# Patient Record
Sex: FEMALE | Race: BLACK OR AFRICAN AMERICAN | Employment: OTHER | URBAN - METROPOLITAN AREA
[De-identification: names, ages, dates, MRNs, and addresses within clinical notes are randomized per-mention and may not be internally consistent; named-entity substitution may affect disease eponyms.]

---

## 2018-05-01 ENCOUNTER — OP HISTORICAL/CONVERTED ENCOUNTER (OUTPATIENT)
Dept: OTHER | Age: 48
End: 2018-05-01

## 2019-07-02 ENCOUNTER — ED HISTORICAL/CONVERTED ENCOUNTER (OUTPATIENT)
Dept: OTHER | Age: 49
End: 2019-07-02

## 2019-07-26 ENCOUNTER — OFFICE VISIT (OUTPATIENT)
Dept: HEMATOLOGY | Age: 49
End: 2019-07-26

## 2019-07-26 VITALS
HEIGHT: 66 IN | DIASTOLIC BLOOD PRESSURE: 65 MMHG | OXYGEN SATURATION: 96 % | HEART RATE: 66 BPM | WEIGHT: 170.6 LBS | TEMPERATURE: 98.9 F | BODY MASS INDEX: 27.42 KG/M2 | SYSTOLIC BLOOD PRESSURE: 97 MMHG | RESPIRATION RATE: 14 BRPM

## 2019-07-26 DIAGNOSIS — R74.8 ELEVATED LIVER ENZYMES: Primary | ICD-10-CM

## 2019-07-26 PROBLEM — G62.9 NEUROPATHY: Status: ACTIVE | Noted: 2019-07-26

## 2019-07-26 PROBLEM — G43.909 MIGRAINE HEADACHE: Status: ACTIVE | Noted: 2019-07-26

## 2019-07-26 PROBLEM — Z90.49 HISTORY OF APPENDECTOMY: Status: ACTIVE | Noted: 2019-07-26

## 2019-07-26 PROBLEM — I42.9 CARDIOMYOPATHY (HCC): Status: ACTIVE | Noted: 2019-07-26

## 2019-07-26 PROBLEM — E03.9 HYPOTHYROIDISM: Status: ACTIVE | Noted: 2019-07-26

## 2019-07-26 PROBLEM — R56.9 SEIZURES (HCC): Status: ACTIVE | Noted: 2019-07-26

## 2019-07-26 PROBLEM — Z90.49 HISTORY OF CHOLECYSTECTOMY: Status: ACTIVE | Noted: 2019-07-26

## 2019-07-26 PROBLEM — Z98.84 H/O GASTRIC BYPASS: Status: ACTIVE | Noted: 2019-07-26

## 2019-07-26 RX ORDER — MONTELUKAST SODIUM 10 MG/1
10 TABLET ORAL DAILY
Refills: 3 | COMMUNITY
Start: 2019-07-15

## 2019-07-26 RX ORDER — ALBUTEROL SULFATE 90 UG/1
2 AEROSOL, METERED RESPIRATORY (INHALATION)
Refills: 4 | COMMUNITY
Start: 2019-07-15

## 2019-07-26 RX ORDER — SUCRALFATE 1 G/10ML
1 SUSPENSION ORAL 4 TIMES DAILY
COMMUNITY
End: 2022-09-28

## 2019-07-26 RX ORDER — DICLOFENAC SODIUM 10 MG/G
2 GEL TOPICAL EVERY 6 HOURS
Refills: 1 | Status: ON HOLD | COMMUNITY
Start: 2019-05-27 | End: 2021-09-17

## 2019-07-26 RX ORDER — PREGABALIN 100 MG/1
CAPSULE ORAL
Refills: 2 | COMMUNITY
Start: 2019-06-25 | End: 2020-04-21

## 2019-07-26 RX ORDER — SYRINGE WITH NEEDLE, 1 ML 25GX5/8"
SYRINGE, EMPTY DISPOSABLE MISCELLANEOUS
Refills: 0 | Status: ON HOLD | COMMUNITY
Start: 2019-07-23 | End: 2021-01-04

## 2019-07-26 RX ORDER — DEXLANSOPRAZOLE 60 MG/1
60 CAPSULE, DELAYED RELEASE ORAL DAILY
COMMUNITY

## 2019-07-26 RX ORDER — TOPIRAMATE 100 MG/1
100 TABLET, FILM COATED ORAL 2 TIMES DAILY
Refills: 5 | COMMUNITY
Start: 2019-07-19

## 2019-07-26 RX ORDER — PAROXETINE 10 MG/1
10 TABLET, FILM COATED ORAL DAILY
Refills: 4 | Status: ON HOLD | COMMUNITY
Start: 2019-04-30 | End: 2021-09-17

## 2019-07-26 RX ORDER — BUMETANIDE 1 MG/1
1 TABLET ORAL DAILY
COMMUNITY
End: 2021-04-30 | Stop reason: SDUPTHER

## 2019-07-26 RX ORDER — ESTRADIOL 0.1 MG/G
CREAM VAGINAL
Refills: 2 | COMMUNITY
Start: 2019-05-04 | End: 2022-09-28

## 2019-07-26 RX ORDER — LOSARTAN POTASSIUM 25 MG/1
25 TABLET ORAL
COMMUNITY
End: 2019-07-26 | Stop reason: SDUPTHER

## 2019-07-26 RX ORDER — NORTRIPTYLINE HYDROCHLORIDE 50 MG/1
100 CAPSULE ORAL
Refills: 5 | COMMUNITY
Start: 2019-07-19

## 2019-07-26 RX ORDER — CARVEDILOL 6.25 MG/1
6.25 TABLET ORAL 2 TIMES DAILY
COMMUNITY
End: 2021-11-08 | Stop reason: SDUPTHER

## 2019-07-26 RX ORDER — POTASSIUM CHLORIDE 20 MEQ/1
20 TABLET, EXTENDED RELEASE ORAL DAILY
Refills: 4 | COMMUNITY
Start: 2019-07-11 | End: 2022-03-08

## 2019-07-26 RX ORDER — DICYCLOMINE HYDROCHLORIDE 20 MG/1
20 TABLET ORAL EVERY 6 HOURS
COMMUNITY

## 2019-07-26 RX ORDER — BUDESONIDE AND FORMOTEROL FUMARATE DIHYDRATE 80; 4.5 UG/1; UG/1
AEROSOL RESPIRATORY (INHALATION)
Refills: 4 | COMMUNITY
Start: 2019-05-24

## 2019-07-26 RX ORDER — FAMOTIDINE 40 MG/1
40 TABLET, FILM COATED ORAL
Refills: 2 | COMMUNITY
Start: 2019-07-20 | End: 2022-03-08

## 2019-07-26 RX ORDER — LEVETIRACETAM 750 MG/1
1500 TABLET ORAL 2 TIMES DAILY
Refills: 1 | COMMUNITY
Start: 2019-06-16 | End: 2022-05-08

## 2019-07-26 RX ORDER — ALBUTEROL SULFATE 0.83 MG/ML
2.5 SOLUTION RESPIRATORY (INHALATION)
Refills: 5 | Status: ON HOLD | COMMUNITY
Start: 2019-05-24 | End: 2021-01-04

## 2019-07-26 RX ORDER — SPIRONOLACTONE 25 MG/1
25 TABLET ORAL
COMMUNITY
End: 2019-07-26 | Stop reason: SDUPTHER

## 2019-07-26 RX ORDER — LEVOTHYROXINE SODIUM 50 UG/1
50 CAPSULE ORAL
COMMUNITY
End: 2019-07-26 | Stop reason: SDUPTHER

## 2019-07-26 NOTE — Clinical Note
7/28/19 Patient: Shilo Campbell YOB: 1970 Date of Visit: 7/26/2019 Matteo Ramirez, [address] VIA Facsimile: [phone] Dear Matteo Ramirez MD, Thank you for referring Ms. Anya Munroe Milagros to [facility] for evaluation. My notes for this consultation are attached. If you have questions, please do not hesitate to call me. I look forward to following your patient along with you. Sincerely, Ivelisse Chawla MD

## 2019-07-26 NOTE — PROGRESS NOTES
Chief Complaint   Patient presents with   [employer] Establish Care     new patient         1. Have you been to the ER, urgent care clinic since your last visit? Hospitalized since your last visit? no    2. Have you seen or consulted any other health care providers outside of the [address] [place] [address] since your last visit? Include any pap smears or colon screening.  no

## 2019-07-26 NOTE — PROGRESS NOTES
[facility], MD, [address], Cite Preeti Bonner MD [place], PARONNY Benitez, ACN-BC     Perla Russo, [facility]   BLESSING HawkinsP-C    Luca Garcia, [facility]       Johanne Coyle Ghassan De [address] 136    at [address], [address] [place] [address], [facility] 22.    [phone]    FAX: [address] [place] [address], [address]    [phone]    FAX: [phone]           Patient Care Team:  Yancy Lucas MD as PCP - General (Family Practice)  Magan Tam MD (Breast Surgery)  Robby Nathan MD as Referring Provider (Obstetrics & Gynecology)  Magan Tam MD (Breast Surgery)      Problem List  Date Reviewed: 7/26/2019          Codes Class Noted    Elevated liver enzymes ICD-10-CM: R74.8  ICD-9-CM: 790.5  7/26/2019        Card[facility] ([facility] ICD-10-CM: I42.9  ICD-9-CM: 425.4  7/26/2019        Hypothyroidism ICD-10-CM: E03.9  ICD-9-CM: 244.9  7/26/2019        H/O gastric bypass ICD-10-CM: Z98.84  ICD-9-CM: V45.86  7/26/2019        History of cholecystectomy ICD-10-CM: Z90.49  ICD-9-CM: V45.79  7/26/2019        History of appendectomy ICD-10-CM: Z90.49  ICD-9-CM: V45.89  7/26/2019        Neuropathy ICD-10-CM: G62.9  ICD-9-CM: 355.9  7/26/2019        Migraine headache ICD-10-CM: F67.472  ICD-9-CM: 346.90  7/26/2019        Seizures ([facility] ICD-10-CM: R56.9  ICD-9-CM: 780.39  7/26/2019        Fibrocystic breast changes of both breasts ICD-10-CM: N60.11, N60.12  ICD-9-CM: 610.1  7/8/2015              The clinicians listed above haveasked me to see Paul Palacios in consultation regarding elevated liver enzymes and its management.   All medical records sent by the referring physicians were reviewed     The patient is a 44 y.o. Black female who was found to have abnormalities in liver enzymes in 2018. Serologic evaluation for markers of chronic liver disease has either not been performed or the results are not available to me. Imaging of the liver was not performed. An assessment of liver fibrosis with biopsy or elastography has not been performed. The patient had not started any new medications within 3 months preceding the elevation in liver     The patient has the following symptoms which are thought to be due to the liver disease:  fatigue,   nausea, diffuse abdominal pain,     The patient has not experienced the following symptoms:  pain in the right side over the liver,     The patient has Mild limitations in functional activities which can be attributed to other medical problems that are not related to the liver disease. ASSESSMENT AND PLAN:  Elevated liver enzymes  Persistent elevation in liver transaminases of unclear etiology at this time. Have performed laboratory testing to monitor liver function and degree of liver injury. This included BMP, hepatic panel, CBC with platelet count, INR. Liver transaminases are elevated. ALP is normal.  Liver function is normal.  The platelet count is normal.      Serologic testing for causes of chronic liver disease were ordered. Will perform imaging of the liver with ultrasound. The need to perform an assessment of liver fibrosis was discussed with the patient. The Fibroscan can assess liver fibrosis and determine if a patient has advanced fibrosis or cirrhosis without the need for liver biopsy. This will be performed at the next office visit. If the Fibroscan suggests advanced fibrosis then a liver biopsy should be considered. Screening for Hepatocellular Carcinoma  HCC screening is not necessary if the patient has no evidence of cirrhosis.     Treatment of other medical problems in patients with chronic liver disease  There are no contraindications for the patient to take most medications that are necessary for treatment of other medical issues. Counseling for alcohol in patients with chronic liver disease  The patient was counseled regarding alcohol consumption and the effect of alcohol on chronic liver disease. Patients who have undergone obesity surgery are at much greater risk to develop alcoholic liver injury. Vaccinations   The need for vaccination against viral hepatitis A and B will be assessed with serologic and instituted as appropriate. Routine vaccinations against other bacterial and viral agents can be performed as indicated. Annual flu vaccination should be administered if indicated. ALLERGIES  Allergies   Allergen Reactions    Morphine Other (comments)     SOB/Chest Pressure - in hosp for a week. States DILAUDID Ok    Codeine Other (comments)     SOB chest pain    Sulfa (Sulfonamide Antibiotics) Rash     itching       MEDICATIONS  Current Outpatient Medications   Medication Sig    levETIRAcetam (KEPPRA) 750 mg tablet TAKE 1 TABLET BY MOUTH TWICE DAILY    lipase-protease-amylase (ZENPEP 40,000) 40,000-126,000- 168,000 unit cpDR capsule Take  by mouth.  montelukast (SINGULAIR) 10 mg tablet TAKE 1 TABLET BY MOUTH ONCE DAILY    nortriptyline (PAMELOR) 50 mg capsule TAKE 2 CAPSULES BY MOUTH ONCE DAILY AT BEDTIME FOR 30 DAYS    PARoxetine (PAXIL) 10 mg tablet     potassium chloride (K-DUR, KLOR-CON) 20 mEq tablet TAKE 1 TABLET BY MOUTH TWICE DAILY FOR 30 DAYS    LYRICA 100 mg capsule TAKE 1 CAPSULE BY MOUTH TWICE DAILY FOR 30 DAYS    sucralfate (CARAFATE) 100 mg/mL suspension Take 1 g by mouth.  BD LUER-TEO SYRINGE 3 mL 23 x 1\" syrg USE TO INJECT B 12 INJECTIONS ONCE A MONTH    topiramate (TOPAMAX) 100 mg tablet TAKE 1 TABLET BY MOUTH TWICE DAILY    carvedilol (COREG) 6.25 mg tablet Take 6.25 mg by mouth.     albuterol (PROVENTIL VENTOLIN) 2.5 mg /3 mL (0.083 %) nebu USE 1 VIAL IN NEBULIZER EVERY 4 TO 6 HOURS AS NEEDED    VENTOLIN HFA 90 mcg/actuation inhaler INHALE 2 PUFFS BY MOUTH EVERY 4 TO 6 HOURS AS NEEDED    SYMBICORT 80-4.5 mcg/actuation HFAA INHALE 2 PUFFS BY MOUTH TWICE DAILY    bumetanide (BUMEX) 1 mg tablet Take 1 mg by mouth.  dexlansoprazole (DEXILANT) 60 mg CpDB capsule (delayed release) Take 60 mg by mouth.  diclofenac (VOLTAREN) 1 % gel APPLY 4 GRAMS TOPICALLY TO AFFECTED AREA FOUR TIMES A DAY    dicyclomine (BENTYL) 20 mg tablet Take 20 mg by mouth.  estradiol (ESTRACE) 0.01 % (0.1 mg/gram) vaginal cream     cetirizine (ZYRTEC) 10 mg tablet Take  by mouth daily.  losartan (COZAAR) 25 mg tablet Take 25 mg by mouth daily (after breakfast).  spironolactone (ALDACTONE) 25 mg tablet Take 25 mg by mouth daily (after breakfast).  levothyroxine (SYNTHROID) 50 mcg tablet Take 50 mcg by mouth Daily (before breakfast).  senna-docusate (PERICOLACE) 8.6-50 mg per tablet Take 2 Tabs by mouth daily.  cyanocobalamin (VITAMIN B-12) 1,000 mcg/mL injection 1,000 mcg by IntraMUSCular route every thirty (30) days.  Calcium-Cholecalciferol, D3, (CALCIUM 600 WITH VITAMIN D3) 600 mg(1,500mg) -400 unit cap Take  by mouth two (2) times a day.  multivitamin (ONE A DAY) tablet Take 1 Tab by mouth daily.  Cholecalciferol, Vitamin D3, (VITAMIN D3) 2,000 unit cap capsule Take 5,000 Units by mouth daily.  famotidine (PEPCID) 40 mg tablet TAKE 1 TABLET BY MOUTH TWICE DAILY    evening primrose oil 500 mg cap Take  by mouth.  vitamin E (AQUA GEMS) 400 unit capsule Take  by mouth daily.  divalproex DR (DEPAKOTE) 500 mg tablet Take 500 mg by mouth daily.  carvedilol (COREG) 3.125 mg tablet Take 3.125 mg by mouth two (2) times a day.  esomeprazole (NEXIUM) 40 mg capsule Take 40 mg by mouth daily (after breakfast).  celecoxib (CELEBREX) 200 mg capsule Take 200 mg by mouth daily. No current facility-administered medications for this visit. SYSTEM REVIEW NOT RELATED TO LIVER DISEASE OR REVIEWED ABOVE:  Constitution systems: Negative for fever, chills, weight gain, weight loss. Eyes: Negative for visual changes. ENT: Negative for sore throat, painful swallowing. Respiratory: Negative for cough, hemoptysis, SOB. Cardiology: Negative for chest pain, palpitations. GI:  Negative for constipation or diarrhea. : Negative for urinary frequency, dysuria, hematuria, nocturia. Skin: Negative for rash. Hematology: Negative for easy bruising, blood clots. Musculo-skelatal: Negative for back pain, muscle pain, weakness. Neurologic: Negative for headaches, dizziness, vertigo, memory problems not related to HE. Psychology: Negative for anxiety, depression. FAMILY HISTORY:  The father  Has/had the following following chronic disease(s): cancer. The mother Has/had the following chronic disease(s): MI.    There is no family history of liver disease. SOCIAL HISTORY:  The patient is . The patient has 4 children,   The patient has never used tobacco products. The patient has never consumed significant amounts of alcohol. The patient currently receiving disability. PHYSICAL EXAMINATION:  Visit Vitals  BP 97/65 (BP 1 Location: Left arm, BP Patient Position: Sitting)   Pulse 66   Temp 98.9 °F (37.2 °C) (Oral)   Resp 14   Ht 5' 6\" (1.676 m)   Wt 170 lb 9.6 oz (77.4 kg)   SpO2 96%   BMI 27.54 kg/m²     General: No acute distress. Eyes: Sclera anicteric. ENT: No oral lesions. Thyroid normal.  Nodes: No adenopathy. Skin: No spider angiomata. No jaundice. No palmar erythema. Respiratory: Lungs clear to auscultation. Cardiovascular: Regular heart rate. No murmurs. No JVD. Abdomen: Soft non-tender. Liver size normal to percussion/palpation. Spleen not palpable. No obvious ascites. Extremities: No edema. No muscle wasting. No gross arthritic changes. Neurologic: Alert and oriented.   Cranial nerves grossly intact. No asterixis. LABORATORY STUDIES:  Liver [place] of [address] Units 7/26/2019   WBC 3.4 - 10.8 x10E3/uL 8.5   ANC 1.4 - 7.0 x10E3/uL 6.3   HGB 11.1 - 15.9 g/dL 11.8    - 450 x10E3/uL 209   INR 0.8 - 1.2 1.0   AST 0 - 40 IU/L 34   ALT 0 - 32 IU/L 57 (H)   Alk Phos 39 - 117 IU/L 126 (H)   Bili, Total 0.0 - 1.2 mg/dL 0.3   Bili, Direct 0.00 - 0.40 mg/dL 0.09   Albumin 3.5 - 5.5 g/dL 4.1   BUN 6 - 24 mg/dL 14   Creat 0.57 - 1.00 mg/dL 0.90   Na 134 - 144 mmol/L 138   K 3.5 - 5.2 mmol/L 3.1 (L)   Cl 96 - 106 mmol/L 100   CO2 20 - 29 mmol/L 24   Glucose 65 - 99 mg/dL 85     SEROLOGIES:  Not available or performed. Testing was performed today. LIVER HISTOLOGY:  Not available or performed    ENDOSCOPIC PROCEDURES:  Not available or performed    RADIOLOGY:  Not available or performed    OTHER TESTING:  Not available or performed    FOLLOW-UP:  All of the issues listed above in the Assessment and Plan were discussed with the patient. All questions were answered. The patient expressed a clear understanding of the above. [address] [place] [address] in 4 weeks for Fibroscan to review all data and determine the treatment plan.       Farzana Matthew MD  [facility] 13  [address], [address] [facility] 22.  [phone]  [facility] [address]

## 2019-07-27 LAB
A1AT SERPL-MCNC: 99 MG/DL (ref 90–200)
ALBUMIN SERPL-MCNC: 4.1 G/DL (ref 3.5–5.5)
ALP SERPL-CCNC: 126 IU/L (ref 39–117)
ALT SERPL-CCNC: 57 IU/L (ref 0–32)
AST SERPL-CCNC: 34 IU/L (ref 0–40)
BASOPHILS # BLD AUTO: 0 X10E3/UL (ref 0–0.2)
BASOPHILS NFR BLD AUTO: 0 %
BILIRUB DIRECT SERPL-MCNC: 0.09 MG/DL (ref 0–0.4)
BILIRUB SERPL-MCNC: 0.3 MG/DL (ref 0–1.2)
BUN SERPL-MCNC: 14 MG/DL (ref 6–24)
BUN/CREAT SERPL: 16 (ref 9–23)
CALCIUM SERPL-MCNC: 8.8 MG/DL (ref 8.7–10.2)
CERULOPLASMIN SERPL-MCNC: 23.2 MG/DL (ref 19–39)
CHLORIDE SERPL-SCNC: 100 MMOL/L (ref 96–106)
CO2 SERPL-SCNC: 24 MMOL/L (ref 20–29)
COMMENT, 144067: NORMAL
CREAT SERPL-MCNC: 0.9 MG/DL (ref 0.57–1)
EOSINOPHIL # BLD AUTO: 0 X10E3/UL (ref 0–0.4)
EOSINOPHIL NFR BLD AUTO: 1 %
ERYTHROCYTE [DISTWIDTH] IN BLOOD BY AUTOMATED COUNT: 15.6 % (ref 12.3–15.4)
FERRITIN SERPL-MCNC: 14 NG/ML (ref 15–150)
GLUCOSE SERPL-MCNC: 85 MG/DL (ref 65–99)
HAV AB SER QL IA: NEGATIVE
HBV CORE AB SERPL QL IA: NEGATIVE
HBV SURFACE AB SER QL: NON REACTIVE
HBV SURFACE AG SERPL QL IA: NEGATIVE
HCT VFR BLD AUTO: 36.2 % (ref 34–46.6)
HCV AB S/CO SERPL IA: <0.1 S/CO RATIO (ref 0–0.9)
HGB BLD-MCNC: 11.8 G/DL (ref 11.1–15.9)
IMM GRANULOCYTES # BLD AUTO: 0 X10E3/UL (ref 0–0.1)
IMM GRANULOCYTES NFR BLD AUTO: 0 %
INR PPP: 1 (ref 0.8–1.2)
IRON SATN MFR SERPL: 12 % (ref 15–55)
IRON SERPL-MCNC: 48 UG/DL (ref 27–159)
LYMPHOCYTES # BLD AUTO: 1.6 X10E3/UL (ref 0.7–3.1)
LYMPHOCYTES NFR BLD AUTO: 19 %
MCH RBC QN AUTO: 27.6 PG (ref 26.6–33)
MCHC RBC AUTO-ENTMCNC: 32.6 G/DL (ref 31.5–35.7)
MCV RBC AUTO: 85 FL (ref 79–97)
MONOCYTES # BLD AUTO: 0.6 X10E3/UL (ref 0.1–0.9)
MONOCYTES NFR BLD AUTO: 7 %
NEUTROPHILS # BLD AUTO: 6.3 X10E3/UL (ref 1.4–7)
NEUTROPHILS NFR BLD AUTO: 73 %
PLATELET # BLD AUTO: 209 X10E3/UL (ref 150–450)
POTASSIUM SERPL-SCNC: 3.1 MMOL/L (ref 3.5–5.2)
PROT SERPL-MCNC: 6.4 G/DL (ref 6–8.5)
PROTHROMBIN TIME: 10.5 SEC (ref 9.1–12)
RBC # BLD AUTO: 4.28 X10E6/UL (ref 3.77–5.28)
SODIUM SERPL-SCNC: 138 MMOL/L (ref 134–144)
TIBC SERPL-MCNC: 393 UG/DL (ref 250–450)
UIBC SERPL-MCNC: 345 UG/DL (ref 131–425)
WBC # BLD AUTO: 8.5 X10E3/UL (ref 3.4–10.8)

## 2019-07-29 LAB
ACTIN IGG SERPL-ACNC: 6 UNITS (ref 0–19)
MITOCHONDRIA M2 IGG SER-ACNC: <20 UNITS (ref 0–20)

## 2019-07-30 LAB — ANA TITR SER IF: NEGATIVE {TITER}

## 2019-08-07 ENCOUNTER — HOSPITAL ENCOUNTER (OUTPATIENT)
Dept: ULTRASOUND IMAGING | Age: 49
Discharge: HOME | End: 2019-08-07
Attending: INTERNAL MEDICINE
Payer: MEDICARE

## 2019-08-07 DIAGNOSIS — R74.8 ELEVATED LIVER ENZYMES: ICD-10-CM

## 2019-08-07 PROCEDURE — 76705 ECHO EXAM OF ABDOMEN: CPT

## 2019-08-30 ENCOUNTER — OFFICE VISIT (OUTPATIENT)
Dept: HEMATOLOGY | Age: 49
End: 2019-08-30

## 2019-08-30 VITALS
WEIGHT: 167 LBS | BODY MASS INDEX: 26.84 KG/M2 | HEIGHT: 66 IN | TEMPERATURE: 97.2 F | HEART RATE: 60 BPM | SYSTOLIC BLOOD PRESSURE: 103 MMHG | OXYGEN SATURATION: 97 % | DIASTOLIC BLOOD PRESSURE: 69 MMHG

## 2019-08-30 DIAGNOSIS — R74.8 ELEVATED LIVER ENZYMES: Primary | ICD-10-CM

## 2019-08-30 DIAGNOSIS — K76.0 FATTY LIVER: ICD-10-CM

## 2019-08-30 NOTE — Clinical Note
9/17/19 Patient: Luan Chu YOB: 1970 Date of Visit: 8/30/2019 Kieran Snyder, 14 [address] VIA Facsimile: [phone] Dear Kieran Snyder MD, Thank you for referring Ms. Viky Reyna Jigna to [facility] for evaluation. My notes for this consultation are attached. If you have questions, please do not hesitate to call me. I look forward to following your patient along with you. Sincerely, Trinity Cuevas MD

## 2019-08-30 NOTE — PROGRESS NOTES
[address] [facility], [place], [address], [place]ite Patricia Marie, MD Alejandra Obregon, MANISH Santiago, [facility]     Perla Russo, [facility]   Harish Garcia, NURIA-SERA Aquino, [facility]       Johanne Coyle Ghassan [address] 136    at [address], [address] [place] [address], [facility] 22.    [phone]    FAX: [address] [place] [address]    at [address], [address]    [phone]    FAX: [phone]       Patient Care Team:  Kelsie Hutson MD as PCP - General (Family Practice)  Omid Martinez MD (Breast Surgery)  Ofelia Garcia MD as Referring Provider (Obstetrics & Gynecology)  Omid Martinez MD (Breast Surgery)      Problem List  Date Reviewed: 7/28/2019          Codes Class Noted    Elevated liver enzymes ICD-10-CM: R74.8  ICD-9-CM: 790.5  7/26/2019        Cardiomyopathy ([facility] 75.) ICD-10-CM: I42.9  ICD-9-CM: 425.4  7/26/2019        Hypothyroidism ICD-10-CM: E03.9  ICD-9-CM: 244.9  7/26/2019        H/O gastric bypass ICD-10-CM: Z98.84  ICD-9-CM: V45.86  7/26/2019        History of cholecystectomy ICD-10-CM: Z90.49  ICD-9-CM: V45.79  7/26/2019        History of appendectomy ICD-10-CM: Z90.49  ICD-9-CM: V45.89  7/26/2019        Neuropathy ICD-10-CM: G62.9  ICD-9-CM: 355.9  7/26/2019        Migraine headache ICD-10-CM: H25.655  ICD-9-CM: 346.90  7/26/2019        Seizures ([facility] 75.) ICD-10-CM: R56.9  ICD-9-CM: 780.39  7/26/2019        Fibrocystic breast changes of both breasts ICD-10-CM: N60.11, N60.12  ICD-9-CM: 610.1  7/8/2015              Rella Pack returns to the [address] for management of elevated liver enzymes.  The active problem list, all pertinent past medical history, medications, radiologic findings and laboratory findings related to the liver disorder were reviewed with the patient. The patient is a 44 y.o. Black female who was found to have abnormalities in liver enzymes in 2018. Serologic evaluation for markers of chronic liver disease were negative. Imaging of the liver was performed 8/2019 which demonstrates a normal liver, no ductal dilatation or mass. Assessment of liver fibrosis was performed in the office today with Fibroscan. The result was 3.3 kPa which correlates with no fibrosis. The CAP score of 267 suggests mild fatty liver. The patient has the following symptoms which are thought to be due to the liver disease:  fatigue, nausea, and diffuse abdominal pain. The patient has not experienced the following symptoms:  pain in the right side over the liver. The patient has Mild limitations in functional activities which can be attributed to other medical problems that are not related to the liver disease. ASSESSMENT AND PLAN:  Benign steatosis/NAFL  The diagnosis is based upon imaging, Fiboscan CAP score, features of metabolic syndrome, and serologic studies that are negative for other causes of chronic liver disease. Elastography performed in 8/2019 suggests no fibrosis. NAFL is a benign form of fatty liver disease and not thought to progress to fibrosis or cirrhosis. Liver transaminases are elevated. AST is normal.  ALT is elevated. ALP is elevated. Liver function is normal.  The platelet count is   normal.      Based upon laboratory studies Fibroscan, and imaging  the patient does not appear to have significant liver injury. Have performed laboratory testing to monitor liver function and degree of liver injury. This included BMP, hepatic panel, CBC with platelet count, and INR. Counseling for diet and weight loss in patients with confirmed or suspected NAFLD  There is currently no FDA approved medical treatment for fatty liver, NALFD or NOEL.     The patient was counseled regarding diet and exercise to achieve weight loss. The best diet for patients with fatty liver is one very low in carbohydrates and enriched with protein such as an Javed's program.      The patient does not have a significant amount of weight to lose but changing dietary choices may help eliminate fatty liver and the liver enzymes may improve. The patient was told not to consume any food products and drinks containing fructose as this enhances hepatic fat synthesis. Screening for Hepatocellular Carcinoma  HCC screening is not necessary if the patient has no evidence of cirrhosis. Treatment of other medical problems in patients with chronic liver disease  There are no contraindications for the patient to take most medications that are necessary for treatment of other medical issues. Counseling for alcohol in patients with chronic liver disease  The patient was counseled regarding alcohol consumption and the effect of alcohol on chronic liver disease. Patients who have undergone obesity surgery are at much greater risk to develop alcoholic liver injury. Vaccinations   Vaccination for viral hepatitis A and B is recommended since the patient has no serologic evidence of previous exposure or vaccination with immunity. Routine vaccinations against other bacterial and viral agents can be performed as indicated. Annual flu vaccination should be administered if indicated. Vaccinations   The need for vaccination against viral hepatitis A and B will be assessed with serologic and instituted as appropriate. Routine vaccinations against other bacterial and viral agents can be performed as indicated. Annual flu vaccination should be administered if indicated. ALLERGIES  Allergies   Allergen Reactions    Morphine Other (comments)     SOB/Chest Pressure - in hosp for a week.   States DILAUDID Ok    Codeine Other (comments)     SOB chest pain    Sulfa (Sulfonamide Antibiotics) Rash     itching       MEDICATIONS  Current Outpatient Medications   Medication Sig    levETIRAcetam (KEPPRA) 750 mg tablet TAKE 1 TABLET BY MOUTH TWICE DAILY    lipase-protease-amylase (ZENPEP 40,000) 40,000-126,000- 168,000 unit cpDR capsule Take  by mouth.  montelukast (SINGULAIR) 10 mg tablet TAKE 1 TABLET BY MOUTH ONCE DAILY    nortriptyline (PAMELOR) 50 mg capsule TAKE 2 CAPSULES BY MOUTH ONCE DAILY AT BEDTIME FOR 30 DAYS    potassium chloride (K-DUR, KLOR-CON) 20 mEq tablet TAKE 1 TABLET BY MOUTH TWICE DAILY FOR 30 DAYS    LYRICA 100 mg capsule TAKE 1 CAPSULE BY MOUTH TWICE DAILY FOR 30 DAYS    sucralfate (CARAFATE) 100 mg/mL suspension Take 1 g by mouth.  BD LUER-TEO SYRINGE 3 mL 23 x 1\" syrg USE TO INJECT B 12 INJECTIONS ONCE A MONTH    topiramate (TOPAMAX) 100 mg tablet TAKE 1 TABLET BY MOUTH TWICE DAILY    carvedilol (COREG) 6.25 mg tablet Take 6.25 mg by mouth.  VENTOLIN HFA 90 mcg/actuation inhaler INHALE 2 PUFFS BY MOUTH EVERY 4 TO 6 HOURS AS NEEDED    SYMBICORT 80-4.5 mcg/actuation HFAA INHALE 2 PUFFS BY MOUTH TWICE DAILY    bumetanide (BUMEX) 1 mg tablet Take 1 mg by mouth.  dexlansoprazole (DEXILANT) 60 mg CpDB capsule (delayed release) Take 60 mg by mouth.  diclofenac (VOLTAREN) 1 % gel APPLY 4 GRAMS TOPICALLY TO AFFECTED AREA FOUR TIMES A DAY    dicyclomine (BENTYL) 20 mg tablet Take 20 mg by mouth.  estradiol (ESTRACE) 0.01 % (0.1 mg/gram) vaginal cream     cetirizine (ZYRTEC) 10 mg tablet Take  by mouth daily.  losartan (COZAAR) 25 mg tablet Take 25 mg by mouth daily (after breakfast).  spironolactone (ALDACTONE) 25 mg tablet Take 25 mg by mouth daily (after breakfast).  celecoxib (CELEBREX) 200 mg capsule Take 200 mg by mouth daily.  levothyroxine (SYNTHROID) 50 mcg tablet Take 50 mcg by mouth Daily (before breakfast).  cyanocobalamin (VITAMIN B-12) 1,000 mcg/mL injection 1,000 mcg by IntraMUSCular route every thirty (30) days.     Calcium-Cholecalciferol, D3, (CALCIUM 600 WITH VITAMIN D3) 600 mg(1,500mg) -400 unit cap Take  by mouth two (2) times a day.  multivitamin (ONE A DAY) tablet Take 1 Tab by mouth daily.  Cholecalciferol, Vitamin D3, (VITAMIN D3) 2,000 unit cap capsule Take 5,000 Units by mouth daily.  PARoxetine (PAXIL) 10 mg tablet     albuterol (PROVENTIL VENTOLIN) 2.5 mg /3 mL (0.083 %) nebu USE 1 VIAL IN NEBULIZER EVERY 4 TO 6 HOURS AS NEEDED    famotidine (PEPCID) 40 mg tablet TAKE 1 TABLET BY MOUTH TWICE DAILY    evening primrose oil 500 mg cap Take  by mouth.  vitamin E (AQUA GEMS) 400 unit capsule Take  by mouth daily.  divalproex DR (DEPAKOTE) 500 mg tablet Take 500 mg by mouth daily.  carvedilol (COREG) 3.125 mg tablet Take 3.125 mg by mouth two (2) times a day.  esomeprazole (NEXIUM) 40 mg capsule Take 40 mg by mouth daily (after breakfast).  senna-docusate (PERICOLACE) 8.6-50 mg per tablet Take 2 Tabs by mouth daily. No current facility-administered medications for this visit. SYSTEM REVIEW NOT RELATED TO LIVER DISEASE OR REVIEWED ABOVE:  Constitution systems: Negative for fever, chills, weight gain, weight loss. Eyes: Negative for visual changes. ENT: Negative for sore throat, painful swallowing. Respiratory: Negative for cough, hemoptysis, SOB. Cardiology: Negative for chest pain, palpitations. GI:  Negative for constipation or diarrhea. : Negative for urinary frequency, dysuria, hematuria, nocturia. Skin: Negative for rash. Hematology: Negative for easy bruising, blood clots. Musculo-skelatal: Negative for back pain, muscle pain, weakness. Neurologic: Negative for headaches, dizziness, vertigo, memory problems not related to HE. Psychology: Negative for anxiety, depression. FAMILY HISTORY:  The father  Has/had the following following chronic disease(s): cancer.     The mother Has/had the following chronic disease(s): MI.    There is no family history of liver disease. SOCIAL HISTORY:  The patient is . The patient has 4 children,   The patient has never used tobacco products. The patient has never consumed significant amounts of alcohol. The patient currently receiving disability. PHYSICAL EXAMINATION:  Visit Vitals  /69 (BP 1 Location: Right arm, BP Patient Position: Sitting)   Pulse 60   Temp 97.2 °F (36.2 °C) (Tympanic)   Ht 5' 6\" (1.676 m)   Wt 167 lb (75.8 kg)   SpO2 97%   BMI 26.95 kg/m²     General: No acute distress. Eyes: Sclera anicteric. ENT: No oral lesions. Thyroid normal.  Nodes: No adenopathy. Skin: No spider angiomata. No jaundice. No palmar erythema. Respiratory: Lungs clear to auscultation. Cardiovascular: Regular heart rate. No murmurs. No JVD. Abdomen: Soft non-tender. Liver size normal to percussion/palpation. Spleen not palpable. No obvious ascites. Extremities: No edema. No muscle wasting. No gross arthritic changes. Neurologic: Alert and oriented. Cranial nerves grossly intact. No asterixis.     LABORATORY STUDIES:  Liver [place] [address] Units 7/26/2019   WBC 3.4 - 10.8 x10E3/uL 8.5   ANC 1.4 - 7.0 x10E3/uL 6.3   HGB 11.1 - 15.9 g/dL 11.8    - 450 x10E3/uL 209   INR 0.8 - 1.2 1.0   AST 0 - 40 IU/L 34   ALT 0 - 32 IU/L 57 (H)   Alk Phos 39 - 117 IU/L 126 (H)   Bili, Total 0.0 - 1.2 mg/dL 0.3   Bili, Direct 0.00 - 0.40 mg/dL 0.09   Albumin 3.5 - 5.5 g/dL 4.1   BUN 6 - 24 mg/dL 14   Creat 0.57 - 1.00 mg/dL 0.90   Na 134 - 144 mmol/L 138   K 3.5 - 5.2 mmol/L 3.1 (L)   Cl 96 - 106 mmol/L 100   CO2 20 - 29 mmol/L 24   Glucose 65 - 99 mg/dL 85     SEROLOGIES:  Serologies Latest Ref Rng & Units 7/26/2019   Hep A Ab, Total Negative Negative   Hep B Surface Ag Negative Negative   Hep B Core Ab, Total Negative Negative   Hep B Surface AB QL  Non Reactive   Hep C Ab 0.0 - 0.9 s/co ratio <0.1   Ferritin 15 - 150 ng/mL 14 (L)   Iron % Saturation 15 - 55 % 12 (L)   GULSHAN, IFA  Negative   ASMCA 0 - 19 Units 6   M2 Ab 0.0 - 20.0 Units <20.0   Ceruloplasmin 19.0 - 39.0 mg/dL 23.2   Alpha-1 antitrypsin level 90 - 200 mg/dL 99     LIVER HISTOLOGY:  8/2019. FibroScan performed at [address]. EkPa was 3.3. IQR/med 9%. . The results suggested a fibrosis level of F0. The CAP score suggests no evidence of fatty liver. ENDOSCOPIC PROCEDURES:  Not available or performed    RADIOLOGY:  8/2019. Ultrasound of liver. Normal appearing liver. No liver mass lesions. OTHER TESTING:  Not available or performed    FOLLOW-UP:  All of the issues listed above in the Assessment and Plan were discussed with the patient. All questions were answered. The patient expressed a clear understanding of the above. [facility] 87 in 6 months. April SBRAD Peterson [facility] [id number] [address], [address] [place] [address] 22.  [address] [place], [place]  [address] [id number] Gordon A, [address] [place] [address] 22.  [phone]  [address] [facility]

## 2019-08-30 NOTE — PROGRESS NOTES
Chief Complaint   Patient presents with    Follow-up     FIBROSCAN     Visit Vitals  /69 (BP 1 Location: Right arm, BP Patient Position: Sitting)   Pulse 60   Temp 97.2 °F (36.2 °C) (Tympanic)   Ht 5' 6\" (1.676 m)   Wt 167 lb (75.8 kg)   SpO2 97%   BMI 26.95 kg/m²         Abuse Screening Questionnaire 8/30/2019   Do you ever feel afraid of your partner? N   Are you in a relationship with someone who physically or mentally threatens you? N   Is it safe for you to go home?  Y     Learning Assessment 8/30/2019   PRIMARY LEARNER Patient   HIGHEST LEVEL OF EDUCATION - PRIMARY LEARNER  GRADUATED HIGH SCHOOL OR GED   BARRIERS PRIMARY LEARNER NONE   PRIMARY LANGUAGE ENGLISH   LEARNER PREFERENCE PRIMARY LISTENING   ANSWERED BY PATIENT   RELATIONSHIP SELF

## 2019-09-12 ENCOUNTER — OP HISTORICAL/CONVERTED ENCOUNTER (OUTPATIENT)
Dept: OTHER | Age: 49
End: 2019-09-12

## 2019-09-19 ENCOUNTER — OP HISTORICAL/CONVERTED ENCOUNTER (OUTPATIENT)
Dept: OTHER | Age: 49
End: 2019-09-19

## 2019-10-16 ENCOUNTER — OP HISTORICAL/CONVERTED ENCOUNTER (OUTPATIENT)
Dept: OTHER | Age: 49
End: 2019-10-16

## 2019-11-01 ENCOUNTER — OP HISTORICAL/CONVERTED ENCOUNTER (OUTPATIENT)
Dept: OTHER | Age: 49
End: 2019-11-01

## 2020-04-21 ENCOUNTER — VIRTUAL VISIT (OUTPATIENT)
Dept: HEMATOLOGY | Age: 50
End: 2020-04-21

## 2020-04-21 DIAGNOSIS — R74.8 ELEVATED SERUM ALKALINE PHOSPHATASE LEVEL: ICD-10-CM

## 2020-04-21 DIAGNOSIS — R74.8 ELEVATED LIVER ENZYMES: Primary | ICD-10-CM

## 2020-04-21 PROBLEM — Z90.49 HISTORY OF BOWEL RESECTION: Status: ACTIVE | Noted: 2020-04-21

## 2020-04-21 RX ORDER — GABAPENTIN 100 MG/1
300 CAPSULE ORAL 3 TIMES DAILY
COMMUNITY
End: 2022-03-08

## 2020-04-21 NOTE — PROGRESS NOTES
[facility], [place], [address], [place] Patricia Marie, Leon Shone, MD Omar Hockey, MANISH Cristina, ACNP-BC     Perla Russo, AGPCNP-BC   Verna Bailon FNP-C    Dudley Drake, [facility]       Johanne Coyle Ghass[address]    at [address], [address] [place] Emmanuel Florian  22. [phone]    FAX: [address] [place] [address]    at [address], [address]    [phone]    FAX: [phone]       Patient Care Team:  Monika Cervantes MD as PCP - General (Family Practice)  Jacquetta Bamberger., MD (Breast Surgery)  Wing Harden MD as Referring Provider (Obstetrics & Gynecology)  Jacquetta Bamberger., MD (Breast Surgery)      Problem List  Date Reviewed: 9/17/2019          Codes Class Noted    History of bowel resection ICD-10-CM: Z90.49  ICD-9-CM: V15.89  4/21/2020        Elevated liver enzymes ICD-10-CM: R74.8  ICD-9-CM: 790.5  7/26/2019        Cardiomyopathy ([facility] 75.) ICD-10-CM: I42.9  ICD-9-CM: 425.4  7/26/2019        Hypothyroidism ICD-10-CM: E03.9  ICD-9-CM: 244.9  7/26/2019        H/O gastric bypass ICD-10-CM: Z98.84  ICD-9-CM: V45.86  7/26/2019        History of cholecystectomy ICD-10-CM: Z90.49  ICD-9-CM: V45.79  7/26/2019        History of appendectomy ICD-10-CM: Z90.49  ICD-9-CM: V45.89  7/26/2019        Neuropathy ICD-10-CM: G62.9  ICD-9-CM: 355.9  7/26/2019        Migraine headache ICD-10-CM: D39.001  ICD-9-CM: 346.90  7/26/2019        Seizures ([facility] ICD-10-CM: R56.9  ICD-9-CM: 780.39  7/26/2019        Fibrocystic breast changes of both breasts ICD-10-CM: N60.11, N60.12  ICD-9-CM: 610.1  7/8/2015              VIRTUAL TELEHEALTH VISIT PERFORMED DUE TO COVID-19 EPIDEMIC    CONSENT:  Jessee Bender, who was seen by synchronous, real-time, audio-video technology, and/or her healthcare decision maker, is aware that this patient-initiated, Telehealth encounter on 4/21/2020 is a billable service, with coverage as determined by her insurance carrier. She is aware that she may receive a bill and has provided verbal consent to proceed. This patient was evaluated during a Virtual Telehealth visit. A caregiver was present if appropriate. Due to this being a TeleHealth encounter performed during the AHHOZ-48 public health emergency, the physical examination was limited to that listed in the [address] returns to the [place] [address] for management of elevated liver enzymes. The active problem list, all pertinent past medical history, medications, radiologic findings and laboratory findings related to the liver disorder were reviewed with the patient. The patient is a 44 y.o. Black female who was found to have abnormalities in liver enzymes in 2018. Serologic evaluation for markers of chronic liver disease were negative. Imaging of the liver was performed 8/2019 which demonstrates a normal liver, no ductal dilatation or mass. Assessment of liver fibrosis was performed in the office with Fibroscan 8/2019. The result was 3.3 kPa which correlates with no fibrosis. The CAP score of 267 suggests mild fatty liver. Since the last office visit the patient has had 2 episodes of C-Diff 8/2019, 2/2020 treated with PO Vancomycin. It is unclear why she would have c-diff, the patient reports no antibiotic use or other contributing factors. She has had persistent loose stools up to 10 per day. The patient has the following symptoms which are thought to be due to the liver disease:  fatigue, nausea, and diffuse abdominal pain. The patient has not experienced the following symptoms:  pain in the right side over the liver.       The patient has Mild limitations in functional activities which can be attributed to other medical problems that are not related to the liver disease. ASSESSMENT AND PLAN:  Benign steatosis/NAFL  The diagnosis is based upon imaging, Fiboscan CAP score, features of metabolic syndrome, and serologic studies that are negative for other causes of chronic liver disease. Elastography performed in 8/2019 suggests no fibrosis. NAFL is a benign form of fatty liver disease and not thought to progress to fibrosis or cirrhosis. Liver transaminases are elevated. AST is normal.  ALT is elevated. ALP is elevated. Liver function is normal.  The platelet count is normal.      Based upon laboratory studies Fibroscan, and imaging  the patient does not appear to have significant liver injury. Have performed laboratory testing to monitor liver function and degree of liver injury. This included BMP, hepatic panel, CBC with platelet count, and INR. Elevated ALP  Although this is a mild elevation, in lieu of abdominal pain will order MRI/MRC to further evaluate. Counseling for diet and weight loss in patients with confirmed or suspected NAFLD  There is currently no FDA approved medical treatment for fatty liver, NALFD or NOEL. The patient was counseled regarding diet and exercise to achieve weight loss. The best diet for patients with fatty liver is one very low in carbohydrates and enriched with protein such as an Javed's program.      The patient does not have a significant amount of weight to lose but changing dietary choices may help eliminate fatty liver and the liver enzymes may improve. The patient was told not to consume any food products and drinks containing fructose as this enhances hepatic fat synthesis. Screening for Hepatocellular Carcinoma  HCC screening is not necessary if the patient has no evidence of cirrhosis.     Treatment of other medical problems in patients with chronic liver disease  There are no contraindications for the patient to take most medications that are necessary for treatment of other medical issues. Counseling for alcohol in patients with chronic liver disease  The patient was counseled regarding alcohol consumption and the effect of alcohol on chronic liver disease. Patients who have undergone obesity surgery are at much greater risk to develop alcoholic liver injury. Vaccinations   Vaccination for viral hepatitis A and B is recommended since the patient has no serologic evidence of previous exposure or vaccination with immunity. Routine vaccinations against other bacterial and viral agents can be performed as indicated. Annual flu vaccination should be administered if indicated. ALLERGIES  Allergies   Allergen Reactions    Morphine Other (comments)     SOB/Chest Pressure - in hosp for a week. States DILAUDID Ok    Codeine Other (comments)     SOB chest pain    Sulfa (Sulfonamide Antibiotics) Rash     itching       MEDICATIONS  Current Outpatient Medications   Medication Sig    gabapentin (NEURONTIN) 100 mg capsule Take 100 mg by mouth three (3) times daily.  levETIRAcetam (KEPPRA) 750 mg tablet TAKE 1 TABLET BY MOUTH TWICE DAILY    montelukast (SINGULAIR) 10 mg tablet TAKE 1 TABLET BY MOUTH ONCE DAILY    nortriptyline (PAMELOR) 50 mg capsule TAKE 2 CAPSULES BY MOUTH ONCE DAILY AT BEDTIME FOR 30 DAYS    PARoxetine (PAXIL) 10 mg tablet     potassium chloride (K-DUR, KLOR-CON) 20 mEq tablet TAKE 1 TABLET BY MOUTH TWICE DAILY FOR 30 DAYS    sucralfate (CARAFATE) 100 mg/mL suspension Take 1 g by mouth.  BD LUER-TEO SYRINGE 3 mL 23 x 1\" syrg USE TO INJECT B 12 INJECTIONS ONCE A MONTH    topiramate (TOPAMAX) 100 mg tablet TAKE 1 TABLET BY MOUTH TWICE DAILY    carvedilol (COREG) 6.25 mg tablet Take 6.25 mg by mouth.     albuterol (PROVENTIL VENTOLIN) 2.5 mg /3 mL (0.083 %) nebu USE 1 VIAL IN NEBULIZER EVERY 4 TO 6 HOURS AS NEEDED    VENTOLIN HFA 90 mcg/actuation inhaler INHALE 2 PUFFS BY MOUTH EVERY 4 TO 6 HOURS AS NEEDED    SYMBICORT 80-4.5 mcg/actuation HFAA INHALE 2 PUFFS BY MOUTH TWICE DAILY    bumetanide (BUMEX) 1 mg tablet Take 1 mg by mouth.  dexlansoprazole (DEXILANT) 60 mg CpDB capsule (delayed release) Take 60 mg by mouth.  diclofenac (VOLTAREN) 1 % gel APPLY 4 GRAMS TOPICALLY TO AFFECTED AREA FOUR TIMES A DAY    dicyclomine (BENTYL) 20 mg tablet Take 20 mg by mouth.  estradiol (ESTRACE) 0.01 % (0.1 mg/gram) vaginal cream     famotidine (PEPCID) 40 mg tablet TAKE 1 TABLET BY MOUTH TWICE DAILY    evening primrose oil 500 mg cap Take  by mouth.  vitamin E (AQUA GEMS) 400 unit capsule Take  by mouth daily.  cetirizine (ZYRTEC) 10 mg tablet Take  by mouth daily.  losartan (COZAAR) 25 mg tablet Take 25 mg by mouth daily (after breakfast).  spironolactone (ALDACTONE) 25 mg tablet Take 25 mg by mouth daily (after breakfast).  divalproex DR (DEPAKOTE) 500 mg tablet Take 500 mg by mouth daily.  carvedilol (COREG) 3.125 mg tablet Take 3.125 mg by mouth two (2) times a day.  esomeprazole (NEXIUM) 40 mg capsule Take 40 mg by mouth daily (after breakfast).  levothyroxine (SYNTHROID) 50 mcg tablet Take 50 mcg by mouth Daily (before breakfast).  cyanocobalamin (VITAMIN B-12) 1,000 mcg/mL injection 1,000 mcg by IntraMUSCular route every thirty (30) days.  Calcium-Cholecalciferol, D3, (CALCIUM 600 WITH VITAMIN D3) 600 mg(1,500mg) -400 unit cap Take  by mouth two (2) times a day.  multivitamin (ONE A DAY) tablet Take 1 Tab by mouth daily.  Cholecalciferol, Vitamin D3, (VITAMIN D3) 2,000 unit cap capsule Take 5,000 Units by mouth daily. No current facility-administered medications for this visit. SYSTEM REVIEW NOT RELATED TO LIVER DISEASE OR REVIEWED ABOVE:  Constitution systems: Negative for fever, chills, weight gain, weight loss. Eyes: Negative for visual changes. ENT: Negative for sore throat, painful swallowing. Respiratory: Negative for cough, hemoptysis, SOB.    Cardiology: Negative for chest pain, palpitations. GI:  Negative for constipation or diarrhea. : Negative for urinary frequency, dysuria, hematuria, nocturia. Skin: Negative for rash. Hematology: Negative for easy bruising, blood clots. Musculo-skeletal: Negative for back pain, muscle pain, weakness. Neurologic: Negative for headaches, dizziness, vertigo, memory problems not related to HE. Psychology: Negative for anxiety, depression. FAMILY HISTORY:  The father  Has/had the following following chronic disease(s): cancer. The mother Has/had the following chronic disease(s): MI.    There is no family history of liver disease. SOCIAL HISTORY:  The patient is . The patient has 4 children,   The patient has never used tobacco products. The patient has never consumed significant amounts of alcohol. The patient currently receiving disability. PHYSICAL EXAMINATION PERFORMED BY VIRTUAL TELEHEALTH:  VS: Not performed   General: No acute distress. Eyes: Sclera anicteric. ENT: No oral lesions. Skin: No rashes. spider angiomata. No jaundice. Abdomen: No obvious distention suggesting ascites. Extremities: No edema. No muscle wasting. Neurologic: Alert and oriented. Cranial nerves grossly intact. LABORATORY STUDIES:  Liver [place] of [address] Units 7/26/2019   WBC 3.4 - 10.8 x10E3/uL 8.5   ANC 1.4 - 7.0 x10E3/uL 6.3   HGB 11.1 - 15.9 g/dL 11.8    - 450 x10E3/uL 209   INR 0.8 - 1.2 1.0   AST 0 - 40 IU/L 34   ALT 0 - 32 IU/L 57 (H)   Alk Phos 39 - 117 IU/L 126 (H)   Bili, Total 0.0 - 1.2 mg/dL 0.3   Bili, Direct 0.00 - 0.40 mg/dL 0.09   Albumin 3.5 - 5.5 g/dL 4.1   BUN 6 - 24 mg/dL 14   Creat 0.57 - 1.00 mg/dL 0.90   Na 134 - 144 mmol/L 138   K 3.5 - 5.2 mmol/L 3.1 (L)   Cl 96 - 106 mmol/L 100   CO2 20 - 29 mmol/L 24   Glucose 65 - 99 mg/dL 85     Repeat testing ordered today. Will follow up when available.     SEROLOGIES:  Serologies Latest Ref Rng & Units 7/26/2019   Hep A Ab, Total Negative Negative   Hep B Surface Ag Negative Negative   Hep B Core Ab, Total Negative Negative   Hep B Surface AB QL  Non Reactive   Hep C Ab 0.0 - 0.9 s/co ratio <0.1   Ferritin 15 - 150 ng/mL 14 (L)   Iron % Saturation 15 - 55 % 12 (L)   GULSHAN, IFA  Negative   ASMCA 0 - 19 Units 6   M2 Ab 0.0 - 20.0 Units <20.0   Ceruloplasmin 19.0 - 39.0 mg/dL 23.2   Alpha-1 antitrypsin level 90 - 200 mg/dL 99     LIVER HISTOLOGY:  8/2019. FibroScan performed at [facility]. EkPa was 3.3. IQR/med 9%. . The results suggested a fibrosis level of F0. The CAP score suggests no evidence of fatty liver. ENDOSCOPIC PROCEDURES:  Not available or performed    RADIOLOGY:  8/2019. Ultrasound of liver. Normal appearing liver. No liver mass lesions. OTHER TESTING:  Not available or performed    FOLLOW-UP:  Pursuant to the emergency declaration under the [address] [place] [address], [facility] waiver authority and the [employer] and [employer] Act, this Virtual  Visit was conducted, with the patient's (and/or their legal guardian's) consent, to reduce the patient's risk of exposure to COVID-19 and provide necessary medical care. Services were provided through a video synchronous discussion virtually to substitute for an in-person clinic visit. The patient was located in their home. The provider was located in the [facility] office. Because of the COVID-19 epidemic all non-emergent diagnostic testing and the in-office visit will be delayed by several months to reduce the risk of patient exposure to and potential infection from the novel corona virus. This follow-up appointment may be delayed further if warranted by the status of the epidemic at that time. Orders to obtain laboratory testing will be mailed to the patient. An in-person follow-up visit will be scheduled in 3 months.  Will order an MRI/MRC to be done prior to the next office visit. All of the issues listed above in the Assessment and Plan were discussed with the patient. All questions were answered. The patient expressed a clear understanding of the above. FAISAL Guadalupe 13  [address] [address] [facility] [address] [id number] [address] [place], [address] [facility] [address]

## 2020-04-30 LAB
ACE SERPL-CCNC: 45 U/L (ref 14–82)
ALBUMIN SERPL-MCNC: 3.8 G/DL (ref 3.8–4.9)
ALP SERPL-CCNC: 94 IU/L (ref 39–117)
ALT SERPL-CCNC: 59 IU/L (ref 0–32)
AST SERPL-CCNC: 49 IU/L (ref 0–40)
BASOPHILS # BLD AUTO: 0 X10E3/UL (ref 0–0.2)
BASOPHILS NFR BLD AUTO: 1 %
BILIRUB DIRECT SERPL-MCNC: 0.08 MG/DL (ref 0–0.4)
BILIRUB SERPL-MCNC: <0.2 MG/DL (ref 0–1.2)
BUN SERPL-MCNC: 8 MG/DL (ref 6–24)
BUN/CREAT SERPL: 12 (ref 9–23)
C-ANCA TITR SER IF: NORMAL TITER
CALCIUM SERPL-MCNC: 8.7 MG/DL (ref 8.7–10.2)
CHLORIDE SERPL-SCNC: 110 MMOL/L (ref 96–106)
CO2 SERPL-SCNC: 22 MMOL/L (ref 20–29)
CREAT SERPL-MCNC: 0.69 MG/DL (ref 0.57–1)
EOSINOPHIL # BLD AUTO: 0.3 X10E3/UL (ref 0–0.4)
EOSINOPHIL NFR BLD AUTO: 5 %
ERYTHROCYTE [DISTWIDTH] IN BLOOD BY AUTOMATED COUNT: 13.5 % (ref 11.7–15.4)
GLUCOSE SERPL-MCNC: 70 MG/DL (ref 65–99)
HCT VFR BLD AUTO: 36.1 % (ref 34–46.6)
HGB BLD-MCNC: 11.3 G/DL (ref 11.1–15.9)
IMM GRANULOCYTES # BLD AUTO: 0 X10E3/UL (ref 0–0.1)
IMM GRANULOCYTES NFR BLD AUTO: 0 %
LYMPHOCYTES # BLD AUTO: 1.5 X10E3/UL (ref 0.7–3.1)
LYMPHOCYTES NFR BLD AUTO: 29 %
MCH RBC QN AUTO: 27.6 PG (ref 26.6–33)
MCHC RBC AUTO-ENTMCNC: 31.3 G/DL (ref 31.5–35.7)
MCV RBC AUTO: 88 FL (ref 79–97)
MONOCYTES # BLD AUTO: 0.5 X10E3/UL (ref 0.1–0.9)
MONOCYTES NFR BLD AUTO: 9 %
NEUTROPHILS # BLD AUTO: 3 X10E3/UL (ref 1.4–7)
NEUTROPHILS NFR BLD AUTO: 56 %
P-ANCA ATYPICAL TITR SER IF: NORMAL TITER
P-ANCA TITR SER IF: NORMAL TITER
PLATELET # BLD AUTO: 198 X10E3/UL (ref 150–450)
POTASSIUM SERPL-SCNC: 4.4 MMOL/L (ref 3.5–5.2)
PROT SERPL-MCNC: 5.6 G/DL (ref 6–8.5)
RBC # BLD AUTO: 4.09 X10E6/UL (ref 3.77–5.28)
SODIUM SERPL-SCNC: 143 MMOL/L (ref 134–144)
WBC # BLD AUTO: 5.3 X10E3/UL (ref 3.4–10.8)

## 2020-05-05 NOTE — PROGRESS NOTES
My chart message sent to patient with results. The liver numbers remain elevated. Autoimmune testing was negative. We will see what the [address] [place] [address] looks like in June. Advised to call if there were any questions or concerns.

## 2020-06-06 ENCOUNTER — HOSPITAL ENCOUNTER (OUTPATIENT)
Dept: MRI IMAGING | Age: 50
Discharge: HOME | End: 2020-06-06
Attending: NURSE PRACTITIONER
Payer: MEDICARE

## 2020-06-06 DIAGNOSIS — R74.8 ELEVATED SERUM ALKALINE PHOSPHATASE LEVEL: ICD-10-CM

## 2020-06-06 DIAGNOSIS — R74.8 ELEVATED LIVER ENZYMES: ICD-10-CM

## 2020-06-06 PROCEDURE — 74183 MRI ABD W/O CNTR FLWD CNTR: CPT

## 2020-06-06 PROCEDURE — 74011250636 HC RX REV CODE- 250/636: Performed by: NURSE PRACTITIONER

## 2020-06-06 PROCEDURE — A9585 GADOBUTROL INJECTION: HCPCS | Performed by: NURSE PRACTITIONER

## 2020-06-06 PROCEDURE — 74011000258 HC RX REV CODE- 258: Performed by: NURSE PRACTITIONER

## 2020-06-06 RX ORDER — SODIUM CHLORIDE 0.9 % (FLUSH) 0.9 %
10 SYRINGE (ML) INJECTION
Status: COMPLETED | OUTPATIENT
Start: 2020-06-06 | End: 2020-06-06

## 2020-06-06 RX ADMIN — Medication 10 ML: at 08:45

## 2020-06-06 RX ADMIN — SODIUM CHLORIDE 100 ML: 900 INJECTION, SOLUTION INTRAVENOUS at 08:45

## 2020-06-06 RX ADMIN — GADOBUTROL 9 ML: 604.72 INJECTION INTRAVENOUS at 08:45

## 2020-06-09 NOTE — PROGRESS NOTES
My chart message sent to the patient regarding the MRI. The liver and other organs were normal. There was a moderate amount of stool and gas in the colon. I am wondering if this may explain some of the abdominal pain. Advised she follow up with questions. I am happy to discuss further by phone.

## 2020-07-21 ENCOUNTER — VIRTUAL VISIT (OUTPATIENT)
Dept: HEMATOLOGY | Age: 50
End: 2020-07-21

## 2020-07-21 DIAGNOSIS — K90.9 STEATORRHEA: Primary | ICD-10-CM

## 2020-07-21 DIAGNOSIS — R10.84 GENERALIZED ABDOMINAL PAIN: ICD-10-CM

## 2020-07-21 DIAGNOSIS — R19.7 DIARRHEA, UNSPECIFIED TYPE: ICD-10-CM

## 2020-07-21 DIAGNOSIS — R74.8 ELEVATED LIVER ENZYMES: ICD-10-CM

## 2020-07-21 PROBLEM — Z86.19 H/O CLOSTRIDIUM DIFFICILE INFECTION: Status: ACTIVE | Noted: 2020-07-21

## 2020-07-21 RX ORDER — NITROGLYCERIN 0.3 MG/1
0.3 TABLET SUBLINGUAL
COMMUNITY
End: 2021-05-25 | Stop reason: SDUPTHER

## 2020-07-21 RX ORDER — ACETAMINOPHEN 500 MG
2000 TABLET ORAL DAILY
Status: ON HOLD | COMMUNITY
End: 2021-01-04

## 2020-07-21 RX ORDER — MINERAL OIL
180 ENEMA (ML) RECTAL DAILY
Status: ON HOLD | COMMUNITY
End: 2021-09-17

## 2020-07-21 RX ORDER — ONDANSETRON HYDROCHLORIDE 8 MG/1
8 TABLET, FILM COATED ORAL
COMMUNITY
End: 2022-09-28

## 2020-07-21 NOTE — PROGRESS NOTES
[address] [place] [address], MD, Rahul Miller, Marsh Crigler, MD Lucile Aures, PA-C Tye Andrew, [facility]     Perla Russo, [facility]   Rc Dotson P-SERA Miguel, [facility]       Johanne Ferrell [address] 136    at [address], [address] [place] [address], [facility] 22.    [phone]    FAX: [address] [place] [address]    at [address], [address]    [phone]    FAX: [phone]       Patient Care Team:  Duc Gallagher MD as PCP - General (Family Medicine)  Tammy Ho MD (Breast Surgery)  Marino Cardoza MD as Referring Provider (Obstetrics & Gynecology)  Tammy Ho MD (Breast Surgery)      Problem List  Date Reviewed: 7/21/2020          Codes Class Noted    Generalized abdominal pain ICD-10-CM: R10.84  ICD-9-CM: 789.07  7/21/2020        Diarrhea ICD-10-CM: R19.7  ICD-9-CM: 787.91  7/21/2020        H/O Clostridium difficile infection ICD-10-CM: Z86.19  ICD-9-CM: V12.09  7/21/2020        History of bowel resection ICD-10-CM: Z90.49  ICD-9-CM: V15.89  4/21/2020        Elevated liver enzymes ICD-10-CM: R74.8  ICD-9-CM: 790.5  7/26/2019        Cardiomyopathy ([facility] [address] ICD-10-CM: I42.9  ICD-9-CM: 425.4  7/26/2019        Hypothyroidism ICD-10-CM: E03.9  ICD-9-CM: 244.9  7/26/2019        H/O gastric bypass ICD-10-CM: Z98.84  ICD-9-CM: V45.86  7/26/2019        History of cholecystectomy ICD-10-CM: Z90.49  ICD-9-CM: V45.79  7/26/2019        History of appendectomy ICD-10-CM: Z90.49  ICD-9-CM: V45.89  7/26/2019        Neuropathy ICD-10-CM: G62.9  ICD-9-CM: 355.9  7/26/2019        Migraine headache ICD-10-CM: I36.074  ICD-9-CM: 346.90  7/26/2019        [facility] ([facility] ICD-10-CM: R56.9  ICD-9-CM: 780.39  7/26/2019        Fibrocystic breast changes of both breasts ICD-10-CM: N60.11, N60.12  ICD-9-CM: 610.1  7/8/2015              VIRTUAL TELEHEALTH VISIT PERFORMED DUE TO COVID-19 EPIDEMIC    CONSENT:  Phillip Sotelo, who was seen by synchronous, real-time, audio-video technology, and/or her healthcare decision maker, is aware that this patient-initiated, Telehealth encounter on 7/21/2020 is a billable service, with coverage as determined by her insurance carrier. She is aware that she may receive a bill and has provided verbal consent to proceed. This patient was evaluated during a Virtual Telehealth visit. A caregiver was present if appropriate. Due to this being a TeleHealth encounter performed during the LJCAH-30 public health emergency, the physical examination was limited to that listed in the [facility] [address] returns to the [place] [address] for management of elevated liver enzymes. The active problem list, all pertinent past medical history, medications, radiologic findings and laboratory findings related to the liver disorder were reviewed with the patient. The patient is a 44 y.o. Black female who was found to have abnormalities in liver enzymes in 2018. Serologic evaluation for markers of chronic liver disease were negative. Imaging of the liver was performed 8/2019 which demonstrates a normal liver, no ductal dilatation or mass. Assessment of liver fibrosis was performed in the office with Fibroscan 8/2019. The result was 3.3 kPa which correlates with no fibrosis. The CAP score of 267 suggests mild fatty liver. The patient has had 2 episodes of C-Diff 8/2019, 2/2020 treated with PO Vancomycin. It is unclear why she would have c-diff, the patient reports no antibiotic use or other contributing factors. Since the last office visit the patient started a probiotic with no change in abdominal pain or frequency of stools.  She reports the stools are \"oily\" accompanied with severe abdominal pain and bloating. Bentyl provides no relief of symptoms. The patient has the following symptoms which are thought to be due to the liver disease:  fatigue, nausea, and diffuse abdominal pain. The patient has not experienced the following symptoms:  pain in the right side over the liver. The patient has Mild limitations in functional activities which can be attributed to other medical problems that are not related to the liver disease. ASSESSMENT AND PLAN:  Benign steatosis/NAFL  The diagnosis is based upon imaging, Fiboscan CAP score, features of metabolic syndrome, and serologic studies that are negative for other causes of chronic liver disease. Elastography performed in 8/2019 suggests no fibrosis. NAFL is a benign form of fatty liver disease and not thought to progress to fibrosis or cirrhosis. Liver transaminases are elevated. AST is normal.  ALT is elevated. ALP is elevated. Liver function is normal.  The platelet count is normal.      Based upon laboratory studies Fibroscan, and imaging  the patient does not appear to have significant liver injury. Have performed laboratory testing to monitor liver function and degree of liver injury. This included BMP, hepatic panel, CBC with platelet count, and INR. Elevated ALP  The ALP was normal with the most recent blood work. [address] performed 6/2020 was negative for [facility] [place], operated by [facility] or a biliary pathology for the elevation. Abdominal Pain/Diarrhea/Steatorhea? Will order amylase/lipase, stool for fecal fat, CDiff, and GI profile to evaluate for potential causes. She is scheduled for follow up with GI in the near future. Counseling for diet and weight loss in patients with confirmed or suspected NAFLD  There is currently no FDA approved medical treatment for fatty liver, NALFD or NOEL. The patient was counseled regarding diet and exercise to achieve weight loss.   The best diet for patients with fatty liver is one very low in carbohydrates and enriched with protein such as an Javed's program.      The patient does not have a significant amount of weight to lose but changing dietary choices may help eliminate fatty liver and the liver enzymes may improve. The patient was told not to consume any food products and drinks containing fructose as this enhances hepatic fat synthesis. Screening for Hepatocellular Carcinoma  HCC screening is not necessary if the patient has no evidence of cirrhosis. Treatment of other medical problems in patients with chronic liver disease  There are no contraindications for the patient to take most medications that are necessary for treatment of other medical issues. Counseling for alcohol in patients with chronic liver disease  The patient was counseled regarding alcohol consumption and the effect of alcohol on chronic liver disease. Patients who have undergone obesity surgery are at much greater risk to develop alcoholic liver injury. Vaccinations   Vaccination for viral hepatitis A and B is recommended since the patient has no serologic evidence of previous exposure or vaccination with immunity. Routine vaccinations against other bacterial and viral agents can be performed as indicated. Annual flu vaccination should be administered if indicated. ALLERGIES  Allergies   Allergen Reactions    Morphine Other (comments)     SOB/Chest Pressure - in hosp for a week. States DILAUDID Ok    Codeine Other (comments)     SOB chest pain    Sulfa (Sulfonamide Antibiotics) Rash     itching       MEDICATIONS  Current Outpatient Medications   Medication Sig    tiotropium (SPIRIVA) 18 mcg inhalation capsule Take 1 Cap by inhalation daily.  fexofenadine (ALLEGRA) 180 mg tablet Take  by mouth.  ondansetron hcl (ZOFRAN) 8 mg tablet Take 8 mg by mouth every eight (8) hours as needed.  nitroglycerin (NITROSTAT) 0.3 mg SL tablet by SubLINGual route every five (5) minutes as needed.  cholecalciferol (VITAMIN D3) (2,000 UNITS /50 MCG) cap capsule Take  by mouth daily.  gabapentin (NEURONTIN) 100 mg capsule Take 200 mg by mouth three (3) times daily.  levETIRAcetam (KEPPRA) 750 mg tablet 1,500 mg two (2) times a day.  montelukast (SINGULAIR) 10 mg tablet TAKE 1 TABLET BY MOUTH ONCE DAILY    nortriptyline (PAMELOR) 50 mg capsule TAKE 2 CAPSULES BY MOUTH ONCE DAILY AT BEDTIME FOR 30 DAYS    PARoxetine (PAXIL) 10 mg tablet     potassium chloride (K-DUR, KLOR-CON) 20 mEq tablet TAKE 1 TABLET BY MOUTH TWICE DAILY FOR 30 DAYS    sucralfate (CARAFATE) 100 mg/mL suspension Take 1 g by mouth.  BD LUER-TEO SYRINGE 3 mL 23 x 1\" syrg USE TO INJECT B 12 INJECTIONS ONCE A MONTH    topiramate (TOPAMAX) 100 mg tablet TAKE 1 TABLET BY MOUTH TWICE DAILY    carvedilol (COREG) 6.25 mg tablet Take 6.25 mg by mouth two (2) times a day.  albuterol (PROVENTIL VENTOLIN) 2.5 mg /3 mL (0.083 %) nebu USE 1 VIAL IN NEBULIZER EVERY 4 TO 6 HOURS AS NEEDED    VENTOLIN HFA 90 mcg/actuation inhaler INHALE 2 PUFFS BY MOUTH EVERY 4 TO 6 HOURS AS NEEDED    SYMBICORT 80-4.5 mcg/actuation HFAA INHALE 2 PUFFS BY MOUTH TWICE DAILY    bumetanide (BUMEX) 1 mg tablet Take 1 mg by mouth.  dexlansoprazole (DEXILANT) 60 mg CpDB capsule (delayed release) Take 60 mg by mouth.  diclofenac (VOLTAREN) 1 % gel APPLY 4 GRAMS TOPICALLY TO AFFECTED AREA FOUR TIMES A DAY    dicyclomine (BENTYL) 20 mg tablet Take 20 mg by mouth.  estradiol (ESTRACE) 0.01 % (0.1 mg/gram) vaginal cream     famotidine (PEPCID) 40 mg tablet TAKE 1 TABLET BY MOUTH TWICE DAILY    evening primrose oil 500 mg cap Take  by mouth.  vitamin E (AQUA GEMS) 400 unit capsule Take  by mouth daily.  losartan (COZAAR) 25 mg tablet Take 25 mg by mouth daily (after breakfast).  spironolactone (ALDACTONE) 25 mg tablet Take 25 mg by mouth daily (after breakfast).  divalproex DR (DEPAKOTE) 500 mg tablet Take 500 mg by mouth daily.     levothyroxine (SYNTHROID) 50 mcg tablet Take 50 mcg by mouth Daily (before breakfast).  cyanocobalamin (VITAMIN B-12) 1,000 mcg/mL injection 1,000 mcg by IntraMUSCular route every thirty (30) days.  Calcium-Cholecalciferol, D3, (CALCIUM 600 WITH VITAMIN D3) 600 mg(1,500mg) -400 unit cap Take  by mouth two (2) times a day.  multivitamin (ONE A DAY) tablet Take 1 Tab by mouth daily.  Cholecalciferol, Vitamin D3, (VITAMIN D3) 2,000 unit cap capsule Take 5,000 Units by mouth daily. No current facility-administered medications for this visit. SYSTEM REVIEW NOT RELATED TO LIVER DISEASE OR REVIEWED ABOVE:  Constitution systems: Negative for fever, chills, weight gain, weight loss. Eyes: Negative for visual changes. ENT: Negative for sore throat, painful swallowing. Respiratory: Negative for cough, hemoptysis, SOB. Cardiology: Negative for chest pain, palpitations. GI:  Negative for constipation or diarrhea. : Negative for urinary frequency, dysuria, hematuria, nocturia. Skin: Negative for rash. Hematology: Negative for easy bruising, blood clots. Musculo-skeletal: Negative for back pain, muscle pain, weakness. Neurologic: Negative for headaches, dizziness, vertigo, memory problems not related to HE. Psychology: Negative for anxiety, depression. FAMILY HISTORY:  The father  Has/had the following following chronic disease(s): cancer. The mother Has/had the following chronic disease(s): MI.    There is no family history of liver disease. SOCIAL HISTORY:  The patient is . The patient has 4 children,   The patient has never used tobacco products. The patient has never consumed significant amounts of alcohol. The patient currently receiving disability. PHYSICAL EXAMINATION PERFORMED BY [employer]:  VS: Not performed   General: No acute distress. Eyes: Sclera anicteric. ENT: No oral lesions. Skin: No rashes. spider angiomata. No jaundice. Abdomen: No obvious distention suggesting ascites. Extremities: No edema. No muscle wasting. Neurologic: Alert and oriented. Cranial nerves grossly intact. LABORATORY STUDIES:  Liver [place] of [address] Units 4/29/2020 7/26/2019   WBC 3.4 - 10.8 x10E3/uL 5.3 8.5   ANC 1.4 - 7.0 x10E3/uL 3.0 6.3   HGB 11.1 - 15.9 g/dL 11.3 11.8    - 450 x10E3/uL 198 209   INR 0.8 - 1.2  1.0   AST 0 - 40 IU/L 49 (H) 34   ALT 0 - 32 IU/L 59 (H) 57 (H)   Alk Phos 39 - 117 IU/L 94 126 (H)   Bili, Total 0.0 - 1.2 mg/dL <0.2 0.3   Bili, Direct 0.00 - 0.40 mg/dL 0.08 0.09   Albumin 3.8 - 4.9 g/dL 3.8 4.1   BUN 6 - 24 mg/dL 8 14   Creat 0.57 - 1.00 mg/dL 0.69 0.90   Na 134 - 144 mmol/L 143 138   K 3.5 - 5.2 mmol/L 4.4 3.1 (L)   Cl 96 - 106 mmol/L 110 (H) 100   CO2 20 - 29 mmol/L 22 24   Glucose 65 - 99 mg/dL 70 85     Repeat testing ordered today. Will follow up when available. SEROLOGIES:  Serologies Latest Ref Rng & Units 7/26/2019   Hep A Ab, Total Negative Negative   Hep B Surface Ag Negative Negative   Hep B Core Ab, Total Negative Negative   Hep B Surface AB QL  Non Reactive   Hep C Ab 0.0 - 0.9 s/co ratio <0.1   Ferritin 15 - 150 ng/mL 14 (L)   Iron % Saturation 15 - 55 % 12 (L)   GULSHAN, IFA  Negative   ASMCA 0 - 19 Units 6   M2 Ab 0.0 - 20.0 Units <20.0   Ceruloplasmin 19.0 - 39.0 mg/dL 23.2   Alpha-1 antitrypsin level 90 - 200 mg/dL 99     Serologies Latest Ref Rng & Units 4/29/2020   C-ANCA Neg:<1:20 titer <1:20   P-ANCA Neg:<1:20 titer <1:20   ANCA Neg:<1:20 titer <1:20     LIVER HISTOLOGY:  8/2019. [facility]. EkPa was 3.3. IQR/med 9%. . The results suggested a fibrosis level of F0. The CAP score suggests no evidence of fatty liver. ENDOSCOPIC PROCEDURES:  Not available or performed    RADIOLOGY:  8/2019. Ultrasound of liver. Normal appearing liver. No liver mass lesions. 6/2020. MRI/MRCP of abdomen.  Normal appearance of the liver and biliary tree status post cholecystectomy. No evidence of primary sclerosing cholangitis or biliary pathology. OTHER TESTING:  Not available or performed    FOLLOW-UP:  Pursuant to the emergency declaration under the [facility] waiver authority and the [employer] Act, this Virtual  Visit was conducted, with the patient's (and/or their legal guardian's) consent, to reduce the patient's risk of exposure to COVID-19 and provide necessary medical care. Services were provided through a video synchronous discussion virtually to substitute for an in-person clinic visit. The patient was located in their home. The provider was located in the [facility] office. Because of the COVID-19 epidemic all non-emergent diagnostic testing and the in-office visit will be delayed by several months to reduce the risk of patient exposure to and potential infection from the novel corona virus. This follow-up appointment may be delayed further if warranted by the status of the epidemic at that time. Orders to obtain laboratory testing will be mailed to the patient. A follow up visit will be scheduled via telehealth in 3 months. Orders for laboratory testing will be sent to the patient. All of the issues listed above in the Assessment and Plan were discussed with the patient. All questions were answered. The patient expressed a clear understanding of the above. JACKIE GuadalupePCNP-BC  [facility] of [address] [address] [place], [address] [facility] 22  201 [facility]

## 2020-07-22 DIAGNOSIS — K90.9 STEATORRHEA: ICD-10-CM

## 2020-07-22 DIAGNOSIS — R74.8 ELEVATED LIVER ENZYMES: ICD-10-CM

## 2020-07-22 DIAGNOSIS — R10.84 GENERALIZED ABDOMINAL PAIN: ICD-10-CM

## 2020-07-22 DIAGNOSIS — R19.7 DIARRHEA, UNSPECIFIED TYPE: ICD-10-CM

## 2020-07-29 LAB
ALBUMIN SERPL-MCNC: 4.1 G/DL (ref 3.8–4.9)
ALP SERPL-CCNC: 112 IU/L (ref 39–117)
ALT SERPL-CCNC: 49 IU/L (ref 0–32)
AMYLASE SERPL-CCNC: 84 U/L (ref 31–110)
AST SERPL-CCNC: 35 IU/L (ref 0–40)
BASOPHILS # BLD AUTO: 0 X10E3/UL (ref 0–0.2)
BASOPHILS NFR BLD AUTO: 1 %
BILIRUB DIRECT SERPL-MCNC: 0.07 MG/DL (ref 0–0.4)
BILIRUB SERPL-MCNC: <0.2 MG/DL (ref 0–1.2)
BUN SERPL-MCNC: 17 MG/DL (ref 6–24)
BUN/CREAT SERPL: 22 (ref 9–23)
CALCIUM SERPL-MCNC: 8.8 MG/DL (ref 8.7–10.2)
CHLORIDE SERPL-SCNC: 103 MMOL/L (ref 96–106)
CO2 SERPL-SCNC: 21 MMOL/L (ref 20–29)
CREAT SERPL-MCNC: 0.79 MG/DL (ref 0.57–1)
EOSINOPHIL # BLD AUTO: 0.2 X10E3/UL (ref 0–0.4)
EOSINOPHIL NFR BLD AUTO: 4 %
ERYTHROCYTE [DISTWIDTH] IN BLOOD BY AUTOMATED COUNT: 13.6 % (ref 11.7–15.4)
ERYTHROCYTE [SEDIMENTATION RATE] IN BLOOD BY WESTERGREN METHOD: 12 MM/HR (ref 0–40)
GLUCOSE SERPL-MCNC: 90 MG/DL (ref 65–99)
HCT VFR BLD AUTO: 37.8 % (ref 34–46.6)
HGB BLD-MCNC: 12.2 G/DL (ref 11.1–15.9)
IMM GRANULOCYTES # BLD AUTO: 0 X10E3/UL (ref 0–0.1)
IMM GRANULOCYTES NFR BLD AUTO: 0 %
INR PPP: 1 (ref 0.8–1.2)
LIPASE SERPL-CCNC: 21 U/L (ref 14–72)
LYMPHOCYTES # BLD AUTO: 1.5 X10E3/UL (ref 0.7–3.1)
LYMPHOCYTES NFR BLD AUTO: 26 %
MCH RBC QN AUTO: 27.4 PG (ref 26.6–33)
MCHC RBC AUTO-ENTMCNC: 32.3 G/DL (ref 31.5–35.7)
MCV RBC AUTO: 85 FL (ref 79–97)
MONOCYTES # BLD AUTO: 0.4 X10E3/UL (ref 0.1–0.9)
MONOCYTES NFR BLD AUTO: 8 %
NEUTROPHILS # BLD AUTO: 3.5 X10E3/UL (ref 1.4–7)
NEUTROPHILS NFR BLD AUTO: 61 %
PLATELET # BLD AUTO: 226 X10E3/UL (ref 150–450)
POTASSIUM SERPL-SCNC: 4.3 MMOL/L (ref 3.5–5.2)
PROT SERPL-MCNC: 6.3 G/DL (ref 6–8.5)
PROTHROMBIN TIME: 10.3 SEC (ref 9.1–12)
RBC # BLD AUTO: 4.46 X10E6/UL (ref 3.77–5.28)
SODIUM SERPL-SCNC: 142 MMOL/L (ref 134–144)
WBC # BLD AUTO: 5.7 X10E3/UL (ref 3.4–10.8)

## 2020-08-12 LAB — C DIFF TOX A+B STL QL IA: NEGATIVE

## 2020-08-14 LAB
ADENOVIRUS F 40/41: NORMAL
ASTROVIRUS: NORMAL
C DIFFICILE TOXIN A/B: NORMAL
CAMPYLOBACTER: NORMAL
CRYPTOSPORIDIUM, CRYPTOSPORIDIUM: NORMAL
CYCLOSPORA CAYETANENSIS: NORMAL
E COLI O157: NORMAL
ENTAMOEBA HISTOLYTICA: NORMAL
ENTEROAGGREGATIVE E COLI: NORMAL
ENTEROPATHOGENIC E COLI (EPEC), EPEC: NORMAL
ENTEROTOXIGENIC E COLI (ETEC), ETEC: NORMAL
GIARDIA LAMBLIA: NORMAL
NOROVIRUS GI/GII: NORMAL
PLESIOMONAS SHIGELLOIDES: NORMAL
ROTAVIRUS A: NORMAL
SALMONELLA: NORMAL
SAPOVIRUS: NORMAL
SHIGA-TOXIN-PRODUCING E COLI: NORMAL
SHIGELLA/ENTEROINVASIVE E COLI (EIEC), EIEC: NORMAL
VIBRIO CHOLERAE: NORMAL
VIBRIO: NORMAL
YERSINIA ENTEROCOLITICA: NORMAL

## 2020-08-17 ENCOUNTER — ED HISTORICAL/CONVERTED ENCOUNTER (OUTPATIENT)
Dept: OTHER | Age: 50
End: 2020-08-17

## 2020-08-18 LAB
FAT 24H STL-MRATE: 0.7 G/24 HR (ref 0–7.1)
SPECIMEN WT STL QN: 96 G

## 2020-08-21 NOTE — PROGRESS NOTES
Called patient with blood work results. There has been some improvement in the liver enzymes. Fecal fat, pancreatic numbers and c-diff were all negative. She has a follow up with GI, advised she discuss symptoms further with them.

## 2020-10-20 ENCOUNTER — HOSPITAL ENCOUNTER (EMERGENCY)
Age: 50
Discharge: HOME | End: 2020-10-20
Attending: EMERGENCY MEDICINE
Payer: MEDICARE

## 2020-10-20 VITALS
SYSTOLIC BLOOD PRESSURE: 95 MMHG | HEART RATE: 75 BPM | RESPIRATION RATE: 16 BRPM | HEIGHT: 65 IN | TEMPERATURE: 97.4 F | OXYGEN SATURATION: 96 % | DIASTOLIC BLOOD PRESSURE: 66 MMHG | WEIGHT: 167 LBS | BODY MASS INDEX: 27.82 KG/M2

## 2020-10-20 DIAGNOSIS — E87.6 HYPOKALEMIA: Primary | ICD-10-CM

## 2020-10-20 LAB
ALBUMIN SERPL-MCNC: 3.4 G/DL (ref 3.5–5)
ALBUMIN/GLOB SERPL: 1 {RATIO} (ref 1.1–2.2)
ALP SERPL-CCNC: 137 U/L (ref 45–117)
ALT SERPL-CCNC: 56 U/L (ref 12–78)
ANION GAP SERPL CALC-SCNC: 9 MMOL/L (ref 5–15)
AST SERPL W P-5'-P-CCNC: 45 U/L (ref 15–37)
BASOPHILS # BLD: 0 K/UL (ref 0–0.2)
BASOPHILS NFR BLD: 1 % (ref 0–2.5)
BILIRUB SERPL-MCNC: 0.1 MG/DL (ref 0.2–1)
BUN SERPL-MCNC: 15 MG/DL (ref 6–20)
BUN/CREAT SERPL: 17 (ref 12–20)
CA-I BLD-MCNC: 8.8 MG/DL (ref 8.5–10.1)
CHLORIDE SERPL-SCNC: 106 MMOL/L (ref 97–108)
CO2 SERPL-SCNC: 26 MMOL/L (ref 21–32)
CREAT SERPL-MCNC: 0.87 MG/DL (ref 0.55–1.02)
EOSINOPHIL # BLD: 0.2 K/UL (ref 0–0.7)
EOSINOPHIL NFR BLD: 4 % (ref 0.9–2.9)
ERYTHROCYTE [DISTWIDTH] IN BLOOD BY AUTOMATED COUNT: 14.2 % (ref 11.5–14.5)
GLOBULIN SER CALC-MCNC: 3.4 G/DL (ref 2–4)
GLUCOSE SERPL-MCNC: 92 MG/DL (ref 65–100)
HCT VFR BLD AUTO: 37.4 % (ref 36–46)
HGB BLD-MCNC: 12.4 G/DL (ref 13.5–17.5)
LYMPHOCYTES # BLD: 1.7 K/UL (ref 1–4.8)
LYMPHOCYTES NFR BLD: 32 % (ref 20.5–51.1)
MCH RBC QN AUTO: 29.1 PG (ref 31–34)
MCHC RBC AUTO-ENTMCNC: 33.1 G/DL (ref 31–36)
MCV RBC AUTO: 87.8 FL (ref 80–100)
MONOCYTES # BLD: 0.4 K/UL (ref 0.2–2.4)
MONOCYTES NFR BLD: 8 % (ref 1.7–9.3)
NEUTS SEG # BLD: 2.9 K/UL (ref 1.8–7.7)
NEUTS SEG NFR BLD: 55 % (ref 42–75)
NRBC # BLD: 0.01 K/UL
NRBC BLD-RTO: 0.2 PER 100 WBC
PLATELET # BLD AUTO: 214 K/UL
PMV BLD AUTO: 8.5 FL (ref 6.5–11.5)
POTASSIUM SERPL-SCNC: 3.2 MMOL/L (ref 3.5–5.1)
PROT SERPL-MCNC: 6.8 G/DL (ref 6.4–8.2)
RBC # BLD AUTO: 4.26 M/UL (ref 4.5–5.9)
SODIUM SERPL-SCNC: 141 MMOL/L (ref 136–145)
WBC # BLD AUTO: 5.3 K/UL (ref 4.4–11.3)

## 2020-10-20 PROCEDURE — 85025 COMPLETE CBC W/AUTO DIFF WBC: CPT

## 2020-10-20 PROCEDURE — 36415 COLL VENOUS BLD VENIPUNCTURE: CPT

## 2020-10-20 PROCEDURE — 74011250637 HC RX REV CODE- 250/637: Performed by: EMERGENCY MEDICINE

## 2020-10-20 PROCEDURE — 80053 COMPREHEN METABOLIC PANEL: CPT

## 2020-10-20 PROCEDURE — 99283 EMERGENCY DEPT VISIT LOW MDM: CPT

## 2020-10-20 RX ORDER — POTASSIUM CHLORIDE 20 MEQ/1
40 TABLET, EXTENDED RELEASE ORAL
Status: COMPLETED | OUTPATIENT
Start: 2020-10-20 | End: 2020-10-20

## 2020-10-20 RX ADMIN — POTASSIUM CHLORIDE 40 MEQ: 1500 TABLET, EXTENDED RELEASE ORAL at 19:04

## 2020-10-21 NOTE — ED PROVIDER NOTES
HPI   Patient is a 44 y.o. female [address] who presents to the ER with a chief complaint of  Possible low potassium level, has mild muscle cramping   Chief Complaint   Patient presents with    Abnormal Lab Results     Potassium low      Past Medical History:   Diagnosis Date    Cardiomyopathy [facility]) 2005    Colon polyp     Heart failure ([facility] [address] 2005    Hypertension     patient states she does not have hypertension    Irritable bowel syndrome     Migraines 2011    2-3 week    Seizures ([facility] [address] [facility]     petite mals last seizure April 2015    Thyroid disease     hypothyroid       Past Surgical History:   Procedure Laterality Date    ABDOMEN SURGERY PROC UNLISTED  2008    cholecystectomy    APPENDECTOMY      HX CHOLECYSTECTOMY  5/2006    HX COLONOSCOPY      HX ENDOSCOPY      HX GASTRIC BYPASS  2013    HX HYSTERECTOMY  2007    HX ORTHOPAEDIC Bilateral 2013    CTR    LAP,CHOLECYSTECTOMY      PART REMOVAL COLON W ANASTOMOSIS      RESECT SMALL INTEST W ENTEROSTOMY      STOMACH SURGERY PROCEDURE UNLISTED           Family History:   Problem Relation Age of Onset    Hypertension Mother     Heart Disease Mother     Cancer Father         stomach cancer    No Known Problems Brother     Asthma Daughter     Asthma Son     Asthma Son     Breast Cancer Paternal Aunt         age unknown       Social History     Socioeconomic History    Marital status:      Spouse name: Not on file    Number of children: Not on file    Years of education: Not on file    Highest education level: Not on file   Occupational History    Not on file   Social Needs    Financial resource strain: Not on file    Food insecurity     Worry: Not on file     Inability: Not on file    Transportation needs     Medical: Not on file     Non-medical: Not on file   Tobacco Use    Smoking status: Never Smoker    Smokeless tobacco: Never Used   Substance and Sexual Activity    Alcohol use: No    Drug use: No    Sexual activity: Not on file   Lifestyle    Physical activity     Days per week: Not on file     Minutes per session: Not on file    Stress: Not on file   Relationships    Social connections     Talks on phone: Not on file     Gets together: Not on file     Attends Confucianist service: Not on file     Active member of club or organization: Not on file     Attends meetings of clubs or organizations: Not on file     Relationship status: Not on file    Intimate partner violence     Fear of current or ex partner: Not on file     Emotionally abused: Not on file     Physically abused: Not on file     Forced sexual activity: Not on file   Other Topics Concern    Not on file   Social History Narrative    Not on file         ALLERGIES: Morphine; Codeine; and Sulfa (sulfonamide antibiotics)    Review of Systems   Constitutional: Negative. HENT: Negative. Eyes: Negative. Respiratory: Negative. Cardiovascular: Negative. Gastrointestinal: Negative. Endocrine: Negative. Genitourinary: Negative. Musculoskeletal: Negative. Skin: Negative. Allergic/Immunologic: Negative. Neurological: Negative. Hematological: Negative. Psychiatric/Behavioral: Negative. Vitals:    10/20/20 1733   BP: 95/66   Pulse: 75   Resp: 16   Temp: 97.4 °F (36.3 °C)   SpO2: 96%   Weight: 75.8 kg (167 lb)   Height: 5' 5\" (1.651 m)            Physical Exam  Vitals signs and nursing note reviewed. Constitutional:       Appearance: Normal appearance. She is normal weight. HENT:      Head: Normocephalic and atraumatic. Nose: Nose normal.   Eyes:      Extraocular Movements: Extraocular movements intact. Pupils: Pupils are equal, round, and reactive to light. Neck:      Musculoskeletal: Normal range of motion and neck supple. Cardiovascular:      Rate and Rhythm: Normal rate and regular rhythm. Pulses: Normal pulses.    Pulmonary:      Effort: Pulmonary effort is normal.      Breath sounds: Normal breath sounds. Abdominal:      General: Abdomen is flat. Palpations: Abdomen is soft. Musculoskeletal: Normal range of motion. Skin:     General: Skin is warm and dry. Neurological:      General: No focal deficit present. Mental Status: She is alert and oriented to person, place, and time. Psychiatric:         Mood and Affect: Mood normal.          MDM  Number of Diagnoses or Management Options  Hypokalemia:          Procedures     ICD-10-CM ICD-9-CM    1.  Hypokalemia  E87.6 276.8

## 2020-10-23 ENCOUNTER — VIRTUAL VISIT (OUTPATIENT)
Dept: HEMATOLOGY | Age: 50
End: 2020-10-23
Payer: MEDICARE

## 2020-10-23 DIAGNOSIS — R74.8 ELEVATED LIVER ENZYMES: Primary | ICD-10-CM

## 2020-10-23 DIAGNOSIS — R10.84 GENERALIZED ABDOMINAL PAIN: ICD-10-CM

## 2020-10-23 PROCEDURE — 99214 OFFICE O/P EST MOD 30 MIN: CPT | Performed by: NURSE PRACTITIONER

## 2020-10-23 NOTE — PROGRESS NOTES
[address] [place] [address], Greg BLANCHARD Hayes Ducking, MD Hope Post, PA-C [facility]     Perla Russo [facility]   NURIA Lou-SERA [facility]       Johanne Deputado Ghassan De Colbert 136    at [address], [address] [place] [address], [facility] 22.    [phone]    FAX: [address] [place] [address], [address]    [phone]    FAX: [phone]       Patient Care Team:  Phong Estrella MD as PCP - General (Family Medicine)  Miroslava Mckeon MD (Breast Surgery)  Hunter Mann MD as Referring Provider (Obstetrics & Gynecology)  Miroslava Mckeon MD (Breast Surgery)      Problem List  Date Reviewed: 7/21/2020          Codes Class Noted    Generalized abdominal pain ICD-10-CM: R10.84  ICD-9-CM: 789.07  7/21/2020        Diarrhea ICD-10-CM: R19.7  ICD-9-CM: 787.91  7/21/2020        H/O Clostridium difficile infection ICD-10-CM: Z86.19  ICD-9-CM: V12.09  7/21/2020        History of bowel resection ICD-10-CM: Z90.49  ICD-9-CM: V15.89  4/21/2020        Elevated liver enzymes ICD-10-CM: R74.8  ICD-9-CM: 790.5  7/26/2019        Cardiomyopathy ([facility] ICD-10-CM: I42.9  ICD-9-CM: 425.4  7/26/2019        Hypothyroidism ICD-10-CM: E03.9  ICD-9-CM: 244.9  7/26/2019        H/O gastric bypass ICD-10-CM: Z98.84  ICD-9-CM: V45.86  7/26/2019        History of cholecystectomy ICD-10-CM: Z90.49  ICD-9-CM: V45.79  7/26/2019        History of appendectomy ICD-10-CM: Z90.49  ICD-9-CM: V45.89  7/26/2019        Neuropathy ICD-10-CM: G62.9  ICD-9-CM: 355.9  7/26/2019        Migraine headache ICD-10-CM: X68.153  ICD-9-CM: 346.90  7/26/2019        Seizures ([facility] 75.) ICD-10-CM: R56.9  ICD-9-CM: 780.39  7/26/2019        Fibrocystic breast changes of both breasts ICD-10-CM: N60.11, N60.12  ICD-9-CM: 610.1  7/8/2015              VIRTUAL TELEHEALTH VISIT PERFORMED DUE TO COVID-19 EPIDEMIC    CONSENT:  Mami Norton, who was seen by synchronous, real-time, audio-video technology, and/or her healthcare decision maker, is aware that this patient-initiated, Telehealth encounter on 10/23/2020 is a billable service, with coverage as determined by her insurance carrier. She is aware that she may receive a bill and has provided verbal consent to proceed. This patient was evaluated during a Virtual Telehealth visit. A caregiver was present if appropriate. Due to this being a TeleHealth encounter performed during the [facility] public health emergency, the physical examination was limited to that listed in the [address] returns to the [place] [address] for management of elevated liver enzymes. The active problem list, all pertinent past medical history, medications, radiologic findings and laboratory findings related to the liver disorder were reviewed with the patient. The patient is a 44 y.o. Black female who was found to have abnormalities in liver enzymes in 2018. Serologic evaluation for markers of chronic liver disease were negative. Imaging of the liver was performed 8/2019 which demonstrates a normal liver, no ductal dilatation or mass. Assessment of liver fibrosis was performed in the office with Fibroscan 8/2019. The result was 3.3 kPa which correlates with no fibrosis. The CAP score of 267 suggests mild fatty liver. The patient has had 2 episodes of C-Diff 8/2019, 2/2020 treated with PO Vancomycin. It is unclear why she would have c-diff, the patient reports no antibiotic use or other contributing factors. Stool workup was negative. Since the last office visit the patient continues to have abdominal pain and loose stools alternating with constipation.  She recently went to the ER with hypokalemia. The serum potassium decreased to 2.8 per patient report. The patient reports increasing edema and shortness of breath. There is a history of cardiomyopathy currently being treated with Bumex. The patient has the following symptoms which are thought to be due to the liver disease:  fatigue, nausea, and diffuse abdominal pain. The patient has not experienced the following symptoms:  pain in the right side over the liver. The patient has Mild limitations in functional activities which can be attributed to other medical problems that are not related to the liver disease. ASSESSMENT AND PLAN:  Benign steatosis/NAFL  The diagnosis is based upon imaging, Fiboscan CAP score, features of metabolic syndrome, and serologic studies that are negative for other causes of chronic liver disease. Elastography performed in 8/2019 suggests no fibrosis. NAFL is a benign form of fatty liver disease and not thought to progress to fibrosis or cirrhosis. Liver transaminases are elevated. AST is normal.  ALT is elevated. ALP is elevated. Liver function is normal.  The platelet count is normal.      Based upon laboratory studies Fibroscan, and imaging  the patient does not appear to have significant liver injury. Have performed laboratory testing to monitor liver function and degree of liver injury. This included BMP, hepatic panel, CBC with platelet count, and INR. The patient is concerned about the elevation in liver enzymes over the past year. She would like to proceed with a liver biopsy. I explained the elevation could be passive congestion from cardiomyopathy. The need to perform a liver biopsy to help determine the cause and severity of the liver test abnormalities was discussed. The risks of performing the liver biopsy including pain, puncture of the lung, gallbladder, intestine or kidney and bleeding were discussed. The patient has decided to have a liver biopsy.  This will be scheduled. Elevated ALP  The ALP is intermittently elevated. [address] performed 6/2020 was negative for [facility] or a biliary pathology for the elevation. Abdominal Pain/Diarrhea/Steatorhea? GI work up was negative. This may be IBS or dietary related. Advised she try a low fat diet. Counseling for diet and weight loss in patients with confirmed or suspected NAFLD  There is currently no FDA approved medical treatment for fatty liver, NALFD or NOEL. The patient was counseled regarding diet and exercise to achieve weight loss. The best diet for patients with fatty liver is one very low in carbohydrates and enriched with protein such as an Javed's program.      The patient does not have a significant amount of weight to lose but changing dietary choices may help eliminate fatty liver and the liver enzymes may improve. The patient was told not to consume any food products and drinks containing fructose as this enhances hepatic fat synthesis. Screening for Hepatocellular Carcinoma  HCC screening is not necessary if the patient has no evidence of cirrhosis. Treatment of other medical problems in patients with chronic liver disease  There are no contraindications for the patient to take most medications that are necessary for treatment of other medical issues. Counseling for alcohol in patients with chronic liver disease  The patient was counseled regarding alcohol consumption and the effect of alcohol on chronic liver disease. Patients who have undergone obesity surgery are at much greater risk to develop alcoholic liver injury. She has never consumed significant amounts of alcohol. Vaccinations   Vaccination for viral hepatitis A and B is recommended since the patient has no serologic evidence of previous exposure or vaccination with immunity. Routine vaccinations against other bacterial and viral agents can be performed as indicated.   Annual flu vaccination should be administered if indicated. ALLERGIES  Allergies   Allergen Reactions    Morphine Other (comments)     SOB/Chest Pressure - in hosp for a week. States DILAUDID Ok    Codeine Other (comments)     SOB chest pain    Sulfa (Sulfonamide Antibiotics) Rash     itching       MEDICATIONS  Current Outpatient Medications   Medication Sig    tiotropium (SPIRIVA) 18 mcg inhalation capsule Take 1 Cap by inhalation daily.  fexofenadine (ALLEGRA) 180 mg tablet Take  by mouth.  ondansetron hcl (ZOFRAN) 8 mg tablet Take 8 mg by mouth every eight (8) hours as needed.  nitroglycerin (NITROSTAT) 0.3 mg SL tablet by SubLINGual route every five (5) minutes as needed.  cholecalciferol (VITAMIN D3) (2,000 UNITS /50 MCG) cap capsule Take  by mouth daily.  gabapentin (NEURONTIN) 100 mg capsule Take 200 mg by mouth three (3) times daily.  levETIRAcetam (KEPPRA) 750 mg tablet 1,500 mg two (2) times a day.  montelukast (SINGULAIR) 10 mg tablet TAKE 1 TABLET BY MOUTH ONCE DAILY    nortriptyline (PAMELOR) 50 mg capsule TAKE 2 CAPSULES BY MOUTH ONCE DAILY AT BEDTIME FOR 30 DAYS    PARoxetine (PAXIL) 10 mg tablet     potassium chloride (K-DUR, KLOR-CON) 20 mEq tablet TAKE 1 TABLET BY MOUTH TWICE DAILY FOR 30 DAYS    sucralfate (CARAFATE) 100 mg/mL suspension Take 1 g by mouth.  BD LUER-TEO SYRINGE 3 mL 23 x 1\" syrg USE TO INJECT B 12 INJECTIONS ONCE A MONTH    topiramate (TOPAMAX) 100 mg tablet TAKE 1 TABLET BY MOUTH TWICE DAILY    carvedilol (COREG) 6.25 mg tablet Take 6.25 mg by mouth two (2) times a day.  albuterol (PROVENTIL VENTOLIN) 2.5 mg /3 mL (0.083 %) nebu USE 1 VIAL IN NEBULIZER EVERY 4 TO 6 HOURS AS NEEDED    VENTOLIN HFA 90 mcg/actuation inhaler INHALE 2 PUFFS BY MOUTH EVERY 4 TO 6 HOURS AS NEEDED    SYMBICORT 80-4.5 mcg/actuation HFAA INHALE 2 PUFFS BY MOUTH TWICE DAILY    bumetanide (BUMEX) 1 mg tablet Take 1 mg by mouth.     dexlansoprazole (DEXILANT) 60 mg CpDB capsule (delayed release) Take 60 mg by mouth.    diclofenac (VOLTAREN) 1 % gel APPLY 4 GRAMS TOPICALLY TO AFFECTED AREA FOUR TIMES A DAY    dicyclomine (BENTYL) 20 mg tablet Take 20 mg by mouth.  estradiol (ESTRACE) 0.01 % (0.1 mg/gram) vaginal cream     famotidine (PEPCID) 40 mg tablet TAKE 1 TABLET BY MOUTH TWICE DAILY    evening primrose oil 500 mg cap Take  by mouth.  vitamin E (AQUA GEMS) 400 unit capsule Take  by mouth daily.  losartan (COZAAR) 25 mg tablet Take 25 mg by mouth daily (after breakfast).  spironolactone (ALDACTONE) 25 mg tablet Take 25 mg by mouth daily (after breakfast).  divalproex DR (DEPAKOTE) 500 mg tablet Take 500 mg by mouth daily.  levothyroxine (SYNTHROID) 50 mcg tablet Take 50 mcg by mouth Daily (before breakfast).  cyanocobalamin (VITAMIN B-12) 1,000 mcg/mL injection 1,000 mcg by IntraMUSCular route every thirty (30) days.  Calcium-Cholecalciferol, D3, (CALCIUM 600 WITH VITAMIN D3) 600 mg(1,500mg) -400 unit cap Take  by mouth two (2) times a day.  multivitamin (ONE A DAY) tablet Take 1 Tab by mouth daily.  Cholecalciferol, Vitamin D3, (VITAMIN D3) 2,000 unit cap capsule Take 5,000 Units by mouth daily. No current facility-administered medications for this visit. SYSTEM REVIEW NOT RELATED TO LIVER DISEASE OR REVIEWED ABOVE:  Constitution systems: Negative for fever, chills, weight gain, weight loss. Eyes: Negative for visual changes. ENT: Negative for sore throat, painful swallowing. Respiratory: Negative for cough, hemoptysis, SOB. Cardiology: Negative for chest pain, palpitations. GI:  Negative for constipation or diarrhea. : Negative for urinary frequency, dysuria, hematuria, nocturia. Skin: Negative for rash. Hematology: Negative for easy bruising, blood clots. Musculo-skeletal: Negative for back pain, muscle pain, weakness. Neurologic: Negative for headaches, dizziness, vertigo, memory problems not related to HE.   Psychology: Negative for anxiety, depression. FAMILY HISTORY:  The father  Has/had the following following chronic disease(s): cancer. The mother Has/had the following chronic disease(s): MI.    There is no family history of liver disease. SOCIAL HISTORY:  The patient is . The patient has 4 children,   The patient has never used tobacco products. The patient has never consumed significant amounts of alcohol. The patient currently receiving disability. PHYSICAL EXAMINATION PERFORMED BY VIRTUAL TELEHEALTH:  VS: Not performed   General: No acute distress. Eyes: Sclera anicteric. ENT: No oral lesions. Skin: No rashes. spider angiomata. No jaundice. Abdomen: No obvious distention suggesting ascites. Extremities: No edema. No muscle wasting. Neurologic: Alert and oriented. Cranial nerves grossly intact.       LABORATORY STUDIES:  [address] & Units 10/20/2020 7/28/2020   WBC 4.4 - 11.3 K/uL 5.3 5.7   ANC 1.8 - 7.7 K/UL 2.9 3.5   HGB 13.5 - 17.5 g/dL 12.4 (L) 12.2   PLT K/uL 214 226   INR 0.8 - 1.2  1.0   AST 15 - 37 U/L 45 (H) 35   ALT 12 - 78 U/L 56 49 (H)   Alk Phos 45 - 117 U/L 137 (H) 112   Bili, Total 0.2 - 1.0 mg/dL 0.1 (L) <0.2   Bili, Direct 0.00 - 0.40 mg/dL  0.07   Albumin 3.5 - 5.0 g/dL 3.4 (L) 4.1   BUN 6 - 20 mg/dL 15 17   Creat 0.55 - 1.02 mg/dL 0.87 0.79   Na 136 - 145 mmol/L 141 142   K 3.5 - 5.1 mmol/L 3.2 (L) 4.3   Cl 97 - 108 mmol/L 106 103   CO2 21 - 32 mmol/L 26 21   Glucose 65 - 100 mg/dL 92 90     Liver [facility] Latest Ref Rng & Units 4/29/2020   WBC 4.4 - 11.3 K/uL 5.3   ANC 1.8 - 7.7 K/UL 3.0   HGB 13.5 - 17.5 g/dL 11.3   PLT K/uL 198   INR 0.8 - 1.2    AST 15 - 37 U/L 49 (H)   ALT 12 - 78 U/L 59 (H)   Alk Phos 45 - 117 U/L 94   Bili, Total 0.2 - 1.0 mg/dL <0.2   Bili, Direct 0.00 - 0.40 mg/dL 0.08   Albumin 3.5 - 5.0 g/dL 3.8   BUN 6 - 20 mg/dL 8   Creat 0.55 - 1.02 mg/dL 0.69   Na 136 - 145 mmol/L 143   K 3.5 - 5.1 mmol/L 4.4   Cl 97 - 108 mmol/L 110 (H)   CO2 21 - 32 mmol/L 22   Glucose 65 - 100 mg/dL 70       SEROLOGIES:  Serologies Latest Ref Rng & Units 7/26/2019   Hep A Ab, Total Negative Negative   Hep B Surface Ag Negative Negative   Hep B Core Ab, Total Negative Negative   Hep B Surface AB QL  Non Reactive   Hep C Ab 0.0 - 0.9 s/co ratio <0.1   Ferritin 15 - 150 ng/mL 14 (L)   Iron % Saturation 15 - 55 % 12 (L)   GULSHAN, IFA  Negative   ASMCA 0 - 19 Units 6   M2 Ab 0.0 - 20.0 Units <20.0   Ceruloplasmin 19.0 - 39.0 mg/dL 23.2   Alpha-1 antitrypsin level 90 - 200 mg/dL 99     Serologies Latest Ref Rng & Units 4/29/2020   C-ANCA Neg:<1:20 titer <1:20   P-ANCA Neg:<1:20 titer <1:20   ANCA Neg:<1:20 titer <1:20     LIVER HISTOLOGY:  8/2019. FibroScan performed at [address]. EkPa was 3.3. IQR/med 9%. . The results suggested a fibrosis level of F0. The CAP score suggests no evidence of fatty liver. ENDOSCOPIC PROCEDURES:  Not available or performed    RADIOLOGY:  8/2019. Ultrasound of liver. Normal appearing liver. No liver mass lesions. 6/2020. MRI/MRCP of abdomen. Normal appearance of the liver and biliary tree status post cholecystectomy. No evidence of primary sclerosing cholangitis or biliary pathology. OTHER TESTING:  Not available or performed    FOLLOW-UP:  Pursuant to the emergency declaration under the [facility] waiver authority and the [employer] Act, this Virtual  Visit was conducted, with the patient's (and/or their legal guardian's) consent, to reduce the patient's risk of exposure to COVID-19 and provide necessary medical care. Services were provided through a video synchronous discussion virtually to substitute for an in-person clinic visit. The patient was located in their home. The provider was located in the [place] [address] office.        Because of the COVID-19 epidemic all non-emergent diagnostic testing and the in-office visit will be delayed by several months to reduce the risk of patient exposure to and potential infection from the novel corona virus. This follow-up appointment may be delayed further if warranted by the status of the epidemic at that time. Orders to obtain laboratory testing will be mailed to the patient. An in office follow-up will be scheduled with Dr. Lali Forbes 2 weeks after the liver biopsy. All of the issues listed above in the Assessment and Plan were discussed with the patient. All questions were answered. The patient expressed a clear understanding of the above. JOSE GuadalupeNP-BC  [facility] 13 of [address] [facility] 77 [address] [address], [address] [place] [address] 22.  201 [facility]

## 2020-11-10 ENCOUNTER — DOCUMENTATION ONLY (OUTPATIENT)
Dept: HEMATOLOGY | Age: 50
End: 2020-11-10

## 2020-11-27 ENCOUNTER — TRANSCRIBE ORDER (OUTPATIENT)
Dept: REGISTRATION | Age: 50
End: 2020-11-27

## 2020-11-27 DIAGNOSIS — M23.306 OTHER MENISCUS DERANGEMENTS, UNSPECIFIED MENISCUS, RIGHT KNEE: ICD-10-CM

## 2020-11-27 DIAGNOSIS — M19.90 SENILE ARTHRITIS: Primary | ICD-10-CM

## 2020-12-07 ENCOUNTER — HOSPITAL ENCOUNTER (OUTPATIENT)
Dept: MRI IMAGING | Age: 50
Discharge: HOME | End: 2020-12-07
Attending: PHYSICIAN ASSISTANT
Payer: MEDICARE

## 2020-12-07 DIAGNOSIS — M23.306 OTHER MENISCUS DERANGEMENTS, UNSPECIFIED MENISCUS, RIGHT KNEE: ICD-10-CM

## 2020-12-07 DIAGNOSIS — M19.90 SENILE ARTHRITIS: ICD-10-CM

## 2020-12-07 PROCEDURE — 73721 MRI JNT OF LWR EXTRE W/O DYE: CPT

## 2020-12-11 ENCOUNTER — TRANSCRIBE ORDER (OUTPATIENT)
Dept: SCHEDULING | Age: 50
End: 2020-12-11

## 2020-12-11 DIAGNOSIS — R74.8 ELEVATED LIVER ENZYMES: Primary | ICD-10-CM

## 2020-12-13 ENCOUNTER — TRANSCRIBE ORDER (OUTPATIENT)
Dept: REGISTRATION | Age: 50
End: 2020-12-13

## 2020-12-13 ENCOUNTER — HOSPITAL ENCOUNTER (OUTPATIENT)
Dept: PREADMISSION TESTING | Age: 50
Discharge: HOME | End: 2020-12-13
Payer: MEDICARE

## 2020-12-13 DIAGNOSIS — Z01.812 PRE-PROCEDURE LAB EXAM: ICD-10-CM

## 2020-12-13 DIAGNOSIS — Z01.812 PRE-PROCEDURE LAB EXAM: Primary | ICD-10-CM

## 2020-12-13 PROCEDURE — 87635 SARS-COV-2 COVID-19 AMP PRB: CPT

## 2020-12-14 LAB — SARS-COV-2, COV2NT: NOT DETECTED

## 2020-12-17 ENCOUNTER — HOSPITAL ENCOUNTER (OUTPATIENT)
Age: 50
Setting detail: OUTPATIENT SURGERY
Discharge: HOME | End: 2020-12-17
Attending: INTERNAL MEDICINE | Admitting: INTERNAL MEDICINE
Payer: MEDICARE

## 2020-12-17 ENCOUNTER — APPOINTMENT (OUTPATIENT)
Dept: ULTRASOUND IMAGING | Age: 50
End: 2020-12-17
Attending: INTERNAL MEDICINE
Payer: MEDICARE

## 2020-12-17 VITALS
RESPIRATION RATE: 10 BRPM | OXYGEN SATURATION: 94 % | TEMPERATURE: 99.1 F | HEART RATE: 58 BPM | DIASTOLIC BLOOD PRESSURE: 64 MMHG | SYSTOLIC BLOOD PRESSURE: 102 MMHG | WEIGHT: 190 LBS | HEIGHT: 65 IN | BODY MASS INDEX: 31.65 KG/M2

## 2020-12-17 DIAGNOSIS — R74.8 ELEVATED LIVER ENZYMES: ICD-10-CM

## 2020-12-17 PROCEDURE — 47000 NEEDLE BIOPSY OF LIVER PERQ: CPT | Performed by: INTERNAL MEDICINE

## 2020-12-17 PROCEDURE — 77030013826 HC NDL BIOP MAXCOR BARD -B: Performed by: INTERNAL MEDICINE

## 2020-12-17 PROCEDURE — 77030014115: Performed by: INTERNAL MEDICINE

## 2020-12-17 PROCEDURE — 76942 ECHO GUIDE FOR BIOPSY: CPT

## 2020-12-17 PROCEDURE — 88313 SPECIAL STAINS GROUP 2: CPT

## 2020-12-17 PROCEDURE — 88312 SPECIAL STAINS GROUP 1: CPT

## 2020-12-17 PROCEDURE — 76942 ECHO GUIDE FOR BIOPSY: CPT | Performed by: INTERNAL MEDICINE

## 2020-12-17 PROCEDURE — 88307 TISSUE EXAM BY PATHOLOGIST: CPT

## 2020-12-17 PROCEDURE — 74011250636 HC RX REV CODE- 250/636: Performed by: INTERNAL MEDICINE

## 2020-12-17 PROCEDURE — 76040000019: Performed by: INTERNAL MEDICINE

## 2020-12-17 RX ORDER — HYDROMORPHONE HYDROCHLORIDE 1 MG/ML
1 INJECTION, SOLUTION INTRAMUSCULAR; INTRAVENOUS; SUBCUTANEOUS
Status: DISCONTINUED | OUTPATIENT
Start: 2020-12-17 | End: 2020-12-17 | Stop reason: HOSPADM

## 2020-12-17 RX ORDER — SODIUM CHLORIDE 0.9 % (FLUSH) 0.9 %
5-40 SYRINGE (ML) INJECTION AS NEEDED
Status: DISCONTINUED | OUTPATIENT
Start: 2020-12-17 | End: 2020-12-17 | Stop reason: HOSPADM

## 2020-12-17 RX ORDER — LIDOCAINE HYDROCHLORIDE 10 MG/ML
10 INJECTION INFILTRATION; PERINEURAL ONCE
Status: DISCONTINUED | OUTPATIENT
Start: 2020-12-17 | End: 2020-12-17 | Stop reason: HOSPADM

## 2020-12-17 RX ORDER — SODIUM CHLORIDE 0.9 % (FLUSH) 0.9 %
5-40 SYRINGE (ML) INJECTION EVERY 8 HOURS
Status: DISCONTINUED | OUTPATIENT
Start: 2020-12-17 | End: 2020-12-17 | Stop reason: HOSPADM

## 2020-12-17 RX ORDER — ONDANSETRON 2 MG/ML
4 INJECTION INTRAMUSCULAR; INTRAVENOUS
Status: DISCONTINUED | OUTPATIENT
Start: 2020-12-17 | End: 2020-12-17 | Stop reason: HOSPADM

## 2020-12-17 RX ADMIN — HYDROMORPHONE HYDROCHLORIDE 1 MG: 1 INJECTION, SOLUTION INTRAMUSCULAR; INTRAVENOUS; SUBCUTANEOUS at 11:14

## 2020-12-17 NOTE — PROCEDURES
[address] [facility], MD, [place]Jenn Camille Shields, MD, MPH      Leela Soler, MANISH Montiel, [facility]     Perla Russo, [facility]   DAVID Carmona, [facility]       [facility]    at [address], [address] Emmanuel Dsouza  22.    [phone]    FAX: [address] [place] [address], [address]    [phone]    FAX: [phone]         LIVER BIOPSY PROCEDURE NOTE    Shaun Awad  1970    INDICATIONS/PRE-OPERATIVE  DIAGNOSIS:  Elevated liver enzymes    : Luis Antonio Villanueva MD    SURGICAL ASSISTANT:  None    PROSTHETIC DEVICES, TISSUE GRAFTS, TRANSPLANTED ORGANS:  Not applicable    SEDATION: 1% Lidocaine injection 10 ml    PROCEDURE:  Informed consent to perform the procedure was obtained from the patient. The patient was positioned on the edge of the stretcher lying flat in the supine position. Ultrasound was utilized to image the liver. The diaphragm and any major mass lesion or vascular structures within the liver were identified. An appropriate site for liver biopsy was identified. The distance from the surface of the skin to the liver capsule was 4 cm. This area was prepped with betadine and draped in sterile fashion. The skin was infiltrated with 1% lidocaine. The deeper subcutanous tissues and liver capsule overlying the biopsy site were then infiltrated with 1% lidocaine until appropriate anesthesia was obtained. A small incision was made in the skin so the biopsy devise could be easily inserted. A total of 2 passes with the 16 gauge Bard biopsy devise was then made into the liver.   Core(s) of liver tissue totaling 4 cm in length were obtained and placed into tissue fixative. A band aid was placed over the biopsy site. The patient was then repositioned on the right side and transported to the recovery area on the stretcher for routine monitoring until discharge. The specimen was sent to pathology for processing via the normal transport mechanism. SPECIMEN COLLECTED: Liver    INTERVENTIONS:  None    ESTIMATED BLOOD LOSS: Negligible.      POST-OPERATIVE DIAGNOSIS: Same as Pre-operative Diagnosis      Leonid Zheng MD  [facility] 1, [address] [facility] 22.  [phone]  [address] [place] [address]

## 2020-12-17 NOTE — H&P
[address] [facility] MD Vaibhav, Krupa Mina MD, MPH      Rosalinda Pugh, MANISH Wilde, [facility]     Perla Russo, [facility]   Jordan Noemy, DAVID Beebe, [facility]       Johanne Coyle Ghass[address]    at [address], [facility] Emmanuel Dsouza  22. [phone]    FAX: [address] [place] [address], [address]    [phone]    FAX: [phone]         PRE-PROCEDURE NOTE - LIVER BIOPSY    H and P from last office visit reviewed. Allergies reviewed. Out-patient medication list reviewed. Patient Active Problem List   Diagnosis Code    Fibrocystic breast changes of both breasts N60.11, N60.12    Elevated liver enzymes R74.8    Cardiomyopathy ([address] I42.9    Hypothyroidism E03.9    H/O gastric bypass Z98.84    History of cholecystectomy Z90.49    History of appendectomy Z90.49    Neuropathy G62.9    Migraine headache G43.909    Seizures ([address] R56.9    History of bowel resection Z90.49    Generalized abdominal pain R10.84    Diarrhea R19.7    H/O Clostridium difficile infection Z86.19       Allergies   Allergen Reactions    Morphine Other (comments)     SOB/Chest Pressure - in hosp for a week. States DILAUDID Ok    Codeine Other (comments)     SOB chest pain    Sulfa (Sulfonamide Antibiotics) Rash     itching       No current facility-administered medications on file prior to encounter. Current Outpatient Medications on File Prior to Encounter   Medication Sig Dispense Refill    tiotropium (SPIRIVA) 18 mcg inhalation capsule Take 1 Cap by inhalation daily.  fexofenadine (ALLEGRA) 180 mg tablet Take  by mouth.       cholecalciferol (VITAMIN D3) (2,000 UNITS /50 MCG) cap capsule Take  by mouth daily.  gabapentin (NEURONTIN) 100 mg capsule Take 200 mg by mouth three (3) times daily.  levETIRAcetam (KEPPRA) 750 mg tablet 1,500 mg two (2) times a day. 1    montelukast (SINGULAIR) 10 mg tablet TAKE 1 TABLET BY MOUTH ONCE DAILY  3    nortriptyline (PAMELOR) 50 mg capsule TAKE 2 CAPSULES BY MOUTH ONCE DAILY AT BEDTIME FOR 30 DAYS  5    PARoxetine (PAXIL) 10 mg tablet   4    potassium chloride (K-DUR, KLOR-CON) 20 mEq tablet TAKE 1 TABLET BY MOUTH TWICE DAILY FOR 30 DAYS  4    sucralfate (CARAFATE) 100 mg/mL suspension Take 1 g by mouth.  BD LUER-TEO SYRINGE 3 mL 23 x 1\" syrg USE TO INJECT B 12 INJECTIONS ONCE A MONTH  0    topiramate (TOPAMAX) 100 mg tablet TAKE 1 TABLET BY MOUTH TWICE DAILY  5    carvedilol (COREG) 6.25 mg tablet Take 6.25 mg by mouth two (2) times a day.  albuterol (PROVENTIL VENTOLIN) 2.5 mg /3 mL (0.083 %) nebu USE 1 VIAL IN NEBULIZER EVERY 4 TO 6 HOURS AS NEEDED  5    SYMBICORT 80-4.5 mcg/actuation HFAA INHALE 2 PUFFS BY MOUTH TWICE DAILY  4    bumetanide (BUMEX) 1 mg tablet Take 1 mg by mouth.  dexlansoprazole (DEXILANT) 60 mg CpDB capsule (delayed release) Take 60 mg by mouth.  dicyclomine (BENTYL) 20 mg tablet Take 20 mg by mouth.  losartan (COZAAR) 25 mg tablet Take 25 mg by mouth daily (after breakfast).  spironolactone (ALDACTONE) 25 mg tablet Take 25 mg by mouth daily (after breakfast).  divalproex DR (DEPAKOTE) 500 mg tablet Take 500 mg by mouth daily.  levothyroxine (SYNTHROID) 50 mcg tablet Take 50 mcg by mouth Daily (before breakfast).  cyanocobalamin (VITAMIN B-12) 1,000 mcg/mL injection 1,000 mcg by IntraMUSCular route every thirty (30) days.  Calcium-Cholecalciferol, D3, (CALCIUM 600 WITH VITAMIN D3) 600 mg(1,500mg) -400 unit cap Take  by mouth two (2) times a day.  multivitamin (ONE A DAY) tablet Take 1 Tab by mouth daily.       Cholecalciferol, Vitamin D3, (VITAMIN D3) 2,000 unit cap capsule Take 5,000 Units by mouth daily.  ondansetron hcl (ZOFRAN) 8 mg tablet Take 8 mg by mouth every eight (8) hours as needed.  nitroglycerin (NITROSTAT) 0.3 mg SL tablet by SubLINGual route every five (5) minutes as needed.  VENTOLIN HFA 90 mcg/actuation inhaler INHALE 2 PUFFS BY MOUTH EVERY 4 TO 6 HOURS AS NEEDED  4    diclofenac (VOLTAREN) 1 % gel APPLY 4 GRAMS TOPICALLY TO AFFECTED AREA FOUR TIMES A DAY  1    estradiol (ESTRACE) 0.01 % (0.1 mg/gram) vaginal cream   2    famotidine (PEPCID) 40 mg tablet TAKE 1 TABLET BY MOUTH TWICE DAILY  2    evening primrose oil 500 mg cap Take  by mouth.  vitamin E (AQUA GEMS) 400 unit capsule Take  by mouth daily. For liver biopsy to assess Abnormal liver enzymes. The risks of the procedure were discussed with the patient. This included bleeding, pain, and puncture of other organs. All questions were answered. The patient wishes to proceed with the procedure. The patient was counseled at length about the risks of ayla Covid-19 in the sapphire-operative and post-operative states including the recovery window of their procedure. The patient was made aware that ayla Covid-19 after a surgical procedure may worsen their prognosis for recovering from the virus and lend to a higher morbidity and or mortality risk. The patient was given the options of postponing their procedure. All of the risks, benefits, and alternatives were discussed. The patient does  wish to proceed with the procedure. PHYSICAL EXAMINATION:  VS: per nursing note  General: No acute distress. Eyes: Sclera anicteric. ENT: No oral lesions. Thyroid normal.  Nodes: No adenopathy. Skin: No spider angiomata. No jaundice. No palmar erythema. Respiratory: Lungs clear to auscultation. Cardiovascular: Regular heart rate. No murmurs. No JVD. Abdomen: Soft non-tender, liver size normal to percussion/palpation. Spleen not palpable.  No obvious ascites. Extremities: No edema. No muscle wasting. No gross arthritic changes. Neurologic: Alert and oriented. Cranial nerves grossly intact. No asterixis. LABS:  Lab Results   Component Value Date/Time    WBC 5.3 10/20/2020 06:15 PM    HGB 12.4 (L) 10/20/2020 06:15 PM    HCT 37.4 10/20/2020 06:15 PM    PLATELET 072 47/70/6860 06:15 PM    MCV 87.8 10/20/2020 06:15 PM     Lab Results   Component Value Date/Time    INR 1.0 07/28/2020 11:53 AM    INR 1.0 07/26/2019 12:00 AM    Prothrombin time 10.3 07/28/2020 11:53 AM    Prothrombin time 10.5 07/26/2019 12:00 AM       ASSESSMENT AND PLAN:  Liver biopsy under ultrasound guidance.     MD [facility] 13  [address] [facility] 22.  [phone]  [address] [place] [address]

## 2020-12-17 NOTE — PROGRESS NOTES
Liver Biopsy site assessed with vital signs. No bleeding or swelling noted at biopsy site. Bandaid changed prior to discharge.

## 2020-12-17 NOTE — PROGRESS NOTES
Mason Benito  1970  [id number]    Situation:  Verbal report received from: Regi BARR  Procedure: Procedure(s):  . LIVER BIOPSY  :-    Background:    Preoperative diagnosis: ELEVATED LIVER ENZYMES  Postoperative diagnosis: * No post-op diagnosis entered *    :  Dr. Guille Calles  Assistant(s): Endoscopy Technician-1: Caitlin Gonzales  Endoscopy RN-1: Aidee Del Real RN    Specimens:   ID Type Source Tests Collected by Time Destination   1 : Liver biopsy Preservative Liver specimen  Johnny Macedo MD 12/17/2020 1034 Pathology     H. Pylori  no    Assessment:      Anesthesia gave intra-procedure sedation and medications, see anesthesia flow sheet yes    Intravenous fluids: NS@ KVO     Vital signs stable     Abdominal assessment: round and soft     Recommendation:  Discharge patient per MD order.     Family or Friend   Permission to share finding with family or friend yes

## 2020-12-17 NOTE — DISCHARGE INSTRUCTIONS
[address] [facility] MD Vaibhav, Vikki Grant MD, MPH      Shana Merrill, MANISH Bradley, [facility]     Perla Russo [facility]   Silas Gaxiola DAJUAN-SERA [facility]       Johanne Coyle [facility] 136    at [address], [facility] Emmanuel Dsouza  22.    [phone]    FAX: [address] [place] [address], [address]    [phone]    FAX: [phone]         LIVER BIOPSY DISCHARGE INSTRUCTIONS      Feliciano Anguiano  1970  Date: 12/17/2020    DIET:    Joseph Marroquin may resume your previous diet. ACTIVITIES:  Rest quietly the rest of today. You should not lift any objects more than 20 pounds for the next 2 days. If you work sitting down without strenuous activity you may return to work tomorrow. If you exert yourself or do heavy lifting at work you should take tomorrow off. Do not drive or operate hazardous machinery for 12 hours after you are discharged from this procedure. SPECIAL INSTRUCTIONS:  Do not use any aspirin or non-steroidal (Motin, Advil, Naproxen, etc) pain medications for the next 2 days. You may use extra-strength Tylenol (acetaminophen) if you experience pain or discomfort later today. Restarting blood thinners: If you were taking blood thinners prior to the procedure you can restart these in 2 days. Call the [facility] office if you experience any of the following:  Persistent or severe abdominal pain. Persistent or severe abdominal distention. Fever and chills   Nausea and vomiting. New or unusual symptoms. Follow-up care: You should have a follow up appointment with Dr. Prince Clemente to review the results of the liver biopsy results in 2 weeks.   If you do not have an appointment please call the office at the number listed above to schedule this. Other instructions: If you have any problems or questions call the [facility] office at the phone number listed above. DISCHARGE SUMMARY from Nurse: The following personal items collected during your admission are returned to you:   Dental Appliance: Dental Appliances: None  Vision: Visual Aid: (wearing sunglasses r/t laser eye surgery)  Hearing Aid:    Jewelry:    Clothing:    Other Valuables:    Valuables sent to safe:            Learning About Coronavirus (COVID-19)  Coronavirus [phone]): Overview  What is coronavirus (FTXIV-35)? The coronavirus disease (COVID-19) is caused by a virus. It is an illness that was first found in [place], [place], in December 2019. It has since spread worldwide. The virus can cause fever, cough, and trouble breathing. In severe cases, it can cause pneumonia and make it hard to breathe without help. It can cause death. Coronaviruses are a large group of viruses. They cause the common cold. They also cause more serious illnesses like Middle East respiratory syndrome (MERS) and severe acute respiratory syndrome (SARS). COVID-19 is caused by a novel coronavirus. That means it's a new type that has not been seen in people before. This virus spreads person-to-person through droplets from coughing and sneezing. It can also spread when you are close to someone who is infected. And it can spread when you touch something that has the virus on it, such as a doorknob or a tabletop. What can you do to protect yourself from coronavirus (COVID-19)? The best way to protect yourself from getting sick is to:  · Avoid areas where there is an outbreak. · Avoid contact with people who may be infected. · Wash your hands often with soap or alcohol-based hand sanitizers. · Avoid crowds and try to stay at least 6 feet away from other people.   · Wash your hands often, especially after you cough or sneeze. Use soap and water, and scrub for at least 20 seconds. If soap and water aren't available, use an alcohol-based hand . · Avoid touching your mouth, nose, and eyes. What can you do to avoid spreading the virus to others? To help avoid spreading the virus to others:  · Cover your mouth with a tissue when you cough or sneeze. Then throw the tissue in the trash. · Use a disinfectant to clean things that you touch often. · Stay home if you are sick or have been exposed to the virus. Don't go to school, work, or public areas. And don't use public transportation. · If you are sick:  ? Leave your home only if you need to get medical care. But call the doctor's office first so they know you're coming. And wear a face mask, if you have one.  ? If you have a face mask, wear it whenever you're around other people. It can help stop the spread of the virus when you cough or sneeze. ? Clean and disinfect your home every day. Use household  and disinfectant wipes or sprays. Take special care to clean things that you grab with your hands. These include doorknobs, remote controls, phones, and handles on your refrigerator and microwave. And don't forget countertops, tabletops, bathrooms, and computer keyboards. When to call for help  Call 911 anytime you think you may need emergency care. For example, call if:  · You have severe trouble breathing. (You can't talk at all.)  · You have constant chest pain or pressure. · You are severely dizzy or lightheaded. · You are confused or can't think clearly. · Your face and lips have a blue color. · You pass out (lose consciousness) or are very hard to wake up. Call your doctor now if you develop symptoms such as:  · Shortness of breath. · Fever. · Cough. If you need to get care, call ahead to the doctor's office for instructions before you go. Make sure you wear a face mask, if you have one, to prevent exposing other people to the virus.   Where can you get the latest information? The following health organizations are tracking and studying this virus. Their websites contain the most up-to-date information. Andrae Crawford also learn what to do if you think you may have been exposed to the virus. · U.S. Centers for Disease Control and Prevention (CDC): The CDC provides updated news about the disease and travel advice. The website also tells you how to prevent the spread of infection. www.cdc.gov  · World Health Organization [facility]): WHO offers information about the virus outbreaks. WHO also has travel advice. www.who.int  Current as of: April 1, 2020               Content Version: 12.4  © 7044-5803 [employer]. Care instructions adapted under license by your healthcare professional. If you have questions about a medical condition or this instruction, always ask your healthcare professional. [address] any warranty or liability for your use of this information.

## 2020-12-29 ENCOUNTER — HOSPITAL ENCOUNTER (OUTPATIENT)
Dept: PREADMISSION TESTING | Age: 50
Discharge: HOME | End: 2020-12-29
Payer: MEDICARE

## 2020-12-29 VITALS
RESPIRATION RATE: 16 BRPM | HEART RATE: 74 BPM | SYSTOLIC BLOOD PRESSURE: 92 MMHG | OXYGEN SATURATION: 100 % | WEIGHT: 201.94 LBS | TEMPERATURE: 98 F | BODY MASS INDEX: 33.65 KG/M2 | DIASTOLIC BLOOD PRESSURE: 59 MMHG | HEIGHT: 65 IN

## 2020-12-29 LAB
ANION GAP SERPL CALC-SCNC: 2 MMOL/L (ref 5–15)
APTT PPP: 25.3 SEC (ref 23–35.7)
ATRIAL RATE: 56 BPM
BUN SERPL-MCNC: 13 MG/DL (ref 6–20)
BUN/CREAT SERPL: 15 (ref 12–20)
CA-I BLD-MCNC: 8.6 MG/DL (ref 8.5–10.1)
CALCULATED P AXIS, ECG09: 24 DEGREES
CALCULATED R AXIS, ECG10: 2 DEGREES
CALCULATED T AXIS, ECG11: 12 DEGREES
CHLORIDE SERPL-SCNC: 110 MMOL/L (ref 97–108)
CO2 SERPL-SCNC: 27 MMOL/L (ref 21–32)
CREAT SERPL-MCNC: 0.87 MG/DL (ref 0.55–1.02)
DIAGNOSIS, 93000: NORMAL
ERYTHROCYTE [DISTWIDTH] IN BLOOD BY AUTOMATED COUNT: 14 % (ref 11.5–14.5)
GLUCOSE SERPL-MCNC: 85 MG/DL (ref 65–100)
HCT VFR BLD AUTO: 38.3 % (ref 35–47)
HGB BLD-MCNC: 12 G/DL (ref 11.5–16)
INR PPP: 1 (ref 0.9–1.1)
MCH RBC QN AUTO: 28.2 PG (ref 26–34)
MCHC RBC AUTO-ENTMCNC: 31.3 G/DL (ref 30–36.5)
MCV RBC AUTO: 90.1 FL (ref 80–99)
P-R INTERVAL, ECG05: 182 MS
PLATELET # BLD AUTO: 210 K/UL (ref 150–400)
PMV BLD AUTO: 10.3 FL (ref 8.9–12.9)
POTASSIUM SERPL-SCNC: 4.2 MMOL/L (ref 3.5–5.1)
PROTHROMBIN TIME: 13.1 SEC (ref 11.9–14.7)
Q-T INTERVAL, ECG07: 418 MS
QRS DURATION, ECG06: 112 MS
QTC CALCULATION (BEZET), ECG08: 403 MS
RBC # BLD AUTO: 4.25 M/UL (ref 3.8–5.2)
SODIUM SERPL-SCNC: 139 MMOL/L (ref 136–145)
THERAPEUTIC RANGE,PTTT: NORMAL SEC (ref 68–109)
VENTRICULAR RATE, ECG03: 56 BPM
WBC # BLD AUTO: 5.3 K/UL (ref 3.6–11)

## 2020-12-29 PROCEDURE — 85027 COMPLETE CBC AUTOMATED: CPT

## 2020-12-29 PROCEDURE — 93005 ELECTROCARDIOGRAM TRACING: CPT

## 2020-12-29 PROCEDURE — 80048 BASIC METABOLIC PNL TOTAL CA: CPT

## 2020-12-29 PROCEDURE — 85610 PROTHROMBIN TIME: CPT

## 2020-12-29 PROCEDURE — 85730 THROMBOPLASTIN TIME PARTIAL: CPT

## 2020-12-29 PROCEDURE — 36415 COLL VENOUS BLD VENIPUNCTURE: CPT

## 2020-12-29 RX ORDER — LACOSAMIDE 100 MG/1
100 TABLET ORAL 2 TIMES DAILY
COMMUNITY
End: 2022-05-08

## 2020-12-29 RX ORDER — BUDESONIDE AND FORMOTEROL FUMARATE DIHYDRATE 160; 4.5 UG/1; UG/1
2 AEROSOL RESPIRATORY (INHALATION) 2 TIMES DAILY
Status: ON HOLD | COMMUNITY
End: 2021-01-04

## 2020-12-29 RX ORDER — RIZATRIPTAN BENZOATE 5 MG/1
5 TABLET ORAL
COMMUNITY

## 2020-12-29 RX ORDER — RABEPRAZOLE SODIUM 20 MG/1
20 TABLET, DELAYED RELEASE ORAL 2 TIMES DAILY
COMMUNITY

## 2020-12-29 NOTE — PERIOP NOTES
18 Dr. Yana Rincon office called, with permission given by patient during PAT visit, requested most recent office note, ekg and any cardiac test results to be faxed to PAT dept as soon as possible. 26 Dr. Jamil Mckenzie called, made aware of patient's history, ekg done by Dr. Yana Rincon office July 15, 2020, echocardiogram done 11/5/2020 by Dr. Kayla Del Valle and office note by Dr. Kayla Del Valle done 12/23/2020. Dr. Jamil Mckenzie requested for new ekg to  be done today, and stated patient is ok to proceed with surgery as planned.

## 2020-12-31 ENCOUNTER — OFFICE VISIT (OUTPATIENT)
Dept: HEMATOLOGY | Age: 50
End: 2020-12-31
Payer: MEDICARE

## 2020-12-31 ENCOUNTER — HOSPITAL ENCOUNTER (OUTPATIENT)
Dept: PREADMISSION TESTING | Age: 50
Discharge: HOME | End: 2020-12-31
Payer: MEDICARE

## 2020-12-31 VITALS
WEIGHT: 208 LBS | SYSTOLIC BLOOD PRESSURE: 115 MMHG | OXYGEN SATURATION: 96 % | HEART RATE: 115 BPM | RESPIRATION RATE: 22 BRPM | DIASTOLIC BLOOD PRESSURE: 70 MMHG | TEMPERATURE: 96.5 F | BODY MASS INDEX: 34.66 KG/M2 | HEIGHT: 65 IN

## 2020-12-31 DIAGNOSIS — R60.0 BILATERAL LOWER EXTREMITY EDEMA: Primary | ICD-10-CM

## 2020-12-31 LAB — SARS-COV-2, COV2: NORMAL

## 2020-12-31 PROCEDURE — 87635 SARS-COV-2 COVID-19 AMP PRB: CPT

## 2020-12-31 PROCEDURE — G8510 SCR DEP NEG, NO PLAN REQD: HCPCS | Performed by: INTERNAL MEDICINE

## 2020-12-31 PROCEDURE — G8427 DOCREV CUR MEDS BY ELIG CLIN: HCPCS | Performed by: INTERNAL MEDICINE

## 2020-12-31 PROCEDURE — 99214 OFFICE O/P EST MOD 30 MIN: CPT | Performed by: INTERNAL MEDICINE

## 2020-12-31 PROCEDURE — 3017F COLORECTAL CA SCREEN DOC REV: CPT | Performed by: INTERNAL MEDICINE

## 2020-12-31 PROCEDURE — G8419 CALC BMI OUT NRM PARAM NOF/U: HCPCS | Performed by: INTERNAL MEDICINE

## 2020-12-31 NOTE — PROGRESS NOTES
[address] [place] [address], Diana BLANCHARD Dian Bar, MD Nile Orn, PA-C Bridgett Ruffing, [facility]-BC     April S Karan, [facility]   Gerard Whitney P-SERA Le, [facility]       Johanne Deputado Ghassan De [address] 136    at [address], [address] [place] [address], [facility] 22.    [phone]    FAX: [address] [place] [address], [address], [address]    [phone]    FAX: [phone]       Patient Care Team:  Leora Emanuel MD as PCP - General (Family Medicine)  Bridgette Angel MD (Breast Surgery)  Diego Augustine MD as Referring Provider (Obstetrics & Gynecology)  Bridgette Angel MD (Breast Surgery)      Problem List  Date Reviewed: 10/23/2020          Codes Class Noted    Generalized abdominal pain ICD-10-CM: R10.84  ICD-9-CM: 789.07  7/21/2020        Diarrhea ICD-10-CM: R19.7  ICD-9-CM: 787.91  7/21/2020        H/O Clostridium difficile infection ICD-10-CM: Z86.19  ICD-9-CM: V12.09  7/21/2020        History of bowel resection ICD-10-CM: Z90.49  ICD-9-CM: V15.89  4/21/2020        Elevated liver enzymes ICD-10-CM: R74.8  ICD-9-CM: 790.5  7/26/2019        Cardiomyopathy ([facility] ICD-10-CM: I42.9  ICD-9-CM: 425.4  7/26/2019        Hypothyroidism ICD-10-CM: E03.9  ICD-9-CM: 244.9  7/26/2019        H/O gastric bypass ICD-10-CM: Z98.84  ICD-9-CM: V45.86  7/26/2019        History of cholecystectomy ICD-10-CM: Z90.49  ICD-9-CM: V45.79  7/26/2019        History of appendectomy ICD-10-CM: Z90.49  ICD-9-CM: V45.89  7/26/2019        Neuropathy ICD-10-CM: G62.9  ICD-9-CM: 355.9  7/26/2019        Migraine headache ICD-10-CM: L08.127  ICD-9-CM: 346.90  7/26/2019        Seizures ([facility] ICD-10-CM: R56.9  ICD-9-CM: 780.39  7/26/2019        Fibrocystic breast changes of both breasts ICD-10-CM: N60.11, N60.12  ICD-9-CM: 610.1  7/8/2015              Abby Rodriges returns to the [address] for management of elevated liver enzymes. The active problem list, all pertinent past medical history, medications, radiologic findings and laboratory findings related to the liver disorder were reviewed with the patient. The patient is a 44 y.o. Black female who was found to have abnormalities in liver enzymes in 2018. There is a history of cardiomyopathy with ECHO at [facility]. The results are not known. Serologic evaluation for markers of chronic liver disease were negative. Imaging of the liver from 8/2019 demonstrates a normal liver, no ductal dilatation or mass. The patient underwent a liver biopsy in 12/2020. The procedure was well tolerated. I have personally reviewed the liver biopsy slides. This demonstrates centrolobular sinusoidal dilation with no inflammaiton, steatosis or fibrosis. A single granuloma was present. The patient has the following symptoms which are thought to be due to the liver disease:  fatigue, nausea, and diffuse abdominal pain. The patient has not experienced the following symptoms:  pain in the right side over the liver. The patient has Mild limitations in functional activities which can be attributed to other medical problems that are not related to the liver disease. ASSESSMENT AND PLAN:  Elevated liver enzymes  Intermittent elevation in liver transaminases and ALP. A liver biopsy in 12/2020 demonstrated centrolobular sinusoidal dilation and a granuloma but no steatosis, no inflammaiton and no fibrosis.       Serologic testing for causes of chronic liver disease was negative     Will perform ECHO to determine the severity of cardiomyopathy and if there is TR with passive hepatic congestion which would explain the centrolobular sinusoidal dilation and interrmittent elevation in liver enzymes. Hepatic granuloma  This has little clinical significance. The mild elevation in ALP may be due to the granuloma. Hepatic granulomas are in most cases benign and do not cause liver injury. Abdominal Pain/Diarrhea/Steatorhea? GI work up was negative. This may be IBS or dietary related. Advised she try a low fat diet. Screening for Hepatocellular Carcinoma  HCC screening is not necessary since the patient has no evidence of cirrhosis. Treatment of other medical problems in patients with chronic liver disease  There are no contraindications for the patient to take most medications that are necessary for treatment of other medical issues. Counseling for alcohol in patients with chronic liver disease  The patient was counseled regarding alcohol consumption and the effect of alcohol on chronic liver disease. Patients who have undergone obesity surgery are at much greater risk to develop alcoholic liver injury. She has never consumed significant amounts of alcohol. Vaccinations   Vaccination for viral hepatitis A and B is recommended since the patient has no serologic evidence of previous exposure or vaccination with immunity. Routine vaccinations against other bacterial and viral agents can be performed as indicated. Annual flu vaccination should be administered if indicated. ALLERGIES  Allergies   Allergen Reactions    Acetaminophen Other (comments)     Advised not to take due to Liver Issues    Morphine Other (comments)     SOB/Chest Pressure - in hosp for a week. States DILAUDID Ok    Codeine Other (comments)     SOB chest pain    Sulfa (Sulfonamide Antibiotics) Rash     itching       MEDICATIONS  Current Outpatient Medications   Medication Sig    RABEprazole (ACIPHEX) 20 mg TbEC Take 20 mg by mouth two (2) times a day.  rizatriptan (MAXALT) 5 mg tablet Take 5 mg by mouth once as needed for Migraine.  May repeat in 2 hours if needed    alum-mag hydroxide-simeth-lidocaine-sucralfate Take 30 mL by mouth once.  lacosamide (Vimpat) 100 mg tab tablet Take 100 mg by mouth two (2) times a day.  tiotropium (SPIRIVA) 18 mcg inhalation capsule Take 1 Cap by inhalation daily.  fexofenadine (ALLEGRA) 180 mg tablet Take 180 mg by mouth daily.  ondansetron hcl (ZOFRAN) 8 mg tablet Take 8 mg by mouth every eight (8) hours as needed.  nitroglycerin (NITROSTAT) 0.3 mg SL tablet 0.3 mg by SubLINGual route every five (5) minutes as needed.  gabapentin (NEURONTIN) 100 mg capsule Take 300 mg by mouth three (3) times daily.  levETIRAcetam (KEPPRA) 750 mg tablet 1,500 mg two (2) times a day.  montelukast (SINGULAIR) 10 mg tablet Take 10 mg by mouth daily.  nortriptyline (PAMELOR) 50 mg capsule Take 100 mg by mouth nightly.  PARoxetine (PAXIL) 10 mg tablet Take 10 mg by mouth daily.  potassium chloride (K-DUR, KLOR-CON) 20 mEq tablet Take 20 mEq by mouth daily.  sucralfate (CARAFATE) 100 mg/mL suspension Take 1 g by mouth four (4) times daily.  BD LUER-TEO SYRINGE 3 mL 23 x 1\" syrg USE TO INJECT B 12 INJECTIONS ONCE A MONTH    topiramate (TOPAMAX) 100 mg tablet Take 100 mg by mouth two (2) times a day.  carvedilol (COREG) 6.25 mg tablet Take 6.25 mg by mouth two (2) times a day.  bumetanide (BUMEX) 1 mg tablet Take 1 mg by mouth daily.  dexlansoprazole (DEXILANT) 60 mg CpDB capsule (delayed release) Take 60 mg by mouth daily.  diclofenac (VOLTAREN) 1 % gel Apply 2 g to affected area every six (6) hours.  dicyclomine (BENTYL) 20 mg tablet Take 20 mg by mouth every six (6) hours.  estradiol (ESTRACE) 0.01 % (0.1 mg/gram) vaginal cream     famotidine (PEPCID) 40 mg tablet Take 40 mg by mouth nightly.  losartan (COZAAR) 25 mg tablet Take 25 mg by mouth daily (after breakfast).  spironolactone (ALDACTONE) 25 mg tablet Take 25 mg by mouth daily (after breakfast).     divalproex DR (DEPAKOTE) 500 mg tablet Take 500 mg by mouth daily.  levothyroxine (SYNTHROID) 50 mcg tablet Take 50 mcg by mouth Daily (before breakfast).  cyanocobalamin (VITAMIN B-12) 1,000 mcg/mL injection 1,000 mcg by IntraMUSCular route every thirty (30) days.  Calcium-Cholecalciferol, D3, (CALCIUM 600 WITH VITAMIN D3) 600 mg(1,500mg) -400 unit cap Take 2 Tabs by mouth daily.  multivitamin (ONE A DAY) tablet Take 1 Tab by mouth daily.  budesonide-formoteroL (Symbicort) 160-4.5 mcg/actuation HFAA Take 2 Puffs by inhalation two (2) times a day.  cholecalciferol (VITAMIN D3) (2,000 UNITS /50 MCG) cap capsule Take 2,000 Units by mouth daily.  albuterol (PROVENTIL VENTOLIN) 2.5 mg /3 mL (0.083 %) nebu Take 2.5 mg by inhalation every six (6) hours as needed.  VENTOLIN HFA 90 mcg/actuation inhaler Take 2 Puffs by inhalation every four (4) hours as needed.  SYMBICORT 80-4.5 mcg/actuation HFAA INHALE 2 PUFFS BY MOUTH TWICE DAILY    evening primrose oil 500 mg cap Take  by mouth.  vitamin E (AQUA GEMS) 400 unit capsule Take  by mouth daily.  Cholecalciferol, Vitamin D3, (VITAMIN D3) 2,000 unit cap capsule Take 5,000 Units by mouth daily. No current facility-administered medications for this visit. SYSTEM REVIEW NOT RELATED TO LIVER DISEASE OR REVIEWED ABOVE:  Constitution systems: Negative for fever, chills, weight gain, weight loss. Eyes: Negative for visual changes. ENT: Negative for sore throat, painful swallowing. Respiratory: Negative for cough, hemoptysis, SOB. Cardiology: Negative for chest pain, palpitations. GI:  Negative for constipation or diarrhea. : Negative for urinary frequency, dysuria, hematuria, nocturia. Skin: Negative for rash. Hematology: Negative for easy bruising, blood clots. Musculo-skeletal: Negative for back pain, muscle pain, weakness. Neurologic: Negative for headaches, dizziness, vertigo, memory problems not related to HE. Psychology: Negative for anxiety, depression.      FAMILY HISTORY:  The father  Has/had the following following chronic disease(s): cancer. The mother Has/had the following chronic disease(s): MI.    There is no family history of liver disease. SOCIAL HISTORY:  The patient is . The patient has 4 children,   The patient has never used tobacco products. The patient has never consumed significant amounts of alcohol. The patient currently receiving disability. PHYSICAL EXAMINATION:  Visit Vitals  /70 (BP 1 Location: Right arm, BP Patient Position: Sitting)   Pulse (!) 115   Temp (!) 96.5 °F (35.8 °C) (Temporal)   Resp 22   Ht 5' 4.96\" (1.65 m)   Wt 208 lb (94.3 kg)   SpO2 96%   BMI 34.66 kg/m²     General: No acute distress. Eyes: Sclera anicteric. ENT: No oral lesions. Thyroid normal.  Nodes: No adenopathy. Skin: No spider angiomata. No jaundice. No palmar erythema. Respiratory: Lungs clear to auscultation. Cardiovascular: Regular heart rate. No murmurs. No JVD. Abdomen: Soft non-tender, liver size normal to percussion/palpation. Spleen not palpable. No obvious ascites. Extremities: No edema. No muscle wasting. No gross arthritic changes. Neurologic: Alert and oriented. Cranial nerves grossly intact. No asterixis.       LABORATORY STUDIES:  [facility] [place] [facility] [address] [place] [address] 10/20/2020 7/28/2020   WBC 4.4 - 11.3 K/uL 5.3 5.7   ANC 1.8 - 7.7 K/UL 2.9 3.5   HGB 13.5 - 17.5 g/dL 12.4 (L) 12.2   PLT K/uL 214 226   INR 0.8 - 1.2  1.0   AST 15 - 37 U/L 45 (H) 35   ALT 12 - 78 U/L 56 49 (H)   Alk Phos 45 - 117 U/L 137 (H) 112   Bili, Total 0.2 - 1.0 mg/dL 0.1 (L) <0.2   Bili, Direct 0.00 - 0.40 mg/dL  0.07   Albumin 3.5 - 5.0 g/dL 3.4 (L) 4.1   BUN 6 - 20 mg/dL 15 17   Creat 0.55 - 1.02 mg/dL 0.87 0.79   Na 136 - 145 mmol/L 141 142   K 3.5 - 5.1 mmol/L 3.2 (L) 4.3   Cl 97 - 108 mmol/L 106 103   CO2 21 - 32 mmol/L 26 21   Glucose 65 - 100 mg/dL 92 90     [facility] [place] [facility] [place] Latest Ref Rng & Units 4/29/2020   WBC 4.4 - 11.3 K/uL 5.3   ANC 1.8 - 7.7 K/UL 3.0   HGB 13.5 - 17.5 g/dL 11.3   PLT K/uL 198   INR 0.8 - 1.2    AST 15 - 37 U/L 49 (H)   ALT 12 - 78 U/L 59 (H)   Alk Phos 45 - 117 U/L 94   Bili, Total 0.2 - 1.0 mg/dL <0.2   Bili, Direct 0.00 - 0.40 mg/dL 0.08   Albumin 3.5 - 5.0 g/dL 3.8   BUN 6 - 20 mg/dL 8   Creat 0.55 - 1.02 mg/dL 0.69   Na 136 - 145 mmol/L 143   K 3.5 - 5.1 mmol/L 4.4   Cl 97 - 108 mmol/L 110 (H)   CO2 21 - 32 mmol/L 22   Glucose 65 - 100 mg/dL 70       SEROLOGIES:  Serologies Latest Ref Rng & Units 7/26/2019   Hep A Ab, Total Negative Negative   Hep B Surface Ag Negative Negative   Hep B Core Ab, Total Negative Negative   Hep B Surface AB QL  Non Reactive   Hep C Ab 0.0 - 0.9 s/co ratio <0.1   Ferritin 15 - 150 ng/mL 14 (L)   Iron % Saturation 15 - 55 % 12 (L)   GULSHAN, IFA  Negative   ASMCA 0 - 19 Units 6   M2 Ab 0.0 - 20.0 Units <20.0   Ceruloplasmin 19.0 - 39.0 mg/dL 23.2   Alpha-1 antitrypsin level 90 - 200 mg/dL 99     Serologies Latest Ref Rng & Units 4/29/2020   C-ANCA Neg:<1:20 titer <1:20   P-ANCA Neg:<1:20 titer <1:20   ANCA Neg:<1:20 titer <1:20     LIVER HISTOLOGY:  8/2019. FibroScan performed at [facility]. EkPa was 3.3. IQR/med 9%. . The results suggested a fibrosis level of F0. The CAP score suggests no evidence of fatty liver. 12/2020. Slides reviewed by MLS. Sinusoidal dilation. A single periportal granuloma. No inflammation, No steatosis, Nofibrosis,     ENDOSCOPIC PROCEDURES:  Not available or performed    RADIOLOGY:  8/2019. Ultrasound of liver. Normal appearing liver. No liver mass lesions. 6/2020. MRI/MRCP of abdomen. Normal appearance of the liver and biliary tree status post cholecystectomy. No evidence of primary sclerosing cholangitis or biliary pathology. OTHER TESTING:  Not available or performed    FOLLOW-UP:  All of the issues listed above in the Assessment and Plan were discussed with the patient.   All questions were answered. The patient expressed a clear understanding of the above. [address] [address] in 4 weeks to review results of ECHO and routine follow-up.         Nighat Mejia MD  [facility] of [address], [address] [place] [address] 22.  [phone]  [address]

## 2020-12-31 NOTE — PROGRESS NOTES
Identified pt with two pt identifiers(name and ). Reviewed record in preparation for visit and have obtained necessary documentation. Chief Complaint   Patient presents with    Follow-up      Vitals:    20 0932   BP: 115/70   Pulse: (!) 115   Resp: 22   Temp: (!) 96.5 °F (35.8 °C)   TempSrc: Temporal   SpO2: 96%   Weight: 208 lb (94.3 kg)   Height: 5' 4.96\" (1.65 m)   PainSc:   4   PainLoc: Flank       Health Maintenance Review: Patient reminded of \"due or due soon\" health maintenance. I have asked the patient to contact his/her primary care provider (PCP) for follow-up on his/her health maintenance. Coordination of Care Questionnaire:  :   1) Have you been to an emergency room, urgent care, or hospitalized since your last visit? If yes, where when, and reason for visit? no       2. Have seen or consulted any other health care provider since your last visit? If yes, where when, and reason for visit? NO      Patient is accompanied by self I have received verbal consent from Alicja Mejia to discuss any/all medical information while they are present in the room.

## 2020-12-31 NOTE — Clinical Note
1/11/2021 Patient: Carin Aguiar YOB: 1970 Date of Visit: 12/31/2020 Elois Gaucher, [address] Fax: [phone] Dear Elois Gaucher, MD, Thank you for referring Ms. ALEJANDRO DOMINGUEZ [facility]. My notes for this consultation are attached. If you have questions, please do not hesitate to call me. I look forward to following your patient along with you. Sincerely, Valma Dakins, MD

## 2021-01-01 LAB — SARS-COV-2, COV2NT: NOT DETECTED

## 2021-01-04 ENCOUNTER — HOSPITAL ENCOUNTER (OUTPATIENT)
Age: 51
Discharge: HOME | End: 2021-01-04
Attending: ORTHOPAEDIC SURGERY | Admitting: ORTHOPAEDIC SURGERY
Payer: MEDICARE

## 2021-01-04 ENCOUNTER — APPOINTMENT (OUTPATIENT)
Dept: GENERAL RADIOLOGY | Age: 51
End: 2021-01-04
Attending: ORTHOPAEDIC SURGERY
Payer: MEDICARE

## 2021-01-04 ENCOUNTER — ANESTHESIA (OUTPATIENT)
Dept: SURGERY | Age: 51
End: 2021-01-04
Payer: MEDICARE

## 2021-01-04 ENCOUNTER — ANESTHESIA EVENT (OUTPATIENT)
Dept: SURGERY | Age: 51
End: 2021-01-04
Payer: MEDICARE

## 2021-01-04 VITALS
DIASTOLIC BLOOD PRESSURE: 71 MMHG | SYSTOLIC BLOOD PRESSURE: 110 MMHG | TEMPERATURE: 97.5 F | OXYGEN SATURATION: 95 % | RESPIRATION RATE: 16 BRPM | HEART RATE: 62 BPM

## 2021-01-04 DIAGNOSIS — M25.561 ACUTE PAIN OF RIGHT KNEE: Primary | ICD-10-CM

## 2021-01-04 LAB — POTASSIUM SERPL-SCNC: 3.9 MMOL/L (ref 3.5–5.1)

## 2021-01-04 PROCEDURE — 2709999900 HC NON-CHARGEABLE SUPPLY: Performed by: ORTHOPAEDIC SURGERY

## 2021-01-04 PROCEDURE — 74011250636 HC RX REV CODE- 250/636: Performed by: ANESTHESIOLOGY

## 2021-01-04 PROCEDURE — 74011250637 HC RX REV CODE- 250/637: Performed by: ORTHOPAEDIC SURGERY

## 2021-01-04 PROCEDURE — 36415 COLL VENOUS BLD VENIPUNCTURE: CPT

## 2021-01-04 PROCEDURE — 77030041703 HC SLV COMPR DVT ARJO -B: Performed by: ORTHOPAEDIC SURGERY

## 2021-01-04 PROCEDURE — 76010000131 HC OR TIME 2 TO 2.5 HR: Performed by: ORTHOPAEDIC SURGERY

## 2021-01-04 PROCEDURE — 74011000250 HC RX REV CODE- 250: Performed by: ORTHOPAEDIC SURGERY

## 2021-01-04 PROCEDURE — 76210000017 HC OR PH I REC 1.5 TO 2 HR: Performed by: ORTHOPAEDIC SURGERY

## 2021-01-04 PROCEDURE — 77030008009 HC PRB ARTHO ABLT ARTH -C: Performed by: ORTHOPAEDIC SURGERY

## 2021-01-04 PROCEDURE — 76000 FLUOROSCOPY <1 HR PHYS/QHP: CPT

## 2021-01-04 PROCEDURE — 77030037837: Performed by: ORTHOPAEDIC SURGERY

## 2021-01-04 PROCEDURE — 77030013079 HC BLNKT BAIR HGGR 3M -A: Performed by: ANESTHESIOLOGY

## 2021-01-04 PROCEDURE — 77030038066 HC BLD SHVR ARTH -B: Performed by: ORTHOPAEDIC SURGERY

## 2021-01-04 PROCEDURE — 77030003598 HC NDL MULT/FIRE ARTH -C: Performed by: ORTHOPAEDIC SURGERY

## 2021-01-04 PROCEDURE — 77030034478 HC TU IRR ARTHRO PT ARTH -B: Performed by: ORTHOPAEDIC SURGERY

## 2021-01-04 PROCEDURE — 77030031139 HC SUT VCRL2 J&J -A: Performed by: ORTHOPAEDIC SURGERY

## 2021-01-04 PROCEDURE — 76060000035 HC ANESTHESIA 2 TO 2.5 HR: Performed by: ORTHOPAEDIC SURGERY

## 2021-01-04 PROCEDURE — 84132 ASSAY OF SERUM POTASSIUM: CPT

## 2021-01-04 PROCEDURE — 77030000032 HC CUF TRNQT ZIMM -B: Performed by: ORTHOPAEDIC SURGERY

## 2021-01-04 PROCEDURE — C1713 ANCHOR/SCREW BN/BN,TIS/BN: HCPCS | Performed by: ORTHOPAEDIC SURGERY

## 2021-01-04 PROCEDURE — 77030002916 HC SUT ETHLN J&J -A: Performed by: ORTHOPAEDIC SURGERY

## 2021-01-04 PROCEDURE — 74011000250 HC RX REV CODE- 250: Performed by: NURSE ANESTHETIST, CERTIFIED REGISTERED

## 2021-01-04 PROCEDURE — 76210000022 HC REC RM PH II 1.5 TO 2 HR: Performed by: ORTHOPAEDIC SURGERY

## 2021-01-04 PROCEDURE — 74011250636 HC RX REV CODE- 250/636: Performed by: ORTHOPAEDIC SURGERY

## 2021-01-04 PROCEDURE — 77030022877 HC TU IRR ARTHRO PMP ARTH -B: Performed by: ORTHOPAEDIC SURGERY

## 2021-01-04 PROCEDURE — 74011250636 HC RX REV CODE- 250/636: Performed by: NURSE ANESTHETIST, CERTIFIED REGISTERED

## 2021-01-04 DEVICE — ANCHOR SUT L19.1MM DIA5.5MM PEEK FULL THRD KNOTLESS EYELET: Type: IMPLANTABLE DEVICE | Site: KNEE | Status: FUNCTIONAL

## 2021-01-04 RX ORDER — EPINEPHRINE 1 MG/ML
INJECTION INTRAMUSCULAR; INTRAVENOUS; SUBCUTANEOUS AS NEEDED
Status: DISCONTINUED | OUTPATIENT
Start: 2021-01-04 | End: 2021-01-04 | Stop reason: HOSPADM

## 2021-01-04 RX ORDER — FENTANYL CITRATE 50 UG/ML
INJECTION, SOLUTION INTRAMUSCULAR; INTRAVENOUS AS NEEDED
Status: DISCONTINUED | OUTPATIENT
Start: 2021-01-04 | End: 2021-01-04 | Stop reason: HOSPADM

## 2021-01-04 RX ORDER — PROPOFOL 10 MG/ML
INJECTION, EMULSION INTRAVENOUS AS NEEDED
Status: DISCONTINUED | OUTPATIENT
Start: 2021-01-04 | End: 2021-01-04 | Stop reason: HOSPADM

## 2021-01-04 RX ORDER — HYDROMORPHONE HYDROCHLORIDE 1 MG/ML
0.5 INJECTION, SOLUTION INTRAMUSCULAR; INTRAVENOUS; SUBCUTANEOUS
Status: DISCONTINUED | OUTPATIENT
Start: 2021-01-04 | End: 2021-01-12 | Stop reason: HOSPADM

## 2021-01-04 RX ORDER — CEFAZOLIN SODIUM 1 G/3ML
INJECTION, POWDER, FOR SOLUTION INTRAMUSCULAR; INTRAVENOUS AS NEEDED
Status: DISCONTINUED | OUTPATIENT
Start: 2021-01-04 | End: 2021-01-04 | Stop reason: HOSPADM

## 2021-01-04 RX ORDER — TRAMADOL HYDROCHLORIDE 50 MG/1
50 TABLET ORAL
Qty: 20 TAB | Refills: 0 | Status: SHIPPED | OUTPATIENT
Start: 2021-01-04 | End: 2021-01-11

## 2021-01-04 RX ORDER — SODIUM CHLORIDE 0.9 % (FLUSH) 0.9 %
5-40 SYRINGE (ML) INJECTION AS NEEDED
Status: DISCONTINUED | OUTPATIENT
Start: 2021-01-04 | End: 2021-01-12 | Stop reason: HOSPADM

## 2021-01-04 RX ORDER — SODIUM CHLORIDE, SODIUM LACTATE, POTASSIUM CHLORIDE, CALCIUM CHLORIDE 600; 310; 30; 20 MG/100ML; MG/100ML; MG/100ML; MG/100ML
20 INJECTION, SOLUTION INTRAVENOUS CONTINUOUS
Status: DISCONTINUED | OUTPATIENT
Start: 2021-01-04 | End: 2021-01-04 | Stop reason: HOSPADM

## 2021-01-04 RX ORDER — BUPIVACAINE HYDROCHLORIDE AND EPINEPHRINE 5; 5 MG/ML; UG/ML
INJECTION, SOLUTION EPIDURAL; INTRACAUDAL; PERINEURAL AS NEEDED
Status: DISCONTINUED | OUTPATIENT
Start: 2021-01-04 | End: 2021-01-04 | Stop reason: HOSPADM

## 2021-01-04 RX ORDER — KETOROLAC TROMETHAMINE 30 MG/ML
30 INJECTION, SOLUTION INTRAMUSCULAR; INTRAVENOUS
Status: COMPLETED | OUTPATIENT
Start: 2021-01-04 | End: 2021-01-04

## 2021-01-04 RX ORDER — DEXAMETHASONE SODIUM PHOSPHATE 4 MG/ML
INJECTION, SOLUTION INTRA-ARTICULAR; INTRALESIONAL; INTRAMUSCULAR; INTRAVENOUS; SOFT TISSUE AS NEEDED
Status: DISCONTINUED | OUTPATIENT
Start: 2021-01-04 | End: 2021-01-04 | Stop reason: HOSPADM

## 2021-01-04 RX ORDER — TRAMADOL HYDROCHLORIDE 50 MG/1
50 TABLET ORAL ONCE
Status: COMPLETED | OUTPATIENT
Start: 2021-01-04 | End: 2021-01-04

## 2021-01-04 RX ORDER — FENTANYL CITRATE 50 UG/ML
25 INJECTION, SOLUTION INTRAMUSCULAR; INTRAVENOUS
Status: DISCONTINUED | OUTPATIENT
Start: 2021-01-04 | End: 2021-01-12 | Stop reason: HOSPADM

## 2021-01-04 RX ORDER — ONDANSETRON 2 MG/ML
4 INJECTION INTRAMUSCULAR; INTRAVENOUS AS NEEDED
Status: DISCONTINUED | OUTPATIENT
Start: 2021-01-04 | End: 2021-01-12 | Stop reason: HOSPADM

## 2021-01-04 RX ORDER — SODIUM CHLORIDE 0.9 % (FLUSH) 0.9 %
5-40 SYRINGE (ML) INJECTION EVERY 8 HOURS
Status: DISCONTINUED | OUTPATIENT
Start: 2021-01-04 | End: 2021-01-12 | Stop reason: HOSPADM

## 2021-01-04 RX ORDER — POVIDONE-IODINE 10 MG/G
OINTMENT TOPICAL AS NEEDED
Status: DISCONTINUED | OUTPATIENT
Start: 2021-01-04 | End: 2021-01-04 | Stop reason: HOSPADM

## 2021-01-04 RX ORDER — ONDANSETRON 2 MG/ML
INJECTION INTRAMUSCULAR; INTRAVENOUS AS NEEDED
Status: DISCONTINUED | OUTPATIENT
Start: 2021-01-04 | End: 2021-01-04 | Stop reason: HOSPADM

## 2021-01-04 RX ORDER — LIDOCAINE HYDROCHLORIDE 20 MG/ML
INJECTION, SOLUTION EPIDURAL; INFILTRATION; INTRACAUDAL; PERINEURAL AS NEEDED
Status: DISCONTINUED | OUTPATIENT
Start: 2021-01-04 | End: 2021-01-04 | Stop reason: HOSPADM

## 2021-01-04 RX ORDER — DIPHENHYDRAMINE HYDROCHLORIDE 50 MG/ML
12.5 INJECTION, SOLUTION INTRAMUSCULAR; INTRAVENOUS AS NEEDED
Status: DISCONTINUED | OUTPATIENT
Start: 2021-01-04 | End: 2021-01-04 | Stop reason: HOSPADM

## 2021-01-04 RX ORDER — HYDROMORPHONE HYDROCHLORIDE 2 MG/1
2 TABLET ORAL
Status: DISCONTINUED | OUTPATIENT
Start: 2021-01-04 | End: 2021-01-05 | Stop reason: HOSPADM

## 2021-01-04 RX ADMIN — FENTANYL CITRATE 50 MCG: 50 INJECTION, SOLUTION INTRAMUSCULAR; INTRAVENOUS at 10:51

## 2021-01-04 RX ADMIN — SODIUM CHLORIDE, POTASSIUM CHLORIDE, SODIUM LACTATE AND CALCIUM CHLORIDE 20 ML/HR: 600; 310; 30; 20 INJECTION, SOLUTION INTRAVENOUS at 09:20

## 2021-01-04 RX ADMIN — DEXAMETHASONE SODIUM PHOSPHATE 4 MG: 4 INJECTION, SOLUTION INTRA-ARTICULAR; INTRALESIONAL; INTRAMUSCULAR; INTRAVENOUS; SOFT TISSUE at 11:07

## 2021-01-04 RX ADMIN — PROPOFOL 200 MG: 10 INJECTION, EMULSION INTRAVENOUS at 11:00

## 2021-01-04 RX ADMIN — ONDANSETRON 4 MG: 2 INJECTION INTRAMUSCULAR; INTRAVENOUS at 11:07

## 2021-01-04 RX ADMIN — CEFAZOLIN SODIUM 2 G: 1 INJECTION, POWDER, FOR SOLUTION INTRAMUSCULAR; INTRAVENOUS at 11:19

## 2021-01-04 RX ADMIN — HYDROMORPHONE HYDROCHLORIDE 0.5 MG: 1 INJECTION, SOLUTION INTRAMUSCULAR; INTRAVENOUS; SUBCUTANEOUS at 14:25

## 2021-01-04 RX ADMIN — KETOROLAC TROMETHAMINE 30 MG: 30 INJECTION, SOLUTION INTRAMUSCULAR at 15:59

## 2021-01-04 RX ADMIN — TRAMADOL HYDROCHLORIDE 50 MG: 50 TABLET, COATED ORAL at 16:34

## 2021-01-04 RX ADMIN — FENTANYL CITRATE 50 MCG: 50 INJECTION, SOLUTION INTRAMUSCULAR; INTRAVENOUS at 10:54

## 2021-01-04 RX ADMIN — LIDOCAINE HYDROCHLORIDE 100 MG: 20 INJECTION, SOLUTION EPIDURAL; INFILTRATION; INTRACAUDAL; PERINEURAL at 11:00

## 2021-01-04 NOTE — OP NOTES
Provider Operative Note    No notes of this type exist for this encounter. Operative Report    Patient: Bita Zhang MRN: [id number]  SSN: [id number]    YOB: 1970  Age: 44 y.o. Sex: female       Date of Surgery: 1/4/2021     Preoperative Diagnosis: Acute pain of right knee [M25.561]     Postoperative Diagnosis: Acute pain of right knee [M25.561]   Chondromalacia medial tibial plateau, ACL tear right knee  Subchondral stress fracture medial tibial plateau  Surgeon(s) and Role:     Jc Arroyo MD - Primary    Anesthesia: General     Procedure: Procedure(s):  Right Knee Arthroscopy With Chondroplasty, ACL repair Subchondroplasty     Procedure in Detail: Prepped and draped in the usual manner, timeout was called, antibiotics were given, DVT prophylaxis was initiated on the contralateral side. The patient started on knee arthroscopy, an anterior lateral portal was established and then under direct vision an anterior medial portal was established, inspection of the joint was done, the patellofemoral compartment showed grade I chondromalacia of the patella no loose chondral fragments, the medial compartment showed normal medial meniscus, normal medial femoral condyle, the medial tibial plateau over the anterior medial area of the the head loose chondral fragments with chondral grade II-III chondromalacia noted, using samreen chondroplasty was done and loose chondral fragments were removed the medial tibial plateau, the lateral compartment showed normal lateral femoral condyle normal lateral tibial plateau, there is a small tear at the lateral meniscus in the posterior side, the ACL was then inspected, and there was a tear of the ACL with adherence to the posterior aspect, this was avulsed from its attachment to the femur. It was mainly the anterior medial bundle with of the parts of the procedure lateral bundle.   The the passport cannula was then introduced and fiber tape was then used to put sutures around the ACL stump on the tibial side. A swivel lock was then introduced into the insertion of the ACL and and the sutures to include the fiber tape was then inserted into the swivel lock to be inserted into the attachment point of the ACL. Good stable repair was obtained. Patient had good range of motion. Joint was then lavaged. The fluoroscopy was then brought in. The proximal medial tibia was then localized in cannula was then drilled into the proximal medial tibial plateau in the subchondral area. Biomet subchondroplasty cement was then injected. Wound was then closed in layers, x-rays were taken to confirm removal of the needle a compression bandage was then applied patient tolerated surgery well and left the operating room in a stable condition plan is for weightbearing as tolerated do range of motion exercises and physical therapy,    Estimated Blood Loss:  minimal  Tourniquet Time: * No tourniquets in log *      Implants:   Implant Name Type Inv. Item Serial No.  Lot No. LRB No. Used Action   ANCHOR SUT CLOS EYE 5.5X19.1MM -- PEEK SWIVELOCK - SNA  ANCHOR SUT CLOS EYE 5.5X19.1MM -- PEEK SWIVELOCK NA [employer] [id number] Right 1 Implanted   SCP Knee Kit   [id number] [facility] [id number] Right 1 Implanted               Specimens: * No specimens in log *        Drains: None                Complications: None    Counts: Sponge and needle counts were correct times two.     Signed By:  Harika Elmore MD     January 4, 2021

## 2021-01-04 NOTE — ANESTHESIA POSTPROCEDURE EVALUATION
Procedure(s):  Right Knee Arthroscopy With Chondroplasty, Subchondroplasty. general    Anesthesia Post Evaluation      Multimodal analgesia: multimodal analgesia used between 6 hours prior to anesthesia start to PACU discharge  Patient location during evaluation: PACU  Level of consciousness: awake and alert  Pain score: 1  Pain management: adequate  Airway patency: patent  Anesthetic complications: no  Cardiovascular status: acceptable, blood pressure returned to baseline and hemodynamically stable  Respiratory status: acceptable, spontaneous ventilation, unassisted and room air  Hydration status: acceptable  Post anesthesia nausea and vomiting:  none  Final Post Anesthesia Temperature Assessment:  Normothermia (36.0-37.5 degrees C)      INITIAL Post-op Vital signs: No vitals data found for the desired time range.

## 2021-01-04 NOTE — ANESTHESIA PREPROCEDURE EVALUATION
Relevant Problems   RESPIRATORY SYSTEM   (+) H/O Clostridium difficile infection      NEUROLOGY   (+) Seizures (HCC)      ENDOCRINE   (+) Hypothyroidism       Anesthetic History   No history of anesthetic complications            Review of Systems / Medical History  Patient summary reviewed, nursing notes reviewed and pertinent labs reviewed    Pulmonary  Within defined limits                 Neuro/Psych     seizures    Headaches     Cardiovascular    Hypertension      CHF             GI/Hepatic/Renal     GERD           Endo/Other      Hypothyroidism  Obesity and arthritis     Other Findings   Comments: Allergies  Acetaminophen, Morphine, Codeine, Sulfa (Sulfonamide Antibiotics)  Ht: 5' 4.96\" (165 cm)  Weight: -  XHM: 22.62 kg/m²  Procedure  Right Knee Arthroscopy With Chondroplasty, Subchondroplasty (Right )  In Pre-Op, [facility] MAIN OR 08      Medical History  Cardiomyopathy ([facility]  Thyroid disease  Migraines  Irritable bowel syndrome  Colon polyp  Hypertension  Heart failure (HCC)  Seizures (HCC)  Cardiomyopathy (HCC)  GERD (gastroesophageal reflux disease)  Ill-defined condition  Knee pain, bilateral  Arthritis  Neuropathy  Dizzy spells           Physical Exam    Airway             Cardiovascular               Dental         Pulmonary                 Abdominal         Other Findings   Comments: Results for Julio Spence (MRN [id number]) as of 1/4/2021 10:19    12/29/2020 14:25  WBC: 5.3  RBC: 4.25  HGB: 12.0  HCT: 38.3  MCV: 90.1  MCH: 28.2  MCHC: 31.3  RDW: 14.0  PLATELET: 920  MPV: 69.6    Results for Julio Spence (MRN [id number]) as of 1/4/2021 10:19    12/29/2020 14:25  Sodium: 139  Potassium: 4.2  Chloride: 110 (H)  CO2: 27  Anion gap: 2 (L)  Glucose: 85  BUN: 13  Creatinine: 0.87  BUN/Creatinine ratio: 15  Calcium: 8.6  GFR est non-AA: >60  GFR est AA: >60    Results for Julio Spence (MRN [id number]) as of 1/4/2021 10:19    12/29/2020 14:25  INR: 1.0  Prothrombin time: 13.1  aPTT: 25.3 Anesthetic Plan    ASA: 4  Anesthesia type: general          Induction: Intravenous  Anesthetic plan and risks discussed with: Patient

## 2021-01-04 NOTE — PROGRESS NOTES
Spoke with patient prior to  Obtaining telephone orders from [address]  About allergies pt  States can take  Tramadol and toradol  Pharmacy aware DR Neha Nath aware

## 2021-01-04 NOTE — ROUTINE PROCESS
TRANSFER - OUT REPORT:    Verbal report given to Anil Julian on Ary Maria  being transferred to [facility] for routine post - op       Report consisted of patients Situation, Background, Assessment and   Recommendations(SBAR). Information from the following report(s) SBAR, OR Summary, Procedure Summary, Intake/Output, MAR and Dual Neuro Assessment was reviewed with the receiving nurse. Opportunity for questions and clarification was provided.       Patient transported with:   Registered Nurse

## 2021-01-11 ENCOUNTER — HOSPITAL ENCOUNTER (OUTPATIENT)
Dept: NON INVASIVE DIAGNOSTICS | Age: 51
Discharge: HOME | End: 2021-01-11
Attending: INTERNAL MEDICINE
Payer: MEDICARE

## 2021-01-11 DIAGNOSIS — R60.0 BILATERAL LOWER EXTREMITY EDEMA: ICD-10-CM

## 2021-01-11 LAB
ECHO AV PEAK GRADIENT: 3.33 MMHG
ECHO AV PEAK VELOCITY: 91.24 CM/S
ECHO LA TO AORTIC ROOT RATIO: 0.71
ECHO LV EDV A2C: 59.18 ML
ECHO LV EDV A4C: 88.47 ML
ECHO LV EDV BP: 72.49 ML (ref 56–104)
ECHO LV EJECTION FRACTION A2C: 68 PERCENT
ECHO LV EJECTION FRACTION A4C: 58 PERCENT
ECHO LV EJECTION FRACTION BIPLANE: 63.7 PERCENT (ref 55–100)
ECHO LV ESV A2C: 18.96 ML
ECHO LV ESV A4C: 37.25 ML
ECHO LV ESV BP: 26.29 ML (ref 19–49)
ECHO LV INTERNAL DIMENSION DIASTOLIC: 4.56 CM (ref 3.9–5.3)
ECHO LV INTERNAL DIMENSION SYSTOLIC: 3.57 CM
ECHO LV IVSD: 1.25 CM (ref 0.6–0.9)
ECHO LV MASS 2D: 220.8 G (ref 67–162)
ECHO LV POSTERIOR WALL DIASTOLIC: 1.3 CM (ref 0.6–0.9)
ECHO LVOT PEAK GRADIENT: 2.68 MMHG
ECHO LVOT PEAK VELOCITY: 81.89 CM/S
ECHO MV A VELOCITY: 45.52 CM/S
ECHO MV E VELOCITY: 40.16 CM/S
ECHO MV E/A RATIO: 0.88
ECHO RV TAPSE: 2.28 CM (ref 1.5–2)
ECHO TV REGURGITANT MAX VELOCITY: 217.05 CM/S
ECHO TV REGURGITANT PEAK GRADIENT: 18.84 MMHG

## 2021-01-11 PROCEDURE — 93356 MYOCRD STRAIN IMG SPCKL TRCK: CPT

## 2021-01-28 NOTE — PROGRESS NOTES
Identified pt with two pt identifiers(name and ). Reviewed record in preparation for visit and have obtained necessary documentation. No chief complaint on file. There were no vitals filed for this visit. Health Maintenance Review: Patient reminded of \"due or due soon\" health maintenance. I have asked the patient to contact his/her primary care provider (PCP) for follow-up on his/her health maintenance. Coordination of Care Questionnaire:  :   1) Have you been to an emergency room, urgent care, or hospitalized since your last visit? If yes, where when, and reason for visit? no       2. Have seen or consulted any other health care provider since your last visit? If yes, where when, and reason for visit? NO      Patient is accompanied by self I have received verbal consent from [facility] to discuss any/all medical information while they are present in the room.

## 2021-01-29 ENCOUNTER — VIRTUAL VISIT (OUTPATIENT)
Dept: HEMATOLOGY | Age: 51
End: 2021-01-29
Payer: MEDICARE

## 2021-01-29 DIAGNOSIS — I42.9 CARDIOMYOPATHY, UNSPECIFIED TYPE (HCC): Primary | ICD-10-CM

## 2021-01-29 DIAGNOSIS — R74.8 ELEVATED LIVER ENZYMES: Primary | ICD-10-CM

## 2021-01-29 PROCEDURE — 99214 OFFICE O/P EST MOD 30 MIN: CPT | Performed by: NURSE PRACTITIONER

## 2021-01-29 NOTE — PROGRESS NOTES
[address] [place] [address], MD, Donneta Holter, MD Cayetano Holiday, PA-C Monique Juba, ACNP-BC     Perla PENN Karan, [facility]   NURIA Zarco-SERA Wall, [facility]       Johanne Coyle [facility] 136    at [address], 81 [facility], [facility] 22.    [phone]    FAX: [address] [place] [address], [address]    [phone]    FAX: [phone]       Patient Care Team:  Lv Baird MD as PCP - General (Family Medicine)  Johana Cortes MD (Breast Surgery)  Claudia Pride MD as Referring Provider (Obstetrics & Gynecology)  Johana Cortes MD (Breast Surgery)      Problem List  Date Reviewed: 1/11/2021          Codes Class Noted    Right knee pain ICD-10-CM: M25.561  ICD-9-CM: 719.46  1/4/2021        Generalized abdominal pain ICD-10-CM: R10.84  ICD-9-CM: 789.07  7/21/2020        Diarrhea ICD-10-CM: R19.7  ICD-9-CM: 787.91  7/21/2020        H/O Clostridium difficile infection ICD-10-CM: Z86.19  ICD-9-CM: V12.09  7/21/2020        History of bowel resection ICD-10-CM: Z90.49  ICD-9-CM: V15.89  4/21/2020        Elevated liver enzymes ICD-10-CM: R74.8  ICD-9-CM: 790.5  7/26/2019        Cardiomyopathy ([address] ICD-10-CM: I42.9  ICD-9-CM: 425.4  7/26/2019        Hypothyroidism ICD-10-CM: E03.9  ICD-9-CM: 244.9  7/26/2019        H/O gastric bypass ICD-10-CM: Z98.84  ICD-9-CM: V45.86  7/26/2019        History of cholecystectomy ICD-10-CM: Z90.49  ICD-9-CM: V45.79  7/26/2019        History of appendectomy ICD-10-CM: Z90.49  ICD-9-CM: V45.89  7/26/2019        Neuropathy ICD-10-CM: G62.9  ICD-9-CM: 355.9  7/26/2019        Migraine headache ICD-10-CM: R99.827  ICD-9-CM: 346.90  7/26/2019        Seizures [facility] ICD-10-CM: R56.9  ICD-9-CM: 780.39  7/26/2019        Fibrocystic breast changes of both breasts ICD-10-CM: N60.11, N60.12  ICD-9-CM: 610.1  7/8/2015            VIRTUAL TELEHEALTH VISIT PERFORMED DUE TO COVID-19 EPIDEMIC    CONSENT:  Trae Cifuentes, who was seen by synchronous, real-time, audio-video technology, and/or her healthcare decision maker, is aware that this patient-initiated, Telehealth encounter on 1/29/2021 is a billable service, with coverage as determined by her insurance carrier. She is aware that she may receive a bill and has provided verbal consent to proceed. This patient was evaluated during a Virtual Telehealth visit. A caregiver was present if appropriate. Due to this being a TeleHealth encounter performed during the [facility]- public health emergency, the physical examination was limited to that listed in the [address] returns to the [place] [address] for management of elevated liver enzymes. The active problem list, all pertinent past medical history, medications, radiologic findings and laboratory findings related to the liver disorder were reviewed with the patient. The patient is a 44 y.o. Black female who was found to have abnormalities in liver enzymes in 2018. There is a history of cardiomyopathy with ECHO at [facility]. The results are not known. Serologic evaluation for markers of chronic liver disease were negative. Imaging of the liver from 8/2019 demonstrates a normal liver, no ductal dilatation or mass. The patient underwent a liver biopsy in 12/2020 which demonstrated centrolobular sinusoidal dilation with no inflammaiton, steatosis or fibrosis. A single granuloma was present. The patient has the following symptoms which are thought to be due to the liver disease:  fatigue, nausea, and diffuse abdominal pain. The patient has not experienced the following symptoms:  Itching or swelling of the abdomen.      The patient has Mild limitations in functional activities which can be attributed to other medical problems that are not related to the liver disease. ASSESSMENT AND PLAN:  Elevated liver enzymes  Intermittent elevation in liver transaminases and ALP. A liver biopsy in 12/2020 demonstrated centrolobular sinusoidal dilation and a granuloma but no steatosis, no inflammaiton and no fibrosis. Serologic testing for causes of chronic liver disease were negative     History of Cardiomyopathy  The patient was being followed by cardiology at [facility] for cardiomyopathy. She wishes to have a second opinion. Will refer for evaluation. ECHO performed 1/2021 demonstrated an LVEF of 50-55%. Mild left ventricular diastolic dysfunction. Mildly dilated left Atrium. Hepatic granuloma  This has little clinical significance. The mild elevation in ALP may be due to the granuloma. Hepatic granulomas are in most cases benign and do not cause liver injury. Abdominal Pain/Diarrhea/Steatorhea? There is intermittent constipation followed by pain and diarrhea. The patient has a history of a bowel resection. She may have adhesions which are causing intermittent blockages. Advised she maintain regular bowel movements by adding low dose miralax. Keep a food diary that may help identify triggers of symptoms. Screening for Hepatocellular Carcinoma  HCC screening is not necessary since the patient has no evidence of cirrhosis. Treatment of other medical problems in patients with chronic liver disease  There are no contraindications for the patient to take most medications that are necessary for treatment of other medical issues. Counseling for alcohol in patients with chronic liver disease  The patient was counseled regarding alcohol consumption and the effect of alcohol on chronic liver disease. Patients who have undergone obesity surgery are at much greater risk to develop alcoholic liver injury.  She has never consumed significant amounts of alcohol. Vaccinations   Vaccination for viral hepatitis A and B is recommended since the patient has no serologic evidence of previous exposure or vaccination with immunity. Routine vaccinations against other bacterial and viral agents can be performed as indicated. Annual flu vaccination should be administered if indicated. ALLERGIES  Allergies   Allergen Reactions    Acetaminophen Other (comments)     Advised not to take due to Liver Issues    Morphine Other (comments)     SOB/Chest Pressure - in hosp for a week. States DILAUDID Ok    Codeine Other (comments)     SOB chest pain    Sulfa (Sulfonamide Antibiotics) Rash     itching       MEDICATIONS  Current Outpatient Medications   Medication Sig    RABEprazole (ACIPHEX) 20 mg TbEC Take 20 mg by mouth two (2) times a day.  rizatriptan (MAXALT) 5 mg tablet Take 5 mg by mouth once as needed for Migraine. May repeat in 2 hours if needed    alum-mag hydroxide-simeth-lidocaine-sucralfate Take 30 mL by mouth once.  lacosamide (Vimpat) 100 mg tab tablet Take 100 mg by mouth two (2) times a day.  tiotropium (SPIRIVA) 18 mcg inhalation capsule Take 1 Cap by inhalation daily.  fexofenadine (ALLEGRA) 180 mg tablet Take 180 mg by mouth daily.  ondansetron hcl (ZOFRAN) 8 mg tablet Take 8 mg by mouth every eight (8) hours as needed.  nitroglycerin (NITROSTAT) 0.3 mg SL tablet 0.3 mg by SubLINGual route every five (5) minutes as needed.  gabapentin (NEURONTIN) 100 mg capsule Take 300 mg by mouth three (3) times daily.  levETIRAcetam (KEPPRA) 750 mg tablet 1,500 mg two (2) times a day.  montelukast (SINGULAIR) 10 mg tablet Take 10 mg by mouth daily.  nortriptyline (PAMELOR) 50 mg capsule Take 100 mg by mouth nightly.  PARoxetine (PAXIL) 10 mg tablet Take 10 mg by mouth daily.  potassium chloride (K-DUR, KLOR-CON) 20 mEq tablet Take 20 mEq by mouth daily.     sucralfate (CARAFATE) 100 mg/mL suspension Take 1 g by mouth four (4) times daily.  topiramate (TOPAMAX) 100 mg tablet Take 100 mg by mouth two (2) times a day.  carvedilol (COREG) 6.25 mg tablet Take 6.25 mg by mouth two (2) times a day.  VENTOLIN HFA 90 mcg/actuation inhaler Take 2 Puffs by inhalation every four (4) hours as needed.  SYMBICORT 80-4.5 mcg/actuation HFAA INHALE 2 PUFFS BY MOUTH TWICE DAILY    bumetanide (BUMEX) 1 mg tablet Take 1 mg by mouth daily.  dexlansoprazole (DEXILANT) 60 mg CpDB capsule (delayed release) Take 60 mg by mouth daily.  diclofenac (VOLTAREN) 1 % gel Apply 2 g to affected area every six (6) hours.  dicyclomine (BENTYL) 20 mg tablet Take 20 mg by mouth every six (6) hours.  estradiol (ESTRACE) 0.01 % (0.1 mg/gram) vaginal cream     famotidine (PEPCID) 40 mg tablet Take 40 mg by mouth nightly.  losartan (COZAAR) 25 mg tablet Take 25 mg by mouth daily (after breakfast).  spironolactone (ALDACTONE) 25 mg tablet Take 25 mg by mouth daily (after breakfast).  levothyroxine (SYNTHROID) 50 mcg tablet Take 50 mcg by mouth Daily (before breakfast).  cyanocobalamin (VITAMIN B-12) 1,000 mcg/mL injection 1,000 mcg by IntraMUSCular route every thirty (30) days.  Calcium-Cholecalciferol, D3, (CALCIUM 600 WITH VITAMIN D3) 600 mg(1,500mg) -400 unit cap Take 2 Tabs by mouth daily.  multivitamin (ONE A DAY) tablet Take 1 Tab by mouth daily.  Cholecalciferol, Vitamin D3, (VITAMIN D3) 2,000 unit cap capsule Take 5,000 Units by mouth daily. No current facility-administered medications for this visit. SYSTEM REVIEW NOT RELATED TO LIVER DISEASE OR REVIEWED ABOVE:  Constitution systems: Negative for fever, chills, weight gain, weight loss. Eyes: Negative for visual changes. ENT: Negative for sore throat, painful swallowing. Respiratory: Negative for cough, hemoptysis, SOB. Cardiology: Negative for chest pain, palpitations. GI:  Negative for constipation or diarrhea.   : Negative for urinary frequency, dysuria, hematuria, nocturia. Skin: Negative for rash. Hematology: Negative for easy bruising, blood clots. Musculo-skeletal: Negative for back pain, muscle pain, weakness. Neurologic: Negative for headaches, dizziness, vertigo, memory problems not related to HE. Psychology: Negative for anxiety, depression. FAMILY HISTORY:  The father  Has/had the following following chronic disease(s): cancer. The mother Has/had the following chronic disease(s): MI.    There is no family history of liver disease. SOCIAL HISTORY:  The patient is . The patient has 4 children,   The patient has never used tobacco products. The patient has never consumed significant amounts of alcohol. The patient currently receiving disability. PHYSICAL EXAMINATION PERFORMED BY VIRTUAL TELEHEALTH:  VS: Not performed   General: No acute distress. Eyes: Sclera anicteric. ENT: No oral lesions. Skin: No rashes. spider angiomata. No jaundice. Abdomen: No obvious distention suggesting ascites. Extremities: No edema. No muscle wasting. Neurologic: Alert and oriented. Cranial nerves grossly intact.       LABORATORY STUDIES:  [address] Units 10/20/2020 7/28/2020   WBC 4.4 - 11.3 K/uL 5.3 5.7   ANC 1.8 - 7.7 K/UL 2.9 3.5   HGB 13.5 - 17.5 g/dL 12.4 (L) 12.2   PLT K/uL 214 226   INR 0.8 - 1.2  1.0   AST 15 - 37 U/L 45 (H) 35   ALT 12 - 78 U/L 56 49 (H)   Alk Phos 45 - 117 U/L 137 (H) 112   Bili, Total 0.2 - 1.0 mg/dL 0.1 (L) <0.2   Bili, Direct 0.00 - 0.40 mg/dL  0.07   Albumin 3.5 - 5.0 g/dL 3.4 (L) 4.1   BUN 6 - 20 mg/dL 15 17   Creat 0.55 - 1.02 mg/dL 0.87 0.79   Na 136 - 145 mmol/L 141 142   K 3.5 - 5.1 mmol/L 3.2 (L) 4.3   Cl 97 - 108 mmol/L 106 103   CO2 21 - 32 mmol/L 26 21   Glucose 65 - 100 mg/dL 92 90     Liver [facility] Ref Rng & Units 4/29/2020   WBC 4.4 - 11.3 K/uL 5.3   ANC 1.8 - 7.7 K/UL 3.0   HGB 13.5 - 17.5 g/dL 11.3   PLT K/uL 198   INR 0.8 - 1.2    AST 15 - 37 U/L 49 (H)   ALT 12 - 78 U/L 59 (H)   Alk Phos 45 - 117 U/L 94   Bili, Total 0.2 - 1.0 mg/dL <0.2   Bili, Direct 0.00 - 0.40 mg/dL 0.08   Albumin 3.5 - 5.0 g/dL 3.8   BUN 6 - 20 mg/dL 8   Creat 0.55 - 1.02 mg/dL 0.69   Na 136 - 145 mmol/L 143   K 3.5 - 5.1 mmol/L 4.4   Cl 97 - 108 mmol/L 110 (H)   CO2 21 - 32 mmol/L 22   Glucose 65 - 100 mg/dL 70     Repeat testing ordered today. Will follow up when available. SEROLOGIES:  Serologies Latest Ref Rng & Units 7/26/2019   Hep A Ab, Total Negative Negative   Hep B Surface Ag Negative Negative   Hep B Core Ab, Total Negative Negative   Hep B Surface AB QL  Non Reactive   Hep C Ab 0.0 - 0.9 s/co ratio <0.1   Ferritin 15 - 150 ng/mL 14 (L)   Iron % Saturation 15 - 55 % 12 (L)   GULSHAN, IFA  Negative   ASMCA 0 - 19 Units 6   M2 Ab 0.0 - 20.0 Units <20.0   Ceruloplasmin 19.0 - 39.0 mg/dL 23.2   Alpha-1 antitrypsin level 90 - 200 mg/dL 99     Serologies Latest Ref Rng & Units 4/29/2020   C-ANCA Neg:<1:20 titer <1:20   P-ANCA Neg:<1:20 titer <1:20   ANCA Neg:<1:20 titer <1:20     LIVER HISTOLOGY:  8/2019. FibroScan performed at [address]. EkPa was 3.3. IQR/med 9%. . The results suggested a fibrosis level of F0. The CAP score suggests no evidence of fatty liver. 12/2020. Liver biopsy Slides reviewed by MLS. Sinusoidal dilation. A single periportal granuloma. No inflammation, No steatosis, Nofibrosis,     ENDOSCOPIC PROCEDURES:  Not available or performed    RADIOLOGY:  8/2019. Ultrasound of liver. Normal appearing liver. No liver mass lesions. 6/2020. MRI/MRCP of abdomen. Normal appearance of the liver and biliary tree status post cholecystectomy. No evidence of primary sclerosing cholangitis or biliary pathology.      OTHER TESTING:  Not available or performed    FOLLOW-UP AFTER VIRTUAL VISIT:  Pursuant to the emergency declaration under the [facility] Act, 1135 waiver authority and the Coronavirus Preparedness and Response Supplemental Appropriations Act, this Virtual  Visit was conducted, with the patient's (and/or their legal guardian's) consent, to reduce the patient's risk of exposure to COVID-19 and provide necessary medical care. Services were provided through a video synchronous discussion virtually to substitute for an in-person clinic visit. The patient was located in their home. The provider [facility] office. All of the issues listed above in the Assessment and Plan were discussed with the patient. All questions were answered. The patient expressed a clear understanding of the above. Because of the COVID-19 epidemic a follow-up appointment will be performed via TeleHealth in 3 months. Orders to obtain laboratory testing will be mailed to the patient. GABRIEL Guadalupe-BC  [facility] of [address] [address] [address], [address] [place] [address] [address]

## 2021-03-08 ENCOUNTER — OFFICE VISIT (OUTPATIENT)
Dept: CARDIOLOGY CLINIC | Age: 51
End: 2021-03-08
Payer: COMMERCIAL

## 2021-03-08 VITALS
DIASTOLIC BLOOD PRESSURE: 76 MMHG | BODY MASS INDEX: 36.19 KG/M2 | HEART RATE: 81 BPM | WEIGHT: 212 LBS | SYSTOLIC BLOOD PRESSURE: 110 MMHG | RESPIRATION RATE: 16 BRPM | OXYGEN SATURATION: 97 % | HEIGHT: 64 IN

## 2021-03-08 DIAGNOSIS — I42.8 NICM (NONISCHEMIC CARDIOMYOPATHY) (HCC): ICD-10-CM

## 2021-03-08 DIAGNOSIS — I47.29 NSVT (NONSUSTAINED VENTRICULAR TACHYCARDIA): ICD-10-CM

## 2021-03-08 DIAGNOSIS — R93.1 REGIONAL WALL MOTION ABNORMALITY OF HEART: ICD-10-CM

## 2021-03-08 DIAGNOSIS — R55 VASOVAGAL SYNCOPE: ICD-10-CM

## 2021-03-08 DIAGNOSIS — I50.42 CHRONIC COMBINED SYSTOLIC AND DIASTOLIC CONGESTIVE HEART FAILURE (HCC): Primary | ICD-10-CM

## 2021-03-08 DIAGNOSIS — R07.89 ATYPICAL CHEST PAIN: ICD-10-CM

## 2021-03-08 PROCEDURE — 99204 OFFICE O/P NEW MOD 45 MIN: CPT | Performed by: SPECIALIST

## 2021-03-08 RX ORDER — CLONIDINE 0.1 MG/24H
PATCH, EXTENDED RELEASE TRANSDERMAL
COMMUNITY
Start: 2020-12-02 | End: 2022-09-07

## 2021-03-08 RX ORDER — FLUTICASONE PROPIONATE 50 MCG
SPRAY, SUSPENSION (ML) NASAL
COMMUNITY
Start: 2020-12-15 | End: 2022-09-28

## 2021-03-08 RX ORDER — ERGOCALCIFEROL 1.25 MG/1
CAPSULE ORAL
COMMUNITY
Start: 2021-02-15 | End: 2021-12-17 | Stop reason: ALTCHOICE

## 2021-03-08 RX ORDER — SYRINGE WITH NEEDLE, 1 ML 25GX5/8"
SYRINGE, EMPTY DISPOSABLE MISCELLANEOUS
COMMUNITY
Start: 2020-12-01

## 2021-03-08 RX ORDER — NEOMYCIN SULFATE, POLYMYXIN B SULFATE AND HYDROCORTISONE 10; 3.5; 1 MG/ML; MG/ML; [USP'U]/ML
SUSPENSION/ DROPS AURICULAR (OTIC)
COMMUNITY
Start: 2021-03-06 | End: 2021-12-17 | Stop reason: ALTCHOICE

## 2021-03-08 NOTE — Clinical Note
3/8/2021 Patient: Kavon Sosa YOB: 1970 Date of Visit: 3/8/2021 Aliyah Aragon, [facility] [address] Fax: [phone] April Osmany Ariza NP 
[address] [place] [address] , [address] [place] [address] [address] [place] Dear MD Perla Anglin NP, Thank you for referring Ms. Amado Brown to [facility] for evaluation. My notes for this consultation are attached. If you have questions, please do not hesitate to call me. I look forward to following your patient along with you.  
 
 
Sincerely, 
 
Marino Alfaro MD

## 2021-03-08 NOTE — PROGRESS NOTES
Nathalia Landry     1970       Vishnu Bell MD, [facility]  Date of Visit-3/8/2021   PCP is Tony White, [address] [facility] [place]  [facility]  HPI:  Nathalia Landry is a 44 y.o. female   New consult, second opinion  Patient says she is here to [facility] at leaky heart valve\"    She describes a sharp pain across her back and pulling across her chest.  Pt followed by liver team. Hx of cardiomyopathy. She had elevated liver enzymes since 2018. [facility]. Echo was done to determine if there was passive liver congestion. Echo done 1/11/21 showed normal EF with mild dilation of the left atrium. No mention of TR or PASP. Pt states she was diagnosed with cardiomyopathy over 10 years ago. She reports that prior to the diagnosis she experienced chest and back pain and SOB while driving. She went to a doctor in [facility] and had an EKG and an echo. They told her she had damage to her heart from a heart attack. She then went to [facility] and had a heart cath and stress test. [facility] prescribed her medication, but she cannot recall what those medications were. She had been seeing Dr. Angelo Bumpers at [facility]. She has an ILR as she was having dizziness and syncopal episodes. She notes she sometimes feels short of breath and she still has lightheadedness and dizziness. She also reports having LE edema. Denies chest pain, edema, syncope or shortness of breath at rest, has no tachycardia, palpitations or sense of arrhythmia.       · Reviewed records cardiology outpatient visit 12/23/2020 [facility] noting cardiomyopathy onset in 2016 had seen Dr. Whitney Talamantes at [facility]   · Echocardiogram [facility] November 2020 showed normal LV cavity size LVEF of 60 to 65% trivial mitral and tricuspid insufficiency  · Cardiac MRI 12/27/2019 showed mild reduction LV systolic function EF 33% with mild global hypokinesia some worsening at the apex  · The assessment was that the patient had prior syncope and had an ILR previous fluid overload with peripheral edema resolved after Bumex and recommended with the abnormal MRI that patient have regular echocardiogram yearly. · Remote monitoring reviewed 12/28/2020 noted some ectopy and 1 undersensing because of pauses artifact. · Reviewed actual echo report of 11/5/2020 to be scanned  · Reviewed OP notation 12/23/2020 of outpatient visit noting admission [facility] on 11/1/2019 with chest pain and palpitations having nonsustained ventricular tachycardia frequent PACs and history of prior EF of 35% but an echo 11/2/2019 showed an EF of 69%; reportedly patient has a son who is 25 also with LV noncompaction ; patient had cardiac MRI 12/27/2019 without evidence of LV noncompaction , she did have  mild apical dyskinesia and prominent trabeculations and an unremarkable EP study in February 2020; loop monitor implanted July 2020;  patient called and was seen on this visit 12/23/2020 noting some edema. Assessment/Plan:   The primary encounter diagnosis was Chronic combined systolic and diastolic congestive heart failure ([address] Diagnoses of NICM (nonischemic cardiomyopathy) ([address] Atypical chest pain, Regional wall motion abnormality of heart, Vasovagal syncope, and NSVT (nonsustained ventricular tachycardia) ([address] were also pertinent to this visit. Sounds like she had a prior cardiomyopathy that improved with medical therapy. Her current regimen is excellent and the report of the last echo did not indicate significant TR. I will review that echo to look for reversal of flow in the IVC but at this point I think her EF is normal and her mild edema and DURÁN seem noncardiac.      Son with non compaction, pt with no evidence on MRI  ILR done, EPS negative  EF reduced 35% in 2016, resolved, has mild CHF , diastolic and systolic combined, needed diuretics 12/2020 with good control  Most recent echo with no abnl  Repeat echo in one year with follow up in 6 months  Future Appointments   Date Time Provider Alicja Jimenez   4/21/2021 10:00 AM SRM MRI 1 SRMRMRI SRM   4/30/2021 10:20 AM Perla Russo L, NP LIVR BS AMB      01/11/21   ECHO ADULT COMPLETE 01/11/2021 1/11/2021    Narrative · LV: Estimated LVEF is 50 - 55%. Normal cavity size and wall thickness. Mild (grade 1) left ventricular diastolic dysfunction. · LA: Mildly dilated left atrium. Signed by: Jarocho Marino MD             Impression:   1. Chronic combined systolic and diastolic congestive heart failure ([address]    2. NICM (nonischemic cardiomyopathy) ([address]    3. Atypical chest pain    4. Regional wall motion abnormality of heart    5. Vasovagal syncope    6. NSVT (nonsustained ventricular tachycardia) ([facility])       Cardiac History:   Cardiomyopathy diagnosed 10 years ago   Gastric bypass 2013   Small bowel resection 2016  Hysterectomy partial 2007  Cholecystectomy 2006  Cardiac loop monitor July 2020  Right breast biopsy  Prior seizure 2012    Social history non-smoker, no alcohol drinks 2 cups of caffeine daily has 4 children lives with 2 of them currently  Family history mother 71 father is 76 no early CAD  Review of systems cardiovascular positive for chest pain shortness of breath irregular heartbeat fast heartbeat dizziness passing out edema and leg pains with walking. Remainder of review of systems positive for headaches blurry vision wheezing asthma coughing up phlegm shortness of breath heartburn belching nausea stomach pain constipation diarrhea loose stools change in bowel habits liver disorder unintended weight gain thyroid disorder dry skin aching joint swollen joints back pain arthritis weakness in arm numbness poor circulation and excessive bruising  ROS-except as noted above. . 12 system ROS  see supplement sheet, initialed and to be scanned by staff    Allergies   Allergen Reactions    Acetaminophen Other (comments)     Advised not to take due to Liver Issues    Morphine Other (comments)     SOB/Chest Pressure - in hosp for a week. States DILAUDID Ok    Codeine Other (comments)     SOB chest pain    Sulfa (Sulfonamide Antibiotics) Rash     itching       Past Medical History:   Diagnosis Date    Arthritis     pt states knees and back    Cardiomyopathy ([facility] 2005    Cardiomyopathy ([facility]     Colon polyp     Dizzy spells     GERD (gastroesophageal reflux disease)     Heart failure ([facility] 2005    Hypertension     patient states she does not have hypertension    Ill-defined condition     pt states she has cardiomyopathy    Irritable bowel syndrome     Knee pain, bilateral     Migraines 2011    2-3 week    Neuropathy     pt states of both feet    Seizures ([facility]     petite mals last seizure April 2015    Thyroid disease     hypothyroid      Social Hx= reports that she has never smoked. She has never used smokeless tobacco. She reports that she does not drink alcohol or use drugs. Exam and Labs:  /76 (BP 1 Location: Left upper arm, BP Patient Position: Sitting, BP Cuff Size: Adult)   Pulse 81   Resp 16   Ht 5' 4\" (1.626 m)   Wt 212 lb (96.2 kg)   SpO2 97%   BMI 36.39 kg/m² Constitutional:  NAD, comfortable  Head: NC,AT. Eyes: No scleral icterus. Neck:  Neck supple. No JVD present. Throat: moist mucous membranes. Chest: Effort normal & normal respiratory excursion . Neurological: alert, conversant and oriented . Skin: Skin is not cold. No obvious systemic rash noted. Not diaphoretic. No erythema. Psychiatric:  Grossly normal mood and affect. Behavior appears normal. Extremities:  no clubbing or cyanosis. Abdomen: non distended    Lungs:breath sounds normal. No stridor. distress, wheezes or  Rales. Heart: normal rate, regular rhythm, normal S1, S2, no murmurs, rubs, clicks or gallops , PMI non displaced. Edema: Edema is 1+.   No results found for: CHOL, CHOLX, CHLST, CHOLV, HDL, HDLP, LDL, LDLC, DLDLP, TGLX, TRIGL, TRIGP, CHHD, CHHDX  Lab Results   Component Value Date/Time    Sodium 139 12/29/2020 02:25 PM Potassium 3.9 01/04/2021 09:10 AM    Chloride 110 (H) 12/29/2020 02:25 PM    CO2 27 12/29/2020 02:25 PM    Anion gap 2 (L) 12/29/2020 02:25 PM    Glucose 85 12/29/2020 02:25 PM    BUN 13 12/29/2020 02:25 PM    Creatinine 0.87 12/29/2020 02:25 PM    BUN/Creatinine ratio 15 12/29/2020 02:25 PM    GFR est AA >60 12/29/2020 02:25 PM    GFR est non-AA >60 12/29/2020 02:25 PM    Calcium 8.6 12/29/2020 02:25 PM      Wt Readings from Last 3 Encounters:   03/08/21 212 lb (96.2 kg)   12/31/20 208 lb (94.3 kg)   12/29/20 201 lb 15.1 oz (91.6 kg)      BP Readings from Last 3 Encounters:   03/08/21 110/76   01/04/21 110/71   12/31/20 115/70      Current Outpatient Medications   Medication Sig    cloNIDine (CATAPRES) 0.1 mg/24 hr ptwk     ergocalciferol (ERGOCALCIFEROL) 1,250 mcg (50,000 unit) capsule TAKE 1 CAPSULE BY MOUTH ONCE A WEEK    neomycin-polymyxin-hydrocortisone, buffered, (PEDIOTIC) 3.5-10,000-1 mg/mL-unit/mL-% otic suspension SHAKE WELL INSTILL 4 DROPS INTO AFFECTED EAR(S) 4 TIMES DAILY FOR 4 DAYS    BD Luer-Narda Syringe 3 mL 25 gauge x 1\" syrg USE FOR B12 INJECTIONS    RABEprazole (ACIPHEX) 20 mg TbEC Take 20 mg by mouth two (2) times a day.  rizatriptan (MAXALT) 5 mg tablet Take 5 mg by mouth once as needed for Migraine. May repeat in 2 hours if needed    alum-mag hydroxide-simeth-lidocaine-sucralfate Take 30 mL by mouth once.  lacosamide (Vimpat) 100 mg tab tablet Take 100 mg by mouth two (2) times a day.  tiotropium (SPIRIVA) 18 mcg inhalation capsule Take 1 Cap by inhalation daily.  fexofenadine (ALLEGRA) 180 mg tablet Take 180 mg by mouth daily.  ondansetron hcl (ZOFRAN) 8 mg tablet Take 8 mg by mouth every eight (8) hours as needed.  nitroglycerin (NITROSTAT) 0.3 mg SL tablet 0.3 mg by SubLINGual route every five (5) minutes as needed.  gabapentin (NEURONTIN) 100 mg capsule Take 300 mg by mouth three (3) times daily.     levETIRAcetam (KEPPRA) 750 mg tablet 1,500 mg two (2) times a day.    montelukast (SINGULAIR) 10 mg tablet Take 10 mg by mouth daily.  nortriptyline (PAMELOR) 50 mg capsule Take 100 mg by mouth nightly.  potassium chloride (K-DUR, KLOR-CON) 20 mEq tablet Take 20 mEq by mouth daily.  sucralfate (CARAFATE) 100 mg/mL suspension Take 1 g by mouth four (4) times daily.  topiramate (TOPAMAX) 100 mg tablet Take 100 mg by mouth two (2) times a day.  carvedilol (COREG) 6.25 mg tablet Take 6.25 mg by mouth two (2) times a day.  VENTOLIN HFA 90 mcg/actuation inhaler Take 2 Puffs by inhalation every four (4) hours as needed.  SYMBICORT 80-4.5 mcg/actuation HFAA INHALE 2 PUFFS BY MOUTH TWICE DAILY    bumetanide (BUMEX) 1 mg tablet Take 1 mg by mouth daily.  dexlansoprazole (DEXILANT) 60 mg CpDB capsule (delayed release) Take 60 mg by mouth daily.  diclofenac (VOLTAREN) 1 % gel Apply 2 g to affected area every six (6) hours.  dicyclomine (BENTYL) 20 mg tablet Take 20 mg by mouth every six (6) hours.  famotidine (PEPCID) 40 mg tablet Take 40 mg by mouth nightly.  losartan (COZAAR) 25 mg tablet Take 25 mg by mouth daily (after breakfast).  spironolactone (ALDACTONE) 25 mg tablet Take 25 mg by mouth daily (after breakfast).  levothyroxine (SYNTHROID) 50 mcg tablet Take 50 mcg by mouth Daily (before breakfast).  cyanocobalamin (VITAMIN B-12) 1,000 mcg/mL injection 1,000 mcg by IntraMUSCular route every thirty (30) days.  Calcium-Cholecalciferol, D3, (CALCIUM 600 WITH VITAMIN D3) 600 mg(1,500mg) -400 unit cap Take 2 Tabs by mouth daily.  multivitamin (ONE A DAY) tablet Take 1 Tab by mouth daily.  Cholecalciferol, Vitamin D3, (VITAMIN D3) 2,000 unit cap capsule Take 5,000 Units by mouth daily.  fluticasone propionate (FLONASE) 50 mcg/actuation nasal spray     PARoxetine (PAXIL) 10 mg tablet Take 10 mg by mouth daily.     estradiol (ESTRACE) 0.01 % (0.1 mg/gram) vaginal cream      No current facility-administered medications for this visit. Impression see above.       Written by Gilmer De La Rosa, as dictated by Naila Lorenzo MD.

## 2021-03-08 NOTE — PROGRESS NOTES
.  Visit Vitals  /76 (BP 1 Location: Left upper arm, BP Patient Position: Sitting, BP Cuff Size: Adult)   Pulse 81   Resp 16   Ht 5' 4\" (1.626 m)   Wt 212 lb (96.2 kg)   SpO2 97%   BMI 36.39 kg/m²

## 2021-03-20 NOTE — ED TRIAGE NOTES
Pt states she had blood drawn on Monday and received results back today. Potassium was 2.8 and was advised by her PCP to visit the ER. Pt reports feeling more tired than usual and having tremors. Pt denies pain.
Two to three times per week

## 2021-04-09 ENCOUNTER — TRANSCRIBE ORDER (OUTPATIENT)
Dept: SCHEDULING | Age: 51
End: 2021-04-09

## 2021-04-09 DIAGNOSIS — M54.50 LOW BACK PAIN: Primary | ICD-10-CM

## 2021-04-10 PROBLEM — R07.89 ATYPICAL CHEST PAIN: Status: ACTIVE | Noted: 2021-04-10

## 2021-04-10 PROBLEM — R93.1 REGIONAL WALL MOTION ABNORMALITY OF HEART: Status: ACTIVE | Noted: 2021-04-10

## 2021-04-10 PROBLEM — I47.29 NSVT (NONSUSTAINED VENTRICULAR TACHYCARDIA): Status: ACTIVE | Noted: 2021-04-10

## 2021-04-10 PROBLEM — I42.8 NICM (NONISCHEMIC CARDIOMYOPATHY) (HCC): Status: ACTIVE | Noted: 2019-07-26

## 2021-04-10 PROBLEM — R55 VASOVAGAL SYNCOPE: Status: ACTIVE | Noted: 2021-04-10

## 2021-04-10 PROBLEM — I50.42 CHRONIC COMBINED SYSTOLIC AND DIASTOLIC CONGESTIVE HEART FAILURE (HCC): Status: ACTIVE | Noted: 2021-04-10

## 2021-04-10 NOTE — PROGRESS NOTES
Addendum  Let her know I was able to review the records from her previous stays at [facility] including the previous cardiomyopathy. Looks like her ejection fraction is now normal which is great news she should continue on meds to control her edema and blood pressure which include clonidine carvedilol Bumex losartan and spironolactone. I would like to see her back in 6 months in the office and then we will see her back again in 1 year for an echo and visit same day she needs a yearly echo. Asked her if she knows any further about her son's history of noncompaction.   We know that she does not have that based on the MRI done at [facility]  4/10/2021

## 2021-04-21 ENCOUNTER — HOSPITAL ENCOUNTER (OUTPATIENT)
Dept: MRI IMAGING | Age: 51
Discharge: HOME | End: 2021-04-21
Attending: ORTHOPAEDIC SURGERY
Payer: MEDICARE

## 2021-04-21 DIAGNOSIS — M54.50 LOW BACK PAIN: ICD-10-CM

## 2021-04-21 PROCEDURE — 72148 MRI LUMBAR SPINE W/O DYE: CPT

## 2021-04-30 ENCOUNTER — VIRTUAL VISIT (OUTPATIENT)
Dept: HEMATOLOGY | Age: 51
End: 2021-04-30
Payer: MEDICARE

## 2021-04-30 ENCOUNTER — TELEPHONE (OUTPATIENT)
Dept: HEMATOLOGY | Age: 51
End: 2021-04-30

## 2021-04-30 DIAGNOSIS — R74.8 ELEVATED LIVER ENZYMES: Primary | ICD-10-CM

## 2021-04-30 DIAGNOSIS — I50.42 CHRONIC COMBINED SYSTOLIC AND DIASTOLIC CONGESTIVE HEART FAILURE (HCC): Primary | ICD-10-CM

## 2021-04-30 PROCEDURE — G8427 DOCREV CUR MEDS BY ELIG CLIN: HCPCS | Performed by: NURSE PRACTITIONER

## 2021-04-30 PROCEDURE — G8432 DEP SCR NOT DOC, RNG: HCPCS | Performed by: NURSE PRACTITIONER

## 2021-04-30 PROCEDURE — 99213 OFFICE O/P EST LOW 20 MIN: CPT | Performed by: NURSE PRACTITIONER

## 2021-04-30 PROCEDURE — 3017F COLORECTAL CA SCREEN DOC REV: CPT | Performed by: NURSE PRACTITIONER

## 2021-04-30 PROCEDURE — G8417 CALC BMI ABV UP PARAM F/U: HCPCS | Performed by: NURSE PRACTITIONER

## 2021-04-30 RX ORDER — METFORMIN HYDROCHLORIDE 500 MG/1
TABLET ORAL 2 TIMES DAILY WITH MEALS
COMMUNITY

## 2021-04-30 RX ORDER — BUMETANIDE 1 MG/1
1 TABLET ORAL DAILY
Qty: 90 TAB | Refills: 3 | Status: SHIPPED | OUTPATIENT
Start: 2021-04-30 | End: 2021-11-08 | Stop reason: SDUPTHER

## 2021-04-30 NOTE — PROGRESS NOTES
[address] [facility], MD, MD Nahed Chaudhry PARONNY Bowles, ACNP-BC     Perla PENN Karan, [facility]   Jessie Lopez, FNP-C    Kobe John, [facility]       Johanne Coyle Formerly [facility] 136    at [address], [address] [facility], [facility] 22.    [phone]    FAX: [address] [place] [address] Drive, [address], [address]    [phone]    FAX: [phone]       Patient Care Team:  Jasmine Kim MD as PCP - General (Family Medicine)  Olivia Ayon MD (Breast Surgery)  Isaura Thompson MD as Referring Provider (Obstetrics & Gynecology)  Olivia Ayon MD (Breast Surgery)      Problem List  Date Reviewed: 1/29/2021          Codes Class Noted    Chronic combined systolic and diastolic congestive heart failure [facility]) ICD-10-CM: I50.42  ICD-9-CM: 428.42, 428.0  4/10/2021        Atypical chest pain ICD-10-CM: R07.89  ICD-9-CM: 786.59  4/10/2021        Regional wall motion abnormality of heart ICD-10-CM: R93.1  ICD-9-CM: 793.2  4/10/2021        Vasovagal syncope ICD-10-CM: R55  ICD-9-CM: 780.2  4/10/2021        NSVT (nonsustained ventricular tachycardia) ([facility] ICD-10-CM: I47.2  ICD-9-CM: 427.1  4/10/2021        Right knee pain ICD-10-CM: M25.561  ICD-9-CM: 719.46  1/4/2021        Generalized abdominal pain ICD-10-CM: R10.84  ICD-9-CM: 789.07  7/21/2020        Diarrhea ICD-10-CM: R19.7  ICD-9-CM: 787.91  7/21/2020        H/O Clostridium difficile infection ICD-10-CM: Z86.19  ICD-9-CM: V12.09  7/21/2020        History of bowel resection ICD-10-CM: Z90.49  ICD-9-CM: V15.89  4/21/2020        Elevated liver enzymes ICD-10-CM: R74.8  ICD-9-CM: 790.5  7/26/2019        NICM (nonischemic cardiomyopathy) ([facility] ICD-10-CM: I42.8  ICD-9-CM: 425.4  7/26/2019        Hypothyroidism ICD-10-CM: E03.9  ICD-9-CM: 244.9  7/26/2019        H/O gastric bypass ICD-10-CM: Z98.84  ICD-9-CM: V45.86  7/26/2019        History of cholecystectomy ICD-10-CM: Z90.49  ICD-9-CM: V45.79  7/26/2019        History of appendectomy ICD-10-CM: Z90.49  ICD-9-CM: V45.89  7/26/2019        Neuropathy ICD-10-CM: G62.9  ICD-9-CM: 355.9  7/26/2019        Migraine headache ICD-10-CM: K13.320  ICD-9-CM: 346.90  7/26/2019        Seizures ([facility] ICD-10-CM: R56.9  ICD-9-CM: 780.39  7/26/2019        Fibrocystic breast changes of both breasts ICD-10-CM: N60.11, N60.12  ICD-9-CM: 610.1  7/8/2015        Heart failure ([facility] ICD-10-CM: I50.9  ICD-9-CM: 428.9  2005    Overview Signed 4/10/2021 12:43 PM by Mattie Dean MD     now combined systolic, diastolic, seen at [facility] 1834, [facility] 2021- Dr Soham Gaming:  Marisol Hoyos, who was seen by synchronous, real-time, audio-video technology, and/or her healthcare decision maker, is aware that this patient-initiated, Telehealth encounter on 4/30/2021 is a billable service, with coverage as determined by her insurance carrier. She is aware that she may receive a bill and has provided verbal consent to proceed. This patient was evaluated during a Virtual Telehealth visit. A caregiver was present if appropriate. Due to this being a TeleHealth encounter performed during the EFYWR-39 public health emergency, the physical examination was limited to that listed in the [address] [place] [address] returns to the [address] for management of elevated liver enzymes. The active problem list, all pertinent past medical history, medications, radiologic findings and laboratory findings related to the liver disorder were reviewed with the patient. The patient is a 44 y.o.  Black female who was found to have abnormalities in liver enzymes in 2018. There is a history of cardiomyopathy with ECHO at [facility]. The results are not known. Serologic evaluation for markers of chronic liver disease were negative. Imaging of the liver from 8/2019 demonstrates a normal liver, no ductal dilatation or mass. The patient underwent a liver biopsy in 12/2020 which demonstrated centrolobular sinusoidal dilation with no inflammaiton, steatosis or fibrosis. A single granuloma was present. Since the last office visit the patient had follow up with Dr. Diana Kulkarni, ECHO was performed and normal. She is now on a high protein low carbohydrate diet. Diarrhea has resolved and she continues to have chronic constipation. The patient has the following symptoms which are thought to be due to the liver disease:  fatigue, nausea, and diffuse abdominal pain. The patient has not experienced the following symptoms:  Itching or swelling of the abdomen. The patient has Mild limitations in functional activities which can be attributed to other medical problems that are not related to the liver disease. ASSESSMENT AND PLAN:  Elevated liver enzymes  Intermittent elevation in liver transaminases and ALP. Serologic testing for causes of chronic liver disease were negative. A liver biopsy in 12/2020 demonstrated centrolobular sinusoidal dilation and a granuloma but no steatosis, no inflammaiton and no fibrosis. Have performed laboratory testing to monitor liver function and degree of liver injury. This included BMP, hepatic panel, and CBC with platelet count. Laboratory testing from 10/20/2020 reviewed in detail. The liver enzymes and ALP are elevated. The liver function and platelet count are normal.     History of Cardiomyopathy  The patient was being followed by cardiology at [facility] for cardiomyopathy. ECHO performed 1/2021 demonstrated an LVEF of 50-55%. Mild left ventricular diastolic dysfunction. Mildly dilated left Atrium. She had a follow up with Dr. Kathrin Berry which felt the ECHO was normal and cardiomyopathy has resolved with medical treatment. Hepatic granuloma  This has little clinical significance. The mild elevation in ALP may be due to the granuloma. Hepatic granulomas are in most cases benign and do not cause liver injury. Abdominal Pain/Diarrhea/Steatorhea? There is intermittent constipation followed by pain and diarrhea. The patient has a history of a bowel resection. She may have adhesions which are causing intermittent blockages. Advised she maintain regular bowel movements by adding low dose miralax. Keep a food diary that may help identify triggers of symptoms. Screening for Hepatocellular Carcinoma  HCC screening is not necessary since the patient has no evidence of cirrhosis. Treatment of other medical problems in patients with chronic liver disease  There are no contraindications for the patient to take most medications that are necessary for treatment of other medical issues. Counseling for alcohol in patients with chronic liver disease  The patient was counseled regarding alcohol consumption and the effect of alcohol on chronic liver disease. Patients who have undergone obesity surgery are at much greater risk to develop alcoholic liver injury. She has never consumed significant amounts of alcohol. Vaccinations   Vaccination for viral hepatitis A and B is recommended since the patient has no serologic evidence of previous exposure or vaccination with immunity. Routine vaccinations against other bacterial and viral agents can be performed as indicated. Annual flu vaccination should be administered if indicated. ALLERGIES  Allergies   Allergen Reactions    Acetaminophen Other (comments)     Advised not to take due to Liver Issues    Morphine Other (comments)     SOB/Chest Pressure - in hosp for a week.   States DILAUDID Ok    Codeine Other (comments)     SOB chest pain    Sulfa (Sulfonamide Antibiotics) Rash     itching       MEDICATIONS  Current Outpatient Medications   Medication Sig    metFORMIN (GLUCOPHAGE) 500 mg tablet Take  by mouth two (2) times daily (with meals).  cloNIDine (CATAPRES) 0.1 mg/24 hr ptwk     ergocalciferol (ERGOCALCIFEROL) 1,250 mcg (50,000 unit) capsule TAKE 1 CAPSULE BY MOUTH ONCE A WEEK    fluticasone propionate (FLONASE) 50 mcg/actuation nasal spray     neomycin-polymyxin-hydrocortisone, buffered, (PEDIOTIC) 3.5-10,000-1 mg/mL-unit/mL-% otic suspension SHAKE WELL INSTILL 4 DROPS INTO AFFECTED EAR(S) 4 TIMES DAILY FOR 4 DAYS    BD Luer-Narda Syringe 3 mL 25 gauge x 1\" syrg USE FOR B12 INJECTIONS    RABEprazole (ACIPHEX) 20 mg TbEC Take 20 mg by mouth two (2) times a day.  rizatriptan (MAXALT) 5 mg tablet Take 5 mg by mouth once as needed for Migraine. May repeat in 2 hours if needed    alum-mag hydroxide-simeth-lidocaine-sucralfate Take 30 mL by mouth once.  lacosamide (Vimpat) 100 mg tab tablet Take 100 mg by mouth two (2) times a day.  tiotropium (SPIRIVA) 18 mcg inhalation capsule Take 1 Cap by inhalation daily.  fexofenadine (ALLEGRA) 180 mg tablet Take 180 mg by mouth daily.  ondansetron hcl (ZOFRAN) 8 mg tablet Take 8 mg by mouth every eight (8) hours as needed.  nitroglycerin (NITROSTAT) 0.3 mg SL tablet 0.3 mg by SubLINGual route every five (5) minutes as needed.  gabapentin (NEURONTIN) 100 mg capsule Take 300 mg by mouth three (3) times daily.  levETIRAcetam (KEPPRA) 750 mg tablet 1,500 mg two (2) times a day.  montelukast (SINGULAIR) 10 mg tablet Take 10 mg by mouth daily.  nortriptyline (PAMELOR) 50 mg capsule Take 100 mg by mouth nightly.  PARoxetine (PAXIL) 10 mg tablet Take 10 mg by mouth daily.  potassium chloride (K-DUR, KLOR-CON) 20 mEq tablet Take 20 mEq by mouth daily.  sucralfate (CARAFATE) 100 mg/mL suspension Take 1 g by mouth four (4) times daily.     topiramate (TOPAMAX) 100 mg tablet Take 100 mg by mouth two (2) times a day.  carvedilol (COREG) 6.25 mg tablet Take 6.25 mg by mouth two (2) times a day.  VENTOLIN HFA 90 mcg/actuation inhaler Take 2 Puffs by inhalation every four (4) hours as needed.  SYMBICORT 80-4.5 mcg/actuation HFAA INHALE 2 PUFFS BY MOUTH TWICE DAILY    bumetanide (BUMEX) 1 mg tablet Take 1 mg by mouth daily.  dexlansoprazole (DEXILANT) 60 mg CpDB capsule (delayed release) Take 60 mg by mouth daily.  diclofenac (VOLTAREN) 1 % gel Apply 2 g to affected area every six (6) hours.  dicyclomine (BENTYL) 20 mg tablet Take 20 mg by mouth every six (6) hours.  estradiol (ESTRACE) 0.01 % (0.1 mg/gram) vaginal cream     famotidine (PEPCID) 40 mg tablet Take 40 mg by mouth nightly.  losartan (COZAAR) 25 mg tablet Take 25 mg by mouth daily (after breakfast).  spironolactone (ALDACTONE) 25 mg tablet Take 25 mg by mouth daily (after breakfast).  levothyroxine (SYNTHROID) 50 mcg tablet Take 50 mcg by mouth Daily (before breakfast).  cyanocobalamin (VITAMIN B-12) 1,000 mcg/mL injection 1,000 mcg by IntraMUSCular route every thirty (30) days.  Calcium-Cholecalciferol, D3, (CALCIUM 600 WITH VITAMIN D3) 600 mg(1,500mg) -400 unit cap Take 2 Tabs by mouth daily.  multivitamin (ONE A DAY) tablet Take 1 Tab by mouth daily.  Cholecalciferol, Vitamin D3, (VITAMIN D3) 2,000 unit cap capsule Take 5,000 Units by mouth daily. No current facility-administered medications for this visit. SYSTEM REVIEW NOT RELATED TO LIVER DISEASE OR REVIEWED ABOVE:  Constitution systems: Negative for fever, chills, weight gain, weight loss. Eyes: Negative for visual changes. ENT: Negative for sore throat, painful swallowing. Respiratory: Negative for cough, hemoptysis, SOB. Cardiology: Negative for chest pain, palpitations. GI:  Negative for constipation or diarrhea. : Negative for urinary frequency, dysuria, hematuria, nocturia.    Skin: Negative for rash.  Hematology: Negative for easy bruising, blood clots. Musculo-skeletal: Negative for back pain, muscle pain, weakness. Neurologic: Negative for headaches, dizziness, vertigo, memory problems not related to HE. Psychology: Negative for anxiety, depression. FAMILY HISTORY:  The father  Has/had the following following chronic disease(s): cancer. The mother Has/had the following chronic disease(s): MI.    There is no family history of liver disease. SOCIAL HISTORY:  The patient is . The patient has 4 children,   The patient has never used tobacco products. The patient has never consumed significant amounts of alcohol. The patient currently receiving disability. PHYSICAL EXAMINATION PERFORMED BY VIRTUAL TELEHEALTH:  VS: Not performed   General: No acute distress. Eyes: Sclera anicteric. ENT: No oral lesions. Skin: No rashes. spider angiomata. No jaundice. Abdomen: No obvious distention suggesting ascites. Extremities: No edema. No muscle wasting. Neurologic: Alert and oriented. Cranial nerves grossly intact.       LABORATORY STUDIES:  [place] [address] [address] Units 10/20/2020 7/28/2020   WBC 4.4 - 11.3 K/uL 5.3 5.7   ANC 1.8 - 7.7 K/UL 2.9 3.5   HGB 13.5 - 17.5 g/dL 12.4 (L) 12.2   PLT K/uL 214 226   INR 0.8 - 1.2  1.0   AST 15 - 37 U/L 45 (H) 35   ALT 12 - 78 U/L 56 49 (H)   Alk Phos 45 - 117 U/L 137 (H) 112   Bili, Total 0.2 - 1.0 mg/dL 0.1 (L) <0.2   Bili, Direct 0.00 - 0.40 mg/dL  0.07   Albumin 3.5 - 5.0 g/dL 3.4 (L) 4.1   BUN 6 - 20 mg/dL 15 17   Creat 0.55 - 1.02 mg/dL 0.87 0.79   Na 136 - 145 mmol/L 141 142   K 3.5 - 5.1 mmol/L 3.2 (L) 4.3   Cl 97 - 108 mmol/L 106 103   CO2 21 - 32 mmol/L 26 21   Glucose 65 - 100 mg/dL 92 90     [facility] Latest Ref Rng & Units 4/29/2020   WBC 4.4 - 11.3 K/uL 5.3   ANC 1.8 - 7.7 K/UL 3.0   HGB 13.5 - 17.5 g/dL 11.3   PLT K/uL 198   INR 0.8 - 1.2    AST 15 - 37 U/L 49 (H)   ALT 12 - 78 U/L 59 (H)   Alk Phos 45 - 117 U/L 94   Bili, Total 0.2 - 1.0 mg/dL <0.2   Bili, Direct 0.00 - 0.40 mg/dL 0.08   Albumin 3.5 - 5.0 g/dL 3.8   BUN 6 - 20 mg/dL 8   Creat 0.55 - 1.02 mg/dL 0.69   Na 136 - 145 mmol/L 143   K 3.5 - 5.1 mmol/L 4.4   Cl 97 - 108 mmol/L 110 (H)   CO2 21 - 32 mmol/L 22   Glucose 65 - 100 mg/dL 70     Laboratory testing from 10/20/2020 reviewed in detail. Additional testing included to evaluate progression or regression of disease. Laboratory testing ordered today will be communicated by My Chart or phone. SEROLOGIES:  [facility]ologies Latest Ref Rng & Units 7/26/2019   Hep A Ab, Total Negative Negative   Hep B Surface Ag Negative Negative   Hep B Core Ab, Total Negative Negative   Hep B Surface AB QL  Non Reactive   Hep C Ab 0.0 - 0.9 s/co ratio <0.1   Ferritin 15 - 150 ng/mL 14 (L)   Iron % Saturation 15 - 55 % 12 (L)   GULSHAN, IFA  Negative   ASMCA 0 - 19 Units 6   M2 Ab 0.0 - 20.0 Units <20.0   Ceruloplasmin 19.0 - 39.0 mg/dL 23.2   Alpha-1 antitrypsin level 90 - 200 mg/dL 99     Serologies Latest Ref Rng & Units 4/29/2020   C-ANCA Neg:<1:20 titer <1:20   P-ANCA Neg:<1:20 titer <1:20   ANCA Neg:<1:20 titer <1:20     LIVER HISTOLOGY:  8/2019. FibroScan performed at [address]. EkPa was 3.3. IQR/med 9%. . The results suggested a fibrosis level of F0. The CAP score suggests no evidence of fatty liver. 12/2020. Liver biopsy Slides reviewed by MELINDAS. Sinusoidal dilation. A single periportal granuloma. No inflammation, No steatosis, Nofibrosis,     ENDOSCOPIC PROCEDURES:  Not available or performed    RADIOLOGY:  8/2019. Ultrasound of liver. Normal appearing liver. No liver mass lesions. 6/2020. MRI/MRCP of abdomen. Normal appearance of the liver and biliary tree status post cholecystectomy. No evidence of primary sclerosing cholangitis or biliary pathology.      OTHER TESTING:  Not available or performed    FOLLOW-UP AFTER VIRTUAL VISIT:  Pursuant to the emergency declaration under the [facility], [facility] waiver authority and the [email or web address] and [employer] Act, this Virtual  Visit was conducted, with the patient's (and/or their legal guardian's) consent, to reduce the patient's risk of exposure to COVID-19 and provide necessary medical care. Services were provided through a video synchronous discussion virtually to substitute for an in-person clinic visit. The patient was located in their home. The provider [facility]. All of the issues listed above in the Assessment and Plan were discussed with the patient. All questions were answered. The patient expressed a clear understanding of the above. An in-person follow-up visit will be scheduled at [place] [address] in 6 months. Orders for laboratory testing will be mailed to the patient. Perla Russo, GABRIEL-Pending sale to [facility] of [address] [address] [address] [place], [address] [facility] 22.  [address] [facility]

## 2021-04-30 NOTE — PROGRESS NOTES
Marlene Briceño is a 44 y.o. female    Chief Complaint   Patient presents with    Follow-up     1. Have you been to the ER, urgent care clinic since your last visit? Hospitalized since your last visit? No     2. Have you seen or consulted any other health care providers outside of the [address] [place] [address] since your last visit? Include any pap smears or colon screening.   No

## 2021-05-15 LAB
ALBUMIN SERPL-MCNC: 4.5 G/DL (ref 3.8–4.9)
ALP SERPL-CCNC: 169 IU/L (ref 39–117)
ALT SERPL-CCNC: 53 IU/L (ref 0–32)
AST SERPL-CCNC: 49 IU/L (ref 0–40)
BASOPHILS # BLD AUTO: 0.1 X10E3/UL (ref 0–0.2)
BASOPHILS NFR BLD AUTO: 1 %
BILIRUB DIRECT SERPL-MCNC: 0.08 MG/DL (ref 0–0.4)
BILIRUB SERPL-MCNC: <0.2 MG/DL (ref 0–1.2)
BUN SERPL-MCNC: 21 MG/DL (ref 6–24)
BUN/CREAT SERPL: 21 (ref 9–23)
CALCIUM SERPL-MCNC: 9 MG/DL (ref 8.7–10.2)
CHLORIDE SERPL-SCNC: 97 MMOL/L (ref 96–106)
CO2 SERPL-SCNC: 20 MMOL/L (ref 20–29)
CREAT SERPL-MCNC: 1.02 MG/DL (ref 0.57–1)
EOSINOPHIL # BLD AUTO: 0.5 X10E3/UL (ref 0–0.4)
EOSINOPHIL NFR BLD AUTO: 7 %
ERYTHROCYTE [DISTWIDTH] IN BLOOD BY AUTOMATED COUNT: 13.8 % (ref 11.7–15.4)
GLUCOSE SERPL-MCNC: 88 MG/DL (ref 65–99)
HCT VFR BLD AUTO: 40.7 % (ref 34–46.6)
HGB BLD-MCNC: 13.3 G/DL (ref 11.1–15.9)
IMM GRANULOCYTES # BLD AUTO: 0 X10E3/UL (ref 0–0.1)
IMM GRANULOCYTES NFR BLD AUTO: 1 %
LYMPHOCYTES # BLD AUTO: 1.7 X10E3/UL (ref 0.7–3.1)
LYMPHOCYTES NFR BLD AUTO: 22 %
MCH RBC QN AUTO: 27.3 PG (ref 26.6–33)
MCHC RBC AUTO-ENTMCNC: 32.7 G/DL (ref 31.5–35.7)
MCV RBC AUTO: 84 FL (ref 79–97)
MONOCYTES # BLD AUTO: 0.4 X10E3/UL (ref 0.1–0.9)
MONOCYTES NFR BLD AUTO: 5 %
NEUTROPHILS # BLD AUTO: 5 X10E3/UL (ref 1.4–7)
NEUTROPHILS NFR BLD AUTO: 64 %
PLATELET # BLD AUTO: 242 X10E3/UL (ref 150–450)
POTASSIUM SERPL-SCNC: 3.9 MMOL/L (ref 3.5–5.2)
PROT SERPL-MCNC: 6.5 G/DL (ref 6–8.5)
RBC # BLD AUTO: 4.87 X10E6/UL (ref 3.77–5.28)
SODIUM SERPL-SCNC: 141 MMOL/L (ref 134–144)
WBC # BLD AUTO: 7.7 X10E3/UL (ref 3.4–10.8)

## 2021-05-19 NOTE — TELEPHONE ENCOUNTER
My chart message sent regarding the blood work results. The ALP was slightly higher. Will consider another MRI in the future if the ALP continues to rise. Liver biopsy did not indicate an underlying liver disease. Will continue to monitor closely.

## 2021-05-24 DIAGNOSIS — R74.8 ELEVATED ALKALINE PHOSPHATASE LEVEL: Primary | ICD-10-CM

## 2021-05-25 RX ORDER — NITROGLYCERIN 0.4 MG/1
0.4 TABLET SUBLINGUAL
Qty: 1 BOTTLE | Refills: 3 | Status: SHIPPED | OUTPATIENT
Start: 2021-05-25 | End: 2021-06-01

## 2021-06-01 RX ORDER — NITROGLYCERIN 0.4 MG/1
TABLET SUBLINGUAL
Qty: 1 BOTTLE | Refills: 3 | Status: SHIPPED | OUTPATIENT
Start: 2021-06-01 | End: 2021-09-27

## 2021-06-01 NOTE — TELEPHONE ENCOUNTER
Requested Prescriptions     Signed Prescriptions Disp Refills    nitroglycerin (NITROSTAT) 0.4 mg SL tablet 1 Bottle 3     Sig: DISSOLVE 1 TABLET UNDER THE TONGUE EVERY 5 MINUTES AS  NEEDED FOR CHEST PAIN. MAX  OF 3 TABLETS IN 15 MINUTES. CALL 911 IF PAIN PERSISTS.      Authorizing Provider: Ingris Grimaldo     Ordering User: Adi Loving     Verbal order per Dr. Alan Parks.      Future Appointments   Date Time Provider Alicja Jimenez   6/8/2021 10:15 AM SVR MRI 1 SVRRMRI SVR   9/15/2021  9:20 AM Vishnu Zamorano MD CAV[facility] BS AMB   10/20/2021  9:00 AM Perla Russo NP LIV[facility] BS AMB

## 2021-06-08 ENCOUNTER — HOSPITAL ENCOUNTER (OUTPATIENT)
Dept: LAB | Age: 51
Discharge: HOME | End: 2021-06-08
Payer: MEDICARE

## 2021-06-08 ENCOUNTER — HOSPITAL ENCOUNTER (OUTPATIENT)
Dept: MRI IMAGING | Age: 51
Discharge: HOME | End: 2021-06-08
Payer: MEDICARE

## 2021-06-08 ENCOUNTER — TRANSCRIBE ORDER (OUTPATIENT)
Dept: REGISTRATION | Age: 51
End: 2021-06-08

## 2021-06-08 DIAGNOSIS — R74.8 ACID PHOSPHATASE ELEVATED: ICD-10-CM

## 2021-06-08 DIAGNOSIS — R74.8 ACID PHOSPHATASE ELEVATED: Primary | ICD-10-CM

## 2021-06-08 DIAGNOSIS — R74.8 ELEVATED ALKALINE PHOSPHATASE LEVEL: ICD-10-CM

## 2021-06-08 PROCEDURE — A9576 INJ PROHANCE MULTIPACK: HCPCS | Performed by: FAMILY MEDICINE

## 2021-06-08 PROCEDURE — 74183 MRI ABD W/O CNTR FLWD CNTR: CPT

## 2021-06-08 PROCEDURE — 74011636320 HC RX REV CODE- 636/320: Performed by: FAMILY MEDICINE

## 2021-06-08 RX ADMIN — GADOTERIDOL 18 ML: 279.3 INJECTION, SOLUTION INTRAVENOUS at 11:10

## 2021-06-09 ENCOUNTER — TRANSCRIBE ORDER (OUTPATIENT)
Dept: SCHEDULING | Age: 51
End: 2021-06-09

## 2021-06-09 DIAGNOSIS — R22.41 LOCALIZED SWELLING, MASS, OR LUMP OF LOWER EXTREMITY, RIGHT: Primary | ICD-10-CM

## 2021-06-09 DIAGNOSIS — R22.42 MASS OF SKIN OF LEFT ANKLE: ICD-10-CM

## 2021-06-10 ENCOUNTER — HOSPITAL ENCOUNTER (OUTPATIENT)
Dept: NON INVASIVE DIAGNOSTICS | Age: 51
Discharge: HOME | End: 2021-06-10
Attending: ORTHOPAEDIC SURGERY
Payer: MEDICARE

## 2021-06-10 DIAGNOSIS — R22.41 LOCALIZED SWELLING, MASS, OR LUMP OF LOWER EXTREMITY, RIGHT: ICD-10-CM

## 2021-06-10 PROCEDURE — 93971 EXTREMITY STUDY: CPT

## 2021-08-16 ENCOUNTER — TRANSCRIBE ORDER (OUTPATIENT)
Dept: SCHEDULING | Age: 51
End: 2021-08-16

## 2021-08-16 DIAGNOSIS — R10.9 PAIN, ABDOMINAL: Primary | ICD-10-CM

## 2021-08-25 ENCOUNTER — TRANSCRIBE ORDER (OUTPATIENT)
Dept: SCHEDULING | Age: 51
End: 2021-08-25

## 2021-08-25 DIAGNOSIS — Z12.31 SCREENING MAMMOGRAM FOR HIGH-RISK PATIENT: Primary | ICD-10-CM

## 2021-08-26 ENCOUNTER — TRANSCRIBE ORDER (OUTPATIENT)
Dept: SCHEDULING | Age: 51
End: 2021-08-26

## 2021-08-26 DIAGNOSIS — Z12.31 SCREENING MAMMOGRAM, ENCOUNTER FOR: Primary | ICD-10-CM

## 2021-09-02 ENCOUNTER — HOSPITAL ENCOUNTER (OUTPATIENT)
Dept: ULTRASOUND IMAGING | Age: 51
Discharge: HOME | End: 2021-09-02
Attending: INTERNAL MEDICINE
Payer: MEDICARE

## 2021-09-02 DIAGNOSIS — R10.9 PAIN, ABDOMINAL: ICD-10-CM

## 2021-09-02 PROCEDURE — 76700 US EXAM ABDOM COMPLETE: CPT

## 2021-09-03 ENCOUNTER — HOSPITAL ENCOUNTER (OUTPATIENT)
Dept: MAMMOGRAPHY | Age: 51
Discharge: HOME | End: 2021-09-03
Payer: MEDICARE

## 2021-09-03 DIAGNOSIS — Z12.31 SCREENING MAMMOGRAM, ENCOUNTER FOR: ICD-10-CM

## 2021-09-03 PROCEDURE — 77063 BREAST TOMOSYNTHESIS BI: CPT

## 2021-09-13 ENCOUNTER — VIRTUAL VISIT (OUTPATIENT)
Dept: CARDIOLOGY CLINIC | Age: 51
End: 2021-09-13
Payer: MEDICARE

## 2021-09-13 ENCOUNTER — HOSPITAL ENCOUNTER (OUTPATIENT)
Dept: PREADMISSION TESTING | Age: 51
Discharge: HOME | End: 2021-09-13
Payer: MEDICARE

## 2021-09-13 DIAGNOSIS — I50.42 CHRONIC COMBINED SYSTOLIC AND DIASTOLIC CONGESTIVE HEART FAILURE (HCC): ICD-10-CM

## 2021-09-13 DIAGNOSIS — R55 VASOVAGAL SYNCOPE: ICD-10-CM

## 2021-09-13 DIAGNOSIS — I47.29 NSVT (NONSUSTAINED VENTRICULAR TACHYCARDIA): ICD-10-CM

## 2021-09-13 DIAGNOSIS — R60.0 PEDAL EDEMA: Primary | ICD-10-CM

## 2021-09-13 DIAGNOSIS — R07.89 ATYPICAL CHEST PAIN: ICD-10-CM

## 2021-09-13 DIAGNOSIS — I42.8 NICM (NONISCHEMIC CARDIOMYOPATHY) (HCC): ICD-10-CM

## 2021-09-13 LAB — SARS-COV-2, COV2: NORMAL

## 2021-09-13 PROCEDURE — G8432 DEP SCR NOT DOC, RNG: HCPCS | Performed by: SPECIALIST

## 2021-09-13 PROCEDURE — 99214 OFFICE O/P EST MOD 30 MIN: CPT | Performed by: SPECIALIST

## 2021-09-13 PROCEDURE — G9899 SCRN MAM PERF RSLTS DOC: HCPCS | Performed by: SPECIALIST

## 2021-09-13 PROCEDURE — 3017F COLORECTAL CA SCREEN DOC REV: CPT | Performed by: SPECIALIST

## 2021-09-13 PROCEDURE — G8427 DOCREV CUR MEDS BY ELIG CLIN: HCPCS | Performed by: SPECIALIST

## 2021-09-13 PROCEDURE — U0005 INFEC AGEN DETEC AMPLI PROBE: HCPCS

## 2021-09-13 NOTE — PROGRESS NOTES
Marce Lombardi     1970       Vishnu Mathur MD, [facility]  Date of Visit-9/13/2021   PCP is Silas Akbar, [address] [facility] [place]  [facility]  Virtual Visit  HPI:  Marce Lombardi is a 46 y.o. female   who was seen by synchronous (real-time) audio-video technology on 9/13/2021. Fu of  Hx CMY  Son has non compaction, she had MRI at [facility] that showed no issue   Liver dz-Echo done 1/11/21 showed normal EF with mild dilation of the left atrium. No mention of TR or PASP. Seen 6 months ago, stable, called last week indicating edema and more SOB, has VV via Facetime today  Today  Noticing more shortness of breath and edema  Last 2-3 weeks  No chest pain  Weight no change  No palps  Watch says heart rate is up 110   bp running 99/60  Had been on clonidine, Bumex, Losartan and spironolactone  Previous hx or visit:      · Reviewed records cardiology outpatient visit 12/23/2020 [facility] noting cardiomyopathy onset in 2016 had seen Dr. Shahab Carlos at [facility]   · Echocardiogram [facility] November 2020 showed normal LV cavity size LVEF of 60 to 65% trivial mitral and tricuspid insufficiency  · Cardiac MRI 12/27/2019 showed mild reduction LV systolic function EF 82% with mild global hypokinesia some worsening at the apex  · The assessment was that the patient had prior syncope and had an ILR previous fluid overload with peripheral edema resolved after Bumex and recommended with the abnormal MRI that patient have regular echocardiogram yearly. · Remote monitoring reviewed 12/28/2020 noted some ectopy and 1 undersensing because of pauses artifact.   · Reviewed actual echo report of 11/5/2020 to be scanned  · Reviewed OP notation 12/23/2020 of outpatient visit noting admission [facility] on 11/1/2019 with chest pain and palpitations having nonsustained ventricular tachycardia frequent PACs and history of prior EF of 35% but an echo 11/2/2019 showed an EF of 69%; reportedly patient has a son who is 25 also with LV noncompaction ; patient had cardiac MRI 12/27/2019 without evidence of LV noncompaction , she did have  mild apical dyskinesia and prominent trabeculations and an unremarkable EP study in February 2020; loop monitor implanted July 2020;  patient called and was seen on this visit 12/23/2020 noting some edema.    01/11/21    ECHO ADULT COMPLETE 01/11/2021 1/11/2021    Interpretation Summary  · LV: Estimated LVEF is 50 - 55%. Normal cavity size and wall thickness. Mild (grade 1) left ventricular diastolic dysfunction. · LA: Mildly dilated left atrium. Signed by: Ayah Curran MD on 1/11/2021  8:06 PM      Assessment/Plan:    1. Edema and SOB  HFpEF versus chronic venous insufficiency   Unable to show me her feet, appears comfortable  Previous EF normal in Jan 2021     Plan is: Take 2 bumex 1 mg for 3 days  If not better by end of week then make appt for echo and OV at [facility]    2. Chronic HFpEF and Hx HFrEF  Improved with medical therapy    3. NICM  Normal cath  4. Atypical CP  Resolved  5. VV syncope-stable  6. NSVT -no palps  Future Appointments   Date Time Provider Alicja Jimenez   10/20/2021  9:00 AM Perla Russo NP [facility] AMB   12/22/2021  9:40 AM Vishnu Zamorano MD [facility] AM[facility]      This note was created using voice recognition software. Despite editing, there may be syntax errors. Key CAD CHF Meds             nitroglycerin (NITROSTAT) 0.4 mg SL tablet (Taking) DISSOLVE 1 TABLET UNDER THE TONGUE EVERY 5 MINUTES AS  NEEDED FOR CHEST PAIN. MAX  OF 3 TABLETS IN 15 MINUTES. CALL 911 IF PAIN PERSISTS. bumetanide (BUMEX) 1 mg tablet (Taking) Take 1 Tab by mouth daily. carvedilol (COREG) 6.25 mg tablet (Taking) Take 6.25 mg by mouth two (2) times a day. losartan (COZAAR) 25 mg tablet (Taking) Take 25 mg by mouth daily (after breakfast). spironolactone (ALDACTONE) 25 mg tablet (Taking) Take 25 mg by mouth daily (after breakfast).     cloNIDine (CATAPRES) 0.1 mg/24 hr ptwk            ROS-except as noted above. . A complete cardiac and respiratory are reviewed and negative except as above ; Resp-denies wheezing  or productive cough,. Const- No unusual weight loss or fever; Neuro-no recent seizure or CVA ; GI- No BRBPR, abdom pain, bloating ; - no  hematuria   Past Medical History:   Diagnosis Date    Arthritis     pt states knees and back    Cardiomyopathy ([facility] [address] 2005    EF 35%, echo in 2021 normal EF     Colon polyp     Dizzy spells     GERD (gastroesophageal reflux disease)     Heart failure ([facility] [address] 2005    now combined systolic, diastolic, seen at [facility], [facility] 2021- Dr Humberto Morrow Irritable bowel syndrome     Knee pain, bilateral     Migraines 2011    2-3 week    Neuropathy     pt states of both feet    Seizures ([facility] [address]     petite mals last seizure April 2015    Thyroid disease     hypothyroid      Social Hx= reports that she has never smoked. She has never used smokeless tobacco. She reports that she does not drink alcohol and does not use drugs. Due to this being a TeleHealth evaluation, many elements of the physical examination are unable to be assessed. Vitals if sent, or see HPI  There were no vitals taken for this visit. General: Well developed, in no acute distress, cooperative and alert  HEENT: Pupils equal/round. No marked JVD visible on video. Respiratory: No audible wheezing, no signs of respiratory distress, lips non cyanotic  Extremities:  No edema  Neuro: A&Ox3, speech clear, no facial droop, answering questions appropriately  Skin: Skin color is normal. No rashes or lesions.  Non diaphoretic on visible skin during exam  Psych: mood and affect are appropriate and pleasant    No results found for: CHOL, CHOLX, CHLST, CHOLV, HDL, HDLP, LDL, LDLC, DLDLP, TGLX, TRIGL, TRIGP, CHHD, CHHDX  Lab Results   Component Value Date/Time    Sodium 141 05/14/2021 02:01 PM    Potassium 3.9 05/14/2021 02:01 PM    Chloride 97 05/14/2021 02:01 PM    CO2 20 05/14/2021 02:01 PM    Anion gap 2 (L) 12/29/2020 02:25 PM    Glucose 88 05/14/2021 02:01 PM    BUN 21 05/14/2021 02:01 PM    Creatinine 1.02 (H) 05/14/2021 02:01 PM    BUN/Creatinine ratio 21 05/14/2021 02:01 PM    GFR est AA 73 05/14/2021 02:01 PM    GFR est non-AA 63 05/14/2021 02:01 PM    Calcium 9.0 05/14/2021 02:01 PM      Wt Readings from Last 3 Encounters:   03/08/21 212 lb (96.2 kg)   12/31/20 208 lb (94.3 kg)   12/29/20 201 lb 15.1 oz (91.6 kg)      BP Readings from Last 3 Encounters:   03/08/21 110/76   01/04/21 110/71   12/31/20 115/70        Current Outpatient Medications   Medication Sig    nitroglycerin (NITROSTAT) 0.4 mg SL tablet DISSOLVE 1 TABLET UNDER THE TONGUE EVERY 5 MINUTES AS  NEEDED FOR CHEST PAIN. MAX  OF 3 TABLETS IN 15 MINUTES. CALL 911 IF PAIN PERSISTS.  metFORMIN (GLUCOPHAGE) 500 mg tablet Take  by mouth two (2) times daily (with meals).  bumetanide (BUMEX) 1 mg tablet Take 1 Tab by mouth daily.  ergocalciferol (ERGOCALCIFEROL) 1,250 mcg (50,000 unit) capsule TAKE 1 CAPSULE BY MOUTH ONCE A WEEK    fluticasone propionate (FLONASE) 50 mcg/actuation nasal spray     RABEprazole (ACIPHEX) 20 mg TbEC Take 20 mg by mouth two (2) times a day.  rizatriptan (MAXALT) 5 mg tablet Take 5 mg by mouth once as needed for Migraine. May repeat in 2 hours if needed    tiotropium (SPIRIVA) 18 mcg inhalation capsule Take 1 Cap by inhalation daily.  fexofenadine (ALLEGRA) 180 mg tablet Take 180 mg by mouth daily.  ondansetron hcl (ZOFRAN) 8 mg tablet Take 8 mg by mouth every eight (8) hours as needed.  gabapentin (NEURONTIN) 100 mg capsule Take 300 mg by mouth three (3) times daily.  levETIRAcetam (KEPPRA) 750 mg tablet 1,500 mg two (2) times a day.  montelukast (SINGULAIR) 10 mg tablet Take 10 mg by mouth daily.  nortriptyline (PAMELOR) 50 mg capsule Take 100 mg by mouth nightly.  potassium chloride (K-DUR, KLOR-CON) 20 mEq tablet Take 20 mEq by mouth daily.     sucralfate (CARAFATE) 100 mg/mL suspension Take 1 g by mouth four (4) times daily.  topiramate (TOPAMAX) 100 mg tablet Take 100 mg by mouth two (2) times a day.  carvedilol (COREG) 6.25 mg tablet Take 6.25 mg by mouth two (2) times a day.  VENTOLIN HFA 90 mcg/actuation inhaler Take 2 Puffs by inhalation every four (4) hours as needed.  SYMBICORT 80-4.5 mcg/actuation HFAA INHALE 2 PUFFS BY MOUTH TWICE DAILY    dicyclomine (BENTYL) 20 mg tablet Take 20 mg by mouth every six (6) hours.  estradiol (ESTRACE) 0.01 % (0.1 mg/gram) vaginal cream     famotidine (PEPCID) 40 mg tablet Take 40 mg by mouth nightly.  losartan (COZAAR) 25 mg tablet Take 25 mg by mouth daily (after breakfast).  spironolactone (ALDACTONE) 25 mg tablet Take 25 mg by mouth daily (after breakfast).  levothyroxine (SYNTHROID) 50 mcg tablet Take 50 mcg by mouth Daily (before breakfast).  cyanocobalamin (VITAMIN B-12) 1,000 mcg/mL injection 1,000 mcg by IntraMUSCular route every thirty (30) days.  multivitamin (ONE A DAY) tablet Take 1 Tab by mouth daily.  cloNIDine (CATAPRES) 0.1 mg/24 hr ptwk     neomycin-polymyxin-hydrocortisone, buffered, (PEDIOTIC) 3.5-10,000-1 mg/mL-unit/mL-% otic suspension SHAKE WELL INSTILL 4 DROPS INTO AFFECTED EAR(S) 4 TIMES DAILY FOR 4 DAYS (Patient not taking: Reported on 9/13/2021)    BD Luer-Narda Syringe 3 mL 25 gauge x 1\" syrg USE FOR B12 INJECTIONS    alum-mag hydroxide-simeth-lidocaine-sucralfate Take 30 mL by mouth once.  lacosamide (Vimpat) 100 mg tab tablet Take 100 mg by mouth two (2) times a day.  PARoxetine (PAXIL) 10 mg tablet Take 10 mg by mouth daily. (Patient not taking: Reported on 9/13/2021)    dexlansoprazole (DEXILANT) 60 mg CpDB capsule (delayed release) Take 60 mg by mouth daily.  diclofenac (VOLTAREN) 1 % gel Apply 2 g to affected area every six (6) hours.  (Patient not taking: Reported on 9/13/2021)    Calcium-Cholecalciferol, D3, (CALCIUM 600 WITH VITAMIN D3) 600 mg(1,500mg) -400 unit cap Take 2 Tabs by mouth daily. (Patient not taking: Reported on 9/13/2021)    Cholecalciferol, Vitamin D3, (VITAMIN D3) 2,000 unit cap capsule Take 5,000 Units by mouth daily. No current facility-administered medications for this visit. Impression see above. VIRTUAL VISIT DOCUMENTATION   Pursuant to the emergency declaration under the [address] [place] [address] waiver authority and the [employer] Act, this Virtual  Visit was conducted, with patient's consent, to reduce the patient's risk of exposure to COVID-19 and provide continuity of care for an established patient. Services were provided through a video synchronous discussion virtually to substitute for in-person clinic visit. We discussed the expected course, resolution and complications of the diagnosis(es) in detail. Medication risks, benefits, costs, interactions, and alternatives were discussed as indicated. I advised her to contact the office if her condition worsens, changes or fails to improve as anticipated. She expressed understanding with the diagnosis(es) and plan  I have reviewed the nurses notes, vitals, problem list, allergy list, medical history, family, social history and medications. FOLLOW-UP   Patient was made aware and verbalized understanding that an appointment will be scheduled for them for a virtual visit and/or office visit within the above time frame. Patient understanding his/her responsibility to call and change time/date if he/she so chooses. MD Carley [address]  [place] [address], [facility], [place] [address]  [address]enny [address]    Ramila Naranjo  [phone] / [phone] Fax  [phone] / [phone] Fax  This visit was conducted using [employer] telemedicine services or similar service.

## 2021-09-14 LAB
SARS-COV-2, XPLCVT: NOT DETECTED
SOURCE, COVRS: NORMAL

## 2021-09-17 ENCOUNTER — HOSPITAL ENCOUNTER (OUTPATIENT)
Age: 51
Setting detail: OUTPATIENT SURGERY
Discharge: HOME | End: 2021-09-17
Attending: INTERNAL MEDICINE | Admitting: INTERNAL MEDICINE
Payer: MEDICARE

## 2021-09-17 ENCOUNTER — ANESTHESIA EVENT (OUTPATIENT)
Dept: ENDOSCOPY | Age: 51
End: 2021-09-17
Payer: MEDICARE

## 2021-09-17 ENCOUNTER — ANESTHESIA (OUTPATIENT)
Dept: ENDOSCOPY | Age: 51
End: 2021-09-17
Payer: MEDICARE

## 2021-09-17 ENCOUNTER — APPOINTMENT (OUTPATIENT)
Dept: ENDOSCOPY | Age: 51
End: 2021-09-17
Attending: INTERNAL MEDICINE
Payer: MEDICARE

## 2021-09-17 VITALS
HEART RATE: 63 BPM | OXYGEN SATURATION: 99 % | BODY MASS INDEX: 31.49 KG/M2 | DIASTOLIC BLOOD PRESSURE: 56 MMHG | HEIGHT: 65 IN | SYSTOLIC BLOOD PRESSURE: 98 MMHG | WEIGHT: 189 LBS | RESPIRATION RATE: 16 BRPM | TEMPERATURE: 97.4 F

## 2021-09-17 LAB
GLUCOSE BLD STRIP.AUTO-MCNC: 65 MG/DL (ref 65–117)
PERFORMED BY, TECHID: NORMAL

## 2021-09-17 PROCEDURE — 82962 GLUCOSE BLOOD TEST: CPT

## 2021-09-17 PROCEDURE — 74011250636 HC RX REV CODE- 250/636: Performed by: REGISTERED NURSE

## 2021-09-17 PROCEDURE — 74011000250 HC RX REV CODE- 250: Performed by: REGISTERED NURSE

## 2021-09-17 PROCEDURE — 77030021593 HC FCPS BIOP ENDOSC BSC -A: Performed by: INTERNAL MEDICINE

## 2021-09-17 PROCEDURE — 74011250636 HC RX REV CODE- 250/636: Performed by: INTERNAL MEDICINE

## 2021-09-17 PROCEDURE — 74011000250 HC RX REV CODE- 250

## 2021-09-17 PROCEDURE — 76040000007: Performed by: INTERNAL MEDICINE

## 2021-09-17 PROCEDURE — 88305 TISSUE EXAM BY PATHOLOGIST: CPT

## 2021-09-17 PROCEDURE — 2709999900 HC NON-CHARGEABLE SUPPLY: Performed by: INTERNAL MEDICINE

## 2021-09-17 PROCEDURE — 77030019988 HC FCPS ENDOSC DISP BSC -B: Performed by: INTERNAL MEDICINE

## 2021-09-17 PROCEDURE — 76060000032 HC ANESTHESIA 0.5 TO 1 HR: Performed by: INTERNAL MEDICINE

## 2021-09-17 RX ORDER — PROPOFOL 10 MG/ML
INJECTION, EMULSION INTRAVENOUS AS NEEDED
Status: DISCONTINUED | OUTPATIENT
Start: 2021-09-17 | End: 2021-09-17 | Stop reason: HOSPADM

## 2021-09-17 RX ORDER — DEXTROSE 50 % IN WATER (D50W) INTRAVENOUS SYRINGE
Status: COMPLETED
Start: 2021-09-17 | End: 2021-09-17

## 2021-09-17 RX ORDER — SODIUM CHLORIDE, SODIUM LACTATE, POTASSIUM CHLORIDE, CALCIUM CHLORIDE 600; 310; 30; 20 MG/100ML; MG/100ML; MG/100ML; MG/100ML
50 INJECTION, SOLUTION INTRAVENOUS CONTINUOUS
Status: DISCONTINUED | OUTPATIENT
Start: 2021-09-17 | End: 2021-09-17 | Stop reason: HOSPADM

## 2021-09-17 RX ORDER — LIDOCAINE HYDROCHLORIDE 20 MG/ML
INJECTION, SOLUTION EPIDURAL; INFILTRATION; INTRACAUDAL; PERINEURAL AS NEEDED
Status: DISCONTINUED | OUTPATIENT
Start: 2021-09-17 | End: 2021-09-17 | Stop reason: HOSPADM

## 2021-09-17 RX ORDER — CETIRIZINE HCL 10 MG
10 TABLET ORAL DAILY
COMMUNITY

## 2021-09-17 RX ORDER — DEXTROSE 50 % IN WATER (D50W) INTRAVENOUS SYRINGE
25 ONCE
Status: COMPLETED | OUTPATIENT
Start: 2021-09-17 | End: 2021-09-17

## 2021-09-17 RX ORDER — SODIUM CHLORIDE, SODIUM LACTATE, POTASSIUM CHLORIDE, CALCIUM CHLORIDE 600; 310; 30; 20 MG/100ML; MG/100ML; MG/100ML; MG/100ML
INJECTION, SOLUTION INTRAVENOUS
Status: DISCONTINUED | OUTPATIENT
Start: 2021-09-17 | End: 2021-09-17 | Stop reason: HOSPADM

## 2021-09-17 RX ADMIN — LIDOCAINE HYDROCHLORIDE 70 MG: 20 INJECTION, SOLUTION EPIDURAL; INFILTRATION; INTRACAUDAL; PERINEURAL at 10:41

## 2021-09-17 RX ADMIN — SODIUM CHLORIDE, POTASSIUM CHLORIDE, SODIUM LACTATE AND CALCIUM CHLORIDE 50 ML/HR: 600; 310; 30; 20 INJECTION, SOLUTION INTRAVENOUS at 09:36

## 2021-09-17 RX ADMIN — SODIUM CHLORIDE, POTASSIUM CHLORIDE, SODIUM LACTATE AND CALCIUM CHLORIDE: 600; 310; 30; 20 INJECTION, SOLUTION INTRAVENOUS at 10:35

## 2021-09-17 RX ADMIN — DEXTROSE MONOHYDRATE 12.5 G: 25 INJECTION, SOLUTION INTRAVENOUS at 09:41

## 2021-09-17 RX ADMIN — PROPOFOL 30 MG: 10 INJECTION, EMULSION INTRAVENOUS at 10:45

## 2021-09-17 RX ADMIN — PROPOFOL 100 MG: 10 INJECTION, EMULSION INTRAVENOUS at 10:41

## 2021-09-17 RX ADMIN — DEXTROSE 50 % IN WATER (D50W) INTRAVENOUS SYRINGE 12.5 G: at 09:41

## 2021-09-17 NOTE — ANESTHESIA POSTPROCEDURE EVALUATION
Procedure(s):  ESOPHAGOGASTRODUODENOSCOPY (EGD)  ESOPHAGOGASTRODUODENAL (EGD) BIOPSY. total IV anesthesia, MAC    Anesthesia Post Evaluation      Multimodal analgesia: multimodal analgesia not used between 6 hours prior to anesthesia start to PACU discharge  Patient location during evaluation: bedside  Patient participation: complete - patient participated  Level of consciousness: awake  Pain score: 0  Pain management: adequate  Airway patency: patent  Anesthetic complications: no  Cardiovascular status: acceptable  Respiratory status: acceptable  Hydration status: acceptable  Post anesthesia nausea and vomiting:  none  Final Post Anesthesia Temperature Assessment:  Normothermia (36.0-37.5 degrees C)      INITIAL Post-op Vital signs: No vitals data found for the desired time range.

## 2021-09-17 NOTE — ANESTHESIA PREPROCEDURE EVALUATION
Relevant Problems   RESPIRATORY SYSTEM   (+) H/O Clostridium difficile infection      NEUROLOGY   (+) Migraine headache   (+) Seizures (HCC)      CARDIOVASCULAR   (+) Chronic combined systolic and diastolic congestive heart failure (HCC)      ENDOCRINE   (+) Hypothyroidism       Anesthetic History   No history of anesthetic complications            Review of Systems / Medical History  Patient summary reviewed, nursing notes reviewed and pertinent labs reviewed    Pulmonary  Within defined limits                 Neuro/Psych     seizures    Headaches     Cardiovascular    Hypertension      CHF        Exercise tolerance: >4 METS  Comments: Echo:  · LV: Estimated LVEF is 50 - 55%. Normal cavity size and wall thickness. Mild (grade 1) left ventricular diastolic dysfunction. · LA: Mildly dilated left atrium.    GI/Hepatic/Renal     GERD           Endo/Other      Hypothyroidism  Obesity and arthritis     Other Findings            Past Medical History:   Diagnosis Date    Arthritis     pt states knees and back    Cardiomyopathy ([address] 2005    EF 35%, echo in 2021 normal EF     Colon polyp     Dizzy spells     GERD (gastroesophageal reflux disease)     Heart failure ([address] 2005    now combined systolic, diastolic, seen at [facility], [facility] 2021- Dr Cliff Mora Irritable bowel syndrome     Knee pain, bilateral     Migraines 2011    2-3 week    Neuropathy     pt states of both feet    Seizures ([address]     petite mals last seizure 2-3 months ago    Thyroid disease     hypothyroid       Past Surgical History:   Procedure Laterality Date    HX CHOLECYSTECTOMY  5/2006    HX COLONOSCOPY      HX ENDOSCOPY      HX GASTRIC BYPASS  2013    HX HYSTERECTOMY  2007    HX ORTHOPAEDIC Bilateral 2013    CTR    HX ORTHOPAEDIC      right knee procedure 2021    HX OTHER SURGICAL      left axillary lymph node biopsy    [place] ABDOMEN SURGERY PROC UNLISTED  2008    cholecystectomy    WI APPENDECTOMY      [place] CARDIAC SURG PROCEDURE UNLIST      Loop recorder implanted july 2020    SD LAP,CHOLECYSTECTOMY      SD PART REMOVAL COLON W ANASTOMOSIS      SD RESECT SMALL INTEST W ENTEROSTOMY      SD STOMACH SURGERY PROCEDURE UNLISTED         Current Outpatient Medications   Medication Instructions    alum-mag hydroxide-simeth-lidocaine-sucralfate 30 mL, Oral, DAILY    BD Luer-Narda Syringe 3 mL 25 gauge x 1\" syrg USE FOR B12 INJECTIONS    bumetanide (BUMEX) 1 mg, Oral, DAILY    Calcium-Cholecalciferol, D3, (CALCIUM 600 WITH VITAMIN D3) 600 mg(1,500mg) -400 unit cap 2 Tablets, Oral, DAILY    carvediloL (COREG) 6.25 mg, Oral, 2 TIMES DAILY    cetirizine (ZYRTEC) 10 mg, Oral, DAILY    cloNIDine (CATAPRES) 0.1 mg/24 hr ptwk No dose, route, or frequency recorded.  cyanocobalamin (VITAMIN B-12) 1,000 mcg, IntraMUSCular, EVERY 30 DAYS    dexlansoprazole (DEXILANT) 60 mg, Oral, DAILY    dicyclomine (BENTYL) 20 mg, Oral, EVERY 6 HOURS    ergocalciferol (ERGOCALCIFEROL) 1,250 mcg (50,000 unit) capsule TAKE 1 CAPSULE BY MOUTH ONCE A WEEK    estradiol (ESTRACE) 0.01 % (0.1 mg/gram) vaginal cream No dose, route, or frequency recorded.  famotidine (PEPCID) 40 mg, Oral, EVERY BEDTIME    fluticasone propionate (FLONASE) 50 mcg/actuation nasal spray No dose, route, or frequency recorded.     gabapentin (NEURONTIN) 300 mg, Oral, 3 TIMES DAILY    lacosamide (VIMPAT) 100 mg, Oral, 2 TIMES DAILY    levETIRAcetam (KEPPRA) 1,500 mg, 2 TIMES DAILY    levothyroxine (SYNTHROID) 50 mcg, Oral, DAILY BEFORE BREAKFAST    losartan (COZAAR) 25 mg, Oral, DAILY AFTER BREAKFAST    metFORMIN (GLUCOPHAGE) 500 mg tablet Oral, 2 TIMES DAILY WITH MEALS    montelukast (SINGULAIR) 10 mg, Oral, DAILY    multivitamin (ONE A DAY) tablet 1 Tablet, Oral, DAILY    neomycin-polymyxin-hydrocortisone, buffered, (PEDIOTIC) 3.5-10,000-1 mg/mL-unit/mL-% otic suspension SHAKE WELL INSTILL 4 DROPS INTO AFFECTED EAR(S) 4 TIMES DAILY FOR 4 DAYS    nitroglycerin (NITROSTAT) 0.4 mg SL tablet DISSOLVE 1 TABLET UNDER THE TONGUE EVERY 5 MINUTES AS  NEEDED FOR CHEST PAIN. MAX  OF 3 TABLETS IN 15 MINUTES. CALL 911 IF PAIN PERSISTS.     nortriptyline (PAMELOR) 100 mg, Oral, EVERY BEDTIME    ondansetron hcl (ZOFRAN) 8 mg, Oral, EVERY 8 HOURS AS NEEDED    potassium chloride (K-DUR, KLOR-CON) 20 mEq tablet 20 mEq, Oral, DAILY    RABEprazole (ACIPHEX) 20 mg, Oral, 2 TIMES DAILY    rizatriptan (MAXALT) 5 mg, Oral, ONCE PRN, May repeat in 2 hours if needed     spironolactone (ALDACTONE) 25 mg, Oral, DAILY AFTER BREAKFAST    sucralfate (CARAFATE) 1 g, Oral, 4 TIMES DAILY    SYMBICORT 80-4.5 mcg/actuation HFAA INHALE 2 PUFFS BY MOUTH TWICE DAILY    tiotropium (SPIRIVA) 18 mcg inhalation capsule 1 Capsule, Inhalation, DAILY    topiramate (TOPAMAX) 100 mg, Oral, 2 TIMES DAILY    VENTOLIN HFA 90 mcg/actuation inhaler 2 Puffs, Inhalation, EVERY 4 HOURS AS NEEDED       Current Facility-Administered Medications   Medication Dose Route Frequency    lactated Ringers infusion  50 mL/hr IntraVENous CONTINUOUS       Patient Vitals for the past 24 hrs:   Temp Pulse Resp BP SpO2   09/17/21 0905 36.7 °C (98 °F) 77 16 110/69 99 %       Lab Results   Component Value Date/Time    WBC 7.7 05/14/2021 02:01 PM    HGB 13.3 05/14/2021 02:01 PM    HCT 40.7 05/14/2021 02:01 PM    PLATELET 319 15/42/2703 02:01 PM    MCV 84 05/14/2021 02:01 PM     Lab Results   Component Value Date/Time    Sodium 141 05/14/2021 02:01 PM    Potassium 3.9 05/14/2021 02:01 PM    Chloride 97 05/14/2021 02:01 PM    CO2 20 05/14/2021 02:01 PM    Anion gap 2 (L) 12/29/2020 02:25 PM    Glucose 88 05/14/2021 02:01 PM    BUN 21 05/14/2021 02:01 PM    Creatinine 1.02 (H) 05/14/2021 02:01 PM    BUN/Creatinine ratio 21 05/14/2021 02:01 PM    GFR est AA 73 05/14/2021 02:01 PM    GFR est non-AA 63 05/14/2021 02:01 PM    Calcium 9.0 05/14/2021 02:01 PM     No results found for: APTT, PTP, INR, INREXT  Lab Results   Component Value Date/Time    Glucose 88 05/14/2021 02:01 PM    Glucose (POC) 65 09/17/2021 09:35 AM     Physical Exam    Airway  Mallampati: II  TM Distance: 4 - 6 cm  Neck ROM: normal range of motion   Mouth opening: Normal     Cardiovascular    Rhythm: regular  Rate: normal         Dental  No notable dental hx       Pulmonary  Breath sounds clear to auscultation               Abdominal  GI exam deferred       Other Findings   Comments: Results for Sarwat Sauer (MRN [id number]) as of 1/4/2021 10:19    12/29/2020 14:25  WBC: 5.3  RBC: 4.25  HGB: 12.0  HCT: 38.3  MCV: 90.1  MCH: 28.2  MCHC: 31.3  RDW: 14.0  PLATELET: 302  MPV: 10.5    Results for Sarwat Sauer (MRN [id number]) as of 1/4/2021 10:19    12/29/2020 14:25  Sodium: 139  Potassium: 4.2  Chloride: 110 (H)  CO2: 27  Anion gap: 2 (L)  Glucose: 85  BUN: 13  Creatinine: 0.87  BUN/Creatinine ratio: 15  Calcium: 8.6  GFR est non-AA: >60  GFR est AA: >60    Results for Sarwat Sauer (MRN [id number]) as of 1/4/2021 10:19    12/29/2020 14:25  INR: 1.0  Prothrombin time: 13.1  aPTT: 25.3           Anesthetic Plan    ASA: 4  Anesthesia type: total IV anesthesia and MAC          Induction: Intravenous  Anesthetic plan and risks discussed with: Patient and Family

## 2021-09-22 ENCOUNTER — TELEPHONE (OUTPATIENT)
Dept: CARDIOLOGY CLINIC | Age: 51
End: 2021-09-22

## 2021-09-22 NOTE — TELEPHONE ENCOUNTER
Patient called and stated that Dr. Eb Fang instructed her to double up on her water pill because she was experiencing build up. Patient stated that Dr. Eb Fang told her to call and speak with the nurse about her progress since.         Phone: [phone]

## 2021-09-24 NOTE — TELEPHONE ENCOUNTER
Attempted to reach patient by telephone. A message was left for return call. Verified patient with two types of identifiers. We had a bad connection, asked patient to send [employer] message with how she has been feeling.

## 2021-09-27 RX ORDER — NITROGLYCERIN 0.4 MG/1
TABLET SUBLINGUAL
Qty: 100 TABLET | Refills: 3 | Status: SHIPPED | OUTPATIENT
Start: 2021-09-27 | End: 2022-08-15

## 2021-10-12 ENCOUNTER — TRANSCRIBE ORDER (OUTPATIENT)
Dept: SCHEDULING | Age: 51
End: 2021-10-12

## 2021-10-12 DIAGNOSIS — R10.9 ABDOMINAL PAIN: Primary | ICD-10-CM

## 2021-10-12 DIAGNOSIS — K21.9 GASTRIC REFLUX SYNDROME: ICD-10-CM

## 2021-10-13 ENCOUNTER — APPOINTMENT (OUTPATIENT)
Dept: CT IMAGING | Age: 51
End: 2021-10-13
Attending: STUDENT IN AN ORGANIZED HEALTH CARE EDUCATION/TRAINING PROGRAM
Payer: MEDICARE

## 2021-10-13 ENCOUNTER — HOSPITAL ENCOUNTER (EMERGENCY)
Age: 51
Discharge: HOME | End: 2021-10-14
Attending: STUDENT IN AN ORGANIZED HEALTH CARE EDUCATION/TRAINING PROGRAM
Payer: MEDICARE

## 2021-10-13 DIAGNOSIS — R56.9 SEIZURE (HCC): ICD-10-CM

## 2021-10-13 DIAGNOSIS — T79.7XXA SUBCUTANEOUS EMPHYSEMA, INITIAL ENCOUNTER (HCC): ICD-10-CM

## 2021-10-13 DIAGNOSIS — R47.01 EXPRESSIVE APHASIA: Primary | ICD-10-CM

## 2021-10-13 LAB
ALBUMIN SERPL-MCNC: 3.4 G/DL (ref 3.5–5)
ALBUMIN/GLOB SERPL: 1 {RATIO} (ref 1.1–2.2)
ALP SERPL-CCNC: 154 U/L (ref 45–117)
ALT SERPL-CCNC: 52 U/L (ref 12–78)
ANION GAP SERPL CALC-SCNC: 7 MMOL/L (ref 5–15)
AST SERPL W P-5'-P-CCNC: 48 U/L (ref 15–37)
BASOPHILS # BLD: 0 K/UL (ref 0–0.1)
BASOPHILS NFR BLD: 1 % (ref 0–1)
BILIRUB SERPL-MCNC: 0.2 MG/DL (ref 0.2–1)
BUN SERPL-MCNC: 19 MG/DL (ref 6–20)
BUN/CREAT SERPL: 23 (ref 12–20)
CA-I BLD-MCNC: 8.7 MG/DL (ref 8.5–10.1)
CHLORIDE SERPL-SCNC: 104 MMOL/L (ref 97–108)
CK SERPL-CCNC: 143 NG/ML (ref 26–192)
CO2 SERPL-SCNC: 27 MMOL/L (ref 21–32)
CREAT SERPL-MCNC: 0.82 MG/DL (ref 0.55–1.02)
DIFFERENTIAL METHOD BLD: ABNORMAL
EOSINOPHIL # BLD: 0.2 K/UL (ref 0–0.4)
EOSINOPHIL NFR BLD: 5 % (ref 0–7)
ERYTHROCYTE [DISTWIDTH] IN BLOOD BY AUTOMATED COUNT: 14.7 % (ref 11.5–14.5)
GLOBULIN SER CALC-MCNC: 3.5 G/DL (ref 2–4)
GLUCOSE SERPL-MCNC: 81 MG/DL (ref 65–100)
HCT VFR BLD AUTO: 37.7 % (ref 35–47)
HGB BLD-MCNC: 12.2 G/DL (ref 11.5–16)
IMM GRANULOCYTES # BLD AUTO: 0 K/UL (ref 0–0.04)
IMM GRANULOCYTES NFR BLD AUTO: 0 % (ref 0–0.5)
LYMPHOCYTES # BLD: 1.9 K/UL (ref 0.8–3.5)
LYMPHOCYTES NFR BLD: 38 % (ref 12–49)
MCH RBC QN AUTO: 27.6 PG (ref 26–34)
MCHC RBC AUTO-ENTMCNC: 32.4 G/DL (ref 30–36.5)
MCV RBC AUTO: 85.3 FL (ref 80–99)
MONOCYTES # BLD: 0.4 K/UL (ref 0–1)
MONOCYTES NFR BLD: 7 % (ref 5–13)
NEUTS SEG # BLD: 2.5 K/UL (ref 1.8–8)
NEUTS SEG NFR BLD: 49 % (ref 32–75)
NRBC # BLD: 0 K/UL (ref 0–0.01)
NRBC BLD-RTO: 0 PER 100 WBC
PLATELET # BLD AUTO: 170 K/UL (ref 150–400)
POTASSIUM SERPL-SCNC: 4.2 MMOL/L (ref 3.5–5.1)
PROT SERPL-MCNC: 6.9 G/DL (ref 6.4–8.2)
RBC # BLD AUTO: 4.42 M/UL (ref 3.8–5.2)
SODIUM SERPL-SCNC: 138 MMOL/L (ref 136–145)
WBC # BLD AUTO: 5.1 K/UL (ref 3.6–11)

## 2021-10-13 PROCEDURE — 80177 DRUG SCRN QUAN LEVETIRACETAM: CPT

## 2021-10-13 PROCEDURE — 96365 THER/PROPH/DIAG IV INF INIT: CPT

## 2021-10-13 PROCEDURE — 70450 CT HEAD/BRAIN W/O DYE: CPT

## 2021-10-13 PROCEDURE — 70486 CT MAXILLOFACIAL W/O DYE: CPT

## 2021-10-13 PROCEDURE — 96375 TX/PRO/DX INJ NEW DRUG ADDON: CPT

## 2021-10-13 PROCEDURE — 72125 CT NECK SPINE W/O DYE: CPT

## 2021-10-13 PROCEDURE — 36415 COLL VENOUS BLD VENIPUNCTURE: CPT

## 2021-10-13 PROCEDURE — 82550 ASSAY OF CK (CPK): CPT

## 2021-10-13 PROCEDURE — 74011250636 HC RX REV CODE- 250/636: Performed by: STUDENT IN AN ORGANIZED HEALTH CARE EDUCATION/TRAINING PROGRAM

## 2021-10-13 PROCEDURE — 80053 COMPREHEN METABOLIC PANEL: CPT

## 2021-10-13 PROCEDURE — 85025 COMPLETE CBC W/AUTO DIFF WBC: CPT

## 2021-10-13 PROCEDURE — 93005 ELECTROCARDIOGRAM TRACING: CPT

## 2021-10-13 PROCEDURE — 99284 EMERGENCY DEPT VISIT MOD MDM: CPT

## 2021-10-13 RX ORDER — ONDANSETRON 2 MG/ML
4 INJECTION INTRAMUSCULAR; INTRAVENOUS
Status: COMPLETED | OUTPATIENT
Start: 2021-10-13 | End: 2021-10-13

## 2021-10-13 RX ORDER — LEVETIRACETAM 15 MG/ML
1500 INJECTION INTRAVASCULAR ONCE
Status: COMPLETED | OUTPATIENT
Start: 2021-10-13 | End: 2021-10-13

## 2021-10-13 RX ADMIN — LEVETIRACETAM 1500 MG: 15 INJECTION INTRAVENOUS at 19:24

## 2021-10-13 RX ADMIN — ONDANSETRON 4 MG: 2 INJECTION INTRAMUSCULAR; INTRAVENOUS at 18:39

## 2021-10-13 NOTE — ED TRIAGE NOTES
Pt states that she had a seizure today. Hx of such. .. Take Keppra for them. . ( neurologist Stacy Silver)

## 2021-10-14 ENCOUNTER — APPOINTMENT (OUTPATIENT)
Dept: GENERAL RADIOLOGY | Age: 51
End: 2021-10-14
Attending: FAMILY MEDICINE
Payer: MEDICARE

## 2021-10-14 VITALS
DIASTOLIC BLOOD PRESSURE: 62 MMHG | SYSTOLIC BLOOD PRESSURE: 92 MMHG | BODY MASS INDEX: 29.73 KG/M2 | TEMPERATURE: 98.2 F | HEART RATE: 76 BPM | RESPIRATION RATE: 16 BRPM | HEIGHT: 66 IN | OXYGEN SATURATION: 95 % | WEIGHT: 185 LBS

## 2021-10-14 LAB
ATRIAL RATE: 78 BPM
BNP SERPL-MCNC: 7 PG/ML
CALCULATED P AXIS, ECG09: 33 DEGREES
CALCULATED R AXIS, ECG10: -7 DEGREES
DIAGNOSIS, 93000: NORMAL
P-R INTERVAL, ECG05: 178 MS
Q-T INTERVAL, ECG07: 392 MS
QRS DURATION, ECG06: 106 MS
QTC CALCULATION (BEZET), ECG08: 446 MS
VENTRICULAR RATE, ECG03: 78 BPM

## 2021-10-14 PROCEDURE — 71045 X-RAY EXAM CHEST 1 VIEW: CPT

## 2021-10-14 PROCEDURE — 36415 COLL VENOUS BLD VENIPUNCTURE: CPT

## 2021-10-14 PROCEDURE — 83880 ASSAY OF NATRIURETIC PEPTIDE: CPT

## 2021-10-14 NOTE — ED PROVIDER NOTES
EMERGENCY DEPARTMENT HISTORY AND PHYSICAL EXAM      Date: 10/13/2021  Patient Name: Darlyn Zhong    History of Presenting Illness     Chief Complaint   Patient presents with    Seizure       HPI: Darlyn Zhong, 46 y.o. female with a history of seizures on Keppra and Vimpat presenting today for a seizure. She has been compliant with her medications. Her father passed away on Sunday and she has not been able to sleep. Today she had a generalized tonic-clonic seizure at 10 AM and presented today for persistent word finding difficulties. Denies any weakness numbness in the upper or lower extremities. No facial droop. No dysarthria but she has had new onset expressive aphasia. She denies any trauma. Denies any headache, nausea, vomiting, chest pain, shortness breath, bone pain. PCP: Bright Nunez MD    Current Outpatient Medications   Medication Sig Dispense Refill    carvediloL (COREG) 6.25 mg tablet Take 1 Tablet by mouth two (2) times a day. 180 Tablet 3    losartan (COZAAR) 25 mg tablet Take 1 Tablet by mouth daily (after breakfast). 90 Tablet 3    spironolactone (ALDACTONE) 25 mg tablet Take 1 Tablet by mouth daily (after breakfast). 90 Tablet 3    bumetanide (BUMEX) 1 mg tablet Take 1 Tablet by mouth daily. 180 Tablet 3    TENS units and TENS electrodes (TENS UNIT AND ELECTRODES) Tens Unit, 1 ea, Dispense: 1 EA, Refills: 0, Easyscript, Maintenance, 0      azelastine (ASTELIN) 137 mcg (0.1 %) nasal spray USE 2 SPRAY(S) IN EACH NOSTRIL TWICE DAILY      azelastine (OPTIVAR) 0.05 % ophthalmic solution 1 Drop two (2) times a day.  augmented betamethasone dipropionate (DIPROLENE-AF) 0.05 % ointment       budesonide (PULMICORT) 0.5 mg/2 mL nbsp USE 1 VIAL IN NEBULIZER TWICE DAILY      citalopram (CELEXA) 20 mg tablet Take 20 mg by mouth daily.  docusate sodium (COLACE) 100 mg capsule Take 100 mg by mouth two (2) times a day.       Premarin 0.625 mg/gram vaginal cream       Emgality Pen 120 mg/mL injection       Ferrex 150 150 mg iron capsule       Linzess 145 mcg cap capsule Take 145 mcg by mouth daily.  metoclopramide HCl (REGLAN) 5 mg tablet       Saccharomyces boulardii (FLORASTOR) 250 mg capsule Take 250 mg by mouth two (2) times a day.  nitroglycerin (NITROSTAT) 0.4 mg SL tablet DISSOLVE 1 TABLET UNDER THE TONGUE EVERY 5 MINUTES AS  NEEDED FOR CHEST PAIN. MAX  OF 3 TABLETS IN 15 MINUTES. CALL 911 IF PAIN PERSISTS. 100 Tablet 3    cetirizine (ZyrTEC) 10 mg tablet Take 10 mg by mouth daily.  metFORMIN (GLUCOPHAGE) 500 mg tablet Take  by mouth two (2) times daily (with meals).  cloNIDine (CATAPRES) 0.1 mg/24 hr ptwk       ergocalciferol (ERGOCALCIFEROL) 1,250 mcg (50,000 unit) capsule TAKE 1 CAPSULE BY MOUTH ONCE A WEEK (Patient not taking: Reported on 10/20/2021)      fluticasone propionate (FLONASE) 50 mcg/actuation nasal spray       neomycin-polymyxin-hydrocortisone, buffered, (PEDIOTIC) 3.5-10,000-1 mg/mL-unit/mL-% otic suspension SHAKE WELL INSTILL 4 DROPS INTO AFFECTED EAR(S) 4 TIMES DAILY FOR 4 DAYS (Patient not taking: Reported on 10/20/2021)      BD Luer-Narda Syringe 3 mL 25 gauge x 1\" syrg USE FOR B12 INJECTIONS      RABEprazole (ACIPHEX) 20 mg TbEC Take 20 mg by mouth two (2) times a day.  rizatriptan (MAXALT) 5 mg tablet Take 5 mg by mouth once as needed for Migraine. May repeat in 2 hours if needed      alum-mag hydroxide-simeth-lidocaine-sucralfate Take 30 mL by mouth daily.  lacosamide (Vimpat) 100 mg tab tablet Take 100 mg by mouth two (2) times a day.  tiotropium (SPIRIVA) 18 mcg inhalation capsule Take 1 Cap by inhalation daily.  ondansetron hcl (ZOFRAN) 8 mg tablet Take 8 mg by mouth every eight (8) hours as needed.  gabapentin (NEURONTIN) 100 mg capsule Take 300 mg by mouth three (3) times daily.  levETIRAcetam (KEPPRA) 750 mg tablet 1,500 mg two (2) times a day.   1    montelukast (SINGULAIR) 10 mg tablet Take 10 mg by mouth daily. 3    nortriptyline (PAMELOR) 50 mg capsule Take 100 mg by mouth nightly. 5    potassium chloride (K-DUR, KLOR-CON) 20 mEq tablet Take 20 mEq by mouth daily. 4    sucralfate (CARAFATE) 100 mg/mL suspension Take 1 g by mouth four (4) times daily.  topiramate (TOPAMAX) 100 mg tablet Take 100 mg by mouth two (2) times a day. 5    VENTOLIN HFA 90 mcg/actuation inhaler Take 2 Puffs by inhalation every four (4) hours as needed. 4    SYMBICORT 80-4.5 mcg/actuation HFAA INHALE 2 PUFFS BY MOUTH TWICE DAILY  4    dexlansoprazole (DEXILANT) 60 mg CpDB capsule (delayed release) Take 60 mg by mouth daily.  dicyclomine (BENTYL) 20 mg tablet Take 20 mg by mouth every six (6) hours.  estradiol (ESTRACE) 0.01 % (0.1 mg/gram) vaginal cream   2    famotidine (PEPCID) 40 mg tablet Take 40 mg by mouth nightly. 2    levothyroxine (SYNTHROID) 50 mcg tablet Take 50 mcg by mouth Daily (before breakfast).  cyanocobalamin (VITAMIN B-12) 1,000 mcg/mL injection 1,000 mcg by IntraMUSCular route every thirty (30) days.  Calcium-Cholecalciferol, D3, (CALCIUM 600 WITH VITAMIN D3) 600 mg(1,500mg) -400 unit cap Take 2 Tablets by mouth daily.  multivitamin (ONE A DAY) tablet Take 1 Tab by mouth daily.          Medical History   I reviewed the medical, surgical, family, and social history, as well as allergies:    Past Medical History:  Past Medical History:   Diagnosis Date    Arthritis     pt states knees and back    Cardiomyopathy ([address] 2005    EF 35%, echo in 2021 normal EF     Colon polyp     Dizzy spells     GERD (gastroesophageal reflux disease)     Heart failure ([address] 2005    now combined systolic, diastolic, seen at [facility], [facility] 2021- Dr Daniel Murrieta Irritable bowel syndrome     Knee pain, bilateral     Migraines 2011    2-3 week    Neuropathy     pt states of both feet    Seizures [facility]     petite mals last seizure 2-3 months ago    Thyroid disease     hypothyroid       Past Surgical History:  Past Surgical History:   Procedure Laterality Date    HX CHOLECYSTECTOMY  5/2006    HX COLONOSCOPY      HX ENDOSCOPY      HX GASTRIC BYPASS  2013    HX HYSTERECTOMY  2007    HX KNEE ARTHROSCOPY Right 05/2021    HX ORTHOPAEDIC Bilateral 2013    CTR    HX ORTHOPAEDIC      right knee procedure 2021    HX OTHER SURGICAL      left axillary lymph node biopsy    [facility] ABDOM[facility] SUR[facility] PROC UNLISTED  2008    cholecystectomy    [facility] APPENDECTOM[facility]      [facility] CARDIAC SUR[facility] PROCEDURE UNLIST      Loop recorder implanted july 2020    [facility] LAP,CHOLECYSTECTOMY      [facility] PART REMOVAL COLON W ANASTOMOSIS      [facility] RESECT SMALL INTEST W ENTEROSTOMY      [facility] STOMACH SURG[facility] PROCEDURE UNLISTED         Family History:  Family History   Problem Relation Age of Onset    Hypertension Mother     Heart Disease Mother     Cancer Father         stomach cancer    Hypertension Father     No Known Problems Brother     Asthma Daughter    24 \[facility]"" Asthma Son     Asthma Son     Breast Cancer Paternal Aunt         age unknown       Social History:  Social History     Tobacco Use    Smoking status: Never Smoker    Smokeless tobacco: Never Used   Vaping Use    Vaping Use: Never used   Substance Use Topics    Alcohol use: No    Drug use: No       Allergies: Allergies   Allergen Reactions    Acetaminophen Other (comments)     Advised not to take due to Liver Issues    Morphine Other (comments)     SOB/Chest Pressure - in hosp for a week. States DILAUDID Ok    Shellfish Derived Anaphylaxis    Codeine Other (comments)     SOB chest pain    Sulfa (Sulfonamide Antibiotics) Rash     itching       Review of Systems     Review of Systems   Constitutional: Negative for chills and fever. HENT: Negative for congestion, rhinorrhea and sore throat. Eyes: Negative. Respiratory: Negative for cough and shortness of breath. Cardiovascular: Negative for chest pain and leg swelling. Gastrointestinal: Negative for abdominal pain and vomiting. Endocrine: Negative. Genitourinary: Negative for dysuria and hematuria. Musculoskeletal: Negative for back pain and myalgias. Skin: Negative for rash and wound. Allergic/Immunologic: Negative. Neurological: Positive for seizures. Negative for light-headedness and numbness. Expressive aphasia   Hematological: Negative. Psychiatric/Behavioral: Negative for agitation and confusion. Physical Exam and Vital Signs   Vital Signs - Reviewed the patient's vital signs. No data found. Physical Exam:    GENERAL: awake, alert, cooperative, not in distress  HEENT:  * Pupils equal, EOMI  * Head atraumatic  *No tenderness to facial palpation or C-spine tenderness  CV:  * regular rhythm  * warm and perfused extremities bilaterally  PULMONARY: Good air movement, no wheezes or crackles  ABDOMEN: soft, not distended, no guarding, not tenderness to palpation  : No suprapubic tenderness  EXTREMITIES/BACK: warm and perfused, no tenderness, no edema  SKIN: no rashes or signs of trauma  NEURO:   * GCS = 15 (E=4, V=5, M=6)  * Awake, alert, oriented x 3  * Expressive aphasia noted  * CNs II-XII intact  * Strength 5/5 in all extremities  * Sensory exam grossly normal  * No pronator drift or dysmetria    Medical Decision Making and ED Course   - I am the first and primary provider for this patient and am the primary provider of record. - I reviewed the vital signs, available nursing notes, past medical history, past surgical history, family history and social history. - Initial assessment performed. The patients presenting problems have been discussed, and the staff are in agreement with the care plan formulated and outlined with them. I have encouraged them to ask questions as they arise throughout their visit.   - Available medical records, nursing notes, old EKGs, and EMS run sheets (if patient was EMS transported) were reviewed    MDM: Patient is a 46 y.o. female presenting for seizures. Vitals reveal no abnormalities and physical exam reveals no abnormalities. Based on the history, physical exam, risk factors, and vitals signs, I favor the following differential diagnoses: Stress-induced seizure, medication noncompliance, electrolyte imbalances, ICH, prolonged postictal phase, CVA. Results     Labs:  No results found for this or any previous visit (from the past 12 hour(s)). Radiologic Studies:  CT Results  (Last 48 hours)    None        CXR Results  (Last 48 hours)    None          Medications ordered:  Medications   ondansetron (ZOFRAN) injection 4 mg (4 mg IntraVENous Given 10/13/21 1839)   levETIRAcetam (KEPPRA) 1500 mg in saline (iso-osm) 100 ml IVPB (0 mg IntraVENous IV Completed 10/13/21 1945)        ED Course     ED Course:     ED Course as of 11/11/21 1314   Wed Oct 13, 2021   2150 CBC does not show any evidence of acute process. Leukocytosis not present to suggest infection. Hemoglobin at baseline without evidence of acute anemia. Platelet count is normal.    Electrolytes are within range. Creatinine is not elevated more than baseline range making ROSE unlikely. No significant transaminitis noted. Normal bilirubin. CPK does not reflect any significant rhabdomyolysis. [SS]      ED Course User Index  [SS] Rossy Cabrera MD       Reassessment / Disposition / Discussion:    Labs are largely within normal limits however CT of the head showed fairly extensive infiltration of air throughout tissues under the right skull base and around the spine and spinal canal.  On reevaluation, the patient says that she had head trauma in July but no recent head trauma. She did not have any tenderness or crepitus on repeat examination of the head and neck. Will order CT face and CT C-spine. The patient still experiences expressive aphasia. Will sign out to oncoming provider Dr. Bridgett Miguel pending the CAT scans.   The patient may need transfer for extensive emphysema to neurosurgery at [facility]. It was also discussed that the patient has expressive aphasia and might need to be admitted for further evaluation for prolonged postictal phase if she does not improve. Final Disposition     Disposition: Condition stable    Signed out to Dr. Carloz Burns. Diagnosis     Clinical Impression:   1. Expressive aphasia    2. Subcutaneous emphysema, initial encounter ([facility]    3. Seizure ([facility]        Attestations:    Jc Dennis MD    Please note that this dictation was completed with [employer], the computer voice recognition software. Quite often unanticipated grammatical, syntax, homophones, and other interpretive errors are inadvertently transcribed by the computer software. Please disregard these errors. Please excuse any errors that have escaped final proofreading. Thank you.

## 2021-10-14 NOTE — DISCHARGE INSTRUCTIONS
Thank you! Thank you for allowing me to care for you in the emergency department. I sincerely hope that you are satisfied with your visit today. It is my goal to provide you with excellent care. Below you will find a list of your labs and imaging from your visit today. Should you have any questions regarding these results please do not hesitate to call the emergency department. Labs -     Recent Results (from the past 12 hour(s))   CBC WITH AUTOMATED DIFF    Collection Time: 10/13/21  5:45 PM   Result Value Ref Range    WBC 5.1 3.6 - 11.0 K/uL    RBC 4.42 3.80 - 5.20 M/uL    HGB 12.2 11.5 - 16.0 g/dL    HCT 37.7 35.0 - 47.0 %    MCV 85.3 80.0 - 99.0 FL    MCH 27.6 26.0 - 34.0 PG    MCHC 32.4 30.0 - 36.5 g/dL    RDW 14.7 (H) 11.5 - 14.5 %    PLATELET 364 838 - 515 K/uL    NRBC 0.0 0.0  WBC    ABSOLUTE NRBC 0.00 0.00 - 0.01 K/uL    NEUTROPHILS 49 32 - 75 %    LYMPHOCYTES 38 12 - 49 %    MONOCYTES 7 5 - 13 %    EOSINOPHILS 5 0 - 7 %    BASOPHILS 1 0 - 1 %    IMMATURE GRANULOCYTES 0 0 - 0.5 %    ABS. NEUTROPHILS 2.5 1.8 - 8.0 K/UL    ABS. LYMPHOCYTES 1.9 0.8 - 3.5 K/UL    ABS. MONOCYTES 0.4 0.0 - 1.0 K/UL    ABS. EOSINOPHILS 0.2 0.0 - 0.4 K/UL    ABS. BASOPHILS 0.0 0.0 - 0.1 K/UL    ABS. IMM. GRANS. 0.0 0.00 - 0.04 K/UL    DF AUTOMATED     METABOLIC PANEL, COMPREHENSIVE    Collection Time: 10/13/21  5:45 PM   Result Value Ref Range    Sodium 138 136 - 145 mmol/L    Potassium 4.2 3.5 - 5.1 mmol/L    Chloride 104 97 - 108 mmol/L    CO2 27 21 - 32 mmol/L    Anion gap 7 5 - 15 mmol/L    Glucose 81 65 - 100 mg/dL    BUN 19 6 - 20 mg/dL    Creatinine 0.82 0.55 - 1.02 mg/dL    BUN/Creatinine ratio 23 (H) 12 - 20      GFR est AA >60 >60 ml/min/1.73m2    GFR est non-AA >60 >60 ml/min/1.73m2    Calcium 8.7 8.5 - 10.1 mg/dL    Bilirubin, total 0.2 0.2 - 1.0 mg/dL    AST (SGOT) 48 (H) 15 - 37 U/L    ALT (SGPT) 52 12 - 78 U/L    Alk.  phosphatase 154 (H) 45 - 117 U/L    Protein, total 6.9 6.4 - 8.2 g/dL    Albumin 3.4 (L) 3.5 - 5.0 g/dL    Globulin 3.5 2.0 - 4.0 g/dL    A-G Ratio 1.0 (L) 1.1 - 2.2     CK W/ REFLX CKMB    Collection Time: 10/13/21  5:45 PM   Result Value Ref Range    .0 26 - 192 ng/mL   NT-PRO BNP    Collection Time: 10/14/21 12:30 AM   Result Value Ref Range    NT pro-BNP 7 <125 pg/mL       Radiologic Studies -   XR CHEST PORT   Final Result   Left lower lung atelectasis or infiltrate. No evident pneumothorax   or pneumomediastinum. CT MAXILLOFACIAL WO CONT   Final Result   FINDINGS: IMPRESSION:      No acute fracture or TMJ dislocation. Mucosal thickening of the right maxillary sinus. No sinus air-fluid level to   suggest acute disease. The globes and the lenses are symmetric and grossly intact. There is air in the soft tissues of the right face, C1-C2 spinal soft tissues,   bilateral cavernous sinuses, subclavicular thoracic inlet. No definite source is   identified. Etiology may be vascular, iatrogenic. Correlate clinically. Enlarged left thyroid lobe with 2 cm nodule. Recommend nonemergent thyroid   ultrasound. CT SPINE CERV WO CONT   Final Result   1. No acute bony findings. 2. Air in the soft tissues, possibly from vascular/iatrogenic etiology. Correlate clinically. 3. Bilateral upper lobe pulmonary airspace pneumonia or edema. CT HEAD WO CONT   Final Result   Normal exam of the brain   There is fairly extensive air infiltration throughout the soft tissues below the   right skull base, also around the spine and in the spinal canal. No spinal or   facial bone fracture seen on the limited views. Consider formal study of the   structures, and assess where all this air is coming from        CT Results  (Last 48 hours)                 10/13/21 2150  CT MAXILLOFACIAL WO CONT Final result    Impression:  FINDINGS: IMPRESSION:       No acute fracture or TMJ dislocation. Mucosal thickening of the right maxillary sinus.  No sinus air-fluid level to   suggest acute disease. The globes and the lenses are symmetric and grossly intact. There is air in the soft tissues of the right face, C1-C2 spinal soft tissues,   bilateral cavernous sinuses, subclavicular thoracic inlet. No definite source is   identified. Etiology may be vascular, iatrogenic. Correlate clinically. Enlarged left thyroid lobe with 2 cm nodule. Recommend nonemergent thyroid   ultrasound. Narrative:  Subcutaneous emphysema. TECHNIQUE: Axial imaging of the facial bones, with multiplanar reformatting. Dose reduction: All CT scans at this facility are performed using dose reduction   optimization techniques as appropriate to a performed exam including the   following: Automated exposure control, adjustments of the mA and/or kV according   to patient's size, or use of iterative reconstruction technique. 10/13/21 2150  CT SPINE CERV WO CONT Final result    Impression:  1. No acute bony findings. 2. Air in the soft tissues, possibly from vascular/iatrogenic etiology. Correlate clinically. 3. Bilateral upper lobe pulmonary airspace pneumonia or edema. Narrative:  Subcutaneous emphysema. No comparison. Technique: Axial imaging cervical spine with sagittal and coronal reformatting   and 3-D volume rendering performed by the technologist at the console. Dose reduction: All CT scans at this facility are performed using dose reduction   optimization techniques as appropriate to a performed exam including the   following: Automated exposure control, adjustments of the mA and/or kV according   to patient's size, or use of iterative reconstruction technique       Findings: No listhesis. Vertebral body heights and disc spaces maintained. No   acute fracture, or jumped or perched facet. Lateral masses symmetric   bilaterally. Odontoid intact. No prevertebral soft tissue swelling. Lung apices   demonstrate groundglass airspace disease.  There is air in the deep soft tissues   of the face, subclavicular thoracic inlet, left C1 vertebral foramen, cavernous   carotid region. Enlarged heterogeneous left thyroid lobe. 10/13/21 1945  CT HEAD WO CONT Final result    Impression:  Normal exam of the brain   There is fairly extensive air infiltration throughout the soft tissues below the   right skull base, also around the spine and in the spinal canal. No spinal or   facial bone fracture seen on the limited views. Consider formal study of the   structures, and assess where all this air is coming from       Narrative:  CT dose reduction was achieved through use of a standardized protocol tailored   for this examination and automatic exposure control for dose modulation. CT Head       History:        The sulcal pattern is symmetric. No abnormality is seen in gray or white matter. The ventricles are symmetric and normal in size. There is no midline shift or   mass effect, or evidence of hemorrhage. Bone windows show no fracture. CXR Results  (Last 48 hours)                 10/14/21 0017  XR CHEST PORT Final result    Impression:  Left lower lung atelectasis or infiltrate. No evident pneumothorax   or pneumomediastinum. Narrative:  Abnormal CT. Comparison chest x-ray 4/4/2020 and CT of the cervical spine 10/13/2021. Findings: Single frontal view of the chest. Normal cardiomediastinal silhouette. No vascular congestion or pulmonary edema. The lungs are well-inflated. Left   lower lung atelectasis or infiltrate. No lobar consolidation, pleural effusion,   pneumothorax. No free air under the hemidiaphragms. Bones grossly intact. Cardiac loop recorder overlies the mid inferior thorax. If you feel that you have not received excellent quality care or timely care, please ask to speak to the nurse manager. Please choose us in the future for your continued health care needs. ------------------------------------------------------------------------------------------------------------  The exam and treatment you received in the Emergency Department were for an urgent problem and are not intended as complete care. It is important that you follow-up with a doctor, nurse practitioner, or physician assistant to:  (1) confirm your diagnosis,  (2) re-evaluation of changes in your illness and treatment, and  (3) for ongoing care. If your symptoms become worse or you do not improve as expected and you are unable to reach your usual health care provider, you should return to the Emergency Department. We are available 24 hours a day. Please take your discharge instructions with you when you go to your follow-up appointment. If you have any problem arranging a follow-up appointment, contact the Emergency Department immediately. If a prescription has been provided, please have it filled as soon as possible to prevent a delay in treatment. Read the entire medication instruction sheet provided to you by the pharmacy. If you have any questions or reservations about taking the medication due to side effects or interactions with other medications, please call your primary care physician or contact the ER to speak with the charge nurse. Make an appointment with your family doctor or the physician you were referred to for follow-up of this visit as instructed on your discharge paperwork, as this is a mandatory follow-up. Return to the ER if you are unable to be seen or if you are unable to be seen in a timely manner. If you have any problem arranging the follow-up visit, contact the Emergency Department immediately.

## 2021-10-16 LAB — LEVETIRACETAM SERPL-MCNC: 47.9 UG/ML (ref 10–40)

## 2021-10-19 ENCOUNTER — TELEPHONE (OUTPATIENT)
Dept: HEMATOLOGY | Age: 51
End: 2021-10-19

## 2021-10-19 NOTE — TELEPHONE ENCOUNTER
Called pt to reschedule 10/20/21 appt, no answer lvm to call the office back in the morning at [phone] to do so

## 2021-10-20 ENCOUNTER — OFFICE VISIT (OUTPATIENT)
Dept: HEMATOLOGY | Age: 51
End: 2021-10-20
Payer: MEDICARE

## 2021-10-20 VITALS
WEIGHT: 191.6 LBS | HEIGHT: 66 IN | OXYGEN SATURATION: 96 % | SYSTOLIC BLOOD PRESSURE: 88 MMHG | DIASTOLIC BLOOD PRESSURE: 58 MMHG | BODY MASS INDEX: 30.79 KG/M2 | RESPIRATION RATE: 16 BRPM | TEMPERATURE: 97.3 F | HEART RATE: 74 BPM

## 2021-10-20 DIAGNOSIS — E04.1 THYROID NODULE: ICD-10-CM

## 2021-10-20 DIAGNOSIS — R74.8 ELEVATED ALKALINE PHOSPHATASE LEVEL: ICD-10-CM

## 2021-10-20 DIAGNOSIS — R74.8 ELEVATED LIVER ENZYMES: Primary | ICD-10-CM

## 2021-10-20 LAB — TSH SERPL DL<=0.05 MIU/L-ACNC: 0.82 UIU/ML (ref 0.36–3.74)

## 2021-10-20 PROCEDURE — G9899 SCRN MAM PERF RSLTS DOC: HCPCS | Performed by: NURSE PRACTITIONER

## 2021-10-20 PROCEDURE — G8432 DEP SCR NOT DOC, RNG: HCPCS | Performed by: NURSE PRACTITIONER

## 2021-10-20 PROCEDURE — 99214 OFFICE O/P EST MOD 30 MIN: CPT | Performed by: NURSE PRACTITIONER

## 2021-10-20 PROCEDURE — G8417 CALC BMI ABV UP PARAM F/U: HCPCS | Performed by: NURSE PRACTITIONER

## 2021-10-20 PROCEDURE — 91200 LIVER ELASTOGRAPHY: CPT | Performed by: NURSE PRACTITIONER

## 2021-10-20 PROCEDURE — G8427 DOCREV CUR MEDS BY ELIG CLIN: HCPCS | Performed by: NURSE PRACTITIONER

## 2021-10-20 PROCEDURE — 3017F COLORECTAL CA SCREEN DOC REV: CPT | Performed by: NURSE PRACTITIONER

## 2021-10-20 RX ORDER — METOCLOPRAMIDE 5 MG/1
TABLET ORAL
COMMUNITY
Start: 2021-10-11 | End: 2022-09-28

## 2021-10-20 RX ORDER — SAME BUTANEDISULFONATE/BETAINE 400-600 MG
250 POWDER IN PACKET (EA) ORAL 2 TIMES DAILY
COMMUNITY
End: 2022-09-28

## 2021-10-20 RX ORDER — CITALOPRAM 20 MG/1
20 TABLET, FILM COATED ORAL DAILY
COMMUNITY
Start: 2021-08-29 | End: 2022-09-28

## 2021-10-20 RX ORDER — LINACLOTIDE 145 UG/1
145 CAPSULE, GELATIN COATED ORAL DAILY
COMMUNITY
Start: 2021-08-11

## 2021-10-20 RX ORDER — AZELASTINE 1 MG/ML
SPRAY, METERED NASAL
COMMUNITY
Start: 2021-08-24 | End: 2022-09-28

## 2021-10-20 RX ORDER — BETAMETHASONE DIPROPIONATE 0.5 MG/G
OINTMENT TOPICAL
COMMUNITY
Start: 2021-08-06 | End: 2022-09-28

## 2021-10-20 RX ORDER — CONJUGATED ESTROGENS 0.62 MG/G
CREAM VAGINAL
COMMUNITY
Start: 2021-09-29 | End: 2022-09-28

## 2021-10-20 RX ORDER — BUDESONIDE 0.5 MG/2ML
INHALANT ORAL
COMMUNITY
Start: 2021-08-27

## 2021-10-20 RX ORDER — GALCANEZUMAB 120 MG/ML
INJECTION, SOLUTION SUBCUTANEOUS
COMMUNITY
Start: 2021-09-16 | End: 2022-03-08

## 2021-10-20 RX ORDER — DOCUSATE SODIUM 100 MG/1
100 CAPSULE, LIQUID FILLED ORAL 2 TIMES DAILY
COMMUNITY

## 2021-10-20 RX ORDER — AZELASTINE HYDROCHLORIDE 0.5 MG/ML
1 SOLUTION/ DROPS OPHTHALMIC 2 TIMES DAILY
COMMUNITY
End: 2022-06-07

## 2021-10-20 RX ORDER — IRON POLYSACCHARIDE COMPLEX 150 MG
CAPSULE ORAL
COMMUNITY
Start: 2021-10-13

## 2021-10-20 NOTE — PROGRESS NOTES
Aida Ch MD, MD Karne Woodall PA-C Daphne Eagles, [facility]-BC     April S Karan, [facility]   Mariela Rogerio, FNP-C    Rosana Wolf, [facility]       Johanne Coyle [facility] 136    at [address]    [address] [place] [address], [facility] Emmanuel Dsouza  22.    [phone]    FAX: [address] [place] [address], [address]    [phone]    FAX: [phone]       Patient Care Team:  Lorraine Spence MD as PCP - General (Family Medicine)  Shade Kumar MD (Breast Surgery)  Rickie Ceron MD as Referring Provider (Obstetrics & Gynecology)  Shade Kumar MD (Breast Surgery)  Greg Wset MD (Cardiology)      Problem List  Date Reviewed: 10/21/2021        Codes Class Noted    Hypotension due to drugs ICD-10-CM: I95.2  ICD-9-CM: 458.8, E947.9  10/21/2021        Chronic combined systolic and diastolic congestive heart failure ([facility] ICD-10-CM: I50.42  ICD-9-CM: 428.42, 428.0  4/10/2021        Atypical chest pain ICD-10-CM: R07.89  ICD-9-CM: 786.59  4/10/2021        Regional wall motion abnormality of heart ICD-10-CM: R93.1  ICD-9-CM: 793.2  4/10/2021        Vasovagal syncope ICD-10-CM: R55  ICD-9-CM: 780.2  4/10/2021        NSVT (nonsustained ventricular tachycardia) ([facility] ICD-10-CM: I47.2  ICD-9-CM: 427.1  4/10/2021        Right knee pain ICD-10-CM: M25.561  ICD-9-CM: 719.46  1/4/2021        Generalized abdominal pain ICD-10-CM: R10.84  ICD-9-CM: 789.07  7/21/2020        Diarrhea ICD-10-CM: R19.7  ICD-9-CM: 787.91  7/21/2020        H/O Clostridium difficile infection ICD-10-CM: Z86.19  ICD-9-CM: V12.09  7/21/2020        History of bowel resection ICD-10-CM: Z90.49  ICD-9-CM: V15.89  4/21/2020 Elevated liver enzymes ICD-10-CM: R74.8  ICD-9-CM: 790.5  7/26/2019        NICM (nonischemic cardiomyopathy) ([facility] ICD-10-CM: I42.8  ICD-9-CM: 425.4  7/26/2019        Hypothyroidism ICD-10-CM: E03.9  ICD-9-CM: 244.9  7/26/2019        H/O gastric bypass ICD-10-CM: Z98.84  ICD-9-CM: V45.86  7/26/2019        History of cholecystectomy ICD-10-CM: Z90.49  ICD-9-CM: V45.79  7/26/2019        History of appendectomy ICD-10-CM: Z90.49  ICD-9-CM: V45.89  7/26/2019        Neuropathy ICD-10-CM: G62.9  ICD-9-CM: 355.9  7/26/2019        Migraine headache ICD-10-CM: L62.222  ICD-9-CM: 346.90  7/26/2019        Seizures ([facility] ICD-10-CM: R56.9  ICD-9-CM: 780.39  7/26/2019        Fibrocystic breast changes of both breasts ICD-10-CM: N60.11, N60.12  ICD-9-CM: 610.1  7/8/2015        Heart failure ([facility] ICD-10-CM: I50.9  ICD-9-CM: 428.9  2005    Overview Signed 4/10/2021 12:43 PM by Yana Leavitt MD     now combined systolic, diastolic, seen at [facility], [facility] 2021- Dr Agnes Retana returns to the [address] for management of elevated liver enzymes. The active problem list, all pertinent past medical history, medications, radiologic findings and laboratory findings related to the liver disorder were reviewed with the patient. The patient is a 46 y.o. Black female who was found to have abnormalities in liver enzymes in 2018. There is a history of cardiomyopathy with ECHO at [facility]. The results are not known. Serologic evaluation for markers of chronic liver disease were negative. Imaging of the liver from 8/2019 demonstrates a normal liver, no ductal dilatation or mass. The patient underwent a liver biopsy in 12/2020 which demonstrated centrolobular sinusoidal dilation with no inflammaiton, steatosis or fibrosis. A single granuloma was present.      Since the last office visit the patient had an episode of expressive aphasia 10/2021 which prompted a visit the [facility] ER. A head CT was performed and was remarkable for air infiltration throughout the tissues on the right skull base along with a thyroid nodule. A neurology appointment is scheduled for today. The patient has the following symptoms which are thought to be due to the liver disease:  fatigue, nausea, and diffuse abdominal pain. The patient has not experienced the following symptoms:  Itching or swelling of the abdomen. The patient has Mild limitations in functional activities which can be attributed to other medical problems that are not related to the liver disease. ASSESSMENT AND PLAN:  Elevated liver enzymes  Intermittent elevation in liver transaminases and ALP. Serologic testing for causes of chronic liver disease were negative. A liver biopsy in 12/2020 demonstrated centrolobular sinusoidal dilation and a granuloma but no steatosis, no inflammaiton and no fibrosis. Assessment of liver fibrosis was performed with Fibroscan in the office today. The result was 3.7 kPa which correlates with no fibrosis. Have performed laboratory testing to monitor liver function and degree of liver injury. This included BMP, hepatic panel, and CBC with platelet count. Laboratory testing from 10/13/2021 reviewed in detail. The liver transaminases are elevated. The ALP is elevated. The liver function and platelet count are normal.     History of Cardiomyopathy  The patient was being followed by cardiology at [facility] for cardiomyopathy. ECHO performed 1/2021 demonstrated an LVEF of 50-55%. Mild left ventricular diastolic dysfunction. Mildly dilated left Atrium. She had a follow up with Dr. Khurram Matias which felt the ECHO was normal and cardiomyopathy has resolved with medical treatment. Hepatic granuloma  This has little clinical significance. The mild elevation in ALP may be due to the granuloma.       Hepatic granulomas are in most cases benign and do not cause liver injury. Hypotension  The SBP is in the 80's today and she is lethargic. Advised she remove the clonidine patch and follow-up with cardiology regarding hypotension. Screening for Hepatocellular Carcinoma  HCC screening is not necessary since the patient has no evidence of cirrhosis. Treatment of other medical problems in patients with chronic liver disease  There are no contraindications for the patient to take most medications that are necessary for treatment of other medical issues. Counseling for alcohol in patients with chronic liver disease  The patient was counseled regarding alcohol consumption and the effect of alcohol on chronic liver disease. Patients who have undergone obesity surgery are at much greater risk to develop alcoholic liver injury. She has never consumed significant amounts of alcohol. Vaccinations   Vaccination for viral hepatitis A and B is recommended since the patient has no serologic evidence of previous exposure or vaccination with immunity. Routine vaccinations against other bacterial and viral agents can be performed as indicated. Annual flu vaccination should be administered if indicated. ALLERGIES  Allergies   Allergen Reactions    Acetaminophen Other (comments)     Advised not to take due to Liver Issues    Morphine Other (comments)     SOB/Chest Pressure - in hosp for a week. States DILAUDID Ok    Shellfish Derived Anaphylaxis    Codeine Other (comments)     SOB chest pain    Sulfa (Sulfonamide Antibiotics) Rash     itching       MEDICATIONS  Current Outpatient Medications   Medication Sig    TENS units and TENS electrodes (TENS UNIT AND ELECTRODES) Tens Unit, 1 ea, Dispense: 1 EA, Refills: 0, Easyscript, Maintenance, 0    azelastine (ASTELIN) 137 mcg (0.1 %) nasal spray USE 2 SPRAY(S) IN EACH NOSTRIL TWICE DAILY    azelastine (OPTIVAR) 0.05 % ophthalmic solution 1 Drop two (2) times a day.     budesonide (PULMICORT) 0.5 mg/2 mL nbs USE 1 VIAL IN NEBULIZER TWICE DAILY    docusate sodium (COLACE) 100 mg capsule Take 100 mg by mouth two (2) times a day.  Premarin 0.625 mg/gram vaginal cream     Emgality Pen 120 mg/mL injection     Ferrex 150 150 mg iron capsule     Linzess 145 mcg cap capsule Take 145 mcg by mouth daily.  Saccharomyces boulardii (FLORASTOR) 250 mg capsule Take 250 mg by mouth two (2) times a day.  nitroglycerin (NITROSTAT) 0.4 mg SL tablet DISSOLVE 1 TABLET UNDER THE TONGUE EVERY 5 MINUTES AS  NEEDED FOR CHEST PAIN. MAX  OF 3 TABLETS IN 15 MINUTES. CALL 911 IF PAIN PERSISTS.  cetirizine (ZyrTEC) 10 mg tablet Take 10 mg by mouth daily.  metFORMIN (GLUCOPHAGE) 500 mg tablet Take  by mouth two (2) times daily (with meals).  bumetanide (BUMEX) 1 mg tablet Take 1 Tab by mouth daily.  cloNIDine (CATAPRES) 0.1 mg/24 hr ptwk     fluticasone propionate (FLONASE) 50 mcg/actuation nasal spray     BD Luer-Narda Syringe 3 mL 25 gauge x 1\" syrg USE FOR B12 INJECTIONS    RABEprazole (ACIPHEX) 20 mg TbEC Take 20 mg by mouth two (2) times a day.  rizatriptan (MAXALT) 5 mg tablet Take 5 mg by mouth once as needed for Migraine. May repeat in 2 hours if needed    alum-mag hydroxide-simeth-lidocaine-sucralfate Take 30 mL by mouth daily.  lacosamide (Vimpat) 100 mg tab tablet Take 100 mg by mouth two (2) times a day.  tiotropium (SPIRIVA) 18 mcg inhalation capsule Take 1 Cap by inhalation daily.  ondansetron hcl (ZOFRAN) 8 mg tablet Take 8 mg by mouth every eight (8) hours as needed.  gabapentin (NEURONTIN) 100 mg capsule Take 300 mg by mouth three (3) times daily.  levETIRAcetam (KEPPRA) 750 mg tablet 1,500 mg two (2) times a day.  montelukast (SINGULAIR) 10 mg tablet Take 10 mg by mouth daily.  nortriptyline (PAMELOR) 50 mg capsule Take 100 mg by mouth nightly.  potassium chloride (K-DUR, KLOR-CON) 20 mEq tablet Take 20 mEq by mouth daily.     sucralfate (CARAFATE) 100 mg/mL suspension Take 1 g by mouth four (4) times daily.  topiramate (TOPAMAX) 100 mg tablet Take 100 mg by mouth two (2) times a day.  carvedilol (COREG) 6.25 mg tablet Take 6.25 mg by mouth two (2) times a day.  VENTOLIN HFA 90 mcg/actuation inhaler Take 2 Puffs by inhalation every four (4) hours as needed.  SYMBICORT 80-4.5 mcg/actuation HFAA INHALE 2 PUFFS BY MOUTH TWICE DAILY    dexlansoprazole (DEXILANT) 60 mg CpDB capsule (delayed release) Take 60 mg by mouth daily.  dicyclomine (BENTYL) 20 mg tablet Take 20 mg by mouth every six (6) hours.  estradiol (ESTRACE) 0.01 % (0.1 mg/gram) vaginal cream     famotidine (PEPCID) 40 mg tablet Take 40 mg by mouth nightly.  losartan (COZAAR) 25 mg tablet Take 25 mg by mouth daily (after breakfast).  spironolactone (ALDACTONE) 25 mg tablet Take 25 mg by mouth daily (after breakfast).  levothyroxine (SYNTHROID) 50 mcg tablet Take 50 mcg by mouth Daily (before breakfast).  cyanocobalamin (VITAMIN B-12) 1,000 mcg/mL injection 1,000 mcg by IntraMUSCular route every thirty (30) days.  Calcium-Cholecalciferol, D3, (CALCIUM 600 WITH VITAMIN D3) 600 mg(1,500mg) -400 unit cap Take 2 Tablets by mouth daily.  multivitamin (ONE A DAY) tablet Take 1 Tab by mouth daily.  augmented betamethasone dipropionate (DIPROLENE-AF) 0.05 % ointment     citalopram (CELEXA) 20 mg tablet Take 20 mg by mouth daily.  metoclopramide HCl (REGLAN) 5 mg tablet     ergocalciferol (ERGOCALCIFEROL) 1,250 mcg (50,000 unit) capsule TAKE 1 CAPSULE BY MOUTH ONCE A WEEK (Patient not taking: Reported on 10/20/2021)    neomycin-polymyxin-hydrocortisone, buffered, (PEDIOTIC) 3.5-10,000-1 mg/mL-unit/mL-% otic suspension SHAKE WELL INSTILL 4 DROPS INTO AFFECTED EAR(S) 4 TIMES DAILY FOR 4 DAYS (Patient not taking: Reported on 10/20/2021)     No current facility-administered medications for this visit.        SYSTEM REVIEW NOT RELATED TO LIVER DISEASE OR REVIEWED ABOVE:  Constitution systems: Negative for fever, chills, weight gain, weight loss. Eyes: Negative for visual changes. ENT: Negative for sore throat, painful swallowing. Respiratory: Negative for cough, hemoptysis, SOB. Cardiology: Negative for chest pain, palpitations. GI:  Negative for constipation or diarrhea. : Negative for urinary frequency, dysuria, hematuria, nocturia. Skin: Negative for rash. Hematology: Negative for easy bruising, blood clots. Musculo-skeletal: Negative for back pain, muscle pain, weakness. Neurologic: Negative for headaches, dizziness, vertigo, memory problems not related to HE. Psychology: Negative for anxiety, depression. FAMILY HISTORY:  The father  Has/had the following following chronic disease(s): cancer. The mother Has/had the following chronic disease(s): MI.    There is no family history of liver disease. SOCIAL HISTORY:  The patient is . The patient has 4 children,   The patient has never used tobacco products. The patient has never consumed significant amounts of alcohol. The patient currently receiving disability. PHYSICAL EXAMINATION:  Visit Vitals  BP (!) 88/58 (BP 1 Location: Left upper arm, BP Patient Position: Sitting, BP Cuff Size: Large adult)   Pulse 74   Temp 97.3 °F (36.3 °C) (Temporal)   Resp 16   Ht 5' 6\" (1.676 m)   Wt 191 lb 9.6 oz (86.9 kg)   SpO2 96%   BMI 30.93 kg/m²       General: No acute distress. Eyes: Sclera anicteric. ENT: No oral lesions. Thyroid normal.  Nodes: No adenopathy. Skin: No spider angiomata. No jaundice. No palmar erythema. Respiratory: Lungs clear to auscultation. Cardiovascular: Regular heart rate. No murmurs. No JVD. Abdomen: Soft non-tender, liver size normal to percussion/palpation. Spleen not palpable. No obvious ascites. Extremities: No edema. No muscle wasting. No gross arthritic changes. Neurologic: Alert and oriented. Cranial nerves grossly intact. No asterixis.     LABORATORY STUDIES:  [facility] [place] Latest Ref Rng & Units 10/13/2021 5/14/2021   WBC 3.6 - 11.0 K/uL 5.1 7.7   ANC 1.8 - 8.0 K/UL 2.5 5.0   HGB 11.5 - 16.0 g/dL 12.2 13.3    - 400 K/uL 170 242   INR 0.9 - 1.1       AST 15 - 37 U/L 48 (H) 49 (H)   ALT 12 - 78 U/L 52 53 (H)   Alk Phos 45 - 117 U/L 154 (H) 169 (H)   Bili, Total 0.2 - 1.0 mg/dL 0.2 <0.2   Bili, Direct 0.00 - 0.40 mg/dL  0.08   Albumin 3.5 - 5.0 g/dL 3.4 (L) 4.5   BUN 6 - 20 mg/dL 19 21   Creat 0.55 - 1.02 mg/dL 0.82 1.02 (H)   Na 136 - 145 mmol/L 138 141   K 3.5 - 5.1 mmol/L 4.2 3.9   Cl 97 - 108 mmol/L 104 97   CO2 21 - 32 mmol/L 27 20   Glucose 65 - 100 mg/dL 81 88     Laboratory testing from 10/13/2021 reviewed in detail. Additional testing included to evaluate progression or regression of disease. SEROLOGIES:  Serologies Latest Ref Rng & Units 7/26/2019   Hep A Ab, Total Negative Negative   Hep B Surface Ag Negative Negative   Hep B Core Ab, Total Negative Negative   Hep B Surface AB QL  Non Reactive   Hep C Ab 0.0 - 0.9 s/co ratio <0.1   Ferritin 15 - 150 ng/mL 14 (L)   Iron % Saturation 15 - 55 % 12 (L)   GULSHAN, IFA  Negative   ASMCA 0 - 19 Units 6   M2 Ab 0.0 - 20.0 Units <20.0   Ceruloplasmin 19.0 - 39.0 mg/dL 23.2   Alpha-1 antitrypsin level 90 - 200 mg/dL 99     Serologies Latest Ref Rng & Units 4/29/2020   C-ANCA Neg:<1:20 titer <1:20   P-ANCA Neg:<1:20 titer <1:20   ANCA Neg:<1:20 titer <1:20     LIVER HISTOLOGY:  8/2019. FibroScan performed at [address]. EkPa was 3.3. IQR/med 9%. . The results suggested a fibrosis level of F0. The CAP score suggests no evidence of fatty liver. 12/2020. Liver biopsy Slides reviewed by MLS. Sinusoidal dilation. A single periportal granuloma. No inflammation, No steatosis, No fibrosis. 10/2021. FibroScan performed at [address]. EkPa was 3.7. IQR/med 5%. . The results suggested a fibrosis level of F0.  The CAP score suggests there is no significant hepatic steatosis. ENDOSCOPIC PROCEDURES:  Not available or performed    RADIOLOGY:  8/2019. Ultrasound of liver. Normal appearing liver. No liver mass lesions. 6/2020. MRI/MRCP of abdomen. Normal appearance of the liver and biliary tree status post cholecystectomy. No evidence of primary sclerosing cholangitis or biliary pathology. 9/2021. Ultrasound of liver. Mild intrahepatic dilatation within normal limits post Cholecystectomy. Hepatic mass unchanged. Normal appearing liver. OTHER TESTING:  Not available or performed    FOLLOW-UP:  All of the issues listed above in the Assessment and Plan were discussed with the patient. All questions were answered. The patient expressed a clear understanding of the above. [facility] 87 in 3 months. April S.  Karan, AGPCNP-BC  [address] [address] [address] [facility] [address] [address] [address] [place], [address] [place] [address] 22.  [address] [facility]

## 2021-10-21 PROBLEM — I95.2 HYPOTENSION DUE TO DRUGS: Status: ACTIVE | Noted: 2021-10-21

## 2021-10-21 LAB — ACE SERPL-CCNC: 54 U/L (ref 14–82)

## 2021-10-22 NOTE — PROGRESS NOTES
Letter sent to the patient regarding the blood work results. The testing for sarcoid was negative.  TSH was normal.

## 2021-10-25 ENCOUNTER — HOSPITAL ENCOUNTER (OUTPATIENT)
Dept: NUCLEAR MEDICINE | Age: 51
Discharge: HOME | End: 2021-10-25
Attending: INTERNAL MEDICINE
Payer: MEDICARE

## 2021-10-25 DIAGNOSIS — K21.9 GASTRIC REFLUX SYNDROME: ICD-10-CM

## 2021-10-25 DIAGNOSIS — R10.9 ABDOMINAL PAIN: ICD-10-CM

## 2021-10-25 PROCEDURE — 78264 GASTRIC EMPTYING IMG STUDY: CPT

## 2021-10-25 PROCEDURE — A9503 TC99M MEDRONATE: HCPCS

## 2021-10-25 RX ORDER — TECHNETIUM TC 99M SULFUR COLLOID 2 MG
1.2 KIT MISCELLANEOUS
Status: COMPLETED | OUTPATIENT
Start: 2021-10-25 | End: 2021-10-25

## 2021-10-25 RX ADMIN — TECHNETIUM TC 99M SULFUR COLLOID 1.2 MILLICURIE: KIT at 08:34

## 2021-10-26 ENCOUNTER — TRANSCRIBE ORDER (OUTPATIENT)
Dept: REGISTRATION | Age: 51
End: 2021-10-26

## 2021-10-26 ENCOUNTER — HOSPITAL ENCOUNTER (OUTPATIENT)
Dept: CT IMAGING | Age: 51
Discharge: HOME | End: 2021-10-26
Attending: INTERNAL MEDICINE
Payer: MEDICARE

## 2021-10-26 ENCOUNTER — HOSPITAL ENCOUNTER (OUTPATIENT)
Dept: LAB | Age: 51
Discharge: HOME | End: 2021-10-26
Attending: INTERNAL MEDICINE
Payer: MEDICARE

## 2021-10-26 DIAGNOSIS — K21.9 ESOPHAGEAL REFLUX: ICD-10-CM

## 2021-10-26 DIAGNOSIS — R10.9 ABDOMINAL PAIN: ICD-10-CM

## 2021-10-26 DIAGNOSIS — K21.9 ESOPHAGEAL REFLUX: Primary | ICD-10-CM

## 2021-10-26 DIAGNOSIS — K21.9 GASTRIC REFLUX SYNDROME: ICD-10-CM

## 2021-10-26 LAB — CREAT SERPL-MCNC: 0.94 MG/DL (ref 0.55–1.02)

## 2021-10-26 PROCEDURE — 74178 CT ABD&PLV WO CNTR FLWD CNTR: CPT

## 2021-10-26 PROCEDURE — 36415 COLL VENOUS BLD VENIPUNCTURE: CPT

## 2021-10-26 PROCEDURE — 82565 ASSAY OF CREATININE: CPT

## 2021-10-26 PROCEDURE — 74011000636 HC RX REV CODE- 636: Performed by: INTERNAL MEDICINE

## 2021-10-26 RX ADMIN — DIATRIZOATE MEGLUMINE AND DIATRIZOATE SODIUM 30 ML: 660; 100 LIQUID ORAL; RECTAL at 10:12

## 2021-10-26 RX ADMIN — IOPAMIDOL 100 ML: 755 INJECTION, SOLUTION INTRAVENOUS at 10:12

## 2021-11-08 DIAGNOSIS — I50.42 CHRONIC COMBINED SYSTOLIC AND DIASTOLIC CONGESTIVE HEART FAILURE (HCC): ICD-10-CM

## 2021-11-08 DIAGNOSIS — I42.8 NICM (NONISCHEMIC CARDIOMYOPATHY) (HCC): Primary | ICD-10-CM

## 2021-11-08 NOTE — TELEPHONE ENCOUNTER
Patient has a week supply left of all medications.       Patient has an upcoming appointment on 12/22/21 at 9:40 with Dr. Dee Ulrich patient, [facility] sent over a refill request)       Phone: [phone]

## 2021-11-09 RX ORDER — LOSARTAN POTASSIUM 25 MG/1
25 TABLET ORAL
Qty: 90 TABLET | Refills: 3 | Status: SHIPPED | OUTPATIENT
Start: 2021-11-09 | End: 2022-09-07

## 2021-11-09 RX ORDER — CARVEDILOL 6.25 MG/1
6.25 TABLET ORAL 2 TIMES DAILY
Qty: 180 TABLET | Refills: 3 | Status: SHIPPED | OUTPATIENT
Start: 2021-11-09 | End: 2022-09-07

## 2021-11-09 RX ORDER — SPIRONOLACTONE 25 MG/1
25 TABLET ORAL
Qty: 90 TABLET | Refills: 3 | Status: SHIPPED | OUTPATIENT
Start: 2021-11-09 | End: 2022-02-07

## 2021-11-09 RX ORDER — BUMETANIDE 1 MG/1
1 TABLET ORAL DAILY
Qty: 180 TABLET | Refills: 3 | Status: SHIPPED | OUTPATIENT
Start: 2021-11-09 | End: 2022-09-07

## 2021-11-09 NOTE — TELEPHONE ENCOUNTER
Per VO by MD.     Future Appointments   Date Time Provider [facility]   12/22/2021  9:40 AM Shay Raman MD [facility] BS AMB   2/4/2022  9:00 AM Perla Russo NP [facility] BS AMB

## 2021-12-09 ENCOUNTER — HOSPITAL ENCOUNTER (OUTPATIENT)
Dept: PREADMISSION TESTING | Age: 51
Discharge: HOME | End: 2021-12-09
Payer: MEDICARE

## 2021-12-09 LAB — SARS-COV-2, COV2: NORMAL

## 2021-12-09 PROCEDURE — U0005 INFEC AGEN DETEC AMPLI PROBE: HCPCS

## 2021-12-10 LAB
SARS-COV-2, XPLCVT: NOT DETECTED
SOURCE, COVRS: NORMAL

## 2021-12-13 ENCOUNTER — ANESTHESIA EVENT (OUTPATIENT)
Dept: ENDOSCOPY | Age: 51
End: 2021-12-13
Payer: MEDICARE

## 2021-12-13 ENCOUNTER — APPOINTMENT (OUTPATIENT)
Dept: ENDOSCOPY | Age: 51
End: 2021-12-13
Attending: INTERNAL MEDICINE
Payer: MEDICARE

## 2021-12-13 ENCOUNTER — HOSPITAL ENCOUNTER (OUTPATIENT)
Age: 51
Discharge: HOME | End: 2021-12-13
Attending: INTERNAL MEDICINE | Admitting: INTERNAL MEDICINE
Payer: MEDICARE

## 2021-12-13 ENCOUNTER — ANESTHESIA (OUTPATIENT)
Dept: ENDOSCOPY | Age: 51
End: 2021-12-13
Payer: MEDICARE

## 2021-12-13 VITALS
RESPIRATION RATE: 16 BRPM | OXYGEN SATURATION: 96 % | HEART RATE: 65 BPM | BODY MASS INDEX: 31.65 KG/M2 | WEIGHT: 190 LBS | SYSTOLIC BLOOD PRESSURE: 111 MMHG | HEIGHT: 65 IN | TEMPERATURE: 97.8 F | DIASTOLIC BLOOD PRESSURE: 67 MMHG

## 2021-12-13 PROBLEM — R10.9 ABDOMINAL PAIN: Status: ACTIVE | Noted: 2021-12-13

## 2021-12-13 LAB
GLUCOSE BLD STRIP.AUTO-MCNC: 100 MG/DL (ref 65–117)
PERFORMED BY, TECHID: NORMAL

## 2021-12-13 PROCEDURE — 82962 GLUCOSE BLOOD TEST: CPT

## 2021-12-13 PROCEDURE — 2709999900 HC NON-CHARGEABLE SUPPLY: Performed by: INTERNAL MEDICINE

## 2021-12-13 PROCEDURE — 77030019988 HC FCPS ENDOSC DISP BSC -B: Performed by: INTERNAL MEDICINE

## 2021-12-13 PROCEDURE — 76060000033 HC ANESTHESIA 1 TO 1.5 HR: Performed by: INTERNAL MEDICINE

## 2021-12-13 PROCEDURE — 74011000258 HC RX REV CODE- 258: Performed by: NURSE ANESTHETIST, CERTIFIED REGISTERED

## 2021-12-13 PROCEDURE — 76040000008: Performed by: INTERNAL MEDICINE

## 2021-12-13 PROCEDURE — 74011250636 HC RX REV CODE- 250/636: Performed by: INTERNAL MEDICINE

## 2021-12-13 PROCEDURE — 74011250636 HC RX REV CODE- 250/636: Performed by: NURSE ANESTHETIST, CERTIFIED REGISTERED

## 2021-12-13 PROCEDURE — 77030021593 HC FCPS BIOP ENDOSC BSC -A: Performed by: INTERNAL MEDICINE

## 2021-12-13 PROCEDURE — 74011000250 HC RX REV CODE- 250: Performed by: NURSE ANESTHETIST, CERTIFIED REGISTERED

## 2021-12-13 PROCEDURE — 88305 TISSUE EXAM BY PATHOLOGIST: CPT

## 2021-12-13 RX ORDER — SODIUM CHLORIDE, SODIUM LACTATE, POTASSIUM CHLORIDE, CALCIUM CHLORIDE 600; 310; 30; 20 MG/100ML; MG/100ML; MG/100ML; MG/100ML
INJECTION, SOLUTION INTRAVENOUS
Status: DISCONTINUED | OUTPATIENT
Start: 2021-12-13 | End: 2021-12-13 | Stop reason: HOSPADM

## 2021-12-13 RX ORDER — LIDOCAINE HYDROCHLORIDE 20 MG/ML
INJECTION, SOLUTION EPIDURAL; INFILTRATION; INTRACAUDAL; PERINEURAL AS NEEDED
Status: DISCONTINUED | OUTPATIENT
Start: 2021-12-13 | End: 2021-12-13 | Stop reason: HOSPADM

## 2021-12-13 RX ORDER — SODIUM CHLORIDE, SODIUM LACTATE, POTASSIUM CHLORIDE, CALCIUM CHLORIDE 600; 310; 30; 20 MG/100ML; MG/100ML; MG/100ML; MG/100ML
50 INJECTION, SOLUTION INTRAVENOUS CONTINUOUS
Status: DISCONTINUED | OUTPATIENT
Start: 2021-12-13 | End: 2021-12-13 | Stop reason: HOSPADM

## 2021-12-13 RX ORDER — PROPOFOL 10 MG/ML
INJECTION, EMULSION INTRAVENOUS AS NEEDED
Status: DISCONTINUED | OUTPATIENT
Start: 2021-12-13 | End: 2021-12-13 | Stop reason: HOSPADM

## 2021-12-13 RX ORDER — SODIUM CHLORIDE 9 MG/ML
25 INJECTION, SOLUTION INTRAVENOUS CONTINUOUS
Status: DISCONTINUED | OUTPATIENT
Start: 2021-12-13 | End: 2021-12-13 | Stop reason: HOSPADM

## 2021-12-13 RX ADMIN — SODIUM CHLORIDE, POTASSIUM CHLORIDE, SODIUM LACTATE AND CALCIUM CHLORIDE: 600; 310; 30; 20 INJECTION, SOLUTION INTRAVENOUS at 09:04

## 2021-12-13 RX ADMIN — SODIUM CHLORIDE, POTASSIUM CHLORIDE, SODIUM LACTATE AND CALCIUM CHLORIDE 50 ML/HR: 600; 310; 30; 20 INJECTION, SOLUTION INTRAVENOUS at 08:14

## 2021-12-13 RX ADMIN — PROPOFOL 100 MG: 10 INJECTION, EMULSION INTRAVENOUS at 09:13

## 2021-12-13 RX ADMIN — LIDOCAINE HYDROCHLORIDE 100 MG: 20 INJECTION, SOLUTION EPIDURAL; INFILTRATION; INTRACAUDAL; PERINEURAL at 09:11

## 2021-12-13 RX ADMIN — DEXMEDETOMIDINE HYDROCHLORIDE 10 MCG: 100 INJECTION, SOLUTION, CONCENTRATE INTRAVENOUS at 09:13

## 2021-12-13 NOTE — ANESTHESIA PREPROCEDURE EVALUATION
Relevant Problems   RESPIRATORY SYSTEM   (+) H/O Clostridium difficile infection      NEUROLOGY   (+) Migraine headache   (+) Seizures (HCC)      CARDIOVASCULAR   (+) Chronic combined systolic and diastolic congestive heart failure (HCC)      ENDOCRINE   (+) Hypothyroidism       Anesthetic History   No history of anesthetic complications            Review of Systems / Medical History  Patient summary reviewed, nursing notes reviewed and pertinent labs reviewed    Pulmonary            Asthma        Neuro/Psych     seizures    Headaches     Cardiovascular    Hypertension      CHF        Exercise tolerance: >4 METS  Comments: Echo:  · LV: Estimated LVEF is 50 - 55%. Normal cavity size and wall thickness. Mild (grade 1) left ventricular diastolic dysfunction. · LA: Mildly dilated left atrium.    GI/Hepatic/Renal     GERD           Endo/Other      Hypothyroidism  Obesity and arthritis     Other Findings            Past Medical History:   Diagnosis Date    Arthritis     pt states knees and back    Asthma     Cardiomyopathy ([address] 2005    EF 35%, echo in 2021 normal EF     Colon polyp     Diabetes ([address]     Dizzy spells     GERD (gastroesophageal reflux disease)     Heart failure ([address] 2005    now combined systolic, diastolic, seen at [facility] 2021- Dr Daniel Murrieta Irritable bowel syndrome     Knee pain, bilateral     Migraines 2011    2-3 week    Neuropathy     pt states of both feet    Seizures ([address]     petite mals last seizure 2-3 months ago    Thyroid disease     hypothyroid       Past Surgical History:   Procedure Laterality Date    HX CHOLECYSTECTOMY  5/2006    HX COLONOSCOPY      HX ENDOSCOPY      HX GASTRIC BYPASS  2013    HX HYSTERECTOMY  2007    HX KNEE ARTHROSCOPY Right 05/2021    HX ORTHOPAEDIC Bilateral 2013    CTR    HX ORTHOPAEDIC      right knee procedure 2021    HX OTHER SURGICAL      left axillary lymph node biopsy    HX SMALL BOWEL RESECTION      OR ABDOMEN SURGERY PROC UNLISTED  2008    cholecystectomy    AK APPENDECTOMY      AK CARDIAC SURG PROCEDURE UNLIST      Loop recorder implanted july 2020    AK LAP,CHOLECYSTECTOMY      AK PART REMOVAL COLON W ANASTOMOSIS      AK RESECT SMALL INTEST W ENTEROSTOMY      AK STOMACH SURGERY PROCEDURE UNLISTED         Current Outpatient Medications   Medication Instructions    alum-mag hydroxide-simeth-lidocaine-sucralfate 30 mL, Oral, DAILY    augmented betamethasone dipropionate (DIPROLENE-AF) 0.05 % ointment No dose, route, or frequency recorded.  azelastine (ASTELIN) 137 mcg (0.1 %) nasal spray USE 2 SPRAY(S) IN EACH NOSTRIL TWICE DAILY    azelastine (OPTIVAR) 0.05 % ophthalmic solution 1 Drop, 2 TIMES DAILY    BD Luer-Narda Syringe 3 mL 25 gauge x 1\" syrg USE FOR B12 INJECTIONS    budesonide (PULMICORT) 0.5 mg/2 mL nbsp USE 1 VIAL IN NEBULIZER TWICE DAILY    bumetanide (BUMEX) 1 mg, Oral, DAILY    Calcium-Cholecalciferol, D3, (CALCIUM 600 WITH VITAMIN D3) 600 mg(1,500mg) -400 unit cap 2 Tablets, Oral, DAILY    carvediloL (COREG) 6.25 mg, Oral, 2 TIMES DAILY    cetirizine (ZYRTEC) 10 mg, Oral, DAILY    citalopram (CELEXA) 20 mg, Oral, DAILY    cloNIDine (CATAPRES) 0.1 mg/24 hr ptwk No dose, route, or frequency recorded.  cyanocobalamin (VITAMIN B-12) 1,000 mcg, IntraMUSCular, EVERY 30 DAYS    dexlansoprazole (DEXILANT) 60 mg, Oral, DAILY    dicyclomine (BENTYL) 20 mg, Oral, EVERY 6 HOURS    docusate sodium (COLACE) 100 mg, Oral, 2 TIMES DAILY    Emgality Pen 120 mg/mL injection No dose, route, or frequency recorded.  ergocalciferol (ERGOCALCIFEROL) 1,250 mcg (50,000 unit) capsule TAKE 1 CAPSULE BY MOUTH ONCE A WEEK    estradiol (ESTRACE) 0.01 % (0.1 mg/gram) vaginal cream No dose, route, or frequency recorded.  famotidine (PEPCID) 40 mg, Oral, EVERY BEDTIME    Ferrex 150 150 mg iron capsule No dose, route, or frequency recorded.     fluticasone propionate (FLONASE) 50 mcg/actuation nasal spray No dose, route, or frequency recorded.  gabapentin (NEURONTIN) 300 mg, Oral, 3 TIMES DAILY    lacosamide (VIMPAT) 100 mg, Oral, 2 TIMES DAILY    levETIRAcetam (KEPPRA) 1,500 mg, 2 TIMES DAILY    levothyroxine (SYNTHROID) 50 mcg, Oral, DAILY BEFORE BREAKFAST    Linzess 145 mcg, Oral, DAILY    losartan (COZAAR) 25 mg, Oral, DAILY AFTER BREAKFAST    metFORMIN (GLUCOPHAGE) 500 mg tablet Oral, 2 TIMES DAILY WITH MEALS    metoclopramide HCl (REGLAN) 5 mg tablet No dose, route, or frequency recorded.  montelukast (SINGULAIR) 10 mg, Oral, DAILY    multivitamin (ONE A DAY) tablet 1 Tablet, Oral, DAILY    neomycin-polymyxin-hydrocortisone, buffered, (PEDIOTIC) 3.5-10,000-1 mg/mL-unit/mL-% otic suspension SHAKE WELL INSTILL 4 DROPS INTO AFFECTED EAR(S) 4 TIMES DAILY FOR 4 DAYS    nitroglycerin (NITROSTAT) 0.4 mg SL tablet DISSOLVE 1 TABLET UNDER THE TONGUE EVERY 5 MINUTES AS  NEEDED FOR CHEST PAIN. MAX  OF 3 TABLETS IN 15 MINUTES. CALL 911 IF PAIN PERSISTS.  nortriptyline (PAMELOR) 100 mg, Oral, EVERY BEDTIME    ondansetron hcl (ZOFRAN) 8 mg, Oral, EVERY 8 HOURS AS NEEDED    potassium chloride (K-DUR, KLOR-CON) 20 mEq tablet 20 mEq, Oral, DAILY    Premarin 0.625 mg/gram vaginal cream No dose, route, or frequency recorded.     RABEprazole (ACIPHEX) 20 mg, Oral, 2 TIMES DAILY    rizatriptan (MAXALT) 5 mg, Oral, ONCE PRN, May repeat in 2 hours if needed     Saccharomyces boulardii (FLORASTOR) 250 mg, Oral, 2 TIMES DAILY    spironolactone (ALDACTONE) 25 mg, Oral, DAILY AFTER BREAKFAST    sucralfate (CARAFATE) 1 g, Oral, 4 TIMES DAILY    SYMBICORT 80-4.5 mcg/actuation HFAA INHALE 2 PUFFS BY MOUTH TWICE DAILY    TENS units and TENS electrodes (TENS UNIT AND ELECTRODES) Tens Unit, 1 ea, Dispense: 1 EA, Refills: 0, Easyscript, Maintenance, 0    tiotropium (SPIRIVA) 18 mcg inhalation capsule 1 Capsule, Inhalation, DAILY    topiramate (TOPAMAX) 100 mg, Oral, 2 TIMES DAILY    VENTOLIN HFA 90 mcg/actuation inhaler 2 Puffs, Inhalation, EVERY 4 HOURS AS NEEDED       No current facility-administered medications for this visit. No current outpatient medications on file. Facility-Administered Medications Ordered in Other Visits   Medication Dose Route Frequency    0.9% sodium chloride infusion  25 mL/hr IntraVENous CONTINUOUS    lactated Ringers infusion  50 mL/hr IntraVENous CONTINUOUS       No data found.     Lab Results   Component Value Date/Time    WBC 5.1 10/13/2021 05:45 PM    HGB 12.2 10/13/2021 05:45 PM    HCT 37.7 10/13/2021 05:45 PM    PLATELET 788 41/47/2845 05:45 PM    MCV 85.3 10/13/2021 05:45 PM     Lab Results   Component Value Date/Time    Sodium 138 10/13/2021 05:45 PM    Potassium 4.2 10/13/2021 05:45 PM    Chloride 104 10/13/2021 05:45 PM    CO2 27 10/13/2021 05:45 PM    Anion gap 7 10/13/2021 05:45 PM    Glucose 81 10/13/2021 05:45 PM    BUN 19 10/13/2021 05:45 PM    Creatinine 0.94 10/26/2021 08:22 AM    BUN/Creatinine ratio 23 (H) 10/13/2021 05:45 PM    GFR est AA >60 10/26/2021 08:22 AM    GFR est non-AA >60 10/26/2021 08:22 AM    Calcium 8.7 10/13/2021 05:45 PM     No results found for: APTT, PTP, INR, INREXT, INREXT  Lab Results   Component Value Date/Time    Glucose 81 10/13/2021 05:45 PM    Glucose (POC) 100 12/13/2021 08:00 AM     Physical Exam    Airway  Mallampati: II  TM Distance: 4 - 6 cm  Neck ROM: normal range of motion   Mouth opening: Normal     Cardiovascular    Rhythm: regular  Rate: normal         Dental  No notable dental hx       Pulmonary  Breath sounds clear to auscultation               Abdominal  GI exam deferred       Other Findings   Comments:          Anesthetic Plan    ASA: 4  Anesthesia type: total IV anesthesia and MAC          Induction: Intravenous  Anesthetic plan and risks discussed with: Patient and Family

## 2021-12-13 NOTE — ANESTHESIA POSTPROCEDURE EVALUATION
Procedure(s):  ESOPHAGOGASTRODUODENOSCOPY (EGD)  ESOPHAGOGASTRODUODENAL (EGD) BIOPSY.     total IV anesthesia, MAC    Anesthesia Post Evaluation      Multimodal analgesia: multimodal analgesia not used between 6 hours prior to anesthesia start to PACU discharge  Patient location during evaluation: bedside  Patient participation: complete - patient participated  Level of consciousness: awake and alert  Pain score: 0  Pain management: adequate  Airway patency: patent  Anesthetic complications: no  Cardiovascular status: acceptable, blood pressure returned to baseline and hemodynamically stable  Respiratory status: acceptable, nonlabored ventilation, spontaneous ventilation, unassisted and room air  Hydration status: acceptable  Post anesthesia nausea and vomiting:  none  Final Post Anesthesia Temperature Assessment:  Normothermia (36.0-37.5 degrees C)      INITIAL Post-op Vital signs:   Vitals Value Taken Time   BP 92/57 12/13/21 0930   Temp 36.6 °C (97.8 °F) 12/13/21 0930   Pulse 62 12/13/21 0930   Resp 16 12/13/21 0930   SpO2 95 % 12/13/21 0930

## 2021-12-13 NOTE — PERIOP NOTES
Patient alert and oriented x 4 with respirations even and unlabored on RA. Patient discharged home per physician's order. Patient verbalizes understanding of discharge instructions. Patient transported via wheelchair to front hospital entrance with family present to transport patient via private vehicle.

## 2021-12-13 NOTE — DISCHARGE INSTRUCTIONS
Avoid citrus juices, cigarettes, chocolate, tight fitting clothing, coffee and other caffeinated beverages, carbonated beverages, fatty and fried foods, anticholinergic medications, medications which decrease LES tone (Ca channel blockers, theophyline perparations)     Follow up in 2 weeks

## 2021-12-17 ENCOUNTER — OFFICE VISIT (OUTPATIENT)
Dept: NEUROLOGY | Age: 51
End: 2021-12-17

## 2021-12-17 VITALS
BODY MASS INDEX: 31.65 KG/M2 | DIASTOLIC BLOOD PRESSURE: 65 MMHG | RESPIRATION RATE: 16 BRPM | HEIGHT: 65 IN | SYSTOLIC BLOOD PRESSURE: 101 MMHG | HEART RATE: 95 BPM | OXYGEN SATURATION: 100 % | WEIGHT: 190 LBS

## 2022-02-07 ENCOUNTER — OFFICE VISIT (OUTPATIENT)
Dept: CARDIOLOGY CLINIC | Age: 52
End: 2022-02-07
Payer: MEDICARE

## 2022-02-07 VITALS
HEIGHT: 65 IN | WEIGHT: 187 LBS | DIASTOLIC BLOOD PRESSURE: 60 MMHG | HEART RATE: 80 BPM | SYSTOLIC BLOOD PRESSURE: 100 MMHG | RESPIRATION RATE: 18 BRPM | OXYGEN SATURATION: 97 % | BODY MASS INDEX: 31.16 KG/M2

## 2022-02-07 DIAGNOSIS — R07.89 ATYPICAL CHEST PAIN: ICD-10-CM

## 2022-02-07 DIAGNOSIS — I47.29 NSVT (NONSUSTAINED VENTRICULAR TACHYCARDIA): ICD-10-CM

## 2022-02-07 DIAGNOSIS — R60.0 PEDAL EDEMA: Primary | ICD-10-CM

## 2022-02-07 DIAGNOSIS — I42.8 NICM (NONISCHEMIC CARDIOMYOPATHY) (HCC): ICD-10-CM

## 2022-02-07 DIAGNOSIS — I50.42 CHRONIC COMBINED SYSTOLIC AND DIASTOLIC CONGESTIVE HEART FAILURE (HCC): ICD-10-CM

## 2022-02-07 DIAGNOSIS — R55 VASOVAGAL SYNCOPE: ICD-10-CM

## 2022-02-07 PROCEDURE — 3017F COLORECTAL CA SCREEN DOC REV: CPT | Performed by: SPECIALIST

## 2022-02-07 PROCEDURE — G8510 SCR DEP NEG, NO PLAN REQD: HCPCS | Performed by: SPECIALIST

## 2022-02-07 PROCEDURE — G8427 DOCREV CUR MEDS BY ELIG CLIN: HCPCS | Performed by: SPECIALIST

## 2022-02-07 PROCEDURE — G8417 CALC BMI ABV UP PARAM F/U: HCPCS | Performed by: SPECIALIST

## 2022-02-07 PROCEDURE — 99214 OFFICE O/P EST MOD 30 MIN: CPT | Performed by: SPECIALIST

## 2022-02-07 PROCEDURE — G9899 SCRN MAM PERF RSLTS DOC: HCPCS | Performed by: SPECIALIST

## 2022-02-07 RX ORDER — OFLOXACIN 3 MG/ML
SOLUTION/ DROPS OPHTHALMIC
COMMUNITY
Start: 2021-11-19 | End: 2022-09-28

## 2022-02-07 RX ORDER — LATANOPROST 50 UG/ML
SOLUTION/ DROPS OPHTHALMIC
COMMUNITY
Start: 2022-01-28

## 2022-02-07 RX ORDER — HYDROXYZINE 25 MG/1
TABLET, FILM COATED ORAL
COMMUNITY
Start: 2021-11-29 | End: 2022-09-28

## 2022-02-07 RX ORDER — ESCITALOPRAM OXALATE 10 MG/1
20 TABLET ORAL DAILY
COMMUNITY
Start: 2022-02-04

## 2022-02-07 RX ORDER — SCOLOPAMINE TRANSDERMAL SYSTEM 1 MG/1
PATCH, EXTENDED RELEASE TRANSDERMAL
COMMUNITY
Start: 2022-01-20 | End: 2022-09-28

## 2022-02-07 RX ORDER — SPIRONOLACTONE 50 MG/1
50 TABLET, FILM COATED ORAL DAILY
Qty: 90 TABLET | Refills: 1 | Status: SHIPPED | OUTPATIENT
Start: 2022-02-07 | End: 2022-09-07

## 2022-02-07 NOTE — PROGRESS NOTES
Radha Figueroa     1970       Vishnu López MD, [facility] [place]  Date of Visit-2/7/2022   PCP is Volodymyr Maldonado, [address] [facility]  [facility]  HPI:  Radha Figueroa is a 46 y.o. female   5 month f/u from [place] [id number] 9/13/21. Fu of  Hx CMY  Son has non compaction, she had MRI at [facility] that showed no issue   Liver dz-Echo done 1/11/21 showed normal EF with mild dilation of the left atrium. No mention of TR or PASP. Seen March 2021, stable, called in September indicating edema and more SOB. Reviewed records cardiology outpatient visit 12/23/2020 [facility] noting cardiomyopathy onset in 2016 had seen Dr. Magan Briones at [facility]   ·           Echocardiogram [facility] November 2020 showed normal LV cavity size LVEF of 60 to 65% trivial mitral and tricuspid insufficiency  ·           Cardiac MRI 12/27/2019 showed mild reduction LV systolic function EF 60% with mild global hypokinesia some worsening at the apex  ·           The assessment was that the patient had prior syncope and had an ILR previous fluid overload with peripheral edema resolved after Bumex and recommended with the abnormal MRI that patient have regular echocardiogram yearly. ·           Remote monitoring reviewed 12/28/2020 noted some ectopy and 1 undersensing because of pauses artifact.   ·           Reviewed actual echo report of 11/5/2020 to be scanned  ·           Reviewed OP notation 12/23/2020 of outpatient visit noting admission [facility] on 11/1/2019 with chest pain and palpitations having nonsustained ventricular tachycardia frequent PACs and history of prior EF of 35% but an echo 11/2/2019 showed an EF of 69%; reportedly patient has a son who is 25 also with LV noncompaction ; patient had cardiac MRI 12/27/2019 without evidence of LV noncompaction , she did have  mild apical dyskinesia and prominent trabeculations and an unremarkable EP study in February 2020; loop monitor implanted July 2020;  patient called and was seen on this visit 12/23/2020 noting some edema. Today. .. Pt had a VV in September, unable to show me her feet and felt comfortable. Told her to take Bumex and tell us how she is doing. She was to see the liver team on 2/4/22 but did not make that visit. She is to have knee surgery at [facility]. Pt states that she has been experiencing some swelling in her legs that is constant. Pt notes that she feels fatigued and constantly SOB. She reports that she gets tired when walking around the store. Review of Systems   Constitutional: Negative for fever and weight loss. Respiratory: Positive for shortness of breath. Cardiovascular: Positive for palpitations and leg swelling. Negative for chest pain. tachycardia   Gastrointestinal: Positive for abdominal pain. Negative for blood in stool and nausea. Genitourinary: Positive for hematuria. Musculoskeletal: Negative for joint pain. Neurological: Positive for dizziness and loss of consciousness. Assessment/Plan:    Patient Instructions   Increase your Aldactone to 50 mg daily    Please have your labs drawn in 1 month    You will need to follow up in clinic with Myesha Harrison NP in 6 months with an Echocardiogram     You will need to follow up in clinic with Dr. Kofi López in 1 year. Please wear compression stockings          1. Pedal edema  There is moderate edema, see picture. It is  sock line edema. Despite being on Bumex and Aldactone. Don't know if heart failure since low BNP, but she has long hx of reduced EF. Will increase Aldactone to 50. Encouraged her to use compression stockings as often as possible. We will try the stockings, add the diuretic, and reevaluate labs and echo. 2. Chronic combined systolic and diastolic congestive heart failure ([address]  She has normal EF on echos, 49% EF MRI 2019. Had NSVT with previous EF of 35%, despite this she seems to be well compensated.  CC's are fatigue and lack of energy and edema, but I dont think it is heart failure. Lab Results   Component Value Date/Time    NT pro-BNP 7 10/14/2021 12:30 AM      3. NICM (nonischemic cardiomyopathy) ([facility]  01/11/21  ECHO ADULT COMPLETE 01/11/2021 1/11/2021  Interpretation Summary  · LV: Estimated LVEF is 50 - 55%. Normal cavity size and wall thickness. Mild (grade 1) left ventricular diastolic dysfunction. · LA: Mildly dilated left atrium. 4. Atypical chest pain  Normal Cath. 5. Vasovagal syncope  Resolved. 6. NSVT (nonsustained ventricular tachycardia) (HCC)  No further palps, had MRI and loop. Will call with results, NP 6 months, F/u 1 year with me     Impression:   1. Pedal edema    2. Chronic combined systolic and diastolic congestive heart failure ([address]    3. NICM (nonischemic cardiomyopathy) ([facility]    4. Atypical chest pain    5. Vasovagal syncope    6. NSVT (nonsustained ventricular tachycardia) (HCC)       Cardiac History:   · Reviewed records cardiology outpatient visit [facility]/23/2020 [facility] noting cardiomyopathy onset in 2016 had seen Dr. Kevyn Acharya at [facility]   · Echocardiogram [facility] November 2020 showed normal LV cavity size LVEF of 60 to 65% trivial mitral and tricuspid insufficiency  · Cardiac MRI 12/27/2019 showed mild reduction LV systolic function EF 43% with mild global hypokinesia some worsening at the apex  · The assessment was that the patient had prior syncope and had an ILR previous fluid overload with peripheral edema resolved after Bumex and recommended with the abnormal MRI that patient have regular echocardiogram yearly. · Remote monitoring reviewed 12/28/2020 noted some ectopy and 1 undersensing because of pauses artifact.   · Reviewed actual echo report of 11/5/2020 to be scanned  · Reviewed OP notation 12/23/2020 of outpatient visit noting admission [facility] on 11/1/2019 with chest pain and palpitations having nonsustained ventricular tachycardia frequent PACs and history of prior EF of 35% but an echo 11/2/2019 showed an EF of 69%; reportedly patient has a son who is 25 also with LV noncompaction ; patient had cardiac MRI 12/27/2019 without evidence of LV noncompaction , she did have  mild apical dyskinesia and prominent trabeculations and an unremarkable EP study in February 2020; loop monitor implanted July 2020;  patient called and was seen on this visit 12/23/2020 noting some edema.        Future Appointments   Date Time Provider Alicja Jimenez   3/8/2022  9:00 AM Perla Russo, NP [facility]        Patient Care Team:  Radha Baker MD as PCP - General (Family Medicine)  Maciel Castellanos MD (Breast Surgery)  Anisa Clarke MD as Referring Provider (Obstetrics & Gynecology)  Maciel Castellanos MD (Breast Surgery)  Luba Rodriguez MD (Cardiology)  Raffi Rodriguez MD (Neurology)    ROS-except as noted above. . A complete cardiac and respiratory are reviewed and negative except as above ;   see supplement sheet, initialed and to be scanned by staff  Past Medical History:   Diagnosis Date    Arthritis     pt states knees and back    Asthma     Cardiomyopathy ([address] 2005    EF 35%, echo in 2021 normal EF     Colon polyp     Diabetes ([address]     Dizzy spells     GERD (gastroesophageal reflux disease)     Heart failure ([address] 2005    now combined systolic, diastolic, [facility]- Dr Sellers Erps Irritable bowel syndrome     Knee pain, bilateral     Migraines 2011    2-3 week    Neuropathy     pt states of both feet    Seizures ([address]     petite mals last seizure 2-3 months ago    Thyroid disease     hypothyroid      Social Hx= reports that she has never smoked. She has never used smokeless tobacco. She reports that she does not drink alcohol and does not use drugs.      Exam and Labs:  /60 (BP 1 Location: Left upper arm, BP Patient Position: Sitting, BP Cuff Size: Adult)   Pulse 80   Resp 18   Ht 5' 5\" (1.651 m)   Wt 187 lb (84.8 kg)   SpO2 97%   BMI 31.12 kg/m² Constitutional:  NAD, comfortable  Head: NC,AT. Eyes: No scleral icterus. Neck:  Neck supple. No JVD present. Throat: moist mucous membranes. Chest: Effort normal & normal respiratory excursion . Neurological: alert, conversant and oriented . Skin: Skin is not cold. No obvious systemic rash noted. Not diaphoretic. No erythema. Psychiatric:  Grossly normal mood and affect. Behavior appears normal. Extremities:  no clubbing or cyanosis. Abdomen: non distended    Lungs:breath sounds normal. No stridor. distress, wheezes or  Rales. Heart: normal rate, regular rhythm, normal S1, S2, no murmurs, rubs, clicks or gallops , PMI non displaced. Edema: Edema is 1+. No results found for: CHOL, CHOLX, CHLST, CHOLV, HDL, HDLP, LDL, LDLC, DLDLP, TGLX, TRIGL, TRIGP, CHHD, CHHDX  Lab Results   Component Value Date/Time    Sodium 138 10/13/2021 05:45 PM    Potassium 4.2 10/13/2021 05:45 PM    Chloride 104 10/13/2021 05:45 PM    CO2 27 10/13/2021 05:45 PM    Anion gap 7 10/13/2021 05:45 PM    Glucose 81 10/13/2021 05:45 PM    BUN 19 10/13/2021 05:45 PM    Creatinine 0.94 10/26/2021 08:22 AM    BUN/Creatinine ratio 23 (H) 10/13/2021 05:45 PM    GFR est AA >60 10/26/2021 08:22 AM    GFR est non-AA >60 10/26/2021 08:22 AM    Calcium 8.7 10/13/2021 05:45 PM      Wt Readings from Last 3 Encounters:   02/07/22 187 lb (84.8 kg)   12/17/21 190 lb (86.2 kg)   12/13/21 190 lb (86.2 kg)      BP Readings from Last 3 Encounters:   02/07/22 100/60   12/17/21 101/65   12/13/21 111/67      Current Outpatient Medications   Medication Sig    escitalopram oxalate (LEXAPRO) 10 mg tablet Take 10 mg by mouth daily.  hydrOXYzine HCL (ATARAX) 25 mg tablet TAKE 1 TABLET BY MOUTH THREE TIMES DAILY AS NEEDED FOR 10 DAYS    latanoprost (XALATAN) 0.005 % ophthalmic solution INSTILL 1 DROP INTO EACH EYE AT NIGHT    spironolactone (ALDACTONE) 50 mg tablet Take 1 Tablet by mouth daily.  carvediloL (COREG) 6.25 mg tablet Take 1 Tablet by mouth two (2) times a day.     bumetanide (BUMEX) 1 mg tablet Take 1 Tablet by mouth daily.  TENS units and TENS electrodes (TENS UNIT AND ELECTRODES) Tens Unit, 1 ea, Dispense: 1 EA, Refills: 0, Easyscript, Maintenance, 0    azelastine (ASTELIN) 137 mcg (0.1 %) nasal spray USE 2 SPRAY(S) IN EACH NOSTRIL TWICE DAILY    augmented betamethasone dipropionate (DIPROLENE-AF) 0.05 % ointment     budesonide (PULMICORT) 0.5 mg/2 mL nbsp USE 1 VIAL IN NEBULIZER TWICE DAILY    citalopram (CELEXA) 20 mg tablet Take 20 mg by mouth daily.  docusate sodium (COLACE) 100 mg capsule Take 100 mg by mouth two (2) times a day.  Premarin 0.625 mg/gram vaginal cream     Ferrex 150 150 mg iron capsule     Linzess 145 mcg cap capsule Take 145 mcg by mouth daily.  metoclopramide HCl (REGLAN) 5 mg tablet     Saccharomyces boulardii (FLORASTOR) 250 mg capsule Take 250 mg by mouth two (2) times a day.  nitroglycerin (NITROSTAT) 0.4 mg SL tablet DISSOLVE 1 TABLET UNDER THE TONGUE EVERY 5 MINUTES AS  NEEDED FOR CHEST PAIN. MAX  OF 3 TABLETS IN 15 MINUTES. CALL 911 IF PAIN PERSISTS.  cetirizine (ZyrTEC) 10 mg tablet Take 10 mg by mouth daily.  metFORMIN (GLUCOPHAGE) 500 mg tablet Take  by mouth two (2) times daily (with meals).  cloNIDine (CATAPRES) 0.1 mg/24 hr ptwk     fluticasone propionate (FLONASE) 50 mcg/actuation nasal spray     BD Luer-Narda Syringe 3 mL 25 gauge x 1\" syrg USE FOR B12 INJECTIONS    RABEprazole (ACIPHEX) 20 mg TbEC Take 20 mg by mouth two (2) times a day.  rizatriptan (MAXALT) 5 mg tablet Take 5 mg by mouth once as needed for Migraine. May repeat in 2 hours if needed     alum-mag hydroxide-simeth-lidocaine-sucralfate Take 30 mL by mouth daily.  lacosamide (Vimpat) 100 mg tab tablet Take 100 mg by mouth two (2) times a day.  tiotropium (SPIRIVA) 18 mcg inhalation capsule Take 1 Cap by inhalation daily.  ondansetron hcl (ZOFRAN) 8 mg tablet Take 8 mg by mouth every eight (8) hours as needed.     levETIRAcetam (KEPPRA) 750 mg tablet 1,500 mg two (2) times a day.  montelukast (SINGULAIR) 10 mg tablet Take 10 mg by mouth daily.  nortriptyline (PAMELOR) 50 mg capsule Take 100 mg by mouth nightly.  sucralfate (CARAFATE) 100 mg/mL suspension Take 1 g by mouth four (4) times daily.  topiramate (TOPAMAX) 100 mg tablet Take 100 mg by mouth two (2) times a day.  VENTOLIN HFA 90 mcg/actuation inhaler Take 2 Puffs by inhalation every four (4) hours as needed.  SYMBICORT 80-4.5 mcg/actuation HFAA INHALE 2 PUFFS BY MOUTH TWICE DAILY    dexlansoprazole (DEXILANT) 60 mg CpDB capsule (delayed release) Take 60 mg by mouth daily.  dicyclomine (BENTYL) 20 mg tablet Take 20 mg by mouth every six (6) hours.  estradiol (ESTRACE) 0.01 % (0.1 mg/gram) vaginal cream     famotidine (PEPCID) 40 mg tablet Take 40 mg by mouth nightly.  levothyroxine (SYNTHROID) 50 mcg tablet Take 50 mcg by mouth Daily (before breakfast).  cyanocobalamin (VITAMIN B-12) 1,000 mcg/mL injection 1,000 mcg by IntraMUSCular route every thirty (30) days.  Calcium-Cholecalciferol, D3, (CALCIUM 600 WITH VITAMIN D3) 600 mg(1,500mg) -400 unit cap Take 2 Tablets by mouth daily.  multivitamin (ONE A DAY) tablet Take 1 Tab by mouth daily.  ofloxacin (FLOXIN) 0.3 % ophthalmic solution INSTILL 5 DROPS INTO AFFECTED EAR TWICE DAILY    scopolamine (TRANSDERM-SCOP) 1 mg over 3 days pt3d PLACE 1 PATCH BEHIND THE EAR EVERY 3 DAYS A NEEDED FOR NAUSEA    losartan (COZAAR) 25 mg tablet Take 1 Tablet by mouth daily (after breakfast).  azelastine (OPTIVAR) 0.05 % ophthalmic solution 1 Drop two (2) times a day.  Emgality Pen 120 mg/mL injection  (Patient not taking: Reported on 2/7/2022)    gabapentin (NEURONTIN) 100 mg capsule Take 300 mg by mouth three (3) times daily. (Patient not taking: Reported on 2/7/2022)    potassium chloride (K-DUR, KLOR-CON) 20 mEq tablet Take 20 mEq by mouth daily.  (Patient not taking: Reported on 12/17/2021)     No current facility-administered medications for this visit. Impression see above.       Written by Torsten Mei, as dictated by Derwood Runner, MD.

## 2022-02-07 NOTE — Clinical Note
2/7/2022    Patient: Radha Figueroa   YOB: 1970   Date of Visit: 2/7/2022     Concha Casarez MD  [address]  [address] [facility] [address]  Via Fax: [phone]    Dear Concha Casarez MD,      Thank you for referring Ms. Eunice Villatoro to [facility] for evaluation. My notes for this consultation are attached. If you have questions, please do not hesitate to call me. I look forward to following your patient along with you.       Sincerely,    Luisa Rose MD

## 2022-02-07 NOTE — PATIENT INSTRUCTIONS
Increase your Aldactone to 50 mg daily    Please have your labs drawn in 1 month    You will need to follow up in clinic with Ursula Baird NP in 6 months with an Echocardiogram     You will need to follow up in clinic with Dr. Filippo Euceda in 1 year.     Please wear compression stockings

## 2022-03-08 ENCOUNTER — VIRTUAL VISIT (OUTPATIENT)
Dept: HEMATOLOGY | Age: 52
End: 2022-03-08
Payer: MEDICARE

## 2022-03-08 DIAGNOSIS — K25.7 CHRONIC GASTRIC ULCER WITHOUT HEMORRHAGE AND WITHOUT PERFORATION: Primary | ICD-10-CM

## 2022-03-08 DIAGNOSIS — R74.8 ELEVATED LIVER ENZYMES: ICD-10-CM

## 2022-03-08 PROCEDURE — G9899 SCRN MAM PERF RSLTS DOC: HCPCS | Performed by: NURSE PRACTITIONER

## 2022-03-08 PROCEDURE — 99213 OFFICE O/P EST LOW 20 MIN: CPT | Performed by: NURSE PRACTITIONER

## 2022-03-08 PROCEDURE — G8432 DEP SCR NOT DOC, RNG: HCPCS | Performed by: NURSE PRACTITIONER

## 2022-03-08 PROCEDURE — 3017F COLORECTAL CA SCREEN DOC REV: CPT | Performed by: NURSE PRACTITIONER

## 2022-03-08 PROCEDURE — G8427 DOCREV CUR MEDS BY ELIG CLIN: HCPCS | Performed by: NURSE PRACTITIONER

## 2022-03-08 NOTE — PROGRESS NOTES
[address] [place] [address], Alyce BLANCHARD Robinson Collier, MD Dava Bacca, PARONNY Hernandez, [facility]     Perla Russo, [facility]   Slade Larios P-SERA Harper, [facility]       Johanne Coyle [facility] 136    at [address]    [address] [place] [address], [address] [place] [address].    [phone]    FAX: [address] [place] [address], [address], [address] [place] [address],[address]    [facility] [facility] [address]: [phone]       Patient Care Team:  Ann Sharp MD as PCP - General (Family Medicine)  Brandon Ogden MD (Breast Surgery)  Rayshawn Flowers MD as Referring Provider (Obstetrics & Gynecology)  Brandon Ogden MD (Breast Surgery)  Johnathon Turcios MD (Cardiology)  Bg Wiley MD (Neurology)  Christin Rubin MD (Gastroenterology)  Tia Meadows NP (Nurse Practitioner)      Problem List  Date Reviewed: 12/13/2021          Codes Class Noted    Abdominal pain ICD-10-CM: R10.9  ICD-9-CM: 789.00  12/13/2021        Hypotension due to drugs ICD-10-CM: I95.2  ICD-9-CM: 458.8, E947.9  10/21/2021        Chronic combined systolic and diastolic congestive heart failure ([facility] ICD-10-CM: I50.42  ICD-9-CM: 428.42, 428.0  4/10/2021        Atypical chest pain ICD-10-CM: R07.89  ICD-9-CM: 786.59  4/10/2021        Regional wall motion abnormality of heart ICD-10-CM: R93.1  ICD-9-CM: 793.2  4/10/2021        Vasovagal syncope ICD-10-CM: R55  ICD-9-CM: 780.2  4/10/2021        NSVT (nonsustained ventricular tachycardia) ([facility] ICD-10-CM: I47.2  ICD-9-CM: 427.1  4/10/2021        Right knee pain ICD-10-CM: M25.561  ICD-9-CM: 719.46  1/4/2021        Generalized abdominal pain ICD-10-CM: R10.84  ICD-9-CM: 789.07  7/21/2020        Diarrhea ICD-10-CM: R19.7  ICD-9-CM: 787.91  7/21/2020 H/O Clostridium difficile infection ICD-10-CM: Z86.19  ICD-9-CM: V12.09  7/21/2020        History of bowel resection ICD-10-CM: Z90.49  ICD-9-CM: V15.89  4/21/2020        Elevated liver enzymes ICD-10-CM: R74.8  ICD-9-CM: 790.5  7/26/2019        NICM (nonischemic cardiomyopathy) ([facility] ICD-10-CM: I42.8  ICD-9-CM: 425.4  7/26/2019        Hypothyroidism ICD-10-CM: E03.9  ICD-9-CM: 244.9  7/26/2019        H/O gastric bypass ICD-10-CM: Z98.84  ICD-9-CM: V45.86  7/26/2019        History of cholecystectomy ICD-10-CM: Z90.49  ICD-9-CM: V45.79  7/26/2019        History of appendectomy ICD-10-CM: Z90.49  ICD-9-CM: V45.89  7/26/2019        Neuropathy ICD-10-CM: G62.9  ICD-9-CM: 355.9  7/26/2019        Migraine headache ICD-10-CM: V42.136  ICD-9-CM: 346.90  7/26/2019        Seizures ([facility] ICD-10-CM: R56.9  ICD-9-CM: 780.39  7/26/2019        Fibrocystic breast changes of both breasts ICD-10-CM: N60.11, N60.12  ICD-9-CM: 610.1  7/8/2015        Heart failure ([facility] ICD-10-CM: I50.9  ICD-9-CM: 428.9  2005    Overview Signed 4/10/2021 12:43 PM by Derek Sevilla MD     now combined systolic, diastolic, seen at [facility], [facility] 2021- Dr Kemar Ko:  Kary Betancourt, who was evaluated through a synchronous (real-time) audio-video encounter, and/or his healthcare decision maker, is aware that it is a billable service, which includes applicable co-pays, with coverage as determined by his insurance carrier. He provided verbal consent to proceed and patient identification was verified. This visit was conducted pursuant to the emergency declaration under the [address] [place] [address] and the [employer] Act. A caregiver was present when appropriate. Ability to conduct physical exam was limited.  The patient was located at home in a state where the provider was licensed to provide care. Eddie Best returns to the [place] [address] for management of elevated liver enzymes. The active problem list, all pertinent past medical history, medications, radiologic findings and laboratory findings related to the liver disorder were reviewed with the patient. The patient is a 46 y.o. Black female who was found to have abnormalities in liver enzymes in 2018. ECHO performed 1/2021 demonstrated mildly reduced systolic function. LVEF 50-55%. Serologic evaluation for markers of chronic liver disease were negative. The patient underwent a liver biopsy in 12/2020 which demonstrated centrolobular sinusoidal dilation with no inflammaiton, steatosis or fibrosis. A single granuloma was present. Assessment of liver fibrosis was performed with Fibroscan 10/2021. The result was 3.7 kPa which correlates with no fibrosis. Since the last office visit the patient continues to have difficulty eating. EGD was performed 12/2021 which demonstrated dyskinesia of the esophagus and a chronic gastric ulcer. She is currently on sucralfate, dexilant and rabeprazole. A GI appointment is scheduled for next month. The patient has the following symptoms which are thought to be due to the liver disease:  fatigue, nausea, and diffuse abdominal pain. This is chronic. The patient has not experienced the following symptoms:  Itching or swelling of the abdomen. The patient has Mild limitations in functional activities which can be attributed to other medical problems that are not related to the liver disease. ASSESSMENT AND PLAN:  Elevated liver enzymes  Intermittent elevation in liver transaminases and ALP. Serologic testing for causes of chronic liver disease were negative. A liver biopsy in 12/2020 demonstrated centrolobular sinusoidal dilation and a granuloma but no steatosis, no inflammaiton and no fibrosis.       Assessment of liver fibrosis was performed with Fibroscan 10/2021. The result was 3.7 kPa which correlates with no fibrosis. Have performed laboratory testing to monitor liver function and degree of liver injury. This included BMP, hepatic panel, and CBC with platelet count. Laboratory testing from 10/13/2021 reviewed in detail. Follow-up testing ordered today. The liver transaminases and ALP remain elevated. The liver function is normal. The platelet count is normal.     History of Cardiomyopathy  The patient was being followed by cardiology at [facility] for cardiomyopathy. ECHO performed 1/2021 demonstrated an LVEF of 50-55%. Mild left ventricular diastolic dysfunction. Mildly dilated left Atrium. She had a follow up with Dr. Dee Pride which felt the ECHO was normal and cardiomyopathy has resolved with medical treatment. Hepatic granuloma  This has little clinical significance. The mild elevation in ALP may be due to the granuloma. Hepatic granulomas are in most cases benign and do not cause liver injury. Screening for Hepatocellular Carcinoma  HCC screening is not necessary since the patient has no evidence of cirrhosis. Treatment of other medical problems in patients with chronic liver disease  There are no contraindications for the patient to take most medications that are necessary for treatment of other medical issues. Counseling for alcohol in patients with chronic liver disease  The patient was counseled regarding alcohol consumption and the effect of alcohol on chronic liver disease. Patients who have undergone obesity surgery are at much greater risk to develop alcoholic liver injury. She has never consumed significant amounts of alcohol. Vaccinations   Vaccination for viral hepatitis A and B is recommended since the patient has no serologic evidence of previous exposure or vaccination with immunity.   Routine vaccinations against other bacterial and viral agents can be performed as indicated. Annual flu vaccination should be administered if indicated. ALLERGIES  Allergies   Allergen Reactions    Acetaminophen Other (comments)     Advised not to take due to Liver Issues    Morphine Other (comments)     SOB/Chest Pressure - in hosp for a week. States DILAUDID [place]    Oxycodone Shortness of Breath     Reaction Type: Allergy; Severity: Severe    Shellfish Derived Anaphylaxis    Codeine Other (comments)     SOB chest pain    Peanut Oil Other (comments)     Reaction Type: Intolerance; Severity: Severe    Sulfa (Sulfonamide Antibiotics) Rash     itching       MEDICATIONS  Current Outpatient Medications   Medication Sig    escitalopram oxalate (LEXAPRO) 10 mg tablet Take 10 mg by mouth daily.  hydrOXYzine HCL (ATARAX) 25 mg tablet TAKE 1 TABLET BY MOUTH THREE TIMES DAILY AS NEEDED FOR 10 DAYS    latanoprost (XALATAN) 0.005 % ophthalmic solution INSTILL 1 DROP INTO EACH EYE AT NIGHT    ofloxacin (FLOXIN) 0.3 % ophthalmic solution INSTILL 5 DROPS INTO AFFECTED EAR TWICE DAILY    scopolamine (TRANSDERM-SCOP) 1 mg over 3 days pt3d PLACE 1 PATCH BEHIND THE EAR EVERY 3 DAYS A NEEDED FOR NAUSEA    spironolactone (ALDACTONE) 50 mg tablet Take 1 Tablet by mouth daily.  carvediloL (COREG) 6.25 mg tablet Take 1 Tablet by mouth two (2) times a day.  losartan (COZAAR) 25 mg tablet Take 1 Tablet by mouth daily (after breakfast).  bumetanide (BUMEX) 1 mg tablet Take 1 Tablet by mouth daily.  TENS units and TENS electrodes (TENS UNIT AND ELECTRODES) Tens Unit, 1 ea, Dispense: 1 EA, Refills: 0, Easyscript, Maintenance, 0    azelastine (ASTELIN) 137 mcg (0.1 %) nasal spray USE 2 SPRAY(S) IN EACH NOSTRIL TWICE DAILY    azelastine (OPTIVAR) 0.05 % ophthalmic solution 1 Drop two (2) times a day.     augmented betamethasone dipropionate (DIPROLENE-AF) 0.05 % ointment     budesonide (PULMICORT) 0.5 mg/2 mL nbsp USE 1 VIAL IN NEBULIZER TWICE DAILY    citalopram (CELEXA) 20 mg tablet Take 20 mg by mouth daily.  docusate sodium (COLACE) 100 mg capsule Take 100 mg by mouth two (2) times a day.  Premarin 0.625 mg/gram vaginal cream     Ferrex 150 150 mg iron capsule     Linzess 145 mcg cap capsule Take 145 mcg by mouth daily.  metoclopramide HCl (REGLAN) 5 mg tablet     Saccharomyces boulardii (FLORASTOR) 250 mg capsule Take 250 mg by mouth two (2) times a day.  nitroglycerin (NITROSTAT) 0.4 mg SL tablet DISSOLVE 1 TABLET UNDER THE TONGUE EVERY 5 MINUTES AS  NEEDED FOR CHEST PAIN. MAX  OF 3 TABLETS IN 15 MINUTES. CALL 911 IF PAIN PERSISTS.  cetirizine (ZyrTEC) 10 mg tablet Take 10 mg by mouth daily.  metFORMIN (GLUCOPHAGE) 500 mg tablet Take  by mouth two (2) times daily (with meals).  cloNIDine (CATAPRES) 0.1 mg/24 hr ptwk     fluticasone propionate (FLONASE) 50 mcg/actuation nasal spray     BD Luer-Narda Syringe 3 mL 25 gauge x 1\" syrg USE FOR B12 INJECTIONS    RABEprazole (ACIPHEX) 20 mg TbEC Take 20 mg by mouth two (2) times a day.  rizatriptan (MAXALT) 5 mg tablet Take 5 mg by mouth once as needed for Migraine. May repeat in 2 hours if needed     alum-mag hydroxide-simeth-lidocaine-sucralfate Take 30 mL by mouth daily.  lacosamide (Vimpat) 100 mg tab tablet Take 100 mg by mouth two (2) times a day.  tiotropium (SPIRIVA) 18 mcg inhalation capsule Take 1 Cap by inhalation daily.  ondansetron hcl (ZOFRAN) 8 mg tablet Take 8 mg by mouth every eight (8) hours as needed.  levETIRAcetam (KEPPRA) 750 mg tablet 1,500 mg two (2) times a day.  montelukast (SINGULAIR) 10 mg tablet Take 10 mg by mouth daily.  nortriptyline (PAMELOR) 50 mg capsule Take 100 mg by mouth nightly.  sucralfate (CARAFATE) 100 mg/mL suspension Take 1 g by mouth four (4) times daily.  topiramate (TOPAMAX) 100 mg tablet Take 100 mg by mouth two (2) times a day.  VENTOLIN HFA 90 mcg/actuation inhaler Take 2 Puffs by inhalation every four (4) hours as needed.     SYMBICORT 80-4.5 mcg/actuation HFAA INHALE 2 PUFFS BY MOUTH TWICE DAILY    dexlansoprazole (DEXILANT) 60 mg CpDB capsule (delayed release) Take 60 mg by mouth daily.  dicyclomine (BENTYL) 20 mg tablet Take 20 mg by mouth every six (6) hours.  estradiol (ESTRACE) 0.01 % (0.1 mg/gram) vaginal cream     levothyroxine (SYNTHROID) 50 mcg tablet Take 50 mcg by mouth Daily (before breakfast).  cyanocobalamin (VITAMIN B-12) 1,000 mcg/mL injection 1,000 mcg by IntraMUSCular route every thirty (30) days.  Calcium-Cholecalciferol, D3, (CALCIUM 600 WITH VITAMIN D3) 600 mg(1,500mg) -400 unit cap Take 2 Tablets by mouth daily.  multivitamin (ONE A DAY) tablet Take 1 Tab by mouth daily. No current facility-administered medications for this visit. SYSTEM REVIEW NOT RELATED TO LIVER DISEASE OR REVIEWED ABOVE:  Constitution systems: Negative for fever, chills, weight gain, weight loss. Eyes: Negative for visual changes. ENT: Negative for sore throat, painful swallowing. Respiratory: Negative for cough, hemoptysis, SOB. Cardiology: Negative for chest pain, palpitations. GI:  Negative for constipation or diarrhea. : Negative for urinary frequency, dysuria, hematuria, nocturia. Skin: Negative for rash. Hematology: Negative for easy bruising, blood clots. Musculo-skeletal: Negative for back pain, muscle pain, weakness. Neurologic: Negative for headaches, dizziness, vertigo, memory problems not related to HE. Psychology: Negative for anxiety, depression. FAMILY HISTORY:  The father  Has/had the following following chronic disease(s): cancer. The mother Has/had the following chronic disease(s): MI.    There is no family history of liver disease. SOCIAL HISTORY:  The patient is . The patient has 4 children,   The patient has never used tobacco products. The patient has never consumed significant amounts of alcohol. The patient currently receiving disability.       PHYSICAL EXAMINATION PERFORMED BY VIRTUAL TELEHEALTH:  VS: Not performed   General: No acute distress. Eyes: Sclera anicteric. ENT: No oral lesions. Skin: No rashes. spider angiomata. No jaundice. Abdomen: No obvious distention suggesting ascites. Extremities: No edema. No muscle wasting. Neurologic: Alert and oriented. Cranial nerves grossly intact. LABORATORY STUDIES:  Liver [place] of [address] Units 10/13/2021 5/14/2021   WBC 3.6 - 11.0 K/uL 5.1 7.7   ANC 1.8 - 8.0 K/UL 2.5 5.0   HGB 11.5 - 16.0 g/dL 12.2 13.3    - 400 K/uL 170 242   INR 0.9 - 1.1       AST 15 - 37 U/L 48 (H) 49 (H)   ALT 12 - 78 U/L 52 53 (H)   Alk Phos 45 - 117 U/L 154 (H) 169 (H)   Bili, Total 0.2 - 1.0 mg/dL 0.2 <0.2   Bili, Direct 0.00 - 0.40 mg/dL  0.08   Albumin 3.5 - 5.0 g/dL 3.4 (L) 4.5   BUN 6 - 20 mg/dL 19 21   Creat 0.55 - 1.02 mg/dL 0.82 1.02 (H)   Na 136 - 145 mmol/L 138 141   K 3.5 - 5.1 mmol/L 4.2 3.9   Cl 97 - 108 mmol/L 104 97   CO2 21 - 32 mmol/L 27 20   Glucose 65 - 100 mg/dL 81 88     Laboratory testing from 10/13/2021 reviewed in detail. Additional testing included to evaluate progression or regression of disease. Laboratory testing results from today will be communicated by My Chart. SEROLOGIES:  Serologies Latest Ref Rng & Units 7/26/2019   Hep A Ab, Total Negative Negative   Hep B Surface Ag Negative Negative   Hep B Core Ab, Total Negative Negative   Hep B Surface AB QL  Non Reactive   Hep C Ab 0.0 - 0.9 s/co ratio <0.1   Ferritin 15 - 150 ng/mL 14 (L)   Iron % Saturation 15 - 55 % 12 (L)   GULSHAN, IFA  Negative   ASMCA 0 - 19 Units 6   M2 Ab 0.0 - 20.0 Units <20.0   Ceruloplasmin 19.0 - 39.0 mg/dL 23.2   Alpha-1 antitrypsin level 90 - 200 mg/dL 99     Serologies Latest Ref Rng & Units 4/29/2020   C-ANCA Neg:<1:20 titer <1:20   P-ANCA Neg:<1:20 titer <1:20   ANCA Neg:<1:20 titer <1:20     LIVER HISTOLOGY:  8/2019. FibroScan performed at [address]. EkPa was 3.3. IQR/med 9%. . The results suggested a fibrosis level of F0. The CAP score suggests no evidence of fatty liver. 12/2020. Liver biopsy Slides reviewed by MLS. Sinusoidal dilation. A single periportal granuloma. No inflammation, No steatosis, No fibrosis. 10/2021. FibroScan performed at [address]. EkPa was 3.7. IQR/med 5%. . The results suggested a fibrosis level of F0. The CAP score suggests there is no significant hepatic steatosis. ENDOSCOPIC PROCEDURES:  Not available or performed    RADIOLOGY:  8/2019. Ultrasound of liver. Normal appearing liver. No liver mass lesions. 6/2020. MRI/MRCP of abdomen. Normal appearance of the liver and biliary tree status post cholecystectomy. No evidence of primary sclerosing cholangitis or biliary pathology. 9/2021. Ultrasound of liver. Mild intrahepatic dilatation within normal limits post Cholecystectomy. Hepatic mass unchanged. Normal appearing liver. OTHER TESTING:  Not available or performed    FOLLOW-UP AFTER VIRTUAL VISIT:  Pursuant to the emergency declaration under the [address] [place] [address], [facility] waiver authority and the [employer] Act, this Virtual  Visit was conducted, with the patient's (and/or their legal guardian's) consent, to reduce the patient's risk of exposure to COVID-19 and provide necessary medical care. Services were provided through a video synchronous discussion virtually to substitute for an in-person clinic visit. The patient was located in their home. The provider was located in the [place] [address] office. All of the issues listed above in the Assessment and Plan were discussed with the patient. All questions were answered. The patient expressed a clear understanding of the above. Because of the COVID-19 epidemic a follow-up appointment will be performed via TeleHealth in 3 months.      Orders to obtain laboratory testing will be mailed to the patient. GABRIEL Guadalupe-BC  [facility]  [address] [address] [place], [address] [facility].  [address] [facility]

## 2022-03-08 NOTE — Clinical Note
NOTIFICATION RETURN TO WORK / SCHOOL    3/9/2022 7:58 AM    Ms. [address] [facility] [address]      To Whom It May Concern:    Savannah Peter is currently under the care of [address]. She will return to work/school on: ***    If there are questions or concerns please have the patient contact our office.         Sincerely,      Perla Carbajal NP

## 2022-03-08 NOTE — PROGRESS NOTES
Identified pt with two pt identifiers(name and ). Reviewed record in preparation for visit and have obtained necessary documentation. Chief Complaint   Patient presents with    Elevated Liver Enzymes     3month VV with April      There were no vitals filed for this visit. Health Maintenance Review: Patient reminded of \"due or due soon\" health maintenance. I have asked the patient to contact his/her primary care provider (PCP) for follow-up on his/her health maintenance. Coordination of Care Questionnaire:  :   1) Have you been to an emergency room, urgent care, or hospitalized since your last visit? If yes, where when, and reason for visit? no       2. Have seen or consulted any other health care provider since your last visit? If yes, where when, and reason for visit? NO      Patient is accompanied by self I have received verbal consent from Aidan Rodriges to discuss any/all medical information while they are present in the room.

## 2022-03-13 LAB
ALBUMIN SERPL-MCNC: 4.3 G/DL (ref 3.8–4.9)
ALP SERPL-CCNC: 142 IU/L (ref 44–121)
ALT SERPL-CCNC: 60 IU/L (ref 0–32)
AST SERPL-CCNC: 48 IU/L (ref 0–40)
BASOPHILS # BLD AUTO: 0 X10E3/UL (ref 0–0.2)
BASOPHILS NFR BLD AUTO: 1 %
BILIRUB DIRECT SERPL-MCNC: <0.1 MG/DL (ref 0–0.4)
BILIRUB SERPL-MCNC: <0.2 MG/DL (ref 0–1.2)
BUN SERPL-MCNC: 13 MG/DL (ref 6–24)
BUN/CREAT SERPL: 14 (ref 9–23)
CALCIUM SERPL-MCNC: 9.1 MG/DL (ref 8.7–10.2)
CHLORIDE SERPL-SCNC: 104 MMOL/L (ref 96–106)
CO2 SERPL-SCNC: 20 MMOL/L (ref 20–29)
CREAT SERPL-MCNC: 0.94 MG/DL (ref 0.57–1)
EGFR: 73 ML/MIN/1.73
EOSINOPHIL # BLD AUTO: 0.2 X10E3/UL (ref 0–0.4)
EOSINOPHIL NFR BLD AUTO: 5 %
ERYTHROCYTE [DISTWIDTH] IN BLOOD BY AUTOMATED COUNT: 13.8 % (ref 11.7–15.4)
GLUCOSE SERPL-MCNC: 87 MG/DL (ref 65–99)
HCT VFR BLD AUTO: 37.1 % (ref 34–46.6)
HGB BLD-MCNC: 12.3 G/DL (ref 11.1–15.9)
IMM GRANULOCYTES # BLD AUTO: 0 X10E3/UL (ref 0–0.1)
IMM GRANULOCYTES NFR BLD AUTO: 0 %
LYMPHOCYTES # BLD AUTO: 1.5 X10E3/UL (ref 0.7–3.1)
LYMPHOCYTES NFR BLD AUTO: 28 %
MCH RBC QN AUTO: 28.1 PG (ref 26.6–33)
MCHC RBC AUTO-ENTMCNC: 33.2 G/DL (ref 31.5–35.7)
MCV RBC AUTO: 85 FL (ref 79–97)
MONOCYTES # BLD AUTO: 0.5 X10E3/UL (ref 0.1–0.9)
MONOCYTES NFR BLD AUTO: 9 %
NEUTROPHILS # BLD AUTO: 3.1 X10E3/UL (ref 1.4–7)
NEUTROPHILS NFR BLD AUTO: 57 %
PLATELET # BLD AUTO: 239 X10E3/UL (ref 150–450)
POTASSIUM SERPL-SCNC: 4 MMOL/L (ref 3.5–5.2)
PROT SERPL-MCNC: 6.6 G/DL (ref 6–8.5)
RBC # BLD AUTO: 4.38 X10E6/UL (ref 3.77–5.28)
SODIUM SERPL-SCNC: 139 MMOL/L (ref 134–144)
WBC # BLD AUTO: 5.4 X10E3/UL (ref 3.4–10.8)

## 2022-03-16 NOTE — PROGRESS NOTES
Letter sent to the patient via my chart regarding the blood work results. The liver enzymes are essentially unchanged.

## 2022-03-18 PROBLEM — R93.1 REGIONAL WALL MOTION ABNORMALITY OF HEART: Status: ACTIVE | Noted: 2021-04-10

## 2022-03-18 PROBLEM — E03.9 HYPOTHYROIDISM: Status: ACTIVE | Noted: 2019-07-26

## 2022-03-18 PROBLEM — I47.29 NSVT (NONSUSTAINED VENTRICULAR TACHYCARDIA) (HCC): Status: ACTIVE | Noted: 2021-04-10

## 2022-03-18 PROBLEM — I95.2 HYPOTENSION DUE TO DRUGS: Status: ACTIVE | Noted: 2021-10-21

## 2022-03-18 PROBLEM — Z98.84 H/O GASTRIC BYPASS: Status: ACTIVE | Noted: 2019-07-26

## 2022-03-18 PROBLEM — Z90.49 HISTORY OF CHOLECYSTECTOMY: Status: ACTIVE | Noted: 2019-07-26

## 2022-03-18 PROBLEM — G43.909 MIGRAINE HEADACHE: Status: ACTIVE | Noted: 2019-07-26

## 2022-03-19 PROBLEM — Z90.49 HISTORY OF APPENDECTOMY: Status: ACTIVE | Noted: 2019-07-26

## 2022-03-19 PROBLEM — Z86.19 H/O CLOSTRIDIUM DIFFICILE INFECTION: Status: ACTIVE | Noted: 2020-07-21

## 2022-03-19 PROBLEM — M25.561 RIGHT KNEE PAIN: Status: ACTIVE | Noted: 2021-01-04

## 2022-03-19 PROBLEM — I42.8 NICM (NONISCHEMIC CARDIOMYOPATHY) (HCC): Status: ACTIVE | Noted: 2019-07-26

## 2022-03-19 PROBLEM — R55 VASOVAGAL SYNCOPE: Status: ACTIVE | Noted: 2021-04-10

## 2022-03-19 PROBLEM — R10.84 GENERALIZED ABDOMINAL PAIN: Status: ACTIVE | Noted: 2020-07-21

## 2022-03-19 PROBLEM — Z90.49 HISTORY OF BOWEL RESECTION: Status: ACTIVE | Noted: 2020-04-21

## 2022-03-19 PROBLEM — G62.9 NEUROPATHY: Status: ACTIVE | Noted: 2019-07-26

## 2022-03-20 PROBLEM — I50.42 CHRONIC COMBINED SYSTOLIC AND DIASTOLIC CONGESTIVE HEART FAILURE (HCC): Status: ACTIVE | Noted: 2021-04-10

## 2022-03-20 PROBLEM — R74.8 ELEVATED LIVER ENZYMES: Status: ACTIVE | Noted: 2019-07-26

## 2022-03-20 PROBLEM — R10.9 ABDOMINAL PAIN: Status: ACTIVE | Noted: 2021-12-13

## 2022-03-20 PROBLEM — R07.89 ATYPICAL CHEST PAIN: Status: ACTIVE | Noted: 2021-04-10

## 2022-03-20 PROBLEM — R19.7 DIARRHEA: Status: ACTIVE | Noted: 2020-07-21

## 2022-03-20 PROBLEM — R56.9 SEIZURES (HCC): Status: ACTIVE | Noted: 2019-07-26

## 2022-05-08 ENCOUNTER — APPOINTMENT (OUTPATIENT)
Dept: GENERAL RADIOLOGY | Age: 52
End: 2022-05-08
Attending: EMERGENCY MEDICINE
Payer: MEDICARE

## 2022-05-08 ENCOUNTER — APPOINTMENT (OUTPATIENT)
Dept: CT IMAGING | Age: 52
End: 2022-05-08
Attending: EMERGENCY MEDICINE
Payer: MEDICARE

## 2022-05-08 ENCOUNTER — HOSPITAL ENCOUNTER (OUTPATIENT)
Age: 52
Setting detail: OBSERVATION
Discharge: HOME | End: 2022-05-09
Attending: STUDENT IN AN ORGANIZED HEALTH CARE EDUCATION/TRAINING PROGRAM | Admitting: INTERNAL MEDICINE
Payer: MEDICARE

## 2022-05-08 DIAGNOSIS — G90.01 CAROTID SINUS SYNCOPE: ICD-10-CM

## 2022-05-08 DIAGNOSIS — R10.84 GENERALIZED ABDOMINAL PAIN: ICD-10-CM

## 2022-05-08 DIAGNOSIS — S82.892D CLOSED FRACTURE OF LEFT ANKLE WITH ROUTINE HEALING, SUBSEQUENT ENCOUNTER: ICD-10-CM

## 2022-05-08 DIAGNOSIS — R55 SYNCOPE AND COLLAPSE: Primary | ICD-10-CM

## 2022-05-08 LAB
ALBUMIN SERPL-MCNC: 3.4 G/DL (ref 3.5–5)
ALBUMIN/GLOB SERPL: 1.1 {RATIO} (ref 1.1–2.2)
ALP SERPL-CCNC: 180 U/L (ref 45–117)
ALT SERPL-CCNC: 111 U/L (ref 12–78)
ANION GAP SERPL CALC-SCNC: 4 MMOL/L (ref 5–15)
AST SERPL W P-5'-P-CCNC: 82 U/L (ref 15–37)
ATRIAL RATE: 96 BPM
BASOPHILS # BLD: 0 K/UL (ref 0–0.1)
BASOPHILS NFR BLD: 1 % (ref 0–1)
BILIRUB SERPL-MCNC: 0.2 MG/DL (ref 0.2–1)
BUN SERPL-MCNC: 13 MG/DL (ref 6–20)
BUN/CREAT SERPL: 18 (ref 12–20)
CA-I BLD-MCNC: 8.6 MG/DL (ref 8.5–10.1)
CALCULATED P AXIS, ECG09: 48 DEGREES
CALCULATED R AXIS, ECG10: -16 DEGREES
CALCULATED T AXIS, ECG11: 47 DEGREES
CHLORIDE SERPL-SCNC: 113 MMOL/L (ref 97–108)
CO2 SERPL-SCNC: 25 MMOL/L (ref 21–32)
CREAT SERPL-MCNC: 0.72 MG/DL (ref 0.55–1.02)
DIAGNOSIS, 93000: NORMAL
DIFFERENTIAL METHOD BLD: ABNORMAL
EOSINOPHIL # BLD: 0.1 K/UL (ref 0–0.4)
EOSINOPHIL NFR BLD: 2 % (ref 0–7)
ERYTHROCYTE [DISTWIDTH] IN BLOOD BY AUTOMATED COUNT: 15.8 % (ref 11.5–14.5)
GLOBULIN SER CALC-MCNC: 3 G/DL (ref 2–4)
GLUCOSE BLD STRIP.AUTO-MCNC: 114 MG/DL (ref 65–117)
GLUCOSE BLD STRIP.AUTO-MCNC: 96 MG/DL (ref 65–117)
GLUCOSE SERPL-MCNC: 94 MG/DL (ref 65–100)
HCT VFR BLD AUTO: 37.6 % (ref 35–47)
HGB BLD-MCNC: 12.1 G/DL (ref 11.5–16)
IMM GRANULOCYTES # BLD AUTO: 0 K/UL (ref 0–0.04)
IMM GRANULOCYTES NFR BLD AUTO: 0 % (ref 0–0.5)
LYMPHOCYTES # BLD: 0.4 K/UL (ref 0.8–3.5)
LYMPHOCYTES NFR BLD: 10 % (ref 12–49)
MCH RBC QN AUTO: 28 PG (ref 26–34)
MCHC RBC AUTO-ENTMCNC: 32.2 G/DL (ref 30–36.5)
MCV RBC AUTO: 87 FL (ref 80–99)
MONOCYTES # BLD: 0.3 K/UL (ref 0–1)
MONOCYTES NFR BLD: 8 % (ref 5–13)
NEUTS SEG # BLD: 3.1 K/UL (ref 1.8–8)
NEUTS SEG NFR BLD: 79 % (ref 32–75)
NRBC # BLD: 0 K/UL (ref 0–0.01)
NRBC BLD-RTO: 0 PER 100 WBC
P-R INTERVAL, ECG05: 172 MS
PERFORMED BY, TECHID: NORMAL
PERFORMED BY, TECHID: NORMAL
PLATELET # BLD AUTO: 251 K/UL (ref 150–400)
PMV BLD AUTO: 10.5 FL (ref 8.9–12.9)
POTASSIUM SERPL-SCNC: 4 MMOL/L (ref 3.5–5.1)
PROT SERPL-MCNC: 6.4 G/DL (ref 6.4–8.2)
Q-T INTERVAL, ECG07: 328 MS
QRS DURATION, ECG06: 88 MS
QTC CALCULATION (BEZET), ECG08: 414 MS
RBC # BLD AUTO: 4.32 M/UL (ref 3.8–5.2)
SODIUM SERPL-SCNC: 142 MMOL/L (ref 136–145)
TROPONIN-HIGH SENSITIVITY: 4 NG/L (ref 0–51)
TSH SERPL DL<=0.05 MIU/L-ACNC: 1.04 UIU/ML (ref 0.36–3.74)
VENTRICULAR RATE, ECG03: 96 BPM
WBC # BLD AUTO: 3.9 K/UL (ref 3.6–11)

## 2022-05-08 PROCEDURE — 74011000250 HC RX REV CODE- 250: Performed by: STUDENT IN AN ORGANIZED HEALTH CARE EDUCATION/TRAINING PROGRAM

## 2022-05-08 PROCEDURE — 74011250637 HC RX REV CODE- 250/637: Performed by: STUDENT IN AN ORGANIZED HEALTH CARE EDUCATION/TRAINING PROGRAM

## 2022-05-08 PROCEDURE — 84443 ASSAY THYROID STIM HORMONE: CPT

## 2022-05-08 PROCEDURE — 85025 COMPLETE CBC W/AUTO DIFF WBC: CPT

## 2022-05-08 PROCEDURE — 71045 X-RAY EXAM CHEST 1 VIEW: CPT

## 2022-05-08 PROCEDURE — 82962 GLUCOSE BLOOD TEST: CPT

## 2022-05-08 PROCEDURE — 84484 ASSAY OF TROPONIN QUANT: CPT

## 2022-05-08 PROCEDURE — 80053 COMPREHEN METABOLIC PANEL: CPT

## 2022-05-08 PROCEDURE — 99285 EMERGENCY DEPT VISIT HI MDM: CPT

## 2022-05-08 PROCEDURE — G0378 HOSPITAL OBSERVATION PER HR: HCPCS

## 2022-05-08 PROCEDURE — 74011250636 HC RX REV CODE- 250/636: Performed by: STUDENT IN AN ORGANIZED HEALTH CARE EDUCATION/TRAINING PROGRAM

## 2022-05-08 PROCEDURE — 94640 AIRWAY INHALATION TREATMENT: CPT

## 2022-05-08 PROCEDURE — 73610 X-RAY EXAM OF ANKLE: CPT

## 2022-05-08 PROCEDURE — 70450 CT HEAD/BRAIN W/O DYE: CPT

## 2022-05-08 PROCEDURE — 93005 ELECTROCARDIOGRAM TRACING: CPT

## 2022-05-08 PROCEDURE — 73564 X-RAY EXAM KNEE 4 OR MORE: CPT

## 2022-05-08 PROCEDURE — 36415 COLL VENOUS BLD VENIPUNCTURE: CPT

## 2022-05-08 PROCEDURE — 96374 THER/PROPH/DIAG INJ IV PUSH: CPT

## 2022-05-08 RX ORDER — LATANOPROST 50 UG/ML
1 SOLUTION/ DROPS OPHTHALMIC EVERY EVENING
Status: DISCONTINUED | OUTPATIENT
Start: 2022-05-08 | End: 2022-05-10 | Stop reason: HOSPADM

## 2022-05-08 RX ORDER — MONTELUKAST SODIUM 10 MG/1
10 TABLET ORAL DAILY
Status: DISCONTINUED | OUTPATIENT
Start: 2022-05-09 | End: 2022-05-10 | Stop reason: HOSPADM

## 2022-05-08 RX ORDER — ONDANSETRON 4 MG/1
4 TABLET, ORALLY DISINTEGRATING ORAL
Status: DISCONTINUED | OUTPATIENT
Start: 2022-05-08 | End: 2022-05-10 | Stop reason: HOSPADM

## 2022-05-08 RX ORDER — LANOLIN ALCOHOL/MO/W.PET/CERES
1 CREAM (GRAM) TOPICAL
Status: DISCONTINUED | OUTPATIENT
Start: 2022-05-09 | End: 2022-05-10 | Stop reason: HOSPADM

## 2022-05-08 RX ORDER — HYDROXYZINE HYDROCHLORIDE 10 MG/1
10 TABLET, FILM COATED ORAL
Status: DISCONTINUED | OUTPATIENT
Start: 2022-05-08 | End: 2022-05-10 | Stop reason: HOSPADM

## 2022-05-08 RX ORDER — HYDROMORPHONE HYDROCHLORIDE 1 MG/ML
0.5 INJECTION, SOLUTION INTRAMUSCULAR; INTRAVENOUS; SUBCUTANEOUS
Status: DISCONTINUED | OUTPATIENT
Start: 2022-05-08 | End: 2022-05-10 | Stop reason: HOSPADM

## 2022-05-08 RX ORDER — CITALOPRAM 20 MG/1
20 TABLET, FILM COATED ORAL DAILY
Status: DISCONTINUED | OUTPATIENT
Start: 2022-05-09 | End: 2022-05-10 | Stop reason: HOSPADM

## 2022-05-08 RX ORDER — BUMETANIDE 1 MG/1
1 TABLET ORAL DAILY
Status: DISCONTINUED | OUTPATIENT
Start: 2022-05-09 | End: 2022-05-10 | Stop reason: HOSPADM

## 2022-05-08 RX ORDER — LEVOTHYROXINE SODIUM 25 UG/1
50 TABLET ORAL
Status: DISCONTINUED | OUTPATIENT
Start: 2022-05-09 | End: 2022-05-10 | Stop reason: HOSPADM

## 2022-05-08 RX ORDER — ONDANSETRON 2 MG/ML
4 INJECTION INTRAMUSCULAR; INTRAVENOUS
Status: DISCONTINUED | OUTPATIENT
Start: 2022-05-08 | End: 2022-05-10 | Stop reason: HOSPADM

## 2022-05-08 RX ORDER — SODIUM CHLORIDE 0.9 % (FLUSH) 0.9 %
5-40 SYRINGE (ML) INJECTION EVERY 8 HOURS
Status: DISCONTINUED | OUTPATIENT
Start: 2022-05-08 | End: 2022-05-10 | Stop reason: HOSPADM

## 2022-05-08 RX ORDER — LEVETIRACETAM 250 MG/1
750 TABLET ORAL 2 TIMES DAILY
Status: DISCONTINUED | OUTPATIENT
Start: 2022-05-08 | End: 2022-05-09

## 2022-05-08 RX ORDER — SODIUM CHLORIDE 9 MG/ML
100 INJECTION, SOLUTION INTRAVENOUS CONTINUOUS
Status: DISCONTINUED | OUTPATIENT
Start: 2022-05-08 | End: 2022-05-10 | Stop reason: HOSPADM

## 2022-05-08 RX ORDER — BUDESONIDE AND FORMOTEROL FUMARATE DIHYDRATE 80; 4.5 UG/1; UG/1
2 AEROSOL RESPIRATORY (INHALATION) 2 TIMES DAILY
Status: DISCONTINUED | OUTPATIENT
Start: 2022-05-08 | End: 2022-05-10 | Stop reason: HOSPADM

## 2022-05-08 RX ORDER — ALBUTEROL SULFATE 90 UG/1
2 AEROSOL, METERED RESPIRATORY (INHALATION)
Status: DISCONTINUED | OUTPATIENT
Start: 2022-05-08 | End: 2022-05-10 | Stop reason: HOSPADM

## 2022-05-08 RX ORDER — INSULIN LISPRO 100 [IU]/ML
INJECTION, SOLUTION INTRAVENOUS; SUBCUTANEOUS
Status: DISCONTINUED | OUTPATIENT
Start: 2022-05-08 | End: 2022-05-10 | Stop reason: HOSPADM

## 2022-05-08 RX ORDER — HYDROMORPHONE HYDROCHLORIDE 1 MG/ML
0.5 INJECTION, SOLUTION INTRAMUSCULAR; INTRAVENOUS; SUBCUTANEOUS
Status: DISCONTINUED | OUTPATIENT
Start: 2022-05-08 | End: 2022-05-08

## 2022-05-08 RX ORDER — CALCIUM CARBONATE/VITAMIN D3 600MG-5MCG
1 TABLET ORAL 2 TIMES DAILY WITH MEALS
Status: DISCONTINUED | OUTPATIENT
Start: 2022-05-08 | End: 2022-05-10 | Stop reason: HOSPADM

## 2022-05-08 RX ORDER — CARVEDILOL 3.12 MG/1
6.25 TABLET ORAL 2 TIMES DAILY WITH MEALS
Status: DISCONTINUED | OUTPATIENT
Start: 2022-05-09 | End: 2022-05-10 | Stop reason: HOSPADM

## 2022-05-08 RX ORDER — SODIUM CHLORIDE 0.9 % (FLUSH) 0.9 %
5-40 SYRINGE (ML) INJECTION AS NEEDED
Status: DISCONTINUED | OUTPATIENT
Start: 2022-05-08 | End: 2022-05-10 | Stop reason: HOSPADM

## 2022-05-08 RX ORDER — MIDODRINE HYDROCHLORIDE 5 MG/1
10 TABLET ORAL
Status: DISCONTINUED | OUTPATIENT
Start: 2022-05-08 | End: 2022-05-10 | Stop reason: HOSPADM

## 2022-05-08 RX ORDER — MAGNESIUM SULFATE 100 %
4 CRYSTALS MISCELLANEOUS AS NEEDED
Status: DISCONTINUED | OUTPATIENT
Start: 2022-05-08 | End: 2022-05-10 | Stop reason: HOSPADM

## 2022-05-08 RX ORDER — BUDESONIDE 0.5 MG/2ML
500 INHALANT ORAL
Status: DISCONTINUED | OUTPATIENT
Start: 2022-05-08 | End: 2022-05-09 | Stop reason: SDUPTHER

## 2022-05-08 RX ORDER — DOCUSATE SODIUM 100 MG/1
100 CAPSULE, LIQUID FILLED ORAL 2 TIMES DAILY
Status: DISCONTINUED | OUTPATIENT
Start: 2022-05-08 | End: 2022-05-10 | Stop reason: HOSPADM

## 2022-05-08 RX ORDER — ESCITALOPRAM OXALATE 10 MG/1
10 TABLET ORAL DAILY
Status: DISCONTINUED | OUTPATIENT
Start: 2022-05-09 | End: 2022-05-10 | Stop reason: HOSPADM

## 2022-05-08 RX ORDER — LACOSAMIDE 100 MG/1
200 TABLET ORAL 2 TIMES DAILY
Status: DISCONTINUED | OUTPATIENT
Start: 2022-05-08 | End: 2022-05-10 | Stop reason: HOSPADM

## 2022-05-08 RX ORDER — POLYETHYLENE GLYCOL 3350 17 G/17G
17 POWDER, FOR SOLUTION ORAL DAILY PRN
Status: DISCONTINUED | OUTPATIENT
Start: 2022-05-08 | End: 2022-05-10 | Stop reason: HOSPADM

## 2022-05-08 RX ORDER — IBUPROFEN 600 MG/1
600 TABLET ORAL
Status: COMPLETED | OUTPATIENT
Start: 2022-05-08 | End: 2022-05-08

## 2022-05-08 RX ADMIN — SODIUM CHLORIDE 500 ML: 9 INJECTION, SOLUTION INTRAVENOUS at 14:22

## 2022-05-08 RX ADMIN — SODIUM CHLORIDE, PRESERVATIVE FREE 10 ML: 5 INJECTION INTRAVENOUS at 17:56

## 2022-05-08 RX ADMIN — SODIUM CHLORIDE, PRESERVATIVE FREE 10 ML: 5 INJECTION INTRAVENOUS at 21:10

## 2022-05-08 RX ADMIN — SODIUM CHLORIDE 100 ML/HR: 9 INJECTION, SOLUTION INTRAVENOUS at 14:22

## 2022-05-08 RX ADMIN — BUDESONIDE AND FORMOTEROL FUMARATE DIHYDRATE 2 PUFF: 80; 4.5 AEROSOL RESPIRATORY (INHALATION) at 22:36

## 2022-05-08 RX ADMIN — IBUPROFEN 600 MG: 600 TABLET ORAL at 14:19

## 2022-05-08 RX ADMIN — LACOSAMIDE 200 MG: 100 TABLET, FILM COATED ORAL at 21:09

## 2022-05-08 RX ADMIN — HYDROMORPHONE HYDROCHLORIDE 0.5 MG: 1 INJECTION, SOLUTION INTRAMUSCULAR; INTRAVENOUS; SUBCUTANEOUS at 21:09

## 2022-05-08 RX ADMIN — MIDODRINE HYDROCHLORIDE 10 MG: 5 TABLET ORAL at 18:03

## 2022-05-08 RX ADMIN — SODIUM CHLORIDE 1000 ML: 9 INJECTION, SOLUTION INTRAVENOUS at 14:12

## 2022-05-08 NOTE — ED PROVIDER NOTES
EMERGENCY DEPARTMENT HISTORY AND PHYSICAL EXAM      Date: 5/8/2022  Patient Name: Nunu Serrano    History of Presenting Illness     Chief Complaint   Patient presents with    Syncope    Headache    Ankle Pain     L    Knee Pain     L       History Provided By: Patient and Patient's Daughter    HPI: Nunu Serrano, 46 y.o. female with a past medical history significant Asthma, CHF, diabetes, seizure disorder presents to the ED with cc of syncopal episode. Patient reportedly was walking to bathroom when she suddenly lost consciousness and fell to the floor. Daughter found patient on ground, no note of any seizure-like activity, no bladder or bowel incontinence. Patient states that she does not remember even walking to bathroom or falling. States she remembers waking up on floor, complaining of significant headache, and left ankle pain. No chest pains, palpitations, shortness of breath. Currently patient awake alert and oriented, no distress, no weakness in extremities. Patient states she is compliant with her seizure medication. There are no other complaints, changes, or physical findings at this time. PCP: Carrie Cunningham MD    Current Facility-Administered Medications   Medication Dose Route Frequency Provider Last Rate Last Admin    sodium chloride 0.9 % bolus infusion 1,000 mL  1,000 mL IntraVENous ONCE Ericka Delacruz MD 1,000 mL/hr at 05/08/22 1412 1,000 mL at 05/08/22 1412    ibuprofen (MOTRIN) tablet 600 mg  600 mg Oral NOW Ericka Delacruz MD         Current Outpatient Medications   Medication Sig Dispense Refill    escitalopram oxalate (LEXAPRO) 10 mg tablet Take 10 mg by mouth daily.       hydrOXYzine HCL (ATARAX) 25 mg tablet TAKE 1 TABLET BY MOUTH THREE TIMES DAILY AS NEEDED FOR 10 DAYS      latanoprost (XALATAN) 0.005 % ophthalmic solution INSTILL 1 DROP INTO EACH EYE AT NIGHT      ofloxacin (FLOXIN) 0.3 % ophthalmic solution INSTILL 5 DROPS INTO AFFECTED EAR TWICE DAILY  scopolamine (TRANSDERM-SCOP) 1 mg over 3 days pt3d PLACE 1 PATCH BEHIND THE EAR EVERY 3 DAYS A NEEDED FOR NAUSEA      spironolactone (ALDACTONE) 50 mg tablet Take 1 Tablet by mouth daily. 90 Tablet 1    carvediloL (COREG) 6.25 mg tablet Take 1 Tablet by mouth two (2) times a day. 180 Tablet 3    losartan (COZAAR) 25 mg tablet Take 1 Tablet by mouth daily (after breakfast). 90 Tablet 3    bumetanide (BUMEX) 1 mg tablet Take 1 Tablet by mouth daily. 180 Tablet 3    TENS units and TENS electrodes (TENS UNIT AND ELECTRODES) Tens Unit, 1 ea, Dispense: 1 EA, Refills: 0, Easyscript, Maintenance, 0      azelastine (ASTELIN) 137 mcg (0.1 %) nasal spray USE 2 SPRAY(S) IN EACH NOSTRIL TWICE DAILY      azelastine (OPTIVAR) 0.05 % ophthalmic solution 1 Drop two (2) times a day.  augmented betamethasone dipropionate (DIPROLENE-AF) 0.05 % ointment       budesonide (PULMICORT) 0.5 mg/2 mL nbsp USE 1 VIAL IN NEBULIZER TWICE DAILY      citalopram (CELEXA) 20 mg tablet Take 20 mg by mouth daily.  docusate sodium (COLACE) 100 mg capsule Take 100 mg by mouth two (2) times a day.  Premarin 0.625 mg/gram vaginal cream       Ferrex 150 150 mg iron capsule       Linzess 145 mcg cap capsule Take 145 mcg by mouth daily.  metoclopramide HCl (REGLAN) 5 mg tablet       Saccharomyces boulardii (FLORASTOR) 250 mg capsule Take 250 mg by mouth two (2) times a day.  nitroglycerin (NITROSTAT) 0.4 mg SL tablet DISSOLVE 1 TABLET UNDER THE TONGUE EVERY 5 MINUTES AS  NEEDED FOR CHEST PAIN. MAX  OF 3 TABLETS IN 15 MINUTES. CALL 911 IF PAIN PERSISTS. 100 Tablet 3    cetirizine (ZyrTEC) 10 mg tablet Take 10 mg by mouth daily.  metFORMIN (GLUCOPHAGE) 500 mg tablet Take  by mouth two (2) times daily (with meals).       cloNIDine (CATAPRES) 0.1 mg/24 hr ptwk       fluticasone propionate (FLONASE) 50 mcg/actuation nasal spray       BD Luer-Narda Syringe 3 mL 25 gauge x 1\" syrg USE FOR B12 INJECTIONS      RABEprazole (ACIPHEX) 20 mg TbEC Take 20 mg by mouth two (2) times a day.  rizatriptan (MAXALT) 5 mg tablet Take 5 mg by mouth once as needed for Migraine. May repeat in 2 hours if needed       alum-mag hydroxide-simeth-lidocaine-sucralfate Take 30 mL by mouth daily.  lacosamide (Vimpat) 100 mg tab tablet Take 100 mg by mouth two (2) times a day.  tiotropium (SPIRIVA) 18 mcg inhalation capsule Take 1 Cap by inhalation daily.  ondansetron hcl (ZOFRAN) 8 mg tablet Take 8 mg by mouth every eight (8) hours as needed.  levETIRAcetam (KEPPRA) 750 mg tablet 1,500 mg two (2) times a day. 1    montelukast (SINGULAIR) 10 mg tablet Take 10 mg by mouth daily. 3    nortriptyline (PAMELOR) 50 mg capsule Take 100 mg by mouth nightly. 5    sucralfate (CARAFATE) 100 mg/mL suspension Take 1 g by mouth four (4) times daily.  topiramate (TOPAMAX) 100 mg tablet Take 100 mg by mouth two (2) times a day. 5    VENTOLIN HFA 90 mcg/actuation inhaler Take 2 Puffs by inhalation every four (4) hours as needed. 4    SYMBICORT 80-4.5 mcg/actuation HFAA INHALE 2 PUFFS BY MOUTH TWICE DAILY  4    dexlansoprazole (DEXILANT) 60 mg CpDB capsule (delayed release) Take 60 mg by mouth daily.  dicyclomine (BENTYL) 20 mg tablet Take 20 mg by mouth every six (6) hours.  estradiol (ESTRACE) 0.01 % (0.1 mg/gram) vaginal cream   2    levothyroxine (SYNTHROID) 50 mcg tablet Take 50 mcg by mouth Daily (before breakfast).  cyanocobalamin (VITAMIN B-12) 1,000 mcg/mL injection 1,000 mcg by IntraMUSCular route every thirty (30) days.  Calcium-Cholecalciferol, D3, (CALCIUM 600 WITH VITAMIN D3) 600 mg(1,500mg) -400 unit cap Take 2 Tablets by mouth daily.  multivitamin (ONE A DAY) tablet Take 1 Tab by mouth daily.          Past History     Past Medical History:  Past Medical History:   Diagnosis Date    Arthritis     pt states knees and back    Asthma     Cardiomyopathy ([facility] [address] 2005    EF 35%, echo in 2021 normal EF     Colon polyp     Diabetes ([facility] [address]     Dizzy spells     GERD (gastroesophageal reflux disease)     Heart failure ([facility] [address] 2005    now combined systolic, diastolic, seen at [facility] 2021- Dr Genora Boeck Irritable bowel syndrome     Knee pain, bilateral     Migraines 2011    2-3 week    Neuropathy     pt states of both feet    Seizures (HCC)     petite mals last seizure 2-3 months ago    Thyroid disease     hypothyroid       Past Surgical History:  Past Surgical History:   Procedure Laterality Date    HX CHOLECYSTECTOMY  5/2006    HX COLONOSCOPY      HX ENDOSCOPY      HX GASTRIC BYPASS  2013    HX HYSTERECTOMY  2007    HX KNEE ARTHROSCOPY Right 05/2021    HX ORTHOPAEDIC Bilateral 2013    CTR    HX ORTHOPAEDIC      right knee procedure 2021    HX OTHER SURGICAL      left axillary lymph node biopsy    HX SMALL BOWEL RESECTION      [place] ABDOMEN SURGERY PROC UNLISTED  2008    cholecystectomy    IL APPENDECTOMY      IL CARDIAC SURG PROCEDURE UNLIST      Loop recorder implanted july 2020    IL LAP,CHOLECYSTECTOMY      IL PART REMOVAL COLON W ANASTOMOSIS      IL RESECT SMALL INTEST W ENTEROSTOMY      IL STOMACH SURGERY PROCEDURE UNLISTED         Family History:  Family History   Problem Relation Age of Onset    Hypertension Mother     Heart Disease Mother     Cancer Father         stomach cancer    Hypertension Father     No Known Problems Brother     Asthma Daughter     Asthma Son     Asthma Son     Breast Cancer Paternal Aunt         age unknown       Social History:  Social History     Tobacco Use    Smoking status: Never Smoker    Smokeless tobacco: Never Used   Vaping Use    Vaping Use: Never used   Substance Use Topics    Alcohol use: No    Drug use: No       Allergies:   Allergies   Allergen Reactions    Acetaminophen Other (comments)     Advised not to take due to Liver Issues    Morphine Other (comments)     SOB/Chest Pressure - in hosp for a week.  States DILAUDID Ok    Oxycodone Shortness of Breath     Reaction Type: Allergy; Severity: Severe    Shellfish Derived Anaphylaxis    Codeine Other (comments)     SOB chest pain    Peanut Oil Other (comments)     Reaction Type: Intolerance; Severity: Severe    Sulfa (Sulfonamide Antibiotics) Rash     itching         Review of Systems     Review of Systems   Constitutional: Negative for activity change, chills, fatigue and fever. HENT: Negative for congestion and trouble swallowing. Eyes: Negative for photophobia and visual disturbance. Respiratory: Negative for cough, chest tightness and shortness of breath. Cardiovascular: Negative for chest pain and palpitations. Gastrointestinal: Negative for abdominal distention, abdominal pain, diarrhea, nausea and vomiting. Genitourinary: Negative for dysuria, flank pain and frequency. Musculoskeletal: Positive for arthralgias and myalgias. Negative for back pain. Skin: Negative for color change, pallor and rash. Neurological: Positive for syncope. Negative for dizziness, tremors, weakness and headaches. Psychiatric/Behavioral: Negative for confusion. Physical Exam     Physical Exam  Vitals and nursing note reviewed. Constitutional:       General: She is not in acute distress. Appearance: Normal appearance. She is normal weight. She is not ill-appearing. HENT:      Head: Normocephalic and atraumatic. Nose: Nose normal.      Mouth/Throat:      Mouth: Mucous membranes are moist.   Eyes:      Extraocular Movements: Extraocular movements intact. Pupils: Pupils are equal, round, and reactive to light. Cardiovascular:      Rate and Rhythm: Normal rate and regular rhythm. Pulses: Normal pulses. Pulmonary:      Effort: Pulmonary effort is normal.   Abdominal:      General: Abdomen is flat. Bowel sounds are normal.      Palpations: Abdomen is soft. Tenderness: There is no abdominal tenderness. There is no guarding. Musculoskeletal:         General: Swelling, tenderness and signs of injury present. Normal range of motion. Cervical back: Normal range of motion and neck supple. No muscular tenderness. Comments: L ankle tenderness to palpation over dorsal aspect   Skin:     General: Skin is warm and dry. Neurological:      General: No focal deficit present. Mental Status: She is alert and oriented to person, place, and time. Sensory: No sensory deficit. Motor: No weakness. Diagnostic Study Results     Labs -     Recent Results (from the past 12 hour(s))   EKG, 12 LEAD, INITIAL    Collection Time: 05/08/22 12:11 PM   Result Value Ref Range    Ventricular Rate 96 BPM    Atrial Rate 96 BPM    P-R Interval 172 ms    QRS Duration 88 ms    Q-T Interval 328 ms    QTC Calculation (Bezet) 414 ms    Calculated P Axis 48 degrees    Calculated R Axis -16 degrees    Calculated T Axis 47 degrees    Diagnosis       Normal sinus rhythm  Nonspecific T wave abnormality  Abnormal ECG  When compared with ECG of 08-MAY-2022 12:07, (Unconfirmed)  No significant change was found  Confirmed by [facility] [place][note 2[place]Diana ([id number]) on 5/8/2022 1:28:05 PM     CBC WITH AUTOMATED DIFF    Collection Time: 05/08/22 12:31 PM   Result Value Ref Range    WBC 3.9 3.6 - 11.0 K/uL    RBC 4.32 3.80 - 5.20 M/uL    HGB 12.1 11.5 - 16.0 g/dL    HCT 37.6 35.0 - 47.0 %    MCV 87.0 80.0 - 99.0 FL    MCH 28.0 26.0 - 34.0 PG    MCHC 32.2 30.0 - 36.5 g/dL    RDW 15.8 (H) 11.5 - 14.5 %    PLATELET 439 738 - 495 K/uL    MPV 10.5 8.9 - 12.9 FL    NRBC 0.0 0.0  WBC    ABSOLUTE NRBC 0.00 0.00 - 0.01 K/uL    NEUTROPHILS 79 (H) 32 - 75 %    LYMPHOCYTES 10 (L) 12 - 49 %    MONOCYTES 8 5 - 13 %    EOSINOPHILS 2 0 - 7 %    BASOPHILS 1 0 - 1 %    IMMATURE GRANULOCYTES 0 0 - 0.5 %    ABS. NEUTROPHILS 3.1 1.8 - 8.0 K/UL    ABS. LYMPHOCYTES 0.4 (L) 0.8 - 3.5 K/UL    ABS. MONOCYTES 0.3 0.0 - 1.0 K/UL    ABS. EOSINOPHILS 0.1 0.0 - 0.4 K/UL    ABS.  BASOPHILS 0.0 0.0 - 0.1 K/UL    ABS. IMM. GRANS. 0.0 0.00 - 0.04 K/UL    DF AUTOMATED     METABOLIC PANEL, COMPREHENSIVE    Collection Time: 05/08/22 12:31 PM   Result Value Ref Range    Sodium 142 136 - 145 mmol/L    Potassium 4.0 3.5 - 5.1 mmol/L    Chloride 113 (H) 97 - 108 mmol/L    CO2 25 21 - 32 mmol/L    Anion gap 4 (L) 5 - 15 mmol/L    Glucose 94 65 - 100 mg/dL    BUN 13 6 - 20 mg/dL    Creatinine 0.72 0.55 - 1.02 mg/dL    BUN/Creatinine ratio 18 12 - 20      GFR est AA >60 >60 ml/min/1.73m2    GFR est non-AA >60 >60 ml/min/1.73m2    Calcium 8.6 8.5 - 10.1 mg/dL    Bilirubin, total 0.2 0.2 - 1.0 mg/dL    AST (SGOT) 82 (H) 15 - 37 U/L    ALT (SGPT) 111 (H) 12 - 78 U/L    Alk. phosphatase 180 (H) 45 - 117 U/L    Protein, total 6.4 6.4 - 8.2 g/dL    Albumin 3.4 (L) 3.5 - 5.0 g/dL    Globulin 3.0 2.0 - 4.0 g/dL    A-G Ratio 1.1 1.1 - 2.2     TROPONIN-HIGH SENSITIVITY    Collection Time: 05/08/22 12:31 PM   Result Value Ref Range    Troponin-High Sensitivity 4 0 - 51 ng/L       Radiologic Studies -   [unfilled]  CT Results  (Last 48 hours)               05/08/22 1229  CT HEAD WO CONT Final result    Impression:  No acute intracranial abnormality. Narrative:  CT head without contrast:       Comparison 10/13/2021. Dose reduction: All CT scans at this facility are performed using dose reduction   optimization techniques as appropriate to a performed exam including the   following: Automated exposure control, adjustments of the mA and/or kV according   to patient size, or use of iterative reconstruction technique. Noncontrast axial images were made with coronal and sagittal reconstructions. The ventricular size and position are normal. The posterior fossa images are   normal. There is no acute hemorrhage, midline shift or other mass. There is no acute skull fracture. There is chronic bilateral maxillary sinusitis. The included paranasal sinuses   and mastoid air cells are otherwise clear. CXR Results  (Last 48 hours)               05/08/22 1305  XR CHEST PORT Final result    Impression:  Chronic findings as above. Narrative:  Chest one view. AP upright portable at 1259 dated 5/8/2022. Comparison 10/14/2021. The heart is normal in size. Mediastinal and hilar outlines are normal. Left   lower lobe volume loss/scar is redemonstrated. Similar features were seen   previously. There is no lobar consolidation, pleural fluid or pneumothorax. A cardiac loop recorder overlies the epigastrium. There are clips in the right   upper quadrant. Medical Decision Making and ED Course   I am the first provider for this patient. I reviewed the vital signs, available nursing notes, past medical history, past surgical history, family history and social history. Vital Signs-Reviewed the patient's vital signs. Patient Vitals for the past 12 hrs:   Temp Pulse Resp BP SpO2   05/08/22 1205 98 °F (36.7 °C) 94 16 (!) 86/56 98 %       EKG interpretation: (Preliminary)  Normal sinus rhythm 96 nonspecific T wave changes, no ST elevations, left axis deviation    Records Reviewed: Nursing Notes    The patient presents with syncope, ankle pain with a differential diagnosis of cardiac syncope, seizure disorder, intracranial hemorrhage, vasovagal syncope, left ankle fracture, left ankle dislocation, left ankle sprain      Provider Notes (Medical Decision Making):     MDM   58-year-old female history of CHF, CAD, cardiomyopathy, seizure disorder, presents emergency department for syncopal event. Patient states she does not recall event woke up on floor after walking to bathroom. No note of any chest pains or palpitations. Physical exam shows well-appearing female no distress, nonfocal neurological exam no signs of significant head trauma. Vital signs show mild hypotension 92G to 40L systolic however patient states he usually has low blood pressures.     Patient is tender to palpation over her left ankle otherwise no signs of acute traumatic injury. Will obtain head CT, chest x-ray, basic lab work including cardiac enzymes, left ankle x-ray. ED Course:   Initial assessment performed. The patients presenting problems have been discussed, and they are in agreement with the care plan formulated and outlined with them. I have encouraged them to ask questions as they arise throughout their visit. ED Course as of 05/08/22 1415   Sun May 08, 2022   1356 Patient's lab work resulting, cardiac enzymes negative, Bolick panel grossly unremarkable CBC shows no leukocytosis stable H&H.  CT head shows no signs of acute injury, left ankle shows possible avulsion fracture and of mortise, consistent with physical findings. Discussed results with patient. [PZ]   [phone] Discussed case with Dr. PALACIO [facility], will admit patient for syncope. [PZ]      ED Course User Index  [PZ] Azalea Coffey MD         Procedures       Lucretia Alvarado MD  Procedures                   Disposition     Admit      Diagnosis     Clinical Impression:   1. Syncope and collapse    2. Closed fracture of left ankle with routine healing, subsequent encounter        Attestations:    Lucretia Alvarado MD    Please note that this dictation was completed with [employer], the computer voice recognition software. Quite often unanticipated grammatical, syntax, homophones, and other interpretive errors are inadvertently transcribed by the computer software. Please disregard these errors. Please excuse any errors that have escaped final proofreading. Thank you.

## 2022-05-08 NOTE — ED NOTES
Dr Tam Patches to bedside. Verbal order received to remove the splint to the left ankle and apply a boot.

## 2022-05-08 NOTE — H&P
History and Physical    Patient: Tootie Beach MRN: [id number]  SSN: [id number]    YOB: 1970  Age: 46 y.o. Sex: female      Subjective:      Tootie Beach is a 46 y.o. female with a PMHx of cardiomyopathy, CHF EF 35%, diabetes mellitus, asthma, hypothyroidism, seizure disorder, and neuropathy presenting to the ED after a syncopal episode today. Patient stood up to get to the bathroom and suddenly lost consciousness, falling to the floor. Daughter found patient on the ground. No seizure activity noted. Patient does not remember the episode. She does complain of left knee pain and left ankle pain after fall. Patient was not disoriented when she woke back up. States that her blood pressure normally runs low due to all of the medications she is on for her cardiomyopathy management. She denies any recent travel or sick contacts. Denies fever, chills, chest pain, palpitations, shortness of breath, cough, nausea, vomiting, abdominal pain, dysuria, or lower extremity edema. In the ED, patient hypertensive with blood pressure of 86/56. She was given 1 L fluid bolus in the ED. Routine labs showing elevated LFTs, otherwise unremarkable. EKG normal sinus rhythm without ischemic changes. Chest x-ray showing no acute new findings. X-ray of left knee unremarkable. X-ray of left ankle showing slight irregularity at the dorsal head of the talus potentially nondisplaced avulsion fracture. Cardiology, neurology, and orthopedic surgery consult.     Past Medical History:   Diagnosis Date    Arthritis     pt states knees and back    Asthma     Cardiomyopathy ([address] 2005    EF 35%, echo in 2021 normal EF     Colon polyp     Diabetes ([address]     Dizzy spells     GERD (gastroesophageal reflux disease)     Heart failure ([address] 2005    now combined systolic, diastolic[place] [facility] [place] [facility] [place]- Dr Jaya Leal    Irritable bowel syndrome     Knee pain, bilateral     Migraines 2011    2-3 week    Neuropathy     pt states of both feet    Seizures (HCC)     petite mals last seizure 2-3 months ago    Thyroid disease     hypothyroid     Past Surgical History:   Procedure Laterality Date    HX CHOLECYSTECTOMY  5/2006    HX COLONOSCOPY      HX ENDOSCOPY      HX GASTRIC BYPASS  2013    HX HYSTERECTOMY  2007    HX KNEE ARTHROSCOPY Right 05/2021    HX ORTHOPAEDIC Bilateral 2013    CTR    HX ORTHOPAEDIC      right knee procedure 2021    HX OTHER SURGICAL      left axillary lymph node biopsy    HX SMALL BOWEL RESECTION      MD ABDOMEN SURGERY PROC UNLISTED  2008    cholecystectomy    MD APPENDECTOMY      MD CARDIAC SURG PROCEDURE UNLIST      Loop recorder implanted july 2020    MD LAP,CHOLECYSTECTOMY      MD PART REMOVAL COLON W ANASTOMOSIS      MD RESECT SMALL INTEST W ENTEROSTOMY      MD STOMACH SURGERY PROCEDURE UNLISTED        Family History   Problem Relation Age of Onset    Hypertension Mother     Heart Disease Mother     Cancer Father         stomach cancer    Hypertension Father     No Known Problems Brother     Asthma Daughter    Duana Big Asthma Son     Asthma Son     Breast Cancer Paternal Aunt         age unknown     Social History     Tobacco Use    Smoking status: Never Smoker    Smokeless tobacco: Never Used   Substance Use Topics    Alcohol use: No      Prior to Admission medications    Medication Sig Start Date End Date Taking? Authorizing Provider   escitalopram oxalate (LEXAPRO) 10 mg tablet Take 10 mg by mouth daily.  2/4/22   Provider, Historical   hydrOXYzine HCL (ATARAX) 25 mg tablet TAKE 1 TABLET BY MOUTH THREE TIMES DAILY AS NEEDED FOR 10 DAYS 11/29/21   Provider, Historical   latanoprost (XALATAN) 0.005 % ophthalmic solution INSTILL 1 DROP INTO [facility] EYE AT NIGHT 1/28/22   Provider, Historical   ofloxacin (FLOXIN) 0.3 % ophthalmic solution INSTILL 5 DROPS INTO AFFECTED EAR TWICE DAILY 11/19/21   Provider, Historical   scopolamine (TRANSDERM-SCOP) 1 mg over 3 days pt3d PLACE 1 PATCH BEHIND THE EAR EVERY 3 DAYS A NEEDED FOR NAUSEA 1/20/22   Provider, Historical   spironolactone (ALDACTONE) 50 mg tablet Take 1 Tablet by mouth daily. 2/7/22   Laura Strauss MD   carvediloL (COREG) 6.25 mg tablet Take 1 Tablet by mouth two (2) times a day. 11/9/21   Laura Strauss MD   losartan (COZAAR) 25 mg tablet Take 1 Tablet by mouth daily (after breakfast). 11/9/21   Vishnu Zamorano MD   bumetanide (BUMEX) 1 mg tablet Take 1 Tablet by mouth daily. 11/9/21   Vishnu Zamorano MD   TENS units and TENS electrodes (TENS UNIT AND ELECTRODES) Tens Unit, 1 ea, Dispense: 1 EA, Refills: 0, Easyscript, Maintenance, 0 9/8/21   Provider, Historical   azelastine (ASTELIN) 137 mcg (0.1 %) nasal spray USE 2 SPRAY(S) IN EACH NOSTRIL TWICE DAILY 8/24/21   Provider, Historical   azelastine (OPTIVAR) 0.05 % ophthalmic solution 1 Drop two (2) times a day. Provider, Historical   augmented betamethasone dipropionate (DIPROLENE-AF) 0.05 % ointment  8/6/21   Provider, Historical   budesonide (PULMICORT) 0.5 mg/2 mL nbsp USE 1 VIAL IN NEBULIZER TWICE DAILY 8/27/21   Provider, Historical   citalopram (CELEXA) 20 mg tablet Take 20 mg by mouth daily. 8/29/21   Provider, Historical   docusate sodium (COLACE) 100 mg capsule Take 100 mg by mouth two (2) times a day. Provider, Historical   Premarin 0.625 mg/gram vaginal cream  9/29/21   Provider, Historical   Ferrex 150 150 mg iron capsule  10/13/21   Provider, Historical   Linzess 145 mcg cap capsule Take 145 mcg by mouth daily. 8/11/21   Provider, Historical   metoclopramide HCl (REGLAN) 5 mg tablet  10/11/21   Provider, Historical   Saccharomyces boulardii (FLORASTOR) 250 mg capsule Take 250 mg by mouth two (2) times a day. Provider, Historical   nitroglycerin (NITROSTAT) 0.4 mg SL tablet DISSOLVE 1 TABLET UNDER THE TONGUE EVERY 5 MINUTES AS  NEEDED FOR CHEST PAIN. MAX  OF 3 TABLETS IN 15 MINUTES.  CALL 911 IF PAIN PERSISTS. 9/27/21 Sun Dudley MD   cetirizine (ZyrTEC) 10 mg tablet Take 10 mg by mouth daily. Provider, Historical   metFORMIN (GLUCOPHAGE) 500 mg tablet Take  by mouth two (2) times daily (with meals). Provider, Historical   cloNIDine (CATAPRES) 0.1 mg/24 hr ptwk  12/2/20   Provider, Historical   fluticasone propionate (FLONASE) 50 mcg/actuation nasal spray  12/15/20   Provider, Historical   BD Luer-Narda Syringe 3 mL 25 gauge x 1\" syrg USE FOR B12 INJECTIONS 12/1/20   Provider, Historical   RABEprazole (ACIPHEX) 20 mg TbEC Take 20 mg by mouth two (2) times a day. Provider, Historical   rizatriptan (MAXALT) 5 mg tablet Take 5 mg by mouth once as needed for Migraine. May repeat in 2 hours if needed     Provider, Historical   alum-mag hydroxide-simeth-lidocaine-sucralfate Take 30 mL by mouth daily. Provider, Historical   lacosamide (Vimpat) 100 mg tab tablet Take 100 mg by mouth two (2) times a day. Provider, Historical   tiotropium (SPIRIVA) 18 mcg inhalation capsule Take 1 Cap by inhalation daily. Provider, Historical   ondansetron hcl (ZOFRAN) 8 mg tablet Take 8 mg by mouth every eight (8) hours as needed. Provider, Historical   levETIRAcetam (KEPPRA) 750 mg tablet 1,500 mg two (2) times a day. 6/16/19   Provider, Historical   montelukast (SINGULAIR) 10 mg tablet Take 10 mg by mouth daily. 7/15/19   Provider, Historical   nortriptyline (PAMELOR) 50 mg capsule Take 100 mg by mouth nightly. 7/19/19   Provider, Historical   sucralfate (CARAFATE) 100 mg/mL suspension Take 1 g by mouth four (4) times daily. Provider, Historical   topiramate (TOPAMAX) 100 mg tablet Take 100 mg by mouth two (2) times a day. 7/19/19   Provider, Historical   VENTOLIN HFA 90 mcg/actuation inhaler Take 2 Puffs by inhalation every four (4) hours as needed.  7/15/19   Provider, Historical   SYMBICORT 80-4.5 mcg/actuation HFAA INHALE 2 PUFFS BY MOUTH TWICE DAILY 5/24/19   Provider, Historical   dexlansoprazole (DEXILANT) 60 mg CpDB capsule (delayed release) Take 60 mg by mouth daily. Provider, Historical   dicyclomine (BENTYL) 20 mg tablet Take 20 mg by mouth every six (6) hours. Provider, Historical   estradiol (ESTRACE) 0.01 % (0.1 mg/gram) vaginal cream  5/4/19   Provider, Historical   levothyroxine (SYNTHROID) 50 mcg tablet Take 50 mcg by mouth Daily (before breakfast). Provider, Historical   cyanocobalamin (VITAMIN B-12) 1,000 mcg/mL injection 1,000 mcg by IntraMUSCular route every thirty (30) days. Provider, Historical   Calcium-Cholecalciferol, D3, (CALCIUM 600 WITH VITAMIN D3) 600 mg(1,500mg) -400 unit cap Take 2 Tablets by mouth daily. Provider, Historical   multivitamin (ONE A DAY) tablet Take 1 Tab by mouth daily. Provider, Historical        Allergies   Allergen Reactions    Acetaminophen Other (comments)     Advised not to take due to Liver Issues    Morphine Other (comments)     SOB/Chest Pressure - in hosp for a week. States DILAUDID Ok    Oxycodone Shortness of Breath     Reaction Type: Allergy; Severity: Severe    Shellfish Derived Anaphylaxis    Codeine Other (comments)     SOB chest pain    Peanut Oil Other (comments)     Reaction Type:  Intolerance; Severity: Severe    Sulfa (Sulfonamide Antibiotics) Rash     itching       Review of Systems:  Constitutional: No fevers, No chills, No fatigue, No weakness  Eyes: No visual disturbance  Ears, Nose, Mouth, Throat, and Face: No nasal congestion, No sore throat  Respiratory: No cough, No sputum, No wheezing, No SOB  Cardiovascular: No chest pain, No lower extremity edema, No Palpitations   Gastrointestinal: No nausea, No vomiting, No diarrhea, No constipation, No abdominal pain  Genitourinary: No frequency, No dysuria, No hematuria  Integument/Breast: No rash, No skin lesion(s), No dryness  Musculoskeletal: No arthralgias, No neck pain, No back pain  Neurological: + headaches, + dizziness, + syncope, No confusion,  No seizures  Behavioral/Psychiatric: No anxiety, No depression      Objective:     Vitals:    05/08/22 1205   BP: (!) 86/56   Pulse: 94   Resp: 16   Temp: 98 °F (36.7 °C)   SpO2: 98%   Weight: 75.8 kg (167 lb)   Height: 5' 5\" (1.651 m)        Physical Exam:  General: alert, cooperative, no distress  Eye: conjunctivae/corneas clear. PERRL, EOM's intact. Throat and Neck: normal and no erythema or exudates noted. No mass   Lung: clear to auscultation bilaterally without wheezing, rhonchi, or rales. Heart: regular rate and rhythm, +V8/S2. 3/6 systolic murmur. Abdomen: soft, non-tender. Bowel sounds normal. No masses,  Extremities:  Left ankle in soft cast. Able to move all extremities normal, atraumatic  Skin: Warm, dry, intact. Neurologic: AOx3. Cranial nerves 2-12 and sensation grossly intact.   Psychiatric: non focal    Recent Results (from the past 24 hour(s))   EKG, 12 LEAD, INITIAL    Collection Time: 05/08/22 12:11 PM   Result Value Ref Range    Ventricular Rate 96 BPM    Atrial Rate 96 BPM    P-R Interval 172 ms    QRS Duration 88 ms    Q-T Interval 328 ms    QTC Calculation (Bezet) 414 ms    Calculated P Axis 48 degrees    Calculated R Axis -16 degrees    Calculated T Axis 47 degrees    Diagnosis       Normal sinus rhythm  Nonspecific T wave abnormality  Abnormal ECG  When compared with ECG of 08-MAY-2022 12:07, (Unconfirmed)  No significant change was found  Confirmed by [facility] Fitkevin Conner ([id number]) on 5/8/2022 1:28:05 PM     CBC WITH AUTOMATED DIFF    Collection Time: 05/08/22 12:31 PM   Result Value Ref Range    WBC 3.9 3.6 - 11.0 K/uL    RBC 4.32 3.80 - 5.20 M/uL    HGB 12.1 11.5 - 16.0 g/dL    HCT 37.6 35.0 - 47.0 %    MCV 87.0 80.0 - 99.0 FL    MCH 28.0 26.0 - 34.0 PG    MCHC 32.2 30.0 - 36.5 g/dL    RDW 15.8 (H) 11.5 - 14.5 %    PLATELET 252 912 - 471 K/uL    MPV 10.5 8.9 - 12.9 FL    NRBC 0.0 0.0  WBC    ABSOLUTE NRBC 0.00 0.00 - 0.01 K/uL    NEUTROPHILS 79 (H) 32 - 75 %    LYMPHOCYTES 10 (L) 12 - 49 %    MONOCYTES 8 5 - 13 % EOSINOPHILS 2 0 - 7 %    BASOPHILS 1 0 - 1 %    IMMATURE GRANULOCYTES 0 0 - 0.5 %    ABS. NEUTROPHILS 3.1 1.8 - 8.0 K/UL    ABS. LYMPHOCYTES 0.4 (L) 0.8 - 3.5 K/UL    ABS. MONOCYTES 0.3 0.0 - 1.0 K/UL    ABS. EOSINOPHILS 0.1 0.0 - 0.4 K/UL    ABS. BASOPHILS 0.0 0.0 - 0.1 K/UL    ABS. IMM. GRANS. 0.0 0.00 - 0.04 K/UL    DF AUTOMATED     METABOLIC PANEL, COMPREHENSIVE    Collection Time: 05/08/22 12:31 PM   Result Value Ref Range    Sodium 142 136 - 145 mmol/L    Potassium 4.0 3.5 - 5.1 mmol/L    Chloride 113 (H) 97 - 108 mmol/L    CO2 25 21 - 32 mmol/L    Anion gap 4 (L) 5 - 15 mmol/L    Glucose 94 65 - 100 mg/dL    BUN 13 6 - 20 mg/dL    Creatinine 0.72 0.55 - 1.02 mg/dL    BUN/Creatinine ratio 18 12 - 20      GFR est AA >60 >60 ml/min/1.73m2    GFR est non-AA >60 >60 ml/min/1.73m2    Calcium 8.6 8.5 - 10.1 mg/dL    Bilirubin, total 0.2 0.2 - 1.0 mg/dL    AST (SGOT) 82 (H) 15 - 37 U/L    ALT (SGPT) 111 (H) 12 - 78 U/L    Alk. phosphatase 180 (H) 45 - 117 U/L    Protein, total 6.4 6.4 - 8.2 g/dL    Albumin 3.4 (L) 3.5 - 5.0 g/dL    Globulin 3.0 2.0 - 4.0 g/dL    A-G Ratio 1.1 1.1 - 2.2     TROPONIN-HIGH SENSITIVITY    Collection Time: 05/08/22 12:31 PM   Result Value Ref Range    Troponin-High Sensitivity 4 0 - 51 ng/L       XR Results (maximum last 3): Results from Hospital Encounter encounter on 05/08/22    XR KNEE LT MIN 4 V    Narrative  XR KNEE LT MIN 4 V    Comparison: None available    Findings: No radiographic abnormality of the bones, joints or soft tissues. Impression  Negative. XR ANKLE LT MIN 3 V    Narrative  XR ANKLE LT MIN 3 V    Comparison: None available    Impression  Findings/impression: Achilles attachment and plantar calcaneal enthesophytes are  noted. The ankle mortise appears intact. Slight irregularity at the dorsal head  of the talus potentially nondisplaced avulsion fracture. XR CHEST PORT    Narrative  Chest one view.     AP upright portable at 1259 dated 5/8/2022. Comparison 10/14/2021. The heart is normal in size. Mediastinal and hilar outlines are normal. Left  lower lobe volume loss/scar is redemonstrated. Similar features were seen  previously. There is no lobar consolidation, pleural fluid or pneumothorax. A cardiac loop recorder overlies the epigastrium. There are clips in the right  upper quadrant. Impression  Chronic findings as above. CT Results (maximum last 3): Results from [place] encounter on 05/08/22    CT HEAD WO CONT    Narrative  CT head without contrast:    Comparison 10/13/2021. Dose reduction: All CT scans at this facility are performed using dose reduction  optimization techniques as appropriate to a performed exam including the  following: Automated exposure control, adjustments of the mA and/or kV according  to patient size, or use of iterative reconstruction technique. Noncontrast axial images were made with coronal and sagittal reconstructions. The ventricular size and position are normal. The posterior fossa images are  normal. There is no acute hemorrhage, midline shift or other mass. There is no acute skull fracture. There is chronic bilateral maxillary sinusitis. The included paranasal sinuses  and mastoid air cells are otherwise clear. Impression  No acute intracranial abnormality. Results from [place] encounter on 10/26/21    CT ABD PELV W WO CONT    Narrative  CT dose reduction was achieved through use of a standardized protocol tailored  for this examination and automatic exposure control for dose modulation. Noncontrast study shows no stone in either kidney. Contrast study shows normal liver, spleen and pancreas. Gallbladder is out. Normal adrenals and kidneys. No proximal small bowel or vascular abnormality,  lymphadenopathy or free fluid    Uterus is small or out. No adnexal mass or free fluid. Normal bladder. Distal  colon is normal. Right colonic resection.  Oral contrast does not reach the  distal small bowel, loops coiled in the low pelvis. Some regional scarring,  mainly obscuration of fat planes in the region of the anastomosis and small  amount of free fluid. No mass, or fluid collection. No mesenteric fat edema. Calcifications in the pelvic floor, uncertain origin or significance. Those in  the right may outline a subtle tubular component, arguably hydrosalpinx, if  applicable. No abdominal wall hernia    No significant bone finding. Mild atelectasis in left lower lobe    Impression  No evidence to explain unspecified pain. Minimal free fluid is a  nonspecific finding      Results from Hospital Encounter encounter on 10/13/21    CT SPINE CERV WO CONT    Narrative  Subcutaneous emphysema. No comparison. Technique: Axial imaging cervical spine with sagittal and coronal reformatting  and 3-D volume rendering performed by the technologist at the console. Dose reduction: All CT scans at this facility are performed using dose reduction  optimization techniques as appropriate to a performed exam including the  following: Automated exposure control, adjustments of the mA and/or kV according  to patient's size, or use of iterative reconstruction technique    Findings: No listhesis. Vertebral body heights and disc spaces maintained. No  acute fracture, or jumped or perched facet. Lateral masses symmetric  bilaterally. Odontoid intact. No prevertebral soft tissue swelling. Lung apices  demonstrate groundglass airspace disease. There is air in the deep soft tissues  of the face, subclavicular thoracic inlet, left C1 vertebral foramen, cavernous  carotid region. Enlarged heterogeneous left thyroid lobe. Impression  1. No acute bony findings. 2. Air in the soft tissues, possibly from vascular/iatrogenic etiology. Correlate clinically. 3. Bilateral upper lobe pulmonary airspace pneumonia or edema. MRI Results (maximum last 3):   Results from [facility] encounter on 06/08/21    MRI ABD W MRCP W WO CONT    Narrative  4 phase contrast enhanced study including MRCP and is a follow-up from one year  ago    The intrahepatic and extra hepatic bile ducts are mildly dilated, as before,  post cholecystectomy. No change in caliber and no stone. No sludge. No ampullary  mass or ampullary impression Pancreatic duct is nondilated in the head and  poorly demonstrated in the body. Liver is normal in size and shape, without fatty or heavy metal infiltration. Small cyst cyst in the subphrenic right hepatic lobe with otherwise normal  liver. Spleen is normal. Pancreas is normal. No free fluid or fluid collection    Normal adrenals and kidneys. No small bowel abnormality. Impression  Stable postoperative biliary dilatation. Doubt low-grade ampullary  stricture      Results from Hospital Encounter encounter on 04/21/21    MRI LUMB SPINE WO CONT    Narrative  Technique: Routine noncontrast MRI of lumbar spine    COMPARISON: None available. FINDINGS:    Alignment is within normal limits. Vertebral body heights are maintained, with  normal marrow signal. Disc desiccation at T11-12 and L4-5. There is slight disc  space narrowing at L4-5. No suspicious or aggressive osseous lesion    Conus terminates at L1-2 and maintains normal caliber, contour and signal  characteristics. At T12-L1, no canal or foraminal narrowing    At L1-2, no canal or foraminal narrowing    At L2-3, early facet hypertrophy, right greater than left. No canal or foraminal  narrowing    At L3-4, mild facet hypertrophy and hypertrophy ligamentum flavum without canal  or foraminal narrowing    At L4-5, facet hypertrophy and hypertrophy ligamentum flavum. There is a subtle  left foraminal and lateral disc bulge with associated annular fissure. This  produces mild left foraminal narrowing. Right neuroforamen is patent. No canal  stenosis. At L5-S1 there is subtle disc bulge and mild facet hypertrophy.  No canal  stenosis. Neural foramen appear adequate. Subtle synovial cysts are seen along  the inferior margin of the facets. Visualized sacroiliac joints appear patent. Included paraspinal soft tissues  image normally. Impression  Mild lumbar spondylosis, most prominent at L4-5 where there is mild left  foraminal narrowing. Results from [place] [facility] beds at the [facility] encounter on 12/07/20    MRI KNEE RT WO CONT    Narrative  EXAMINATION: Right knee MRI without contrast    HISTORY: Right knee contours    TECHNIQUE: Multiplanar multisequence MR examination of the right knee is  performed without contrast.    FINDINGS: No prior study is available for comparison. In the medial compartment, the meniscus is intact. There is partial-thickness  chondrosis of the central weightbearing medial femoral condyle. There is partial  and deep partial thickness chondrosis of the medial tibial plateau. In the lateral compartment, the meniscus is intact. There is partial-thickness  chondrosis of the central weightbearing lateral femoral condyle. There is mild  bone marrow edema in the posterior lateral tibial plateau. In the patellofemoral compartment, there is relatively diffuse deep partial  thickness chondrosis of the patella with matching partial-thickness chondrosis  of the trochlea. The tibial tubercle trochlear groove (TT TG) distance measures  10 mm. There is patella jhon. The cruciate and collateral ligaments are normal.  The extensor mechanism is  intact. There is edema in Hoffa's fat pad between the patellar tendon and  lateral femoral condyle. A physiologic amount of fluid is seen in the knee. There are no suspicious marrow replacing lesions. Impression  IMPRESSION:    1. Intact menisci and ligaments of the right knee. 2. Moderate patellofemoral compartment and mild medial and lateral compartment  right knee chondrosis.   3. Edema in Hoffa's fat pad between the patellar tendon lateral femoral condyle,  nonspecific, but has been described in the setting of patellar tendon-lateral  femoral condyle friction syndrome. 4. Mild bone marrow edema in the posterior lateral tibial plateau, nonspecific,  but could represent a contusion in the appropriate clinical setting. Nuclear Medicine Results (maximum last 3): Results from [place] encounter on 10/25/21    NM GASTRIC EMPTY STDY    Narrative  Gastric emptying is grossly abnormal. There is an initial 20% drop-off by 12  minutes and after that, there is virtually no emptying for the remainder of the  2 hour. Images do show some small bowel activity. US Results (maximum last 3): Results from [place] encounter on 09/02/21    US ABD COMP    Narrative  Unspecified abdominal pain. Comparison right upper quadrant ultrasound 8/7/2019 at [address] [place] [address]. [place]  abdomen MRI 6/6/2020. FINDINGS: Transabdominal ultrasound. Pancreas is completely obscured by bowel gas. Liver partially obscured by bowel gas, gross normal size. 1.6 x 1.1 cm echogenic  hepatic mass is unchanged in size compared to MRI 6/6/2020. Mild intrahepatic  biliary ductal dilation. Proximal IVC normal grayscale. Abdominal aorta: Mid and proximal obscured by bowel gas, distal aorta normal  caliber. Common bile duct within normal limits 0.9 cm. Status post cholecystectomy. Negative sonographic Scanlon sign. Right kidney 10.1 cm long axis. 6 mm nonobstructing right renal calcification. Left kidney 10.3 cm long axis. No hydronephrosis. No identified solid renal  mass. Spleen normal size and echotexture. No ascites. Impression  Limited visualization due to bowel gas. 1. Nonobstructing right nephrolithiasis. 2. Unchanged mild intrahepatic biliary ductal dilation, grossly within normal  limits status post cholecystectomy cholecystectomy. 3. Unchanged small hepatic lesion possibly hemangioma.       Results from [place] encounter on 12/17/20    US GUIDE NDL PLC    Narrative  Portable sonography was performed by the technologist to localize a site for  liver biopsy which was performed by Dr. Jessie Miguel. Impression  IMPRESSION:  PORTABLE ULTRASOUND.      vk      Results from Hospital Encounter encounter on 08/07/19    US ABD LTD    Narrative  History: Elevated liver enzymes. Ultrasonography of the right upper quadrant demonstrates that the pancreas is  obscured by bowel gas. The liver is normal in echotexture. There is no  intrahepatic duct dilatation or mass. The right kidney measures 10.3 cm and is  normal in echotexture without hydronephrosis or mass. The gallbladder is  surgically absent. Portal vein flow is hepatopedal. The common bile duct  measures 8.8 mm. Impression  IMPRESSION: Unremarkable right upper quadrant ultrasound status post  cholecystectomy. Active Problems:    Syncope (5/8/2022)        Assessment/Plan:     1. Syncope  - Blood pressure initially 86/56  - S/p 1 L fluid bolus in ED. Continue IV fluids. - Will get orthostatics when more stable  - CT head negative   - Cardiology consult to review home cardiac medications  - Repeat echocardiogram  - Neuro to review home seizure/migraine medications  - Cardiac monitoring   - Neuro-checks    2. Left ankle pain  - X-ray of left ankle showing slight irregularity at the dorsal head of the talus potentially nondisplaced avulsion fracture  - Left ankle in soft short leg cast  - Orthopedic surgery consult     3. Cardiomyopathy  4. Hx of systolic CHF  - EKG normal sinus rhythm without ischemic changes  - Chest x-ray showing no acute new findings    5. Hx seizures  - Continue home Vimpat and Keppra  - Neuro consult to review medications  - Seizure precautions    6. Diabetes mellitus  - ISS and accu-checks    7.  Hypothyroidism   - Continue home medications     DVT Prophylaxis: Lovenox  Code Status: Full  POA    Total Time >55 minutes      Signed By: Ruel West PA-C May 8, 2022

## 2022-05-08 NOTE — ED TRIAGE NOTES
GLF in bathroom, Family member heard Thump found Pt on ground. Reports HA 8/10 and hitting head, Neg Blood thinner. L ankle/knee pain.

## 2022-05-09 ENCOUNTER — APPOINTMENT (OUTPATIENT)
Dept: NON INVASIVE DIAGNOSTICS | Age: 52
End: 2022-05-09
Attending: STUDENT IN AN ORGANIZED HEALTH CARE EDUCATION/TRAINING PROGRAM
Payer: MEDICARE

## 2022-05-09 VITALS
TEMPERATURE: 99.2 F | HEIGHT: 65 IN | WEIGHT: 167 LBS | OXYGEN SATURATION: 95 % | DIASTOLIC BLOOD PRESSURE: 54 MMHG | RESPIRATION RATE: 17 BRPM | SYSTOLIC BLOOD PRESSURE: 83 MMHG | HEART RATE: 90 BPM | BODY MASS INDEX: 27.82 KG/M2

## 2022-05-09 LAB
ANION GAP SERPL CALC-SCNC: 5 MMOL/L (ref 5–15)
BUN SERPL-MCNC: 14 MG/DL (ref 6–20)
BUN/CREAT SERPL: 23 (ref 12–20)
CA-I BLD-MCNC: 7.8 MG/DL (ref 8.5–10.1)
CHLORIDE SERPL-SCNC: 114 MMOL/L (ref 97–108)
CO2 SERPL-SCNC: 22 MMOL/L (ref 21–32)
CREAT SERPL-MCNC: 0.6 MG/DL (ref 0.55–1.02)
ECHO AO ASC DIAM: 2.7 CM
ECHO AO ASCENDING AORTA INDEX: 1.48 CM/M2
ECHO AO ROOT DIAM: 3.2 CM
ECHO AO ROOT INDEX: 1.75 CM/M2
ECHO AV AREA PEAK VELOCITY: 5.1 CM2
ECHO AV AREA VTI: 5 CM2
ECHO AV AREA/BSA PEAK VELOCITY: 2.8 CM2/M2
ECHO AV AREA/BSA VTI: 2.7 CM2/M2
ECHO AV MEAN GRADIENT: 3 MMHG
ECHO AV MEAN VELOCITY: 0.7 M/S
ECHO AV PEAK GRADIENT: 4 MMHG
ECHO AV PEAK VELOCITY: 1.1 M/S
ECHO AV VELOCITY RATIO: 0.91
ECHO AV VTI: 18 CM
ECHO EST RA PRESSURE: 3 MMHG
ECHO LA AREA 2C: 12.5 CM2
ECHO LA AREA 4C: 17 CM2
ECHO LA DIAMETER INDEX: 1.42 CM/M2
ECHO LA DIAMETER: 2.6 CM
ECHO LA MAJOR AXIS: 4.6 CM
ECHO LA MINOR AXIS: 4.1 CM
ECHO LA TO AORTIC ROOT RATIO: 0.81
ECHO LA VOL BP: 31 ML (ref 22–52)
ECHO LA VOL/BSA BIPLANE: 17 ML/M2 (ref 16–34)
ECHO LV E' LATERAL VELOCITY: 9 CM/S
ECHO LV E' SEPTAL VELOCITY: 11 CM/S
ECHO LV EDV A2C: 78 ML
ECHO LV EDV A4C: 30 ML
ECHO LV EDV INDEX A4C: 16 ML/M2
ECHO LV EDV NDEX A2C: 43 ML/M2
ECHO LV EJECTION FRACTION A2C: 74 %
ECHO LV ESV A2C: 20 ML
ECHO LV ESV INDEX A2C: 11 ML/M2
ECHO LV FRACTIONAL SHORTENING: 15 % (ref 28–44)
ECHO LV INTERNAL DIMENSION DIASTOLE INDEX: 2.19 CM/M2
ECHO LV INTERNAL DIMENSION DIASTOLIC: 4 CM (ref 3.9–5.3)
ECHO LV INTERNAL DIMENSION SYSTOLIC INDEX: 1.86 CM/M2
ECHO LV INTERNAL DIMENSION SYSTOLIC: 3.4 CM
ECHO LV IVSD: 1.3 CM (ref 0.6–0.9)
ECHO LV MASS 2D: 186.5 G (ref 67–162)
ECHO LV MASS INDEX 2D: 101.9 G/M2 (ref 43–95)
ECHO LV POSTERIOR WALL DIASTOLIC: 1.3 CM (ref 0.6–0.9)
ECHO LV RELATIVE WALL THICKNESS RATIO: 0.65
ECHO LVOT AREA: 5.3 CM2
ECHO LVOT AV VTI INDEX: 0.95
ECHO LVOT DIAM: 2.6 CM
ECHO LVOT MEAN GRADIENT: 2 MMHG
ECHO LVOT PEAK GRADIENT: 4 MMHG
ECHO LVOT PEAK VELOCITY: 1 M/S
ECHO LVOT STROKE VOLUME INDEX: 49.6 ML/M2
ECHO LVOT SV: 90.7 ML
ECHO LVOT VTI: 17.1 CM
ECHO MV A VELOCITY: 0.68 M/S
ECHO MV AREA VTI: 6.5 CM2
ECHO MV E VELOCITY: 0.57 M/S
ECHO MV E/A RATIO: 0.84
ECHO MV E/E' LATERAL: 6.33
ECHO MV E/E' RATIO (AVERAGED): 5.76
ECHO MV E/E' SEPTAL: 5.18
ECHO MV LVOT VTI INDEX: 0.82
ECHO MV MAX VELOCITY: 0.7 M/S
ECHO MV MEAN GRADIENT: 1 MMHG
ECHO MV MEAN VELOCITY: 0.4 M/S
ECHO MV PEAK GRADIENT: 2 MMHG
ECHO MV REGURGITANT PEAK GRADIENT: 3 MMHG
ECHO MV REGURGITANT PEAK VELOCITY: 0.9 M/S
ECHO MV REGURGITANT VTIA: 21.2 CM
ECHO MV VTI: 14 CM
ECHO PULMONARY ARTERY END DIASTOLIC PRESSURE: 4 MMHG
ECHO PV MAX VELOCITY: 0.8 M/S
ECHO PV MEAN GRADIENT: 1 MMHG
ECHO PV MEAN VELOCITY: 0.5 M/S
ECHO PV PEAK GRADIENT: 3 MMHG
ECHO PV REGURGITANT MAX VELOCITY: 0.9 M/S
ECHO PV VTI: 15.4 CM
ECHO PVEIN PEAK D VELOCITY: 0.5 M/S
ECHO PVEIN PEAK S VELOCITY: 0.8 M/S
ECHO PVEIN S/D RATIO: 1.6 NO UNITS
ECHO RIGHT VENTRICULAR SYSTOLIC PRESSURE (RVSP): 16 MMHG
ECHO RV BASAL DIMENSION: 2.9 CM
ECHO RV FREE WALL PEAK S': 13 CM/S
ECHO RV MID DIMENSION: 2 CM
ECHO TV REGURGITANT MAX VELOCITY: 1.79 M/S
ECHO TV REGURGITANT PEAK GRADIENT: 13 MMHG
ERYTHROCYTE [DISTWIDTH] IN BLOOD BY AUTOMATED COUNT: 16.1 % (ref 11.5–14.5)
GLUCOSE BLD STRIP.AUTO-MCNC: 82 MG/DL (ref 65–117)
GLUCOSE SERPL-MCNC: 80 MG/DL (ref 65–100)
HCT VFR BLD AUTO: 32.8 % (ref 35–47)
HGB BLD-MCNC: 10.5 G/DL (ref 11.5–16)
MCH RBC QN AUTO: 28.3 PG (ref 26–34)
MCHC RBC AUTO-ENTMCNC: 32 G/DL (ref 30–36.5)
MCV RBC AUTO: 88.4 FL (ref 80–99)
NRBC # BLD: 0 K/UL (ref 0–0.01)
NRBC BLD-RTO: 0 PER 100 WBC
PERFORMED BY, TECHID: NORMAL
PLATELET # BLD AUTO: 217 K/UL (ref 150–400)
PMV BLD AUTO: 11 FL (ref 8.9–12.9)
POTASSIUM SERPL-SCNC: 3.9 MMOL/L (ref 3.5–5.1)
RBC # BLD AUTO: 3.71 M/UL (ref 3.8–5.2)
SODIUM SERPL-SCNC: 141 MMOL/L (ref 136–145)
WBC # BLD AUTO: 3.8 K/UL (ref 3.6–11)

## 2022-05-09 PROCEDURE — 96375 TX/PRO/DX INJ NEW DRUG ADDON: CPT

## 2022-05-09 PROCEDURE — 93306 TTE W/DOPPLER COMPLETE: CPT

## 2022-05-09 PROCEDURE — 85027 COMPLETE CBC AUTOMATED: CPT

## 2022-05-09 PROCEDURE — 94640 AIRWAY INHALATION TREATMENT: CPT

## 2022-05-09 PROCEDURE — 74011250637 HC RX REV CODE- 250/637: Performed by: INTERNAL MEDICINE

## 2022-05-09 PROCEDURE — 96376 TX/PRO/DX INJ SAME DRUG ADON: CPT

## 2022-05-09 PROCEDURE — 74011250636 HC RX REV CODE- 250/636: Performed by: STUDENT IN AN ORGANIZED HEALTH CARE EDUCATION/TRAINING PROGRAM

## 2022-05-09 PROCEDURE — 74011250637 HC RX REV CODE- 250/637: Performed by: PSYCHIATRY & NEUROLOGY

## 2022-05-09 PROCEDURE — 36415 COLL VENOUS BLD VENIPUNCTURE: CPT

## 2022-05-09 PROCEDURE — G0378 HOSPITAL OBSERVATION PER HR: HCPCS

## 2022-05-09 PROCEDURE — 80048 BASIC METABOLIC PNL TOTAL CA: CPT

## 2022-05-09 PROCEDURE — 74011000250 HC RX REV CODE- 250: Performed by: STUDENT IN AN ORGANIZED HEALTH CARE EDUCATION/TRAINING PROGRAM

## 2022-05-09 PROCEDURE — 82962 GLUCOSE BLOOD TEST: CPT

## 2022-05-09 PROCEDURE — 74011250637 HC RX REV CODE- 250/637: Performed by: STUDENT IN AN ORGANIZED HEALTH CARE EDUCATION/TRAINING PROGRAM

## 2022-05-09 PROCEDURE — 74011250636 HC RX REV CODE- 250/636: Performed by: PHYSICIAN ASSISTANT

## 2022-05-09 RX ORDER — KETOROLAC TROMETHAMINE 15 MG/ML
15 INJECTION, SOLUTION INTRAMUSCULAR; INTRAVENOUS EVERY 6 HOURS
Status: DISCONTINUED | OUTPATIENT
Start: 2022-05-09 | End: 2022-05-10 | Stop reason: HOSPADM

## 2022-05-09 RX ORDER — DICLOFENAC POTASSIUM 50 MG/1
50 TABLET, FILM COATED ORAL
Qty: 20 TABLET | Refills: 0 | Status: SHIPPED | OUTPATIENT
Start: 2022-05-09 | End: 2022-05-10 | Stop reason: SDUPTHER

## 2022-05-09 RX ORDER — LEVETIRACETAM 500 MG/1
500 TABLET ORAL 2 TIMES DAILY
Status: DISCONTINUED | OUTPATIENT
Start: 2022-05-09 | End: 2022-05-10 | Stop reason: HOSPADM

## 2022-05-09 RX ORDER — HYDROCODONE BITARTRATE AND ACETAMINOPHEN 5; 325 MG/1; MG/1
1 TABLET ORAL
Qty: 16 TABLET | Refills: 0 | Status: SHIPPED | OUTPATIENT
Start: 2022-05-09 | End: 2022-05-09

## 2022-05-09 RX ORDER — LACOSAMIDE 200 MG/1
200 TABLET ORAL 2 TIMES DAILY
Qty: 30 TABLET | Refills: 0 | Status: SHIPPED | OUTPATIENT
Start: 2022-05-09 | End: 2022-05-09 | Stop reason: SDUPTHER

## 2022-05-09 RX ORDER — LACOSAMIDE 200 MG/1
200 TABLET ORAL 2 TIMES DAILY
Qty: 30 TABLET | Refills: 0 | Status: SHIPPED | OUTPATIENT
Start: 2022-05-09 | End: 2022-05-10 | Stop reason: SDUPTHER

## 2022-05-09 RX ORDER — TRAMADOL HYDROCHLORIDE 50 MG/1
50 TABLET ORAL
Status: DISCONTINUED | OUTPATIENT
Start: 2022-05-09 | End: 2022-05-10 | Stop reason: HOSPADM

## 2022-05-09 RX ORDER — TOPIRAMATE 100 MG/1
100 TABLET, FILM COATED ORAL 2 TIMES DAILY
Status: DISCONTINUED | OUTPATIENT
Start: 2022-05-09 | End: 2022-05-10 | Stop reason: HOSPADM

## 2022-05-09 RX ORDER — LEVETIRACETAM 500 MG/1
500 TABLET ORAL 2 TIMES DAILY
Qty: 30 TABLET | Refills: 2 | Status: SHIPPED | OUTPATIENT
Start: 2022-05-09 | End: 2022-05-10 | Stop reason: SDUPTHER

## 2022-05-09 RX ADMIN — LEVETIRACETAM 500 MG: 500 TABLET, FILM COATED ORAL at 09:55

## 2022-05-09 RX ADMIN — Medication 1 TABLET: at 17:50

## 2022-05-09 RX ADMIN — HYDROMORPHONE HYDROCHLORIDE 0.5 MG: 1 INJECTION, SOLUTION INTRAMUSCULAR; INTRAVENOUS; SUBCUTANEOUS at 04:31

## 2022-05-09 RX ADMIN — HYDROMORPHONE HYDROCHLORIDE 0.5 MG: 1 INJECTION, SOLUTION INTRAMUSCULAR; INTRAVENOUS; SUBCUTANEOUS at 17:51

## 2022-05-09 RX ADMIN — LACOSAMIDE 200 MG: 100 TABLET, FILM COATED ORAL at 09:55

## 2022-05-09 RX ADMIN — KETOROLAC TROMETHAMINE 15 MG: 15 INJECTION, SOLUTION INTRAMUSCULAR; INTRAVENOUS at 14:30

## 2022-05-09 RX ADMIN — TOPIRAMATE 100 MG: 100 TABLET, FILM COATED ORAL at 17:51

## 2022-05-09 RX ADMIN — SODIUM CHLORIDE 100 ML/HR: 9 INJECTION, SOLUTION INTRAVENOUS at 06:16

## 2022-05-09 RX ADMIN — SODIUM CHLORIDE, PRESERVATIVE FREE 10 ML: 5 INJECTION INTRAVENOUS at 06:15

## 2022-05-09 RX ADMIN — MONTELUKAST 10 MG: 10 TABLET, FILM COATED ORAL at 09:55

## 2022-05-09 RX ADMIN — HYDROMORPHONE HYDROCHLORIDE 0.5 MG: 1 INJECTION, SOLUTION INTRAMUSCULAR; INTRAVENOUS; SUBCUTANEOUS at 09:55

## 2022-05-09 RX ADMIN — DOCUSATE SODIUM 100 MG: 100 CAPSULE, LIQUID FILLED ORAL at 09:55

## 2022-05-09 RX ADMIN — SODIUM CHLORIDE, PRESERVATIVE FREE 10 ML: 5 INJECTION INTRAVENOUS at 17:52

## 2022-05-09 RX ADMIN — FERROUS SULFATE TAB 325 MG (65 MG ELEMENTAL FE) 325 MG: 325 (65 FE) TAB at 09:55

## 2022-05-09 RX ADMIN — Medication 1 TABLET: at 09:55

## 2022-05-09 RX ADMIN — BUDESONIDE AND FORMOTEROL FUMARATE DIHYDRATE 2 PUFF: 80; 4.5 AEROSOL RESPIRATORY (INHALATION) at 08:27

## 2022-05-09 RX ADMIN — LEVOTHYROXINE SODIUM 50 MCG: 0.03 TABLET ORAL at 10:30

## 2022-05-09 NOTE — CONSULTS
NEUROLOGY CONSULT    Name Nunu Serrano Age 46 y.o. MRN [id number]  1970     Referring Physician: Carrie Cunningham MD    Chief Complaint: Syncope with history of seizure     This is a 46 y.o. right handed pleasant -American female who was admitted through the ED overnight where she presented with an episode of sudden loss of consciousness, fell and injured her left leg. She is also having headache. As per patient she was not sitting in the living room watching TV got up to go to the bathroom and thus the last thing she remembers she denied any prodrome of any dizziness lightheadedness feeling of fainting or any other symptoms. She woke up on the floor of the bathroom but did not know how she got there. Her daughter who was in the house heard her falling and came to found her on the floor. It took a little while for her to wake up. Patient has a history of seizures since that she was around 6 or 7 but was not diagnosed till she was 27years old as nobody could figure out what those spells were. She is currently on 4 seizure medicines including Vimpat 100 g twice a day, Keppra 500 mg twice a day which her neurologist is trying to taper off and she also takes Topamax for headaches but it is a seizure medicine and another new medicine she can tell the name. As such I will increase the dose of lacosamide 250 mg twice a day. Assessment and Plan:  1. Breakthrough seizure: I doubt it was a syncopal episode on the basis of the history and her previous history I believe she had a seizure. She abruptly lost the consciousness when she got up from the couch and does not remember how she got to the bathroom where she fell hard and injured her left leg. I recommended to the patient to contact her neurologist which medicine she would like to increase. My feeling is we should increase the new medicine, but she does not know the name of this medicine. 2.  Cardiomyopathy  3. Migraine headaches  4. History of seizures since the age of 4 years  11. Heart failure  6. Diabetes mellitus  7. Peripheral neuropathy. .  Thank you for the consult    Allergies   Allergen Reactions    Acetaminophen Other (comments)     Advised not to take due to Liver Issues    Morphine Other (comments)     SOB/Chest Pressure - in hosp for a week. States DILAUDID Ok    Oxycodone Shortness of Breath     Reaction Type: Allergy; Severity: Severe    Shellfish Derived Anaphylaxis    Codeine Other (comments)     SOB chest pain    Peanut Oil Other (comments)     Reaction Type:  Intolerance; Severity: Severe    Sulfa (Sulfonamide Antibiotics) Rash     itching     Current Facility-Administered Medications   Medication Dose Route Frequency    levETIRAcetam (KEPPRA) tablet 500 mg  500 mg Oral BID    sodium chloride (NS) flush 5-40 mL  5-40 mL IntraVENous Q8H    sodium chloride (NS) flush 5-40 mL  5-40 mL IntraVENous PRN    polyethylene glycol (MIRALAX) packet 17 g  17 g Oral DAILY PRN    ondansetron (ZOFRAN ODT) tablet 4 mg  4 mg Oral Q8H PRN    Or    ondansetron (ZOFRAN) injection 4 mg  4 mg IntraVENous Q6H PRN    0.9% sodium chloride infusion  100 mL/hr IntraVENous CONTINUOUS    [Held by provider] bumetanide (BUMEX) tablet 1 mg  1 mg Oral DAILY    calcium-vitamin D 600 mg-5 mcg (200 unit) per tablet 1 Tablet  1 Tablet Oral BID WITH MEALS    [Held by provider] carvediloL (COREG) tablet 6.25 mg  6.25 mg Oral BID WITH MEALS    [Held by provider] citalopram (CELEXA) tablet 20 mg  20 mg Oral DAILY    docusate sodium (COLACE) capsule 100 mg  100 mg Oral BID    escitalopram oxalate (LEXAPRO) tablet 10 mg  10 mg Oral DAILY    ferrous sulfate tablet 325 mg  1 Tablet Oral DAILY WITH BREAKFAST    hydrOXYzine HCL (ATARAX) tablet 10 mg  10 mg Oral TID PRN    lacosamide (VIMPAT) tablet 200 mg  200 mg Oral BID    latanoprost (XALATAN) 0.005 % ophthalmic solution 1 Drop  1 Drop Both Eyes QPM    levothyroxine (SYNTHROID) tablet 50 mcg 50 mcg Oral ACB    insulin lispro (HUMALOG) injection   SubCUTAneous AC&HS    glucose chewable tablet 16 g  4 Tablet Oral PRN    glucagon (GLUCAGEN) injection 1 mg  1 mg IntraMUSCular PRN    montelukast (SINGULAIR) tablet 10 mg  10 mg Oral DAILY    budesonide-formoterol (SYMBICORT) 80-4.5 mcg inhaler  2 Puff Inhalation BID    albuterol (PROVENTIL HFA, VENTOLIN HFA, PROAIR HFA) inhaler 2 Puff  2 Puff Inhalation Q4H PRN    midodrine (PROAMATINE) tablet 10 mg  10 mg Oral TID PRN    HYDROmorphone (DILAUDID) syringe 0.5 mg  0.5 mg IntraVENous Q6H PRN     Past Medical History:   Diagnosis Date    Arthritis     pt states knees and back    Asthma     Cardiomyopathy ([facility] [address] 2005    EF 35%, echo in 2021 normal EF     Colon polyp     Diabetes ([facility] [address]     Dizzy spells     GERD (gastroesophageal reflux disease)     Heart failure ([facility] [address] 2005    now combined systolic, diastolic, seen at [facility] [place] [facility] 2021- Dr Meenu Craft    Irritable bowel syndrome     Knee pain, bilateral     Migraines 2011    2-3 week    Neuropathy     pt states of both feet    Seizures (HCC)     petite mals last seizure 2-3 months ago    Thyroid disease     hypothyroid     Social History     Tobacco Use    Smoking status: Never Smoker    Smokeless tobacco: Never Used   Vaping Use    Vaping Use: Never used   Substance Use Topics    Alcohol use: No    Drug use: No     Exam  Visit Vitals  BP 93/64 (BP 1 Location: Left upper arm, BP Patient Position: At rest;Supine)   Pulse 74   Temp 98.2 °F (36.8 °C)   Resp 20   Ht 5' 5\" (1.651 m)   Wt 75.8 kg (167 lb)   SpO2 95%   BMI 27.79 kg/m²     General: Well developed, well nourished. Patient in no distress   Head: Normocephalic, atraumatic, anicteric sclera   Neck Normal ROM, No thyromegally               Ext: No pedal edema   Skin: Supple no rash     NeurologicExam:  Mental Status: Alert and oriented to person place and time   Speech: Fluent no aphasia or dysarthria.    Cranial Nerves: Pupils are equal round and reactive to light and accommodation, extraocular movements are intact and full, visual fields are intact by confrontation, no nystagmus noted, face is symmetric, sensation on face is intact and symmetric, hearing is intact and symmetric, tongue and uvula are in midline with normal movements, palate is elevating equally, shoulder shrug is intact and symmetric. Motor:  Full and symmetric strength of upper and lower ext. Normal bulk and tone. Reflexes:   Deep tendon reflexes 2/4 in the upper extremities but could not be elicited in the knees and the right ankle. The left ankle could not be checked due to the injury. Sensory:   Symmetric and intact    Gait:  Gait is balanced    Tremor:   No tremor noted. Cerebellar:  Coordination intact for finger-nose-finger. Neurovascular: No carotid bruits.  No JVD   Lab Review  Lab Results   Component Value Date/Time    WBC 3.8 05/09/2022 02:00 AM    HCT 32.8 (L) 05/09/2022 02:00 AM    HGB 10.5 (L) 05/09/2022 02:00 AM    PLATELET 536 77/64/1421 02:00 AM     Lab Results   Component Value Date/Time    Sodium 141 05/09/2022 02:00 AM    Potassium 3.9 05/09/2022 02:00 AM    Chloride 114 (H) 05/09/2022 02:00 AM    CO2 22 05/09/2022 02:00 AM    Glucose 80 05/09/2022 02:00 AM    BUN 14 05/09/2022 02:00 AM    Creatinine 0.60 05/09/2022 02:00 AM    Calcium 7.8 (L) 05/09/2022 02:00 AM     No results found for: B12LT, FOL, RBCF  No results found for: LDL, LDLC, DLDLP  Lab Results   Component Value Date/Time    Hemoglobin A1c, External 5.4 09/11/2020 12:00 AM     No components found for: TROPQUANT  No results found for: GULSHAN

## 2022-05-09 NOTE — ROUTINE PROCESS
TRANSFER - OUT REPORT:    Verbal report given to Yana(name) on Nanette Christina  being transferred to [facility](unit) for routine progression of care       Report consisted of patients Situation, Background, Assessment and   Recommendations(SBAR). Information from the following report(s) SBAR was reviewed with the receiving nurse. Lines:   Peripheral IV 05/08/22 Anterior;Proximal;Right Forearm (Active)        Opportunity for questions and clarification was provided.       Patient transported with:   [email or web address]

## 2022-05-09 NOTE — PROGRESS NOTES
Orthopedic progress note    Date:2022       Room:219/  Patient Jadon Askew     YOB: 1970     Age:51 y.o. Subjective    57-year-old female seen in follow-up for left ankle sprain with avulsion fracture lateral talus. Patient still complain of moderate pain of her ankle. He does not feel pain medication helping. Can boot has been applied. No other complaints at this time.   Objective           Vitals Last 24 Hours:  TEMPERATURE:  Temp  Av.2 °F (36.8 °C)  Min: 98 °F (36.7 °C)  Max: 98.4 °F (36.9 °C)  RESPIRATIONS RANGE: Resp  Av.3  Min: 14  Max: 20  PULSE OXIMETRY RANGE: SpO2  Av.9 %  Min: 94 %  Max: 100 %  PULSE RANGE: Pulse  Av.3  Min: 72  Max: 94  BLOOD PRESSURE RANGE: Systolic (41HFS), OXJ:87 , Min:86 , JSI:383   ; Diastolic (87NQE), OMX:29, Min:56, Max:74    Current Facility-Administered Medications   Medication Dose Route Frequency    levETIRAcetam (KEPPRA) tablet 500 mg  500 mg Oral BID    sodium chloride (NS) flush 5-40 mL  5-40 mL IntraVENous Q8H    sodium chloride (NS) flush 5-40 mL  5-40 mL IntraVENous PRN    polyethylene glycol (MIRALAX) packet 17 g  17 g Oral DAILY PRN    ondansetron (ZOFRAN ODT) tablet 4 mg  4 mg Oral Q8H PRN    Or    ondansetron (ZOFRAN) injection 4 mg  4 mg IntraVENous Q6H PRN    0.9% sodium chloride infusion  100 mL/hr IntraVENous CONTINUOUS    [Held by provider] bumetanide (BUMEX) tablet 1 mg  1 mg Oral DAILY    calcium-vitamin D 600 mg-5 mcg (200 unit) per tablet 1 Tablet  1 Tablet Oral BID WITH MEALS    [Held by provider] carvediloL (COREG) tablet 6.25 mg  6.25 mg Oral BID WITH MEALS    [Held by provider] citalopram (CELEXA) tablet 20 mg  20 mg Oral DAILY    docusate sodium (COLACE) capsule 100 mg  100 mg Oral BID    escitalopram oxalate (LEXAPRO) tablet 10 mg  10 mg Oral DAILY    ferrous sulfate tablet 325 mg  1 Tablet Oral DAILY WITH BREAKFAST    hydrOXYzine HCL (ATARAX) tablet 10 mg  10 mg Oral TID PRN    lacosamide (VIMPAT) tablet 200 mg  200 mg Oral BID    latanoprost (XALATAN) 0.005 % ophthalmic solution 1 Drop  1 Drop Both Eyes QPM    levothyroxine (SYNTHROID) tablet 50 mcg  50 mcg Oral ACB    insulin lispro (HUMALOG) injection   SubCUTAneous AC&HS    glucose chewable tablet 16 g  4 Tablet Oral PRN    glucagon (GLUCAGEN) injection 1 mg  1 mg IntraMUSCular PRN    montelukast (SINGULAIR) tablet 10 mg  10 mg Oral DAILY    budesonide-formoterol (SYMBICORT) 80-4.5 mcg inhaler  2 Puff Inhalation BID    albuterol (PROVENTIL HFA, VENTOLIN HFA, PROAIR HFA) inhaler 2 Puff  2 Puff Inhalation Q4H PRN    midodrine (PROAMATINE) tablet 10 mg  10 mg Oral TID PRN    HYDROmorphone (DILAUDID) syringe 0.5 mg  0.5 mg IntraVENous Q6H PRN          I/O (24Hr): Intake/Output Summary (Last 24 hours) at 5/9/2022 1028  Last data filed at 5/8/2022 1825  Gross per 24 hour   Intake 1500 ml   Output --   Net 1500 ml     Objective  Labs/Imaging/Diagnostics    Labs:  CBC:  Recent Labs     05/09/22  0200 05/08/22  1231   WBC 3.8 3.9   RBC 3.71* 4.32   HGB 10.5* 12.1   HCT 32.8* 37.6   MCV 88.4 87.0   RDW 16.1* 15.8*    251     CHEMISTRIES:  Recent Labs     05/09/22  0200 05/08/22  1231    142   K 3.9 4.0   * 113*   CO2 22 25   BUN 14 13   CREA 0.60 0.72   CA 7.8* 8.6   PT/INR:No results for input(s): INR, INREXT in the last 72 hours. No lab exists for component: PROTIME  APTT:No results for input(s): APTT in the last 72 hours. LIVER PROFILE:  Recent Labs     05/08/22  1231   AST 82*   *     Lab Results   Component Value Date/Time    ALT (SGPT) 111 (H) 05/08/2022 12:31 PM    AST (SGOT) 82 (H) 05/08/2022 12:31 PM    Alk. phosphatase 180 (H) 05/08/2022 12:31 PM    Bilirubin, direct <0.10 03/12/2022 11:10 AM    Bilirubin, total 0.2 05/08/2022 12:31 PM       Physical Exam:  Left lower extremity: Cam boot in good position alignment. Mild swelling seen the left ankle. DP pulses palpable.   Cap refill is 2 seconds. No calf pain. Left lower extremity. Neurovascular intact. Assessment//Plan           Patient Active Problem List    Diagnosis Date Noted    Syncope 05/08/2022    Abdominal pain 12/13/2021    Hypotension due to drugs 10/21/2021    Chronic combined systolic and diastolic congestive heart failure ([address] 04/10/2021    Atypical chest pain 04/10/2021    Regional wall motion abnormality of heart 04/10/2021    Vasovagal syncope 04/10/2021    NSVT (nonsustained ventricular tachycardia) ([address] 04/10/2021    Right knee pain 01/04/2021    Generalized abdominal pain 07/21/2020    Diarrhea 07/21/2020    H/O Clostridium difficile infection 07/21/2020    History of bowel resection 04/21/2020    Elevated liver enzymes 07/26/2019    NICM (nonischemic cardiomyopathy) ([address] 07/26/2019    Hypothyroidism 07/26/2019    H/O gastric bypass 07/26/2019    History of cholecystectomy 07/26/2019    History of appendectomy 07/26/2019    Neuropathy 07/26/2019    Migraine headache 07/26/2019    Seizures ([address] 07/26/2019    Fibrocystic breast changes of both breasts 07/08/2015    Heart failure ([address] 2005     Status post left ankle sprain with avulsion fracture lateral talus. Continue with cam boot and weightbearing as tolerated. I will adjust pain medication:. Patient can follow-up with Dr. Cordell Lam as outpatient in approximately 2 weeks.       Electronically signed by Rayshawn Maynard PA-C on 5/9/2022 at 10:28 AM

## 2022-05-09 NOTE — CONSULTS
Consult Date: 5/8/2022    IP CONSULT TO ORTHOPEDIC SURGERY  Consult performed by: Jack Howell MD  Consult ordered by: Elisa Benson PA-C  Reason for consult: Left ankle pain after fall  Assessment/Recommendations: Impression: Left ankle anterior capsular strain with small avulsion fracture from talus-treated nonoperatively in a cam boot walker weightbearing as tolerated  Left knee anterior contusion-stable knee no interventions with weightbearing as tolerated and full range of motion    70-year-old female who had a syncopal episode and fell. Patient was found by daughter on the ground. Patient does not remember the exact mechanism of fall. Patient's left knee had slight anterior tibial tuberosity contusion. Knee is stable for collateral and cruciate ligaments with no joint effusion. Terminal range of motion is slightly decreased for flexion. Left ankle is swollen with tenderness anteriorly medial lateral side of the ankles are stable. Mortise is well-maintained. X-rays of the ankle show a small avulsion fracture of the anterior talus mortise is normal.  Patient can be treated nonoperatively weightbearing as tolerated with a cam boot and mobilization of the knee as tolerated. Patient will benefit from use of anti-inflammatories if allowed medically for 7 to 10 days along with some Tylenol arthritis as needed. Patient can follow-up in office at [phone] with Dr. Piter [facility] in 4 weeks. Subjective   Nanette Christina, 46 y.o. female with a past medical history significant Asthma, CHF, diabetes, seizure disorder presents to the ED with cc of syncopal episode. Patient reportedly was walking to bathroom when she suddenly lost consciousness and fell to the floor. Daughter found patient on ground, no note of any seizure-like activity, no bladder or bowel incontinence. Patient states that she does not remember even walking to bathroom or falling.   States she remembers waking up on floor, complaining of significant headache, and left ankle pain. No chest pains, palpitations, shortness of breath. Currently patient awake alert and oriented, no distress, no weakness in extremities. Patient states she is compliant with her seizure medication.     There are no other complaints, changes, or physical findings at this time.   Past Medical History:   Diagnosis Date    Arthritis     pt states knees and back    Asthma     Cardiomyopathy ([address] 2005    EF 35%, echo in 2021 normal EF     Colon polyp     Diabetes ([address]     Dizzy spells     GERD (gastroesophageal reflux disease)     Heart failure ([address] 2005    now combined systolic, diastolic, seen at [facility] 2021- Dr Halina Mena Irritable bowel syndrome     Knee pain, bilateral     Migraines 2011    2-3 week    Neuropathy     pt states of both feet    Seizures ([address]     petite mals last seizure 2-3 months ago    Thyroid disease     hypothyroid      Past Surgical History:   Procedure Laterality Date    HX CHOLECYSTECTOMY  5/2006    HX COLONOSCOPY      HX ENDOSCOPY      HX GASTRIC BYPASS  2013    HX HYSTERECTOMY  2007    HX KNEE ARTHROSCOPY Right 05/2021    HX ORTHOPAEDIC Bilateral 2013    CTR    HX ORTHOPAEDIC      right knee procedure 2021    HX OTHER SURGICAL      left axillary lymph node biopsy    HX SMALL BOWEL RESECTION      [place] ABDOMEN SURGERY PROC UNLISTED  2008    cholecystectomy    KS APPENDECTOMY      [place] CARDIAC SURG PROCEDURE UNLIST      Loop recorder implanted july 2020    [place] LAP,CHOLECYSTECTOMY      [place] PART REMOVAL COLON W ANASTOMOSIS      [place] RESECT SMALL INTEST W ENTEROSTOMY      [place] STOMACH SURGERY PROCEDURE UNLISTED       Family History   Problem Relation Age of Onset    Hypertension Mother     Heart Disease Mother     Cancer Father         stomach cancer    Hypertension Father     No Known Problems Brother     Asthma Daughter     Asthma Son     Asthma Son    Ticarlos Yaima Breast Cancer Paternal Aunt         age unknown      Social History     Tobacco Use    Smoking status: Never Smoker    Smokeless tobacco: Never Used   Substance Use Topics    Alcohol use: No       Current Facility-Administered Medications   Medication Dose Route Frequency Provider Last Rate Last Admin    sodium chloride (NS) flush 5-40 mL  5-40 mL IntraVENous Q8H Saintair Mcnamara PA-C   10 mL at 05/08/22 1756    sodium chloride (NS) flush 5-40 mL  5-40 mL IntraVENous PRN [facility] MAYE Mcnamara-C        polyethylene glycol (MIRALAX) packet 17 g  17 g Oral DAILY PRN [facility] MANISH Mcnamara        ondansetron (ZOFRAN ODT) tablet 4 mg  4 mg Oral Q8H PRN [facility] MANISH Mcnamara        Or    ondansetron (ZOFRAN) injection 4 mg  4 mg IntraVENous Q6H PRN [facility] VICIK McnamaraC        0.9% sodium chloride infusion  100 mL/hr IntraVENous CONTINUOUS SaintVICKI LauraC 100 mL/hr at 05/08/22 1422 100 mL/hr at 05/08/22 1422    budesonide (PULMICORT) 500 mcg/2 ml nebulizer suspension  500 mcg Nebulization BID RT Saintair Anders PA-C        [Held by provider] bumetanide (BUMEX) tablet 1 mg  1 mg Oral DAILY [facility] MANISH Mcnamara        calcium-vitamin D 600 mg-5 mcg (200 unit) per tablet 1 Tablet  1 Tablet Oral BID WITH MEALS Saintair Anders PA-C        [Held by provider] carvediloL (COREG) tablet 6.25 mg  6.25 mg Oral BID [facility] MANISH Mcnamara        [START ON 5/9/2022] citalopram (CELEXA) tablet 20 mg  20 mg Oral DAILY [facility] OmMANISH woods        docusate sodium (COLACE) capsule 100 mg  100 mg Oral BID [facility] MANISH Mcnamara        [START ON 5/9/2022] escitalopram oxalate (LEXAPRO) tablet 10 mg  10 mg Oral DAILY [facility] OmMANISH woods        [START ON 5/9/2022] ferrous sulfate tablet 325 mg  1 Tablet Oral DAILY WITH BREAKFAST [facility] MANISH Mcnamara        hydrOXYzine HCL (ATARAX) tablet 10 mg  10 mg Oral TID PRN [facility] Oms, PA-C        lacosamide (VIMPAT) tablet 100 mg  100 mg Oral BID Tracy Going, MANISH        latanoprost (XALATAN) 0.005 % ophthalmic solution 1 Drop  1 Drop Both Eyes QPM Tracy Going, MANISH        levETIRAcetam (KEPPRA) tablet 750 mg  750 mg Oral BID Tracy Going, MANISH        [START ON 5/9/2022] levothyroxine (SYNTHROID) tablet 50 mcg  50 mcg Oral ACB Tracy Going, MANISH        insulin lispro (HUMALOG) injection   SubCUTAneous AC&HS Tracy Going, MANISH        glucose chewable tablet 16 g  4 Tablet Oral PRN Tracy Going, MANISH        glucagon (GLUCAGEN) injection 1 mg  1 mg IntraMUSCular PRN Tracy Going, MANISH        [START ON 5/9/2022] montelukast (SINGULAIR) tablet 10 mg  10 mg Oral DAILY Tracy Going, MANISH        budesonide-formoterol (SYMBICORT) 80-4.5 mcg inhaler  2 Puff Inhalation BID Tracy Going, MANISH        albuterol (PROVENTIL HFA, VENTOLIN HFA, PROAIR HFA) inhaler 2 Puff  2 Puff Inhalation Q4H PRN Tracy Going, MANISH        midodrine (PROAMATINE) tablet 10 mg  10 mg Oral TID PRN Tracy Going, MANISH   10 mg at 05/08/22 1803     Current Outpatient Medications   Medication Sig Dispense Refill    escitalopram oxalate (LEXAPRO) 10 mg tablet Take 10 mg by mouth daily.  hydrOXYzine HCL (ATARAX) 25 mg tablet TAKE 1 TABLET BY MOUTH THREE TIMES DAILY AS NEEDED FOR 10 DAYS      latanoprost (XALATAN) 0.005 % ophthalmic solution INSTILL 1 DROP INTO EACH EYE AT NIGHT      ofloxacin (FLOXIN) 0.3 % ophthalmic solution INSTILL 5 DROPS INTO AFFECTED EAR TWICE DAILY      scopolamine (TRANSDERM-SCOP) 1 mg over 3 days pt3d PLACE 1 PATCH BEHIND THE EAR EVERY 3 DAYS A NEEDED FOR NAUSEA      spironolactone (ALDACTONE) 50 mg tablet Take 1 Tablet by mouth daily. 90 Tablet 1    carvediloL (COREG) 6.25 mg tablet Take 1 Tablet by mouth two (2) times a day. 180 Tablet 3    losartan (COZAAR) 25 mg tablet Take 1 Tablet by mouth daily (after breakfast).  90 Tablet 3    bumetanide (BUMEX) 1 mg tablet Take 1 Tablet by mouth daily. 180 Tablet 3    TENS units and TENS electrodes (TENS UNIT AND ELECTRODES) Tens Unit, 1 ea, Dispense: 1 EA, Refills: 0, Easyscript, Maintenance, 0      azelastine (ASTELIN) 137 mcg (0.1 %) nasal spray USE 2 SPRAY(S) IN EACH NOSTRIL TWICE DAILY      azelastine (OPTIVAR) 0.05 % ophthalmic solution 1 Drop two (2) times a day.  augmented betamethasone dipropionate (DIPROLENE-AF) 0.05 % ointment       budesonide (PULMICORT) 0.5 mg/2 mL nbsp USE 1 VIAL IN NEBULIZER TWICE DAILY      citalopram (CELEXA) 20 mg tablet Take 20 mg by mouth daily.  docusate sodium (COLACE) 100 mg capsule Take 100 mg by mouth two (2) times a day.  Premarin 0.625 mg/gram vaginal cream       Ferrex 150 150 mg iron capsule       Linzess 145 mcg cap capsule Take 145 mcg by mouth daily.  metoclopramide HCl (REGLAN) 5 mg tablet       Saccharomyces boulardii (FLORASTOR) 250 mg capsule Take 250 mg by mouth two (2) times a day.  nitroglycerin (NITROSTAT) 0.4 mg SL tablet DISSOLVE 1 TABLET UNDER THE TONGUE EVERY 5 MINUTES AS  NEEDED FOR CHEST PAIN. MAX  OF 3 TABLETS IN 15 MINUTES. CALL 911 IF PAIN PERSISTS. 100 Tablet 3    cetirizine (ZyrTEC) 10 mg tablet Take 10 mg by mouth daily.  metFORMIN (GLUCOPHAGE) 500 mg tablet Take  by mouth two (2) times daily (with meals).  cloNIDine (CATAPRES) 0.1 mg/24 hr ptwk       fluticasone propionate (FLONASE) 50 mcg/actuation nasal spray       BD Luer-Narda Syringe 3 mL 25 gauge x 1\" syrg USE FOR B12 INJECTIONS      RABEprazole (ACIPHEX) 20 mg TbEC Take 20 mg by mouth two (2) times a day.  rizatriptan (MAXALT) 5 mg tablet Take 5 mg by mouth once as needed for Migraine. May repeat in 2 hours if needed       alum-mag hydroxide-simeth-lidocaine-sucralfate Take 30 mL by mouth daily.  lacosamide (Vimpat) 100 mg tab tablet Take 100 mg by mouth two (2) times a day.  tiotropium (SPIRIVA) 18 mcg inhalation capsule Take 1 Cap by inhalation daily.  ondansetron hcl (ZOFRAN) 8 mg tablet Take 8 mg by mouth every eight (8) hours as needed.  levETIRAcetam (KEPPRA) 750 mg tablet 1,500 mg two (2) times a day. 1    montelukast (SINGULAIR) 10 mg tablet Take 10 mg by mouth daily. 3    nortriptyline (PAMELOR) 50 mg capsule Take 100 mg by mouth nightly. 5    sucralfate (CARAFATE) 100 mg/mL suspension Take 1 g by mouth four (4) times daily.  topiramate (TOPAMAX) 100 mg tablet Take 100 mg by mouth two (2) times a day. 5    VENTOLIN HFA 90 mcg/actuation inhaler Take 2 Puffs by inhalation every four (4) hours as needed. 4    SYMBICORT 80-4.5 mcg/actuation HFAA INHALE 2 PUFFS BY MOUTH TWICE DAILY  4    dexlansoprazole (DEXILANT) 60 mg CpDB capsule (delayed release) Take 60 mg by mouth daily.  dicyclomine (BENTYL) 20 mg tablet Take 20 mg by mouth every six (6) hours.  estradiol (ESTRACE) 0.01 % (0.1 mg/gram) vaginal cream   2    levothyroxine (SYNTHROID) 50 mcg tablet Take 50 mcg by mouth Daily (before breakfast).  cyanocobalamin (VITAMIN B-12) 1,000 mcg/mL injection 1,000 mcg by IntraMUSCular route every thirty (30) days.  Calcium-Cholecalciferol, D3, (CALCIUM 600 WITH VITAMIN D3) 600 mg(1,500mg) -400 unit cap Take 2 Tablets by mouth daily.  multivitamin (ONE A DAY) tablet Take 1 Tab by mouth daily. Review of Systems  Constitutional: Negative for activity change, chills, fatigue and fever. HENT: Negative for congestion and trouble swallowing. Eyes: Negative for photophobia and visual disturbance. Respiratory: Negative for cough, chest tightness and shortness of breath. Cardiovascular: Negative for chest pain and palpitations. Gastrointestinal: Negative for abdominal distention, abdominal pain, diarrhea, nausea and vomiting. Genitourinary: Negative for dysuria, flank pain and frequency. Musculoskeletal: Positive for arthralgias and myalgias. Negative for back pain.    Skin: Negative for color change, pallor and rash. Neurological: Positive for syncope. Negative for dizziness, tremors, weakness and headaches. Psychiatric/Behavioral: Negative for confusion. Objective     Vital signs for last 24 hours:  Visit Vitals  BP (!) 87/59   Pulse 86   Temp 98 °F (36.7 °C)   Resp 16   Ht 5' 5\" (1.651 m)   Wt 75.8 kg (167 lb)   SpO2 96%   BMI 27.79 kg/m²       Intake/Output this shift:  Current Shift: No intake/output data recorded. Last 3 Shifts: 05/07 0701 - 05/08 1900  In: 1500 [I.V.:1500]  Out: -     Data Review:   Recent Results (from the past 24 hour(s))   EKG, 12 LEAD, INITIAL    Collection Time: 05/08/22 12:11 PM   Result Value Ref Range    Ventricular Rate 96 BPM    Atrial Rate 96 BPM    P-R Interval 172 ms    QRS Duration 88 ms    Q-T Interval 328 ms    QTC Calculation (Bezet) 414 ms    Calculated P Axis 48 degrees    Calculated R Axis -16 degrees    Calculated T Axis 47 degrees    Diagnosis       Normal sinus rhythm  Nonspecific T wave abnormality  Abnormal ECG  When compared with ECG of 08-MAY-2022 12:07, (Unconfirmed)  No significant change was found  Confirmed by [facility], Sarah 85 ([id number]) on 5/8/2022 1:28:05 PM     CBC WITH AUTOMATED DIFF    Collection Time: 05/08/22 12:31 PM   Result Value Ref Range    WBC 3.9 3.6 - 11.0 K/uL    RBC 4.32 3.80 - 5.20 M/uL    HGB 12.1 11.5 - 16.0 g/dL    HCT 37.6 35.0 - 47.0 %    MCV 87.0 80.0 - 99.0 FL    MCH 28.0 26.0 - 34.0 PG    MCHC 32.2 30.0 - 36.5 g/dL    RDW 15.8 (H) 11.5 - 14.5 %    PLATELET 525 551 - 347 K/uL    MPV 10.5 8.9 - 12.9 FL    NRBC 0.0 0.0  WBC    ABSOLUTE NRBC 0.00 0.00 - 0.01 K/uL    NEUTROPHILS 79 (H) 32 - 75 %    LYMPHOCYTES 10 (L) 12 - 49 %    MONOCYTES 8 5 - 13 %    EOSINOPHILS 2 0 - 7 %    BASOPHILS 1 0 - 1 %    IMMATURE GRANULOCYTES 0 0 - 0.5 %    ABS. NEUTROPHILS 3.1 1.8 - 8.0 K/UL    ABS. LYMPHOCYTES 0.4 (L) 0.8 - 3.5 K/UL    ABS. MONOCYTES 0.3 0.0 - 1.0 K/UL    ABS. EOSINOPHILS 0.1 0.0 - 0.4 K/UL    ABS.  BASOPHILS 0.0 0.0 - 0.1 K/UL ABS. IMM. GRANS. 0.0 0.00 - 0.04 K/UL    DF AUTOMATED     METABOLIC PANEL, COMPREHENSIVE    Collection Time: 05/08/22 12:31 PM   Result Value Ref Range    Sodium 142 136 - 145 mmol/L    Potassium 4.0 3.5 - 5.1 mmol/L    Chloride 113 (H) 97 - 108 mmol/L    CO2 25 21 - 32 mmol/L    Anion gap 4 (L) 5 - 15 mmol/L    Glucose 94 65 - 100 mg/dL    BUN 13 6 - 20 mg/dL    Creatinine 0.72 0.55 - 1.02 mg/dL    BUN/Creatinine ratio 18 12 - 20      GFR est AA >60 >60 ml/min/1.73m2    GFR est non-AA >60 >60 ml/min/1.73m2    Calcium 8.6 8.5 - 10.1 mg/dL    Bilirubin, total 0.2 0.2 - 1.0 mg/dL    AST (SGOT) 82 (H) 15 - 37 U/L    ALT (SGPT) 111 (H) 12 - 78 U/L    Alk. phosphatase 180 (H) 45 - 117 U/L    Protein, total 6.4 6.4 - 8.2 g/dL    Albumin 3.4 (L) 3.5 - 5.0 g/dL    Globulin 3.0 2.0 - 4.0 g/dL    A-G Ratio 1.1 1.1 - 2.2     TROPONIN-HIGH SENSITIVITY    Collection Time: 05/08/22 12:31 PM   Result Value Ref Range    Troponin-High Sensitivity 4 0 - 51 ng/L   TSH 3RD GENERATION    Collection Time: 05/08/22  2:30 PM   Result Value Ref Range    TSH 1.04 0.36 - 3.74 uIU/mL   GLUCOSE, POC    Collection Time: 05/08/22  5:56 PM   Result Value Ref Range    Glucose (POC) 96 65 - 117 mg/dL    Performed by ERNESTINA BILLINGSLEY    X-rays of ankle reviewed reveal no obvious structural bony injury small avulsion of anterior talar neck. X-rays of knee 3 views are normal.    Physical Exam  Vitals and nursing note reviewed. Constitutional:       Appearance: Normal appearance. HENT:      Head: Normocephalic and atraumatic. Mouth/Throat:      Mouth: Mucous membranes are dry. Pharynx: Oropharynx is clear. Eyes:      Extraocular Movements: Extraocular movements intact. Conjunctiva/sclera: Conjunctivae normal.      Pupils: Pupils are equal, round, and reactive to light. Cardiovascular:      Rate and Rhythm: Normal rate and regular rhythm. Pulmonary:      Effort: Pulmonary effort is normal. No respiratory distress.    Musculoskeletal: General: Swelling, tenderness and signs of injury present. Left lower leg: Edema present. Comments: Left hip nontender with good logroll and no tenderness on logroll. Left knee has slight anterior bruise over the tibial tuberosity. There is no joint effusion. Collateral ligaments are stable at 0 and 30. Lachman's test is negative anterior posterior drawer test are negative. Knee range of motion terminal flexion is limited secondary to patellofemoral and anterior tibial pain. Left ankle has anterior swelling with tenderness in the talar neck area medial lateral sides are nontender. Posterior ankle is nontender foot is neurovascular intact. Skin:     General: Skin is warm and dry. Neurological:      General: No focal deficit present. Mental Status: She is alert and oriented to person, place, and time. Mental status is at baseline. Psychiatric:         Mood and Affect: Mood normal.         Behavior: Behavior normal.         Thought Content:  Thought content normal.         Judgment: Judgment normal.

## 2022-05-09 NOTE — PROGRESS NOTES
.Medicare Outpatient Observation Notice (MOON)/ [place] Outpatient Observation Notice (Jose Neigh) provided to patient/representative with verbal explanation of the notice. Time allotted for questions regarding the notice. Patient /representative provided a completed copy of the MOON/VOON notice. Copy placed on bedside chart.     [address], [address]

## 2022-05-10 RX ORDER — LACOSAMIDE 200 MG/1
200 TABLET ORAL 2 TIMES DAILY
Qty: 30 TABLET | Refills: 0 | Status: SHIPPED | OUTPATIENT
Start: 2022-05-10

## 2022-05-10 RX ORDER — LEVETIRACETAM 500 MG/1
500 TABLET ORAL 2 TIMES DAILY
Qty: 30 TABLET | Refills: 2 | Status: SHIPPED | OUTPATIENT
Start: 2022-05-10

## 2022-05-10 RX ORDER — DICLOFENAC POTASSIUM 50 MG/1
50 TABLET, FILM COATED ORAL
Qty: 20 TABLET | Refills: 0 | Status: SHIPPED | OUTPATIENT
Start: 2022-05-10 | End: 2022-05-24

## 2022-05-10 NOTE — PROGRESS NOTES
Problem: Falls - Risk of  Goal: *Absence of Falls  Description: Document Brian Molina Fall Risk and appropriate interventions in the flowsheet.   Outcome: Resolved/Met  Note: Fall Risk Interventions:                                Problem: Patient Education: Go to Patient Education Activity  Goal: Patient/Family Education  Outcome: Resolved/Met

## 2022-05-10 NOTE — DISCHARGE INSTRUCTIONS
Patient Education        Broken Ankle: Care Instructions  Your Care Instructions     An ankle may break (fracture) during sports, a fall, or other accidents. Fractures can range from a small, hairline crack, to a bone or bones broken into two or more pieces. Your treatment depends on how bad the break is. Your doctor may have put your ankle in a splint or cast to allow it to heal or to keep it stable until you see another doctor. It may take weeks or months for your ankle to heal. You can help your ankle heal with some care at home. You heal best when you take good care of yourself. Eat a variety of healthy foods, and don't smoke. You may have had a sedative to help you relax. You may be unsteady after having sedation. It can take a few hours for the medicine's effects to wear off. Common side effects of sedation include nausea, vomiting, and feeling sleepy or tired. The doctor has checked you carefully, but problems can develop later. If you notice any problems or new symptoms,  get medical treatment right away. Follow-up care is a key part of your treatment and safety. Be sure to make and go to all appointments, and call your doctor if you are having problems. It's also a good idea to know your test results and keep a list of the medicines you take. How can you care for yourself at home? · If the doctor gave you a sedative:  ? For 24 hours, don't do anything that requires attention to detail, such as going to work, making important decisions, or signing any legal documents. It takes time for the medicine's effects to completely wear off.  ? For your safety, do not drive or operate any machinery that could be dangerous. Wait until the medicine wears off and you can think clearly and react easily. · Put ice or a cold pack on your ankle for 10 to 20 minutes at a time. Try to do this every 1 to 2 hours for the next 3 days (when you are awake). Put a thin cloth between the ice and your cast or splint.  Keep your cast or splint dry. · Follow the cast care instructions your doctor gives you. If you have a splint, do not take it off unless your doctor tells you to. · Be safe with medicines. Take pain medicines exactly as directed. ? If the doctor gave you a prescription medicine for pain, take it as prescribed. ? If you are not taking a prescription pain medicine, ask your doctor if you can take an over-the-counter medicine. · Prop up your leg on pillows in the first few days after the injury. Keep the ankle higher than the level of your heart. This will help reduce swelling. · Do not put weight on your ankle unless your doctor tells you to. Use crutches to walk. · Follow instructions for exercises to keep your leg strong. · Wiggle your toes often to reduce swelling and stiffness. When should you call for help? Call 911 anytime you think you may need emergency care. For example, call if:    · You have chest pain, are short of breath, or you cough up blood.     · You are very sleepy and you have trouble waking up. Call your doctor now or seek immediate medical care if:    · You have new or worse nausea or vomiting.     · You have new or worse pain.     · Your foot is cool or pale or changes color.     · You have tingling, weakness, or numbness in your toes.     · Your cast or splint feels too tight.     · You have signs of a blood clot in your leg (called a deep vein thrombosis), such as:  ? Pain in your calf, back of the knee, thigh, or groin. ? Redness or swelling in your leg. Watch closely for changes in your health, and be sure to contact your doctor if:    · You have a problem with your splint or cast.     · You do not get better as expected. Where can you learn more? Go to http://www.gray.com/  Enter P763 in the search box to learn more about \"Broken Ankle: Care Instructions. \"  Current as of: July 1, 2021               Content Version: 13.2  © 3148-2511 [employer]. Care instructions adapted under license by [employer] (which disclaims liability or warranty for this information). If you have questions about a medical condition or this instruction, always ask your healthcare professional. [address] any warranty or liability for your use of this information. Patient Education        Abdominal Pain: Care Instructions  Your Care Instructions     Abdominal pain has many possible causes. Some aren't serious and get better on their own in a few days. Others need more testing and treatment. If your pain continues or gets worse, you need to be rechecked and may need more tests to find out what is wrong. You may need surgery to correct the problem. Don't ignore new symptoms, such as fever, nausea and vomiting, urination problems, pain that gets worse, and dizziness. These may be signs of a more serious problem. Your doctor may have recommended a follow-up visit in the next 8 to 12 hours. If you are not getting better, you may need more tests or treatment. The doctor has checked you carefully, but problems can develop later. If you notice any problems or new symptoms, get medical treatment right away. Follow-up care is a key part of your treatment and safety. Be sure to make and go to all appointments, and call your doctor if you are having problems. It's also a good idea to know your test results and keep a list of the medicines you take. How can you care for yourself at home? · Rest until you feel better. · To prevent dehydration, drink plenty of fluids. Choose water and other clear liquids until you feel better. If you have kidney, heart, or liver disease and have to limit fluids, talk with your doctor before you increase the amount of fluids you drink. · If your stomach is upset, eat mild foods, such as rice, dry toast or crackers, bananas, and applesauce.  Try eating several small meals instead of two or three large ones.  · Wait until 48 hours after all symptoms have gone away before you have spicy foods, alcohol, and drinks that contain caffeine. · Do not eat foods that are high in fat. · Avoid anti-inflammatory medicines such as aspirin, ibuprofen (Advil, Motrin), and naproxen (Aleve). These can cause stomach upset. Talk to your doctor if you take daily aspirin for another health problem. When should you call for help? Call 911 anytime you think you may need emergency care. For example, call if:    · You passed out (lost consciousness).     · You pass maroon or very bloody stools.     · You vomit blood or what looks like coffee grounds.     · You have new, severe belly pain. Call your doctor now or seek immediate medical care if:    · Your pain gets worse, especially if it becomes focused in one area of your belly.     · You have a new or higher fever.     · Your stools are black and look like tar, or they have streaks of blood.     · You have unexpected vaginal bleeding.     · You have symptoms of a urinary tract infection. These may include:  ? Pain when you urinate. ? Urinating more often than usual.  ? Blood in your urine.     · You are dizzy or lightheaded, or you feel like you may faint. Watch closely for changes in your health, and be sure to contact your doctor if:    · You are not getting better after 1 day (24 hours). Where can you learn more? Go to http://www.gray.com/  Enter I968 in the search box to learn more about \"Abdominal Pain: Care Instructions. \"  Current as of: July 1, 2021               Content Version: 13.2  © 6227-6518 [employer]. Care instructions adapted under license by [employer] (which disclaims liability or warranty for this information).  If you have questions about a medical condition or this instruction, always ask your healthcare professional. [address] any warranty or liability for your use of this information.

## 2022-06-07 ENCOUNTER — VIRTUAL VISIT (OUTPATIENT)
Dept: HEMATOLOGY | Age: 52
End: 2022-06-07
Payer: MEDICARE

## 2022-06-07 DIAGNOSIS — R74.8 ELEVATED LIVER ENZYMES: Primary | ICD-10-CM

## 2022-06-07 PROCEDURE — G9899 SCRN MAM PERF RSLTS DOC: HCPCS | Performed by: NURSE PRACTITIONER

## 2022-06-07 PROCEDURE — 3017F COLORECTAL CA SCREEN DOC REV: CPT | Performed by: NURSE PRACTITIONER

## 2022-06-07 PROCEDURE — G8432 DEP SCR NOT DOC, RNG: HCPCS | Performed by: NURSE PRACTITIONER

## 2022-06-07 PROCEDURE — G8427 DOCREV CUR MEDS BY ELIG CLIN: HCPCS | Performed by: NURSE PRACTITIONER

## 2022-06-07 PROCEDURE — 99213 OFFICE O/P EST LOW 20 MIN: CPT | Performed by: NURSE PRACTITIONER

## 2022-06-07 NOTE — PROGRESS NOTES
[address] [facility], MD, [address], [place], [place]      MANISH Mckeon, [facility]-BC     Perla Russo, [facility]   BLESSING AlanisP-SERA Ziegler, [facility]       Johanne Coyle Ghassan [address]    at [address], [address] [place] [address], [facility] 22. [phone]    FAX: [address] [place] [address]    at [address], [address], [address] [place] [address],[address]    [address] [facility] [address]: [phone]       Patient Care Team:  Sofia Merlos MD as PCP - General (Family Medicine)  Selwyn Kidd MD (Surgery General)  Maxx Sorenson MD as Referring Provider (Obstetrics & Gynecology)  Selwyn Kidd MD (Surgery General)  Caroline Lewis MD (Cardiovascular Disease Physician)  Rasta Sloan MD (Gastroenterology)  [facility], NP (Nurse Practitioner)      Problem List  Date Reviewed: 3/9/2022          Codes Class Noted    Syncope ICD-10-CM: R55  ICD-9-CM: 780.2  5/8/2022        Abdominal pain ICD-10-CM: R10.9  ICD-9-CM: 789.00  12/13/2021        Hypotension due to drugs ICD-10-CM: I95.2  ICD-9-CM: 458.8, E947.9  10/21/2021        Chronic combined systolic and diastolic congestive heart failure ([facility] ICD-10-CM: I50.42  ICD-9-CM: 428.42, 428.0  4/10/2021        Atypical chest pain ICD-10-CM: R07.89  ICD-9-CM: 786.59  4/10/2021        Regional wall motion abnormality of heart ICD-10-CM: R93.1  ICD-9-CM: 793.2  4/10/2021        Vasovagal syncope ICD-10-CM: R55  ICD-9-CM: 780.2  4/10/2021        NSVT (nonsustained ventricular tachycardia) ([facility] ICD-10-CM: I47.2  ICD-9-CM: 427.1  4/10/2021        Right knee pain ICD-10-CM: M25.561  ICD-9-CM: 719.46  1/4/2021        Generalized abdominal pain ICD-10-CM: R10.84  ICD-9-CM: 789.07  7/21/2020        Diarrhea ICD-10-CM: R19.7  ICD-9-CM: 787.91  7/21/2020        H/O Clostridium difficile infection ICD-10-CM: Z86.19  ICD-9-CM: V12.09  7/21/2020        History of bowel resection ICD-10-CM: Z90.49  ICD-9-CM: V15.89  4/21/2020        Elevated liver enzymes ICD-10-CM: R74.8  ICD-9-CM: 790.5  7/26/2019        NICM (nonischemic cardiomyopathy) ([facility] ICD-10-CM: I42.8  ICD-9-CM: 425.4  7/26/2019        Hypothyroidism ICD-10-CM: E03.9  ICD-9-CM: 244.9  7/26/2019        H/O gastric bypass ICD-10-CM: Z98.84  ICD-9-CM: V45.86  7/26/2019        History of cholecystectomy ICD-10-CM: Z90.49  ICD-9-CM: V45.79  7/26/2019        History of appendectomy ICD-10-CM: Z90.49  ICD-9-CM: V45.89  7/26/2019        Neuropathy ICD-10-CM: G62.9  ICD-9-CM: 355.9  7/26/2019        Migraine headache ICD-10-CM: Z72.665  ICD-9-CM: 346.90  7/26/2019        Seizures ([facility] ICD-10-CM: R56.9  ICD-9-CM: 780.39  7/26/2019        Fibrocystic breast changes of both breasts ICD-10-CM: N60.11, N60.12  ICD-9-CM: 610.1  7/8/2015        Heart failure ([facility] ICD-10-CM: I50.9  ICD-9-CM: 428.9  2005    Overview Signed 4/10/2021 12:43 PM by Nalini Quinn MD     now combined systolic, diastolic, seen at [facility], [facility] 2021- Dr Mahi Escamilla:  [place] Toy, who was evaluated through a synchronous (real-time) audio-video encounter, and/or his healthcare decision maker, is aware that it is a billable service, which includes applicable co-pays, with coverage as determined by his insurance carrier. He provided verbal consent to proceed and patient identification was verified. This visit was conducted pursuant to the emergency declaration under the [address] [place] [address] and the [employer] General Act. A caregiver was present when appropriate. Ability to conduct physical exam was limited.  The patient was located at home in a state where the provider was licensed to provide care. Ariel Seymour returns to the [address] [place] [address] for management of elevated liver enzymes. The active problem list, all pertinent past medical history, medications, radiologic findings and laboratory findings related to the liver disorder were reviewed with the patient. The patient is a 46 y.o. Black female who was found to have abnormalities in liver enzymes in 2018. ECHO performed 1/2021 demonstrated mildly reduced systolic function. LVEF 50-55%. This was recently repeated 5/2022 which demonstrated an LVEF of 45-50%. Grade 1 diastolic dysfunction. Mildly thickened tricuspid valve, Moderate regurgitation of pulmonic valve. Serologic evaluation for markers of chronic liver disease were negative. The patient underwent a liver biopsy in 12/2020 which demonstrated centrolobular sinusoidal dilation with no inflammaiton, steatosis or fibrosis. A single granuloma was present. Assessment of liver fibrosis was performed with Fibroscan 10/2021. The result was 3.7 kPa which correlates with no fibrosis. EGD was performed 12/2021 which demonstrated dyskinesia of the esophagus and a chronic gastric ulcer. She is currently on sucralfate, dexilant and rabeprazole. Since the last office visit the patient has continued to have intermittent seizures with the last being 5/2022 which resulted in a fall with an ankle fracture. She has chronic hypotension and reports there was an adjustment in anti-hypertensive medication which in turn caused her to be symptomatic from heart failure. The patient has the following symptoms which are thought to be due to the liver disease:  fatigue, nausea, and diffuse abdominal pain. This is chronic. The patient has not experienced the following symptoms:  Itching or swelling of the abdomen.      The patient has Mild limitations in functional activities which can be attributed to other medical problems that are not related to the liver disease. ASSESSMENT AND PLAN:  Elevated liver enzymes  Intermittent elevation in liver transaminases and ALP. Serologic testing for causes of chronic liver disease were negative. A liver biopsy in 12/2020 demonstrated centrolobular sinusoidal dilation and a granuloma but no steatosis, no inflammaiton and no fibrosis. Assessment of liver fibrosis was performed with Fibroscan 10/2021. The result was 3.7 kPa which correlates with no fibrosis. Have performed laboratory testing to monitor liver function and degree of liver injury. This included BMP, hepatic panel, CBC with platelet count and INR. Laboratory testing from 5/08/2022 reviewed in detail. Follow-up testing ordered today. The liver transaminases and ALP are elevated. The liver function and platelet count are normal.     Will order IGG4 to rule out an underlying autoimmune liver disease. MRI/MRCP 4/2020 demonstrated a normal appearing liver with no ductal dilatation or stones. History of Cardiomyopathy  The patient was being followed by cardiology at [facility] for cardiomyopathy. ECHO performed 1/2021 demonstrated an LVEF of 50-55%. Mild left ventricular diastolic dysfunction. Mildly dilated left [facility]. She had a follow up with Dr. India Bowles which felt the ECHO was normal and cardiomyopathy has resolved with medical treatment. ECHO again performed 5/2022 which demonstrated an LVEF of 45-50%. Mild diastolic dysfunction. Hepatic granuloma  This has little clinical significance. The mild elevation in ALP may be due to the granuloma. Hepatic granulomas are in most cases benign and do not cause liver injury. Screening for Hepatocellular Carcinoma  HCC screening is not necessary since the patient has no evidence of cirrhosis.     Treatment of other medical problems in patients with chronic liver disease  There are no contraindications for the patient to take most medications that are necessary for treatment of other medical issues. Counseling for alcohol in patients with chronic liver disease  The patient was counseled regarding alcohol consumption and the effect of alcohol on chronic liver disease. Patients who have undergone obesity surgery are at much greater risk to develop alcoholic liver injury. She has never consumed significant amounts of alcohol. Vaccinations   Vaccination for viral hepatitis A and B is recommended since the patient has no serologic evidence of previous exposure or vaccination with immunity. Routine vaccinations against other bacterial and viral agents can be performed as indicated. Annual flu vaccination should be administered if indicated. ALLERGIES  Allergies   Allergen Reactions    Acetaminophen Other (comments)     Advised not to take due to Liver Issues    Morphine Other (comments)     SOB/Chest Pressure - in hosp for a week. States DILAUDID Ok    Oxycodone Shortness of Breath     Reaction Type: Allergy; Severity: Severe    Shellfish Derived Anaphylaxis    Codeine Other (comments)     SOB chest pain    Peanut Oil Other (comments)     Reaction Type: Intolerance; Severity: Severe    Sulfa (Sulfonamide Antibiotics) Rash     itching       MEDICATIONS  Current Outpatient Medications   Medication Sig    lacosamide (Vimpat) 200 mg tab tablet Take 1 Tablet by mouth two (2) times a day. Max Daily Amount: 400 mg.  levETIRAcetam (KEPPRA) 500 mg tablet Take 1 Tablet by mouth two (2) times a day.  escitalopram oxalate (LEXAPRO) 10 mg tablet Take 10 mg by mouth daily.     hydrOXYzine HCL (ATARAX) 25 mg tablet TAKE 1 TABLET BY MOUTH THREE TIMES DAILY AS NEEDED FOR 10 DAYS    latanoprost (XALATAN) 0.005 % ophthalmic solution INSTILL 1 DROP INTO EACH EYE AT NIGHT    ofloxacin (FLOXIN) 0.3 % ophthalmic solution INSTILL 5 DROPS INTO AFFECTED EAR TWICE DAILY    scopolamine (TRANSDERM-SCOP) 1 mg over 3 days pt3d PLACE 1 PATCH BEHIND THE EAR EVERY 3 DAYS A NEEDED FOR NAUSEA    spironolactone (ALDACTONE) 50 mg tablet Take 1 Tablet by mouth daily.  carvediloL (COREG) 6.25 mg tablet Take 1 Tablet by mouth two (2) times a day.  losartan (COZAAR) 25 mg tablet Take 1 Tablet by mouth daily (after breakfast).  bumetanide (BUMEX) 1 mg tablet Take 1 Tablet by mouth daily.  TENS units and TENS electrodes (TENS UNIT AND ELECTRODES) Tens Unit, 1 ea, Dispense: 1 EA, Refills: 0, Easyscript, Maintenance, 0    azelastine (ASTELIN) 137 mcg (0.1 %) nasal spray USE 2 SPRAY(S) IN EACH NOSTRIL TWICE DAILY    augmented betamethasone dipropionate (DIPROLENE-AF) 0.05 % ointment     budesonide (PULMICORT) 0.5 mg/2 mL nbsp USE 1 VIAL IN NEBULIZER TWICE DAILY    citalopram (CELEXA) 20 mg tablet Take 20 mg by mouth daily.  docusate sodium (COLACE) 100 mg capsule Take 100 mg by mouth two (2) times a day.  Premarin 0.625 mg/gram vaginal cream     Ferrex 150 150 mg iron capsule     Linzess 145 mcg cap capsule Take 145 mcg by mouth daily.  metoclopramide HCl (REGLAN) 5 mg tablet     Saccharomyces boulardii (FLORASTOR) 250 mg capsule Take 250 mg by mouth two (2) times a day.  nitroglycerin (NITROSTAT) 0.4 mg SL tablet DISSOLVE 1 TABLET UNDER THE TONGUE EVERY 5 MINUTES AS  NEEDED FOR CHEST PAIN. MAX  OF 3 TABLETS IN 15 MINUTES. CALL 911 IF PAIN PERSISTS.  cetirizine (ZyrTEC) 10 mg tablet Take 10 mg by mouth daily.  metFORMIN (GLUCOPHAGE) 500 mg tablet Take  by mouth two (2) times daily (with meals).  cloNIDine (CATAPRES) 0.1 mg/24 hr ptwk     fluticasone propionate (FLONASE) 50 mcg/actuation nasal spray     BD Luer-Narda Syringe 3 mL 25 gauge x 1\" syrg USE FOR B12 INJECTIONS    RABEprazole (ACIPHEX) 20 mg TbEC Take 20 mg by mouth two (2) times a day.  rizatriptan (MAXALT) 5 mg tablet Take 5 mg by mouth once as needed for Migraine.  May repeat in 2 hours if needed     alum-mag hydroxide-simeth-lidocaine-sucralfate Take 30 mL by mouth daily.    tiotropium (SPIRIVA) 18 mcg inhalation capsule Take 1 Cap by inhalation daily.  ondansetron hcl (ZOFRAN) 8 mg tablet Take 8 mg by mouth every eight (8) hours as needed.  montelukast (SINGULAIR) 10 mg tablet Take 10 mg by mouth daily.  nortriptyline (PAMELOR) 50 mg capsule Take 100 mg by mouth nightly.  sucralfate (CARAFATE) 100 mg/mL suspension Take 1 g by mouth four (4) times daily.  topiramate (TOPAMAX) 100 mg tablet Take 100 mg by mouth two (2) times a day.  VENTOLIN HFA 90 mcg/actuation inhaler Take 2 Puffs by inhalation every four (4) hours as needed.  SYMBICORT 80-4.5 mcg/actuation HFAA INHALE 2 PUFFS BY MOUTH TWICE DAILY    dexlansoprazole (DEXILANT) 60 mg CpDB capsule (delayed release) Take 60 mg by mouth daily.  dicyclomine (BENTYL) 20 mg tablet Take 20 mg by mouth every six (6) hours.  estradiol (ESTRACE) 0.01 % (0.1 mg/gram) vaginal cream     levothyroxine (SYNTHROID) 50 mcg tablet Take 50 mcg by mouth Daily (before breakfast).  cyanocobalamin (VITAMIN B-12) 1,000 mcg/mL injection 1,000 mcg by IntraMUSCular route every thirty (30) days.  Calcium-Cholecalciferol, D3, (CALCIUM 600 WITH VITAMIN D3) 600 mg(1,500mg) -400 unit cap Take 2 Tablets by mouth daily.  multivitamin (ONE A DAY) tablet Take 1 Tab by mouth daily. No current facility-administered medications for this visit. SYSTEM REVIEW NOT RELATED TO LIVER DISEASE OR REVIEWED ABOVE:  Constitution systems: Negative for fever, chills, weight gain, weight loss. Eyes: Negative for visual changes. ENT: Negative for sore throat, painful swallowing. Respiratory: Negative for cough, hemoptysis, SOB. Cardiology: Negative for chest pain, palpitations. GI:  Negative for constipation or diarrhea. : Negative for urinary frequency, dysuria, hematuria, nocturia. Skin: Negative for rash. Hematology: Negative for easy bruising, blood clots.     Musculo-skeletal: Negative for back pain, muscle pain, weakness. Neurologic: Negative for headaches, dizziness, vertigo, memory problems not related to HE. Psychology: Negative for anxiety, depression. FAMILY HISTORY:  The father  Has/had the following following chronic disease(s): cancer. The mother Has/had the following chronic disease(s): MI.    There is no family history of liver disease. SOCIAL HISTORY:  The patient is . The patient has 4 children,   The patient has never used tobacco products. The patient has never consumed significant amounts of alcohol. The patient currently receiving disability. PHYSICAL EXAMINATION PERFORMED BY VIRTUAL TELEHEALTH:  VS: Not performed   General: No acute distress. Eyes: Sclera anicteric. ENT: No oral lesions. Skin: No rashes. spider angiomata. No jaundice. Abdomen: No obvious distention suggesting ascites. Extremities: No edema. No muscle wasting. Neurologic: Alert and oriented. Cranial nerves grossly intact. LABORATORY STUDIES:  Liver [place] of [address] Units 5/8/2022 3/12/2022   WBC 3.6 - 11.0 K/uL 3.9 5.4   ANC 1.8 - 8.0 K/UL 3.1 3.1   HGB 11.5 - 16.0 g/dL 12.1 12.3    - 400 K/uL 251 239   INR 0.9 - 1.1       AST 15 - 37 U/L 82 (H) 48 (H)   ALT 12 - 78 U/L 111 (H) 60 (H)   Alk Phos 45 - 117 U/L 180 (H) 142 (H)   Bili, Total 0.2 - 1.0 mg/dL 0.2 <0.2   Bili, Direct 0.00 - 0.40 mg/dL  <0.10   Albumin 3.5 - 5.0 g/dL 3.4 (L) 4.3   BUN 6 - 20 mg/dL 13 13   Creat 0.55 - 1.02 mg/dL 0.72 0.94   Na 136 - 145 mmol/L 142 139   K 3.5 - 5.1 mmol/L 4.0 4.0   Cl 97 - 108 mmol/L 113 (H) 104   CO2 21 - 32 mmol/L 25 20   Glucose 65 - 100 mg/dL 94 87     Laboratory testing from 5/08/2022 reviewed in detail. Additional testing included to evaluate progression or regression of disease. Laboratory testing results from today will be communicated by My Chart.      SEROLOGIES:  Serologies Latest Ref Rng & Units 7/26/2019   Hep A Ab, Total Negative Negative   Hep B Surface Ag Negative Negative   Hep B Core Ab, Total Negative Negative   Hep B Surface AB QL  Non Reactive   Hep C Ab 0.0 - 0.9 s/co ratio <0.1   Ferritin 15 - 150 ng/mL 14 (L)   Iron % Saturation 15 - 55 % 12 (L)   GULSHAN, IFA  Negative   ASMCA 0 - 19 Units 6   M2 Ab 0.0 - 20.0 Units <20.0   Ceruloplasmin 19.0 - 39.0 mg/dL 23.2   Alpha-1 antitrypsin level 90 - 200 mg/dL 99     Serologies Latest Ref Rng & Units 4/29/2020   C-ANCA Neg:<1:20 titer <1:20   P-ANCA Neg:<1:20 titer <1:20   ANCA Neg:<1:20 titer <1:20     LIVER HISTOLOGY:  8/2019. FibroScan performed at [address]. EkPa was 3.3. IQR/med 9%. . The results suggested a fibrosis level of F0. The CAP score suggests no evidence of fatty liver. 12/2020. Liver biopsy Slides reviewed by MLS. Sinusoidal dilation. A single periportal granuloma. No inflammation, No steatosis, No fibrosis. 10/2021. FibroScan performed at [address]. EkPa was 3.7. IQR/med 5%. . The results suggested a fibrosis level of F0. The CAP score suggests there is no significant hepatic steatosis. ENDOSCOPIC PROCEDURES:  Not available or performed    RADIOLOGY:  8/2019. Ultrasound of liver. Normal appearing liver. No liver mass lesions. 6/2020. MRI/MRCP of abdomen. Normal appearance of the liver and biliary tree status post cholecystectomy. No evidence of primary sclerosing cholangitis or biliary pathology. 9/2021. Ultrasound of liver. Mild intrahepatic dilatation within normal limits post Cholecystectomy. Hepatic mass unchanged. Normal appearing liver.       OTHER TESTING:  Not available or performed    FOLLOW-UP AFTER VIRTUAL VISIT:  Pursuant to the emergency declaration under the [address] [place] [address], [facility] waiver authority and the [employer] Act, this Virtual  Visit was conducted, with the patient's (and/or their legal guardian's) consent, to reduce the patient's risk of exposure to COVID-19 and provide necessary medical care. Services were provided through a video synchronous discussion virtually to substitute for an in-person clinic visit. The patient was located in their home. The provider was located in the [facility] office. All of the issues listed above in the Assessment and Plan were discussed with the patient. All questions were answered. The patient expressed a clear understanding of the above. Because of the COVID-19 epidemic a follow-up appointment will be performed via TeleHealth in 3 months. Orders to obtain laboratory testing will be mailed to the patient. [facility] of [address] [facility] [address] [id number] [address], [address] [place] [address] 22.  [facility]

## 2022-06-07 NOTE — PROGRESS NOTES
Identified pt with two pt identifiers(name and ). Reviewed record in preparation for visit and have obtained necessary documentation. Chief Complaint   Patient presents with    Elevated Liver Enzymes     VV f/u with April      There were no vitals filed for this visit. Health Maintenance Review: Patient reminded of \"due or due soon\" health maintenance. I have asked the patient to contact his/her primary care provider (PCP) for follow-up on his/her health maintenance. Coordination of Care Questionnaire:  :   1) Have you been to an emergency room, urgent care, or hospitalized since your last visit? If yes, where when, and reason for visit? no       2. Have seen or consulted any other health care provider since your last visit? If yes, where when, and reason for visit? YES      Patient is accompanied by self I have received verbal consent from Kaylyn Yang to discuss any/all medical information while they are present in the room. Pt reports having 3 BMs today. Nurse visualized bright red bloody stool in bedside commode.

## 2022-06-07 NOTE — Clinical Note
NOTIFICATION RETURN TO WORK / SCHOOL    6/7/2022 11:51 AM    Ms. [id number] [facility] [address]      To Whom It May Concern:    Katt Joãoraphael is currently under the care of [address]. She will return to work/school on: ***    If there are questions or concerns please have the patient contact our office.         Sincerely,      Perla Sierra NP

## 2022-06-15 LAB
ALBUMIN SERPL-MCNC: 3.8 G/DL (ref 3.8–4.9)
ALP SERPL-CCNC: 156 IU/L (ref 44–121)
ALT SERPL-CCNC: 33 IU/L (ref 0–32)
AST SERPL-CCNC: 23 IU/L (ref 0–40)
BASOPHILS # BLD AUTO: 0 X10E3/UL (ref 0–0.2)
BASOPHILS NFR BLD AUTO: 0 %
BILIRUB DIRECT SERPL-MCNC: <0.1 MG/DL (ref 0–0.4)
BILIRUB SERPL-MCNC: <0.2 MG/DL (ref 0–1.2)
BUN SERPL-MCNC: 15 MG/DL (ref 6–24)
BUN/CREAT SERPL: 21 (ref 9–23)
CALCIUM SERPL-MCNC: 8.6 MG/DL (ref 8.7–10.2)
CHLORIDE SERPL-SCNC: 107 MMOL/L (ref 96–106)
CO2 SERPL-SCNC: 23 MMOL/L (ref 20–29)
CREAT SERPL-MCNC: 0.7 MG/DL (ref 0.57–1)
EGFR: 103 ML/MIN/1.73
EOSINOPHIL # BLD AUTO: 0.2 X10E3/UL (ref 0–0.4)
EOSINOPHIL NFR BLD AUTO: 4 %
ERYTHROCYTE [DISTWIDTH] IN BLOOD BY AUTOMATED COUNT: 15.2 % (ref 11.7–15.4)
GGT SERPL-CCNC: 54 IU/L (ref 0–60)
GLUCOSE SERPL-MCNC: 94 MG/DL (ref 65–99)
HCT VFR BLD AUTO: 33.4 % (ref 34–46.6)
HGB BLD-MCNC: 11 G/DL (ref 11.1–15.9)
IGG4 SER-MCNC: 10 MG/DL (ref 2–96)
IMM GRANULOCYTES # BLD AUTO: 0 X10E3/UL (ref 0–0.1)
IMM GRANULOCYTES NFR BLD AUTO: 0 %
LYMPHOCYTES # BLD AUTO: 1.6 X10E3/UL (ref 0.7–3.1)
LYMPHOCYTES NFR BLD AUTO: 32 %
MCH RBC QN AUTO: 28.4 PG (ref 26.6–33)
MCHC RBC AUTO-ENTMCNC: 32.9 G/DL (ref 31.5–35.7)
MCV RBC AUTO: 86 FL (ref 79–97)
MONOCYTES # BLD AUTO: 0.4 X10E3/UL (ref 0.1–0.9)
MONOCYTES NFR BLD AUTO: 8 %
NEUTROPHILS # BLD AUTO: 2.8 X10E3/UL (ref 1.4–7)
NEUTROPHILS NFR BLD AUTO: 56 %
PLATELET # BLD AUTO: 198 X10E3/UL (ref 150–450)
POTASSIUM SERPL-SCNC: 4.3 MMOL/L (ref 3.5–5.2)
PROT SERPL-MCNC: 5.9 G/DL (ref 6–8.5)
RBC # BLD AUTO: 3.88 X10E6/UL (ref 3.77–5.28)
SODIUM SERPL-SCNC: 141 MMOL/L (ref 134–144)
WBC # BLD AUTO: 5 X10E3/UL (ref 3.4–10.8)

## 2022-06-17 NOTE — PROGRESS NOTES
Letter sent via my chart regarding the blood work results. The liver enzymes have improved. The GGT is normal. The elevation may be due to her other underlying health conditions. Will continue to monitor.

## 2022-08-08 ENCOUNTER — APPOINTMENT (OUTPATIENT)
Dept: GENERAL RADIOLOGY | Age: 52
End: 2022-08-08
Attending: STUDENT IN AN ORGANIZED HEALTH CARE EDUCATION/TRAINING PROGRAM
Payer: MEDICARE

## 2022-08-08 ENCOUNTER — HOSPITAL ENCOUNTER (EMERGENCY)
Age: 52
Discharge: HOME | End: 2022-08-08
Attending: STUDENT IN AN ORGANIZED HEALTH CARE EDUCATION/TRAINING PROGRAM
Payer: MEDICARE

## 2022-08-08 ENCOUNTER — APPOINTMENT (OUTPATIENT)
Dept: CT IMAGING | Age: 52
End: 2022-08-08
Attending: STUDENT IN AN ORGANIZED HEALTH CARE EDUCATION/TRAINING PROGRAM
Payer: MEDICARE

## 2022-08-08 VITALS
HEART RATE: 85 BPM | OXYGEN SATURATION: 98 % | TEMPERATURE: 98.4 F | HEIGHT: 65 IN | RESPIRATION RATE: 18 BRPM | DIASTOLIC BLOOD PRESSURE: 58 MMHG | WEIGHT: 162 LBS | BODY MASS INDEX: 26.99 KG/M2 | SYSTOLIC BLOOD PRESSURE: 94 MMHG

## 2022-08-08 DIAGNOSIS — R42 DIZZINESS: Primary | ICD-10-CM

## 2022-08-08 LAB
ALBUMIN SERPL-MCNC: 2.9 G/DL (ref 3.5–5)
ALBUMIN/GLOB SERPL: 1 {RATIO} (ref 1.1–2.2)
ALP SERPL-CCNC: 133 U/L (ref 45–117)
ALT SERPL-CCNC: 50 U/L (ref 12–78)
ANION GAP SERPL CALC-SCNC: 4 MMOL/L (ref 5–15)
APTT PPP: 24.5 SEC (ref 21.2–34.1)
AST SERPL W P-5'-P-CCNC: 27 U/L (ref 15–37)
BASOPHILS # BLD: 0 K/UL (ref 0–0.1)
BASOPHILS NFR BLD: 1 % (ref 0–1)
BILIRUB SERPL-MCNC: 0.2 MG/DL (ref 0.2–1)
BUN SERPL-MCNC: 14 MG/DL (ref 6–20)
BUN/CREAT SERPL: 16 (ref 12–20)
CA-I BLD-MCNC: 8.4 MG/DL (ref 8.5–10.1)
CHLORIDE SERPL-SCNC: 111 MMOL/L (ref 97–108)
CO2 SERPL-SCNC: 27 MMOL/L (ref 21–32)
COVID-19 RAPID TEST, COVR: NOT DETECTED
CREAT SERPL-MCNC: 0.89 MG/DL (ref 0.55–1.02)
DIFFERENTIAL METHOD BLD: ABNORMAL
EOSINOPHIL # BLD: 0.2 K/UL (ref 0–0.4)
EOSINOPHIL NFR BLD: 4 % (ref 0–7)
ERYTHROCYTE [DISTWIDTH] IN BLOOD BY AUTOMATED COUNT: 15.6 % (ref 11.5–14.5)
GLOBULIN SER CALC-MCNC: 2.8 G/DL (ref 2–4)
GLUCOSE SERPL-MCNC: 123 MG/DL (ref 65–100)
HCT VFR BLD AUTO: 34.6 % (ref 35–47)
HGB BLD-MCNC: 11.1 G/DL (ref 11.5–16)
IMM GRANULOCYTES # BLD AUTO: 0 K/UL (ref 0–0.04)
IMM GRANULOCYTES NFR BLD AUTO: 0 % (ref 0–0.5)
INR PPP: 1.1 (ref 0.9–1.1)
LYMPHOCYTES # BLD: 1.4 K/UL (ref 0.8–3.5)
LYMPHOCYTES NFR BLD: 24 % (ref 12–49)
MCH RBC QN AUTO: 28.2 PG (ref 26–34)
MCHC RBC AUTO-ENTMCNC: 32.1 G/DL (ref 30–36.5)
MCV RBC AUTO: 87.8 FL (ref 80–99)
MONOCYTES # BLD: 0.4 K/UL (ref 0–1)
MONOCYTES NFR BLD: 6 % (ref 5–13)
NEUTS SEG # BLD: 3.7 K/UL (ref 1.8–8)
NEUTS SEG NFR BLD: 65 % (ref 32–75)
NRBC # BLD: 0 K/UL (ref 0–0.01)
NRBC BLD-RTO: 0 PER 100 WBC
PLATELET # BLD AUTO: 198 K/UL (ref 150–400)
PMV BLD AUTO: 10.4 FL (ref 8.9–12.9)
POTASSIUM SERPL-SCNC: 3.5 MMOL/L (ref 3.5–5.1)
PROT SERPL-MCNC: 5.7 G/DL (ref 6.4–8.2)
PROTHROMBIN TIME: 14.2 SEC (ref 11.9–14.6)
RBC # BLD AUTO: 3.94 M/UL (ref 3.8–5.2)
SODIUM SERPL-SCNC: 142 MMOL/L (ref 136–145)
THERAPEUTIC RANGE,PTTT: NORMAL SEC (ref 82–109)
TROPONIN-HIGH SENSITIVITY: 3 NG/L (ref 0–51)
WBC # BLD AUTO: 5.8 K/UL (ref 3.6–11)

## 2022-08-08 PROCEDURE — 96360 HYDRATION IV INFUSION INIT: CPT

## 2022-08-08 PROCEDURE — 94761 N-INVAS EAR/PLS OXIMETRY MLT: CPT

## 2022-08-08 PROCEDURE — 71045 X-RAY EXAM CHEST 1 VIEW: CPT

## 2022-08-08 PROCEDURE — 36415 COLL VENOUS BLD VENIPUNCTURE: CPT

## 2022-08-08 PROCEDURE — 74011250636 HC RX REV CODE- 250/636: Performed by: STUDENT IN AN ORGANIZED HEALTH CARE EDUCATION/TRAINING PROGRAM

## 2022-08-08 PROCEDURE — 93005 ELECTROCARDIOGRAM TRACING: CPT

## 2022-08-08 PROCEDURE — 80053 COMPREHEN METABOLIC PANEL: CPT

## 2022-08-08 PROCEDURE — 87635 SARS-COV-2 COVID-19 AMP PRB: CPT

## 2022-08-08 PROCEDURE — 85610 PROTHROMBIN TIME: CPT

## 2022-08-08 PROCEDURE — 85025 COMPLETE CBC W/AUTO DIFF WBC: CPT

## 2022-08-08 PROCEDURE — 84484 ASSAY OF TROPONIN QUANT: CPT

## 2022-08-08 PROCEDURE — 70450 CT HEAD/BRAIN W/O DYE: CPT

## 2022-08-08 PROCEDURE — 85730 THROMBOPLASTIN TIME PARTIAL: CPT

## 2022-08-08 PROCEDURE — 99285 EMERGENCY DEPT VISIT HI MDM: CPT

## 2022-08-08 RX ADMIN — SODIUM CHLORIDE 1000 ML: 9 INJECTION, SOLUTION INTRAVENOUS at 19:15

## 2022-08-08 NOTE — ED TRIAGE NOTES
Family reports pt has been falling a lot recently. Lisa Dural today. Hit head Unk loc. Hypotensive. Pt lethargic. Able to answer questions. Oriented.

## 2022-08-09 LAB
ATRIAL RATE: 70 BPM
CALCULATED P AXIS, ECG09: 50 DEGREES
CALCULATED R AXIS, ECG10: 27 DEGREES
CALCULATED T AXIS, ECG11: 1 DEGREES
DIAGNOSIS, 93000: NORMAL
P-R INTERVAL, ECG05: 210 MS
Q-T INTERVAL, ECG07: 404 MS
QRS DURATION, ECG06: 114 MS
QTC CALCULATION (BEZET), ECG08: 436 MS
VENTRICULAR RATE, ECG03: 70 BPM

## 2022-08-09 NOTE — DISCHARGE INSTRUCTIONS
Please return to the emergency department if your symptoms worsen or you experience any new symptoms that are concerning to you. Please follow-up with your primary care doctor within the next week.

## 2022-08-09 NOTE — ED PROVIDER NOTES
EMERGENCY DEPARTMENT HISTORY AND PHYSICAL EXAM      Date: 8/8/2022  Patient Name: Jamila Sears      History of Presenting Illness     Chief Complaint   Patient presents with    Hypotension       History Provided By: Patient    HPI: Jamila Sears, 70-year-old female with past medical history of CHF, migraines, asthma, GERD, cardiomyopathy presents with complaint of dizziness. She describes her dizziness as feeling like she is going to pass out and says that she has had multiple episodes at home where she has had to lower herself to the ground due to this sensation. She has experienced this in the past and does not know what the cause was. She endorses a chronic cough that has become somewhat worse recently but denies any fever or dyspnea. No sick contacts. She denies any other symptoms at this time    There are no other complaints, changes, or physical findings at this time. PCP: Barbra Singh MD    Current Outpatient Medications   Medication Sig Dispense Refill    lacosamide (Vimpat) 200 mg tab tablet Take 1 Tablet by mouth two (2) times a day. Max Daily Amount: 400 mg. 30 Tablet 0    levETIRAcetam (KEPPRA) 500 mg tablet Take 1 Tablet by mouth two (2) times a day. 30 Tablet 2    escitalopram oxalate (LEXAPRO) 10 mg tablet Take 10 mg by mouth daily. hydrOXYzine HCL (ATARAX) 25 mg tablet TAKE 1 TABLET BY MOUTH THREE TIMES DAILY AS NEEDED FOR 10 DAYS      latanoprost (XALATAN) 0.005 % ophthalmic solution INSTILL 1 DROP INTO EACH EYE AT NIGHT      ofloxacin (FLOXIN) 0.3 % ophthalmic solution INSTILL 5 DROPS INTO AFFECTED EAR TWICE DAILY      scopolamine (TRANSDERM-SCOP) 1 mg over 3 days pt3d PLACE 1 PATCH BEHIND THE EAR EVERY 3 DAYS A NEEDED FOR NAUSEA      spironolactone (ALDACTONE) 50 mg tablet Take 1 Tablet by mouth daily. 90 Tablet 1    carvediloL (COREG) 6.25 mg tablet Take 1 Tablet by mouth two (2) times a day.  180 Tablet 3    losartan (COZAAR) 25 mg tablet Take 1 Tablet by mouth daily (after breakfast). 90 Tablet 3    bumetanide (BUMEX) 1 mg tablet Take 1 Tablet by mouth daily. 180 Tablet 3    TENS units and TENS electrodes (TENS UNIT AND ELECTRODES) Tens Unit, 1 ea, Dispense: 1 EA, Refills: 0, Easyscript, Maintenance, 0      azelastine (ASTELIN) 137 mcg (0.1 %) nasal spray USE 2 SPRAY(S) IN EACH NOSTRIL TWICE DAILY      augmented betamethasone dipropionate (DIPROLENE-AF) 0.05 % ointment       budesonide (PULMICORT) 0.5 mg/2 mL nbsp USE 1 VIAL IN NEBULIZER TWICE DAILY      citalopram (CELEXA) 20 mg tablet Take 20 mg by mouth daily. docusate sodium (COLACE) 100 mg capsule Take 100 mg by mouth two (2) times a day. Premarin 0.625 mg/gram vaginal cream       Ferrex 150 150 mg iron capsule       Linzess 145 mcg cap capsule Take 145 mcg by mouth daily. metoclopramide HCl (REGLAN) 5 mg tablet       Saccharomyces boulardii (FLORASTOR) 250 mg capsule Take 250 mg by mouth two (2) times a day. nitroglycerin (NITROSTAT) 0.4 mg SL tablet DISSOLVE 1 TABLET UNDER THE TONGUE EVERY 5 MINUTES AS  NEEDED FOR CHEST PAIN. MAX  OF 3 TABLETS IN 15 MINUTES. CALL 911 IF PAIN PERSISTS. 100 Tablet 3    cetirizine (ZyrTEC) 10 mg tablet Take 10 mg by mouth daily. metFORMIN (GLUCOPHAGE) 500 mg tablet Take  by mouth two (2) times daily (with meals). cloNIDine (CATAPRES) 0.1 mg/24 hr ptwk       fluticasone propionate (FLONASE) 50 mcg/actuation nasal spray       BD Luer-Narda Syringe 3 mL 25 gauge x 1\" syrg USE FOR B12 INJECTIONS      RABEprazole (ACIPHEX) 20 mg TbEC Take 20 mg by mouth two (2) times a day. rizatriptan (MAXALT) 5 mg tablet Take 5 mg by mouth once as needed for Migraine. May repeat in 2 hours if needed       alum-mag hydroxide-simeth-lidocaine-sucralfate Take 30 mL by mouth daily. tiotropium (SPIRIVA) 18 mcg inhalation capsule Take 1 Cap by inhalation daily. ondansetron hcl (ZOFRAN) 8 mg tablet Take 8 mg by mouth every eight (8) hours as needed.       montelukast (SINGULAIR) 10 mg tablet Take 10 mg by mouth daily. 3    nortriptyline (PAMELOR) 50 mg capsule Take 100 mg by mouth nightly. 5    sucralfate (CARAFATE) 100 mg/mL suspension Take 1 g by mouth four (4) times daily. topiramate (TOPAMAX) 100 mg tablet Take 100 mg by mouth two (2) times a day.  5    VENTOLIN HFA 90 mcg/actuation inhaler Take 2 Puffs by inhalation every four (4) hours as needed.  4    SYMBICORT 80-4.5 mcg/actuation HFAA INHALE 2 PUFFS BY MOUTH TWICE DAILY  4    dexlansoprazole (DEXILANT) 60 mg CpDB capsule (delayed release) Take 60 mg by mouth daily. dicyclomine (BENTYL) 20 mg tablet Take 20 mg by mouth every six (6) hours. estradiol (ESTRACE) 0.01 % (0.1 mg/gram) vaginal cream   2    levothyroxine (SYNTHROID) 50 mcg tablet Take 50 mcg by mouth Daily (before breakfast). cyanocobalamin (VITAMIN B-12) 1,000 mcg/mL injection 1,000 mcg by IntraMUSCular route every thirty (30) days. Calcium-Cholecalciferol, D3, (CALCIUM 600 WITH VITAMIN D3) 600 mg(1,500mg) -400 unit cap Take 2 Tablets by mouth daily. multivitamin (ONE A DAY) tablet Take 1 Tab by mouth daily.          Past History   Past Medical History:  Past Medical History:   Diagnosis Date    Arthritis     pt states knees and back    Asthma     Cardiomyopathy ([address] 2005    EF 35%, echo in 2021 normal EF     Colon polyp     Diabetes ([address]     Dizzy spells     GERD (gastroesophageal reflux disease)     Heart failure ([address] 2005    now combined systolic, diastolic, seen at [facility], [facility] 2021- Dr Whit Piper    Irritable bowel syndrome     Knee pain, bilateral     Migraines 2011    2-3 week    Neuropathy     pt states of both feet    Seizures ([address]     petite mals last seizure 2-3 months ago    Thyroid disease     hypothyroid       Past Surgical History:  Past Surgical History:   Procedure Laterality Date    HX CHOLECYSTECTOMY  5/2006    HX COLONOSCOPY      HX ENDOSCOPY      HX GASTRIC BYPASS  2013    HX HYSTERECTOMY  2007    HX KNEE ARTHROSCOPY Right 05/2021    HX ORTHOPAEDIC Bilateral 2013    CTR    HX ORTHOPAEDIC      right knee procedure 2021    HX OTHER SURGICAL      left axillary lymph node biopsy    HX SMALL BOWEL RESECTION      MS ABDOMEN SURGERY PROC UNLISTED  2008    cholecystectomy    MS APPENDECTOMY      MS CARDIAC SURG PROCEDURE UNLIST      Loop recorder implanted july 2020    MS LAP,CHOLECYSTECTOMY      MS PART REMOVAL COLON W ANASTOMOSIS      MS RESECT SMALL INTEST W ENTEROSTOMY      MS STOMACH SURGERY PROCEDURE UNLISTED         Family History:  Family History   Problem Relation Age of Onset    Hypertension Mother     Heart Disease Mother     Cancer Father         stomach cancer    Hypertension Father     No Known Problems Brother     Asthma Daughter     Asthma Son     Asthma Son     Breast Cancer Paternal Aunt         age unknown       Social History:  Social History     Tobacco Use    Smoking status: Never    Smokeless tobacco: Never   Vaping Use    Vaping Use: Never used   Substance Use Topics    Alcohol use: No    Drug use: No       Allergies: Allergies   Allergen Reactions    Acetaminophen Other (comments)     Advised not to take due to Liver Issues    Morphine Other (comments)     SOB/Chest Pressure - in hosp for a week. States DILAUDID Ok    Oxycodone Shortness of Breath     Reaction Type: Allergy; Severity: Severe    Shellfish Derived Anaphylaxis    Codeine Other (comments)     SOB chest pain    Peanut Oil Other (comments)     Reaction Type: Intolerance; Severity: Severe    Sulfa (Sulfonamide Antibiotics) Rash     itching     Review of Systems   Review of Systems   Constitutional:  Negative for fever. HENT:  Negative for congestion. Respiratory:  Positive for cough. Negative for shortness of breath. Cardiovascular:  Negative for chest pain. Gastrointestinal:  Negative for abdominal pain, constipation, nausea and vomiting. Genitourinary:  Negative for dysuria.    Musculoskeletal:  Negative for arthralgias and myalgias. Skin:  Negative for rash. Allergic/Immunologic: Negative for immunocompromised state. Neurological:  Positive for dizziness and light-headedness. Psychiatric/Behavioral:  Negative for confusion. Physical Exam   Physical Exam  Constitutional:       Appearance: She is not toxic-appearing. HENT:      Head: Normocephalic and atraumatic. Nose: No congestion. Mouth/Throat:      Mouth: Mucous membranes are dry. Eyes:      Extraocular Movements: Extraocular movements intact. Conjunctiva/sclera: Conjunctivae normal.      Pupils: Pupils are equal, round, and reactive to light. Cardiovascular:      Rate and Rhythm: Normal rate and regular rhythm. Pulmonary:      Effort: Pulmonary effort is normal.      Breath sounds: No wheezing, rhonchi or rales. Abdominal:      General: There is no distension. Palpations: Abdomen is soft. Tenderness: There is no abdominal tenderness. Musculoskeletal:         General: No swelling or deformity. Cervical back: Normal range of motion. Skin:     General: Skin is warm and dry. Neurological:      General: No focal deficit present. Mental Status: She is alert.    Psychiatric:         Mood and Affect: Mood normal.         Behavior: Behavior normal.       Lab and Diagnostic Study Results   Labs -     Recent Results (from the past 12 hour(s))   CBC WITH AUTOMATED DIFF    Collection Time: 08/08/22  7:12 PM   Result Value Ref Range    WBC 5.8 3.6 - 11.0 K/uL    RBC 3.94 3.80 - 5.20 M/uL    HGB 11.1 (L) 11.5 - 16.0 g/dL    HCT 34.6 (L) 35.0 - 47.0 %    MCV 87.8 80.0 - 99.0 FL    MCH 28.2 26.0 - 34.0 PG    MCHC 32.1 30.0 - 36.5 g/dL    RDW 15.6 (H) 11.5 - 14.5 %    PLATELET 119 624 - 334 K/uL    MPV 10.4 8.9 - 12.9 FL    NRBC 0.0 0.0  WBC    ABSOLUTE NRBC 0.00 0.00 - 0.01 K/uL    NEUTROPHILS 65 32 - 75 %    LYMPHOCYTES 24 12 - 49 %    MONOCYTES 6 5 - 13 %    EOSINOPHILS 4 0 - 7 %    BASOPHILS 1 0 - 1 %    IMMATURE GRANULOCYTES 0 0 - 0.5 %    ABS. NEUTROPHILS 3.7 1.8 - 8.0 K/UL    ABS. LYMPHOCYTES 1.4 0.8 - 3.5 K/UL    ABS. MONOCYTES 0.4 0.0 - 1.0 K/UL    ABS. EOSINOPHILS 0.2 0.0 - 0.4 K/UL    ABS. BASOPHILS 0.0 0.0 - 0.1 K/UL    ABS. IMM. GRANS. 0.0 0.00 - 0.04 K/UL    DF AUTOMATED     METABOLIC PANEL, COMPREHENSIVE    Collection Time: 08/08/22  7:12 PM   Result Value Ref Range    Sodium 142 136 - 145 mmol/L    Potassium 3.5 3.5 - 5.1 mmol/L    Chloride 111 (H) 97 - 108 mmol/L    CO2 27 21 - 32 mmol/L    Anion gap 4 (L) 5 - 15 mmol/L    Glucose 123 (H) 65 - 100 mg/dL    BUN 14 6 - 20 mg/dL    Creatinine 0.89 0.55 - 1.02 mg/dL    BUN/Creatinine ratio 16 12 - 20      GFR est AA >60 >60 ml/min/1.73m2    GFR est non-AA >60 >60 ml/min/1.73m2    Calcium 8.4 (L) 8.5 - 10.1 mg/dL    Bilirubin, total 0.2 0.2 - 1.0 mg/dL    AST (SGOT) 27 15 - 37 U/L    ALT (SGPT) 50 12 - 78 U/L    Alk. phosphatase 133 (H) 45 - 117 U/L    Protein, total 5.7 (L) 6.4 - 8.2 g/dL    Albumin 2.9 (L) 3.5 - 5.0 g/dL    Globulin 2.8 2.0 - 4.0 g/dL    A-G Ratio 1.0 (L) 1.1 - 2.2     TROPONIN-HIGH SENSITIVITY    Collection Time: 08/08/22  7:12 PM   Result Value Ref Range    Troponin-High Sensitivity 3 0 - 51 ng/L   PROTHROMBIN TIME + INR    Collection Time: 08/08/22  7:12 PM   Result Value Ref Range    Prothrombin time 14.2 11.9 - 14.6 sec    INR 1.1 0.9 - 1.1     PTT    Collection Time: 08/08/22  7:12 PM   Result Value Ref Range    aPTT 24.5 21.2 - 34.1 sec    aPTT, therapeutic range   82 - 109 sec   COVID-19 RAPID TEST    Collection Time: 08/08/22  8:15 PM   Result Value Ref Range    COVID-19 rapid test Not Detected Not Detected         Radiologic Studies -   [unfilled]  CT Results  (Last 48 hours)                 08/08/22 1926  CT HEAD WO CONT Final result    Impression:  No acute intracranial hemorrhage, mass or infarct. Narrative:  INDICATION: eval for bleed        Exam: Noncontrast CT of the brain is performed with 5 mm collimation.        CT dose reduction was achieved with the use of the standardized protocol   tailored for this examination and automatic exposure control for dose   modulation. Direct comparison is made to prior CT dated May 2022. FINDINGS: There is no acute intracranial hemorrhage, mass, mass effect or   herniation. Ventricular system is normal. The gray-white matter differentiation   is well-preserved. The mastoid air cells are well pneumatized. The visualized   paranasal sinuses are normal.                 CXR Results  (Last 48 hours)                 08/08/22 2003  XR CHEST PORT Final result    Impression:  No acute process identified. Narrative:  Clinical history: eval for pneumonia   INDICATION:   Cough   COMPARISON: 5/8/2022       FINDINGS:   AP portable upright view of the chest demonstrates a stable  cardiopericardial   silhouette. There is no pleural effusion. .There is no focal consolidation. .There   is no pneumothorax. . Patient is on a cardiac monitor. Medical Decision Making and ED Course   - I am the first and primary provider for this patient AND AM THE PRIMARY PROVIDER OF RECORD. I reviewed the vital signs, available nursing notes, past medical history, past surgical history, family history and social history. - Initial assessment performed. The patients presenting problems have been discussed, and the staff are in agreement with the care plan formulated and outlined with them. I have encouraged them to ask questions as they arise throughout their visit. Differential Diagnosis & Medical Decision Making Provider Note:   77-year-old presenting with complaint of dizziness leading to near syncopal events. She is hypertensive on arrival with a leading diagnosis of dehydration given reduced p.o. intake recently. She has a cough so infection is on the differential though she does not appear septic at this time.   Will check chest x-ray as well as basic labs  MDM       Vital Signs-Reviewed the patient's vital signs. Patient Vitals for the past 12 hrs:   Temp Pulse Resp BP SpO2   08/08/22 2151 -- 71 15 95/67 98 %   08/08/22 2023 -- 64 16 103/69 98 %   08/08/22 1912 -- 70 16 98/64 98 %   08/08/22 1848 98.4 °F (36.9 °C) 69 18 94/60 99 %         ED Course:   ED Course as of 08/08/22 2205   Mon Aug 08, 2022   1946 CT HEAD IMPRESSION  No acute intracranial hemorrhage, mass or infarct. [BQ]   2204 Patient reports that she is feeling much better, has tolerated eating and would like to be discharged [BQ]      ED Course User Index  [BQ] Jeffry Iyer MD           Procedures and Critical Care     Performed by: Lorena Melendez MD  Procedures    Disposition   Disposition: DC- Adult Discharges: All of the diagnostic tests were reviewed and questions answered. Diagnosis, care plan and treatment options were discussed. The patient understands the instructions and will follow up as directed. The patients results have been reviewed with them. They have been counseled regarding their diagnosis. The patient verbally convey understanding and agreement of the signs, symptoms, diagnosis, treatment and prognosis and additionally agrees to follow up as recommended with their PCP in 24 - 48 hours. They also agree with the care-plan and convey that all of their questions have been answered. I have also put together some discharge instructions for them that include: 1) educational information regarding their diagnosis, 2) how to care for their diagnosis at home, as well a 3) list of reasons why they would want to return to the ED prior to their follow-up appointment, should their condition change. Discharged    DISCHARGE PLAN:  1. Current Discharge Medication List        CONTINUE these medications which have NOT CHANGED    Details   lacosamide (Vimpat) 200 mg tab tablet Take 1 Tablet by mouth two (2) times a day. Max Daily Amount: 400 mg.   Qty: 30 Tablet, Refills: 0    Associated Diagnoses: Carotid sinus syncope      levETIRAcetam (KEPPRA) 500 mg tablet Take 1 Tablet by mouth two (2) times a day. Qty: 30 Tablet, Refills: 2      escitalopram oxalate (LEXAPRO) 10 mg tablet Take 10 mg by mouth daily. hydrOXYzine HCL (ATARAX) 25 mg tablet TAKE 1 TABLET BY MOUTH THREE TIMES DAILY AS NEEDED FOR 10 DAYS      latanoprost (XALATAN) 0.005 % ophthalmic solution INSTILL 1 DROP INTO EACH EYE AT NIGHT      ofloxacin (FLOXIN) 0.3 % ophthalmic solution INSTILL 5 DROPS INTO AFFECTED EAR TWICE DAILY      scopolamine (TRANSDERM-SCOP) 1 mg over 3 days pt3d PLACE 1 PATCH BEHIND THE EAR EVERY 3 DAYS A NEEDED FOR NAUSEA      spironolactone (ALDACTONE) 50 mg tablet Take 1 Tablet by mouth daily. Qty: 90 Tablet, Refills: 1    Comments: Dose increase  Associated Diagnoses: Chronic combined systolic and diastolic congestive heart failure (HCC); NICM (nonischemic cardiomyopathy) ([facility])      carvediloL (COREG) 6.25 mg tablet Take 1 Tablet by mouth two (2) times a day. Qty: 180 Tablet, Refills: 3    Associated Diagnoses: Chronic combined systolic and diastolic congestive heart failure (HCC); NICM (nonischemic cardiomyopathy) ([facility])      losartan (COZAAR) 25 mg tablet Take 1 Tablet by mouth daily (after breakfast). Qty: 90 Tablet, Refills: 3    Associated Diagnoses: Chronic combined systolic and diastolic congestive heart failure (HCC); NICM (nonischemic cardiomyopathy) ([facility])      bumetanide (BUMEX) 1 mg tablet Take 1 Tablet by mouth daily.   Qty: 180 Tablet, Refills: 3    Associated Diagnoses: Chronic combined systolic and diastolic congestive heart failure (HCC); NICM (nonischemic cardiomyopathy) ([facility])      TENS units and TENS electrodes (TENS UNIT AND ELECTRODES) Tens Unit, 1 ea, Dispense: 1 EA, Refills: 0, Easyscript, Maintenance, 0      azelastine (ASTELIN) 137 mcg (0.1 %) nasal spray USE 2 SPRAY(S) IN EACH NOSTRIL TWICE DAILY      augmented betamethasone dipropionate (DIPROLENE-AF) 0.05 % ointment       budesonide (PULMICORT) 0.5 mg/2 mL nbs USE 1 VIAL IN NEBULIZER TWICE DAILY      citalopram (CELEXA) 20 mg tablet Take 20 mg by mouth daily. docusate sodium (COLACE) 100 mg capsule Take 100 mg by mouth two (2) times a day. Premarin 0.625 mg/gram vaginal cream       Ferrex 150 150 mg iron capsule       Linzess 145 mcg cap capsule Take 145 mcg by mouth daily. metoclopramide HCl (REGLAN) 5 mg tablet       Saccharomyces boulardii (FLORASTOR) 250 mg capsule Take 250 mg by mouth two (2) times a day. nitroglycerin (NITROSTAT) 0.4 mg SL tablet DISSOLVE 1 TABLET UNDER THE TONGUE EVERY 5 MINUTES AS  NEEDED FOR CHEST PAIN. MAX  OF 3 TABLETS IN 15 MINUTES. CALL 911 IF PAIN PERSISTS. Qty: 100 Tablet, Refills: 3    Comments: Requesting 1 year supply      cetirizine (ZyrTEC) 10 mg tablet Take 10 mg by mouth daily. metFORMIN (GLUCOPHAGE) 500 mg tablet Take  by mouth two (2) times daily (with meals). cloNIDine (CATAPRES) 0.1 mg/24 hr ptwk       fluticasone propionate (FLONASE) 50 mcg/actuation nasal spray       BD Luer-Narda Syringe 3 mL 25 gauge x 1\" syrg USE FOR B12 INJECTIONS      RABEprazole (ACIPHEX) 20 mg TbEC Take 20 mg by mouth two (2) times a day. rizatriptan (MAXALT) 5 mg tablet Take 5 mg by mouth once as needed for Migraine. May repeat in 2 hours if needed       alum-mag hydroxide-simeth-lidocaine-sucralfate Take 30 mL by mouth daily. tiotropium (SPIRIVA) 18 mcg inhalation capsule Take 1 Cap by inhalation daily. ondansetron hcl (ZOFRAN) 8 mg tablet Take 8 mg by mouth every eight (8) hours as needed. montelukast (SINGULAIR) 10 mg tablet Take 10 mg by mouth daily. Refills: 3      nortriptyline (PAMELOR) 50 mg capsule Take 100 mg by mouth nightly. Refills: 5      sucralfate (CARAFATE) 100 mg/mL suspension Take 1 g by mouth four (4) times daily. topiramate (TOPAMAX) 100 mg tablet Take 100 mg by mouth two (2) times a day.   Refills: 5      VENTOLIN HFA 90 mcg/actuation inhaler Take 2 Puffs by inhalation every four (4) hours as needed. Refills: 4      SYMBICORT 80-4.5 mcg/actuation HFAA INHALE 2 PUFFS BY MOUTH TWICE DAILY  Refills: 4      dexlansoprazole (DEXILANT) 60 mg CpDB capsule (delayed release) Take 60 mg by mouth daily. dicyclomine (BENTYL) 20 mg tablet Take 20 mg by mouth every six (6) hours. estradiol (ESTRACE) 0.01 % (0.1 mg/gram) vaginal cream Refills: 2      levothyroxine (SYNTHROID) 50 mcg tablet Take 50 mcg by mouth Daily (before breakfast). cyanocobalamin (VITAMIN B-12) 1,000 mcg/mL injection 1,000 mcg by IntraMUSCular route every thirty (30) days. Calcium-Cholecalciferol, D3, (CALCIUM 600 WITH VITAMIN D3) 600 mg(1,500mg) -400 unit cap Take 2 Tablets by mouth daily. multivitamin (ONE A DAY) tablet Take 1 Tab by mouth daily. 2.   Follow-up Information       Follow up With Specialties Details Why Reji Key MD Family Medicine Schedule an appointment as soon as possible for a visit   [address] [place] [id number] [phone]      [facility] EMERGENCY DEPT Emergency Medicine  As needed, If symptoms worsen [address] [facility] [address]  [phone]          3. Return to ED if worse   4. Current Discharge Medication List            Diagnosis/Clinical Impression     Clinical Impression:   1. Dizziness        Attestations: Omero Lutz MD, am the primary clinician of record. Please note that this dictation was completed with [employer], the [employer] voice recognition software. Quite often unanticipated grammatical, syntax, homophones, and other interpretive errors are inadvertently transcribed by the computer software. Please disregard these errors. Please excuse any errors that have escaped final proofreading. Thank you.

## 2022-08-15 RX ORDER — NITROGLYCERIN 0.4 MG/1
TABLET SUBLINGUAL
Qty: 30 TABLET | Refills: 5 | Status: SHIPPED | OUTPATIENT
Start: 2022-08-15 | End: 2022-10-19

## 2022-08-15 NOTE — TELEPHONE ENCOUNTER
Per VO by MD.    Future Appointments   Date Time Provider Alicja Jimenez   8/17/2022  1:00 PM LUIS E JIANG BS AMB   8/26/2022  1:00 PM Christiane Santiago NP [facility] BS AMB   9/7/2022  3:30 PM Perla Russo NP [facility] BS AMB   2/7/2023 11:00 AM Vishnu Zamorano MD [facility] BS AMB

## 2022-08-26 ENCOUNTER — TELEPHONE (OUTPATIENT)
Dept: CARDIOLOGY CLINIC | Age: 52
End: 2022-08-26

## 2022-08-26 ENCOUNTER — APPOINTMENT (OUTPATIENT)
Dept: GENERAL RADIOLOGY | Age: 52
End: 2022-08-26
Attending: PHYSICIAN ASSISTANT
Payer: MEDICARE

## 2022-08-26 ENCOUNTER — DOCUMENTATION ONLY (OUTPATIENT)
Dept: CARDIOLOGY CLINIC | Age: 52
End: 2022-08-26

## 2022-08-26 ENCOUNTER — HOSPITAL ENCOUNTER (EMERGENCY)
Age: 52
Discharge: HOME | End: 2022-08-26
Attending: STUDENT IN AN ORGANIZED HEALTH CARE EDUCATION/TRAINING PROGRAM
Payer: MEDICARE

## 2022-08-26 VITALS
OXYGEN SATURATION: 94 % | HEIGHT: 68 IN | WEIGHT: 165 LBS | DIASTOLIC BLOOD PRESSURE: 70 MMHG | RESPIRATION RATE: 17 BRPM | TEMPERATURE: 98.5 F | HEART RATE: 85 BPM | SYSTOLIC BLOOD PRESSURE: 99 MMHG | BODY MASS INDEX: 25.01 KG/M2

## 2022-08-26 DIAGNOSIS — R07.9 CHEST PAIN, UNSPECIFIED TYPE: ICD-10-CM

## 2022-08-26 DIAGNOSIS — R53.81 MALAISE AND FATIGUE: Primary | ICD-10-CM

## 2022-08-26 DIAGNOSIS — R53.83 MALAISE AND FATIGUE: Primary | ICD-10-CM

## 2022-08-26 LAB
ALBUMIN SERPL-MCNC: 2.9 G/DL (ref 3.5–5)
ALBUMIN/GLOB SERPL: 0.8 {RATIO} (ref 1.1–2.2)
ALP SERPL-CCNC: 155 U/L (ref 45–117)
ALT SERPL-CCNC: 73 U/L (ref 12–78)
ANION GAP SERPL CALC-SCNC: 5 MMOL/L (ref 5–15)
AST SERPL W P-5'-P-CCNC: 55 U/L (ref 15–37)
BASOPHILS # BLD: 0 K/UL (ref 0–0.1)
BASOPHILS NFR BLD: 1 % (ref 0–1)
BILIRUB SERPL-MCNC: 0.1 MG/DL (ref 0.2–1)
BUN SERPL-MCNC: 11 MG/DL (ref 6–20)
BUN/CREAT SERPL: 15 (ref 12–20)
CA-I BLD-MCNC: 8.4 MG/DL (ref 8.5–10.1)
CHLORIDE SERPL-SCNC: 109 MMOL/L (ref 97–108)
CO2 SERPL-SCNC: 24 MMOL/L (ref 21–32)
CREAT SERPL-MCNC: 0.72 MG/DL (ref 0.55–1.02)
DIFFERENTIAL METHOD BLD: ABNORMAL
EOSINOPHIL # BLD: 0.2 K/UL (ref 0–0.4)
EOSINOPHIL NFR BLD: 4 % (ref 0–7)
ERYTHROCYTE [DISTWIDTH] IN BLOOD BY AUTOMATED COUNT: 14.8 % (ref 11.5–14.5)
GLOBULIN SER CALC-MCNC: 3.5 G/DL (ref 2–4)
GLUCOSE BLD STRIP.AUTO-MCNC: 79 MG/DL (ref 65–117)
GLUCOSE SERPL-MCNC: 77 MG/DL (ref 65–100)
HCT VFR BLD AUTO: 34.3 % (ref 35–47)
HGB BLD-MCNC: 11 G/DL (ref 11.5–16)
IMM GRANULOCYTES # BLD AUTO: 0 K/UL (ref 0–0.04)
IMM GRANULOCYTES NFR BLD AUTO: 0 % (ref 0–0.5)
LACTATE SERPL-SCNC: 1.6 MMOL/L (ref 0.4–2)
LYMPHOCYTES # BLD: 0.4 K/UL (ref 0.8–3.5)
LYMPHOCYTES NFR BLD: 9 % (ref 12–49)
MCH RBC QN AUTO: 27.6 PG (ref 26–34)
MCHC RBC AUTO-ENTMCNC: 32.1 G/DL (ref 30–36.5)
MCV RBC AUTO: 86 FL (ref 80–99)
MONOCYTES # BLD: 0.4 K/UL (ref 0–1)
MONOCYTES NFR BLD: 10 % (ref 5–13)
NEUTS SEG # BLD: 3.3 K/UL (ref 1.8–8)
NEUTS SEG NFR BLD: 76 % (ref 32–75)
NRBC # BLD: 0 K/UL (ref 0–0.01)
NRBC BLD-RTO: 0 PER 100 WBC
PERFORMED BY, TECHID: NORMAL
PLATELET # BLD AUTO: 189 K/UL (ref 150–400)
PMV BLD AUTO: 11.1 FL (ref 8.9–12.9)
POTASSIUM SERPL-SCNC: 4.1 MMOL/L (ref 3.5–5.1)
PROT SERPL-MCNC: 6.4 G/DL (ref 6.4–8.2)
RBC # BLD AUTO: 3.99 M/UL (ref 3.8–5.2)
SODIUM SERPL-SCNC: 138 MMOL/L (ref 136–145)
TROPONIN-HIGH SENSITIVITY: <3 NG/L (ref 0–51)
WBC # BLD AUTO: 4.4 K/UL (ref 3.6–11)

## 2022-08-26 PROCEDURE — 74011250636 HC RX REV CODE- 250/636: Performed by: PHYSICIAN ASSISTANT

## 2022-08-26 PROCEDURE — 85025 COMPLETE CBC W/AUTO DIFF WBC: CPT

## 2022-08-26 PROCEDURE — 84484 ASSAY OF TROPONIN QUANT: CPT

## 2022-08-26 PROCEDURE — 83605 ASSAY OF LACTIC ACID: CPT

## 2022-08-26 PROCEDURE — 82962 GLUCOSE BLOOD TEST: CPT

## 2022-08-26 PROCEDURE — 80053 COMPREHEN METABOLIC PANEL: CPT

## 2022-08-26 PROCEDURE — 93005 ELECTROCARDIOGRAM TRACING: CPT

## 2022-08-26 PROCEDURE — 99285 EMERGENCY DEPT VISIT HI MDM: CPT

## 2022-08-26 PROCEDURE — 36415 COLL VENOUS BLD VENIPUNCTURE: CPT

## 2022-08-26 PROCEDURE — 71045 X-RAY EXAM CHEST 1 VIEW: CPT

## 2022-08-26 PROCEDURE — 96374 THER/PROPH/DIAG INJ IV PUSH: CPT

## 2022-08-26 RX ORDER — KETOROLAC TROMETHAMINE 30 MG/ML
15 INJECTION, SOLUTION INTRAMUSCULAR; INTRAVENOUS
Status: COMPLETED | OUTPATIENT
Start: 2022-08-26 | End: 2022-08-26

## 2022-08-26 RX ADMIN — KETOROLAC TROMETHAMINE 15 MG: 30 INJECTION, SOLUTION INTRAMUSCULAR; INTRAVENOUS at 21:28

## 2022-08-26 RX ADMIN — SODIUM CHLORIDE 500 ML: 9 INJECTION, SOLUTION INTRAVENOUS at 21:07

## 2022-08-26 NOTE — ED TRIAGE NOTES
Pt presents to er c/o chest pain, lethargy/fatigue. Family reports pt had seizure activity earlier in the day. Pt fully a/o but slow to reply and gives weak response.

## 2022-08-26 NOTE — Clinical Note
[facility] EMERGENCY DEPT  [facility] [place] [id number]  [phone]    Work/School Note    Date: 8/26/2022    To Whom It May concern:      Nina Sol was seen and treated today in the emergency room by the following provider(s):  Attending Provider: Gerardo Anthony DO  Physician Assistant: Uday Pina PA-C. Nina Sol is excused from work/school on 08/26/22. She is clear to return to work/school on 08/27/22.         Sincerely,          Jud Frazier RN

## 2022-08-26 NOTE — TELEPHONE ENCOUNTER
Patient contacted and identity verified by two identifiers. Rescheduled patient missed 8/26/22 appointment with Dudley Montano NP to 9/9/22, as requested. Patient verbalized all understanding and had no additional questions.      Future Appointments   Date Time Provider Alicja Jimenez   9/7/2022  3:30 PM Perla Russo NP [facility] BS AMB   9/9/2022  2:00 PM Merlin Santiago NP [facility] BS AMB   2/7/2023 11:00 AM Vishnu Zamorano MD [facility] BS AMB

## 2022-08-26 NOTE — TELEPHONE ENCOUNTER
Jena Drew NP routed conversation to You 1 hour ago (2:23 PM)     Jena Drew NP 1 hour ago (2:23 PM)     KD  Patient did not show for appointment today  Please call to see if she would like to reschedule after 9/6      Unsigned   Note

## 2022-08-26 NOTE — PROGRESS NOTES
Patient did not show for appointment today  Please call to see if she would like to reschedule after 9/6

## 2022-08-26 NOTE — Clinical Note
[facility] EMERGENCY DEPT  [address] [place] [id number]  [phone]    Work/School Note    Date: 8/26/2022    To Whom It May concern:      Rena Whitten was seen and treated today in the emergency room by the following provider(s):  Attending Provider: Monica Aguilera DO  Physician Assistant: Parth March PA-C. Rena Whitten is excused from work/school on 08/26/22. She is clear to return to work/school on 08/27/22.         Sincerely,          Gia Cadena PA-C

## 2022-08-27 NOTE — ED PROVIDER NOTES
EMERGENCY DEPARTMENT HISTORY AND PHYSICAL EXAM      Date: 8/26/2022  Patient Name: Jannie Osman    History of Presenting Illness     Chief Complaint   Patient presents with    Chest Pain       History Provided By: Patient    HPI: Jannie Osman, 45 y.o. female with a past medical history significant for HTN, DM, CHF, asthma, seizures, hypothyroidism, migraines, GERD, and IBS who presents to the ED with cc of chest pain. Patient reports 2-day history of constant midsternal chest pain described as a \"sharp and achy\" sensation. Patient denies any modifying factors or radiating quality of pain. She also complains of generalized malaise and fatigue today. Family expresses concern that she had 2 seizures earlier this afternoon noting that she was more tremulous today than usual.  Patient takes Keppra 750 mg and denies any missed doses or recent changes in dosage. Family denies witnessing any postictal state. Patient denies treating symptoms with anything and notes no additional associated symptoms. She specifically denies any shortness of breath, palpitations, leg swelling, numbness, weakness, headaches, lightheadedness, dizziness, syncope or near syncope, diaphoresis, fevers, chills, cough, congestion, rash. There are no other complaints, changes, or physical findings at this time. PCP: Beltran Whelan MD    No current facility-administered medications on file prior to encounter. Current Outpatient Medications on File Prior to Encounter   Medication Sig Dispense Refill    nitroglycerin (NITROSTAT) 0.4 mg SL tablet DISSOLVE 1 TABLET UNDER THE TONGUE EVERY 5 MINUTES AS  NEEDED FOR CHEST PAIN. MAX  OF 3 TABLETS IN 15 MINUTES. CALL 911 IF PAIN PERSISTS. 30 Tablet 5    lacosamide (Vimpat) 200 mg tab tablet Take 1 Tablet by mouth two (2) times a day. Max Daily Amount: 400 mg. 30 Tablet 0    levETIRAcetam (KEPPRA) 500 mg tablet Take 1 Tablet by mouth two (2) times a day.  30 Tablet 2    escitalopram oxalate (LEXAPRO) 10 mg tablet Take 10 mg by mouth daily. hydrOXYzine HCL (ATARAX) 25 mg tablet TAKE 1 TABLET BY MOUTH THREE TIMES DAILY AS NEEDED FOR 10 DAYS      latanoprost (XALATAN) 0.005 % ophthalmic solution INSTILL 1 DROP INTO EACH EYE AT NIGHT      ofloxacin (FLOXIN) 0.3 % ophthalmic solution INSTILL 5 DROPS INTO AFFECTED EAR TWICE DAILY      scopolamine (TRANSDERM-SCOP) 1 mg over 3 days pt3d PLACE 1 PATCH BEHIND THE EAR EVERY 3 DAYS A NEEDED FOR NAUSEA      spironolactone (ALDACTONE) 50 mg tablet Take 1 Tablet by mouth daily. 90 Tablet 1    carvediloL (COREG) 6.25 mg tablet Take 1 Tablet by mouth two (2) times a day. 180 Tablet 3    losartan (COZAAR) 25 mg tablet Take 1 Tablet by mouth daily (after breakfast). 90 Tablet 3    bumetanide (BUMEX) 1 mg tablet Take 1 Tablet by mouth daily. 180 Tablet 3    TENS units and TENS electrodes (TENS UNIT AND ELECTRODES) Tens Unit, 1 ea, Dispense: 1 EA, Refills: 0, Easyscript, Maintenance, 0      azelastine (ASTELIN) 137 mcg (0.1 %) nasal spray USE 2 SPRAY(S) IN EACH NOSTRIL TWICE DAILY      augmented betamethasone dipropionate (DIPROLENE-AF) 0.05 % ointment       budesonide (PULMICORT) 0.5 mg/2 mL nbsp USE 1 VIAL IN NEBULIZER TWICE DAILY      citalopram (CELEXA) 20 mg tablet Take 20 mg by mouth daily. docusate sodium (COLACE) 100 mg capsule Take 100 mg by mouth two (2) times a day. Premarin 0.625 mg/gram vaginal cream       Ferrex 150 150 mg iron capsule       Linzess 145 mcg cap capsule Take 145 mcg by mouth daily. metoclopramide HCl (REGLAN) 5 mg tablet       Saccharomyces boulardii (FLORASTOR) 250 mg capsule Take 250 mg by mouth two (2) times a day. cetirizine (ZyrTEC) 10 mg tablet Take 10 mg by mouth daily. metFORMIN (GLUCOPHAGE) 500 mg tablet Take  by mouth two (2) times daily (with meals).       cloNIDine (CATAPRES) 0.1 mg/24 hr ptwk       fluticasone propionate (FLONASE) 50 mcg/actuation nasal spray       BD Luer-Narda Syringe 3 mL 25 gauge x 1\" syrg USE FOR B12 INJECTIONS      RABEprazole (ACIPHEX) 20 mg TbEC Take 20 mg by mouth two (2) times a day. rizatriptan (MAXALT) 5 mg tablet Take 5 mg by mouth once as needed for Migraine. May repeat in 2 hours if needed       alum-mag hydroxide-simeth-lidocaine-sucralfate Take 30 mL by mouth daily. tiotropium (SPIRIVA) 18 mcg inhalation capsule Take 1 Cap by inhalation daily. ondansetron hcl (ZOFRAN) 8 mg tablet Take 8 mg by mouth every eight (8) hours as needed. montelukast (SINGULAIR) 10 mg tablet Take 10 mg by mouth daily. 3    nortriptyline (PAMELOR) 50 mg capsule Take 100 mg by mouth nightly. 5    sucralfate (CARAFATE) 100 mg/mL suspension Take 1 g by mouth four (4) times daily. topiramate (TOPAMAX) 100 mg tablet Take 100 mg by mouth two (2) times a day.  5    VENTOLIN HFA 90 mcg/actuation inhaler Take 2 Puffs by inhalation every four (4) hours as needed.  4    SYMBICORT 80-4.5 mcg/actuation HFAA INHALE 2 PUFFS BY MOUTH TWICE DAILY  4    dexlansoprazole (DEXILANT) 60 mg CpDB capsule (delayed release) Take 60 mg by mouth daily. dicyclomine (BENTYL) 20 mg tablet Take 20 mg by mouth every six (6) hours. estradiol (ESTRACE) 0.01 % (0.1 mg/gram) vaginal cream   2    levothyroxine (SYNTHROID) 50 mcg tablet Take 50 mcg by mouth Daily (before breakfast). cyanocobalamin (VITAMIN B-12) 1,000 mcg/mL injection 1,000 mcg by IntraMUSCular route every thirty (30) days. Calcium-Cholecalciferol, D3, (CALCIUM 600 WITH VITAMIN D3) 600 mg(1,500mg) -400 unit cap Take 2 Tablets by mouth daily. multivitamin (ONE A DAY) tablet Take 1 Tab by mouth daily.          Past History     Past Medical History:  Past Medical History:   Diagnosis Date    Arthritis     pt states knees and back    Asthma     Cardiomyopathy ([facility] 2005    EF 35%, echo in 2021 normal EF     Colon polyp     Diabetes ([facility]     Dizzy spells     GERD (gastroesophageal reflux disease)     [facility]    now combined systolic, diastolic, seen at [facility], [facility] 2021- Dr Paredes Wixom    Irritable bowel syndrome     Knee pain, bilateral     Migraines 2011    2-3 week    Neuropathy     pt states of both feet    Seizures (HCC)     petite mals last seizure 2-3 months ago    Thyroid disease     hypothyroid       Past Surgical History:  Past Surgical History:   Procedure Laterality Date    HX CHOLECYSTECTOMY  5/2006    HX COLONOSCOPY      HX ENDOSCOPY      HX GASTRIC BYPASS  2013    HX HYSTERECTOMY  2007    HX KNEE ARTHROSCOPY Right 05/2021    HX ORTHOPAEDIC Bilateral 2013    CTR    HX ORTHOPAEDIC      right knee procedure 2021    HX OTHER SURGICAL      left axillary lymph node biopsy    HX SMALL BOWEL RESECTION      OH ABDOMEN SURGERY PROC UNLISTED  2008    cholecystectomy    OH APPENDECTOMY      OH CARDIAC SURG PROCEDURE UNLIST      Loop recorder implanted july 2020    OH LAP,CHOLECYSTECTOMY      OH PART REMOVAL COLON W ANASTOMOSIS      OH RESECT SMALL INTEST W ENTEROSTOMY      OH STOMACH SURGERY PROCEDURE UNLISTED         Family History:  Family History   Problem Relation Age of Onset    Hypertension Mother     Heart Disease Mother     Cancer Father         stomach cancer    Hypertension Father     No Known Problems Brother     Asthma Daughter     Asthma Son     Asthma Son     Breast Cancer Paternal Aunt         age unknown       Social History:  Social History     Tobacco Use    Smoking status: Never    Smokeless tobacco: Never   Vaping Use    Vaping Use: Never used   Substance Use Topics    Alcohol use: No    Drug use: No       Allergies: Allergies   Allergen Reactions    Acetaminophen Other (comments)     Advised not to take due to Liver Issues    Morphine Other (comments)     SOB/Chest Pressure - in hosp for a week. States DILAUDID Ok    Oxycodone Shortness of Breath     Reaction Type: Allergy;  Severity: Severe    Shellfish Derived Anaphylaxis    Codeine Other (comments)     SOB chest pain    Peanut Oil Other (comments)     Reaction Type: Intolerance; Severity: Severe    Sulfa (Sulfonamide Antibiotics) Rash     itching       Review of Systems   Review of Systems   Constitutional:  Positive for fatigue. Negative for chills, diaphoresis and fever. Malaise   HENT: Negative. Negative for congestion, rhinorrhea and sore throat. Eyes: Negative. Respiratory: Negative. Negative for cough, chest tightness, shortness of breath and wheezing. Cardiovascular:  Positive for chest pain. Negative for palpitations and leg swelling. Gastrointestinal: Negative. Negative for abdominal pain, diarrhea, nausea and vomiting. Genitourinary: Negative. Negative for difficulty urinating, dysuria, flank pain, frequency and hematuria. Musculoskeletal: Negative. Negative for back pain and gait problem. Skin: Negative. Negative for rash. Neurological: Negative. Negative for dizziness, syncope, weakness, light-headedness, numbness and headaches. Psychiatric/Behavioral: Negative. All other systems reviewed and are negative. Physical Exam   Physical Exam  Vitals and nursing note reviewed. Constitutional:       General: She is not in acute distress. Appearance: Normal appearance. She is not ill-appearing or toxic-appearing. HENT:      Head: Normocephalic and atraumatic. Mouth/Throat:      Mouth: Mucous membranes are dry. Pharynx: Oropharynx is clear. Eyes:      Extraocular Movements: Extraocular movements intact. Conjunctiva/sclera: Conjunctivae normal.      Pupils: Pupils are equal, round, and reactive to light. Cardiovascular:      Rate and Rhythm: Normal rate and regular rhythm. Pulses: Normal pulses. Heart sounds: Normal heart sounds. No murmur heard. No friction rub. No gallop. Pulmonary:      Effort: Pulmonary effort is normal.      Breath sounds: Normal breath sounds. No wheezing, rhonchi or rales. Abdominal:      General: There is no distension.       Palpations: Abdomen is soft. Tenderness: There is no abdominal tenderness. There is no guarding or rebound. Musculoskeletal:      Cervical back: Neck supple. No tenderness. Right lower leg: No edema. Left lower leg: No edema. Skin:     General: Skin is warm and dry. Capillary Refill: Capillary refill takes less than 2 seconds. Findings: No rash. Neurological:      General: No focal deficit present. Mental Status: She is alert and oriented to person, place, and time. GCS: GCS eye subscore is 4. GCS verbal subscore is 5. GCS motor subscore is 6. Sensory: Sensation is intact. Motor: Motor function is intact. Gait: Gait is intact. Comments: Somnolent   Psychiatric:         Mood and Affect: Mood normal.         Behavior: Behavior normal.       Lab and Diagnostic Study Results   Labs -     Recent Results (from the past 12 hour(s))   GLUCOSE, POC    Collection Time: 08/26/22  7:46 PM   Result Value Ref Range    Glucose (POC) 79 65 - 117 mg/dL    Performed by Aden Mac RN (Traveler)    CBC WITH AUTOMATED DIFF    Collection Time: 08/26/22  7:47 PM   Result Value Ref Range    WBC 4.4 3.6 - 11.0 K/uL    RBC 3.99 3.80 - 5.20 M/uL    HGB 11.0 (L) 11.5 - 16.0 g/dL    HCT 34.3 (L) 35.0 - 47.0 %    MCV 86.0 80.0 - 99.0 FL    MCH 27.6 26.0 - 34.0 PG    MCHC 32.1 30.0 - 36.5 g/dL    RDW 14.8 (H) 11.5 - 14.5 %    PLATELET 333 140 - 462 K/uL    MPV 11.1 8.9 - 12.9 FL    NRBC 0.0 0.0  WBC    ABSOLUTE NRBC 0.00 0.00 - 0.01 K/uL    NEUTROPHILS 76 (H) 32 - 75 %    LYMPHOCYTES 9 (L) 12 - 49 %    MONOCYTES 10 5 - 13 %    EOSINOPHILS 4 0 - 7 %    BASOPHILS 1 0 - 1 %    IMMATURE GRANULOCYTES 0 0 - 0.5 %    ABS. NEUTROPHILS 3.3 1.8 - 8.0 K/UL    ABS. LYMPHOCYTES 0.4 (L) 0.8 - 3.5 K/UL    ABS. MONOCYTES 0.4 0.0 - 1.0 K/UL    ABS. EOSINOPHILS 0.2 0.0 - 0.4 K/UL    ABS. BASOPHILS 0.0 0.0 - 0.1 K/UL    ABS. IMM.  GRANS. 0.0 0.00 - 0.04 K/UL    DF AUTOMATED     METABOLIC PANEL, COMPREHENSIVE    Collection Time: 08/26/22  7:47 PM   Result Value Ref Range    Sodium 138 136 - 145 mmol/L    Potassium 4.1 3.5 - 5.1 mmol/L    Chloride 109 (H) 97 - 108 mmol/L    CO2 24 21 - 32 mmol/L    Anion gap 5 5 - 15 mmol/L    Glucose 77 65 - 100 mg/dL    BUN 11 6 - 20 mg/dL    Creatinine 0.72 0.55 - 1.02 mg/dL    BUN/Creatinine ratio 15 12 - 20      GFR est AA >60 >60 ml/min/1.73m2    GFR est non-AA >60 >60 ml/min/1.73m2    Calcium 8.4 (L) 8.5 - 10.1 mg/dL    Bilirubin, total 0.1 (L) 0.2 - 1.0 mg/dL    AST (SGOT) 55 (H) 15 - 37 U/L    ALT (SGPT) 73 12 - 78 U/L    Alk. phosphatase 155 (H) 45 - 117 U/L    Protein, total 6.4 6.4 - 8.2 g/dL    Albumin 2.9 (L) 3.5 - 5.0 g/dL    Globulin 3.5 2.0 - 4.0 g/dL    A-G Ratio 0.8 (L) 1.1 - 2.2     TROPONIN-HIGH SENSITIVITY    Collection Time: 08/26/22  7:47 PM   Result Value Ref Range    Troponin-High Sensitivity <3 0 - 51 ng/L   LACTIC ACID    Collection Time: 08/26/22  9:13 PM   Result Value Ref Range    Lactic acid 1.6 0.4 - 2.0 mmol/L       Radiologic Studies -   @lastxrresult@  CT Results  (Last 48 hours)      None          CXR Results  (Last 48 hours)                 08/26/22 2020  XR CHEST PORT Final result    Impression:  Bibasilar atelectasis and interstitial infiltrate new since the prior study. Correlate for developing interstitial edema or infection. .  . Narrative:  INDICATION:  cp        EXAM: Chest single view. COMPARISON: 8/8/2022. FINDINGS: A single frontal view of the chest at 2022 hours shows bibasilar   interstitial infiltrate and atelectasis. .  The heart, mediastinum and pulmonary   vasculature are stable . The bony thorax is unremarkable for age.  .                   Medical Decision Making and ED Course   Differential Diagnosis & Medical Decision Making Provider Note:   DDx: atypical chest pain, ACS, costochondritis, PNA, bronchitis, CHF, pleural effusion, MSK pain, GERD, pancreatitis    Pt presents with acute chest pain; stable vitals and nontoxic appearing. The pt is unlikely to have PE. There is no pleuritic chest pain, no tachycardia, no hypoxia, pt non-smoker, no unilateral leg swelling, no hormone use, no recent travel/immobilization, no recent surgery. Therefore no d-dimer or PE    The pt is unlikely to have aortic dissection. The pulses are equal, there is no sharp tearing chest pain radiating to back, the patient is not extremely hypertensive. Therefore no d-dimer or CTA chest for dissection    Patient not extremely hypertensive, unlikely to be hypertensive emergency. No history of valve abnormality and no harsh murmur, no signs of flash pulmonary edema, therefore less likely ruptured valve. Endocarditis unlikely -- no IV drug abuse, native valve, no fevers, patient well appearing, no murmurs/splinter hemorrhages/janeway lesions or oslar nodes    The pt is unlikely to have esophageal rupture. There is no history of forceful vomiting, patient well appearing, no difficulty swallowing    Will pursue cardiac work-up with EKG, troponin, ckmb, CXR. While the pt is less likely to have pneumonia as there is no cough and no fever, and less likely to have ptx, will order CXR to assess lung fields. Will provide pain control and reassess.      - I am the first provider for this patient. I reviewed the vital signs, available nursing notes, past medical history, past surgical history, family history and social history. The patients presenting problems have been discussed, and they are in agreement with the care plan formulated and outlined with them. I have encouraged them to ask questions as they arise throughout their visit. Vital Signs-Reviewed the patient's vital signs.   Patient Vitals for the past 12 hrs:   Temp Pulse Resp BP SpO2   08/26/22 2238 -- 85 17 99/70 94 %   08/26/22 2057 -- 86 16 102/66 96 %   08/26/22 1945 -- 90 17 95/60 96 %   08/26/22 1932 98.5 °F (36.9 °C) 90 16 95/61 96 %       ED Course:   11:00 PM  Patient reassessed and updated on all available results and findings. She reports feeling markedly improved and states that her headache is completely resolved. Patient encouraged to schedule close follow-up with her primary care physician within the next week, or return to ED sooner if her condition persists or worsens. She has been educated on strict return precautions conveys good understanding agree with care plan as outlined. Patient has no new complaints or concerns and all her questions have been answered. Patient requesting to be discharged home at this time. Procedures   Performed by: Diego Andrews PA-C  Procedures      Disposition   Disposition: DC- Adult Discharges: All of the diagnostic tests were reviewed and questions answered. Diagnosis, care plan and treatment options were discussed. The patient understands the instructions and will follow up as directed. The patients results have been reviewed with them. They have been counseled regarding their diagnosis. The patient verbally convey understanding and agreement of the signs, symptoms, diagnosis, treatment and prognosis and additionally agrees to follow up as recommended with their PCP in 24 - 48 hours. They also agree with the care-plan and convey that all of their questions have been answered. I have also put together some discharge instructions for them that include: 1) educational information regarding their diagnosis, 2) how to care for their diagnosis at home, as well a 3) list of reasons why they would want to return to the ED prior to their follow-up appointment, should their condition change. DISCHARGE PLAN:  1. Current Discharge Medication List        CONTINUE these medications which have NOT CHANGED    Details   nitroglycerin (NITROSTAT) 0.4 mg SL tablet DISSOLVE 1 TABLET UNDER THE TONGUE EVERY 5 MINUTES AS  NEEDED FOR CHEST PAIN. MAX  OF 3 TABLETS IN 15 MINUTES. CALL 911 IF PAIN PERSISTS.   Qty: 30 Tablet, Refills: 5      lacosamide (Vimpat) 200 mg tab tablet Take 1 Tablet by mouth two (2) times a day. Max Daily Amount: 400 mg. Qty: 30 Tablet, Refills: 0    Associated Diagnoses: Carotid sinus syncope      levETIRAcetam (KEPPRA) 500 mg tablet Take 1 Tablet by mouth two (2) times a day. Qty: 30 Tablet, Refills: 2      escitalopram oxalate (LEXAPRO) 10 mg tablet Take 10 mg by mouth daily. hydrOXYzine HCL (ATARAX) 25 mg tablet TAKE 1 TABLET BY MOUTH THREE TIMES DAILY AS NEEDED FOR 10 DAYS      latanoprost (XALATAN) 0.005 % ophthalmic solution INSTILL 1 DROP INTO EACH EYE AT NIGHT      ofloxacin (FLOXIN) 0.3 % ophthalmic solution INSTILL 5 DROPS INTO AFFECTED EAR TWICE DAILY      scopolamine (TRANSDERM-SCOP) 1 mg over 3 days pt3d PLACE 1 PATCH BEHIND THE EAR EVERY 3 DAYS A NEEDED FOR NAUSEA      spironolactone (ALDACTONE) 50 mg tablet Take 1 Tablet by mouth daily. Qty: 90 Tablet, Refills: 1    Comments: Dose increase  Associated Diagnoses: Chronic combined systolic and diastolic congestive heart failure (HCC); NICM (nonischemic cardiomyopathy) ([facility])      carvediloL (COREG) 6.25 mg tablet Take 1 Tablet by mouth two (2) times a day. Qty: 180 Tablet, Refills: 3    Associated Diagnoses: Chronic combined systolic and diastolic congestive heart failure (HCC); NICM (nonischemic cardiomyopathy) ([facility])      losartan (COZAAR) 25 mg tablet Take 1 Tablet by mouth daily (after breakfast). Qty: 90 Tablet, Refills: 3    Associated Diagnoses: Chronic combined systolic and diastolic congestive heart failure (HCC); NICM (nonischemic cardiomyopathy) ([facility])      bumetanide (BUMEX) 1 mg tablet Take 1 Tablet by mouth daily.   Qty: 180 Tablet, Refills: 3    Associated Diagnoses: Chronic combined systolic and diastolic congestive heart failure (HCC); NICM (nonischemic cardiomyopathy) ([facility])      TENS units and TENS electrodes (TENS UNIT AND ELECTRODES) Tens Unit, 1 ea, Dispense: 1 EA, Refills: 0, Easyscript, Maintenance, 0      azelastine (ASTELIN) 137 mcg (0.1 %) nasal spray USE 2 SPRAY(S) IN EACH NOSTRIL TWICE DAILY      augmented betamethasone dipropionate (DIPROLENE-AF) 0.05 % ointment       budesonide (PULMICORT) 0.5 mg/2 mL nbsp USE 1 VIAL IN NEBULIZER TWICE DAILY      citalopram (CELEXA) 20 mg tablet Take 20 mg by mouth daily. docusate sodium (COLACE) 100 mg capsule Take 100 mg by mouth two (2) times a day. Premarin 0.625 mg/gram vaginal cream       Ferrex 150 150 mg iron capsule       Linzess 145 mcg cap capsule Take 145 mcg by mouth daily. metoclopramide HCl (REGLAN) 5 mg tablet       Saccharomyces boulardii (FLORASTOR) 250 mg capsule Take 250 mg by mouth two (2) times a day. cetirizine (ZyrTEC) 10 mg tablet Take 10 mg by mouth daily. metFORMIN (GLUCOPHAGE) 500 mg tablet Take  by mouth two (2) times daily (with meals). cloNIDine (CATAPRES) 0.1 mg/24 hr ptwk       fluticasone propionate (FLONASE) 50 mcg/actuation nasal spray       BD Luer-Narda Syringe 3 mL 25 gauge x 1\" syrg USE FOR B12 INJECTIONS      RABEprazole (ACIPHEX) 20 mg TbEC Take 20 mg by mouth two (2) times a day. rizatriptan (MAXALT) 5 mg tablet Take 5 mg by mouth once as needed for Migraine. May repeat in 2 hours if needed       alum-mag hydroxide-simeth-lidocaine-sucralfate Take 30 mL by mouth daily. tiotropium (SPIRIVA) 18 mcg inhalation capsule Take 1 Cap by inhalation daily. ondansetron hcl (ZOFRAN) 8 mg tablet Take 8 mg by mouth every eight (8) hours as needed. montelukast (SINGULAIR) 10 mg tablet Take 10 mg by mouth daily. Refills: 3      nortriptyline (PAMELOR) 50 mg capsule Take 100 mg by mouth nightly. Refills: 5      sucralfate (CARAFATE) 100 mg/mL suspension Take 1 g by mouth four (4) times daily. topiramate (TOPAMAX) 100 mg tablet Take 100 mg by mouth two (2) times a day. Refills: 5      VENTOLIN HFA 90 mcg/actuation inhaler Take 2 Puffs by inhalation every four (4) hours as needed.   Refills: 4      SYMBICORT 80-4.5 mcg/actuation HFAA INHALE 2 PUFFS BY MOUTH TWICE DAILY  Refills: 4      dexlansoprazole (DEXILANT) 60 mg CpDB capsule (delayed release) Take 60 mg by mouth daily. dicyclomine (BENTYL) 20 mg tablet Take 20 mg by mouth every six (6) hours. estradiol (ESTRACE) 0.01 % (0.1 mg/gram) vaginal cream Refills: 2      levothyroxine (SYNTHROID) 50 mcg tablet Take 50 mcg by mouth Daily (before breakfast). cyanocobalamin (VITAMIN B-12) 1,000 mcg/mL injection 1,000 mcg by IntraMUSCular route every thirty (30) days. Calcium-Cholecalciferol, D3, (CALCIUM 600 WITH VITAMIN D3) 600 mg(1,500mg) -400 unit cap Take 2 Tablets by mouth daily. multivitamin (ONE A DAY) tablet Take 1 Tab by mouth daily. 2.   Follow-up Information       Follow up With Specialties Details Why Joelle Rivera MD Family Medicine Schedule an appointment as soon as possible for a visit   [address] [place] [id number] [phone]            3. Return to ED if worse   4. Current Discharge Medication List        Remove if admitted/transferred    Diagnosis/Clinical Impression     Clinical Impression:   1. Malaise and fatigue    2. Chest pain, unspecified type        Attestations: I, Jessica Cadena PA-C, am the primary clinician of record. Please note that this dictation was completed with [employer], the computer voice recognition software. Quite often unanticipated grammatical, syntax, homophones, and other interpretive errors are inadvertently transcribed by the computer software. Please disregard these errors. Please excuse any errors that have escaped final proofreading. Thank you.

## 2022-08-28 LAB
ATRIAL RATE: 90 BPM
CALCULATED P AXIS, ECG09: 47 DEGREES
CALCULATED R AXIS, ECG10: 38 DEGREES
CALCULATED T AXIS, ECG11: 16 DEGREES
DIAGNOSIS, 93000: NORMAL
P-R INTERVAL, ECG05: 196 MS
Q-T INTERVAL, ECG07: 352 MS
QRS DURATION, ECG06: 104 MS
QTC CALCULATION (BEZET), ECG08: 430 MS
VENTRICULAR RATE, ECG03: 90 BPM

## 2022-09-07 ENCOUNTER — VIRTUAL VISIT (OUTPATIENT)
Dept: HEMATOLOGY | Age: 52
End: 2022-09-07
Payer: MEDICARE

## 2022-09-07 DIAGNOSIS — R10.84 GENERALIZED ABDOMINAL PAIN: ICD-10-CM

## 2022-09-07 DIAGNOSIS — R74.8 ELEVATED LIVER ENZYMES: Primary | ICD-10-CM

## 2022-09-07 DIAGNOSIS — I26.99 ACUTE PULMONARY EMBOLISM, UNSPECIFIED PULMONARY EMBOLISM TYPE, UNSPECIFIED WHETHER ACUTE COR PULMONALE PRESENT (HCC): ICD-10-CM

## 2022-09-07 PROCEDURE — G9899 SCRN MAM PERF RSLTS DOC: HCPCS | Performed by: NURSE PRACTITIONER

## 2022-09-07 PROCEDURE — 3017F COLORECTAL CA SCREEN DOC REV: CPT | Performed by: NURSE PRACTITIONER

## 2022-09-07 PROCEDURE — G8427 DOCREV CUR MEDS BY ELIG CLIN: HCPCS | Performed by: NURSE PRACTITIONER

## 2022-09-07 PROCEDURE — G8432 DEP SCR NOT DOC, RNG: HCPCS | Performed by: NURSE PRACTITIONER

## 2022-09-07 PROCEDURE — 99214 OFFICE O/P EST MOD 30 MIN: CPT | Performed by: NURSE PRACTITIONER

## 2022-09-07 PROCEDURE — G8417 CALC BMI ABV UP PARAM F/U: HCPCS | Performed by: NURSE PRACTITIONER

## 2022-09-07 NOTE — PROGRESS NOTES
[address] [facility], [place], [address], [facility], [place]      MANISH Chawla, [facility]-BC     Perla Russo, [facility]   NURIA Keyes-SERA Nieto, [facility]       Johanne Coyle [facility] [address]    at [address], [address] [place] [address], [place] [address]. [phone]    FAX: [address] [place] [address] Avenue    at [address], [address], [address] [place] [address],[address]    [address] [facility] [address]: [phone]       Patient Care Team:  Indigo Christina MD as PCP - General (Family Medicine)  Nel Rashid MD (Surgery General)  Dario Monzon MD as Referring Provider (Obstetrics & Gynecology)  Nel Rashid MD (Surgery General)  Larisa Best MD (Cardiovascular Disease Physician)  Martha Landry MD (Gastroenterology)  Stephen Peter NP (Nurse Practitioner)      Problem List  Date Reviewed: 6/8/2022            Codes Class Noted    Syncope ICD-10-CM: R55  ICD-9-CM: 780.2  5/8/2022        Abdominal pain ICD-10-CM: R10.9  ICD-9-CM: 789.00  12/13/2021        Hypotension due to drugs ICD-10-CM: I95.2  ICD-9-CM: 458.8, E947.9  10/21/2021        Chronic combined systolic and diastolic congestive heart failure ([facility] ICD-10-CM: I50.42  ICD-9-CM: 428.42, 428.0  4/10/2021        Atypical chest pain ICD-10-CM: R07.89  ICD-9-CM: 786.59  4/10/2021        Regional wall motion abnormality of heart ICD-10-CM: R93.1  ICD-9-CM: 793.2  4/10/2021        Vasovagal syncope ICD-10-CM: R55  ICD-9-CM: 780.2  4/10/2021        NSVT (nonsustained ventricular tachycardia) ([facility] ICD-10-CM: I47.2  ICD-9-CM: 427.1  4/10/2021        Right knee pain ICD-10-CM: M25.561  ICD-9-CM: 719.46  1/4/2021        Generalized abdominal pain ICD-10-CM: R10.84  ICD-9-CM: 789.07  7/21/2020        Diarrhea ICD-10-CM: R19.7  ICD-9-CM: 787.91  7/21/2020        H/O Clostridium difficile infection ICD-10-CM: Z86.19  ICD-9-CM: V12.09  7/21/2020        History of bowel resection ICD-10-CM: Z90.49  ICD-9-CM: V15.89  4/21/2020        Elevated liver enzymes ICD-10-CM: R74.8  ICD-9-CM: 790.5  7/26/2019        NICM (nonischemic cardiomyopathy) ([facility] ICD-10-CM: I42.8  ICD-9-CM: 425.4  7/26/2019        Hypothyroidism ICD-10-CM: E03.9  ICD-9-CM: 244.9  7/26/2019        H/O gastric bypass ICD-10-CM: Z98.84  ICD-9-CM: V45.86  7/26/2019        History of cholecystectomy ICD-10-CM: Z90.49  ICD-9-CM: V45.79  7/26/2019        History of appendectomy ICD-10-CM: Z90.49  ICD-9-CM: V45.89  7/26/2019        Neuropathy ICD-10-CM: G62.9  ICD-9-CM: 355.9  7/26/2019        Migraine headache ICD-10-CM: B19.454  ICD-9-CM: 346.90  7/26/2019        Seizures ([facility] ICD-10-CM: R56.9  ICD-9-CM: 780.39  7/26/2019        Fibrocystic breast changes of both breasts ICD-10-CM: N60.11, N60.12  ICD-9-CM: 610.1  7/8/2015        Heart failure ([facility] ICD-10-CM: I50.9  ICD-9-CM: 428.9  2005    Overview Signed 4/10/2021 12:43 PM by Ludmila West MD     now combined systolic, diastolic, seen at [facility]19, [facility] 2021- Dr Polo Farrar COVID-19 EPIDEMIC    CONSENT:    Darlyn Zhong, was evaluated through a synchronous (real-time) audio-video encounter. The patient (or guardian if applicable) is aware that this is a billable service, which includes applicable co-pays. This Virtual Visit was conducted with patient's (and/or legal guardian's) consent. The visit was conducted pursuant to the emergency declaration under the [address] [place] [address] authority and the [employer] General Act. Patient identification was verified, and a caregiver was present when appropriate.   The patient was located at: Home  The provider was located at: [address] Rubia Holguin returns to the [place] [address] for management of elevated liver enzymes. The active problem list, all pertinent past medical history, medications, radiologic findings and laboratory findings related to the liver disorder were reviewed with the patient. The patient is a 45 y.o. Black female who was found to have abnormalities in liver enzymes in 2018. ECHO performed 1/2021 demonstrated mildly reduced systolic function. LVEF 50-55%. This was recently repeated 5/2022 which demonstrated an LVEF of 45-50%. Grade 1 diastolic dysfunction. Mildly thickened tricuspid valve, Moderate regurgitation of pulmonic valve. Serologic evaluation for markers of chronic liver disease were negative. The patient underwent a liver biopsy in 12/2020 which demonstrated centrolobular sinusoidal dilation with no inflammaiton, steatosis or fibrosis. A single granuloma was present. Assessment of liver fibrosis was performed with Fibroscan 10/2021. The result was 3.7 kPa which correlates with no fibrosis. EGD was performed 12/2021 which demonstrated dyskinesia of the esophagus and a chronic gastric ulcer. She is currently on sucralfate, dexilant and rabeprazole. Since the last office visit the patient developed an acute onset of inability to ambulate and subsequently went to the ER for treatment 8/28/2022. She was diagnosed with COVID. Further evaluation with CTA demonstrated an acute PE. The main pulmonary artery demonstrated PHTN. Lower extremity workup negative for DVT. She was completely asymptomatic but noted a significant cough 1 month prior. The liver enzymes were elevated during these events but not significantly changed from baseline. All anti-hypertensive medication discontinued due to hypotension. She notes this has caused symptoms of SOB due to CHF.      The patient has the following symptoms which are thought to be due to the liver disease:  fatigue, nausea, and diffuse abdominal pain. This is chronic. The patient has not experienced the following symptoms:  Itching or swelling of the abdomen. The patient has Mild limitations in functional activities which can be attributed to other medical problems that are not related to the liver disease. ASSESSMENT AND PLAN:  Elevated liver enzymes  Intermittent elevation in liver transaminases and ALP. Serologic testing for causes of chronic liver disease were negative. A liver biopsy in 12/2020 demonstrated centrolobular sinusoidal dilation and a granuloma but no steatosis, no inflammaiton and no fibrosis. Assessment of liver fibrosis was performed with Fibroscan 10/2021. The result was 3.7 kPa which correlates with no fibrosis. Have performed laboratory testing to monitor liver function and degree of liver injury. This included BMP, hepatic panel, CBC with platelet count and INR. Laboratory testing during hospitalization from 8/30/2022 reviewed in detail. Follow-up testing ordered today. The liver transaminases and ALP are elevated. The liver function is normal. The platelet count is normal.     The IGG4 and GGT are normal.     MRI/MRCP 6/2020 demonstrated a normal appearing liver with no ductal dilatation or stones. It has been 2 years since the last MRI. Will order MRI/MRCP to evaluate for biliary stricture or change in imaging. The elevation in liver enzymes may be due to other underlying medical conditions. Pulmonary Embolism  Acute 8/28/2022, asymptomatic. Apixaban 5 mg started and current dose is 5 mg BID    History of Cardiomyopathy  The patient was being followed by cardiology at [facility] for cardiomyopathy. ECHO performed 1/2021 demonstrated an LVEF of 50-55%. Mild left ventricular diastolic dysfunction. Mildly dilated left Atrium. She had a follow up with Dr. Constance Xiong which felt the ECHO was normal and cardiomyopathy has resolved with medical treatment.    ECHO again performed 5/2022 which demonstrated an LVEF of 45-50%. Mild diastolic dysfunction. A follow up with cardiology is scheduled. Hepatic granuloma  This has little clinical significance. The mild elevation in ALP may be due to the granuloma. Hepatic granulomas are in most cases benign and do not cause liver injury. Screening for Hepatocellular Carcinoma  HCC screening is not necessary since the patient has no evidence of cirrhosis. Treatment of other medical problems in patients with chronic liver disease  There are no contraindications for the patient to take most medications that are necessary for treatment of other medical issues. Counseling for alcohol in patients with chronic liver disease  The patient was counseled regarding alcohol consumption and the effect of alcohol on chronic liver disease. Patients who have undergone obesity surgery are at much greater risk to develop alcoholic liver injury. She has never consumed significant amounts of alcohol. Vaccinations   Vaccination for viral hepatitis A and B is recommended since the patient has no serologic evidence of previous exposure or vaccination with immunity. Routine vaccinations against other bacterial and viral agents can be performed as indicated. Annual flu vaccination should be administered if indicated. ALLERGIES  Allergies   Allergen Reactions    Acetaminophen Other (comments)     Advised not to take due to Liver Issues    Morphine Other (comments)     SOB/Chest Pressure - in hosp for a week. States DILAUDID Ok    Oxycodone Shortness of Breath     Reaction Type: Allergy; Severity: Severe    Shellfish Derived Anaphylaxis    Codeine Other (comments)     SOB chest pain    Peanut Oil Other (comments)     Reaction Type:  Intolerance; Severity: Severe    Sulfa (Sulfonamide Antibiotics) Rash     itching       MEDICATIONS  Current Outpatient Medications   Medication Sig    nitroglycerin (NITROSTAT) 0.4 mg SL tablet DISSOLVE 1 TABLET UNDER THE TONGUE EVERY 5 MINUTES AS  NEEDED FOR CHEST PAIN. MAX  OF 3 TABLETS IN 15 MINUTES. CALL 911 IF PAIN PERSISTS. lacosamide (Vimpat) 200 mg tab tablet Take 1 Tablet by mouth two (2) times a day. Max Daily Amount: 400 mg.    levETIRAcetam (KEPPRA) 500 mg tablet Take 1 Tablet by mouth two (2) times a day. escitalopram oxalate (LEXAPRO) 10 mg tablet Take 10 mg by mouth daily. hydrOXYzine HCL (ATARAX) 25 mg tablet TAKE 1 TABLET BY MOUTH THREE TIMES DAILY AS NEEDED FOR 10 DAYS    latanoprost (XALATAN) 0.005 % ophthalmic solution INSTILL 1 DROP INTO EACH EYE AT NIGHT    ofloxacin (FLOXIN) 0.3 % ophthalmic solution INSTILL 5 DROPS INTO AFFECTED EAR TWICE DAILY    scopolamine (TRANSDERM-SCOP) 1 mg over 3 days pt3d PLACE 1 PATCH BEHIND THE EAR EVERY 3 DAYS A NEEDED FOR NAUSEA    TENS units and TENS electrodes (TENS UNIT AND ELECTRODES) Tens Unit, 1 ea, Dispense: 1 EA, Refills: 0, Easyscript, Maintenance, 0    azelastine (ASTELIN) 137 mcg (0.1 %) nasal spray USE 2 SPRAY(S) IN EACH NOSTRIL TWICE DAILY    augmented betamethasone dipropionate (DIPROLENE-AF) 0.05 % ointment     budesonide (PULMICORT) 0.5 mg/2 mL nbsp USE 1 VIAL IN NEBULIZER TWICE DAILY    citalopram (CELEXA) 20 mg tablet Take 20 mg by mouth daily. docusate sodium (COLACE) 100 mg capsule Take 100 mg by mouth two (2) times a day. Premarin 0.625 mg/gram vaginal cream     Ferrex 150 150 mg iron capsule     Linzess 145 mcg cap capsule Take 145 mcg by mouth daily. metoclopramide HCl (REGLAN) 5 mg tablet     Saccharomyces boulardii (FLORASTOR) 250 mg capsule Take 250 mg by mouth two (2) times a day. cetirizine (ZyrTEC) 10 mg tablet Take 10 mg by mouth daily. metFORMIN (GLUCOPHAGE) 500 mg tablet Take  by mouth two (2) times daily (with meals).     fluticasone propionate (FLONASE) 50 mcg/actuation nasal spray     BD Luer-Narda Syringe 3 mL 25 gauge x 1\" syrg USE FOR B12 INJECTIONS    RABEprazole (ACIPHEX) 20 mg TbEC Take 20 mg by mouth two (2) times a day. rizatriptan (MAXALT) 5 mg tablet Take 5 mg by mouth once as needed for Migraine. May repeat in 2 hours if needed     alum-mag hydroxide-simeth-lidocaine-sucralfate Take 30 mL by mouth daily. tiotropium (SPIRIVA) 18 mcg inhalation capsule Take 1 Cap by inhalation daily. ondansetron hcl (ZOFRAN) 8 mg tablet Take 8 mg by mouth every eight (8) hours as needed. montelukast (SINGULAIR) 10 mg tablet Take 10 mg by mouth daily. nortriptyline (PAMELOR) 50 mg capsule Take 100 mg by mouth nightly. sucralfate (CARAFATE) 100 mg/mL suspension Take 1 g by mouth four (4) times daily. topiramate (TOPAMAX) 100 mg tablet Take 100 mg by mouth two (2) times a day. VENTOLIN HFA 90 mcg/actuation inhaler Take 2 Puffs by inhalation every four (4) hours as needed. SYMBICORT 80-4.5 mcg/actuation HFAA INHALE 2 PUFFS BY MOUTH TWICE DAILY    dexlansoprazole (DEXILANT) 60 mg CpDB capsule (delayed release) Take 60 mg by mouth daily. dicyclomine (BENTYL) 20 mg tablet Take 20 mg by mouth every six (6) hours. estradiol (ESTRACE) 0.01 % (0.1 mg/gram) vaginal cream     levothyroxine (SYNTHROID) 50 mcg tablet Take 50 mcg by mouth Daily (before breakfast). cyanocobalamin (VITAMIN B-12) 1,000 mcg/mL injection 1,000 mcg by IntraMUSCular route every thirty (30) days. Calcium-Cholecalciferol, D3, (CALCIUM 600 WITH VITAMIN D3) 600 mg(1,500mg) -400 unit cap Take 2 Tablets by mouth daily. multivitamin (ONE A DAY) tablet Take 1 Tab by mouth daily. No current facility-administered medications for this visit. SYSTEM REVIEW NOT RELATED TO LIVER DISEASE OR REVIEWED ABOVE:  Constitution systems: Negative for fever, chills, weight gain, weight loss. Eyes: Negative for visual changes. ENT: Negative for sore throat, painful swallowing. Respiratory: Negative for cough, hemoptysis, SOB. Cardiology: Negative for chest pain, palpitations. GI:  Negative for constipation or diarrhea.   : Negative for urinary frequency, dysuria, hematuria, nocturia. Skin: Negative for rash. Hematology: Negative for easy bruising, blood clots. Musculo-skeletal: Negative for back pain, muscle pain, weakness. Neurologic: Negative for headaches, dizziness, vertigo, memory problems not related to HE. Psychology: Negative for anxiety, depression. FAMILY HISTORY:  The father  Has/had the following following chronic disease(s): cancer. The mother Has/had the following chronic disease(s): MI.    There is no family history of liver disease. SOCIAL HISTORY:  The patient is . The patient has 4 children,   The patient has never used tobacco products. The patient has never consumed significant amounts of alcohol. The patient currently receiving disability. PHYSICAL EXAMINATION PERFORMED BY VIRTUAL TELEHEALTH:  VS: Not performed   General: No acute distress. Eyes: Sclera anicteric. ENT: No oral lesions. Skin: No rashes. spider angiomata. No jaundice. Abdomen: No obvious distention suggesting ascites. Extremities: No edema. No muscle wasting. Neurologic: Alert and oriented. Cranial nerves grossly intact.     LABORATORY STUDIES:  From: 8/30/2022   AST/ALT/ALP/T Bili/ALB:70/74/154/0.1/3.3  WBC/HB/PLT:3.4/11.2/179  NA/BUN/CREAT:137/12/0.66    Liver [place] of [address] [place] [address] Ref Rng & Units 5/8/2022 3/12/2022   WBC 3.6 - 11.0 K/uL 3.9 5.4   ANC 1.8 - 8.0 K/UL 3.1 3.1   HGB 11.5 - 16.0 g/dL 12.1 12.3    - 400 K/uL 251 239   INR 0.9 - 1.1       AST 15 - 37 U/L 82 (H) 48 (H)   ALT 12 - 78 U/L 111 (H) 60 (H)   Alk Phos 45 - 117 U/L 180 (H) 142 (H)   Bili, Total 0.2 - 1.0 mg/dL 0.2 <0.2   Bili, Direct 0.00 - 0.40 mg/dL  <0.10   Albumin 3.5 - 5.0 g/dL 3.4 (L) 4.3   BUN 6 - 20 mg/dL 13 13   Creat 0.55 - 1.02 mg/dL 0.72 0.94   Na 136 - 145 mmol/L 142 139   K 3.5 - 5.1 mmol/L 4.0 4.0   Cl 97 - 108 mmol/L 113 (H) 104   CO2 21 - 32 mmol/L 25 20   Glucose 65 - 100 mg/dL 94 87 2022. IGG4 10, GGT 54    Laboratory testing from 2022 reviewed in detail. Additional testing included to evaluate progression or regression of disease. Laboratory testing results from today will be communicated by My Chart. SEROLOGIES:  Serologies Latest Ref Rng & Units 2019   Hep A Ab, Total Negative Negative   Hep B Surface Ag Negative Negative   Hep B Core Ab, Total Negative Negative   Hep B Surface AB QL  Non Reactive   Hep C Ab 0.0 - 0.9 s/co ratio <0.1   Ferritin 15 - 150 ng/mL 14 (L)   Iron % Saturation 15 - 55 % 12 (L)   GULSHAN, IFA  Negative   ASMCA 0 - 19 Units 6   M2 Ab 0.0 - 20.0 Units <20.0   Ceruloplasmin 19.0 - 39.0 mg/dL 23.2   Alpha-1 antitrypsin level 90 - 200 mg/dL 99     Serologies Latest Ref Rng & Units 2020   C-ANCA Neg:<1:20 titer <1:20   P-ANCA Neg:<1:20 titer <1:20   ANCA Neg:<1:20 titer <1:20     LIVER HISTOLOGY:  2019. FibroScan performed at [facility]. EkPa was 3.3. IQR/med 9%. . The results suggested a fibrosis level of F0. The CAP score suggests no evidence of fatty liver. 2020. Liver biopsy Slides reviewed by MLS. Sinusoidal dilation. A single periportal granuloma. No inflammation, No steatosis, No fibrosis. 10/2021. FibroScan performed at [facility]. EkPa was 3.7. IQR/med 5%. . The results suggested a fibrosis level of F0. The CAP score suggests there is no significant hepatic steatosis. ENDOSCOPIC PROCEDURES:  Not available or performed    RADIOLOGY:  2019. Ultrasound of liver. Normal appearing liver. No liver mass lesions. 2020. MRI/MRCP of abdomen. Normal appearance of the liver and biliary tree status post cholecystectomy. No evidence of primary sclerosing cholangitis or biliary pathology. 2021. Ultrasound of liver. Mild intrahepatic dilatation within normal limits post Cholecystectomy. Hepatic mass unchanged. Normal appearing liver. OTHER TESTIN2022.   2022. CTA. Acute PE. PHTN     FOLLOW-UP AFTER VIRTUAL VISIT:  Pursuant to the emergency declaration under the [address] waiver authority and the [employer] Act, this Virtual  Visit was conducted, with the patient's (and/or their legal guardian's) consent, to reduce the patient's risk of exposure to COVID-19 and provide necessary medical care. Services were provided through a video synchronous discussion virtually to substitute for an in-person clinic visit. The patient was located in their home. The provider was located in the [place] [address] office. All of the issues listed above in the Assessment and Plan were discussed with the patient. All questions were answered. The patient expressed a clear understanding of the above. Because of the COVID-19 epidemic a follow-up appointment will be performed via TeleHealth in 3 months. Orders to obtain laboratory testing will be mailed to the patient and obtained 1 week prior to the next TeleHealth or in-person encounter. April BENNY Russo AGPCNP-BC  NataliiaFormerly [facility] 13 of [address] [address] [place], [address] [facility] 22.  [address] [facility]

## 2022-09-07 NOTE — Clinical Note
NOTIFICATION RETURN TO WORK / SCHOOL    9/8/2022 9:07 AM    [phone]      To Whom It May Concern:    Roxanna Lorenzana is currently under the care of [address]. She will return to work/school on: ***    If there are questions or concerns please have the patient contact our office.         Sincerely,      Perla Leong NP

## 2022-09-07 NOTE — PROGRESS NOTES
Identified pt with two pt identifiers(name and ). Reviewed record in preparation for visit and have obtained necessary documentation. Chief Complaint   Patient presents with    Follow-up      There were no vitals filed for this visit. Health Maintenance Review: Patient reminded of \"due or due soon\" health maintenance. I have asked the patient to contact his/her primary care provider (PCP) for follow-up on his/her health maintenance. Coordination of Care Questionnaire:  :   1) Have you been to an emergency room, urgent care, or hospitalized since your last visit? If yes, where when, and reason for visit? yes / shoulder pain      2. Have seen or consulted any other health care provider since your last visit? If yes, where when, and reason for visit? YES/ check-up VV      Patient is accompanied by self I have received verbal consent from Ace Lorenzo to discuss any/all medical information while they are present in the room.

## 2022-09-13 ENCOUNTER — TELEPHONE (OUTPATIENT)
Dept: CARDIOLOGY CLINIC | Age: 52
End: 2022-09-13

## 2022-09-13 DIAGNOSIS — I42.8 NICM (NONISCHEMIC CARDIOMYOPATHY) (HCC): ICD-10-CM

## 2022-09-13 DIAGNOSIS — R07.2 PRECORDIAL CHEST PAIN: Primary | ICD-10-CM

## 2022-09-13 DIAGNOSIS — I50.42 CHRONIC COMBINED SYSTOLIC AND DIASTOLIC CONGESTIVE HEART FAILURE (HCC): ICD-10-CM

## 2022-09-13 NOTE — TELEPHONE ENCOUNTER
Sab patient    Please call patient and let her know that I reviewed her recent admission to the emergency room for chest pain and I would recommend that she undergo a Lexiscan nuclear stress test to look for blocked arteries in her heart. I would like to go ahead and schedule this even though I know she probably will not have it done before her appointment with me on September 28. I would still like her to come to the appointment on September 28 but I want to go ahead and get her a spot for the stress test now if she is agreeable.     If she is agreeable please place order for lexiscan nuclear stress test.    Future Appointments   Date Time Provider Alicja Jimenez   9/28/2022  9:00 AM Terry Santiago NP [facility] AM[facility]   1/23/2023  3:30 PM Perla Russo NP [facility] AMB   2/7/2023 11:00 AM Vishnu Zamorano MD [facility]

## 2022-09-14 DIAGNOSIS — I50.42 CHRONIC COMBINED SYSTOLIC AND DIASTOLIC CONGESTIVE HEART FAILURE (HCC): ICD-10-CM

## 2022-09-14 DIAGNOSIS — I42.8 NICM (NONISCHEMIC CARDIOMYOPATHY) (HCC): ICD-10-CM

## 2022-09-16 NOTE — TELEPHONE ENCOUNTER
Attempted to reach patient by telephone. A message was left for return call.      Future Appointments   Date Time Provider [facility]   9/28/2022  9:00 AM Cely Santiago NP [facility]   10/6/2022 10:00 AM LUIS E HINOJOSA BS AMB   12/16/2022 11:20 AM MD AB Willis BS AMB   1/23/2023  3:30 PM Perla Russo NP [facility] AMB   2/7/2023 11:00 AM Vishnu Zamorano MD [facility] AM[facility]

## 2022-09-19 RX ORDER — LOSARTAN POTASSIUM 25 MG/1
TABLET ORAL
Qty: 90 TABLET | Refills: 3 | OUTPATIENT
Start: 2022-09-19

## 2022-09-19 RX ORDER — CARVEDILOL 6.25 MG/1
TABLET ORAL
Qty: 180 TABLET | Refills: 3 | Status: SHIPPED | OUTPATIENT
Start: 2022-09-19 | End: 2022-09-28

## 2022-09-19 NOTE — TELEPHONE ENCOUNTER
Per VO by MD.    Future Appointments   Date Time Provider [facility]   9/28/2022  9:00 AM Rosangela Santiago NP [facility]   10/6/2022 10:00 AM LUIS E HINOJOSA BS AMB   1/23/2023  3:30 PM Perla Russo NP [facility] AMB   2/7/2023 11:00 AM Vishnu Zamorano MD [facility] AMB

## 2022-09-27 NOTE — PROGRESS NOTES
Keiry Green     1970         Date of Visit-9/29/2022   PCP is Tatyana Dos Santos MD   Cardiology: Ama Escamilla. Lona BLANCHARD, [address] [facility] [place]  [facility]  HPI:  Keiry Green is a 45 y.o. female     Fu for cardiomyopathy    Admission at [facility] on 11/1/2019 with chest pain and palpitations, had nonsustained ventricular tachycardia frequent PACs and history of prior EF of 35% but an echo 11/2/2019 showed an EF of 69%    Son has non compaction, she had MRI at [address] [place] [address] that did not show non-compaction  Cardiac MRI 12/27/2019 showed mild reduction LV systolic function EF 98% with mild global hypokinesia some worsening at the apex, no evidence of LV noncompaction but mild apical dyskinesia and prominent trabeculations   Recommended with the abnormal MRI that patient have annual echocardiograms    Unremarkable EP study in February 2020  Echocardiogram at [address] [place] [address] in November 2020 showed normal LV cavity size LVEF of 60 to 65% trivial mitral and tricuspid insufficiency  Cardiology outpatient visit 12/23/2020 at [address] [place] [address] noted cardiomyopathy onset in 2016 and saw Dr. Lakesha Rivera at [facility]   Remote monitoring 12/28/2020 noted some ectopy and 1 undersensing because of pauses artifact. Prior syncope and had ILR placed July 2020  Liver dz-Echo done 1/11/21 showed normal EF with mild dilation of the left atrium. No mention of TR or PASP. Seen March 2021, stable, called in September indicating edema and more SOB, resolved with bumex      Today. ..   She was admitted at [address] [place] [address] last month and diagnosed with acute NELLY PE, partial clots seen in the right lung as well, now on eliquis  She had had COVID prior to this   She has been having a lot of palpitations but is not currently taking coreg due to low BP  She reports a lot of shortness of breath and coughing, coughing preceded PE  Not currently on a diuretic  She was recently in ER with c/o chest pain and is awaiting a stress test that is scheduled for  10/6/22, PE was diagnosed a month ago and will be having lexiscan nuclear stress test  The cough is really bothering her, she says that pulmonary says it's not pulmonary and GI says it's not GI   Not on CT scan at [facility] she has a left thyroid mass of 2cm, she was unaware of this, I asked her to follow up with her PCP about this, I will fax of a copy of the report to her PCP's office for review     ECHO ADULT COMPLETE 05/09/2022 5/9/2022    Interpretation Summary    Left Ventricle: Mildly reduced left ventricular systolic function with a visually estimated EF of 45 - 50%. Left ventricle size is normal. Mildly increased wall thickness. Normal wall motion. Grade I diastolic dysfunction with normal LAP. Tricuspid Valve: Valve structure is normal. Mildly thickened leaflets. Pulmonic Valve: Moderate regurgitation. Signed by: Joey Guerrero MD on 5/9/2022 12:23 PM      Review of Systems   Constitutional:  Negative for fever and weight loss. Respiratory:  Positive for shortness of breath. Cardiovascular:  Positive for palpitations and leg swelling. Negative for chest pain. Gastrointestinal:  Positive for abdominal pain. Negative for blood in stool and nausea. Genitourinary:  Negative for hematuria . Musculoskeletal:  Negative for joint pain. Neurological:  Positive for dizziness and loss of consciousness. Assessment/Plan:    Patient Instructions   Please go to imaging center today for chest xray     Please start Toprol XL 12.5mg (1/2 of a 25mg tab) nightly  Please begin Lasix/furosemide 20mg daily   Please have labs drawn at [facility] on 10/14/22 to check your kidney function     Please check your blood pressure daily in the morning. Keep a written record of your blood pressure readings and bring it to the next appointment.     Please begin wearing compression socks - put them on first thing in the morning and take them off at bedtime    Please follow up with Dr. Mague Herzog about your thyroid mass    Stress Test:    Please arrive 15 minutes prior to your appointment. Wear comfortable clothing and walking or athletic shoes. Do not eat or drink anything, except water, for at least 4 hours prior to your appointment. Avoid tobacco products for at least 12 hours prior to your test.    Do not eat or drink anything containing caffeine, including but not limited to the following: chocolate, regular and decaffeinated coffee, soft drinks, or tea for at least 24 hours prior to your test.    Do not hold your scheduled medications prior to your test.    Your test will be performed on a 1 day protocol. This is determined by your height, weight, and other risk factors. For a 1 day test, please allow for 4 hours in the office that day. 1. Secondary lymphedema  Not currently on any diuretics, advised her to begin lasix 20mg daily   Asked her to follow low sodium diet and begin wearing compression socks  Labs 8/26/22 show K 4.1, Cr 0.7, low proBNP in 10/21    2. Chronic combined systolic and diastolic congestive heart failure ([facility]  She had normal EF on echos up until 5/22, EF 49% on MRI in 2019. Had NSVT with previous EF of 35%  With low BP will start Toprol XL 12.5mg nightly, will not start entresto or ARB yet  Advised her to begin lasix 20mg daily as well  Asked her to have BMP, Mg level and pro-BNP checked in 2 weeks  She will follow up with Dr. Gina Steven in 3 weeks     Key CAD CHF Meds               metoprolol succinate (TOPROL-XL) 25 mg XL tablet (Taking) Take 0.5 Tablets by mouth every evening. furosemide (LASIX) 20 mg tablet (Taking) Take 1 Tablet by mouth daily. apixaban (ELIQUIS) 5 mg tablet (Taking) Take 5 mg by mouth two (2) times a day. nitroglycerin (NITROSTAT) 0.4 mg SL tablet (Taking) DISSOLVE 1 TABLET UNDER THE TONGUE EVERY 5 MINUTES AS  NEEDED FOR CHEST PAIN. MAX  OF 3 TABLETS IN 15 MINUTES. CALL 911 IF PAIN PERSISTS.            Lab Results   Component Value Date/Time    NT pro-BNP 7 10/14/2021 12:30 AM      3. NICM (nonischemic cardiomyopathy) [facility])  Medical management as above    4. Chest pain  Normal Cath in the past but recent ER visit for chest pain   Will proceed with nuclear stress test to assess for ischemia   Could be related to PE as well     5. Vasovagal syncope  Etiology unclear at this time    6. NSVT (nonsustained ventricular tachycardia) (HCC)  Had MRI and loop, will resume beta blocker as above    7. NELLY pulmonary embolism with COVID infection  On Eliquis for anticoagulation  Will proceed with TTE to check EF, right sided function, looked for PA HTN    8. Cough   Recent COVID infection but has been coughing for at least a year   Will check CXR today     9. Palpitations  Will start Toprol XL 12.5mg nightly and reassess at next visit  May need loop monitor to assess rhythm in the future  She will follow up with Dr. Padmini Aiken in 3 weeks        Impression:   1. Chronic combined systolic and diastolic congestive heart failure ([address]    2. Cough    3. NSVT (nonsustained ventricular tachycardia) (HCC)    4. Palpitations    5. Secondary lymphedema    6. Chest pain, unspecified type    7. Syncope, unspecified syncope type    8. Acute pulmonary embolism without acute cor pulmonale, unspecified pulmonary embolism type [facility])       Cardiac History:   · Reviewed records cardiology outpatient visit 12/23/2020 [facility] noting cardiomyopathy onset in 2016 had seen Dr. Sandra Maria at [facility]   · Echocardiogram [facility] November 2020 showed normal LV cavity size LVEF of 60 to 65% trivial mitral and tricuspid insufficiency  · Cardiac MRI 12/27/2019 showed mild reduction LV systolic function EF 80% with mild global hypokinesia some worsening at the apex  · The assessment was that the patient had prior syncope and had an ILR previous fluid overload with peripheral edema resolved after Bumex and recommended with the abnormal MRI that patient have regular echocardiogram yearly.   · Remote monitoring reviewed 12/28/2020 noted some ectopy and 1 undersensing because of pauses artifact. · Reviewed actual echo report of 11/5/2020 to be scanned  · Reviewed OP notation 12/23/2020 of outpatient visit noting admission [facility] on 11/1/2019 with chest pain and palpitations having nonsustained ventricular tachycardia frequent PACs and history of prior EF of 35% but an echo 11/2/2019 showed an EF of 69%; reportedly patient has a son who is 25 also with LV noncompaction ; patient had cardiac MRI 12/27/2019 without evidence of LV noncompaction , she did have  mild apical dyskinesia and prominent trabeculations and an unremarkable EP study in February 2020; loop monitor implanted July 2020;  patient called and was seen on this visit 12/23/2020 noting some edema. Future Appointments   Date Time Provider Alicja Jimenez   10/6/2022 10:00 AM LUIS E HINOJOSA  AMB   10/6/2022 11:30 AM ECHOTWLUIS E WESTBROOK  AMB   10/18/2022 11:40 [facility]   1/23/2023  3:30 PM Perla Russo NP [facility]  AMB   2/7/2023 11:00 AM Madelaine Sutton MD [facility] AMB        Patient Care Team:  Mio Phelps MD as PCP - General (Family Medicine)  Sandra Vicente MD (Surgery General)  Rosie Martinez MD as Referring Provider (Obstetrics & Gynecology)  Sandra Vicente MD (Surgery General)  Madelaine Sutton MD (Cardiovascular Disease Physician)  Princess Montilla MD (Gastroenterology)  Jonathon Welsh NP (Nurse Practitioner)    ROS-except as noted above. . A complete cardiac and respiratory are reviewed and negative except as above     Past Medical History:   Diagnosis Date    Arthritis     pt states knees and back    Asthma     Cardiomyopathy ([address] 2005    EF 35%, echo in 2021 normal EF     Colon polyp     Diabetes ([address]     Dizzy spells     GERD (gastroesophageal reflux disease)     Heart failure ([address] 2005    now combined systolic, diastolic, seen at [facility] 2021- Dr Main Johnson Irritable bowel syndrome     Knee pain, bilateral     Migraines 2011    2-3 week    Neuropathy     pt states of both feet    Seizures (HCC)     petite mals last seizure 2-3 months ago    Thyroid disease     hypothyroid      Social Hx= reports that she has never smoked. She has never used smokeless tobacco. She reports that she does not drink alcohol and does not use drugs. Exam and Labs:  /70   Pulse 86   Resp 16   Ht 5' 5\" (1.651 m)   Wt 168 lb (76.2 kg)   SpO2 96%   BMI 27.96 kg/m² Constitutional:  NAD, comfortable  Head: NC,AT. Eyes: No scleral icterus. Neck:  Neck supple. No JVD present. Throat: moist mucous membranes. Chest: Effort normal & normal respiratory excursion . Neurological: alert, conversant and oriented . Skin: Skin is not cold. No obvious systemic rash noted. Not diaphoretic. No erythema. Psychiatric:  Grossly normal mood and affect. Behavior appears normal. Extremities:  no clubbing or cyanosis. Abdomen: non distended    Lungs:breath sounds normal. No stridor. distress, wheezes or rales   Heart: normal rate, regular rhythm, normal S1, S2, no murmurs, rubs, clicks or gallops , PMI non displaced.     Edema: Edema is trace-1+ bilaterally     No results found for: CHOL, CHOLX, CHLST, CHOLV, HDL, HDLP, LDL, LDLC, DLDLP, TGLX, TRIGL, TRIGP, CHHD, CHHDX  Lab Results   Component Value Date/Time    Sodium 138 08/26/2022 07:47 PM    Potassium 4.1 08/26/2022 07:47 PM    Chloride 109 (H) 08/26/2022 07:47 PM    CO2 24 08/26/2022 07:47 PM    Anion gap 5 08/26/2022 07:47 PM    Glucose 77 08/26/2022 07:47 PM    BUN 11 08/26/2022 07:47 PM    Creatinine 0.72 08/26/2022 07:47 PM    BUN/Creatinine ratio 15 08/26/2022 07:47 PM    GFR est AA >60 08/26/2022 07:47 PM    GFR est non-AA >60 08/26/2022 07:47 PM    Calcium 8.4 (L) 08/26/2022 07:47 PM      Wt Readings from Last 3 Encounters:   09/28/22 168 lb (76.2 kg)   08/26/22 165 lb (74.8 kg)   08/08/22 162 lb (73.5 kg)      BP Readings from Last 3 Encounters:   09/28/22 110/70   08/26/22 99/70   08/08/22 (!) 94/58      Current Outpatient Medications   Medication Sig    metoprolol succinate (TOPROL-XL) 25 mg XL tablet Take 0.5 Tablets by mouth every evening. furosemide (LASIX) 20 mg tablet Take 1 Tablet by mouth daily. apixaban (ELIQUIS) 5 mg tablet Take 5 mg by mouth two (2) times a day. nitroglycerin (NITROSTAT) 0.4 mg SL tablet DISSOLVE 1 TABLET UNDER THE TONGUE EVERY 5 MINUTES AS  NEEDED FOR CHEST PAIN. MAX  OF 3 TABLETS IN 15 MINUTES. CALL 911 IF PAIN PERSISTS. lacosamide (Vimpat) 200 mg tab tablet Take 1 Tablet by mouth two (2) times a day. Max Daily Amount: 400 mg.    levETIRAcetam (KEPPRA) 500 mg tablet Take 1 Tablet by mouth two (2) times a day. (Patient taking differently: Take 750 mg by mouth two (2) times a day.)    escitalopram oxalate (LEXAPRO) 10 mg tablet Take 20 mg by mouth daily. latanoprost (XALATAN) 0.005 % ophthalmic solution INSTILL 1 DROP INTO EACH EYE AT NIGHT    TENS units and TENS electrodes (TENS UNIT AND ELECTRODES) Tens Unit, 1 ea, Dispense: 1 EA, Refills: 0, Easyscript, Maintenance, 0    budesonide (PULMICORT) 0.5 mg/2 mL nbsp USE 1 VIAL IN NEBULIZER TWICE DAILY    docusate sodium (COLACE) 100 mg capsule Take 100 mg by mouth two (2) times a day. Ferrex 150 150 mg iron capsule     Linzess 145 mcg cap capsule Take 145 mcg by mouth daily. cetirizine (ZYRTEC) 10 mg tablet Take 10 mg by mouth daily. metFORMIN (GLUCOPHAGE) 500 mg tablet Take  by mouth two (2) times daily (with meals). BD Luer-Narda Syringe 3 mL 25 gauge x 1\" syrg USE FOR B12 INJECTIONS    RABEprazole (ACIPHEX) 20 mg TbEC Take 20 mg by mouth two (2) times a day. rizatriptan (MAXALT) 5 mg tablet Take 5 mg by mouth once as needed for Migraine. May repeat in 2 hours if needed     tiotropium (SPIRIVA) 18 mcg inhalation capsule Take 1 Cap by inhalation daily. montelukast (SINGULAIR) 10 mg tablet Take 10 mg by mouth daily.     nortriptyline (PAMELOR) 50 mg capsule Take 100 mg by mouth nightly. topiramate (TOPAMAX) 100 mg tablet Take 100 mg by mouth two (2) times a day. VENTOLIN HFA 90 mcg/actuation inhaler Take 2 Puffs by inhalation every four (4) hours as needed. SYMBICORT 80-4.5 mcg/actuation HFAA INHALE 2 PUFFS BY MOUTH TWICE DAILY    dexlansoprazole (DEXILANT) 60 mg CpDB capsule (delayed release) Take 60 mg by mouth daily. dicyclomine (BENTYL) 20 mg tablet Take 20 mg by mouth every six (6) hours. levothyroxine (SYNTHROID) 50 mcg tablet Take 50 mcg by mouth Daily (before breakfast). cyanocobalamin (VITAMIN B12) 1,000 mcg/mL injection 1,000 mcg by IntraMUSCular route every thirty (30) days. Calcium-Cholecalciferol, D3, 600 mg-10 mcg (400 unit) cap Take 2 Tablets by mouth daily. multivitamin (ONE A DAY) tablet Take 1 Tab by mouth daily. famotidine (PEPCID) 40 mg tablet     gabapentin (NEURONTIN) 300 mg capsule Take 300 mg by mouth three (3) times daily. alum-mag hydroxide-simeth-lidocaine-sucralfate Take 30 mL by mouth daily. No current facility-administered medications for this visit.      JOHN Rose, [address] [facility]  [facility]  [address], [address] [facility] [address]  [address] [facility], [address] [place] [address]  (x) [phone] (kul) [phone]

## 2022-09-28 ENCOUNTER — OFFICE VISIT (OUTPATIENT)
Dept: CARDIOLOGY CLINIC | Age: 52
End: 2022-09-28
Payer: MEDICARE

## 2022-09-28 ENCOUNTER — HOSPITAL ENCOUNTER (OUTPATIENT)
Dept: GENERAL RADIOLOGY | Age: 52
Discharge: HOME | End: 2022-09-28
Attending: NURSE PRACTITIONER
Payer: MEDICARE

## 2022-09-28 VITALS
OXYGEN SATURATION: 96 % | HEIGHT: 65 IN | DIASTOLIC BLOOD PRESSURE: 70 MMHG | BODY MASS INDEX: 27.99 KG/M2 | SYSTOLIC BLOOD PRESSURE: 110 MMHG | HEART RATE: 86 BPM | WEIGHT: 168 LBS | RESPIRATION RATE: 16 BRPM

## 2022-09-28 DIAGNOSIS — R55 SYNCOPE, UNSPECIFIED SYNCOPE TYPE: ICD-10-CM

## 2022-09-28 DIAGNOSIS — I26.99 ACUTE PULMONARY EMBOLISM WITHOUT ACUTE COR PULMONALE, UNSPECIFIED PULMONARY EMBOLISM TYPE (HCC): ICD-10-CM

## 2022-09-28 DIAGNOSIS — I89.0 SECONDARY LYMPHEDEMA: ICD-10-CM

## 2022-09-28 DIAGNOSIS — R05.9 COUGH: ICD-10-CM

## 2022-09-28 DIAGNOSIS — I50.42 CHRONIC COMBINED SYSTOLIC AND DIASTOLIC CONGESTIVE HEART FAILURE (HCC): Primary | ICD-10-CM

## 2022-09-28 DIAGNOSIS — I47.29 NSVT (NONSUSTAINED VENTRICULAR TACHYCARDIA): ICD-10-CM

## 2022-09-28 DIAGNOSIS — R07.9 CHEST PAIN, UNSPECIFIED TYPE: ICD-10-CM

## 2022-09-28 DIAGNOSIS — R00.2 PALPITATIONS: ICD-10-CM

## 2022-09-28 PROCEDURE — 99214 OFFICE O/P EST MOD 30 MIN: CPT | Performed by: NURSE PRACTITIONER

## 2022-09-28 PROCEDURE — 3017F COLORECTAL CA SCREEN DOC REV: CPT | Performed by: NURSE PRACTITIONER

## 2022-09-28 PROCEDURE — G8427 DOCREV CUR MEDS BY ELIG CLIN: HCPCS | Performed by: NURSE PRACTITIONER

## 2022-09-28 PROCEDURE — G9899 SCRN MAM PERF RSLTS DOC: HCPCS | Performed by: NURSE PRACTITIONER

## 2022-09-28 PROCEDURE — G8432 DEP SCR NOT DOC, RNG: HCPCS | Performed by: NURSE PRACTITIONER

## 2022-09-28 PROCEDURE — G8417 CALC BMI ABV UP PARAM F/U: HCPCS | Performed by: NURSE PRACTITIONER

## 2022-09-28 PROCEDURE — 71046 X-RAY EXAM CHEST 2 VIEWS: CPT

## 2022-09-28 RX ORDER — GABAPENTIN 300 MG/1
300 CAPSULE ORAL 3 TIMES DAILY
COMMUNITY
Start: 2022-09-09

## 2022-09-28 RX ORDER — FUROSEMIDE 20 MG/1
20 TABLET ORAL DAILY
Qty: 30 TABLET | Refills: 0 | Status: SHIPPED | OUTPATIENT
Start: 2022-09-28 | End: 2022-10-24

## 2022-09-28 RX ORDER — METOPROLOL SUCCINATE 25 MG/1
12.5 TABLET, EXTENDED RELEASE ORAL EVERY EVENING
Qty: 15 TABLET | Refills: 1 | Status: SHIPPED | OUTPATIENT
Start: 2022-09-28

## 2022-09-28 RX ORDER — FAMOTIDINE 40 MG/1
TABLET, FILM COATED ORAL
COMMUNITY
Start: 2022-08-12

## 2022-09-28 NOTE — PATIENT INSTRUCTIONS
Please go to imaging center today for chest xray     Please start Toprol XL 12.5mg (1/2 of a 25mg tab) nightly  Please begin Lasix/furosemide 20mg daily   Please have labs drawn at [facility] on 10/14/22 to check your kidney function     Please check your blood pressure daily in the morning. Keep a written record of your blood pressure readings and bring it to the next appointment. Please begin wearing compression socks - put them on first thing in the morning and take them off at bedtime    Please follow up with Dr. Tiff Will about your thyroid mass    Stress Test:    Please arrive 15 minutes prior to your appointment. Wear comfortable clothing and walking or athletic shoes. Do not eat or drink anything, except water, for at least 4 hours prior to your appointment. Avoid tobacco products for at least 12 hours prior to your test.    Do not eat or drink anything containing caffeine, including but not limited to the following: chocolate, regular and decaffeinated coffee, soft drinks, or tea for at least 24 hours prior to your test.    Do not hold your scheduled medications prior to your test.    Your test will be performed on a 1 day protocol. This is determined by your height, weight, and other risk factors. For a 1 day test, please allow for 4 hours in the office that day.

## 2022-09-28 NOTE — PROGRESS NOTES
CXR does not show any fluid or infection. Please fax a copy to her PCP for review regarding gaseous distention of sigmoid colon.

## 2022-09-29 ENCOUNTER — TELEPHONE (OUTPATIENT)
Dept: CARDIOLOGY CLINIC | Age: 52
End: 2022-09-29

## 2022-09-29 ENCOUNTER — DOCUMENTATION ONLY (OUTPATIENT)
Dept: CARDIOLOGY CLINIC | Age: 52
End: 2022-09-29

## 2022-09-29 NOTE — TELEPHONE ENCOUNTER
I called and spoke with the patient, two identifiers verified. PCP, Dr. Grant Chowdary confirmed by patient. I have notified her that I will be faxing the result of her CT head, neck, and cervical spine done at [facility] on 08/28/2022. She verbalized understanding. Fax number verified by  via phone call at Dr. Hillary Townsend office: [phone]    Fax confirmation received.

## 2022-09-29 NOTE — PROGRESS NOTES
Please fax her CT of the head and neck done at [facility] (it's in care everywhere) dated 8/28/22 to her PCP  + thyroid mass, I asked her to follow up with PCP about this

## 2022-09-29 NOTE — PROGRESS NOTES
CT Results of head and neck; and cervical spine done at [facility] on 08/28/2022 faxed to PCP, Dr. Yasmin Langston.

## 2022-10-10 ENCOUNTER — TELEPHONE (OUTPATIENT)
Dept: CARDIOLOGY CLINIC | Age: 52
End: 2022-10-10

## 2022-10-10 NOTE — TELEPHONE ENCOUNTER
Patient called in on today stating since her medication switch during last office visit she has been having palpitations and fatigue and would like to be contacted.     Callback#: [phone]

## 2022-10-12 ENCOUNTER — TRANSCRIBE ORDER (OUTPATIENT)
Dept: SCHEDULING | Age: 52
End: 2022-10-12

## 2022-10-12 DIAGNOSIS — R10.9 STOMACH ACHE: Primary | ICD-10-CM

## 2022-10-14 NOTE — TELEPHONE ENCOUNTER
Verified patient with two types of identifiers. Mrs. Natalie Weber reports an increase in her feelings of palpitations over the last two weeks. She reports she had her labs completed today. She no showed to her nuke & echo, they are r/s for 11-18-22. She reports she cannot remember her home BP and pulse. Advised she continue to monitor her home BP/HR and write it down.  Will update NP.

## 2022-10-14 NOTE — TELEPHONE ENCOUNTER
Increase Toprol XL to 25mg daily  Needs to call us back with BP and heart rate readings  Will await lab results   Nuc and echo scheduled for 11/18      Future Appointments   Date Time Provider Alicja Barbara   11/18/2022 10:00 AM NUCLEARLUIS E BS AMB   11/18/2022  3:45 PM LUIS E JIANG AMB   11/21/2022 11:40 AM Vishnu Zamorano MD [facility] BS AMB   1/23/2023  3:30 PM Perla Russo NP [facility] BS AMB   2/7/2023 11:00 AM Vishnu Zamorano MD [facility] BS AMB

## 2022-10-15 LAB
BUN SERPL-MCNC: 12 MG/DL (ref 6–24)
BUN/CREAT SERPL: 16 (ref 9–23)
CALCIUM SERPL-MCNC: 8.3 MG/DL (ref 8.7–10.2)
CHLORIDE SERPL-SCNC: 110 MMOL/L (ref 96–106)
CO2 SERPL-SCNC: 21 MMOL/L (ref 20–29)
CREAT SERPL-MCNC: 0.74 MG/DL (ref 0.57–1)
EGFR: 96 ML/MIN/1.73
GLUCOSE SERPL-MCNC: 80 MG/DL (ref 70–99)
MAGNESIUM SERPL-MCNC: 2.1 MG/DL (ref 1.6–2.3)
NT-PROBNP SERPL-MCNC: 60 PG/ML (ref 0–249)
POTASSIUM SERPL-SCNC: 3.8 MMOL/L (ref 3.5–5.2)
SODIUM SERPL-SCNC: 142 MMOL/L (ref 134–144)

## 2022-10-17 NOTE — TELEPHONE ENCOUNTER
Verified patient with two types of identifiers. Ms. Maria Luisa Fleming will start to monitor her pulse and blood pressure at home as she has not done this yet. Advised she may increase her metoprolol but she will hold off her now and record her blood pressure and pulse as advised. Confirmed testing on 11-18 and reiterated date, time and instructions for stress test. We discussed her lab results as well. Patient verbalized understanding and will call with any other questions.

## 2022-10-18 NOTE — PROGRESS NOTES
Labs look good, kidney function is normal and potassium is 3.8. Magnesium is normal at 2.1 and proBNP is low indicating that she has very little fluid overload.   Please ask her to discuss her chronic low calcium level with Dr. Adolfo Beavers and fax last 2 sets of labs indicating low calcium level to Dr. Rebecca Jason office

## 2022-10-19 RX ORDER — NITROGLYCERIN 0.4 MG/1
TABLET SUBLINGUAL
Qty: 30 TABLET | Refills: 5 | Status: SHIPPED | OUTPATIENT
Start: 2022-10-19

## 2022-10-19 NOTE — TELEPHONE ENCOUNTER
Per VO by MD.    Future Appointments   Date Time Provider Alicja Jimenez   11/18/2022 10:00 AM LUIS E HINOJOSA BS AMB   11/18/2022  3:45 PM LUIS E JIANG AMB   11/21/2022 11:40 AM Vishnu Zamorano MD [facility] AMB   1/23/2023  3:30 PM Perla Russo NP [facility] BS AMB   2/7/2023 11:00 AM Vishnu Zamorano MD [facility] BS AMB

## 2022-10-24 RX ORDER — FUROSEMIDE 20 MG/1
TABLET ORAL
Qty: 30 TABLET | Refills: 0 | Status: SHIPPED | OUTPATIENT
Start: 2022-10-24 | End: 2022-11-29

## 2022-10-27 ENCOUNTER — HOSPITAL ENCOUNTER (OUTPATIENT)
Dept: CT IMAGING | Age: 52
Discharge: HOME | End: 2022-10-27
Attending: INTERNAL MEDICINE
Payer: MEDICARE

## 2022-10-27 DIAGNOSIS — R10.9 STOMACH ACHE: ICD-10-CM

## 2022-10-27 PROCEDURE — 74011000636 HC RX REV CODE- 636: Performed by: INTERNAL MEDICINE

## 2022-10-27 PROCEDURE — 74177 CT ABD & PELVIS W/CONTRAST: CPT

## 2022-10-27 RX ADMIN — IOPAMIDOL 100 ML: 755 INJECTION, SOLUTION INTRAVENOUS at 10:34

## 2022-11-09 ENCOUNTER — TRANSCRIBE ORDER (OUTPATIENT)
Dept: SCHEDULING | Age: 52
End: 2022-11-09

## 2022-11-09 DIAGNOSIS — R10.9 ABDOMINAL PAIN: Primary | ICD-10-CM

## 2022-11-18 ENCOUNTER — ANCILLARY PROCEDURE (OUTPATIENT)
Dept: CARDIOLOGY CLINIC | Age: 52
End: 2022-11-18
Payer: MEDICARE

## 2022-11-18 VITALS
SYSTOLIC BLOOD PRESSURE: 110 MMHG | BODY MASS INDEX: 27.99 KG/M2 | DIASTOLIC BLOOD PRESSURE: 70 MMHG | HEIGHT: 65 IN | WEIGHT: 168 LBS

## 2022-11-18 DIAGNOSIS — I50.42 CHRONIC COMBINED SYSTOLIC AND DIASTOLIC CONGESTIVE HEART FAILURE (HCC): ICD-10-CM

## 2022-11-18 LAB
ECHO AO ASC DIAM: 3.3 CM
ECHO AO ASCENDING AORTA INDEX: 1.79 CM/M2
ECHO AO ROOT DIAM: 3 CM
ECHO AO ROOT INDEX: 1.63 CM/M2
ECHO AV PEAK GRADIENT: 3 MMHG
ECHO AV PEAK VELOCITY: 0.9 M/S
ECHO AV VELOCITY RATIO: 0.89
ECHO EST RA PRESSURE: 3 MMHG
ECHO LA DIAMETER INDEX: 2.17 CM/M2
ECHO LA DIAMETER: 4 CM
ECHO LA TO AORTIC ROOT RATIO: 1.33
ECHO LA VOL 2C: 67 ML (ref 22–52)
ECHO LA VOL 4C: 68 ML (ref 22–52)
ECHO LA VOL BP: 70 ML (ref 22–52)
ECHO LA VOL/BSA BIPLANE: 38 ML/M2 (ref 16–34)
ECHO LA VOLUME AREA LENGTH: 73 ML
ECHO LA VOLUME INDEX A2C: 36 ML/M2 (ref 16–34)
ECHO LA VOLUME INDEX A4C: 37 ML/M2 (ref 16–34)
ECHO LA VOLUME INDEX AREA LENGTH: 40 ML/M2 (ref 16–34)
ECHO LV E' LATERAL VELOCITY: 8 CM/S
ECHO LV E' SEPTAL VELOCITY: 6 CM/S
ECHO LV EDV A2C: 29 ML
ECHO LV EDV A4C: 106 ML
ECHO LV EDV BP: 91 ML (ref 56–104)
ECHO LV EDV INDEX A4C: 58 ML/M2
ECHO LV EDV INDEX BP: 49 ML/M2
ECHO LV EDV NDEX A2C: 16 ML/M2
ECHO LV EJECTION FRACTION A2C: 62 %
ECHO LV EJECTION FRACTION A4C: 49 %
ECHO LV EJECTION FRACTION BIPLANE: 56 % (ref 55–100)
ECHO LV ESV A2C: 29 ML
ECHO LV ESV A4C: 54 ML
ECHO LV ESV BP: 51 ML (ref 19–49)
ECHO LV ESV INDEX A2C: 16 ML/M2
ECHO LV ESV INDEX A4C: 29 ML/M2
ECHO LV ESV INDEX BP: 28 ML/M2
ECHO LV FRACTIONAL SHORTENING: 36 % (ref 28–44)
ECHO LV INTERNAL DIMENSION DIASTOLE INDEX: 2.72 CM/M2
ECHO LV INTERNAL DIMENSION DIASTOLIC: 5 CM (ref 3.9–5.3)
ECHO LV INTERNAL DIMENSION SYSTOLIC INDEX: 1.74 CM/M2
ECHO LV INTERNAL DIMENSION SYSTOLIC: 3.2 CM
ECHO LV IVSD: 1.2 CM (ref 0.6–0.9)
ECHO LV MASS 2D: 233.7 G (ref 67–162)
ECHO LV MASS INDEX 2D: 127 G/M2 (ref 43–95)
ECHO LV POSTERIOR WALL DIASTOLIC: 1.2 CM (ref 0.6–0.9)
ECHO LV RELATIVE WALL THICKNESS RATIO: 0.48
ECHO LVOT CARDIAC OUTPUT: 3.4 LITER/MINUTE
ECHO LVOT CARDIAC OUTPUT: 3.4 LITER/MINUTE
ECHO LVOT CARDIAC OUTPUT: 3.5 LITER/MINUTE
ECHO LVOT CARDIAC OUTPUT: 3.5 LITER/MINUTE
ECHO LVOT CARDIAC OUTPUT: 3.6 LITER/MINUTE
ECHO LVOT CARDIAC OUTPUT: 3.6 LITER/MINUTE
ECHO LVOT PEAK GRADIENT: 3 MMHG
ECHO LVOT PEAK VELOCITY: 0.8 M/S
ECHO MV A VELOCITY: 0.55 M/S
ECHO MV E DECELERATION TIME (DT): 224.1 MS
ECHO MV E VELOCITY: 0.47 M/S
ECHO MV E/A RATIO: 0.85
ECHO MV E/E' LATERAL: 5.88
ECHO MV E/E' RATIO (AVERAGED): 6.85
ECHO MV E/E' SEPTAL: 7.83
ECHO RA AREA 4C: 12.2 CM2
ECHO RA END SYSTOLIC VOLUME APICAL 4 CHAMBER INDEX BSA: 13 ML/M2
ECHO RA VOLUME AREA LENGTH APICAL 4 CHAMBER: 24 ML
ECHO RA VOLUME: 23 ML
ECHO RIGHT VENTRICULAR SYSTOLIC PRESSURE (RVSP): 19 MMHG
ECHO RV BASAL DIMENSION: 3.7 CM
ECHO RV FREE WALL PEAK S': 15 CM/S
ECHO RV TAPSE: 1.8 CM (ref 1.7–?)
ECHO TV REGURGITANT MAX VELOCITY: 2 M/S
ECHO TV REGURGITANT PEAK GRADIENT: 16 MMHG

## 2022-11-21 ENCOUNTER — OFFICE VISIT (OUTPATIENT)
Dept: CARDIOLOGY CLINIC | Age: 52
End: 2022-11-21
Payer: MEDICARE

## 2022-11-21 VITALS
RESPIRATION RATE: 16 BRPM | WEIGHT: 168 LBS | HEART RATE: 97 BPM | DIASTOLIC BLOOD PRESSURE: 50 MMHG | SYSTOLIC BLOOD PRESSURE: 90 MMHG | BODY MASS INDEX: 27.99 KG/M2 | HEIGHT: 65 IN | OXYGEN SATURATION: 99 %

## 2022-11-21 DIAGNOSIS — I42.8 NICM (NONISCHEMIC CARDIOMYOPATHY) (HCC): Primary | ICD-10-CM

## 2022-11-21 DIAGNOSIS — I47.29 NSVT (NONSUSTAINED VENTRICULAR TACHYCARDIA): ICD-10-CM

## 2022-11-21 DIAGNOSIS — R60.0 PEDAL EDEMA: ICD-10-CM

## 2022-11-21 DIAGNOSIS — R55 VASOVAGAL SYNCOPE: ICD-10-CM

## 2022-11-21 DIAGNOSIS — Z86.711 HISTORY OF PULMONARY EMBOLUS (PE): ICD-10-CM

## 2022-11-21 DIAGNOSIS — R00.2 PALPITATIONS: ICD-10-CM

## 2022-11-21 NOTE — PATIENT INSTRUCTIONS
Please stop your metoprolol for one week. After one week off the metoprolol send Dr. Edson Dang a AdverseEventst message with how you are feeling. Stress Test:    Please arrive 15 minutes prior to your appointment. Wear comfortable clothing and walking or athletic shoes. Do not eat or drink anything, except water, for at least 4 hours prior to your appointment. Avoid tobacco products for at least 12 hours prior to your test.    Do not eat or drink anything containing caffeine, including but not limited to the following: chocolate, regular and decaffeinated coffee, soft drinks, or tea for at least 24 hours prior to your test.    Do hold your scheduled medications prior to your test - do not take you metoprolol 24 hours prior. Your test will be performed on a 1 day protocol. This is determined by your height, weight, and other risk factors. For a 1 day test, please allow for 4 hours in the office that day.     We will see you back in 6 months with Costa Means., BRAD.

## 2022-11-21 NOTE — PROGRESS NOTES
Corina Hendricks     1970       Vishnu Gentile MD, [facility] [place]  Date of Visit-12/4/2022   PCP is Britni Francis, [address] [facility] [place]  [facility]  HPI:  Corina Hendricks is a 45 y.o. female   5 month f/u from [facility] 1237 9/13/21. Fu of  Hx prior cardiomyopathy chronic edema  Cardiomyopathy onset in 2016 had seen Dr. Edith Gutierrez at [facility],  prior EF of 35%  [place] [facility] [place] showed normal LV cavity size LVEF of 60 to 65% trivial mitral and tricuspid insufficiency  Cardiac MRI 12/27/2019 showed mild reduction LV systolic function EF 21% with mild global hypokinesia some worsening at the apex  assessment was that the patient had prior syncope and had an ILR previous fluid overload with peripheral edema resolved after Bumex and recommended with the abnormal MRI that patient have regular echocardiogram yearly. Admission [facility] on 11/1/2019 with chest pain and palpitations having nonsustained ventricular tachycardia frequent PACs    echo 11/2/2019 showed an EF of 69%; reportedly patient has a son who is 25 also with LV noncompaction ; patient had cardiac MRI 12/27/2019 without evidence of LV noncompaction , she did have  mild apical dyskinesia and prominent trabeculations and an unremarkable EP study in February 2020; loop monitor implanted July 2020;    Liver dz-Echo done 1/11/21 showed normal EF with mild dilation of the left atrium. No mention of TR or PASP. Today. .. Seen in Feb OV and increased aldactone due to edema  Ad[place] [facility] [place]  with a PE , seen by Dilma Hernandez NP two months ago   chest x ray and labs ordered BNP is 60   Chest x-ray and ECHO done   echo showed normal EF,unremarkable study    Today she reports the edema has gone. She feels fatigue and shortness of breath which has been persistent. She seen her pulmonologist at [facility] had PFTs and placed on as medication which she does not believe is helping.   She does not feel the diuretics are changed anything. She was scheduled for a stress test but missed 1 appointment and had to be rescheduled because of isotope shortage. Review of Systems   Constitutional:  Negative for fever and weight loss. Respiratory:  Positive for shortness of breath. Cardiovascular:  Positive for palpitations and leg swelling. Negative for chest pain. tachycardia   Gastrointestinal:  Positive for abdominal pain. Negative for blood in stool and nausea. Genitourinary:  Positive for hematuria. Musculoskeletal:  Negative for joint pain. Neurological:  Positive for dizziness and loss of consciousness. Assessment/Plan:    Patient Instructions   Please stop your metoprolol for one week. After one week off the metoprolol send Dr. Alex Sher a [employer] message with how you are feeling. Stress Test:    Please arrive 15 minutes prior to your appointment. Wear comfortable clothing and walking or athletic shoes. Do not eat or drink anything, except water, for at least 4 hours prior to your appointment. Avoid tobacco products for at least 12 hours prior to your test.    Do not eat or drink anything containing caffeine, including but not limited to the following: chocolate, regular and decaffeinated coffee, soft drinks, or tea for at least 24 hours prior to your test.    Do hold your scheduled medications prior to your test - do not take you metoprolol 24 hours prior. Your test will be performed on a 1 day protocol. This is determined by your height, weight, and other risk factors. For a 1 day test, please allow for 4 hours in the office that day. We will see you back in 6 months with Marco Antonio GARVEY NP.        1. NICM (nonischemic cardiomyopathy) ([facility] [address]  Prior EF 2016 around 35% but normal or low normal since then  49% EF MRI 2019.  Normal Cath somewhere in past , but she can not recall when or where-to be complete with her DURÁN will do [facility] stress  No edema, has persistent fatigue and DURÁN  Pulmonary has done PFTs, has recent PE but DURÁN and fatigue predate the PE  Echo now shows good RV and LV fx-  Hard to explain her DURÁN with normal EF, BNP, CXR, PFTs , Hgb 11 on 8/22 and resolution of edema  Will stop beta blocker for one week and have her update us, if that does not change things then stop diuretic for one week    2. NSVT (nonsustained ventricular tachycardia)  NSVT with previous EF of 35%    3. Pedal edema  --has no edema now, on low dose lasix, BNP normal, echo normal EF  -prev on Bumex and aldactone  No longer requiring compression stockings    4. Prior PE  On [facility] , no bleeding  5. Palpitations  Has ILR from [facility]  6. Vasovagal syncope  No recent episodes     Impression:   1. NICM (nonischemic cardiomyopathy) ([address]    2. NSVT (nonsustained ventricular tachycardia)    3. Pedal edema    4. Vasovagal syncope    5. History of pulmonary embolus (PE)    6. Palpitations       Cardiac History:   · Reviewed records cardiology outpatient visit 12/23/2020 [facility] noting cardiomyopathy onset in 2016 had seen Dr. Stephanie Santos at [facility]   · Echocardiogram [facility] November 2020 showed normal LV cavity size LVEF of 60 to 65% trivial mitral and tricuspid insufficiency  · Cardiac MRI 12/27/2019 showed mild reduction LV systolic function EF 24% with mild global hypokinesia some worsening at the apex  · The assessment was that the patient had prior syncope and had an ILR previous fluid overload with peripheral edema resolved after Bumex and recommended with the abnormal MRI that patient have regular echocardiogram yearly. · Remote monitoring reviewed 12/28/2020 noted some ectopy and 1 undersensing because of pauses artifact.   · Reviewed actual echo report of 11/5/2020 to be scanned  · Reviewed OP notation 12/23/2020 of outpatient visit noting admission [facility] on 11/1/2019 with chest pain and palpitations having nonsustained ventricular tachycardia frequent PACs and history of prior EF of 35% but an echo 11/2/2019 showed an EF of 69%; reportedly patient has a son who is 25 also with LV noncompaction ; patient had cardiac MRI 12/27/2019 without evidence of LV noncompaction , she did have  mild apical dyskinesia and prominent trabeculations and an unremarkable EP study in February 2020; loop monitor implanted July 2020;  patient called and was seen on this visit 12/23/2020 noting some edema. Future Appointments   Date Time Provider Alicja Jimenez   1/3/2023 10:00 AM LUIS E HINOJOSA BS AMB   1/23/2023  3:30 PM Perla Russo NP LIVR BS AMB   5/22/2023 11:00 AM BRAD Graham BS AMB        Patient Care Team:  Miranda De La Torre MD as PCP - General (Family Medicine)  Melissa Becerra MD (Surgery General)  Sherrell Alvarez MD as Referring Provider (Obstetrics & Gynecology)  Melissa Becerra MD (Surgery General)  Nevaeh Crawford MD (Cardiovascular Disease Physician)  Marielle Maravilla MD (Gastroenterology)  Fatuma Daniels NP (Nurse Practitioner)    ROS-except as noted above. . A complete cardiac and respiratory are reviewed and negative except as above ;   see supplement sheet, initialed and to be scanned by staff  Past Medical History:   Diagnosis Date    Arthritis     pt states knees and back    Asthma     Cardiomyopathy ([address] 2005    EF 35%, echo in 2021 normal EF     Colon polyp     Diabetes ([address]     Pt states not diabetic but has all the symptoms of it    Dizzy spells     GERD (gastroesophageal reflux disease)     Heart failure ([address] 2005    now combined systolic, diastolic, seen at [facility], [facility] 2021- Dr Sid Last    Irritable bowel syndrome     Knee pain, bilateral     Migraines 2011    2-3 week    Neuropathy     pt states of both feet    Pulmonary embolism ([address]     August 2022    Seizures ([address]     petite mals last seizure 2-3 months ago    Thyroid disease     hypothyroid      Social Hx= reports that she has never smoked.  She has never used smokeless tobacco. She reports that she does not drink alcohol and does not use drugs. Exam and Labs:  BP (!) 90/50   Pulse 97   Resp 16   Ht 5' 5\" (1.651 m)   Wt 168 lb (76.2 kg)   SpO2 99%   BMI 27.96 kg/m² Constitutional:  NAD, comfortable  Head: NC,AT. Eyes: No scleral icterus. Neck:  Neck supple. No JVD present. Throat: moist mucous membranes. Chest: Effort normal & normal respiratory excursion . Neurological: alert, conversant and oriented . Skin: Skin is not cold. No obvious systemic rash noted. Not diaphoretic. No erythema. Psychiatric:  Grossly normal mood and affect. Behavior appears normal. Extremities:  no clubbing or cyanosis. Abdomen: non distended    Lungs:breath sounds normal. No stridor. distress, wheezes or  Rales. Heart: normal rate, regular rhythm, normal S1, S2, no murmurs, rubs, clicks or gallops , PMI non displaced. Edema: Edema is 1+. No results found for: CHOL, CHOLX, CHLST, CHOLV, HDL, HDLP, LDL, LDLC, DLDLP, TGLX, TRIGL, TRIGP, CHHD, CHHDX  Lab Results   Component Value Date/Time    Sodium 142 10/14/2022 02:22 PM    Potassium 3.8 10/14/2022 02:22 PM    Chloride 110 (H) 10/14/2022 02:22 PM    CO2 21 10/14/2022 02:22 PM    Anion gap 5 08/26/2022 07:47 PM    Glucose 80 10/14/2022 02:22 PM    BUN 12 10/14/2022 02:22 PM    Creatinine 0.74 10/14/2022 02:22 PM    BUN/Creatinine ratio 16 10/14/2022 02:22 PM    GFR est AA >60 08/26/2022 07:47 PM    GFR est non-AA >60 08/26/2022 07:47 PM    Calcium 8.3 (L) 10/14/2022 02:22 PM      Wt Readings from Last 3 Encounters:   11/21/22 167 lb (75.8 kg)   11/25/22 168 lb (76.2 kg)   11/21/22 168 lb (76.2 kg)      BP Readings from Last 3 Encounters:   11/28/22 131/80   11/25/22 113/73 11/21/22 (!) 90/50      Current Outpatient Medications   Medication Sig    nitroglycerin (NITROSTAT) 0.4 mg SL tablet DISSOLVE 1 TABLET UNDER THE TONGUE EVERY 5 MINUTES AS  NEEDED FOR CHEST PAIN. MAX  OF 3 TABLETS IN 15 MINUTES. CALL 911 IF PAIN PERSISTS. famotidine (PEPCID) 40 mg tablet Take 40 mg by mouth nightly. apixaban (ELIQUIS) 5 mg tablet Take 5 mg by mouth two (2) times a day. lacosamide (Vimpat) 200 mg tab tablet Take 1 Tablet by mouth two (2) times a day. Max Daily Amount: 400 mg.    levETIRAcetam (KEPPRA) 500 mg tablet Take 1 Tablet by mouth two (2) times a day. (Patient taking differently: Take 750 mg by mouth two (2) times a day.)    escitalopram oxalate (LEXAPRO) 10 mg tablet Take 20 mg by mouth daily. latanoprost (XALATAN) 0.005 % ophthalmic solution INSTILL 1 DROP INTO EACH EYE AT NIGHT    TENS units and TENS electrodes (TENS UNIT AND ELECTRODES) Tens Unit, 1 ea, Dispense: 1 EA, Refills: 0, Easyscript, Maintenance, 0    docusate sodium (COLACE) 100 mg capsule Take 100 mg by mouth two (2) times a day. Linzess 145 mcg cap capsule Take 145 mcg by mouth daily. BD Luer-Narda Syringe 3 mL 25 gauge x 1\" syrg USE FOR B12 INJECTIONS    RABEprazole (ACIPHEX) 20 mg TbEC Take 20 mg by mouth two (2) times a day. rizatriptan (MAXALT) 5 mg tablet Take 5 mg by mouth once as needed for Migraine. May repeat in 2 hours if needed     alum-mag hydroxide-simeth-lidocaine-sucralfate Take 30 mL by mouth daily. montelukast (SINGULAIR) 10 mg tablet Take 10 mg by mouth daily. nortriptyline (PAMELOR) 50 mg capsule Take 100 mg by mouth nightly. topiramate (TOPAMAX) 100 mg tablet Take 100 mg by mouth two (2) times a day. VENTOLIN HFA 90 mcg/actuation inhaler Take 2 Puffs by inhalation every four (4) hours as needed. dexlansoprazole (DEXILANT) 60 mg CpDB capsule (delayed release) Take 60 mg by mouth daily. dicyclomine (BENTYL) 20 mg tablet Take 20 mg by mouth every six (6) hours. levothyroxine (SYNTHROID) 50 mcg tablet Take 50 mcg by mouth Daily (before breakfast). Calcium-Cholecalciferol, D3, 600 mg-10 mcg (400 unit) cap Take 2 Tablets by mouth daily. multivitamin (ONE A DAY) tablet Take 1 Tab by mouth daily.     furosemide (LASIX) 20 mg tablet Take 1 tablet by mouth once daily    metoprolol succinate (TOPROL-XL) 25 mg XL tablet TAKE 1/2 (ONE-HALF) TABLET BY MOUTH IN THE EVENING    sucralfate (Carafate) 100 mg/mL suspension Take 1 g by mouth four (4) times daily. albuterol (PROVENTIL VENTOLIN) 2.5 mg /3 mL (0.083 %) nebu by Nebulization route every four (4) hours as needed for Wheezing.    scopolamine (TRANSDERM-SCOP) 1 mg over 3 days pt3d 1 Patch by TransDERmal route every seventy-two (72) hours. Present on pt at this time, right ear     No current facility-administered medications for this visit. Impression see above.       Written by Lyndon Piña, as dictated by MD Taylor Mathis with the note as Mariama Blair MD

## 2022-11-21 NOTE — Clinical Note
11/21/2022    Patient: Radha Figueroa   YOB: 1970   Date of Visit: 11/21/2022     Concha Casarez MD  [address]  [address] [facility] [address]  Via Fax: [phone]    Dear Concha Casarez MD,      Thank you for referring Ms. Eunice Villatoro to [facility] for evaluation. My notes for this consultation are attached. If you have questions, please do not hesitate to call me. I look forward to following your patient along with you.       Sincerely,    Luisa Rose MD

## 2022-11-25 ENCOUNTER — HOSPITAL ENCOUNTER (EMERGENCY)
Age: 52
Discharge: HOME | End: 2022-11-25
Attending: EMERGENCY MEDICINE
Payer: MEDICARE

## 2022-11-25 ENCOUNTER — TELEPHONE (OUTPATIENT)
Dept: CARDIOLOGY CLINIC | Age: 52
End: 2022-11-25

## 2022-11-25 ENCOUNTER — TRANSCRIBE ORDER (OUTPATIENT)
Dept: REGISTRATION | Age: 52
End: 2022-11-25

## 2022-11-25 ENCOUNTER — HOSPITAL ENCOUNTER (OUTPATIENT)
Dept: NON INVASIVE DIAGNOSTICS | Age: 52
Discharge: HOME | End: 2022-11-25
Payer: MEDICARE

## 2022-11-25 VITALS
SYSTOLIC BLOOD PRESSURE: 113 MMHG | OXYGEN SATURATION: 98 % | BODY MASS INDEX: 27.99 KG/M2 | HEART RATE: 95 BPM | RESPIRATION RATE: 18 BRPM | DIASTOLIC BLOOD PRESSURE: 73 MMHG | HEIGHT: 65 IN | WEIGHT: 168 LBS | TEMPERATURE: 98.4 F

## 2022-11-25 DIAGNOSIS — R52 PAIN: ICD-10-CM

## 2022-11-25 DIAGNOSIS — L60.0 INGROWN TOENAIL: ICD-10-CM

## 2022-11-25 DIAGNOSIS — M79.89 LEG SWELLING: Primary | ICD-10-CM

## 2022-11-25 DIAGNOSIS — R52 PAIN: Primary | ICD-10-CM

## 2022-11-25 PROCEDURE — 93970 EXTREMITY STUDY: CPT

## 2022-11-25 PROCEDURE — 99282 EMERGENCY DEPT VISIT SF MDM: CPT

## 2022-11-25 NOTE — ED TRIAGE NOTES
Patient reports right and left leg swelling since yesterday, right calf bruise, missed a few doses of eliquis, right big to has a hang nail she wants checked out as well.

## 2022-11-25 NOTE — TELEPHONE ENCOUNTER
Patient called. Verified patient's identity with two identifiers. Pt stated she stopped metoprolol per Dr. Jenn Lu recomenedations on 11/21 and now she is having palpitations she wants to report to Dr. Serene Ferrell. Patient also mentioned she is planning to go to urgent care. I asked what for and she said she has had calf bruising and swelling in one calf only. It was painful yesterday, but not really today, she was off eliquis for 2 days due to her daughter being in the hospital, and now she is concerned of clot, so had already decided she is going to go to urgent care before calling us about palpitations. I asked about fatigue and sob, which it seems is why Dr. Serene Ferrell stopped BB and sob also concerning for possible PE if clot. She is having both. I said probably okay to go back on the toprol 12.5 mg for now to help with palpitations, will message Dr. Serene Ferrell, but advised more importantly that she proceed with plan for urgent care to r/o clot. Patient verbalized understanding and denied further questions or concerns. Nurse from urgent care called. Chart discussed. Pt already on eliquis for recent PE (not prescribed by Dr. Serene Ferrell). I let them know pt had only really called us because of palpitations after toprol stopped and mentioned the calf swelling, bruising, and pain that she was planning to go to urgent care for and that we agreed she should get it checked out.

## 2022-11-25 NOTE — ED PROVIDER NOTES
EMERGENCY DEPARTMENT HISTORY AND PHYSICAL EXAM      Date: 11/25/2022  Patient Name: Fahad Yun    History of Presenting Illness     Chief Complaint   Patient presents with    Leg Swelling       History Provided By: Patient    HPI: Fahad Yun, 45 y.o. female with history of PE on eliquis, came to ED concerned about LE swelling. Pt has bruise on right leg and both legs are swollen per pt. She is concerned because she missed two days of her Eliquis. She reports calling Cardiology and they advised her to the ED to rule out clot. She is also complaining of ingrow toe nail big toe right. There are no other complaints, changes, or physical findings at this time.     Past History     Past Medical History:  Past Medical History:   Diagnosis Date    Arthritis     pt states knees and back    Asthma     Cardiomyopathy ([address] 2005    EF 35%, echo in 2021 normal EF     Colon polyp     Diabetes ([address]     Dizzy spells     GERD (gastroesophageal reflux disease)     Heart failure ([address] 2005    now combined systolic, diastolic, seen at [facility], [facility] 2021- Dr Natalia Morataya    Irritable bowel syndrome     Knee pain, bilateral     Migraines 2011    2-3 week    Neuropathy     pt states of both feet    Seizures ([address]     petite mals last seizure 2-3 months ago    Thyroid disease     hypothyroid       Past Surgical History:  Past Surgical History:   Procedure Laterality Date    HX CHOLECYSTECTOMY  5/2006    HX COLONOSCOPY      HX ENDOSCOPY      HX GASTRIC BYPASS  2013    HX HYSTERECTOMY  2007    HX KNEE ARTHROSCOPY Right 05/2021    HX ORTHOPAEDIC Bilateral 2013    CTR    HX ORTHOPAEDIC      right knee procedure 2021    HX OTHER SURGICAL      left axillary lymph node biopsy    HX SMALL BOWEL RESECTION      CA ABDOMEN SURGERY PROC UNLISTED  2008    cholecystectomy    CA APPENDECTOMY      CA CARDIAC SURG PROCEDURE UNLIST      Loop recorder implanted july 2020    CA LAP,CHOLECYSTECTOMY      CA PART REMOVAL COLON W ANASTOMOSIS      ID RESECT SMALL INTEST W ENTEROSTOMY      ID STOMACH SURGERY PROCEDURE UNLISTED         Family History:  Family History   Problem Relation Age of Onset    Hypertension Mother     Heart Disease Mother     Cancer Father         stomach cancer    Hypertension Father     No Known Problems Brother     Asthma Daughter     Asthma Son     Asthma Son     Breast Cancer Paternal Aunt         age unknown       Social History:  Social History     Tobacco Use    Smoking status: Never    Smokeless tobacco: Never   Vaping Use    Vaping Use: Never used   Substance Use Topics    Alcohol use: No    Drug use: No       Allergies: Allergies   Allergen Reactions    Acetaminophen Other (comments)     Advised not to take due to Liver Issues    Morphine Other (comments)     SOB/Chest Pressure - in hosp for a week. States DILAUDID Ok    Oxycodone Shortness of Breath     Reaction Type: Allergy; Severity: Severe    Shellfish Derived Anaphylaxis    Codeine Other (comments)     SOB chest pain    Peanut Oil Other (comments)     Reaction Type: Intolerance; Severity: Severe    Sulfa (Sulfonamide Antibiotics) Rash     itching       PCP: Julianna Lucero MD    No current facility-administered medications on file prior to encounter. Current Outpatient Medications on File Prior to Encounter   Medication Sig Dispense Refill    furosemide (LASIX) 20 mg tablet Take 1 tablet by mouth once daily 30 Tablet 0    nitroglycerin (NITROSTAT) 0.4 mg SL tablet DISSOLVE 1 TABLET UNDER THE TONGUE EVERY 5 MINUTES AS  NEEDED FOR CHEST PAIN. MAX  OF 3 TABLETS IN 15 MINUTES. CALL 911 IF PAIN PERSISTS. 30 Tablet 5    famotidine (PEPCID) 40 mg tablet       gabapentin (NEURONTIN) 300 mg capsule Take 300 mg by mouth three (3) times daily. metoprolol succinate (TOPROL-XL) 25 mg XL tablet Take 0.5 Tablets by mouth every evening. 15 Tablet 1    apixaban (ELIQUIS) 5 mg tablet Take 5 mg by mouth two (2) times a day.       lacosamide (Vimpat) 200 mg tab tablet Take 1 Tablet by mouth two (2) times a day. Max Daily Amount: 400 mg. 30 Tablet 0    levETIRAcetam (KEPPRA) 500 mg tablet Take 1 Tablet by mouth two (2) times a day. (Patient taking differently: Take 750 mg by mouth two (2) times a day.) 30 Tablet 2    escitalopram oxalate (LEXAPRO) 10 mg tablet Take 20 mg by mouth daily. latanoprost (XALATAN) 0.005 % ophthalmic solution INSTILL 1 DROP INTO EACH EYE AT NIGHT      TENS units and TENS electrodes (TENS UNIT AND ELECTRODES) Tens Unit, 1 ea, Dispense: 1 EA, Refills: 0, Easyscript, Maintenance, 0      budesonide (PULMICORT) 0.5 mg/2 mL nbsp USE 1 VIAL IN NEBULIZER TWICE DAILY      docusate sodium (COLACE) 100 mg capsule Take 100 mg by mouth two (2) times a day. Ferrex 150 150 mg iron capsule       Linzess 145 mcg cap capsule Take 145 mcg by mouth daily. cetirizine (ZYRTEC) 10 mg tablet Take 10 mg by mouth daily. metFORMIN (GLUCOPHAGE) 500 mg tablet Take  by mouth two (2) times daily (with meals). BD Luer-Narda Syringe 3 mL 25 gauge x 1\" syrg USE FOR B12 INJECTIONS      RABEprazole (ACIPHEX) 20 mg TbEC Take 20 mg by mouth two (2) times a day. rizatriptan (MAXALT) 5 mg tablet Take 5 mg by mouth once as needed for Migraine. May repeat in 2 hours if needed       alum-mag hydroxide-simeth-lidocaine-sucralfate Take 30 mL by mouth daily. tiotropium (SPIRIVA) 18 mcg inhalation capsule Take 1 Cap by inhalation daily. montelukast (SINGULAIR) 10 mg tablet Take 10 mg by mouth daily. 3    nortriptyline (PAMELOR) 50 mg capsule Take 100 mg by mouth nightly. 5    topiramate (TOPAMAX) 100 mg tablet Take 100 mg by mouth two (2) times a day.  5    VENTOLIN HFA 90 mcg/actuation inhaler Take 2 Puffs by inhalation every four (4) hours as needed.  4    SYMBICORT 80-4.5 mcg/actuation HFAA INHALE 2 PUFFS BY MOUTH TWICE DAILY  4    dexlansoprazole (DEXILANT) 60 mg CpDB capsule (delayed release) Take 60 mg by mouth daily.       dicyclomine (BENTYL) 20 mg tablet Take 20 mg by mouth every six (6) hours. levothyroxine (SYNTHROID) 50 mcg tablet Take 50 mcg by mouth Daily (before breakfast). cyanocobalamin (VITAMIN B12) 1,000 mcg/mL injection 1,000 mcg by IntraMUSCular route every thirty (30) days. Calcium-Cholecalciferol, D3, 600 mg-10 mcg (400 unit) cap Take 2 Tablets by mouth daily. multivitamin (ONE A DAY) tablet Take 1 Tab by mouth daily. Review of Systems   Review of Systems   Constitutional:  Negative for activity change, appetite change, fatigue and fever. HENT: Negative. Negative for congestion, rhinorrhea and sore throat. Respiratory: Negative. Negative for cough, shortness of breath and wheezing. Cardiovascular:  Positive for leg swelling. Negative for chest pain. Gastrointestinal: Negative. Negative for abdominal distention, abdominal pain, constipation, diarrhea, nausea and vomiting. Endocrine: Negative. Genitourinary:  Negative for difficulty urinating and dysuria. Musculoskeletal:  Positive for myalgias. Negative for arthralgias and joint swelling. Skin: Negative. Negative for rash. Neurological: Negative. Negative for dizziness, weakness, light-headedness and headaches. Psychiatric/Behavioral: Negative. Physical Exam   Physical Exam  Vitals and nursing note reviewed. Constitutional:       General: She is not in acute distress. Appearance: Normal appearance. She is not ill-appearing or toxic-appearing. HENT:      Head: Normocephalic. Mouth/Throat:      Mouth: Mucous membranes are moist.   Eyes:      Extraocular Movements: Extraocular movements intact. Conjunctiva/sclera: Conjunctivae normal.      Pupils: Pupils are equal, round, and reactive to light. Cardiovascular:      Rate and Rhythm: Normal rate and regular rhythm. Pulses: Normal pulses. Pulmonary:      Effort: Pulmonary effort is normal.      Breath sounds: Normal breath sounds.    Abdominal:      General: Abdomen is flat. Bowel sounds are normal.      Palpations: Abdomen is soft. Tenderness: There is no abdominal tenderness. There is no guarding. Musculoskeletal:         General: No swelling. Normal range of motion. Cervical back: Normal range of motion. Right lower leg: Edema present. Left lower leg: Edema present. Comments:     Pt has ingrown toenail, right great toe without redness or swelling. ttp   Skin:     General: Skin is warm. Findings: Bruising present. No erythema or rash. Neurological:      General: No focal deficit present. Mental Status: She is alert and oriented to person, place, and time. Cranial Nerves: No cranial nerve deficit. Psychiatric:         Mood and Affect: Mood normal.       Lab and Diagnostic Study Results   Labs -   No results found for this or any previous visit (from the past 12 hour(s)). Radiologic Studies -   @lastxrresult@  CT Results  (Last 48 hours)      None          CXR Results  (Last 48 hours)      None            Medical Decision Making and ED Course   Differential Diagnosis & Medical Decision Making Provider Note:   Pt already on Eliquis for PE however missed two doses this week, and resumed today. Will review correspondence she had with her heart doctor as to why they sent her because she is already on therapy for clots and determine need to send for Dopplers. Chart Review: Pt chart review, telephone call to nurse Eden Carmona for Dr Janice Robertson where it states her concerns and that she should come to ED. Next attempted to call Pulmonologist who writes her refills now but did not receive return call despite consult request.    Progress note\" This was discussed with patient. Outpatient doppler ordered since her doctor's office advised her in writing (however when called nurse stated pt stated she was coming anyway so she supported her decision but agreed that treatment would be to resume Eliquis which she already has.     - I am the first provider for this patient. I reviewed the vital signs, available nursing notes, past medical history, past surgical history, family history and social history. The patients presenting problems have been discussed, and they are in agreement with the care plan formulated and outlined with them. I have encouraged them to ask questions as they arise throughout their visit. Vital Signs-Reviewed the patient's vital signs. Patient Vitals for the past 12 hrs:   Temp Pulse Resp BP SpO2   11/25/22 1437 98.4 °F (36.9 °C) 95 18 113/73 98 %       ED Course:          Procedures   Performed by: Sumit Andrews MD  Procedures    Disposition   Disposition: Condition stable    DISCHARGE PLAN:  1. Cannot display discharge medications since this patient is not currently admitted. 2.   Follow-up Information       Follow up With Specialties Details Why Contact Info       CONTINUE ELIQUIS IF POSITIVE AND CONTACT YOUR PULMONOLOGIST HEAD TO [address] [place] [address]    GO TO OUTPATIENT REGISTRATION          3. Return to ED if worse   4. Discharge Medication List as of 11/25/2022  4:05 PM            Diagnosis/Clinical Impression     Clinical Impression:   1. Leg swelling    2. Ingrown toenail        Attestations: I, Sumit Andrews MD, am the primary clinician of record. Please note that this dictation was completed with [employer], the computer voice recognition software. Quite often unanticipated grammatical, syntax, homophones, and other interpretive errors are inadvertently transcribed by the computer software. Please disregard these errors. Please excuse any errors that have escaped final proofreading. Thank you.

## 2022-11-28 ENCOUNTER — APPOINTMENT (OUTPATIENT)
Dept: ENDOSCOPY | Age: 52
End: 2022-11-28
Attending: INTERNAL MEDICINE
Payer: MEDICARE

## 2022-11-28 ENCOUNTER — HOSPITAL ENCOUNTER (OUTPATIENT)
Age: 52
Setting detail: OUTPATIENT SURGERY
Discharge: HOME | End: 2022-11-28
Attending: INTERNAL MEDICINE | Admitting: INTERNAL MEDICINE
Payer: MEDICARE

## 2022-11-28 VITALS
OXYGEN SATURATION: 94 % | TEMPERATURE: 97.2 F | SYSTOLIC BLOOD PRESSURE: 131 MMHG | BODY MASS INDEX: 27.82 KG/M2 | HEIGHT: 65 IN | HEART RATE: 68 BPM | RESPIRATION RATE: 18 BRPM | DIASTOLIC BLOOD PRESSURE: 80 MMHG | WEIGHT: 167 LBS

## 2022-11-28 LAB
GLUCOSE BLD STRIP.AUTO-MCNC: 175 MG/DL (ref 65–100)
GLUCOSE BLD STRIP.AUTO-MCNC: 65 MG/DL (ref 65–100)
GLUCOSE BLD STRIP.AUTO-MCNC: 79 MG/DL (ref 65–100)
PERFORMED BY, TECHID: ABNORMAL
PERFORMED BY, TECHID: NORMAL
PERFORMED BY, TECHID: NORMAL

## 2022-11-28 PROCEDURE — 74011000250 HC RX REV CODE- 250: Performed by: INTERNAL MEDICINE

## 2022-11-28 PROCEDURE — 74011250636 HC RX REV CODE- 250/636: Performed by: INTERNAL MEDICINE

## 2022-11-28 PROCEDURE — 82962 GLUCOSE BLOOD TEST: CPT

## 2022-11-28 PROCEDURE — 2709999900 HC NON-CHARGEABLE SUPPLY: Performed by: INTERNAL MEDICINE

## 2022-11-28 PROCEDURE — 76040000007: Performed by: INTERNAL MEDICINE

## 2022-11-28 RX ORDER — SUCRALFATE 1 G/10ML
1 SUSPENSION ORAL 4 TIMES DAILY
COMMUNITY

## 2022-11-28 RX ORDER — SODIUM CHLORIDE 9 MG/ML
25 INJECTION, SOLUTION INTRAVENOUS CONTINUOUS
Status: DISCONTINUED | OUTPATIENT
Start: 2022-11-28 | End: 2022-11-28 | Stop reason: HOSPADM

## 2022-11-28 RX ORDER — FENTANYL CITRATE 50 UG/ML
INJECTION, SOLUTION INTRAMUSCULAR; INTRAVENOUS AS NEEDED
Status: DISCONTINUED | OUTPATIENT
Start: 2022-11-28 | End: 2022-11-28 | Stop reason: HOSPADM

## 2022-11-28 RX ORDER — SODIUM CHLORIDE, SODIUM LACTATE, POTASSIUM CHLORIDE, CALCIUM CHLORIDE 600; 310; 30; 20 MG/100ML; MG/100ML; MG/100ML; MG/100ML
75 INJECTION, SOLUTION INTRAVENOUS CONTINUOUS
Status: DISCONTINUED | OUTPATIENT
Start: 2022-11-28 | End: 2022-11-28 | Stop reason: HOSPADM

## 2022-11-28 RX ORDER — DEXTROSE MONOHYDRATE 100 MG/ML
250 INJECTION, SOLUTION INTRAVENOUS ONCE
Status: COMPLETED | OUTPATIENT
Start: 2022-11-28 | End: 2022-11-28

## 2022-11-28 RX ORDER — MIDAZOLAM HYDROCHLORIDE 1 MG/ML
INJECTION INTRAMUSCULAR; INTRAVENOUS AS NEEDED
Status: DISCONTINUED | OUTPATIENT
Start: 2022-11-28 | End: 2022-11-28 | Stop reason: HOSPADM

## 2022-11-28 RX ORDER — SCOLOPAMINE TRANSDERMAL SYSTEM 1 MG/1
1 PATCH, EXTENDED RELEASE TRANSDERMAL
COMMUNITY

## 2022-11-28 RX ORDER — ALBUTEROL SULFATE 0.83 MG/ML
SOLUTION RESPIRATORY (INHALATION)
COMMUNITY

## 2022-11-28 RX ADMIN — SODIUM CHLORIDE, POTASSIUM CHLORIDE, SODIUM LACTATE AND CALCIUM CHLORIDE 75 ML/HR: 600; 310; 30; 20 INJECTION, SOLUTION INTRAVENOUS at 09:06

## 2022-11-28 RX ADMIN — DEXTROSE MONOHYDRATE 250 ML: 100 INJECTION, SOLUTION INTRAVENOUS at 09:22

## 2022-11-28 NOTE — PROGRESS NOTES
IV DC'D SITE INTACT NO SWELLING NOTED DISCHARGE INSTRUCTIONS GIVEN TO PT AND PT'S SON HEATHER DOMINGUEZ , BOTH VERBALIZED UNDERSTANDING , NO DISTRESS NOTED

## 2022-11-28 NOTE — PERIOP NOTES
Called to [place], RN in [facility] for Dr. Mita Simpson. Made Dr. Mita Simpson aware blood sugar 65, order received for D50, made aware no D50 available, D10 being hung in place of it.

## 2022-11-29 RX ORDER — METOPROLOL SUCCINATE 25 MG/1
TABLET, EXTENDED RELEASE ORAL
Qty: 15 TABLET | Refills: 0 | Status: SHIPPED | OUTPATIENT
Start: 2022-11-29

## 2022-11-29 RX ORDER — FUROSEMIDE 20 MG/1
TABLET ORAL
Qty: 30 TABLET | Refills: 0 | Status: SHIPPED | OUTPATIENT
Start: 2022-11-29

## 2022-12-01 NOTE — TELEPHONE ENCOUNTER
Thomas Ambriz MD  You 3 days ago     VS  Restart beta blocker and get update on her condition     Future Appointments   1/3/2023   10:00 AM   LUIS E HINOJOSA              BS AMB   1/23/2023  3:30 PM    Perla Russo NP [facility]                BS AMB   5/22/2023  11:00 AM   Pierce Santiago* AB              BS AMB       Verified patient with two types of identifiers. Called to check on Mrs. Grace Sr. She reports she resumed her metoprolol 12.5mg QHS but her SBP has been in the 90's the last couple days. She reports she passed out this morning. She denies hitting her head and has states her BP cuff is broken. Advised she proceed to ED or UC if she lost consciousness for further evaluation.  Will update MD.

## 2022-12-06 NOTE — TELEPHONE ENCOUNTER
Verified patient with two types of identifiers. Ms. Alejandra Urban returned my calls. She reports she passed out again on Sunday. She sounds out of sorts and sleepy. She still reports she has not gotten new batteries for her BP cuff. Again advised she proceed to ED or UC for evaluation.

## 2022-12-06 NOTE — TELEPHONE ENCOUNTER
Attempted to reach patient by telephone yesterday and today. JAQUELIN full unable to leave message. Attempted to reach patient's daughter by telephone. JAQUELIN full.

## 2022-12-13 ENCOUNTER — TELEPHONE (OUTPATIENT)
Dept: CARDIOLOGY CLINIC | Age: 52
End: 2022-12-13

## 2022-12-13 NOTE — TELEPHONE ENCOUNTER
Patient is calling to speak with the nurse in regards to her Lasix ( not on currently ) , she says her legs are swollen and she would like to know if she would be able to get back on the medication. Her BP was 99/54. Please Advise.      [phone]

## 2022-12-14 NOTE — TELEPHONE ENCOUNTER
Rx sent for lasix to her pharmacy last month. She can go ahead and restart it but I need to see her again in 1 month for follow up. Please schedule this with her.

## 2022-12-16 NOTE — TELEPHONE ENCOUNTER
Verified patient with two types of identifiers. Ms. Nicole Armstrong has stopped her metoprolol and lasix. Her swelling has returned and her BP remains low with the last SBP in the 90's. She notes she has not had any further episodes of passing out/seizures. She sounds more alert while speaking with me. Scheduled OV next week. Also let her know I have reached out to [facility] to see if they can interrogate her ILR.  Will update MD.

## 2022-12-28 RX ORDER — NITROGLYCERIN 0.4 MG/1
TABLET SUBLINGUAL
Qty: 100 TABLET | Refills: 3 | OUTPATIENT
Start: 2022-12-28

## 2022-12-28 NOTE — TELEPHONE ENCOUNTER
Refill sent to pharmacy 10/19/2022 for 30 tablets with 5 refills. LOV: 11/21/2022    Future Appointments   Date Time Provider Alicja Barbara   1/3/2023 10:00 AM LUIS E HINOJOSA BS AMB   1/23/2023  3:30 PM Perla Russo NP [facility] BS AMB   5/23/2023 11:20 AM Dayan Santiago NP [facility] BS AMB       Requested Prescriptions     Refused Prescriptions Disp Refills    nitroglycerin (NITROSTAT) 0.4 mg SL tablet [Pharmacy Med Name: Nitroglycerin 0.4 MG Sublingual Tablet Sublingual] 100 Tablet 3     Sig: DISSOLVE 1 TABLET UNDER THE TONGUE EVERY 5 MINUTES AS  NEEDED FOR CHEST PAIN. MAX  OF 3 TABLETS IN 15 MINUTES. CALL 911 IF PAIN PERSISTS.      Refused By: Mazin Carmona     Reason for Refusal: Patient has requested refill too soon

## 2023-01-01 ENCOUNTER — APPOINTMENT (OUTPATIENT)
Dept: CT IMAGING | Age: 53
DRG: 103 | End: 2023-01-01
Attending: FAMILY MEDICINE
Payer: MEDICARE

## 2023-01-01 ENCOUNTER — HOSPITAL ENCOUNTER (EMERGENCY)
Age: 53
Discharge: HOME | DRG: 103 | End: 2023-01-01
Attending: FAMILY MEDICINE
Payer: MEDICARE

## 2023-01-01 VITALS
WEIGHT: 170 LBS | TEMPERATURE: 98.4 F | HEART RATE: 91 BPM | OXYGEN SATURATION: 98 % | RESPIRATION RATE: 18 BRPM | HEIGHT: 65 IN | BODY MASS INDEX: 28.32 KG/M2 | DIASTOLIC BLOOD PRESSURE: 65 MMHG | SYSTOLIC BLOOD PRESSURE: 103 MMHG

## 2023-01-01 DIAGNOSIS — R51.9 NONINTRACTABLE EPISODIC HEADACHE, UNSPECIFIED HEADACHE TYPE: Primary | ICD-10-CM

## 2023-01-01 PROCEDURE — 74011250636 HC RX REV CODE- 250/636: Performed by: FAMILY MEDICINE

## 2023-01-01 PROCEDURE — 70450 CT HEAD/BRAIN W/O DYE: CPT

## 2023-01-01 PROCEDURE — 74011250637 HC RX REV CODE- 250/637: Performed by: FAMILY MEDICINE

## 2023-01-01 RX ORDER — PROCHLORPERAZINE EDISYLATE 5 MG/ML
10 INJECTION INTRAMUSCULAR; INTRAVENOUS
Status: DISCONTINUED | OUTPATIENT
Start: 2023-01-01 | End: 2023-01-01 | Stop reason: HOSPADM

## 2023-01-01 RX ORDER — ERENUMAB-AOOE 140 MG/ML
140 INJECTION, SOLUTION SUBCUTANEOUS ONCE
COMMUNITY

## 2023-01-01 RX ORDER — DIPHENHYDRAMINE HCL 25 MG
25 CAPSULE ORAL
Status: COMPLETED | OUTPATIENT
Start: 2023-01-01 | End: 2023-01-01

## 2023-01-01 RX ADMIN — PROCHLORPERAZINE EDISYLATE 10 MG: 5 INJECTION INTRAMUSCULAR; INTRAVENOUS at 11:48

## 2023-01-01 RX ADMIN — DIPHENHYDRAMINE HYDROCHLORIDE 25 MG: 25 CAPSULE ORAL at 11:45

## 2023-01-01 NOTE — ED NOTES
Pt discharged, son will be picking her up.  Pt verbalized understanding of discharge instructions and to follow up with neurologist

## 2023-01-01 NOTE — ED TRIAGE NOTES
Pt with c/o migraine for a couple of days, takes an injection for migraines once a month and takes topamax, no relief

## 2023-01-01 NOTE — ED PROVIDER NOTES
EMERGENCY DEPARTMENT HISTORY AND PHYSICAL EXAM      Date: 1/1/2023  Patient Name: Alison Smith    History of Presenting Illness     Chief Complaint   Patient presents with    Migraine       History Provided By:     HPI: Alison Smith, is an extremely pleasant 45 y.o. female presenting to the ED with a chief complaint of headache. Patient endorses gradual onset of headache that started 2 days ago patient characterizes pain as achy over front of head. Denies abrupt nor worst headache of life. No identifiable alleviating or aggravating factors. No neck stiffness nor fevers. No visual changes. No temporal pain. No numbness, tingling nor weakness in extremities. Patient declines any recent head nor neck injuries. There are no other complaints, changes, or physical findings at this time. PCP: Gina Galloway MD    No current facility-administered medications on file prior to encounter. Current Outpatient Medications on File Prior to Encounter   Medication Sig Dispense Refill    erenumab-aooe (Aimovig Autoinjector) 140 mg/mL injection 140 mg by SubCUTAneous route once. sucralfate (Carafate) 100 mg/mL suspension Take 1 g by mouth four (4) times daily. albuterol (PROVENTIL VENTOLIN) 2.5 mg /3 mL (0.083 %) nebu by Nebulization route every four (4) hours as needed for Wheezing.      scopolamine (TRANSDERM-SCOP) 1 mg over 3 days pt3d 1 Patch by TransDERmal route every seventy-two (72) hours. Present on pt at this time, right ear      nitroglycerin (NITROSTAT) 0.4 mg SL tablet DISSOLVE 1 TABLET UNDER THE TONGUE EVERY 5 MINUTES AS  NEEDED FOR CHEST PAIN. MAX  OF 3 TABLETS IN 15 MINUTES. CALL 911 IF PAIN PERSISTS. 30 Tablet 5    famotidine (PEPCID) 40 mg tablet Take 40 mg by mouth nightly. apixaban (ELIQUIS) 5 mg tablet Take 5 mg by mouth two (2) times a day. lacosamide (Vimpat) 200 mg tab tablet Take 1 Tablet by mouth two (2) times a day.  Max Daily Amount: 400 mg. 30 Tablet 0 levETIRAcetam (KEPPRA) 500 mg tablet Take 1 Tablet by mouth two (2) times a day. (Patient taking differently: Take 750 mg by mouth two (2) times a day.) 30 Tablet 2    escitalopram oxalate (LEXAPRO) 10 mg tablet Take 20 mg by mouth daily. latanoprost (XALATAN) 0.005 % ophthalmic solution INSTILL 1 DROP INTO EACH EYE AT NIGHT      TENS units and TENS electrodes (TENS UNIT AND ELECTRODES) Tens Unit, 1 ea, Dispense: 1 EA, Refills: 0, Easyscript, Maintenance, 0      docusate sodium (COLACE) 100 mg capsule Take 100 mg by mouth two (2) times a day. Linzess 145 mcg cap capsule Take 145 mcg by mouth daily. BD Luer-Narda Syringe 3 mL 25 gauge x 1\" syrg USE FOR B12 INJECTIONS      RABEprazole (ACIPHEX) 20 mg TbEC Take 20 mg by mouth two (2) times a day. rizatriptan (MAXALT) 5 mg tablet Take 5 mg by mouth once as needed for Migraine. May repeat in 2 hours if needed       alum-mag hydroxide-simeth-lidocaine-sucralfate Take 30 mL by mouth daily. montelukast (SINGULAIR) 10 mg tablet Take 10 mg by mouth daily. 3    nortriptyline (PAMELOR) 50 mg capsule Take 100 mg by mouth nightly. 5    topiramate (TOPAMAX) 100 mg tablet Take 100 mg by mouth two (2) times a day.  5    VENTOLIN HFA 90 mcg/actuation inhaler Take 2 Puffs by inhalation every four (4) hours as needed. 4    dexlansoprazole (DEXILANT) 60 mg CpDB capsule (delayed release) Take 60 mg by mouth daily. dicyclomine (BENTYL) 20 mg tablet Take 20 mg by mouth every six (6) hours. levothyroxine (SYNTHROID) 50 mcg tablet Take 50 mcg by mouth Daily (before breakfast). Calcium-Cholecalciferol, D3, 600 mg-10 mcg (400 unit) cap Take 2 Tablets by mouth daily. multivitamin (ONE A DAY) tablet Take 1 Tab by mouth daily.          Past History     Past Medical History:  Past Medical History:   Diagnosis Date    Arthritis     pt states knees and back    Asthma     Cardiomyopathy ([facility] [address] 2005    EF 35%, echo in 2021 normal EF     Colon polyp Diabetes ([address]     Pt states not diabetic but has all the symptoms of it    Dizzy spells     GERD (gastroesophageal reflux disease)     Heart failure ([address] 2005    now combined systolic, diastolic, seen at [facility] [address], [facility] 2021- Dr Dhruv Diggs    Irritable bowel syndrome     Knee pain, bilateral     Migraines 2011    2-3 week    Neuropathy     pt states of both feet    Pulmonary embolism ([address]     August 2022    Seizures ([facility] [address]     petite mals last seizure 2-3 months ago    Thyroid disease     hypothyroid       Past Surgical History:  Past Surgical History:   Procedure Laterality Date    COLONOSCOPY N/A 11/28/2022    COLONOSCOPY performed by Denice Oakley MD at [facility] ENDOSCOPY    HX CHOLECYSTECTOMY  5/2006    HX COLONOSCOPY      HX ENDOSCOPY      HX GASTRIC BYPASS  2013    HX HYSTERECTOMY  2007    HX KNEE ARTHROSCOPY Right 05/2021    HX ORTHOPAEDIC Bilateral 2013    CTR    HX ORTHOPAEDIC      right knee procedure 2021    HX OTHER SURGICAL      left axillary lymph node biopsy    HX SMALL BOWEL RESECTION      PA APPENDECTOMY      PA COLECTOMY PARTIAL W/ANASTOMOSIS      PA ENTERECTOMY RESCJ SMALL INTESTINE W/ENTEROSTOMY      PA LAPAROSCOPY SURG CHOLECYSTECTOMY      PA UNLISTED PROCEDURE ABDOMEN PERITONEUM & OMENTUM  2008    cholecystectomy    PA UNLISTED PROCEDURE CARDIAC SURGERY      Loop recorder implanted july 2020    PA UNLISTED PROCEDURE STOMACH         Family History:  Family History   Problem Relation Age of Onset    Hypertension Mother     Heart Disease Mother     Cancer Father         stomach cancer    Hypertension Father     No Known Problems Brother     Asthma Daughter     Asthma Son     Asthma Son     Breast Cancer Paternal Aunt         age unknown       Social History:  Social History     Tobacco Use    Smoking status: Never    Smokeless tobacco: Never   Vaping Use    Vaping Use: Never used   Substance Use Topics    Alcohol use: No    Drug use: No       Allergies:   Allergies   Allergen Reactions Acetaminophen Other (comments)     Advised not to take due to Liver Issues    Morphine Other (comments)     SOB/Chest Pressure - in hosp for a week. States DILAUDID Ok    Oxycodone Shortness of Breath     Reaction Type: Allergy; Severity: Severe    Shellfish Derived Anaphylaxis    Codeine Other (comments)     SOB chest pain    Peanut Oil Other (comments)     Reaction Type: Intolerance; Severity: Severe    Sulfa (Sulfonamide Antibiotics) Rash     itching         Review of Systems     Review of Systems   Constitutional: Negative for activity change, appetite change, chills, fatigue and fever. HENT: Negative for congestion and sore throat. Eyes: Negative for photophobia and visual disturbance. Respiratory: Negative for cough, shortness of breath and wheezing. Cardiovascular: Negative for chest pain, palpitations and leg swelling. Gastrointestinal: Negative for abdominal pain, diarrhea, nausea and vomiting. Endocrine: Negative for cold intolerance and heat intolerance. Musculoskeletal: Negative for gait problem and joint swelling. Skin: Negative for color change and rash. Neurological: + Headache. No numbness, tingling nor weakness in extremities. No visual disturbances. No vertigo nor lightheadedness. No syncope. No neck stiffness. Physical Exam     Physical Exam  Constitutional:       Appearance: well-developed. HENT:      Head: Normocephalic and atraumatic. Right Ear: Tympanic membrane, ear canal and external ear normal.      Left Ear: Tympanic membrane, ear canal and external ear normal.      Ears:         Mouth/Throat:      Mouth: Mucous membranes are moist.      Pharynx: Oropharynx is clear. Eyes:      Conjunctiva/sclera: Conjunctivae normal.      Pupils: Pupils are equal, round, and reactive to light. Cardiovascular:      Rate and Rhythm: Normal rate and regular rhythm. Heart sounds: No murmur heard  Pulmonary:      Effort: No respiratory distress.       Breath sounds: No stridor. No wheezing, rhonchi or rales. Abdominal:      General: There is no distension. Tenderness: There is no abdominal tenderness. There is no rebound. Musculoskeletal:      Cervical back: Normal range of motion and neck supple. No neck stiffness/rigidity, negative Brudzinski sign, negative Kernig sign    Skin:     General: Skin is warm and dry. Neurological:      General: No focal neurologic deficits, alert and oriented x4, cranial nerves II through XII grossly intact, no sensory deficits, no weakness in extremities, no pronator drift, no abnormal coordination, no abnormal gait, no abnormal deep tendon reflexes. No motor nor sensory deficits. No nystagmus. Mental Status: alert and oriented to person, place, and time. Psychiatric:         Mood and Affect: Mood normal.         Behavior: Behavior normal.       Lab and Diagnostic Study Results     Labs -   No results found for this or any previous visit (from the past 12 hour(s)). Radiologic Studies -   @lastxrresult@  CT Results  (Last 48 hours)                 01/01/23 1055  CT HEAD WO CONT Final result    Impression:  No acute intracranial process. Narrative:  CLINICAL HISTORY: HA   INDICATION: HA   COMPARISON: 8/8/2022. CT dose reduction was achieved through use of a standardized protocol tailored   for this examination and automatic exposure control for dose modulation. TECHNIQUE: Serial axial images with a collimation of 5 mm were obtained from the   skull base through the vertex     FINDINGS:    The sulci and ventricles are within normal limits for patient age. There is no   evidence of an acute infarction, hemorrhage, or mass-effect. There is no   evidence of midline shift or hydrocephalus. Posterior fossa structures are   unremarkable. No extra-axial collections are seen. Mastoid air cells are well pneumatized and clear. There is no evidence of depressed skull fractures of soft tissue swelling. CXR Results  (Last 48 hours)      None              Medical Decision Making   - I am the first provider for this patient. - I reviewed the vital signs, available nursing notes, past medical history, past surgical history, family history and social history. - Initial assessment performed. The patients presenting problems have been discussed, and they are in agreement with the care plan formulated and outlined with them. I have encouraged them to ask questions as they arise throughout their visit. Vital Signs-Reviewed the patient's vital signs. Patient Vitals for the past 12 hrs:   Temp Pulse Resp BP SpO2   01/01/23 0952 98.4 °F (36.9 °C) 91 18 103/65 98 %         ED Course/ Provider Notes (Medical Decision Making):     Patient presented to the emergency department with a chief complaint of headache. On examination the patient is nontoxic and well-appearing. Vitals were reviewed per above. Pt resents with gradual onset headache; afebrile with stable vitals; exam is without focal deficits. DDx: migraine, tension headache, cluster HA, stress, hypertensive urgency, dehydration. HA was gradual onset, pt neuro intact, no nausea/vomiting/focal weakness/sensory change to suggest CVA or ICH. Also pt is not immunocompromised, no fever, no focal weakness to suggest brain abscess. Therefore, no CT head. No neck stiffness, fever, AMS, photophobia to suggest meningitis. Neg kernig and Brudzinski. Therefore no LP     No jaw claudication, visual changes or temporal pain to suggest temporal arteritis, therefore no ESR/CRP     No eye pain, PERRL, no red eye to suggest glaucoma     No risk factors for CO    Upon reexamining patient they are feeling significantly better. History and exam findings consistent with likely self-limiting headache. Patient was discharged home in stable ambulatory condition feeling much better.         Anant Walls was given a thorough list of signs and symptoms that would warrant an immediate return to the emergency department. Otherwise Sybil Castillo will follow up with PCP. Procedures   Medical Decision Makingedical Decision Making  Performed by: Lisbeth Joe DO  Procedures  None       Disposition   Disposition:     Home     All of the diagnostic tests were reviewed and questions answered. Diagnosis, care plan and treatment options were discussed. The patient understands the instructions and will follow up as directed. The patients results have been reviewed with them. They have been counseled regarding their diagnosis. The patient verbally convey understanding and agreement of the signs, symptoms, diagnosis, treatment and prognosis and additionally agrees to follow up as recommended with their PCP in 24 - 48 hours. They also agree with the care-plan and convey that all of their questions have been answered. I have also put together some discharge instructions for them that include: 1) educational information regarding their diagnosis, 2) how to care for their diagnosis at home, as well a 3) list of reasons why they would want to return to the ED prior to their follow-up appointment, should their condition change. DISCHARGE PLAN:    1. Current Discharge Medication List        CONTINUE these medications which have NOT CHANGED    Details   erenumab-aooe (Aimovig Autoinjector) 140 mg/mL injection 140 mg by SubCUTAneous route once. sucralfate (Carafate) 100 mg/mL suspension Take 1 g by mouth four (4) times daily. albuterol (PROVENTIL VENTOLIN) 2.5 mg /3 mL (0.083 %) nebu by Nebulization route every four (4) hours as needed for Wheezing.      scopolamine (TRANSDERM-SCOP) 1 mg over 3 days pt3d 1 Patch by TransDERmal route every seventy-two (72) hours. Present on pt at this time, right ear      nitroglycerin (NITROSTAT) 0.4 mg SL tablet DISSOLVE 1 TABLET UNDER THE TONGUE EVERY 5 MINUTES AS  NEEDED FOR CHEST PAIN. MAX  OF 3 TABLETS IN 15 MINUTES.  CALL 911 IF PAIN PERSISTS. Qty: 30 Tablet, Refills: 5    Comments: Requesting 1 year supply      famotidine (PEPCID) 40 mg tablet Take 40 mg by mouth nightly. apixaban (ELIQUIS) 5 mg tablet Take 5 mg by mouth two (2) times a day. lacosamide (Vimpat) 200 mg tab tablet Take 1 Tablet by mouth two (2) times a day. Max Daily Amount: 400 mg. Qty: 30 Tablet, Refills: 0    Associated Diagnoses: Carotid sinus syncope      levETIRAcetam (KEPPRA) 500 mg tablet Take 1 Tablet by mouth two (2) times a day. Qty: 30 Tablet, Refills: 2      escitalopram oxalate (LEXAPRO) 10 mg tablet Take 20 mg by mouth daily. latanoprost (XALATAN) 0.005 % ophthalmic solution INSTILL 1 DROP INTO EACH EYE AT NIGHT      TENS units and TENS electrodes (TENS UNIT AND ELECTRODES) Tens Unit, 1 ea, Dispense: 1 EA, Refills: 0, Easyscript, Maintenance, 0      docusate sodium (COLACE) 100 mg capsule Take 100 mg by mouth two (2) times a day. Linzess 145 mcg cap capsule Take 145 mcg by mouth daily. BD Luer-Narda Syringe 3 mL 25 gauge x 1\" syrg USE FOR B12 INJECTIONS      RABEprazole (ACIPHEX) 20 mg TbEC Take 20 mg by mouth two (2) times a day. rizatriptan (MAXALT) 5 mg tablet Take 5 mg by mouth once as needed for Migraine. May repeat in 2 hours if needed       alum-mag hydroxide-simeth-lidocaine-sucralfate Take 30 mL by mouth daily. montelukast (SINGULAIR) 10 mg tablet Take 10 mg by mouth daily. Refills: 3      nortriptyline (PAMELOR) 50 mg capsule Take 100 mg by mouth nightly. Refills: 5      topiramate (TOPAMAX) 100 mg tablet Take 100 mg by mouth two (2) times a day. Refills: 5      VENTOLIN HFA 90 mcg/actuation inhaler Take 2 Puffs by inhalation every four (4) hours as needed. Refills: 4      dexlansoprazole (DEXILANT) 60 mg CpDB capsule (delayed release) Take 60 mg by mouth daily. dicyclomine (BENTYL) 20 mg tablet Take 20 mg by mouth every six (6) hours.       levothyroxine (SYNTHROID) 50 mcg tablet Take 50 mcg by mouth Daily (before breakfast). Calcium-Cholecalciferol, D3, 600 mg-10 mcg (400 unit) cap Take 2 Tablets by mouth daily. multivitamin (ONE A DAY) tablet Take 1 Tab by mouth daily. 2.   Follow-up Information       Follow up With Specialties Details Why Contact Info    Your primary care doctor  Schedule an appointment as soon as possible for a visit in 2 days      [id number] [facility] Neurology Call   [address] [place] [address]  [phone]              3.  Return to ED if worse       4. Current Discharge Medication List            Diagnosis     Clinical Impression:    1. Nonintractable episodic headache, unspecified headache type        Attestations:    Page Carpenter, DO    Please note that this dictation was completed with [employer], the computer voice recognition software. Quite often unanticipated grammatical, syntax, homophones, and other interpretive errors are inadvertently transcribed by the computer software. Please disregard these errors. Please excuse any errors that have escaped final proofreading. Thank you.

## 2023-01-01 NOTE — DISCHARGE INSTRUCTIONS
Thank you! Thank you for allowing me to care for you in the emergency department. It is my goal to provide you with excellent care. If you have not received excellent quality care, please ask to speak to the nurse manager. Please fill out the survey that will come to you by mail or email since we listen to your feedback! Below you will find a list of your tests from today's visit. Should you have any questions, please do not hesitate to call the emergency department. Labs  No results found for this or any previous visit (from the past 12 hour(s)). Radiologic Studies  CT HEAD WO CONT   Final Result   No acute intracranial process. CT Results  (Last 48 hours)                 01/01/23 1055  CT HEAD WO CONT Final result    Impression:  No acute intracranial process. Narrative:  CLINICAL HISTORY: HA   INDICATION: HA   COMPARISON: 8/8/2022. CT dose reduction was achieved through use of a standardized protocol tailored   for this examination and automatic exposure control for dose modulation. TECHNIQUE: Serial axial images with a collimation of 5 mm were obtained from the   skull base through the vertex     FINDINGS:    The sulci and ventricles are within normal limits for patient age. There is no   evidence of an acute infarction, hemorrhage, or mass-effect. There is no   evidence of midline shift or hydrocephalus. Posterior fossa structures are   unremarkable. No extra-axial collections are seen. Mastoid air cells are well pneumatized and clear. There is no evidence of depressed skull fractures of soft tissue swelling. CXR Results  (Last 48 hours)      None          ------------------------------------------------------------------------------------------------------------  The exam and treatment you received in the Emergency Department were for an urgent problem and are not intended as complete care.  It is important that you follow-up with a doctor, nurse practitioner, or physician assistant to:  (1) confirm your diagnosis,  (2) re-evaluation of changes in your illness and treatment, and  (3) for ongoing care. Please take your discharge instructions with you when you go to your follow-up appointment. If you have any problem arranging a follow-up appointment, contact the Emergency Department. If your symptoms become worse or you do not improve as expected and you are unable to reach your health care provider, please return to the Emergency Department. We are available 24 hours a day. If a prescription has been provided, please have it filled as soon as possible to prevent a delay in treatment. If you have any questions or reservations about taking the medication due to side effects or interactions with other medications, please call your primary care provider or contact the ER.

## 2023-01-03 ENCOUNTER — HOSPITAL ENCOUNTER (INPATIENT)
Age: 53
LOS: 2 days | Discharge: HOME HEALTH | DRG: 103 | End: 2023-01-05
Attending: STUDENT IN AN ORGANIZED HEALTH CARE EDUCATION/TRAINING PROGRAM | Admitting: FAMILY MEDICINE
Payer: MEDICARE

## 2023-01-03 ENCOUNTER — APPOINTMENT (OUTPATIENT)
Dept: GENERAL RADIOLOGY | Age: 53
DRG: 103 | End: 2023-01-03
Attending: FAMILY MEDICINE
Payer: MEDICARE

## 2023-01-03 ENCOUNTER — APPOINTMENT (OUTPATIENT)
Dept: CT IMAGING | Age: 53
DRG: 103 | End: 2023-01-03
Attending: STUDENT IN AN ORGANIZED HEALTH CARE EDUCATION/TRAINING PROGRAM
Payer: MEDICARE

## 2023-01-03 DIAGNOSIS — R56.9 SEIZURE (HCC): ICD-10-CM

## 2023-01-03 DIAGNOSIS — R41.82 ALTERED MENTAL STATUS, UNSPECIFIED ALTERED MENTAL STATUS TYPE: Primary | ICD-10-CM

## 2023-01-03 DIAGNOSIS — D70.9 NEUTROPENIA, UNSPECIFIED TYPE (HCC): ICD-10-CM

## 2023-01-03 LAB
ALBUMIN SERPL-MCNC: 3.3 G/DL (ref 3.5–5)
ALBUMIN/GLOB SERPL: 1 (ref 1.1–2.2)
ALP SERPL-CCNC: 171 U/L (ref 45–117)
ALT SERPL-CCNC: 160 U/L (ref 12–78)
AMMONIA PLAS-SCNC: 14 UMOL/L
AMPHET UR QL SCN: NEGATIVE
ANION GAP SERPL CALC-SCNC: 8 MMOL/L (ref 5–15)
APPEARANCE UR: CLEAR
AST SERPL W P-5'-P-CCNC: 148 U/L (ref 15–37)
ATRIAL RATE: 76 BPM
BACTERIA URNS QL MICRO: NEGATIVE /HPF
BARBITURATES UR QL SCN: NEGATIVE
BASOPHILS # BLD: 0 K/UL (ref 0–0.1)
BASOPHILS NFR BLD: 1 % (ref 0–1)
BENZODIAZ UR QL: NEGATIVE
BILIRUB SERPL-MCNC: 0.2 MG/DL (ref 0.2–1)
BILIRUB UR QL: NEGATIVE
BUN SERPL-MCNC: 12 MG/DL (ref 6–20)
BUN/CREAT SERPL: 16 (ref 12–20)
CA-I BLD-MCNC: 8.6 MG/DL (ref 8.5–10.1)
CALCULATED P AXIS, ECG09: 47 DEGREES
CALCULATED R AXIS, ECG10: 35 DEGREES
CALCULATED T AXIS, ECG11: 35 DEGREES
CANNABINOIDS UR QL SCN: NEGATIVE
CHLORIDE SERPL-SCNC: 108 MMOL/L (ref 97–108)
CO2 SERPL-SCNC: 21 MMOL/L (ref 21–32)
COCAINE UR QL SCN: NEGATIVE
COLOR UR: ABNORMAL
CREAT SERPL-MCNC: 0.73 MG/DL (ref 0.55–1.02)
DIAGNOSIS, 93000: NORMAL
DIFFERENTIAL METHOD BLD: ABNORMAL
DRUG SCRN COMMENT,DRGCM: NORMAL
EOSINOPHIL # BLD: 0.1 K/UL (ref 0–0.4)
EOSINOPHIL NFR BLD: 5 % (ref 0–7)
ERYTHROCYTE [DISTWIDTH] IN BLOOD BY AUTOMATED COUNT: 14.9 % (ref 11.5–14.5)
EST. AVERAGE GLUCOSE BLD GHB EST-MCNC: 108 MG/DL
GLOBULIN SER CALC-MCNC: 3.4 G/DL (ref 2–4)
GLUCOSE BLD STRIP.AUTO-MCNC: 103 MG/DL (ref 65–100)
GLUCOSE BLD STRIP.AUTO-MCNC: 111 MG/DL (ref 65–100)
GLUCOSE BLD STRIP.AUTO-MCNC: 66 MG/DL (ref 65–100)
GLUCOSE BLD STRIP.AUTO-MCNC: 77 MG/DL (ref 65–100)
GLUCOSE SERPL-MCNC: 98 MG/DL (ref 65–100)
GLUCOSE UR STRIP.AUTO-MCNC: NEGATIVE MG/DL
HBA1C MFR BLD: 5.4 % (ref 4–5.6)
HCT VFR BLD AUTO: 35.7 % (ref 35–47)
HGB BLD-MCNC: 11.7 G/DL (ref 11.5–16)
HGB UR QL STRIP: NEGATIVE
IMM GRANULOCYTES # BLD AUTO: 0 K/UL (ref 0–0.04)
IMM GRANULOCYTES NFR BLD AUTO: 0 % (ref 0–0.5)
KETONES UR QL STRIP.AUTO: 5 MG/DL
LEUKOCYTE ESTERASE UR QL STRIP.AUTO: NEGATIVE
LYMPHOCYTES # BLD: 0.9 K/UL (ref 0.8–3.5)
LYMPHOCYTES NFR BLD: 42 % (ref 12–49)
MAGNESIUM SERPL-MCNC: 2.3 MG/DL (ref 1.6–2.4)
MCH RBC QN AUTO: 27.8 PG (ref 26–34)
MCHC RBC AUTO-ENTMCNC: 32.8 G/DL (ref 30–36.5)
MCV RBC AUTO: 84.8 FL (ref 80–99)
METHADONE UR QL: NEGATIVE
MONOCYTES # BLD: 0.4 K/UL (ref 0–1)
MONOCYTES NFR BLD: 18 % (ref 5–13)
MUCOUS THREADS URNS QL MICRO: ABNORMAL /LPF
NEUTS SEG # BLD: 0.7 K/UL (ref 1.8–8)
NEUTS SEG NFR BLD: 34 % (ref 32–75)
NITRITE UR QL STRIP.AUTO: NEGATIVE
NRBC # BLD: 0 K/UL (ref 0–0.01)
NRBC BLD-RTO: 0 PER 100 WBC
OPIATES UR QL: NEGATIVE
P-R INTERVAL, ECG05: 192 MS
PCP UR QL: NEGATIVE
PERFORMED BY, TECHID: ABNORMAL
PERFORMED BY, TECHID: ABNORMAL
PERFORMED BY, TECHID: NORMAL
PERFORMED BY, TECHID: NORMAL
PH UR STRIP: 5 (ref 5–8)
PLATELET # BLD AUTO: 198 K/UL (ref 150–400)
PMV BLD AUTO: 11.2 FL (ref 8.9–12.9)
POTASSIUM SERPL-SCNC: 3.8 MMOL/L (ref 3.5–5.1)
PROT SERPL-MCNC: 6.7 G/DL (ref 6.4–8.2)
PROT UR STRIP-MCNC: NEGATIVE MG/DL
Q-T INTERVAL, ECG07: 400 MS
QRS DURATION, ECG06: 100 MS
QTC CALCULATION (BEZET), ECG08: 450 MS
RBC # BLD AUTO: 4.21 M/UL (ref 3.8–5.2)
RBC #/AREA URNS HPF: ABNORMAL /HPF (ref 0–5)
SODIUM SERPL-SCNC: 137 MMOL/L (ref 136–145)
SP GR UR REFRACTOMETRY: 1.02 (ref 1–1.03)
TROPONIN-HIGH SENSITIVITY: 7 NG/L (ref 0–51)
TSH SERPL DL<=0.05 MIU/L-ACNC: 0.31 UIU/ML (ref 0.36–3.74)
UA: UC IF INDICATED,UAUC: ABNORMAL
UROBILINOGEN UR QL STRIP.AUTO: 0.1 EU/DL (ref 0.1–1)
VENTRICULAR RATE, ECG03: 76 BPM
WBC # BLD AUTO: 2.1 K/UL (ref 3.6–11)
WBC URNS QL MICRO: ABNORMAL /HPF (ref 0–4)

## 2023-01-03 PROCEDURE — 80307 DRUG TEST PRSMV CHEM ANLYZR: CPT

## 2023-01-03 PROCEDURE — 84443 ASSAY THYROID STIM HORMONE: CPT

## 2023-01-03 PROCEDURE — 85025 COMPLETE CBC W/AUTO DIFF WBC: CPT

## 2023-01-03 PROCEDURE — G0378 HOSPITAL OBSERVATION PER HR: HCPCS

## 2023-01-03 PROCEDURE — 74011000250 HC RX REV CODE- 250: Performed by: FAMILY MEDICINE

## 2023-01-03 PROCEDURE — 36415 COLL VENOUS BLD VENIPUNCTURE: CPT

## 2023-01-03 PROCEDURE — 82140 ASSAY OF AMMONIA: CPT

## 2023-01-03 PROCEDURE — 82962 GLUCOSE BLOOD TEST: CPT

## 2023-01-03 PROCEDURE — 71045 X-RAY EXAM CHEST 1 VIEW: CPT

## 2023-01-03 PROCEDURE — 87086 URINE CULTURE/COLONY COUNT: CPT

## 2023-01-03 PROCEDURE — 74011250637 HC RX REV CODE- 250/637: Performed by: FAMILY MEDICINE

## 2023-01-03 PROCEDURE — 74011250636 HC RX REV CODE- 250/636: Performed by: FAMILY MEDICINE

## 2023-01-03 PROCEDURE — 80177 DRUG SCRN QUAN LEVETIRACETAM: CPT

## 2023-01-03 PROCEDURE — 81001 URINALYSIS AUTO W/SCOPE: CPT

## 2023-01-03 PROCEDURE — 84484 ASSAY OF TROPONIN QUANT: CPT

## 2023-01-03 PROCEDURE — 99285 EMERGENCY DEPT VISIT HI MDM: CPT

## 2023-01-03 PROCEDURE — 94761 N-INVAS EAR/PLS OXIMETRY MLT: CPT

## 2023-01-03 PROCEDURE — 70450 CT HEAD/BRAIN W/O DYE: CPT

## 2023-01-03 PROCEDURE — 65270000032 HC RM SEMIPRIVATE

## 2023-01-03 PROCEDURE — 80053 COMPREHEN METABOLIC PANEL: CPT

## 2023-01-03 PROCEDURE — 87040 BLOOD CULTURE FOR BACTERIA: CPT

## 2023-01-03 PROCEDURE — 93005 ELECTROCARDIOGRAM TRACING: CPT

## 2023-01-03 PROCEDURE — 83735 ASSAY OF MAGNESIUM: CPT

## 2023-01-03 PROCEDURE — 83036 HEMOGLOBIN GLYCOSYLATED A1C: CPT

## 2023-01-03 PROCEDURE — 74011000250 HC RX REV CODE- 250: Performed by: STUDENT IN AN ORGANIZED HEALTH CARE EDUCATION/TRAINING PROGRAM

## 2023-01-03 RX ORDER — PANTOPRAZOLE SODIUM 40 MG/1
40 TABLET, DELAYED RELEASE ORAL DAILY
Status: DISCONTINUED | OUTPATIENT
Start: 2023-01-04 | End: 2023-01-05 | Stop reason: HOSPADM

## 2023-01-03 RX ORDER — ONDANSETRON 2 MG/ML
4 INJECTION INTRAMUSCULAR; INTRAVENOUS
Status: DISCONTINUED | OUTPATIENT
Start: 2023-01-03 | End: 2023-01-05 | Stop reason: HOSPADM

## 2023-01-03 RX ORDER — INSULIN LISPRO 100 [IU]/ML
INJECTION, SOLUTION INTRAVENOUS; SUBCUTANEOUS
Status: DISCONTINUED | OUTPATIENT
Start: 2023-01-03 | End: 2023-01-05 | Stop reason: HOSPADM

## 2023-01-03 RX ORDER — SUMATRIPTAN 25 MG/1
50 TABLET, FILM COATED ORAL
Status: COMPLETED | OUTPATIENT
Start: 2023-01-03 | End: 2023-01-03

## 2023-01-03 RX ORDER — LATANOPROST 50 UG/ML
1 SOLUTION/ DROPS OPHTHALMIC DAILY
Status: DISCONTINUED | OUTPATIENT
Start: 2023-01-03 | End: 2023-01-05 | Stop reason: HOSPADM

## 2023-01-03 RX ORDER — DICYCLOMINE HYDROCHLORIDE 20 MG/1
20 TABLET ORAL EVERY 6 HOURS
Status: DISCONTINUED | OUTPATIENT
Start: 2023-01-03 | End: 2023-01-05 | Stop reason: HOSPADM

## 2023-01-03 RX ORDER — SODIUM CHLORIDE 0.9 % (FLUSH) 0.9 %
5-40 SYRINGE (ML) INJECTION AS NEEDED
Status: DISCONTINUED | OUTPATIENT
Start: 2023-01-03 | End: 2023-01-05 | Stop reason: HOSPADM

## 2023-01-03 RX ORDER — NORTRIPTYLINE HYDROCHLORIDE 25 MG/1
100 CAPSULE ORAL
Status: DISCONTINUED | OUTPATIENT
Start: 2023-01-03 | End: 2023-01-05 | Stop reason: HOSPADM

## 2023-01-03 RX ORDER — SODIUM CHLORIDE 9 MG/ML
75 INJECTION, SOLUTION INTRAVENOUS CONTINUOUS
Status: DISCONTINUED | OUTPATIENT
Start: 2023-01-03 | End: 2023-01-05 | Stop reason: HOSPADM

## 2023-01-03 RX ORDER — SUMATRIPTAN 25 MG/1
50 TABLET, FILM COATED ORAL
Status: DISCONTINUED | OUTPATIENT
Start: 2023-01-03 | End: 2023-01-03

## 2023-01-03 RX ORDER — CLONAZEPAM 1 MG/1
1 TABLET ORAL
COMMUNITY

## 2023-01-03 RX ORDER — ESCITALOPRAM OXALATE 10 MG/1
20 TABLET ORAL DAILY
Status: DISCONTINUED | OUTPATIENT
Start: 2023-01-03 | End: 2023-01-05 | Stop reason: HOSPADM

## 2023-01-03 RX ORDER — LEVOTHYROXINE SODIUM 25 UG/1
50 TABLET ORAL
Status: DISCONTINUED | OUTPATIENT
Start: 2023-01-04 | End: 2023-01-05 | Stop reason: HOSPADM

## 2023-01-03 RX ORDER — SODIUM CHLORIDE 0.9 % (FLUSH) 0.9 %
5-40 SYRINGE (ML) INJECTION EVERY 8 HOURS
Status: DISCONTINUED | OUTPATIENT
Start: 2023-01-03 | End: 2023-01-04

## 2023-01-03 RX ORDER — ERGOCALCIFEROL 1.25 MG/1
50000 CAPSULE ORAL ONCE
COMMUNITY

## 2023-01-03 RX ORDER — ONDANSETRON 4 MG/1
4 TABLET, ORALLY DISINTEGRATING ORAL
Status: DISCONTINUED | OUTPATIENT
Start: 2023-01-03 | End: 2023-01-05 | Stop reason: HOSPADM

## 2023-01-03 RX ORDER — POLYETHYLENE GLYCOL 3350 17 G/17G
17 POWDER, FOR SOLUTION ORAL DAILY PRN
Status: DISCONTINUED | OUTPATIENT
Start: 2023-01-03 | End: 2023-01-05 | Stop reason: HOSPADM

## 2023-01-03 RX ORDER — SCOLOPAMINE TRANSDERMAL SYSTEM 1 MG/1
1 PATCH, EXTENDED RELEASE TRANSDERMAL
Status: DISCONTINUED | OUTPATIENT
Start: 2023-01-03 | End: 2023-01-05 | Stop reason: HOSPADM

## 2023-01-03 RX ORDER — ESLICARBAZEPINE ACETATE 800 MG/1
800 TABLET ORAL DAILY
COMMUNITY

## 2023-01-03 RX ORDER — CENOBAMATE 200 MG/1
200 TABLET, FILM COATED ORAL DAILY
COMMUNITY

## 2023-01-03 RX ORDER — LACOSAMIDE 200 MG/1
200 TABLET ORAL 2 TIMES DAILY
Status: DISCONTINUED | OUTPATIENT
Start: 2023-01-03 | End: 2023-01-05 | Stop reason: HOSPADM

## 2023-01-03 RX ORDER — NITROGLYCERIN 0.4 MG/1
0.4 TABLET SUBLINGUAL AS NEEDED
Status: DISCONTINUED | OUTPATIENT
Start: 2023-01-03 | End: 2023-01-05 | Stop reason: HOSPADM

## 2023-01-03 RX ORDER — ALBUTEROL SULFATE 2.5 MG/.5ML
2.5 SOLUTION RESPIRATORY (INHALATION)
Status: DISCONTINUED | OUTPATIENT
Start: 2023-01-03 | End: 2023-01-05 | Stop reason: HOSPADM

## 2023-01-03 RX ORDER — TOPIRAMATE 100 MG/1
100 TABLET, FILM COATED ORAL 2 TIMES DAILY
Status: DISCONTINUED | OUTPATIENT
Start: 2023-01-03 | End: 2023-01-05 | Stop reason: HOSPADM

## 2023-01-03 RX ORDER — MONTELUKAST SODIUM 10 MG/1
10 TABLET ORAL DAILY
Status: DISCONTINUED | OUTPATIENT
Start: 2023-01-03 | End: 2023-01-05 | Stop reason: HOSPADM

## 2023-01-03 RX ORDER — IBUPROFEN 200 MG
4 TABLET ORAL AS NEEDED
Status: DISCONTINUED | OUTPATIENT
Start: 2023-01-03 | End: 2023-01-05 | Stop reason: HOSPADM

## 2023-01-03 RX ORDER — CALCIUM CARBONATE/VITAMIN D3 600MG-5MCG
2 TABLET ORAL DAILY
Status: DISCONTINUED | OUTPATIENT
Start: 2023-01-04 | End: 2023-01-05 | Stop reason: HOSPADM

## 2023-01-03 RX ORDER — CEPHALEXIN 500 MG/1
500 CAPSULE ORAL 4 TIMES DAILY
COMMUNITY
End: 2023-01-05

## 2023-01-03 RX ADMIN — MONTELUKAST 10 MG: 10 TABLET, FILM COATED ORAL at 14:11

## 2023-01-03 RX ADMIN — LACOSAMIDE 200 MG: 200 TABLET, FILM COATED ORAL at 21:27

## 2023-01-03 RX ADMIN — APIXABAN 5 MG: 5 TABLET, FILM COATED ORAL at 21:26

## 2023-01-03 RX ADMIN — LACOSAMIDE 200 MG: 200 TABLET, FILM COATED ORAL at 14:09

## 2023-01-03 RX ADMIN — SODIUM CHLORIDE 75 ML/HR: 9 INJECTION, SOLUTION INTRAVENOUS at 20:18

## 2023-01-03 RX ADMIN — TOPIRAMATE 100 MG: 100 TABLET, FILM COATED ORAL at 21:27

## 2023-01-03 RX ADMIN — SODIUM CHLORIDE 75 ML/HR: 9 INJECTION, SOLUTION INTRAVENOUS at 22:23

## 2023-01-03 RX ADMIN — APIXABAN 5 MG: 5 TABLET, FILM COATED ORAL at 14:09

## 2023-01-03 RX ADMIN — SODIUM CHLORIDE, PRESERVATIVE FREE 10 ML: 5 INJECTION INTRAVENOUS at 22:25

## 2023-01-03 RX ADMIN — SODIUM CHLORIDE, PRESERVATIVE FREE 10 ML: 5 INJECTION INTRAVENOUS at 01:59

## 2023-01-03 RX ADMIN — ESCITALOPRAM OXALATE 20 MG: 10 TABLET ORAL at 14:09

## 2023-01-03 RX ADMIN — TOPIRAMATE 100 MG: 100 TABLET, FILM COATED ORAL at 14:09

## 2023-01-03 RX ADMIN — SODIUM CHLORIDE, PRESERVATIVE FREE 10 ML: 5 INJECTION INTRAVENOUS at 14:12

## 2023-01-03 RX ADMIN — NORTRIPTYLINE HYDROCHLORIDE 100 MG: 25 CAPSULE ORAL at 22:21

## 2023-01-03 RX ADMIN — LATANOPROST 1 DROP: 50 SOLUTION OPHTHALMIC at 14:11

## 2023-01-03 RX ADMIN — DICYCLOMINE HYDROCHLORIDE 20 MG: 20 TABLET ORAL at 21:26

## 2023-01-03 RX ADMIN — SUMATRIPTAN SUCCINATE 50 MG: 25 TABLET ORAL at 22:19

## 2023-01-03 RX ADMIN — DICYCLOMINE HYDROCHLORIDE 20 MG: 20 TABLET ORAL at 14:09

## 2023-01-03 RX ADMIN — LEVETIRACETAM 750 MG: 250 TABLET, FILM COATED ORAL at 14:09

## 2023-01-03 RX ADMIN — SODIUM CHLORIDE 75 ML/HR: 9 INJECTION, SOLUTION INTRAVENOUS at 10:37

## 2023-01-03 RX ADMIN — LEVETIRACETAM 750 MG: 250 TABLET, FILM COATED ORAL at 22:53

## 2023-01-03 NOTE — ED NOTES
Pt requested medication for her migraine. Notified Ben RANGEL Will continue to monitor while awaiting orders.

## 2023-01-03 NOTE — Clinical Note
Status[de-identified] INPATIENT [101]   Type of Bed: Remote Telemetry [29]   Cardiac Monitoring Required?: Yes   Inpatient Hospitalization Certified Necessary for the Following Reasons: 9.  Other (further clarification in H&P documentation)   Admitting Diagnosis: AMS (altered mental status) [[id number]]   Admitting Physician: Josey Harris [[id number]]   Attending Physician: Josey Harris [[id number]]   Estimated Length of Stay: 2 Midnights   Discharge Plan[de-identified] Home with Office Follow-up

## 2023-01-03 NOTE — MED STUDENT NOTES
History & Physical    Primary Care Provider: Hernandez Cortes MD  Source of Information: Patient/family     History of Presenting Illness:   Aidan Rodriges is a 45 y.o. female with past medical history of hypotension, diabetes, hypothyroidism, GERD, PE, CHF, and seizure disorder who presented to the ED last night after she was found unresponsive on the floor. Patient was sleeping at the time of interview and therefore history was taken from her mother. Mother reports that she has not been doing much last week. He did not witness any seizure like activity. She has been having some mucus las night as well as wheezing and migraine headache that she has been complaining for about a week. She could not recall any particular triggers leading to this event. Patient had episodes of seizure before. She currently sees Dr. Rajinder Torre as her primary care doctor. She denies any other complaints today. In the ED, CT of the head was done which showed no acute intracranial process. Review of Systems:  Review of systems not obtained due to patient factors.      Past Medical History:   Diagnosis Date    Arthritis     pt states knees and back    Asthma     Cardiomyopathy ([address] 2005    EF 35%, echo in 2021 normal EF     Colon polyp     Diabetes ([address]     Pt states not diabetic but has all the symptoms of it    Dizzy spells     GERD (gastroesophageal reflux disease)     Heart failure ([address] 2005    now combined systolic, diastolic, seen at [facility], [facility] 2021- Dr Alexey Adams    Irritable bowel syndrome     Knee pain, bilateral     Migraines 2011    2-3 week    Neuropathy     pt states of both feet    Pulmonary embolism [facility])     August 2022    Seizures ([address]     petite mals last seizure 2-3 months ago    Thyroid disease     hypothyroid        Past Surgical History:   Procedure Laterality Date    COLONOSCOPY N/A 11/28/2022    COLONOSCOPY performed by Isamar Pichardo MD at [address]  5/2006    HX COLONOSCOPY      HX ENDOSCOPY      HX GASTRIC BYPASS  2013    HX HYSTERECTOMY  2007    HX KNEE ARTHROSCOPY Right 05/2021    HX ORTHOPAEDIC Bilateral 2013    CTR    HX ORTHOPAEDIC      right knee procedure 2021    HX OTHER SURGICAL      left axillary lymph node biopsy    HX SMALL BOWEL RESECTION      NH APPENDECTOMY      NH COLECTOMY PARTIAL W/ANASTOMOSIS      NH ENTERECTOMY RESCJ SMALL INTESTINE W/ENTEROSTOMY      NH LAPAROSCOPY SURG CHOLECYSTECTOMY      NH UNLISTED PROCEDURE ABDOMEN PERITONEUM & OMENTUM  2008    cholecystectomy    NH UNLISTED PROCEDURE CARDIAC SURGERY      Loop recorder implanted july 2020    NH UNLISTED PROCEDURE STOMACH         Prior to Admission medications    Medication Sig Start Date End Date Taking? Authorizing Provider   erenumab-aooe (Aimovig Autoinjector) 140 mg/mL injection 140 mg by SubCUTAneous route once. Other, MD Kenzie   sucralfate (Carafate) 100 mg/mL suspension Take 1 g by mouth four (4) times daily. Provider, Historical   albuterol (PROVENTIL VENTOLIN) 2.5 mg /3 mL (0.083 %) nebu by Nebulization route every four (4) hours as needed for Wheezing. Provider, Historical   scopolamine (TRANSDERM-SCOP) 1 mg over 3 days pt3d 1 Patch by TransDERmal route every seventy-two (72) hours. Present on pt at this time, right ear    Provider, Historical   nitroglycerin (NITROSTAT) 0.4 mg SL tablet DISSOLVE 1 TABLET UNDER THE TONGUE EVERY 5 MINUTES AS  NEEDED FOR CHEST PAIN. MAX  OF 3 TABLETS IN 15 MINUTES. CALL 911 IF PAIN PERSISTS. 10/19/22   Vishnu Zamorano MD   famotidine (PEPCID) 40 mg tablet Take 40 mg by mouth nightly. 8/12/22   Provider, Historical   apixaban (ELIQUIS) 5 mg tablet Take 5 mg by mouth two (2) times a day. Provider, Historical   lacosamide (Vimpat) 200 mg tab tablet Take 1 Tablet by mouth two (2) times a day. Max Daily Amount: 400 mg. 5/10/22   Reasoner, Fredi Leahy PA-C   levETIRAcetam (KEPPRA) 500 mg tablet Take 1 Tablet by mouth two (2) times a day.   Patient taking differently: Take 750 mg by mouth two (2) times a day. 5/10/22   Josesito Rogers PA-C   escitalopram oxalate (LEXAPRO) 10 mg tablet Take 20 mg by mouth daily. 2/4/22   Provider, Historical   latanoprost (XALATAN) 0.005 % ophthalmic solution INSTILL 1 DROP INTO [facility] EYE AT NIGHT 1/28/22   Provider, Historical   TENS units and TENS electrodes (TENS UNIT AND ELECTRODES) Tens Unit, 1 ea, Dispense: 1 EA, Refills: 0, Easyscript, Maintenance, 0 9/8/21   Provider, Historical   docusate sodium (COLACE) 100 mg capsule Take 100 mg by mouth two (2) times a day. Provider, Historical   Linzess 145 mcg cap capsule Take 145 mcg by mouth daily. 8/11/21   Provider, Historical   BD Luer-Narda Syringe 3 mL 25 gauge x 1\" syrg USE FOR B12 INJECTIONS 12/1/20   Provider, Historical   RABEprazole (ACIPHEX) 20 mg TbEC Take 20 mg by mouth two (2) times a day. Provider, Historical   rizatriptan (MAXALT) 5 mg tablet Take 5 mg by mouth once as needed for Migraine. May repeat in 2 hours if needed     Provider, Historical   alum-mag hydroxide-simeth-lidocaine-sucralfate Take 30 mL by mouth daily. Provider, Historical   montelukast (SINGULAIR) 10 mg tablet Take 10 mg by mouth daily. 7/15/19   Provider, Historical   nortriptyline (PAMELOR) 50 mg capsule Take 100 mg by mouth nightly. 7/19/19   Provider, Historical   topiramate (TOPAMAX) 100 mg tablet Take 100 mg by mouth two (2) times a day. 7/19/19   Provider, Historical   VENTOLIN HFA 90 mcg/actuation inhaler Take 2 Puffs by inhalation every four (4) hours as needed. 7/15/19   Provider, Historical   dexlansoprazole (DEXILANT) 60 mg CpDB capsule (delayed release) Take 60 mg by mouth daily. Provider, Historical   dicyclomine (BENTYL) 20 mg tablet Take 20 mg by mouth every six (6) hours. Provider, Historical   levothyroxine (SYNTHROID) 50 mcg tablet Take 50 mcg by mouth Daily (before breakfast).     Provider, Historical   Calcium-Cholecalciferol, D3, 600 mg-10 mcg (400 unit) cap Take 2 Tablets by mouth daily. Provider, Historical   multivitamin (ONE A DAY) tablet Take 1 Tab by mouth daily. Provider, Historical       Allergies   Allergen Reactions    Acetaminophen Other (comments)     Advised not to take due to Liver Issues    Morphine Other (comments)     SOB/Chest Pressure - in hosp for a week. States DILAUDID Ok    Oxycodone Shortness of Breath     Reaction Type: Allergy; Severity: Severe    Shellfish Derived Anaphylaxis    Codeine Other (comments)     SOB chest pain    Peanut Oil Other (comments)     Reaction Type: Intolerance; Severity: Severe    Sulfa (Sulfonamide Antibiotics) Rash     itching        Family History   Problem Relation Age of Onset    Hypertension Mother     Heart Disease Mother     Cancer Father         stomach cancer    Hypertension Father     No Known Problems Brother     Asthma Daughter     Asthma Son     Asthma Son     Breast Cancer Paternal Aunt         age unknown        Social History     Socioeconomic History    Marital status:    Tobacco Use    Smoking status: Never    Smokeless tobacco: Never   Vaping Use    Vaping Use: Never used   Substance and Sexual Activity    Alcohol use: No    Drug use: No            CODE STATUS:  DNR    Full    Other      Objective:     Physical Exam:     Visit Vitals  /77   Pulse 76   Temp 98.7 °F (37.1 °C)   Resp 19   Wt 79.4 kg (175 lb)   SpO2 98%   BMI 29.12 kg/m²      O2 Device: None (Room air)    General:  Alert, cooperative, no distress, appears stated age. Head:  Normocephalic, without obvious abnormality, atraumatic. Eyes:  Conjunctivae/corneas clear. PERRL, EOMs intact. Nose: Nares normal. Septum midline. Mucosa normal. No drainage or sinus tenderness. Throat: Lips, mucosa, and tongue normal. Teeth and gums normal.   Neck: Supple, symmetrical, trachea midline, no adenopathy, thyroid: no enlargement/tenderness/nodules, no carotid bruit and no JVD. Back:   Symmetric, no curvature.  ROM normal. No CVA tenderness. Lungs:   Clear to auscultation bilaterally. Chest wall:  No tenderness or deformity. Heart:  Regular rate and rhythm, S1, S2 normal, no murmur, click, rub or gallop. Abdomen:   Soft, non-tender. Bowel sounds normal. No masses,  No organomegaly. Extremities: Extremities normal, atraumatic, no cyanosis or edema. Pulses: 2+ and symmetric all extremities. Skin: Skin color, texture, turgor normal. No rashes or lesions   Neurologic: CNII-XII intact. No motor or sensory deficits. 24 Hour Results:    Recent Results (from the past 24 hour(s))   METABOLIC PANEL, COMPREHENSIVE    Collection Time: 01/03/23  1:07 AM   Result Value Ref Range    Sodium 137 136 - 145 mmol/L    Potassium 3.8 3.5 - 5.1 mmol/L    Chloride 108 97 - 108 mmol/L    CO2 21 21 - 32 mmol/L    Anion gap 8 5 - 15 mmol/L    Glucose 98 65 - 100 mg/dL    BUN 12 6 - 20 mg/dL    Creatinine 0.73 0.55 - 1.02 mg/dL    BUN/Creatinine ratio 16 12 - 20      eGFR >60 >60 ml/min/1.73m2    Calcium 8.6 8.5 - 10.1 mg/dL    Bilirubin, total 0.2 0.2 - 1.0 mg/dL    AST (SGOT) 148 (H) 15 - 37 U/L    ALT (SGPT) 160 (H) 12 - 78 U/L    Alk. phosphatase 171 (H) 45 - 117 U/L    Protein, total 6.7 6.4 - 8.2 g/dL    Albumin 3.3 (L) 3.5 - 5.0 g/dL    Globulin 3.4 2.0 - 4.0 g/dL    A-G Ratio 1.0 (L) 1.1 - 2.2     CBC WITH AUTOMATED DIFF    Collection Time: 01/03/23  1:07 AM   Result Value Ref Range    WBC 2.1 (L) 3.6 - 11.0 K/uL    RBC 4.21 3.80 - 5.20 M/uL    HGB 11.7 11.5 - 16.0 g/dL    HCT 35.7 35.0 - 47.0 %    MCV 84.8 80.0 - 99.0 FL    MCH 27.8 26.0 - 34.0 PG    MCHC 32.8 30.0 - 36.5 g/dL    RDW 14.9 (H) 11.5 - 14.5 %    PLATELET 733 891 - 512 K/uL    MPV 11.2 8.9 - 12.9 FL    NRBC 0.0 0.0  WBC    ABSOLUTE NRBC 0.00 0.00 - 0.01 K/uL    NEUTROPHILS 34 32 - 75 %    LYMPHOCYTES 42 12 - 49 %    MONOCYTES 18 (H) 5 - 13 %    EOSINOPHILS 5 0 - 7 %    BASOPHILS 1 0 - 1 %    IMMATURE GRANULOCYTES 0 0 - 0.5 %    ABS.  NEUTROPHILS 0.7 (L) 1.8 - 8.0 K/UL    ABS. LYMPHOCYTES 0.9 0.8 - 3.5 K/UL    ABS. MONOCYTES 0.4 0.0 - 1.0 K/UL    ABS. EOSINOPHILS 0.1 0.0 - 0.4 K/UL    ABS. BASOPHILS 0.0 0.0 - 0.1 K/UL    ABS. IMM. GRANS. 0.0 0.00 - 0.04 K/UL    DF AUTOMATED     TROPONIN-HIGH SENSITIVITY    Collection Time: 01/03/23  1:07 AM   Result Value Ref Range    Troponin-High Sensitivity 7 0 - 51 ng/L   MAGNESIUM    Collection Time: 01/03/23  1:07 AM   Result Value Ref Range    Magnesium 2.3 1.6 - 2.4 mg/dL   TSH 3RD GENERATION    Collection Time: 01/03/23  1:07 AM   Result Value Ref Range    TSH 0.31 (L) 0.36 - 3.74 uIU/mL   DRUG SCREEN, URINE    Collection Time: 01/03/23  1:16 AM   Result Value Ref Range    AMPHETAMINES Negative Negative      BARBITURATES Negative Negative      BENZODIAZEPINES Negative Negative      COCAINE Negative Negative      METHADONE Negative Negative      OPIATES Negative Negative      PCP(PHENCYCLIDINE) Negative Negative      THC (TH-CANNABINOL) Negative Negative      Drug screen comment        This test is a screen for drugs of abuse in a medical setting only (i.e., they are unconfirmed results and as such must not be used for non-medical purposes, e.g.,employment testing, legal testing). Due to its inherent nature, false positive (FP) and false negative (FN) results may be obtained. Therefore, if necessary for medical care, recommend confirmation of positive findings by GC/MS.      URINALYSIS W/ REFLEX CULTURE    Collection Time: 01/03/23  1:18 AM    Specimen: Urine   Result Value Ref Range    Color Yellow/Straw      Appearance Clear Clear      Specific gravity 1.016 1.003 - 1.030      pH (UA) 5.0 5.0 - 8.0      Protein Negative Negative mg/dL    Glucose Negative Negative mg/dL    Ketone 5 (A) Negative mg/dL    Bilirubin Negative Negative      Blood Negative Negative      Urobilinogen 0.1 0.1 - 1.0 EU/dL    Nitrites Negative Negative      Leukocyte Esterase Negative Negative      UA:UC IF INDICATED Culture not indicated by UA result Culture not indicated by UA result      WBC 0-4 0 - 4 /hpf    RBC 0-5 0 - 5 /hpf    Bacteria Negative Negative /hpf    Mucus Trace /lpf   AMMONIA    Collection Time: 01/03/23  6:05 AM   Result Value Ref Range    Ammonia, plasma 14 <32 umol/L         Imaging:   CT HEAD WO CONT   Final Result   No acute intracranial process. Assessment:   Altered mental status  Seizure disorder  Migraine headaches  Diabetes mellitus  Heart failure, combined systolic and diastolic  - EF 00%  Hypothyroidism  GERD  Hx of pulmonary embolism      Plan:   Consult to neurologist  Continue home medications  Admit to medical floor for observation    Home medications were reviewed    Signed By: Joseph Jarrett     January 3, 2023           *ATTENTION:  This note has been created by a medical student for educational purposes only. Please do not refer to the content of this note for clinical decision-making, billing, or other purposes. Please see attending physicians note to obtain clinical information on this patient. *

## 2023-01-03 NOTE — PROGRESS NOTES
Reason for Admission:  AMS                     RUR Score:  12%                   Plan for utilizing home health:  None @ this time/uses cane/awaiting therapy evals. PCP: First and Last name:  Terri Contreras MD     Name of Practice:    Are you a current patient: Yes/No: Yes   Approximate date of last visit: Been a while since last visit. Can you participate in a virtual visit with your PCP: Yes/Call/Has cell phone. Current Advanced Directive/Advance Care Plan: Full Code      Healthcare Decision Maker:              Primary Decision Maker: Leann Piecre - Mother - [phone]                  Transition of Care Plan:                    D/C Plan is home with mother & family to transport. Send Rxs to [facility] , upon discharge.

## 2023-01-03 NOTE — H&P
History & Physical    Primary Care Provider: Evelyn Friedman MD  Source of Information: Patient/family     History of Presenting Illness:   Mane Cabezas is a 45 y.o. female with past medical history of hypotension, diabetes, hypothyroidism, GERD, PE, CHF, and seizure disorder who presented to the ED last night after she was found unresponsive on the floor. Patient was sleeping at the time of interview and therefore history was taken from her mother. Mother reports that she has not been doing much last week. He did not witness any seizure like activity. She has been having some mucus las night as well as wheezing and migraine headache that she has been complaining for about a week. She could not recall any particular triggers leading to this event. Patient had episodes of seizure before. She currently sees Dr. Vicente Amin as her primary care doctor. She denies any other complaints today. In the ED, CT of the head was done which showed no acute intracranial process. Was recently seen and in the ER because of intractable headache and was sent home       Review of Systems:  Review of systems not obtained due to patient factors.      Past Medical History:   Diagnosis Date    Arthritis     pt states knees and back    Asthma     Cardiomyopathy ([address] 2005    EF 35%, echo in 2021 normal EF     Colon polyp     Diabetes ([address]     Pt states not diabetic but has all the symptoms of it    Dizzy spells     GERD (gastroesophageal reflux disease)     Heart failure ([address] 2005    now combined systolic, diastolic, seen at [facility], [facility] 2021- Dr Brooklynn Dupont    Irritable bowel syndrome     Knee pain, bilateral     Migraines 2011    2-3 week    Neuropathy     pt states of both feet    Pulmonary embolism [facility])     August 2022    Seizures ([address]     petite mals last seizure 2-3 months ago    Thyroid disease     hypothyroid        Past Surgical History:   Procedure Laterality Date    COLONOSCOPY N/A 11/28/2022    COLONOSCOPY performed by Michele Schlatter, MD at [address]  5/2006    HX COLONOSCOPY      HX ENDOSCOPY      HX GASTRIC BYPASS  2013    HX HYSTERECTOMY  2007    HX KNEE ARTHROSCOPY Right 05/2021    HX ORTHOPAEDIC Bilateral 2013    CTR    HX ORTHOPAEDIC      right knee procedure 2021    HX OTHER SURGICAL      left axillary lymph node biopsy    HX SMALL BOWEL RESECTION      WA APPENDECTOMY      WA COLECTOMY PARTIAL W/ANASTOMOSIS      WA ENTERECTOMY RESCJ SMALL INTESTINE W/ENTEROSTOMY      WA LAPAROSCOPY SURG CHOLECYSTECTOMY      WA UNLISTED PROCEDURE ABDOMEN PERITONEUM & OMENTUM  2008    cholecystectomy    WA UNLISTED PROCEDURE CARDIAC SURGERY      Loop recorder implanted july 2020    WA UNLISTED PROCEDURE STOMACH         Prior to Admission medications    Medication Sig Start Date End Date Taking? Authorizing Provider   erenumab-aooe (Aimovig Autoinjector) 140 mg/mL injection 140 mg by SubCUTAneous route once. Other, MD Kenzie   sucralfate (Carafate) 100 mg/mL suspension Take 1 g by mouth four (4) times daily. Provider, Historical   albuterol (PROVENTIL VENTOLIN) 2.5 mg /3 mL (0.083 %) nebu by Nebulization route every four (4) hours as needed for Wheezing. Provider, Historical   scopolamine (TRANSDERM-SCOP) 1 mg over 3 days pt3d 1 Patch by TransDERmal route every seventy-two (72) hours. Present on pt at this time, right ear    Provider, Historical   nitroglycerin (NITROSTAT) 0.4 mg SL tablet DISSOLVE 1 TABLET UNDER THE TONGUE EVERY 5 MINUTES AS  NEEDED FOR CHEST PAIN. MAX  OF 3 TABLETS IN 15 MINUTES. CALL 911 IF PAIN PERSISTS. 10/19/22   Vishnu Zamorano MD   famotidine (PEPCID) 40 mg tablet Take 40 mg by mouth nightly. 8/12/22   Provider, Historical   apixaban (ELIQUIS) 5 mg tablet Take 5 mg by mouth two (2) times a day. Provider, Historical   lacosamide (Vimpat) 200 mg tab tablet Take 1 Tablet by mouth two (2) times a day.  Max Daily Amount: 400 mg. 5/10/22   Reasoner, Maine Campbell PA-C levETIRAcetam (KEPPRA) 500 mg tablet Take 1 Tablet by mouth two (2) times a day. Patient taking differently: Take 750 mg by mouth two (2) times a day. 5/10/22   Porter Rogers PA-C   escitalopram oxalate (LEXAPRO) 10 mg tablet Take 20 mg by mouth daily. 2/4/22   Provider, Historical   latanoprost (XALATAN) 0.005 % ophthalmic solution INSTILL 1 DROP INTO [facility] EYE AT NIGHT 1/28/22   Provider, Historical   TENS units and TENS electrodes (TENS UNIT AND ELECTRODES) Tens Unit, 1 ea, Dispense: 1 EA, Refills: 0, Easyscript, Maintenance, 0 9/8/21   Provider, Historical   docusate sodium (COLACE) 100 mg capsule Take 100 mg by mouth two (2) times a day. Provider, Historical   Linzess 145 mcg cap capsule Take 145 mcg by mouth daily. 8/11/21   Provider, Historical   BD Luer-Narda Syringe 3 mL 25 gauge x 1\" syrg USE FOR B12 INJECTIONS 12/1/20   Provider, Historical   RABEprazole (ACIPHEX) 20 mg TbEC Take 20 mg by mouth two (2) times a day. Provider, Historical   rizatriptan (MAXALT) 5 mg tablet Take 5 mg by mouth once as needed for Migraine. May repeat in 2 hours if needed     Provider, Historical   alum-mag hydroxide-simeth-lidocaine-sucralfate Take 30 mL by mouth daily. Provider, Historical   montelukast (SINGULAIR) 10 mg tablet Take 10 mg by mouth daily. 7/15/19   Provider, Historical   nortriptyline (PAMELOR) 50 mg capsule Take 100 mg by mouth nightly. 7/19/19   Provider, Historical   topiramate (TOPAMAX) 100 mg tablet Take 100 mg by mouth two (2) times a day. 7/19/19   Provider, Historical   VENTOLIN HFA 90 mcg/actuation inhaler Take 2 Puffs by inhalation every four (4) hours as needed. 7/15/19   Provider, Historical   dexlansoprazole (DEXILANT) 60 mg CpDB capsule (delayed release) Take 60 mg by mouth daily. Provider, Historical   dicyclomine (BENTYL) 20 mg tablet Take 20 mg by mouth every six (6) hours.     Provider, Historical   levothyroxine (SYNTHROID) 50 mcg tablet Take 50 mcg by mouth Daily (before breakfast). Provider, Historical   Calcium-Cholecalciferol, D3, 600 mg-10 mcg (400 unit) cap Take 2 Tablets by mouth daily. Provider, Historical   multivitamin (ONE A DAY) tablet Take 1 Tab by mouth daily. Provider, Historical       Allergies   Allergen Reactions    Acetaminophen Other (comments)     Advised not to take due to Liver Issues    Morphine Other (comments)     SOB/Chest Pressure - in hosp for a week. States DILAUDID Ok    Oxycodone Shortness of Breath     Reaction Type: Allergy; Severity: Severe    Shellfish Derived Anaphylaxis    Codeine Other (comments)     SOB chest pain    Peanut Oil Other (comments)     Reaction Type: Intolerance; Severity: Severe    Sulfa (Sulfonamide Antibiotics) Rash     itching        Family History   Problem Relation Age of Onset    Hypertension Mother     Heart Disease Mother     Cancer Father         stomach cancer    Hypertension Father     No Known Problems Brother     Asthma Daughter     Asthma Son     Asthma Son     Breast Cancer Paternal Aunt         age unknown        Social History     Socioeconomic History    Marital status:    Tobacco Use    Smoking status: Never    Smokeless tobacco: Never   Vaping Use    Vaping Use: Never used   Substance and Sexual Activity    Alcohol use: No    Drug use: No            CODE STATUS:  DNR    Full    Other      Objective:     Physical Exam:     Visit Vitals  /74   Pulse 74   Temp 98.7 °F (37.1 °C)   Resp 17   Wt 79.4 kg (175 lb)   SpO2 94%   BMI 29.12 kg/m²      O2 Device: None (Room air)    General:  Alert, cooperative, no distress, appears stated age. Head:  Normocephalic, without obvious abnormality, atraumatic. Eyes:  Conjunctivae/corneas clear. PERRL, EOMs intact. Nose: Nares normal. Septum midline. Mucosa normal. No drainage or sinus tenderness.    Throat: Lips, mucosa, and tongue normal. Teeth and gums normal.   Neck: Supple, symmetrical, trachea midline, no adenopathy, thyroid: no enlargement/tenderness/nodules, no carotid bruit and no JVD. Back:   Symmetric, no curvature. ROM normal. No CVA tenderness. Lungs:   Clear to auscultation bilaterally. Chest wall:  No tenderness or deformity. Heart:  Regular rate and rhythm, S1, S2 normal, no murmur, click, rub or gallop. Abdomen:   Soft, non-tender. Bowel sounds normal. No masses,  No organomegaly. Extremities: Extremities normal, atraumatic, no cyanosis or edema. Pulses: 2+ and symmetric all extremities. Skin: Skin color, texture, turgor normal. No rashes or lesions   Neurologic: CNII-XII intact. No motor or sensory deficits. 24 Hour Results:    Recent Results (from the past 24 hour(s))   EKG, 12 LEAD, INITIAL    Collection Time: 01/03/23 12:54 AM   Result Value Ref Range    Ventricular Rate 76 BPM    Atrial Rate 76 BPM    P-R Interval 192 ms    QRS Duration 100 ms    Q-T Interval 400 ms    QTC Calculation (Bezet) 450 ms    Calculated P Axis 47 degrees    Calculated R Axis 35 degrees    Calculated T Axis 35 degrees    Diagnosis       Normal sinus rhythm  Normal ECG  When compared with ECG of 26-AUG-2022 19:40,  No significant change was found  Confirmed by Karissa Kapadia MD, KELL ([id number]) on 1/3/2023 01:48:04 AM     METABOLIC PANEL, COMPREHENSIVE    Collection Time: 01/03/23  1:07 AM   Result Value Ref Range    Sodium 137 136 - 145 mmol/L    Potassium 3.8 3.5 - 5.1 mmol/L    Chloride 108 97 - 108 mmol/L    CO2 21 21 - 32 mmol/L    Anion gap 8 5 - 15 mmol/L    Glucose 98 65 - 100 mg/dL    BUN 12 6 - 20 mg/dL    Creatinine 0.73 0.55 - 1.02 mg/dL    BUN/Creatinine ratio 16 12 - 20      eGFR >60 >60 ml/min/1.73m2    Calcium 8.6 8.5 - 10.1 mg/dL    Bilirubin, total 0.2 0.2 - 1.0 mg/dL    AST (SGOT) 148 (H) 15 - 37 U/L    ALT (SGPT) 160 (H) 12 - 78 U/L    Alk.  phosphatase 171 (H) 45 - 117 U/L    Protein, total 6.7 6.4 - 8.2 g/dL    Albumin 3.3 (L) 3.5 - 5.0 g/dL    Globulin 3.4 2.0 - 4.0 g/dL    A-G Ratio 1.0 (L) 1.1 - 2.2     CBC WITH AUTOMATED DIFF    Collection Time: 01/03/23  1:07 AM   Result Value Ref Range    WBC 2.1 (L) 3.6 - 11.0 K/uL    RBC 4.21 3.80 - 5.20 M/uL    HGB 11.7 11.5 - 16.0 g/dL    HCT 35.7 35.0 - 47.0 %    MCV 84.8 80.0 - 99.0 FL    MCH 27.8 26.0 - 34.0 PG    MCHC 32.8 30.0 - 36.5 g/dL    RDW 14.9 (H) 11.5 - 14.5 %    PLATELET 628 566 - 220 K/uL    MPV 11.2 8.9 - 12.9 FL    NRBC 0.0 0.0  WBC    ABSOLUTE NRBC 0.00 0.00 - 0.01 K/uL    NEUTROPHILS 34 32 - 75 %    LYMPHOCYTES 42 12 - 49 %    MONOCYTES 18 (H) 5 - 13 %    EOSINOPHILS 5 0 - 7 %    BASOPHILS 1 0 - 1 %    IMMATURE GRANULOCYTES 0 0 - 0.5 %    ABS. NEUTROPHILS 0.7 (L) 1.8 - 8.0 K/UL    ABS. LYMPHOCYTES 0.9 0.8 - 3.5 K/UL    ABS. MONOCYTES 0.4 0.0 - 1.0 K/UL    ABS. EOSINOPHILS 0.1 0.0 - 0.4 K/UL    ABS. BASOPHILS 0.0 0.0 - 0.1 K/UL    ABS. IMM. GRANS. 0.0 0.00 - 0.04 K/UL    DF AUTOMATED     TROPONIN-HIGH SENSITIVITY    Collection Time: 01/03/23  1:07 AM   Result Value Ref Range    Troponin-High Sensitivity 7 0 - 51 ng/L   MAGNESIUM    Collection Time: 01/03/23  1:07 AM   Result Value Ref Range    Magnesium 2.3 1.6 - 2.4 mg/dL   TSH 3RD GENERATION    Collection Time: 01/03/23  1:07 AM   Result Value Ref Range    TSH 0.31 (L) 0.36 - 3.74 uIU/mL   DRUG SCREEN, URINE    Collection Time: 01/03/23  1:16 AM   Result Value Ref Range    AMPHETAMINES Negative Negative      BARBITURATES Negative Negative      BENZODIAZEPINES Negative Negative      COCAINE Negative Negative      METHADONE Negative Negative      OPIATES Negative Negative      PCP(PHENCYCLIDINE) Negative Negative      THC (TH-CANNABINOL) Negative Negative      Drug screen comment        This test is a screen for drugs of abuse in a medical setting only (i.e., they are unconfirmed results and as such must not be used for non-medical purposes, e.g.,employment testing, legal testing). Due to its inherent nature, false positive (FP) and false negative (FN) results may be obtained.  Therefore, if necessary for medical care, recommend confirmation of positive findings by GC/MS. URINALYSIS W/ REFLEX CULTURE    Collection Time: 01/03/23  1:18 AM    Specimen: Urine   Result Value Ref Range    Color Yellow/Straw      Appearance Clear Clear      Specific gravity 1.016 1.003 - 1.030      pH (UA) 5.0 5.0 - 8.0      Protein Negative Negative mg/dL    Glucose Negative Negative mg/dL    Ketone 5 (A) Negative mg/dL    Bilirubin Negative Negative      Blood Negative Negative      Urobilinogen 0.1 0.1 - 1.0 EU/dL    Nitrites Negative Negative      Leukocyte Esterase Negative Negative      UA:UC IF INDICATED Culture not indicated by UA result Culture not indicated by UA result      WBC 0-4 0 - 4 /hpf    RBC 0-5 0 - 5 /hpf    Bacteria Negative Negative /hpf    Mucus Trace /lpf   AMMONIA    Collection Time: 01/03/23  6:05 AM   Result Value Ref Range    Ammonia, plasma 14 <32 umol/L         Imaging:   CT HEAD WO CONT   Final Result   No acute intracranial process.                     Assessment:   Altered mental status  Seizure disorder  Migraine headaches  Diabetes mellitus  Heart failure, combined systolic and diastolic  Recent echo shows ejection fraction of 50 to 55%  Hypothyroidism  GERD  Hx of pulmonary embolism  Neutropenia      Plan:   Consult to neurologist  Continue home medications  Admit to medical floor for observation  Order blood cultures urine cultures  Chest x-ray  Seizure precautions  Sliding scale insulin      Current Facility-Administered Medications:     sodium chloride (NS) flush 5-40 mL, 5-40 mL, IntraVENous, Q8H, Andrew Contreras MD, 10 mL at 01/03/23 0159    sodium chloride (NS) flush 5-40 mL, 5-40 mL, IntraVENous, PRN, Andrew Contreras MD    albuterol CONCENTRATE 2.5mg/0.5 mL neb soln, 2.5 mg, Nebulization, Q4H PRN, Andrew Contreras MD    apixaban (ELIQUIS) tablet 5 mg, 5 mg, Oral, BID, Andrew Contreras MD    .PHARMACY TO SUBSTITUTE PER PROTOCOL (Reordered from: Calcium-Cholecalciferol, D3, 600 mg-10 mcg (400 unit) cap), , , Per Protocol, Jaya Turcios MD    .PHARMACY TO SUBSTITUTE PER PROTOCOL (Reordered from: dexlansoprazole (DEXILANT) 60 mg CpDB capsule (delayed release)), , , Per Protocol, Jaya Turcios MD    dicyclomine (BENTYL) tablet 20 mg, 20 mg, Oral, Q6H, Andrew Contreras MD    escitalopram oxalate (LEXAPRO) tablet 20 mg, 20 mg, Oral, DAILY, Thomas Contreras MD    lacosamide (VIMPAT) tablet 200 mg, 200 mg, Oral, BID, Thomas Contreras MD    levETIRAcetam (KEPPRA) tablet 750 mg, 750 mg, Oral, BID, Thomas Contreras MD    latanoprost (XALATAN) 0.005 % ophthalmic solution 1 Drop, 1 Drop, Both Eyes, DAILY, Thomas Contreras MD    [START ON 1/4/2023] levothyroxine (SYNTHROID) tablet 50 mcg, 50 mcg, Oral, ACB, Andrew Contreras MD    .PHARMACY TO SUBSTITUTE PER PROTOCOL (Reordered from: Linzess 145 mcg cap capsule), , , Per Protocol, Thomas Contreras MD    montelukast (SINGULAIR) tablet 10 mg, 10 mg, Oral, DAILY, Thomas Contreras MD    nitroglycerin (NITROSTAT) tablet 0.4 mg, 0.4 mg, SubLINGual, PRN, Thomas Contreras MD    nortriptyline (PAMELOR) capsule 100 mg, 100 mg, Oral, QHS, Andrew Contreras MD    .PHARMACY TO SUBSTITUTE PER PROTOCOL (Reordered from: rizatriptan (MAXALT) 5 mg tablet), , , Per Protocol, Jaya Turcios MD    topiramate (TOPAMAX) tablet 100 mg, 100 mg, Oral, BID, Thomas Contreras MD    insulin lispro (HUMALOG) injection, , SubCUTAneous, AC&HS, Thomas Contreras MD    glucose chewable tablet 16 g, 4 Tablet, Oral, PRN, Andrew Contreras MD    glucagon (GLUCAGEN) injection 1 mg, 1 mg, IntraMUSCular, PRN, Andrew Contreras MD    polyethylene glycol (MIRALAX) packet 17 g, 17 g, Oral, DAILY PRN, Andrew Contreras MD    ondansetron (ZOFRAN ODT) tablet 4 mg, 4 mg, Oral, Q8H PRN **OR** ondansetron (ZOFRAN) injection 4 mg, 4 mg, IntraVENous, Q6H PRN, Andrew Contreras MD    0.9% sodium chloride infusion, 75 mL/hr, IntraVENous, CONTINUOUS, Thomas Contreras MD    Current Outpatient Medications:     erenumab-aooe (Aimovig Autoinjector) 140 mg/mL injection, 140 mg by SubCUTAneous route once., Disp: , Rfl:     sucralfate (Carafate) 100 mg/mL suspension, Take 1 g by mouth four (4) times daily. , Disp: , Rfl:     albuterol (PROVENTIL VENTOLIN) 2.5 mg /3 mL (0.083 %) nebu, by Nebulization route every four (4) hours as needed for Wheezing., Disp: , Rfl:     scopolamine (TRANSDERM-SCOP) 1 mg over 3 days pt3d, 1 Patch by TransDERmal route every seventy-two (72) hours. Present on pt at this time, right ear, Disp: , Rfl:     nitroglycerin (NITROSTAT) 0.4 mg SL tablet, DISSOLVE 1 TABLET UNDER THE TONGUE EVERY 5 MINUTES AS  NEEDED FOR CHEST PAIN. MAX  OF 3 TABLETS IN 15 MINUTES. CALL 911 IF PAIN PERSISTS., Disp: 30 Tablet, Rfl: 5    famotidine (PEPCID) 40 mg tablet, Take 40 mg by mouth nightly., Disp: , Rfl:     apixaban (ELIQUIS) 5 mg tablet, Take 5 mg by mouth two (2) times a day., Disp: , Rfl:     lacosamide (Vimpat) 200 mg tab tablet, Take 1 Tablet by mouth two (2) times a day. Max Daily Amount: 400 mg., Disp: 30 Tablet, Rfl: 0    levETIRAcetam (KEPPRA) 500 mg tablet, Take 1 Tablet by mouth two (2) times a day. (Patient taking differently: Take 750 mg by mouth two (2) times a day.), Disp: 30 Tablet, Rfl: 2    escitalopram oxalate (LEXAPRO) 10 mg tablet, Take 20 mg by mouth daily. , Disp: , Rfl:     latanoprost (XALATAN) 0.005 % ophthalmic solution, INSTILL 1 DROP INTO EACH EYE AT NIGHT, Disp: , Rfl:     TENS units and TENS electrodes (TENS UNIT AND ELECTRODES), Tens Unit, 1 ea, Dispense: 1 EA, Refills: 0, Easyscript, Maintenance, 0, Disp: , Rfl:     docusate sodium (COLACE) 100 mg capsule, Take 100 mg by mouth two (2) times a day., Disp: , Rfl:     Linzess 145 mcg cap capsule, Take 145 mcg by mouth daily. , Disp: , Rfl:     BD Luer-Narda Syringe 3 mL 25 gauge x 1\" syrg, USE FOR B12 INJECTIONS, Disp: , Rfl:     RABEprazole (ACIPHEX) 20 mg TbEC, Take 20 mg by mouth two (2) times a day., Disp: , Rfl: rizatriptan (MAXALT) 5 mg tablet, Take 5 mg by mouth once as needed for Migraine. May repeat in 2 hours if needed , Disp: , Rfl:     alum-mag hydroxide-simeth-lidocaine-sucralfate, Take 30 mL by mouth daily. , Disp: , Rfl:     montelukast (SINGULAIR) 10 mg tablet, Take 10 mg by mouth daily. , Disp: , Rfl: 3    nortriptyline (PAMELOR) 50 mg capsule, Take 100 mg by mouth nightly., Disp: , Rfl: 5    topiramate (TOPAMAX) 100 mg tablet, Take 100 mg by mouth two (2) times a day., Disp: , Rfl: 5    VENTOLIN HFA 90 mcg/actuation inhaler, Take 2 Puffs by inhalation every four (4) hours as needed. , Disp: , Rfl: 4    dexlansoprazole (DEXILANT) 60 mg CpDB capsule (delayed release), Take 60 mg by mouth daily. , Disp: , Rfl:     dicyclomine (BENTYL) 20 mg tablet, Take 20 mg by mouth every six (6) hours. , Disp: , Rfl:     levothyroxine (SYNTHROID) 50 mcg tablet, Take 50 mcg by mouth Daily (before breakfast). , Disp: , Rfl:     Calcium-Cholecalciferol, D3, 600 mg-10 mcg (400 unit) cap, Take 2 Tablets by mouth daily. , Disp: , Rfl:     multivitamin (ONE A DAY) tablet, Take 1 Tab by mouth daily. , Disp: , Rfl:      Home medications were reviewed    Signed By: Sophie Morejon MD     January 3, 2023

## 2023-01-03 NOTE — ED NOTES
Per daughter patient had migraine on Sunday and used unknown amount of benadryl and Imitrex shots to relieve headache. She believes this is why her mother became unresponsive.

## 2023-01-03 NOTE — ED NOTES
Writer noticed scopolamine patch behind pt's right ear. Writer informed MD. Verbal order received to remove patch. Patch removed. MD aware.

## 2023-01-03 NOTE — ED TRIAGE NOTES
EMS reports pt was in the floor unresponsive when they arrived. Upon EMS arrival pt is arrousable but lethargic.

## 2023-01-03 NOTE — ED PROVIDER NOTES
[facility]  EMERGENCY DEPARTMENT ENCOUNTER NOTE        Date: 1/3/2023  Patient Name: Aidan Rodriges      History of Presenting Illness     Chief Complaint   Patient presents with    Unresponsive       History Provided By: Patient and EMS    HPI: Aidan Rodriges, 45 y.o. female with PMH as below including seizure disorder presents to the ED with altered mental status. Reportedly, patient was found on the floor after falling. She was slow to respond to questions. Did not receive any medications. No witnessed like seizure activity. As the patient is being transported her mental status is slowly clearing up. Fingerstick within normal.  Last seizure was 3 days ago. Patient reports compliance with medications. She is denying other complaints today. PCP: Hernandez Cortes MD    Current Facility-Administered Medications   Medication Dose Route Frequency Provider Last Rate Last Admin    sodium chloride (NS) flush 5-40 mL  5-40 mL IntraVENous Q8H Tawanna Ryan MD   10 mL at 01/03/23 0159    sodium chloride (NS) flush 5-40 mL  5-40 mL IntraVENous PRN Tawanna Ryan MD         Current Outpatient Medications   Medication Sig Dispense Refill    erenumab-aooe (Aimovig Autoinjector) 140 mg/mL injection 140 mg by SubCUTAneous route once. sucralfate (Carafate) 100 mg/mL suspension Take 1 g by mouth four (4) times daily. albuterol (PROVENTIL VENTOLIN) 2.5 mg /3 mL (0.083 %) nebu by Nebulization route every four (4) hours as needed for Wheezing.      scopolamine (TRANSDERM-SCOP) 1 mg over 3 days pt3d 1 Patch by TransDERmal route every seventy-two (72) hours. Present on pt at this time, right ear      nitroglycerin (NITROSTAT) 0.4 mg SL tablet DISSOLVE 1 TABLET UNDER THE TONGUE EVERY 5 MINUTES AS  NEEDED FOR CHEST PAIN. MAX  OF 3 TABLETS IN 15 MINUTES. CALL 911 IF PAIN PERSISTS. 30 Tablet 5    famotidine (PEPCID) 40 mg tablet Take 40 mg by mouth nightly.       apixaban (ELIQUIS) 5 mg tablet Take 5 mg by mouth two (2) times a day. lacosamide (Vimpat) 200 mg tab tablet Take 1 Tablet by mouth two (2) times a day. Max Daily Amount: 400 mg. 30 Tablet 0    levETIRAcetam (KEPPRA) 500 mg tablet Take 1 Tablet by mouth two (2) times a day. (Patient taking differently: Take 750 mg by mouth two (2) times a day.) 30 Tablet 2    escitalopram oxalate (LEXAPRO) 10 mg tablet Take 20 mg by mouth daily. latanoprost (XALATAN) 0.005 % ophthalmic solution INSTILL 1 DROP INTO EACH EYE AT NIGHT      TENS units and TENS electrodes (TENS UNIT AND ELECTRODES) Tens Unit, 1 ea, Dispense: 1 EA, Refills: 0, Easyscript, Maintenance, 0      docusate sodium (COLACE) 100 mg capsule Take 100 mg by mouth two (2) times a day. Linzess 145 mcg cap capsule Take 145 mcg by mouth daily. BD Luer-Narda Syringe 3 mL 25 gauge x 1\" syrg USE FOR B12 INJECTIONS      RABEprazole (ACIPHEX) 20 mg TbEC Take 20 mg by mouth two (2) times a day. rizatriptan (MAXALT) 5 mg tablet Take 5 mg by mouth once as needed for Migraine. May repeat in 2 hours if needed       alum-mag hydroxide-simeth-lidocaine-sucralfate Take 30 mL by mouth daily. montelukast (SINGULAIR) 10 mg tablet Take 10 mg by mouth daily. 3    nortriptyline (PAMELOR) 50 mg capsule Take 100 mg by mouth nightly. 5    topiramate (TOPAMAX) 100 mg tablet Take 100 mg by mouth two (2) times a day.  5    VENTOLIN HFA 90 mcg/actuation inhaler Take 2 Puffs by inhalation every four (4) hours as needed. 4    dexlansoprazole (DEXILANT) 60 mg CpDB capsule (delayed release) Take 60 mg by mouth daily. dicyclomine (BENTYL) 20 mg tablet Take 20 mg by mouth every six (6) hours. levothyroxine (SYNTHROID) 50 mcg tablet Take 50 mcg by mouth Daily (before breakfast). Calcium-Cholecalciferol, D3, 600 mg-10 mcg (400 unit) cap Take 2 Tablets by mouth daily. multivitamin (ONE A DAY) tablet Take 1 Tab by mouth daily.          Past History     Past Medical History:  Past Medical History:   Diagnosis Date    Arthritis     pt states knees and back    Asthma     Cardiomyopathy ([address] 2005    EF 35%, echo in 2021 normal EF     Colon polyp     Diabetes ([address]     Pt states not diabetic but has all the symptoms of it    Dizzy spells     GERD (gastroesophageal reflux disease)     Heart failure ([address] 2005    now combined systolic, diastolic, seen at [facility] 2021- Dr Chris De La Vega    Irritable bowel syndrome     Knee pain, bilateral     Migraines 2011    2-3 week    Neuropathy     pt states of both feet    Pulmonary embolism ([address]     August 2022    Seizures ([address]     petite mals last seizure 2-3 months ago    Thyroid disease     hypothyroid       Past Surgical History:  Past Surgical History:   Procedure Laterality Date    COLONOSCOPY N/A 11/28/2022    COLONOSCOPY performed by Carolyn Bahena MD at [facility] END[facility]COPY    HX CHOLECYSTECTOMY  5/2006    HX COLONOSCOPY      HX ENDOSCOPY      HX GASTRIC BYPASS  2013    HX HYSTERECTOMY  2007    HX KNEE ARTHROSCOPY Right 05/2021    HX ORTHOPAEDIC Bilateral 2013    CTR    HX ORTHOPAEDIC      right knee procedure 2021    HX OTHER SURGICAL      left axillary lymph node biopsy    HX SMALL BOWEL RESECTION      AL APPENDECTOMY      AL COLECTOMY PARTIAL W/ANASTOMOSIS      AL ENTERECTOMY RESCJ SMALL INTESTINE W/ENTEROSTOMY      AL LAPAROSCOPY SURG CHOLECYSTECTOMY      AL UNLISTED PROCEDURE ABDOMEN PERITONEUM & OMENTUM  2008    cholecystectomy    AL UNLISTED PROCEDURE CARDIAC SURGERY      Loop recorder implanted july 2020    AL UNLISTED PROCEDURE STOMACH         Family History:  Family History   Problem Relation Age of Onset    Hypertension Mother     Heart Disease Mother     Cancer Father         stomach cancer    Hypertension Father     No Known Problems Brother     Asthma Daughter     Asthma Son     Asthma Son     Breast Cancer Paternal Aunt         age unknown       Social History:  Social History     Tobacco Use    Smoking status: Never    Smokeless tobacco: Never   Vaping Use    Vaping Use: Never used   Substance Use Topics    Alcohol use: No    Drug use: No       Allergies: Allergies   Allergen Reactions    Acetaminophen Other (comments)     Advised not to take due to Liver Issues    Morphine Other (comments)     SOB/Chest Pressure - in hosp for a week. States DILAUDID Ok    Oxycodone Shortness of Breath     Reaction Type: Allergy; Severity: Severe    Shellfish Derived Anaphylaxis    Codeine Other (comments)     SOB chest pain    Peanut Oil Other (comments)     Reaction Type: Intolerance; Severity: Severe    Sulfa (Sulfonamide Antibiotics) Rash     itching         Review of Systems     Review of Systems    A 10 point review of system was performed and was negative except as noted above in HPI    Physical Exam     Physical Exam  Vitals and nursing note reviewed. Constitutional:       General: She is not in acute distress. Appearance: She is well-developed. She is not diaphoretic. HENT:      Head: Normocephalic and atraumatic. Eyes:      Extraocular Movements: Extraocular movements intact. Conjunctiva/sclera: Conjunctivae normal.   Cardiovascular:      Rate and Rhythm: Normal rate and regular rhythm. Heart sounds: Normal heart sounds. Pulmonary:      Effort: Pulmonary effort is normal.      Breath sounds: Normal breath sounds. Abdominal:      Palpations: Abdomen is soft. Tenderness: There is no abdominal tenderness. Musculoskeletal:      Cervical back: Neck supple. Right lower leg: No tenderness. No edema. Left lower leg: No tenderness. No edema. Neurological:      General: No focal deficit present. Mental Status: She is alert and oriented to person, place, and time.        Lab and Diagnostic Study Results     Labs -     Recent Results (from the past 12 hour(s))   METABOLIC PANEL, COMPREHENSIVE    Collection Time: 01/03/23  1:07 AM   Result Value Ref Range    Sodium 137 136 - 145 mmol/L Potassium 3.8 3.5 - 5.1 mmol/L    Chloride 108 97 - 108 mmol/L    CO2 21 21 - 32 mmol/L    Anion gap 8 5 - 15 mmol/L    Glucose 98 65 - 100 mg/dL    BUN 12 6 - 20 mg/dL    Creatinine 0.73 0.55 - 1.02 mg/dL    BUN/Creatinine ratio 16 12 - 20      eGFR >60 >60 ml/min/1.73m2    Calcium 8.6 8.5 - 10.1 mg/dL    Bilirubin, total 0.2 0.2 - 1.0 mg/dL    AST (SGOT) 148 (H) 15 - 37 U/L    ALT (SGPT) 160 (H) 12 - 78 U/L    Alk. phosphatase 171 (H) 45 - 117 U/L    Protein, total 6.7 6.4 - 8.2 g/dL    Albumin 3.3 (L) 3.5 - 5.0 g/dL    Globulin 3.4 2.0 - 4.0 g/dL    A-G Ratio 1.0 (L) 1.1 - 2.2     CBC WITH AUTOMATED DIFF    Collection Time: 01/03/23  1:07 AM   Result Value Ref Range    WBC 2.1 (L) 3.6 - 11.0 K/uL    RBC 4.21 3.80 - 5.20 M/uL    HGB 11.7 11.5 - 16.0 g/dL    HCT 35.7 35.0 - 47.0 %    MCV 84.8 80.0 - 99.0 FL    MCH 27.8 26.0 - 34.0 PG    MCHC 32.8 30.0 - 36.5 g/dL    RDW 14.9 (H) 11.5 - 14.5 %    PLATELET 298 736 - 884 K/uL    MPV 11.2 8.9 - 12.9 FL    NRBC 0.0 0.0  WBC    ABSOLUTE NRBC 0.00 0.00 - 0.01 K/uL    NEUTROPHILS 34 32 - 75 %    LYMPHOCYTES 42 12 - 49 %    MONOCYTES 18 (H) 5 - 13 %    EOSINOPHILS 5 0 - 7 %    BASOPHILS 1 0 - 1 %    IMMATURE GRANULOCYTES 0 0 - 0.5 %    ABS. NEUTROPHILS 0.7 (L) 1.8 - 8.0 K/UL    ABS. LYMPHOCYTES 0.9 0.8 - 3.5 K/UL    ABS. MONOCYTES 0.4 0.0 - 1.0 K/UL    ABS. EOSINOPHILS 0.1 0.0 - 0.4 K/UL    ABS. BASOPHILS 0.0 0.0 - 0.1 K/UL    ABS. IMM.  GRANS. 0.0 0.00 - 0.04 K/UL    DF AUTOMATED     TROPONIN-HIGH SENSITIVITY    Collection Time: 01/03/23  1:07 AM   Result Value Ref Range    Troponin-High Sensitivity 7 0 - 51 ng/L   MAGNESIUM    Collection Time: 01/03/23  1:07 AM   Result Value Ref Range    Magnesium 2.3 1.6 - 2.4 mg/dL   TSH 3RD GENERATION    Collection Time: 01/03/23  1:07 AM   Result Value Ref Range    TSH 0.31 (L) 0.36 - 3.74 uIU/mL   DRUG SCREEN, URINE    Collection Time: 01/03/23  1:16 AM   Result Value Ref Range    AMPHETAMINES Negative Negative BARBITURATES Negative Negative      BENZODIAZEPINES Negative Negative      COCAINE Negative Negative      METHADONE Negative Negative      OPIATES Negative Negative      PCP(PHENCYCLIDINE) Negative Negative      THC (TH-CANNABINOL) Negative Negative      Drug screen comment        This test is a screen for drugs of abuse in a medical setting only (i.e., they are unconfirmed results and as such must not be used for non-medical purposes, e.g.,employment testing, legal testing). Due to its inherent nature, false positive (FP) and false negative (FN) results may be obtained. Therefore, if necessary for medical care, recommend confirmation of positive findings by GC/MS. URINALYSIS W/ REFLEX CULTURE    Collection Time: 01/03/23  1:18 AM    Specimen: Urine   Result Value Ref Range    Color Yellow/Straw      Appearance Clear Clear      Specific gravity 1.016 1.003 - 1.030      pH (UA) 5.0 5.0 - 8.0      Protein Negative Negative mg/dL    Glucose Negative Negative mg/dL    Ketone 5 (A) Negative mg/dL    Bilirubin Negative Negative      Blood Negative Negative      Urobilinogen 0.1 0.1 - 1.0 EU/dL    Nitrites Negative Negative      Leukocyte Esterase Negative Negative      UA:UC IF INDICATED Culture not indicated by UA result Culture not indicated by UA result      WBC 0-4 0 - 4 /hpf    RBC 0-5 0 - 5 /hpf    Bacteria Negative Negative /hpf    Mucus Trace /lpf   AMMONIA    Collection Time: 01/03/23  6:05 AM   Result Value Ref Range    Ammonia, plasma 14 <32 umol/L       Radiologic Studies -   [unfilled]  CT Results  (Last 48 hours)                 01/03/23 0127  CT HEAD WO CONT Final result    Impression:  No acute intracranial process. Narrative:  EXAM: CT HEAD WO CONT       INDICATION: AMS       COMPARISON: January 1, 2023. CONTRAST: None. TECHNIQUE: Unenhanced CT of the head was performed using 5 mm images. Brain and   bone windows were generated. Coronal and sagittal reformats.  CT dose reduction   was achieved through use of a standardized protocol tailored for this   examination and automatic exposure control for dose modulation. FINDINGS:   The ventricles and sulci are normal in size, shape and configuration. There is   no significant white matter disease. There is no intracranial hemorrhage,   extra-axial collection, or mass effect. The basilar cisterns are open. No CT   evidence of acute infarct. The bone windows demonstrate no abnormalities. The visualized portions of the   paranasal sinuses and mastoid air cells are clear. 01/01/23 1055  CT HEAD WO CONT Final result    Impression:  No acute intracranial process. Narrative:  CLINICAL HISTORY: HA   INDICATION: HA   COMPARISON: 8/8/2022. CT dose reduction was achieved through use of a standardized protocol tailored   for this examination and automatic exposure control for dose modulation. TECHNIQUE: Serial axial images with a collimation of 5 mm were obtained from the   skull base through the vertex     FINDINGS:    The sulci and ventricles are within normal limits for patient age. There is no   evidence of an acute infarction, hemorrhage, or mass-effect. There is no   evidence of midline shift or hydrocephalus. Posterior fossa structures are   unremarkable. No extra-axial collections are seen. Mastoid air cells are well pneumatized and clear. There is no evidence of depressed skull fractures of soft tissue swelling. CXR Results  (Last 48 hours)      None            Medical Decision Making and ED Course   - I am the first and primary provider for this patient AND AM THE PRIMARY PROVIDER OF RECORD. - I reviewed the vital signs, available nursing notes, past medical history, past surgical history, family history and social history. - Initial assessment performed.  The patients presenting problems have been discussed, and the staff are in agreement with the care plan formulated and outlined with them. I have encouraged them to ask questions as they arise throughout their visit. Vital Signs-Reviewed the patient's vital signs. Patient Vitals for the past 24 hrs:   Temp Pulse Resp BP SpO2   01/03/23 0647 -- 76 19 110/77 --   01/03/23 0632 -- 79 16 112/78 98 %   01/03/23 0547 -- 69 17 105/73 98 %   01/03/23 0532 -- 71 19 112/76 97 %   01/03/23 0357 -- 65 18 105/78 97 %   01/03/23 0257 -- 64 16 116/84 97 %   01/03/23 0142 -- 72 16 112/83 97 %   01/03/23 0057 98.7 °F (37.1 °C) 79 18 112/79 96 %       Records Reviewed: Nursing Notes and Old Medical Records    Provider Notes (Medical Decision Making):     ED Course as of 01/03/23 0717   Tue Jan 03, 2023   [address] [facility],[address] Patient is 40-year-old female who presents to the ED with somnolence/lethargy that is improving of the patient arriving to the ED. She does not recall if she had a seizure. Possibly fell and hit her head. She is reporting medication compliance. In that setting and her having seizure 3 days ago the question is that if she is having infectious etiology such as urine tract infection patient is denying any URI-like symptoms. Her lung exam is unremarkable. No wounds appreciated. Thus, will obtain CBC, chemistry, troponin, urinalysis, Keppra level, CT head without contrast.  Patient will obtain an EKG to look for signs of dysrhythmia. [AA]   0105 EKG was done at 12:54 AM and read by me as NSR at a rate of 76, intervals within normal, normal axis, no ST elevation or depression, no signs of dysrhythmia nor blocks. [AA]   0130 I independently reviewed the CT scan. I did not appreciate acute bleed. Pending the radiologist final read for confirmation. [AA]   0240 CBC showed leukopenia with decreased absolute lymphocytic count. Chemistry showed mild hepatic impairment. Patient does not have any tenderness in the right upper quadrant of the epigastrium. This would need further work-up as an outpatient.   Pending urinalysis and urine drug screen. [AA]   0330 Patient is more arousable. I obtained additional history. She reports that she has had upper and right lower extremity weakness since August 2022 that has not changed. She does not have any new weakness, facial droop, difficulty speaking, vision changes, or hearing changes. Pending return to baseline for disposition. [AA]   0400 Urinalysis and urine drug screen are negative for acute finding. [AA]   [id number] Patient has prolonged postictal status. At this point I wonder if there is an underlying cause of her mental status change. We will add in TSH given history of hypothyroidism as well as ammonia. She otherwise is easily arousable yet somnolent. [AA]   0700 TSH and ammonia is negative. Admit the patient to the hospital for further work-up for altered mental status. [AA]      ED Course User Index  [AA] Radha Hickman MD           Diagnosis     Clinical Impression:   1. Altered mental status, unspecified altered mental status type    2. Seizure ([address]    3. Neutropenia, unspecified type ([address]        Disposition     Disposition: Condition stable  Admitted to [facility] the case was discussed with the admitting physician Dr. Conchita Navarrete    Admitted    Attestations: Renetta Sawyer MD    Please note that this dictation was completed with [employer], the computer voice recognition software. Quite often unanticipated grammatical, syntax, homophones, and other interpretive errors are inadvertently transcribed by the computer software. Please disregard these errors. Please excuse any errors that have escaped final proofreading. Thank you.

## 2023-01-04 LAB
ALBUMIN SERPL-MCNC: 2.8 G/DL (ref 3.5–5)
ALBUMIN/GLOB SERPL: 0.9 (ref 1.1–2.2)
ALP SERPL-CCNC: 156 U/L (ref 45–117)
ALT SERPL-CCNC: 121 U/L (ref 12–78)
ANION GAP SERPL CALC-SCNC: 8 MMOL/L (ref 5–15)
AST SERPL W P-5'-P-CCNC: 83 U/L (ref 15–37)
BASOPHILS # BLD: 0 K/UL (ref 0–0.1)
BASOPHILS NFR BLD: 1 % (ref 0–1)
BILIRUB SERPL-MCNC: 0.2 MG/DL (ref 0.2–1)
BUN SERPL-MCNC: 11 MG/DL (ref 6–20)
BUN/CREAT SERPL: 17 (ref 12–20)
CA-I BLD-MCNC: 8.1 MG/DL (ref 8.5–10.1)
CHLORIDE SERPL-SCNC: 113 MMOL/L (ref 97–108)
CO2 SERPL-SCNC: 21 MMOL/L (ref 21–32)
CREAT SERPL-MCNC: 0.65 MG/DL (ref 0.55–1.02)
DIFFERENTIAL METHOD BLD: ABNORMAL
EOSINOPHIL # BLD: 0.2 K/UL (ref 0–0.4)
EOSINOPHIL NFR BLD: 6 % (ref 0–7)
ERYTHROCYTE [DISTWIDTH] IN BLOOD BY AUTOMATED COUNT: 15.1 % (ref 11.5–14.5)
GLOBULIN SER CALC-MCNC: 3.2 G/DL (ref 2–4)
GLUCOSE BLD STRIP.AUTO-MCNC: 74 MG/DL (ref 65–100)
GLUCOSE BLD STRIP.AUTO-MCNC: 79 MG/DL (ref 65–100)
GLUCOSE BLD STRIP.AUTO-MCNC: 87 MG/DL (ref 65–100)
GLUCOSE BLD STRIP.AUTO-MCNC: 92 MG/DL (ref 65–100)
GLUCOSE SERPL-MCNC: 81 MG/DL (ref 65–100)
HCT VFR BLD AUTO: 34.1 % (ref 35–47)
HGB BLD-MCNC: 11.1 G/DL (ref 11.5–16)
IMM GRANULOCYTES # BLD AUTO: 0 K/UL (ref 0–0.04)
IMM GRANULOCYTES NFR BLD AUTO: 0 % (ref 0–0.5)
LEVETIRACETAM SERPL-MCNC: 11.9 UG/ML (ref 10–40)
LYMPHOCYTES # BLD: 1.1 K/UL (ref 0.8–3.5)
LYMPHOCYTES NFR BLD: 40 % (ref 12–49)
MCH RBC QN AUTO: 27.7 PG (ref 26–34)
MCHC RBC AUTO-ENTMCNC: 32.6 G/DL (ref 30–36.5)
MCV RBC AUTO: 85 FL (ref 80–99)
MONOCYTES # BLD: 0.3 K/UL (ref 0–1)
MONOCYTES NFR BLD: 11 % (ref 5–13)
NEUTS SEG # BLD: 1.2 K/UL (ref 1.8–8)
NEUTS SEG NFR BLD: 42 % (ref 32–75)
NRBC # BLD: 0 K/UL (ref 0–0.01)
NRBC BLD-RTO: 0 PER 100 WBC
PERFORMED BY, TECHID: NORMAL
PLATELET # BLD AUTO: 187 K/UL (ref 150–400)
PMV BLD AUTO: 10.8 FL (ref 8.9–12.9)
POTASSIUM SERPL-SCNC: 3.6 MMOL/L (ref 3.5–5.1)
PROT SERPL-MCNC: 6 G/DL (ref 6.4–8.2)
RBC # BLD AUTO: 4.01 M/UL (ref 3.8–5.2)
SODIUM SERPL-SCNC: 142 MMOL/L (ref 136–145)
WBC # BLD AUTO: 2.7 K/UL (ref 3.6–11)

## 2023-01-04 PROCEDURE — 74011250637 HC RX REV CODE- 250/637: Performed by: FAMILY MEDICINE

## 2023-01-04 PROCEDURE — 97162 PT EVAL MOD COMPLEX 30 MIN: CPT

## 2023-01-04 PROCEDURE — 97165 OT EVAL LOW COMPLEX 30 MIN: CPT

## 2023-01-04 PROCEDURE — 36415 COLL VENOUS BLD VENIPUNCTURE: CPT

## 2023-01-04 PROCEDURE — 65270000032 HC RM SEMIPRIVATE

## 2023-01-04 PROCEDURE — 80053 COMPREHEN METABOLIC PANEL: CPT

## 2023-01-04 PROCEDURE — 74011000250 HC RX REV CODE- 250: Performed by: FAMILY MEDICINE

## 2023-01-04 PROCEDURE — G0378 HOSPITAL OBSERVATION PER HR: HCPCS

## 2023-01-04 PROCEDURE — 82962 GLUCOSE BLOOD TEST: CPT

## 2023-01-04 PROCEDURE — 85025 COMPLETE CBC W/AUTO DIFF WBC: CPT

## 2023-01-04 PROCEDURE — 74011250637 HC RX REV CODE- 250/637: Performed by: PSYCHIATRY & NEUROLOGY

## 2023-01-04 PROCEDURE — 97530 THERAPEUTIC ACTIVITIES: CPT

## 2023-01-04 RX ORDER — BENZONATATE 100 MG/1
100 CAPSULE ORAL
Status: DISCONTINUED | OUTPATIENT
Start: 2023-01-04 | End: 2023-01-05 | Stop reason: HOSPADM

## 2023-01-04 RX ORDER — OXCARBAZEPINE 150 MG/1
300 TABLET, FILM COATED ORAL 2 TIMES DAILY
Status: DISCONTINUED | OUTPATIENT
Start: 2023-01-04 | End: 2023-01-05

## 2023-01-04 RX ADMIN — Medication 2 TABLET: at 08:55

## 2023-01-04 RX ADMIN — LATANOPROST 1 DROP: 50 SOLUTION OPHTHALMIC at 08:56

## 2023-01-04 RX ADMIN — BENZONATATE 100 MG: 100 CAPSULE ORAL at 11:58

## 2023-01-04 RX ADMIN — NORTRIPTYLINE HYDROCHLORIDE 100 MG: 25 CAPSULE ORAL at 21:34

## 2023-01-04 RX ADMIN — SODIUM CHLORIDE, PRESERVATIVE FREE 10 ML: 5 INJECTION INTRAVENOUS at 05:28

## 2023-01-04 RX ADMIN — LACOSAMIDE 200 MG: 200 TABLET, FILM COATED ORAL at 21:34

## 2023-01-04 RX ADMIN — TOPIRAMATE 100 MG: 100 TABLET, FILM COATED ORAL at 21:34

## 2023-01-04 RX ADMIN — LEVOTHYROXINE SODIUM 50 MCG: 0.03 TABLET ORAL at 08:55

## 2023-01-04 RX ADMIN — ESCITALOPRAM OXALATE 20 MG: 10 TABLET ORAL at 08:55

## 2023-01-04 RX ADMIN — APIXABAN 5 MG: 5 TABLET, FILM COATED ORAL at 08:56

## 2023-01-04 RX ADMIN — DICYCLOMINE HYDROCHLORIDE 20 MG: 20 TABLET ORAL at 03:07

## 2023-01-04 RX ADMIN — DICYCLOMINE HYDROCHLORIDE 20 MG: 20 TABLET ORAL at 11:58

## 2023-01-04 RX ADMIN — DICYCLOMINE HYDROCHLORIDE 20 MG: 20 TABLET ORAL at 08:55

## 2023-01-04 RX ADMIN — DICYCLOMINE HYDROCHLORIDE 20 MG: 20 TABLET ORAL at 23:58

## 2023-01-04 RX ADMIN — OXCARBAZEPINE 300 MG: 150 TABLET, FILM COATED ORAL at 11:58

## 2023-01-04 RX ADMIN — APIXABAN 5 MG: 5 TABLET, FILM COATED ORAL at 21:34

## 2023-01-04 RX ADMIN — PANTOPRAZOLE SODIUM 40 MG: 40 TABLET, DELAYED RELEASE ORAL at 08:55

## 2023-01-04 RX ADMIN — MONTELUKAST 10 MG: 10 TABLET, FILM COATED ORAL at 08:55

## 2023-01-04 RX ADMIN — OXCARBAZEPINE 300 MG: 150 TABLET, FILM COATED ORAL at 21:34

## 2023-01-04 RX ADMIN — TOPIRAMATE 100 MG: 100 TABLET, FILM COATED ORAL at 08:55

## 2023-01-04 RX ADMIN — LACOSAMIDE 200 MG: 200 TABLET, FILM COATED ORAL at 08:55

## 2023-01-04 RX ADMIN — DICYCLOMINE HYDROCHLORIDE 20 MG: 20 TABLET ORAL at 17:22

## 2023-01-04 NOTE — PROGRESS NOTES
General Daily Progress Note          Patient Name:   Matt Bender       YOB: 1970       Age:  45 y.o. Admit Date: 1/3/2023      Subjective:     Patient evaluated at bedside and resting comfortably about to have breakfast. She reports no episodes of a migraine since last night. Only complaint this morning is a dry cough and asking for me medication to assist with this symptom. She denies any shortness of breath, chest pain or any other symptoms at this time.     Objective:     Visit Vitals  /67 (BP 1 Location: Right upper arm)   Pulse 70   Temp 97.9 °F (36.6 °C)   Resp 18   Wt 81 kg (178 lb 9.2 oz)   SpO2 97%   BMI 29.72 kg/m²        Recent Results (from the past 24 hour(s))   GLUCOSE, POC    Collection Time: 01/03/23  1:25 PM   Result Value Ref Range    Glucose (POC) 77 65 - 100 mg/dL    Performed by RADHA ROQUE    HEMOGLOBIN A1C WITH EAG    Collection Time: 01/03/23  1:28 PM   Result Value Ref Range    Hemoglobin A1c 5.4 4.0 - 5.6 %    Est. average glucose 108 mg/dL   GLUCOSE, POC    Collection Time: 01/03/23  6:37 PM   Result Value Ref Range    Glucose (POC) 103 (H) 65 - 100 mg/dL    Performed by [id number], POC    Collection Time: 01/03/23 11:01 PM   Result Value Ref Range    Glucose (POC) 66 65 - 100 mg/dL    Performed by [address] [facility], POC    Collection Time: 01/03/23 11:37 PM   Result Value Ref Range    Glucose (POC) 111 (H) 65 - 100 mg/dL    Performed by Geoff Precise    METABOLIC PANEL, COMPREHENSIVE    Collection Time: 01/04/23  6:51 AM   Result Value Ref Range    Sodium 142 136 - 145 mmol/L    Potassium 3.6 3.5 - 5.1 mmol/L    Chloride 113 (H) 97 - 108 mmol/L    CO2 21 21 - 32 mmol/L    Anion gap 8 5 - 15 mmol/L    Glucose 81 65 - 100 mg/dL    BUN 11 6 - 20 mg/dL    Creatinine 0.65 0.55 - 1.02 mg/dL    BUN/Creatinine ratio 17 12 - 20      eGFR >60 >60 ml/min/1.73m2    Calcium 8.1 (L) 8.5 - 10.1 mg/dL    Bilirubin, total 0.2 0.2 - 1.0 mg/dL    AST (SGOT) 83 (H) 15 - 37 U/L    ALT (SGPT) 121 (H) 12 - 78 U/L    Alk. phosphatase 156 (H) 45 - 117 U/L    Protein, total 6.0 (L) 6.4 - 8.2 g/dL    Albumin 2.8 (L) 3.5 - 5.0 g/dL    Globulin 3.2 2.0 - 4.0 g/dL    A-G Ratio 0.9 (L) 1.1 - 2.2     CBC WITH AUTOMATED DIFF    Collection Time: 01/04/23  6:51 AM   Result Value Ref Range    WBC 2.7 (L) 3.6 - 11.0 K/uL    RBC 4.01 3.80 - 5.20 M/uL    HGB 11.1 (L) 11.5 - 16.0 g/dL    HCT 34.1 (L) 35.0 - 47.0 %    MCV 85.0 80.0 - 99.0 FL    MCH 27.7 26.0 - 34.0 PG    MCHC 32.6 30.0 - 36.5 g/dL    RDW 15.1 (H) 11.5 - 14.5 %    PLATELET 028 302 - 979 K/uL    MPV 10.8 8.9 - 12.9 FL    NRBC 0.0 0.0  WBC    ABSOLUTE NRBC 0.00 0.00 - 0.01 K/uL    NEUTROPHILS 42 32 - 75 %    LYMPHOCYTES 40 12 - 49 %    MONOCYTES 11 5 - 13 %    EOSINOPHILS 6 0 - 7 %    BASOPHILS 1 0 - 1 %    IMMATURE GRANULOCYTES 0 0 - 0.5 %    ABS. NEUTROPHILS 1.2 (L) 1.8 - 8.0 K/UL    ABS. LYMPHOCYTES 1.1 0.8 - 3.5 K/UL    ABS. MONOCYTES 0.3 0.0 - 1.0 K/UL    ABS. EOSINOPHILS 0.2 0.0 - 0.4 K/UL    ABS. BASOPHILS 0.0 0.0 - 0.1 K/UL    ABS. IMM. GRANS. 0.0 0.00 - 0.04 K/UL    DF AUTOMATED     GLUCOSE, POC    Collection Time: 01/04/23  8:40 AM   Result Value Ref Range    Glucose (POC) 87 65 - 100 mg/dL    Performed by Yuki Gabriel    GLUCOSE, POC    Collection Time: 01/04/23 11:08 AM   Result Value Ref Range    Glucose (POC) 74 65 - 100 mg/dL    Performed by Anita Brennan      [unfilled]      Review of Systems    Constitutional: Negative for chills and fever. HENT: Negative. Eyes: Negative. Respiratory: Positive cough (dry)   Cardiovascular: Negative. Gastrointestinal: Negative for abdominal pain and nausea. Skin: Negative. Neurological: Negative. Physical Exam:      Constitutional: pt is oriented to person, place, and time. HENT:   Head: Normocephalic and atraumatic. Eyes: Pupils are equal, round, and reactive to light.  EOM are normal.   Cardiovascular: Normal rate, regular rhythm and normal heart sounds. Pulmonary/Chest: Breath sounds normal. No wheezes. No rales. Exhibits no tenderness. Abdominal: Soft. Bowel sounds are normal. There is no abdominal tenderness. There is no rebound and no guarding. Musculoskeletal: Normal range of motion. Neurological: pt is alert and oriented to person, place, and time. XR CHEST PORT   Final Result   Small areas of discoid atelectasis in the left base. CT HEAD WO CONT   Final Result   No acute intracranial process. Recent Results (from the past 24 hour(s))   GLUCOSE, POC    Collection Time: 01/03/23  1:25 PM   Result Value Ref Range    Glucose (POC) 77 65 - 100 mg/dL    Performed by RADHA ROQUE    HEMOGLOBIN A1C WITH EAG    Collection Time: 01/03/23  1:28 PM   Result Value Ref Range    Hemoglobin A1c 5.4 4.0 - 5.6 %    Est. average glucose 108 mg/dL   GLUCOSE, POC    Collection Time: 01/03/23  6:37 PM   Result Value Ref Range    Glucose (POC) 103 (H) 65 - 100 mg/dL    Performed by [id number] [facility], POC    Collection Time: 01/03/23 11:01 PM   Result Value Ref Range    Glucose (POC) 66 65 - 100 mg/dL    Performed by [address] [facility], POC    Collection Time: 01/03/23 11:37 PM   Result Value Ref Range    Glucose (POC) 111 (H) 65 - 100 mg/dL    Performed by Jim Fenton    METABOLIC PANEL, COMPREHENSIVE    Collection Time: 01/04/23  6:51 AM   Result Value Ref Range    Sodium 142 136 - 145 mmol/L    Potassium 3.6 3.5 - 5.1 mmol/L    Chloride 113 (H) 97 - 108 mmol/L    CO2 21 21 - 32 mmol/L    Anion gap 8 5 - 15 mmol/L    Glucose 81 65 - 100 mg/dL    BUN 11 6 - 20 mg/dL    Creatinine 0.65 0.55 - 1.02 mg/dL    BUN/Creatinine ratio 17 12 - 20      eGFR >60 >60 ml/min/1.73m2    Calcium 8.1 (L) 8.5 - 10.1 mg/dL    Bilirubin, total 0.2 0.2 - 1.0 mg/dL    AST (SGOT) 83 (H) 15 - 37 U/L    ALT (SGPT) 121 (H) 12 - 78 U/L    Alk.  phosphatase 156 (H) 45 - 117 U/L    Protein, total 6.0 (L) 6.4 - 8.2 g/dL Albumin 2.8 (L) 3.5 - 5.0 g/dL    Globulin 3.2 2.0 - 4.0 g/dL    A-G Ratio 0.9 (L) 1.1 - 2.2     CBC WITH AUTOMATED DIFF    Collection Time: 01/04/23  6:51 AM   Result Value Ref Range    WBC 2.7 (L) 3.6 - 11.0 K/uL    RBC 4.01 3.80 - 5.20 M/uL    HGB 11.1 (L) 11.5 - 16.0 g/dL    HCT 34.1 (L) 35.0 - 47.0 %    MCV 85.0 80.0 - 99.0 FL    MCH 27.7 26.0 - 34.0 PG    MCHC 32.6 30.0 - 36.5 g/dL    RDW 15.1 (H) 11.5 - 14.5 %    PLATELET 771 863 - 249 K/uL    MPV 10.8 8.9 - 12.9 FL    NRBC 0.0 0.0  WBC    ABSOLUTE NRBC 0.00 0.00 - 0.01 K/uL    NEUTROPHILS 42 32 - 75 %    LYMPHOCYTES 40 12 - 49 %    MONOCYTES 11 5 - 13 %    EOSINOPHILS 6 0 - 7 %    BASOPHILS 1 0 - 1 %    IMMATURE GRANULOCYTES 0 0 - 0.5 %    ABS. NEUTROPHILS 1.2 (L) 1.8 - 8.0 K/UL    ABS. LYMPHOCYTES 1.1 0.8 - 3.5 K/UL    ABS. MONOCYTES 0.3 0.0 - 1.0 K/UL    ABS. EOSINOPHILS 0.2 0.0 - 0.4 K/UL    ABS. BASOPHILS 0.0 0.0 - 0.1 K/UL    ABS. IMM.  GRANS. 0.0 0.00 - 0.04 K/UL    DF AUTOMATED     GLUCOSE, POC    Collection Time: 01/04/23  8:40 AM   Result Value Ref Range    Glucose (POC) 87 65 - 100 mg/dL    Performed by Zia Anderson    GLUCOSE, POC    Collection Time: 01/04/23 11:08 AM   Result Value Ref Range    Glucose (POC) 74 65 - 100 mg/dL    Performed by Jesu Fraga        Results       Procedure Component Value Units Date/Time    CULTURE, BLOOD #1 [[id number]] Collected: 01/03/23 1319    Order Status: Sent Specimen: Blood Updated: 01/03/23 1346    CULTURE, BLOOD #2 [[id number]] Collected: 01/03/23 1319    Order Status: Sent Specimen: Blood Updated: 01/03/23 1346    CULTURE, URINE [[id number]]     Order Status: Sent Specimen: Urine from Clean catch              Labs:     Recent Labs     01/04/23  0651 01/03/23  0107   WBC 2.7* 2.1*   HGB 11.1* 11.7   HCT 34.1* 35.7    198       Recent Labs     01/04/23  0651 01/03/23  0107    137   K 3.6 3.8   * 108   CO2 21 21   BUN 11 12   CREA 0.65 0.73   GLU 81 98   CA 8.1* 8.6   MG  --  2.3 Recent Labs     01/04/23  0651 01/03/23  0107   * 160*   * 171*   TBILI 0.2 0.2   TP 6.0* 6.7   ALB 2.8* 3.3*   GLOB 3.2 3.4       No results for input(s): INR, PTP, APTT, INREXT, INREXT in the last 72 hours. No results for input(s): FE, TIBC, PSAT, FERR in the last 72 hours. No results found for: FOL, RBCF   No results for input(s): PH, PCO2, PO2 in the last 72 hours. No results for input(s): CPK, CKNDX, TROIQ in the last 72 hours.     No lab exists for component: CPKMB  No results found for: CHOL, CHOLX, CHLST, CHOLV, HDL, HDLP, LDL, LDLC, DLDLP, TGLX, TRIGL, TRIGP, CHHD, CHHDX  Lab Results   Component Value Date/Time    Glucose (POC) 74 01/04/2023 11:08 AM    Glucose (POC) 87 01/04/2023 08:40 AM    Glucose (POC) 111 (H) 01/03/2023 11:37 PM    Glucose (POC) 66 01/03/2023 11:01 PM    Glucose (POC) 103 (H) 01/03/2023 06:37 PM     Lab Results   Component Value Date/Time    Color Yellow/Straw 01/03/2023 01:18 AM    Appearance Clear 01/03/2023 01:18 AM    Specific gravity 1.016 01/03/2023 01:18 AM    pH (UA) 5.0 01/03/2023 01:18 AM    Protein Negative 01/03/2023 01:18 AM    Glucose Negative 01/03/2023 01:18 AM    Ketone 5 (A) 01/03/2023 01:18 AM    Bilirubin Negative 01/03/2023 01:18 AM    Urobilinogen 0.1 01/03/2023 01:18 AM    Nitrites Negative 01/03/2023 01:18 AM    Leukocyte Esterase Negative 01/03/2023 01:18 AM    Bacteria Negative 01/03/2023 01:18 AM    WBC 0-4 01/03/2023 01:18 AM    RBC 0-5 01/03/2023 01:18 AM         Assessment:     Altered mental status  Seizure disorder  Migraine headaches  Diabetes mellitus  Heart failure, combined systolic and diastolic  Recent echo shows ejection fraction of 50 to 55%  Hypothyroidism  GERD  Hx of pulmonary embolism  Neutropenia (improving)    Plan:     Continue home medications  Admit to medical floor for observation  Blood and urine cultures pending  Chest x-ray revealed small areas of discoid atelectasis in the left base  Seizure precautions  Sliding scale insulin  Begin Teslon  assist with the dry cough    Neurology evaluated the patient and recommended reinstating all antiepileptic medications, if some of medications are not available to pharmacy she can take her home medications. No further neurological work-up recommended. Repeat the labs in the morning and possible discharge home tomorrow      Current Facility-Administered Medications:     OTHER(NON-FORMULARY) 200 mg, 200 mg, Oral, DAILY, Josef Lares MD    OTHER(NON-FORMULARY) 800 mg, 800 mg, Oral, DAILY, Josef Lares MD    OXcarbazepine (TRILEPTAL) tablet 300 mg, 300 mg, Oral, BID, Josef Lares MD    sodium chloride (NS) flush 5-40 mL, 5-40 mL, IntraVENous, PRN, Andrew Contreras MD    albuterol CONCENTRATE 2.5mg/0.5 mL neb soln, 2.5 mg, Nebulization, Q4H PRN, Andrew Contreras MD    apixaban (ELIQUIS) tablet 5 mg, 5 mg, Oral, BID, Andrew Contreras MD, 5 mg at 01/04/23 0856    calcium-vitamin D 600 mg-5 mcg (200 unit) per tablet 2 Tablet, 2 Tablet, Oral, DAILY, Andrew Contreras MD, 2 Tablet at 01/04/23 0855    pantoprazole (PROTONIX) tablet 40 mg, 40 mg, Oral, DAILY, Ricardo Dudley MD, 40 mg at 01/04/23 0855    dicyclomine (BENTYL) tablet 20 mg, 20 mg, Oral, Q6H, Andrew Contreras MD, 20 mg at 01/04/23 0855    escitalopram oxalate (LEXAPRO) tablet 20 mg, 20 mg, Oral, DAILY, Andrew Contreras MD, 20 mg at 01/04/23 0855    lacosamide (VIMPAT) tablet 200 mg, 200 mg, Oral, BID, Ricardo Dudley MD, 200 mg at 01/04/23 0855    latanoprost (XALATAN) 0.005 % ophthalmic solution 1 Drop, 1 Drop, Both Eyes, DAILY, Andrew Contreras MD, 1 Drop at 01/04/23 0856    levothyroxine (SYNTHROID) tablet 50 mcg, 50 mcg, Oral, ACB, Andrew Contreras MD, 50 mcg at 01/04/23 0855    . PHARMACY TO SUBSTITUTE PER PROTOCOL (Reordered from: Linzess 145 mcg cap capsule), , , Per Protocol, Elisha Contreras MD    montelukast (SINGULAIR) tablet 10 mg, 10 mg, Oral, DAILY, Andrew Contreras MD, 10 mg at 01/04/23 0855    nitroglycerin (NITROSTAT) tablet 0.4 mg, 0.4 mg, SubLINGual, PRN, Rico Contreras MD    nortriptyline (PAMELOR) capsule 100 mg, 100 mg, Oral, QHS, Andrew Contreras MD, 100 mg at 01/03/23 2221    topiramate (TOPAMAX) tablet 100 mg, 100 mg, Oral, BID, Rico Contreras MD, 100 mg at 01/04/23 0855    insulin lispro (HUMALOG) injection, , SubCUTAneous, AC&HS, Rico Contreras MD    glucose chewable tablet 16 g, 4 Tablet, Oral, PRN, Rico Contreras MD    glucagon (GLUCAGEN) injection 1 mg, 1 mg, IntraMUSCular, PRN, Rico Contreras MD    polyethylene glycol (MIRALAX) packet 17 g, 17 g, Oral, DAILY PRN, Andrew Contreras MD    ondansetron (ZOFRAN ODT) tablet 4 mg, 4 mg, Oral, Q8H PRN **OR** ondansetron (ZOFRAN) injection 4 mg, 4 mg, IntraVENous, Q6H PRN, Marjorie Cardona MD    0.9% sodium chloride infusion, 75 mL/hr, IntraVENous, CONTINUOUS, Andrew Contreras MD, Last Rate: 75 mL/hr at 01/03/23 2223, 75 mL/hr at 01/03/23 2223    scopolamine (TRANSDERM-SCOP) 1 mg over 3 days 1 Patch, 1 Patch, TransDERmal, Q72H, Marjorie Cardona MD, 1 Patch at 01/03/23 2221

## 2023-01-04 NOTE — CONSULTS
Consult Date: 1/4/2023    Consults Ms. Meldia Krause is a 58-year-old woman with past medical history of seizures, migraines, heart failure, comes in for evaluation of loss of consciousness that occurred yesterday. She recalls that she was trying to sit up on her bed but could not do so after repeated tries and then lost consciousness. Her mother called 46. She says she woke up sometime after but cannot recall how long she was unconscious for. She says she is unsure but this may have been like her typical seizure. She is back to baseline at this time. She does state that she has been having 4 days worth of migraines and did get some relief briefly from an ER visit but migraines came back. At the current time she denies any migraine headaches but does have a mild headache in the frontal region. She also has a history of complex partial seizures and is on Vimpat 200 mg twice a day, Xcopri 200 mg daily and Aptiom 800 mg daily and follows up with Dr. Richard Purvis. She does state that she has been having trouble filling one of her medications and has not been on it. She does not recall the name of that particular medication.     Subjective     Past Medical History:   Diagnosis Date    Arthritis     pt states knees and back    Asthma     Cardiomyopathy ([address] 2005    EF 35%, echo in 2021 normal EF     Colon polyp     Diabetes ([address]     Pt states not diabetic but has all the symptoms of it    Dizzy spells     GERD (gastroesophageal reflux disease)     Heart failure ([address] 2005    now combined systolic, diastolic, [place] [facility] [place] [id number] 2021- Dr Sourav Ortiz    Irritable bowel syndrome     Knee pain, bilateral     Migraines 2011    2-3 week    Neuropathy     pt states of both feet    Pulmonary embolism [facility])     August 2022    Seizures ([address]     petite mals last seizure 2-3 months ago    Thyroid disease     hypothyroid      Past Surgical History:   Procedure Laterality Date    COLONOSCOPY N/A 11/28/2022 COLONOSCOPY performed by Ester Sewell MD at [address]  5/2006    HX COLONOSCOPY      HX ENDOSCOPY      HX GASTRIC BYPASS  2013    HX HYSTERECTOMY  2007    HX KNEE ARTHROSCOPY Right 05/2021    HX ORTHOPAEDIC Bilateral 2013    CTR    HX ORTHOPAEDIC      right knee procedure 2021    HX OTHER SURGICAL      left axillary lymph node biopsy    HX SMALL BOWEL RESECTION      FL APPENDECTOMY      FL COLECTOMY PARTIAL W/ANASTOMOSIS      FL ENTERECTOMY RESCJ SMALL INTESTINE W/ENTEROSTOMY      FL LAPAROSCOPY SURG CHOLECYSTECTOMY      FL UNLISTED PROCEDURE ABDOMEN PERITONEUM & OMENTUM  2008    cholecystectomy    FL UNLISTED PROCEDURE CARDIAC SURGERY      Loop recorder implanted july 2020    FL UNLISTED PROCEDURE STOMACH       Family History   Problem Relation Age of Onset    Hypertension Mother     Heart Disease Mother     Cancer Father         stomach cancer    Hypertension Father     No Known Problems Brother     Asthma Daughter     Asthma Son     Asthma Son     Breast Cancer Paternal Aunt         age unknown      Social History     Tobacco Use    Smoking status: Never    Smokeless tobacco: Never   Substance Use Topics    Alcohol use: No       Current Facility-Administered Medications   Medication Dose Route Frequency Provider Last Rate Last Admin    sodium chloride (NS) flush 5-40 mL  5-40 mL IntraVENous PRN Andrew Contreras MD        albuterol CONCENTRATE 2.5mg/0.5 mL neb soln  2.5 mg Nebulization Q4H PRN Doris Contreras MD        apixaban (ELIQUIS) tablet 5 mg  5 mg Oral BID Andrew Contreras MD   5 mg at 01/03/23 2126    calcium-vitamin D 600 mg-5 mcg (200 unit) per tablet 2 Tablet  2 Tablet Oral DAILY Andrew Contreras MD        pantoprazole (PROTONIX) tablet 40 mg  40 mg Oral DAILY Andrew Contreras MD        dicyclomine (BENTYL) tablet 20 mg  20 mg Oral Q6H Andrew Contreras MD   20 mg at 01/04/23 0307    escitalopram oxalate (LEXAPRO) tablet 20 mg  20 mg Oral DAILY Kelsea Garcia MD 20 mg at 01/03/23 1409    lacosamide (VIMPAT) tablet 200 mg  200 mg Oral BID Rico Contreras MD   200 mg at 01/03/23 2127    levETIRAcetam (KEPPRA) tablet 750 mg  750 mg Oral BID Rico Contreras MD   750 mg at 01/03/23 2253    latanoprost (XALATAN) 0.005 % ophthalmic solution 1 Drop  1 Drop Both Eyes DAILY Andrew Contreras MD   1 Drop at 01/03/23 1411    levothyroxine (SYNTHROID) tablet 50 mcg  50 mcg Oral ACB Andrew Contreras MD        .PHARMACY TO SUBSTITUTE PER PROTOCOL (Reordered from: Linzess 145 mcg cap capsule)    Per Protocol Marjorie Cardona MD        montelukast (SINGULAIR) tablet 10 mg  10 mg Oral DAILY Andrew Contreras MD   10 mg at 01/03/23 1411    nitroglycerin (NITROSTAT) tablet 0.4 mg  0.4 mg SubLINGual PRN Marjorie Cardona MD        nortriptyline (PAMELOR) capsule 100 mg  100 mg Oral QHS Andrew Contreras MD   100 mg at 01/03/23 2221    topiramate (TOPAMAX) tablet 100 mg  100 mg Oral BID Rico Contreras MD   100 mg at 01/03/23 2127    insulin lispro (HUMALOG) injection   SubCUTAneous AC&HS Marjorie Cardona MD        glucose chewable tablet 16 g  4 Tablet Oral PRN Rico Contreras MD        glucagon (GLUCAGEN) injection 1 mg  1 mg IntraMUSCular PRN Marjorie Cardona MD        polyethylene glycol (MIRALAX) packet 17 g  17 g Oral DAILY PRN Marjorie Cardona MD        ondansetron (ZOFRAN ODT) tablet 4 mg  4 mg Oral Q8H PRN Andrew Contreras MD        Or    ondansetron [facility]) injection 4 mg  4 mg IntraVENous Q6H PRN Marjorie Cardona MD        0.9% sodium chloride infusion  75 mL/hr IntraVENous CONTINUOUS Andrew Contreras MD 75 mL/hr at 01/03/23 2223 75 mL/hr at 01/03/23 2223    scopolamine (TRANSDERM-SCOP) 1 mg over 3 days 1 Patch  1 Patch TransDERmal Q72H Rico Contreras MD   1 Patch at 01/03/23 2221        Review of Systems   Neurological:  Positive for headaches. All other systems reviewed and are negative.     Objective     Vital signs for last 24 hours:  Visit Vitals  /67 (BP 1 Location: Right upper arm)   Pulse 70   Temp 97.9 °F (36.6 °C)   Resp 18   Wt 81 kg (178 lb 9.2 oz)   SpO2 97%   BMI 29.72 kg/m²       Intake/Output this shift:  Current Shift: No intake/output data recorded. Last 3 Shifts: 01/02 1901 - 01/04 0700  In: 726.3 [I.V.:726.3]  Out: 900 [Urine:900]    Data Review:   Recent Results (from the past 24 hour(s))   GLUCOSE, POC    Collection Time: 01/03/23  1:25 PM   Result Value Ref Range    Glucose (POC) 77 65 - 100 mg/dL    Performed by RADHA ROQUE    HEMOGLOBIN A1C WITH EAG    Collection Time: 01/03/23  1:28 PM   Result Value Ref Range    Hemoglobin A1c 5.4 4.0 - 5.6 %    Est. average glucose 108 mg/dL   GLUCOSE, POC    Collection Time: 01/03/23  6:37 PM   Result Value Ref Range    Glucose (POC) 103 (H) 65 - 100 mg/dL    Performed by [id number], POC    Collection Time: 01/03/23 11:01 PM   Result Value Ref Range    Glucose (POC) 66 65 - 100 mg/dL    Performed by [address] [facility], POC    Collection Time: 01/03/23 11:37 PM   Result Value Ref Range    Glucose (POC) 111 (H) 65 - 100 mg/dL    Performed by Cleo Juan        Physical Exam    Neuro Physical Exam      General: Well developed, well nourished. Patient in no apparent distress. Cardiac: Regular rate and rhythm with no murmurs. Neurological Exam:  Mental Status: Awake, alert, oriented x4, normal speech   Cranial Nerves:   Intact visual fields. PERRL, EOM's full, no nystagmus, no ptosis. Facial sensation is normal. Facial movement is symmetric. Palate is midline. Normal sternocleidomastoid strength. Tongue is midline. Hearing is intact bilaterally. Motor:  5/5 strength in upper and lower proximal and distal muscles. Normal bulk and tone. Reflexes:   Deep tendon reflexes 2+/4 and symmetrical.   Sensory:   Normal to light touch throughout   Gait:  defferred   Tremor:   No tremor noted. Cerebellar:   Babinski:  No cerebellar signs present.   Down b/l     Assessment and plan: Ms. Chevy Hunter is a 49-year-old woman with an episode of loss of consciousness yesterday which may have been her typical seizure. She is not able to provide a good history of what her typical seizures like. She has been unable to take 1 medication because of problems with pharmacy. Reinstate all antiepileptics and she can use home medication if not available at our pharmacy. No further neurological work-up.

## 2023-01-04 NOTE — ED NOTES
Patient family member standing in window with her face in the glass dancing and jumping around, patient holding her phone wearing rubber gloves.

## 2023-01-04 NOTE — PROGRESS NOTES
Primary Nurse Braden Ray and Cecille Marrero RN performed a dual skin assessment on this patient No impairment noted  Meir score is 22.

## 2023-01-04 NOTE — PROGRESS NOTES
PHYSICAL THERAPY EVALUATION  Patient: Carisa Hitchcock (58 y.o. female)  Date: 1/4/2023  Primary Diagnosis: AMS (altered mental status) [R41.82]       Precautions: Fall      ASSESSMENT  Pt is a 45 y.o. female admitted on 1/3/2023 for AMS; pt currently being treated for seizure . Pt semi supine upon PT arrival/Ot join for OOB activities, agreeable to evaluation. Pt A&O x 4. Based on the objective data described, the patient presents with generalized weakness, impaired functional mobility, impaired amb, impaired balance, and decreased endurance to activities. (See below for objective details and assist levels). Overall pt tolerated session fair  today with therapy. Pt will benefit from continued skilled PT to address above deficits and return to PLOF. Current PT DC recommendation Inpatient Rehabilitation Facility  once medically appropriate. Patient reside with mother and was indep prior to adm. Current Level of Function Impacting Discharge (mobility/balance): decreased balance in sitting ans standing requiring constant sup to min assist for ambulation. Other factors to consider for discharge: Irf-patient wants to go home. so HHPT      PLAN :  Recommendations and Planned Interventions: bed mobility training, transfer training, gait training, therapeutic exercises, patient and family training/education, and therapeutic activities      Recommend for staff: Out of bed to chair for meals and Amb to bathroom for toileting with gt belt and AD    Frequency/Duration: Patient will be followed by physical therapy:  3-5x/week to address goals.     Recommendation for discharge: (in order for the patient to meet his/her long term goals)  [facility]     This discharge recommendation:  Has been made in collaboration with the attending provider and/or case management    IF patient discharges home will need the following DME: bedside commode, shower chair, rolling walker, and wheelchair SUBJECTIVE:   Patient stated I am better.     OBJECTIVE DATA SUMMARY:   HISTORY:    Past Medical History:   Diagnosis Date    Arthritis     pt states knees and back    Asthma     Cardiomyopathy ([address] 2005    EF 35%, echo in 2021 normal EF     Colon polyp     Diabetes ([address]     Pt states not diabetic but has all the symptoms of it    Dizzy spells     GERD (gastroesophageal reflux disease)     Heart failure ([address] 2005    now combined systolic, diastolic, [facility] at [facility] 2021- Dr Bartlett Neighbor    Irritable bowel syndrome     Knee pain, bilateral     Migraines 2011    2-3 week    Neuropathy     pt states of both feet    Pulmonary embolism ([address]     August 2022    Seizures ([facility]     petite mals last seizure 2-3 months ago    Thyroid disease     hypothyroid     Past Surgical History:   Procedure Laterality Date    COLONOSCOPY N/A 11/28/2022    COLONOSCOPY performed by Denice Oakley MD at [address]  5/2006    HX COLONOSCOPY      HX ENDOSCOPY      HX GASTRIC BYPASS  2013    HX HYSTERECTOMY  2007    HX KNEE ARTHROSCOPY Right 05/2021    HX ORTHOPAEDIC Bilateral 2013    CTR    HX ORTHOPAEDIC      right knee procedure 2021    HX OTHER SURGICAL      left axillary lymph node biopsy    HX SMALL BOWEL RESECTION      NC APPENDECTOMY      NC COLECTOMY PARTIAL W/ANASTOMOSIS      NC ENTERECTOMY RESCJ SMALL INTESTINE W/ENTEROSTOMY      NC LAPAROSCOPY SURG CHOLECYSTECTOMY      NC UNLISTED PROCEDURE ABDOMEN PERITONEUM & OMENTUM  2008    cholecystectomy    NC UNLISTED PROCEDURE CARDIAC SURGERY      Loop recorder implanted july 2020    NC UNLISTED PROCEDURE STOMACH         Home Situation  Home Environment: Private residence  # Steps to Enter: 3  Rails to Enter: Yes  Hand Rails : Bilateral  One/Two Story Residence: One story  Living Alone: No  Support Systems: Parent(s)  Patient Expects to be Discharged to[de-identified] Home with home health  Current DME Used/Available at Home: lionel Watkins, Guido Deras, rolling    EXAMINATION/PRESENTATION/DECISION MAKING:   Critical Behavior:  Neurologic State: Alert  Orientation Level: Appropriate for age  Cognition: Appropriate decision making     Hearing: Auditory  Auditory Impairment: None  Skin:  intact  Edema: none  Range Of Motion:  AROM: Generally decreased, functional                       Strength:    Strength: Generally decreased, functional (RUE decreased compared to LUE)                       Functional Mobility:  Bed Mobility:  Rolling: Contact guard assistance  Supine to Sit: Contact guard assistance     Scooting: Contact guard assistance  Transfers:  Sit to Stand: Minimum assistance  Stand to Sit: Minimum assistance                       Balance:   Sitting: Intact  Standing: Impaired; With support  Standing - Static: Fair;Constant support  Standing - Dynamic : Fair;Constant support  Ambulation/Gait Training:                                                    Therapeutic Exercises:   AP, laq, lyndsey curry reviewed. Functional Measure:  Mariah Whaley -PAC 6 Clicks         Basic Mobility Inpatient Short Form  How much difficulty does the patient currently have. .. Unable A Lot A Little None   1. Turning over in bed (including adjusting bedclothes, sheets and blankets)? [] 1   [] 2   [x] 3   [] 4   2. Sitting down on and standing up from a chair with arms ( e.g., wheelchair, bedside commode, etc.)   [] 1   [] 2   [x] 3   [] 4   3. Moving from lying on back to sitting on the side of the bed? [] 1   [] 2   [x] 3   [] 4          How much help from another person does the patient currently need. .. Total A Lot A Little None   4. Moving to and from a bed to a chair (including a wheelchair)? [] 1   [] 2   [x] 3   [] 4   5. Need to walk in hospital room? [] 1   [] 2   [x] 3   [] 4   6. Climbing 3-5 steps with a railing? [] 1   [] 2   [x] 3   [] 4   © 2007, Trustees of Mariah Whaley, under license to [employer].  All rights reserved     Score: Initial:  Most Recent: X (Date: 2023 )   Interpretation of Tool:  Represents activities that are increasingly more difficult (i.e. Bed mobility, Transfers, Gait). Score 24 23 22-20 19-15 14-10 9-7 6   Modifier CH CI CJ CK CL CM CN         Physical Therapy Evaluation Charge Determination   History Examination Presentation Decision-Making   LOW Complexity : Zero comorbidities / personal factors that will impact the outcome / POC MEDIUM Complexity : 3 Standardized tests and measures addressing body structure, function, activity limitation and / or participation in recreation  MEDIUM Complexity : Evolving with changing characteristics  Other outcome measures [facility] 6        Based on the above components, the patient evaluation is determined to be of the following complexity level: MEDIUM    Pain Ratin/10     Activity Tolerance:   Good and Fair    After treatment patient left in no apparent distress:   Bed locked and in lowest position Sitting in chair and Call bell within reach . GOALS:    Problem: Mobility Impaired (Adult and Pediatric)  Goal: *Acute Goals and Plan of Care (Insert Text)  Description: Patient stated goal: go home  Patient will move from supine to sit and sit to supine , scoot up and down, and roll side to side in bed with supervision/set-up within 7 day(s). Patient will transfer from bed to chair and chair to bed with supervision/set-up using the least restrictive device within 7 day(s). Patient will improve static standing balance to supervision within 1 week(s). Patient will ambulate 70 feet with supervision with least restrictive device within 1 weeks. Outcome: Progressing Towards Goal       COMMUNICATION/EDUCATION:   The patients plan of care was discussed with: Occupational therapist, Registered nurse, and Case management.      Fall prevention education was provided and the patient/caregiver indicated understanding., Patient/family have participated as able in goal setting and plan of care. , and Patient/family agree to work toward stated goals and plan of care.          Thank you for this referral.  Patti Bazan, PT   Time Calculation: 35 mins

## 2023-01-04 NOTE — PROGRESS NOTES
OCCUPATIONAL THERAPY EVALUATION  Patient: Jorge Blum (49 y.o. female)  Date: 1/4/2023  Primary Diagnosis: AMS (altered mental status) [R41.82]       Precautions: fall risk, seizure precautions       ASSESSMENT  Pt is a 45 y.o. female [facility] to [facility] after being found unresponsive, admitted 1/3/23 and currently being treated for AMS, seizure disorder, migraine headaches, DM, heart failure, hypothyroidism, GERD, hx of pulmonary embolism, and neutropenia. See below for home and PLOF information. Pt received semi-supine in bed upon arrival, AXO x person and place, and agreeable to OT evaluation. Based on current observations, pt presents with deficits in generalized strength/AROM, balance (see PT note for gait details), functional activity tolerance, cognition/confusion, and decreased safety awareness currently impacting overall performance of ADLs and functional transfers/mobility (see below for objective details and assist levels). Overall, pt tolerates session fair with pt completing bed mobility, sit<>stand transfers, ambulating to and from bathroom, completing toileting routine, and returning to chairside with CGA-min A overall. Socks adjusted while long sitting on bed with mod I. Pt educated to push from bed during sit>stand transfer with pt demo'ing understanding. VC req'd for pt to reach back for recliner chair during stand>sit transfer with pt demo'ing understanding. Pt able to complete anterior sapphire care while seated on toilet with SBA. Pt would benefit from continued skilled OT services to address current impairments and improve IND and safety with self cares and functional transfers/mobility. Current OT d/c recommendation Inpatient Rehabilitation Facility  once medically appropriate.     Other factors to consider for discharge: family/social support, DME, time since onset, severity of deficits, functional baseline     Patient will benefit from skilled therapy intervention to address the above noted impairments. PLAN :  Recommendations and Planned Interventions: self care training, functional mobility training, therapeutic exercise, balance training, therapeutic activities, cognitive retraining, endurance activities, patient education, home safety training, and family training/education    Recommend with staff: Out of bed to chair for meals, Encourage HEP in prep for ADLs/mobility, and Amb to bathroom for toileting with gt belt and AD    Recommend next session: Standing grooming    Frequency/Duration: Patient will be followed by occupational therapy:  3-5x/week to address goals. Recommendation for discharge: (in order for the patient to meet his/her long term goals)  [facility]     This discharge recommendation:  Has been made in collaboration with the attending provider and/or case management    IF patient discharges home will need the following DME: TBD       SUBJECTIVE:   Patient stated I am okay to walk to the bathroom.     OBJECTIVE DATA SUMMARY:   HISTORY:   Past Medical History:   Diagnosis Date    Arthritis     pt states knees and back    Asthma     Cardiomyopathy ([facility] 2005    EF 35%, echo in 2021 normal EF     Colon polyp     Diabetes ([facility]     Pt states not diabetic but has all the symptoms of it    Dizzy spells     GERD (gastroesophageal reflux disease)     Heart failure ([address] 2005    now combined systolic, diastolic, [facility] at [facility] 2021- Dr Winnie Olvera    Irritable bowel syndrome     Knee pain, bilateral     Migraines 2011    2-3 week    Neuropathy     pt states of both feet    Pulmonary embolism [facility])     August 2022    Seizures ([facility]     petite mals last seizure 2-3 months ago    Thyroid disease     hypothyroid     Past Surgical History:   Procedure Laterality Date    COLONOSCOPY N/A 11/28/2022    COLONOSCOPY performed by Luda Edwards MD at [address]  5/2006    HX COLONOSCOPY      HX ENDOSCOPY      HX GASTRIC BYPASS  2013    HX HYSTERECTOMY  2007    HX KNEE ARTHROSCOPY Right 05/2021    HX ORTHOPAEDIC Bilateral 2013    CTR    HX ORTHOPAEDIC      right knee procedure 2021    HX OTHER SURGICAL      left axillary lymph node biopsy    HX SMALL BOWEL RESECTION      NM APPENDECTOMY      NM COLECTOMY PARTIAL W/ANASTOMOSIS      NM ENTERECTOMY RESCJ SMALL INTESTINE W/ENTEROSTOMY      NM LAPAROSCOPY SURG CHOLECYSTECTOMY      NM UNLISTED PROCEDURE ABDOMEN PERITONEUM & OMENTUM  2008    cholecystectomy    NM UNLISTED PROCEDURE CARDIAC SURGERY      Loop recorder implanted july 2020    NM UNLISTED PROCEDURE STOMACH         Per pt:   Home Situation  Home Environment: Private residence  # Steps to Enter: 3  Rails to Enter: Yes  Hand Rails : Bilateral  One/Two Story Residence: One story  Living Alone: No  Support Systems: Parent(s)  Patient Expects to be Discharged to[de-identified] Home with home health  Current DME Used/Available at Home: Cane, straight, Walker, rolling    Hand dominance: Right    EXAMINATION OF PERFORMANCE DEFICITS:  Cognitive/Behavioral Status:  Neurologic State: Alert  Orientation Level: Appropriate for age  Cognition: Appropriate decision making    Hearing: Auditory  Auditory Impairment: None    Range of Motion:  AROM: Generally decreased, functional    Strength:  Strength: Generally decreased, functional (RUE decreased compared to LUE)    Coordination:  Fine Motor Skills-Upper: Left Intact; Right Intact    Gross Motor Skills-Upper: Left Intact; Right Intact    Balance:  Sitting: Intact  Standing: Impaired; With support  Standing - Static: Fair;Constant support  Standing - Dynamic : Fair;Constant support    Functional Mobility and Transfers for ADLs:  Bed Mobility:  Rolling: Contact guard assistance  Supine to Sit: Contact guard assistance  Scooting: Contact guard assistance    Transfers:  Sit to Stand: Minimum assistance  Stand to Sit: Minimum assistance  Bathroom Mobility: Minimum assistance  Toilet Transfer : Minimum assistance      ADL Intervention and task modifications:  Lower Body Dressing Assistance  Socks: Modified independent  Leg Crossed Method Used: No  Position Performed: Long sitting on bed    Toileting  Bladder Hygiene: Stand-by assistance  Clothing Management: Minimum assistance    Therapeutic Exercise:  Pt would benefit from UE HEP in prep for ADLs and functional mobility/transfers. Functional Measure:    Zeinab Kim AM-PACTM \"6 Clicks\"                                                       Daily Activity Inpatient Short Form  How much help from another person does the patient currently need. .. Total; A Lot A Little None   1. Putting on and taking off regular lower body clothing? []  1 []  2 [x]  3 []  4   2. Bathing (including washing, rinsing, drying)? []  1 []  2 [x]  3 []  4   3. Toileting, which includes using toilet, bedpan or urinal? [] 1 []  2 [x]  3 []  4   4. Putting on and taking off regular upper body clothing? []  1 []  2 []  3 [x]  4   5. Taking care of personal grooming such as brushing teeth? []  1 []  2 [x]  3 []  4   6. Eating meals? []  1 []  2 []  3 [x]  4   © 2007, Trustees of Zeinab Kim, under license to [employer]. All rights reserved     Score: 20/24     Interpretation of Tool:  Represents clinically-significant functional categories (i.e. Activities of daily living). Percentage of Impairment CH    0%   CI    1-19% CJ    20-39% CK    40-59% CL    60-79% CM    80-99% CN     100%   AMPA  Score 6-24 24 23 20-22 15-19 10-14 7-9 6     Occupational Therapy Evaluation Charge Determination   History Examination Decision-Making   LOW Complexity : Brief history review  MEDIUM Complexity : 3-5 performance deficits relating to physical, cognitive , or psychosocial skils that result in activity limitations and / or participation restrictions MEDIUM Complexity : Patient may present with comorbidities that affect occupational performnce.  Miniml to moderate modification of tasks or assistance (eg, physical or verbal ) with assesment(s) is necessary to enable patient to complete evaluation       Based on the above components, the patient evaluation is determined to be of the following complexity level: LOW     Pain Ratin/10    Activity Tolerance:   Fair and requires rest breaks    After treatment patient left in no apparent distress:    Sitting in chair and Call bell within reach, bed locked and in lowest position    COMMUNICATION/EDUCATION:   The patients plan of care was discussed with: Physical therapist and Registered nurse. Patient/family agree to work toward stated goals and plan of care. This patients plan of care is appropriate for delegation to [facility]. PT/OT sessions occurred together for increased safety of pt and clinician. Thank you for this referral.  Leticia Jimenez OT  Time Calculation: 33 mins   Problem: Self Care Deficits Care Plan (Adult)  Goal: *Acute Goals and Plan of Care (Insert Text)  Description:   FUNCTIONAL STATUS PRIOR TO ADMISSION: Patient was modified independent using a single point cane for functional mobility. Patient was modified independent for basic and instrumental ADLs. HOME SUPPORT: The patient lived with her mother but did not require assist.    Occupational Therapy Goals  Initiated 2023   Patient Goal: Be able to take care of self and do daily tasks. 1.  Patient will perform grooming with modified independence within 7 day(s). 2.  Patient will perform bathing with modified independence within 7 day(s). 3.  Patient will perform simple home management with modified independence within 7 day(s). 4.  Patient will perform toilet transfers with modified independence within 7 day(s). 5.  Patient will perform all aspects of toileting with modified independence within 7 day(s). 6.  Patient will participate in upper extremity therapeutic exercise/activities with independence within 7 day(s).     Outcome: Not Met

## 2023-01-05 VITALS
DIASTOLIC BLOOD PRESSURE: 68 MMHG | SYSTOLIC BLOOD PRESSURE: 113 MMHG | TEMPERATURE: 98.2 F | WEIGHT: 178.57 LBS | OXYGEN SATURATION: 96 % | BODY MASS INDEX: 29.72 KG/M2 | RESPIRATION RATE: 18 BRPM | HEART RATE: 61 BPM

## 2023-01-05 LAB
ALBUMIN SERPL-MCNC: 2.7 G/DL (ref 3.5–5)
ALBUMIN/GLOB SERPL: 0.9 (ref 1.1–2.2)
ALP SERPL-CCNC: 149 U/L (ref 45–117)
ALT SERPL-CCNC: 102 U/L (ref 12–78)
ANION GAP SERPL CALC-SCNC: 8 MMOL/L (ref 5–15)
AST SERPL W P-5'-P-CCNC: 66 U/L (ref 15–37)
BACTERIA SPEC CULT: NORMAL
BILIRUB SERPL-MCNC: 0.2 MG/DL (ref 0.2–1)
BUN SERPL-MCNC: 11 MG/DL (ref 6–20)
BUN/CREAT SERPL: 19 (ref 12–20)
CA-I BLD-MCNC: 8 MG/DL (ref 8.5–10.1)
CHLORIDE SERPL-SCNC: 114 MMOL/L (ref 97–108)
CO2 SERPL-SCNC: 20 MMOL/L (ref 21–32)
CREAT SERPL-MCNC: 0.58 MG/DL (ref 0.55–1.02)
ERYTHROCYTE [DISTWIDTH] IN BLOOD BY AUTOMATED COUNT: 15.1 % (ref 11.5–14.5)
GLOBULIN SER CALC-MCNC: 3 G/DL (ref 2–4)
GLUCOSE BLD STRIP.AUTO-MCNC: 78 MG/DL (ref 65–100)
GLUCOSE BLD STRIP.AUTO-MCNC: 79 MG/DL (ref 65–100)
GLUCOSE SERPL-MCNC: 80 MG/DL (ref 65–100)
HCT VFR BLD AUTO: 31.8 % (ref 35–47)
HGB BLD-MCNC: 10.5 G/DL (ref 11.5–16)
MCH RBC QN AUTO: 27.9 PG (ref 26–34)
MCHC RBC AUTO-ENTMCNC: 33 G/DL (ref 30–36.5)
MCV RBC AUTO: 84.4 FL (ref 80–99)
NRBC # BLD: 0 K/UL (ref 0–0.01)
NRBC BLD-RTO: 0 PER 100 WBC
PERFORMED BY, TECHID: NORMAL
PERFORMED BY, TECHID: NORMAL
PLATELET # BLD AUTO: 182 K/UL (ref 150–400)
PMV BLD AUTO: 11.2 FL (ref 8.9–12.9)
POTASSIUM SERPL-SCNC: 3.6 MMOL/L (ref 3.5–5.1)
PROT SERPL-MCNC: 5.7 G/DL (ref 6.4–8.2)
RBC # BLD AUTO: 3.77 M/UL (ref 3.8–5.2)
SODIUM SERPL-SCNC: 142 MMOL/L (ref 136–145)
SPECIAL REQUESTS,SREQ: NORMAL
WBC # BLD AUTO: 2.8 K/UL (ref 3.6–11)

## 2023-01-05 PROCEDURE — G0378 HOSPITAL OBSERVATION PER HR: HCPCS

## 2023-01-05 PROCEDURE — 36415 COLL VENOUS BLD VENIPUNCTURE: CPT

## 2023-01-05 PROCEDURE — 74011250636 HC RX REV CODE- 250/636: Performed by: FAMILY MEDICINE

## 2023-01-05 PROCEDURE — 82962 GLUCOSE BLOOD TEST: CPT

## 2023-01-05 PROCEDURE — 74011250637 HC RX REV CODE- 250/637: Performed by: FAMILY MEDICINE

## 2023-01-05 PROCEDURE — 85027 COMPLETE CBC AUTOMATED: CPT

## 2023-01-05 PROCEDURE — 97535 SELF CARE MNGMENT TRAINING: CPT

## 2023-01-05 PROCEDURE — 74011250637 HC RX REV CODE- 250/637: Performed by: PSYCHIATRY & NEUROLOGY

## 2023-01-05 PROCEDURE — 80053 COMPREHEN METABOLIC PANEL: CPT

## 2023-01-05 RX ORDER — SUMATRIPTAN 25 MG/1
50 TABLET, FILM COATED ORAL ONCE
Status: COMPLETED | OUTPATIENT
Start: 2023-01-05 | End: 2023-01-05

## 2023-01-05 RX ORDER — OXCARBAZEPINE 300 MG/1
300 TABLET, FILM COATED ORAL 2 TIMES DAILY
Qty: 60 TABLET | Refills: 0 | Status: SHIPPED | OUTPATIENT
Start: 2023-01-05

## 2023-01-05 RX ORDER — BENZONATATE 100 MG/1
100 CAPSULE ORAL
Qty: 30 CAPSULE | Refills: 0 | Status: SHIPPED | OUTPATIENT
Start: 2023-01-05 | End: 2023-01-12

## 2023-01-05 RX ORDER — ONDANSETRON 4 MG/1
4 TABLET, ORALLY DISINTEGRATING ORAL
Qty: 20 TABLET | Refills: 0 | Status: SHIPPED | OUTPATIENT
Start: 2023-01-05

## 2023-01-05 RX ADMIN — Medication 2 TABLET: at 10:31

## 2023-01-05 RX ADMIN — LACOSAMIDE 200 MG: 200 TABLET, FILM COATED ORAL at 10:32

## 2023-01-05 RX ADMIN — APIXABAN 5 MG: 5 TABLET, FILM COATED ORAL at 10:31

## 2023-01-05 RX ADMIN — OXCARBAZEPINE 300 MG: 150 TABLET, FILM COATED ORAL at 10:31

## 2023-01-05 RX ADMIN — DICYCLOMINE HYDROCHLORIDE 20 MG: 20 TABLET ORAL at 10:32

## 2023-01-05 RX ADMIN — PANTOPRAZOLE SODIUM 40 MG: 40 TABLET, DELAYED RELEASE ORAL at 10:31

## 2023-01-05 RX ADMIN — ESCITALOPRAM OXALATE 20 MG: 10 TABLET ORAL at 10:32

## 2023-01-05 RX ADMIN — LATANOPROST 1 DROP: 50 SOLUTION OPHTHALMIC at 10:31

## 2023-01-05 RX ADMIN — LEVOTHYROXINE SODIUM 50 MCG: 0.03 TABLET ORAL at 10:32

## 2023-01-05 RX ADMIN — DICYCLOMINE HYDROCHLORIDE 20 MG: 20 TABLET ORAL at 04:55

## 2023-01-05 RX ADMIN — TOPIRAMATE 100 MG: 100 TABLET, FILM COATED ORAL at 10:32

## 2023-01-05 RX ADMIN — SODIUM CHLORIDE 75 ML/HR: 9 INJECTION, SOLUTION INTRAVENOUS at 04:54

## 2023-01-05 RX ADMIN — SUMATRIPTAN SUCCINATE 50 MG: 25 TABLET ORAL at 04:54

## 2023-01-05 RX ADMIN — MONTELUKAST 10 MG: 10 TABLET, FILM COATED ORAL at 10:32

## 2023-01-05 NOTE — DISCHARGE SUMMARY
Discharge Summary       PATIENT ID: Eryn Guzman  MRN: [id number]   YOB: 1970    DATE OF ADMISSION: 1/3/2023 12:51 AM    DATE OF DISCHARGE:   PRIMARY CARE PROVIDER: Rupesh Ferraro MD     ATTENDING PHYSICIAN: Andrew Contreras  DISCHARGING PROVIDER: Ruslan Contreras      CONSULTATIONS: IP CONSULT TO NEUROLOGY    PROCEDURES/SURGERIES: * No surgery found *    ADMITTING DIAGNOSES:    Patient Active Problem List    Diagnosis Date Noted    AMS (altered mental status) 01/03/2023    Syncope 05/08/2022    Abdominal pain 12/13/2021    Hypotension due to drugs 10/21/2021    Chronic combined systolic and diastolic congestive heart failure ([facility] [address] 04/10/2021    Atypical chest pain 04/10/2021    Regional wall motion abnormality of heart 04/10/2021    Vasovagal syncope 04/10/2021    NSVT (nonsustained ventricular tachycardia) 04/10/2021    Right knee pain 01/04/2021    Generalized abdominal pain 07/21/2020    Diarrhea 07/21/2020    H/O Clostridium difficile infection 07/21/2020    History of bowel resection 04/21/2020    Elevated liver enzymes 07/26/2019    NICM (nonischemic cardiomyopathy) ([facility] [address] 07/26/2019    Hypothyroidism 07/26/2019    H/O gastric bypass 07/26/2019    History of cholecystectomy 07/26/2019    History of appendectomy 07/26/2019    Neuropathy 07/26/2019    Migraine headache 07/26/2019    Seizures ([facility] [address] 07/26/2019    Fibrocystic breast changes of both breasts 07/08/2015    Heart failure ([facility] [address] 2005       DISCHARGE DIAGNOSES / PLAN:      Altered mental status, resolved  Seizure disorder  Migraine headaches  Diabetes mellitus  Heart failure, combined systolic and diastolic  Recent echo shows ejection fraction of 50 to 55%  Hypothyroidism  GERD  Hx of pulmonary embolism  Neutropenia (improving)        DISCHARGE MEDICATIONS:  Current Discharge Medication List        START taking these medications    Details   benzonatate (TESSALON) 100 mg capsule Take 1 Capsule by mouth three (3) times daily as needed for Cough for up to 7 days. Qty: 30 Capsule, Refills: 0  Start date: 1/5/2023, End date: 1/12/2023      ondansetron (ZOFRAN ODT) 4 mg disintegrating tablet Take 1 Tablet by mouth every eight (8) hours as needed for Nausea or Vomiting. Qty: 20 Tablet, Refills: 0  Start date: 1/5/2023      OXcarbazepine (TRILEPTAL) 300 mg tablet Take 1 Tablet by mouth two (2) times a day. Qty: 60 Tablet, Refills: 0  Start date: 1/5/2023           CONTINUE these medications which have NOT CHANGED    Details   clonazePAM (KlonoPIN) 1 mg tablet Take 1 mg by mouth nightly.      ergocalciferol (Vitamin D2) 1,250 mcg (50,000 unit) capsule Take 50,000 Units by mouth once. Every Friday      eslicarbazepine (Aptiom) 800 mg tab tablet Take 800 mg by mouth daily. erenumab-aooe (Aimovig Autoinjector) 140 mg/mL injection 140 mg by SubCUTAneous route once.      scopolamine (TRANSDERM-SCOP) 1 mg over 3 days pt3d 1 Patch by TransDERmal route every seventy-two (72) hours. Present on pt at this time, right ear      nitroglycerin (NITROSTAT) 0.4 mg SL tablet DISSOLVE 1 TABLET UNDER THE TONGUE EVERY 5 MINUTES AS  NEEDED FOR CHEST PAIN. MAX  OF 3 TABLETS IN 15 MINUTES. CALL 911 IF PAIN PERSISTS. Qty: 30 Tablet, Refills: 5    Comments: Requesting 1 year supply      apixaban (ELIQUIS) 5 mg tablet Take 5 mg by mouth two (2) times a day. lacosamide (Vimpat) 200 mg tab tablet Take 1 Tablet by mouth two (2) times a day. Max Daily Amount: 400 mg. Qty: 30 Tablet, Refills: 0    Associated Diagnoses: Carotid sinus syncope      escitalopram oxalate (LEXAPRO) 10 mg tablet Take 20 mg by mouth daily. latanoprost (XALATAN) 0.005 % ophthalmic solution INSTILL 1 DROP INTO EACH EYE AT NIGHT      Linzess 145 mcg cap capsule Take 290 mcg by mouth daily. rizatriptan (MAXALT) 5 mg tablet Take 5 mg by mouth once as needed for Migraine. May repeat in 2 hours if needed       montelukast (SINGULAIR) 10 mg tablet Take 10 mg by mouth daily.   Refills: 3 nortriptyline (PAMELOR) 50 mg capsule Take 100 mg by mouth nightly. Refills: 5      topiramate (TOPAMAX) 100 mg tablet Take 100 mg by mouth two (2) times a day. Refills: 5      dexlansoprazole (DEXILANT) 60 mg CpDB capsule (delayed release) Take 60 mg by mouth daily. dicyclomine (BENTYL) 20 mg tablet Take 20 mg by mouth every six (6) hours. levothyroxine (SYNTHROID) 50 mcg tablet Take 50 mcg by mouth Daily (before breakfast). Calcium-Cholecalciferol, D3, 600 mg-10 mcg (400 unit) cap Take 2 Tablets by mouth daily. VENTOLIN HFA 90 mcg/actuation inhaler Take 2 Puffs by inhalation every four (4) hours as needed. Refills: 4           STOP taking these medications       cephALEXin (KEFLEX) 500 mg capsule Comments:   Reason for Stopping:         cenobamate (Xcopri) 200 mg tab Comments:   Reason for Stopping:         albuterol (PROVENTIL VENTOLIN) 2.5 mg /3 mL (0.083 %) nebu Comments:   Reason for Stopping:         levETIRAcetam (KEPPRA) 500 mg tablet Comments:   Reason for Stopping:         docusate sodium (COLACE) 100 mg capsule Comments:   Reason for Stopping:         BD Luer-Narda Syringe 3 mL 25 gauge x 1\" syrg Comments:   Reason for Stopping:         RABEprazole (ACIPHEX) 20 mg TbEC Comments:   Reason for Stopping:         multivitamin (ONE A DAY) tablet Comments:   Reason for Stopping:         sucralfate (CARAFATE) 100 mg/mL suspension Comments:   Reason for Stopping:         famotidine (PEPCID) 40 mg tablet Comments:   Reason for Stopping:         TENS units and TENS electrodes (TENS UNIT AND ELECTRODES) Comments:   Reason for Stopping:         alum-mag hydroxide-simeth-lidocaine-sucralfate Comments:   Reason for Stopping:                 NOTIFY YOUR PHYSICIAN FOR ANY OF THE FOLLOWING:   Fever over 101 degrees for 24 hours. Chest pain, shortness of breath, fever, chills, nausea, vomiting, diarrhea, change in mentation, falling, weakness, bleeding.  Severe pain or pain not relieved by medications. Or, any other signs or symptoms that you may have questions about. DISPOSITION:  x  Home With:   OT  PT  HH  RN       Long term SNF/Inpatient Rehab    Independent/assisted living    Hospice    Other:       PATIENT CONDITION AT DISCHARGE: Stable      PHYSICAL EXAMINATION AT DISCHARGE:  General:          Alert, cooperative, no distress, appears stated age. HEENT:           Atraumatic, anicteric sclerae, pink conjunctivae                          No oral ulcers, mucosa moist, throat clear, dentition fair  Neck:               Supple, symmetrical  Lungs:             Clear to auscultation bilaterally. No Wheezing or Rhonchi. No rales. Chest wall:      No tenderness  No Accessory muscle use. Heart:              Regular  rhythm,  No  murmur   No edema  Abdomen:        Soft, non-tender. Not distended. Bowel sounds normal  Extremities:     No cyanosis. No clubbing,                            Skin turgor normal, Capillary refill normal  Skin:                Not pale. Not Jaundiced  No rashes   Psych:             Not anxious or agitated. Neurologic:      Alert, moves all extremities, answers questions appropriately and responds to commands     XR CHEST PORT   Final Result   Small areas of discoid atelectasis in the left base. CT HEAD WO CONT   Final Result   No acute intracranial process.                  Recent Results (from the past 24 hour(s))   GLUCOSE, POC    Collection Time: 01/04/23  4:35 PM   Result Value Ref Range    Glucose (POC) 92 65 - 100 mg/dL    Performed by Jacqueline Howard, POC    Collection Time: 01/04/23  8:01 PM   Result Value Ref Range    Glucose (POC) 79 65 - 100 mg/dL    Performed by Dajuan Fields    CBC W/O DIFF    Collection Time: 01/05/23  6:53 AM   Result Value Ref Range    WBC 2.8 (L) 3.6 - 11.0 K/uL    RBC 3.77 (L) 3.80 - 5.20 M/uL    HGB 10.5 (L) 11.5 - 16.0 g/dL    HCT 31.8 (L) 35.0 - 47.0 %    MCV 84.4 80.0 - 99.0 FL    MCH 27.9 26.0 - 34.0 PG    MCHC 33.0 30.0 - 36.5 g/dL    RDW 15.1 (H) 11.5 - 14.5 %    PLATELET 318 582 - 063 K/uL    MPV 11.2 8.9 - 12.9 FL    NRBC 0.0 0.0  WBC    ABSOLUTE NRBC 0.00 0.00 - 2.61 K/uL   METABOLIC PANEL, COMPREHENSIVE    Collection Time: 01/05/23  6:53 AM   Result Value Ref Range    Sodium 142 136 - 145 mmol/L    Potassium 3.6 3.5 - 5.1 mmol/L    Chloride 114 (H) 97 - 108 mmol/L    CO2 20 (L) 21 - 32 mmol/L    Anion gap 8 5 - 15 mmol/L    Glucose 80 65 - 100 mg/dL    BUN 11 6 - 20 mg/dL    Creatinine 0.58 0.55 - 1.02 mg/dL    BUN/Creatinine ratio 19 12 - 20      eGFR >60 >60 ml/min/1.73m2    Calcium 8.0 (L) 8.5 - 10.1 mg/dL    Bilirubin, total 0.2 0.2 - 1.0 mg/dL    AST (SGOT) 66 (H) 15 - 37 U/L    ALT (SGPT) 102 (H) 12 - 78 U/L    Alk. phosphatase 149 (H) 45 - 117 U/L    Protein, total 5.7 (L) 6.4 - 8.2 g/dL    Albumin 2.7 (L) 3.5 - 5.0 g/dL    Globulin 3.0 2.0 - 4.0 g/dL    A-G Ratio 0.9 (L) 1.1 - 2.2     GLUCOSE, POC    Collection Time: 01/05/23  8:27 AM   Result Value Ref Range    Glucose (POC) 79 65 - 100 mg/dL    Performed by [employer]    GLUCOSE, POC    Collection Time: 01/05/23 11:52 AM   Result Value Ref Range    Glucose (POC) 78 65 - 100 mg/dL    Performed by Svetlana VIDAL:    Mary Sykes is a 45 y.o. female with past medical history of hypotension, diabetes, hypothyroidism, GERD, PE, CHF, and seizure disorder who presented to the ED last night after she was found unresponsive on the floor. Patient was sleeping at the time of interview and therefore history was taken from her mother. Mother reports that she has not been doing much last week. He did not witness any seizure like activity. She has been having some mucus las night as well as wheezing and migraine headache that she has been complaining for about a week. She could not recall any particular triggers leading to this event. Patient had episodes of seizure before. She currently sees Dr. Vikram Garcia as her primary care doctor.  She denies any other complaints today. In the ED, CT of the head was done which showed no acute intracranial process. Was recently seen and in the ER because of intractable headache and was sent home  Patient was seen in her room and she was resting comfortably in chair. She reports that she had an episode of migraine overnight and received medication for it. She also stated that when she got up this morning for physical therapy, she had migraine again but after she got herself together she felt fine. Currently she doesn't complain of any migraine headaches. She still complains of some cough with congestion.  Otherwise she denies any shortness of breath, chest pain or any other symptoms at this time    Signed:   [id number] Michel Cross MD  1/5/2023  11:54 AM

## 2023-01-05 NOTE — PROGRESS NOTES
Neurology Progress Note    Patient ID:  Kary Betancourt  [id number]  45 y.o.  1970    Subjective: At the time of my evaluation this morning, Ms. Jose Tay was awake, alert, and oriented x4. She verbalized she has been doing well and denies any seizures/activities overnight. She did have a slight frontal headache 4/10 on the pain scale and was improving. She verbalized she has not been able to refill Xcopri and Aptiom outpatient and she has not had any Keppra since admitted to the hospital. She was originally on keppra xr 1500 mg daily. Ms. Joes Tay is a 57-year-old woman with past medical history of seizures, migraines, heart failure, comes in for evaluation of loss of consciousness that occurred yesterday. She recalls that she was trying to sit up on her bed but could not do so after repeated tries and then lost consciousness. Her mother called 46. She says she woke up sometime after but cannot recall how long she was unconscious for. She says she is unsure but this may have been like her typical seizure. She is back to baseline at this time. She does state that she has been having 4 days worth of migraines and did get some relief briefly from an ER visit but migraines came back. At the current time she denies any migraine headaches but does have a mild headache in the frontal region. She also has a history of complex partial seizures and is on Vimpat 200 mg twice a day, Xcopri 200 mg daily and Aptiom 800 mg daily and follows up with Dr. Ayan Amaral. She does state that she has been having trouble filling one of her medications and has not been on it. She does not recall the name of that particular medication.     Current Facility-Administered Medications   Medication Dose Route Frequency    OTHER(NON-FORMULARY) 200 mg  200 mg Oral DAILY    OTHER(NON-FORMULARY) 800 mg  800 mg Oral DAILY    OXcarbazepine (TRILEPTAL) tablet 300 mg  300 mg Oral BID    benzonatate (TESSALON) capsule 100 mg  100 mg Oral TID PRN    sodium chloride (NS) flush 5-40 mL  5-40 mL IntraVENous PRN    albuterol CONCENTRATE 2.5mg/0.5 mL neb soln  2.5 mg Nebulization Q4H PRN    apixaban (ELIQUIS) tablet 5 mg  5 mg Oral BID    calcium-vitamin D 600 mg-5 mcg (200 unit) per tablet 2 Tablet  2 Tablet Oral DAILY    pantoprazole (PROTONIX) tablet 40 mg  40 mg Oral DAILY    dicyclomine (BENTYL) tablet 20 mg  20 mg Oral Q6H    escitalopram oxalate (LEXAPRO) tablet 20 mg  20 mg Oral DAILY    lacosamide (VIMPAT) tablet 200 mg  200 mg Oral BID    latanoprost (XALATAN) 0.005 % ophthalmic solution 1 Drop  1 Drop Both Eyes DAILY    levothyroxine (SYNTHROID) tablet 50 mcg  50 mcg Oral ACB    . PHARMACY TO SUBSTITUTE PER PROTOCOL (Reordered from: Linzess 145 mcg cap capsule)    Per Protocol    montelukast (SINGULAIR) tablet 10 mg  10 mg Oral DAILY    nitroglycerin (NITROSTAT) tablet 0.4 mg  0.4 mg SubLINGual PRN    nortriptyline (PAMELOR) capsule 100 mg  100 mg Oral QHS    topiramate (TOPAMAX) tablet 100 mg  100 mg Oral BID    insulin lispro (HUMALOG) injection   SubCUTAneous AC&HS    glucose chewable tablet 16 g  4 Tablet Oral PRN    glucagon (GLUCAGEN) injection 1 mg  1 mg IntraMUSCular PRN    polyethylene glycol (MIRALAX) packet 17 g  17 g Oral DAILY PRN    ondansetron (ZOFRAN ODT) tablet 4 mg  4 mg Oral Q8H PRN    Or    ondansetron (ZOFRAN) injection 4 mg  4 mg IntraVENous Q6H PRN    0.9% sodium chloride infusion  75 mL/hr IntraVENous CONTINUOUS    scopolamine (TRANSDERM-SCOP) 1 mg over 3 days 1 Patch  1 Patch TransDERmal Q72H          Objective:     Patient Vitals for the past 8 hrs:   BP Temp Pulse Resp SpO2   01/05/23 0829 113/68 98.2 °F (36.8 °C) 61 18 96 %   01/05/23 0241 101/62 98.2 °F (36.8 °C) 77 16 96 %       No intake/output data recorded.   01/03 1901 - 01/05 0700  In: 2492.5 [I.V.:2492.5]  Out: 1800 [Urine:1800]    Lab Review   Recent Results (from the past 24 hour(s))   GLUCOSE, POC    Collection Time: 01/04/23 11:08 AM   Result Value Ref Range    Glucose (POC) 74 65 - 100 mg/dL    Performed by Rowena Taveras, POC    Collection Time: 01/04/23  4:35 PM   Result Value Ref Range    Glucose (POC) 92 65 - 100 mg/dL    Performed by Jacqueline Howard, POC    Collection Time: 01/04/23  8:01 PM   Result Value Ref Range    Glucose (POC) 79 65 - 100 mg/dL    Performed by Citlalli Mabry    CBC W/O DIFF    Collection Time: 01/05/23  6:53 AM   Result Value Ref Range    WBC 2.8 (L) 3.6 - 11.0 K/uL    RBC 3.77 (L) 3.80 - 5.20 M/uL    HGB 10.5 (L) 11.5 - 16.0 g/dL    HCT 31.8 (L) 35.0 - 47.0 %    MCV 84.4 80.0 - 99.0 FL    MCH 27.9 26.0 - 34.0 PG    MCHC 33.0 30.0 - 36.5 g/dL    RDW 15.1 (H) 11.5 - 14.5 %    PLATELET 137 141 - 204 K/uL    MPV 11.2 8.9 - 12.9 FL    NRBC 0.0 0.0  WBC    ABSOLUTE NRBC 0.00 0.00 - 5.58 K/uL   METABOLIC PANEL, COMPREHENSIVE    Collection Time: 01/05/23  6:53 AM   Result Value Ref Range    Sodium 142 136 - 145 mmol/L    Potassium 3.6 3.5 - 5.1 mmol/L    Chloride 114 (H) 97 - 108 mmol/L    CO2 20 (L) 21 - 32 mmol/L    Anion gap 8 5 - 15 mmol/L    Glucose 80 65 - 100 mg/dL    BUN 11 6 - 20 mg/dL    Creatinine 0.58 0.55 - 1.02 mg/dL    BUN/Creatinine ratio 19 12 - 20      eGFR >60 >60 ml/min/1.73m2    Calcium 8.0 (L) 8.5 - 10.1 mg/dL    Bilirubin, total 0.2 0.2 - 1.0 mg/dL    AST (SGOT) 66 (H) 15 - 37 U/L    ALT (SGPT) 102 (H) 12 - 78 U/L    Alk. phosphatase 149 (H) 45 - 117 U/L    Protein, total 5.7 (L) 6.4 - 8.2 g/dL    Albumin 2.7 (L) 3.5 - 5.0 g/dL    Globulin 3.0 2.0 - 4.0 g/dL    A-G Ratio 0.9 (L) 1.1 - 2.2     GLUCOSE, POC    Collection Time: 01/05/23  8:27 AM   Result Value Ref Range    Glucose (POC) 79 65 - 100 mg/dL    Performed by Dalton Herrera          NEUROLOGICAL EXAM:    Appearance: The patient is well developed, well nourished, provides a coherent history and is in no acute distress. Mental Status: Oriented to time, place and person. Mood and affect appropriate.    Cranial Nerves:   Intact visual fields. LYLY, EOM's full, no nystagmus, no ptosis. Facial sensation is normal. Corneal reflexes are intact. Facial movement is symmetric. Hearing is normal bilaterally. Palate is midline with normal sternocleidomastoid and trapezius muscles are normal. Tongue is midline. Motor:  5/5 strength in upper and lower proximal and distal muscles. Normal bulk and tone. Reflexes:   Deep tendon reflexes 2+/4 and symmetrical.   Sensory:   Intact and symmetric. Gait:  Normal gait using a walker   Tremor:   No tremor noted. Cerebellar:  Coordination intact for finger-nose-finger   Neurovascular:  Normal heart sounds and regular rhythm, peripheral pulses intact, and no carotid bruits. Assessment:  1) Seizures: Ms. Alden Mccall is a 57-year-old female who came in with an episode of loss of consciousness which may have been her typical seizure since she was not able to take 2 of her seizure medications due to pharmacy issues. No seizures overnight reported. 2) Migraine headaches  3) Diabetes  4) Cardiomyopathy  5) Hypothyroidism  6) Pulmonary embolism  7) GERD     Active Problems:    AMS (altered mental status) (1/3/2023)        Plan:  -No further neurological work-up needed at this time. -patient had difficulty filling aptiom and xcopri. For now continue vimpat 200 mg bid and keppra 750 mg bid.    -follow up with Dr. Betzaida Cruz scheduled 2/9     Thank you for the courtesy of this consult,    Signed:  Marcial Green  1/5/2023  9:42 AM     Collaborating Physician :  Dr. Bonnie Bustamante

## 2023-01-05 NOTE — PROGRESS NOTES
OCCUPATIONAL THERAPY TREATMENT  Patient: Zahira Schultz (37 y.o. female)  Date: 1/5/2023  Diagnosis: AMS (altered mental status) [R41.82] <principal problem not specified>      Precautions: FALL  Chart, occupational therapy assessment, plan of care, and goals were reviewed. ASSESSMENT  Pt continues with skilled OT services and is progressing towards goals. Pt received semi-supine in bed upon arrival, AXO x4 and agreeable to VALENTINE tx at this time. Pt cooperative and demonstrated good effort during activities. Pt improved with less confusion this visit. Pt improved with bed mobility>EOB with additional time to compose self. Pt continues to require vc's for proper hand placement for STS and additional time to compose self d/t headache with positional change. Pt reported headache 4/10. Physician present to perform exam and present for part of therapy. Pt able to perform toileting tf with vc's for hand placement and RW mgmt. Pt completed toileting hygiene seated with SBA. Pt stood at sink for ~ 4 minutes to perform simple grooming before pt requesting to sit d/t unsteadiness. Pt ambulated to chair with slow gait and vc's for RW mgmt for safety. Pt appears to be motivated and good rehab potential.  Overall, pt continues to present with deficits in generalized strength/AROM, static/dynamic standing balance and functional activity tolerance during performance of ADLs/mobility (see below for objective details and assist levels). Will continue to progress. Recommend d/c to IRF once medically appropriate. Other factors to consider for discharge: Time of onset, medical prognosis/diagnosis, severity of deficits, PLOF, functional baseline, home environment, and family support          PLAN :  Patient continues to benefit from skilled intervention to address the above impairments. Continue treatment per established plan of care. to address goals.     Recommend with staff: Out of bed to chair for meals and Encourage HEP in prep for ADLs/mobility    Recommend next session: Standing grooming    Recommendation for discharge: (in order for the patient to meet his/her long term goals)  [address]     This discharge recommendation:  Has been made in collaboration with the attending provider and/or case management       IF patient discharges home will need the following DME: TBD       SUBJECTIVE:   Patient stated I have a headache.     OBJECTIVE DATA SUMMARY:   Cognitive/Behavioral Status:  Neurologic State: Alert  Orientation Level: Oriented X4  Cognition: Follows commands             Functional Mobility and Transfers for ADLs:  Bed Mobility:  Rolling: Stand-by assistance  Supine to Sit: Stand-by assistance; Additional time  Scooting: Stand-by assistance    Transfers:  Sit to Stand: Contact guard assistance; Additional time  Functional Transfers  Bathroom Mobility: Minimum assistance;Contact guard assistance  Toilet Transfer : Contact guard assistance;Minimum assistance       Balance:  Sitting: Intact  Standing: Impaired; With support  Standing - Static: Constant support; Fair  Standing - Dynamic : Constant support; Fair    ADL Intervention:       Grooming  Grooming Assistance: Contact guard assistance  Position Performed: Standing  Washing Face: Contact guard assistance  Washing Hands: Contact guard assistance     Lower Body Dressing Assistance  Dressing Assistance: Set-up  Socks: Set-up  Leg Crossed Method Used: Yes    Toileting  Toileting Assistance: Stand-by assistance (seated)  Bladder Hygiene: Stand-by assistance  Clothing Management: Stand-by assistance                Pain:  4/10 for headache with initial STS    Activity Tolerance:   Fair and requires rest breaks    After treatment patient left in no apparent distress:   Sitting in chair and Call bell within reach, bed locked and in lowest position    COMMUNICATION/COLLABORATION:   The patients plan of care was discussed with: Registered nurse.  Physician MEME Peña  Time Calculation: 32 mins     Problem: Self Care Deficits Care Plan (Adult)  Goal: *Acute Goals and Plan of Care (Insert Text)  Description:   FUNCTIONAL STATUS PRIOR TO ADMISSION: Patient was modified independent using a single point cane for functional mobility. Patient was modified independent for basic and instrumental ADLs. HOME SUPPORT: The patient lived with her mother but did not require assist.    Occupational Therapy Goals  Initiated 1/4/2023   Patient Goal: Be able to take care of self and do daily tasks. 1.  Patient will perform grooming with modified independence within 7 day(s). 2.  Patient will perform bathing with modified independence within 7 day(s). 3.  Patient will perform simple home management with modified independence within 7 day(s). 4.  Patient will perform toilet transfers with modified independence within 7 day(s). 5.  Patient will perform all aspects of toileting with modified independence within 7 day(s). 6.  Patient will participate in upper extremity therapeutic exercise/activities with independence within 7 day(s).     Outcome: Progressing Towards Goal

## 2023-01-05 NOTE — PROGRESS NOTES
Problem: Seizure Disorder (Adult)  Goal: *STG: Remains safe in hospital  Outcome: Progressing Towards Goal  Note: Patient has experienced no seizures while in the hospital and the patient is resting soundly     Bedside shift change report given to Gretta Romano (oncoming nurse) by Tete Mcfarland RN (offgoing nurse). Report included the following information SBAR, Kardex, ED Summary, Intake/Output, MAR, Accordion, and Cardiac Rhythm NSR .

## 2023-01-05 NOTE — DISCHARGE INSTRUCTIONS
Discharge Instructions       PATIENT ID: Mary Sykes  MRN: [id number]   YOB: 1970    DATE OF ADMISSION: [unfilled]    DATE OF DISCHARGE: 1/5/2023    PRIMARY CARE PROVIDER: @PCP@     ATTENDING PHYSICIAN: [unfilled]  DISCHARGING PROVIDER: Kwabena Esquivel MD    To contact this individual call [phone] and ask the  to page. If unavailable ask to be transferred the Adult Hospitalist Department. DISCHARGE DIAGNOSES migraine/seizures    CONSULTATIONS: [unfilled]    PROCEDURES/SURGERIES: * No surgery found *    PENDING TEST RESULTS:   At the time of discharge the following test results are still pending: None    FOLLOW UP APPOINTMENTS:   [facility]     ADDITIONAL CARE RECOMMENDATIONS: Follow-up with neurologist    DIET: Resume previous diet      ACTIVITY: Activity as tolerated    Wound care: Wound Care Order: submitted to [facility] Please view [email or web address]    EQUIPMENT needed: ***      DISCHARGE MEDICATIONS:   See Medication Reconciliation Form    It is important that you take the medication exactly as they are prescribed. Keep your medication in the bottles provided by the pharmacist and keep a list of the medication names, dosages, and times to be taken in your wallet. Do not take other medications without consulting your doctor. NOTIFY YOUR PHYSICIAN FOR ANY OF THE FOLLOWING:   Fever over 101 degrees for 24 hours. Chest pain, shortness of breath, fever, chills, nausea, vomiting, diarrhea, change in mentation, falling, weakness, bleeding. Severe pain or pain not relieved by medications. Or, any other signs or symptoms that you may have questions about.       DISPOSITION:    Home With:   OT  PT  HH  RN       SNF/Inpatient Rehab/LTAC    Independent/assisted living    Hospice    Other:         PROBLEM LIST Updated:  Yes ***       Signed:   Kwaebna Esquivel MD  1/5/2023  11:54 AM

## 2023-01-05 NOTE — PROGRESS NOTES
CM discussed discharge planning with patient at bedside. Therapy recommend IRF. Patient does not want to go to IRF. She will go to her mother home at discharge. Patient is willing to accept [facility]. Choice letter reviewed and signed. Blank referral sent due to insurance type. Patient accepted by [facility] services. Anticipated SOC date 1-10-23. Patient is aware and agrees. Medicare pt has received, reviewed, and signed 2nd IM letter informing them of their right to appeal the discharge. Signed copied has been placed on pt bedside chart. Discharge plan of care/case management plan validated with provider discharge order.

## 2023-01-05 NOTE — MED STUDENT NOTES
General Daily Progress Note          Patient Name:   Russ Collier       YOB: 1970       Age:  45 y.o. Admit Date: 1/3/2023      Subjective:     Patient was seen in her room and she was resting comfortably in chair. She reports that she had an episode of migraine overnight and received medication for it. She also stated that when she got up this morning for physical therapy, she had migraine again but after she got herself together she felt fine. Currently she doesn't complain of any migraine headaches. She still complains of some cough with congestion. Otherwise she denies any shortness of breath, chest pain or any other symptoms at this time.     Objective:     Visit Vitals  /68 (BP 1 Location: Right upper arm, BP Patient Position: At rest)   Pulse 61   Temp 98.2 °F (36.8 °C)   Resp 18   Wt 81 kg (178 lb 9.2 oz)   SpO2 96%   BMI 29.72 kg/m²        Recent Results (from the past 24 hour(s))   GLUCOSE, POC    Collection Time: 01/04/23 11:08 AM   Result Value Ref Range    Glucose (POC) 74 65 - 100 mg/dL    Performed by [address] [place] [address], POC    Collection Time: 01/04/23  4:35 PM   Result Value Ref Range    Glucose (POC) 92 65 - 100 mg/dL    Performed by [address] [place] [address], POC    Collection Time: 01/04/23  8:01 PM   Result Value Ref Range    Glucose (POC) 79 65 - 100 mg/dL    Performed by Reyna Caleb    CBC W/O DIFF    Collection Time: 01/05/23  6:53 AM   Result Value Ref Range    WBC 2.8 (L) 3.6 - 11.0 K/uL    RBC 3.77 (L) 3.80 - 5.20 M/uL    HGB 10.5 (L) 11.5 - 16.0 g/dL    HCT 31.8 (L) 35.0 - 47.0 %    MCV 84.4 80.0 - 99.0 FL    MCH 27.9 26.0 - 34.0 PG    MCHC 33.0 30.0 - 36.5 g/dL    RDW 15.1 (H) 11.5 - 14.5 %    PLATELET 152 536 - 767 K/uL    MPV 11.2 8.9 - 12.9 FL    NRBC 0.0 0.0  WBC    ABSOLUTE NRBC 0.00 0.00 - 8.80 K/uL   METABOLIC PANEL, COMPREHENSIVE    Collection Time: 01/05/23  6:53 AM   Result Value Ref Range    Sodium 142 136 - 145 mmol/L    Potassium 3.6 3.5 - 5.1 mmol/L    Chloride 114 (H) 97 - 108 mmol/L    CO2 20 (L) 21 - 32 mmol/L    Anion gap 8 5 - 15 mmol/L    Glucose 80 65 - 100 mg/dL    BUN 11 6 - 20 mg/dL    Creatinine 0.58 0.55 - 1.02 mg/dL    BUN/Creatinine ratio 19 12 - 20      eGFR >60 >60 ml/min/1.73m2    Calcium 8.0 (L) 8.5 - 10.1 mg/dL    Bilirubin, total 0.2 0.2 - 1.0 mg/dL    AST (SGOT) 66 (H) 15 - 37 U/L    ALT (SGPT) 102 (H) 12 - 78 U/L    Alk. phosphatase 149 (H) 45 - 117 U/L    Protein, total 5.7 (L) 6.4 - 8.2 g/dL    Albumin 2.7 (L) 3.5 - 5.0 g/dL    Globulin 3.0 2.0 - 4.0 g/dL    A-G Ratio 0.9 (L) 1.1 - 2.2     GLUCOSE, POC    Collection Time: 01/05/23  8:27 AM   Result Value Ref Range    Glucose (POC) 79 65 - 100 mg/dL    Performed by Kymberly Santana      [unfilled]      Review of Systems    Constitutional: Negative for chills and fever. HENT: Negative. Eyes: Negative. Respiratory: Positive cough   Cardiovascular: Negative. Gastrointestinal: Negative for abdominal pain and nausea. Skin: Negative. Neurological: Negative. Physical Exam:      Constitutional: pt is oriented to person, place, and time. HENT:   Head: Normocephalic and atraumatic. Eyes: Pupils are equal, round, and reactive to light. EOM are normal.   Cardiovascular: Normal rate, regular rhythm and normal heart sounds. Pulmonary/Chest: Breath sounds normal. No wheezes. No rales. Exhibits no tenderness. Abdominal: Soft. Bowel sounds are normal. There is no abdominal tenderness. There is no rebound and no guarding. Musculoskeletal: Normal range of motion. Neurological: pt is alert and oriented to person, place, and time. XR CHEST PORT   Final Result   Small areas of discoid atelectasis in the left base. CT HEAD WO CONT   Final Result   No acute intracranial process.                  Recent Results (from the past 24 hour(s))   GLUCOSE, POC    Collection Time: 01/04/23 11:08 AM   Result Value Ref Range    Glucose (POC) 74 65 - 100 mg/dL    Performed by Aftab Marquez    GLUCOSE, POC    Collection Time: 01/04/23  4:35 PM   Result Value Ref Range    Glucose (POC) 92 65 - 100 mg/dL    Performed by [address], POC    Collection Time: 01/04/23  8:01 PM   Result Value Ref Range    Glucose (POC) 79 65 - 100 mg/dL    Performed by [address] Strong    CBC W/O DIFF    Collection Time: 01/05/23  6:53 AM   Result Value Ref Range    WBC 2.8 (L) 3.6 - 11.0 K/uL    RBC 3.77 (L) 3.80 - 5.20 M/uL    HGB 10.5 (L) 11.5 - 16.0 g/dL    HCT 31.8 (L) 35.0 - 47.0 %    MCV 84.4 80.0 - 99.0 FL    MCH 27.9 26.0 - 34.0 PG    MCHC 33.0 30.0 - 36.5 g/dL    RDW 15.1 (H) 11.5 - 14.5 %    PLATELET 899 153 - 754 K/uL    MPV 11.2 8.9 - 12.9 FL    NRBC 0.0 0.0  WBC    ABSOLUTE NRBC 0.00 0.00 - 0.02 K/uL   METABOLIC PANEL, COMPREHENSIVE    Collection Time: 01/05/23  6:53 AM   Result Value Ref Range    Sodium 142 136 - 145 mmol/L    Potassium 3.6 3.5 - 5.1 mmol/L    Chloride 114 (H) 97 - 108 mmol/L    CO2 20 (L) 21 - 32 mmol/L    Anion gap 8 5 - 15 mmol/L    Glucose 80 65 - 100 mg/dL    BUN 11 6 - 20 mg/dL    Creatinine 0.58 0.55 - 1.02 mg/dL    BUN/Creatinine ratio 19 12 - 20      eGFR >60 >60 ml/min/1.73m2    Calcium 8.0 (L) 8.5 - 10.1 mg/dL    Bilirubin, total 0.2 0.2 - 1.0 mg/dL    AST (SGOT) 66 (H) 15 - 37 U/L    ALT (SGPT) 102 (H) 12 - 78 U/L    Alk.  phosphatase 149 (H) 45 - 117 U/L    Protein, total 5.7 (L) 6.4 - 8.2 g/dL    Albumin 2.7 (L) 3.5 - 5.0 g/dL    Globulin 3.0 2.0 - 4.0 g/dL    A-G Ratio 0.9 (L) 1.1 - 2.2     GLUCOSE, POC    Collection Time: 01/05/23  8:27 AM   Result Value Ref Range    Glucose (POC) 79 65 - 100 mg/dL    Performed by Lelo Haile        Results       Procedure Component Value Units Date/Time    CULTURE, BLOOD #1 [[id number]] Collected: 01/03/23 1319    Order Status: Sent Specimen: Blood Updated: 01/03/23 1346    CULTURE, BLOOD #2 [[id number]] Collected: 01/03/23 1319    Order Status: Sent Specimen: Blood Updated: 01/03/23 1346 CULTURE, URINE [[id number]] Collected: 01/03/23 1045    Order Status: Sent Specimen: Urine Updated: 01/04/23 1212             Labs:     Recent Labs     01/05/23  0653 01/04/23  0651   WBC 2.8* 2.7*   HGB 10.5* 11.1*   HCT 31.8* 34.1*    187       Recent Labs     01/05/23  0653 01/04/23  0651 01/03/23  0107    142 137   K 3.6 3.6 3.8   * 113* 108   CO2 20* 21 21   BUN 11 11 12   CREA 0.58 0.65 0.73   GLU 80 81 98   CA 8.0* 8.1* 8.6   MG  --   --  2.3       Recent Labs     01/05/23  0653 01/04/23  0651 01/03/23  0107   * 121* 160*   * 156* 171*   TBILI 0.2 0.2 0.2   TP 5.7* 6.0* 6.7   ALB 2.7* 2.8* 3.3*   GLOB 3.0 3.2 3.4       No results for input(s): INR, PTP, APTT, INREXT, INREXT in the last 72 hours. No results for input(s): FE, TIBC, PSAT, FERR in the last 72 hours. No results found for: FOL, RBCF   No results for input(s): PH, PCO2, PO2 in the last 72 hours. No results for input(s): CPK, CKNDX, TROIQ in the last 72 hours.     No lab exists for component: CPKMB  No results found for: CHOL, CHOLX, CHLST, CHOLV, HDL, HDLP, LDL, LDLC, DLDLP, TGLX, TRIGL, TRIGP, CHHD, CHHDX  Lab Results   Component Value Date/Time    Glucose (POC) 79 01/05/2023 08:27 AM    Glucose (POC) 79 01/04/2023 08:01 PM    Glucose (POC) 92 01/04/2023 04:35 PM    Glucose (POC) 74 01/04/2023 11:08 AM    Glucose (POC) 87 01/04/2023 08:40 AM     Lab Results   Component Value Date/Time    Color Yellow/Straw 01/03/2023 01:18 AM    Appearance Clear 01/03/2023 01:18 AM    Specific gravity 1.016 01/03/2023 01:18 AM    pH (UA) 5.0 01/03/2023 01:18 AM    Protein Negative 01/03/2023 01:18 AM    Glucose Negative 01/03/2023 01:18 AM    Ketone 5 (A) 01/03/2023 01:18 AM    Bilirubin Negative 01/03/2023 01:18 AM    Urobilinogen 0.1 01/03/2023 01:18 AM    Nitrites Negative 01/03/2023 01:18 AM    Leukocyte Esterase Negative 01/03/2023 01:18 AM    Bacteria Negative 01/03/2023 01:18 AM    WBC 0-4 01/03/2023 01:18 AM    RBC 0-5 01/03/2023 01:18 AM         Assessment:     Altered mental status, resolved  Seizure disorder  Migraine headaches  Diabetes mellitus  Heart failure, combined systolic and diastolic  Recent echo shows ejection fraction of 50 to 55%  Hypothyroidism  GERD  Hx of pulmonary embolism  Neutropenia (improving)    Plan:     Continue home medications including antiepileptic drugs  Blood and urine cultures pending  Seizure precautions  Sliding scale insulin  Continue Tessalon to help with the dry cough          Current Facility-Administered Medications:     OTHER(NON-FORMULARY) 200 mg, 200 mg, Oral, DAILY, Lamar Seo MD    OTHER(NON-FORMULARY) 800 mg, 800 mg, Oral, DAILY, Lamar Seo MD    OXcarbazepine (TRILEPTAL) tablet 300 mg, 300 mg, Oral, BID, Lamar Seo MD, 300 mg at 01/04/23 2134    benzonatate (TESSALON) capsule 100 mg, 100 mg, Oral, TID PRN, Andrew Contreras MD, 100 mg at 01/04/23 1158    sodium chloride (NS) flush 5-40 mL, 5-40 mL, IntraVENous, PRN, Andrew Contreras MD    albuterol CONCENTRATE 2.5mg/0.5 mL neb soln, 2.5 mg, Nebulization, Q4H PRN, Andrew Contreras MD    apixaban (ELIQUIS) tablet 5 mg, 5 mg, Oral, BID, Nena Lopez MD, 5 mg at 01/04/23 2134    calcium-vitamin D 600 mg-5 mcg (200 unit) per tablet 2 Tablet, 2 Tablet, Oral, DAILY, Andrew Contreras MD, 2 Tablet at 01/04/23 0855    pantoprazole (PROTONIX) tablet 40 mg, 40 mg, Oral, DAILY, Andrew Contreras MD, 40 mg at 01/04/23 0855    dicyclomine (BENTYL) tablet 20 mg, 20 mg, Oral, Q6H, Andrew Contreras MD, 20 mg at 01/05/23 0455    escitalopram oxalate (LEXAPRO) tablet 20 mg, 20 mg, Oral, DAILY, Andrew Contreras MD, 20 mg at 01/04/23 0855    lacosamide (VIMPAT) tablet 200 mg, 200 mg, Oral, BID, Andrew Contreras MD, 200 mg at 01/04/23 2134    latanoprost (XALATAN) 0.005 % ophthalmic solution 1 Drop, 1 Drop, Both Eyes, DAILY, Andrew Contreras MD, 1 Drop at 01/04/23 0801    levothyroxine (SYNTHROID) tablet 50 mcg, 50 mcg, Oral, ACB, Christin Arroyo MD, 50 mcg at 01/04/23 0855    . PHARMACY TO SUBSTITUTE PER PROTOCOL (Reordered from: Linzess 145 mcg cap capsule), , , Per Protocol, Christin Arroyo MD    montelukast (SINGULAIR) tablet 10 mg, 10 mg, Oral, DAILY, Andrew Contreras MD, 10 mg at 01/04/23 0855    nitroglycerin (NITROSTAT) tablet 0.4 mg, 0.4 mg, SubLINGual, PRN, Austin Contreras MD    nortriptyline (PAMELOR) capsule 100 mg, 100 mg, Oral, QHS, Andrew Contreras MD, 100 mg at 01/04/23 2134    topiramate (TOPAMAX) tablet 100 mg, 100 mg, Oral, BID, Andrew Contreras MD, 100 mg at 01/04/23 2134    insulin lispro (HUMALOG) injection, , SubCUTAneous, AC&HS, Austin Contreras MD    glucose chewable tablet 16 g, 4 Tablet, Oral, PRN, Austin Contreras MD    glucagon (GLUCAGEN) injection 1 mg, 1 mg, IntraMUSCular, PRN, Austin Contreras MD    polyethylene glycol (MIRALAX) packet 17 g, 17 g, Oral, DAILY PRN, Andrew Contreras MD    ondansetron (ZOFRAN ODT) tablet 4 mg, 4 mg, Oral, Q8H PRN **OR** ondansetron (ZOFRAN) injection 4 mg, 4 mg, IntraVENous, Q6H PRN, Christin Arroyo MD    0.9% sodium chloride infusion, 75 mL/hr, IntraVENous, CONTINUOUS, Andrew Contreras MD, Last Rate: 75 mL/hr at 01/05/23 0454, 75 mL/hr at 01/05/23 0454    scopolamine (TRANSDERM-SCOP) 1 mg over 3 days 1 Patch, 1 Patch, TransDERmal, Q72H, Christin Arroyo MD, 1 Patch at 01/03/23 3387

## 2023-01-06 NOTE — PROGRESS NOTES
Discharge medications reviewed with patient and appropriate educational materials and side effects teaching were provided. I have reviewed discharge instructions with the patient. The patient verbalized understanding. Patient discharged home. Patient escorted to exit by nurse via wheelchair with belongings. Patient's daughter here to take patient home via private vehicle.

## 2023-01-06 NOTE — PROGRESS NOTES
Physician Progress Note      PATIENTAlmedia Last  CSN #:                  [id number]  :                       1970  ADMIT DATE:       1/3/2023 12:51 AM  DISCH DATE:        2023 2:10 PM  RESPONDING  PROVIDER #:        Yary Wu MD          QUERY TEXT:    Dear Dr. Jorje Mccullough,    Pt admitted with AMS. Pt noted to have hx of seizures and migraines. If possible, please document in progress notes and discharge summary the relationship, if any, between AMS and seizures with prolonged postictal state or migraines with unknown amount of use of Benadryl and Imitrex. The medical record reflects the following:  Risk Factors: 45year old female, hx seizures and migraines  Clinical Indicators:1/3 ED provider note - presents to the ED with altered mental status. Reportedly, patient was found on the floor after falling. She was slow to respond to questions. Did not receive any medications. No witnessed like seizure activity. As the patient is being transported her mental status is slowly clearing up. Fingerstick within normal.  Last seizure was 3 days ago. Patient has prolonged postictal status. 1/3 H&P - AMS  Was recently seen and in the ER because of intractable headache and was sent home  Seizure disorder  Migraine headaches  1/3 RN note - Per daughter patient had migraine on  and used unknown amount of benadryl and Imitrex shots to relieve headache. She believes this is why her mother became unresponsive.  Neurology consult - She recalls that she was trying to sit up on her bed but could not do so after repeated tries and then lost consciousness. Her mother called 44. She says she woke up sometime after but cannot recall how long she was unconscious for. She says she is unsure but this may have been like her typical seizure. She is back to baseline at this time.    She does state that she has been having 4 days worth of migraines and did get some relief briefly from an ER visit but migraines came back. episode of loss of consciousness yesterday which may have been her typical seizure. She is not able to provide a good history of what her typical seizures like. She has been unable to take 1 medication because of problems with pharmacy. Treatment: PO Keppra, PO Imitrex, PO Topamax, PO Trileptal, PO Vimpat, IVF NS      Please email [email or web address] with any questions  Options provided:  -- AMS due to migraine with unknown amount of use of Benadryl and Imitrex  -- AMS due to seizure disorder with prolonged postictal status  -- AMS unrelated to seizure or migraines  -- Other - I will add my own diagnosis  -- Disagree - Not applicable / Not valid  -- Disagree - Clinically unable to determine / Unknown  -- Refer to Clinical Documentation Reviewer    PROVIDER RESPONSE TEXT:    AMS due to migraine with unknown amount of use of Benadryl and Imitrex. Query created by:  Harvey Gloria on 1/5/2023 8:06 AM      Electronically signed by:  Shana Pimentel MD 1/6/2023 12:16 PM

## 2023-01-08 LAB
BACTERIA SPEC CULT: NORMAL
BACTERIA SPEC CULT: NORMAL
SPECIAL REQUESTS,SREQ: NORMAL
SPECIAL REQUESTS,SREQ: NORMAL

## 2023-01-08 NOTE — PROGRESS NOTES
Physician Progress Note      Perico Griffiths  CSN #:                  [id number]  :                       1970  ADMIT DATE:       1/3/2023 12:51 AM  DISCH DATE:        2023 2:10 PM  RESPONDING  PROVIDER #:        Paramjit Solis MD          QUERY TEXT:    Dear Dr. Jose Tavarez,    Patient admitted with AMS, noted to have unknown amount of use of Benadryl and Imitrex. . If possible, please document in progress notes and discharge summary if you are evaluating and/or treating any of the following: The medical record reflects the following:  Risk Factors: 45year old female, hx seizures and migraines  Clinical Indicators: 1/3 ED provider note - presents to the ED with altered mental status. Reportedly, patient was found on the floor after falling. She was slow to respond to questions. 1/3 H&P - AMS  Was recently seen and in the ER because of intractable headache and was sent home  Seizure disorder  Migraine headaches  1/3 RN note - Per daughter patient had migraine on  and used unknown amount of benadryl and Imitrex shots to relieve headache. She believes this is why her mother became unresponsive. Treatment: PO Imitrex, PO Topamax, PO Trileptal, PO Vimpat, IVF NS    Please email [email or web address] with any questions  Options provided:  -- Accidental overdose of Benadryl and Imitrex  -- Adverse effect of Benadryl and Imitrex, properly administered  -- Other - I will add my own diagnosis  -- Disagree - Not applicable / Not valid  -- Disagree - Clinically unable to determine / Unknown  -- Refer to Clinical Documentation Reviewer    PROVIDER RESPONSE TEXT:    This patient had an accidental overdose of Benadryl and Imitrex. Query created by: Nafisa Camara on 2023 12:34 PM      QUERY TEXT:    Pt admitted with Migraine. Pt noted to have AMS.  If possible, please document in the progress notes and discharge summary if you are evaluating and / or treating any of the following: The medical record reflects the following:  Risk Factors: 45year old female, hx seizures and migraines  Clinical Indicators: 1/3 H&P - AMS  Was recently seen and in the ER because of intractable headache and was sent home  Seizure disorder  Migraine headaches  1/3 RN note - Per daughter patient had migraine on Sunday and used unknown amount of benadryl and Imitrex shots to relieve headache. She believes this is why her mother became unresponsive. 1/4 Neurology consult - She recalls that she was trying to sit up on her bed but could not do so after repeated tries and then lost consciousness. Her mother called 44. She says she woke up sometime after but cannot recall how long she was unconscious for. She says she is unsure but this may have been like her typical seizure. She is back to baseline at this time. Treatment: PO Keppra, PO Imitrex, PO Topamax, PO Trileptal, PO Vimpat, IVF NS    Please email [email or web address] with any questions  Options provided:  -- Metabolic encephalopathy  -- Toxic encephalopathy  -- Toxic metabolic encephalopathy  -- Other - I will add my own diagnosis  -- Disagree - Not applicable / Not valid  -- Disagree - Clinically unable to determine / Unknown  -- Refer to Clinical Documentation Reviewer    PROVIDER RESPONSE TEXT:    This patient has metabolic encephalopathy. Query created by:  David Wise on 1/6/2023 12:36 PM      Electronically signed by:  Alysha Mckenzie MD 1/8/2023 6:22 PM

## 2023-01-09 ENCOUNTER — TELEPHONE (OUTPATIENT)
Dept: CARDIOLOGY CLINIC | Age: 53
End: 2023-01-09

## 2023-01-09 DIAGNOSIS — I50.42 CHRONIC COMBINED SYSTOLIC AND DIASTOLIC CONGESTIVE HEART FAILURE (HCC): Primary | ICD-10-CM

## 2023-01-09 DIAGNOSIS — I42.8 NICM (NONISCHEMIC CARDIOMYOPATHY) (HCC): ICD-10-CM

## 2023-01-09 DIAGNOSIS — R07.2 PRECORDIAL CHEST PAIN: ICD-10-CM

## 2023-01-09 NOTE — TELEPHONE ENCOUNTER
Patient is calling because she would like to be schedule for her Nuclear Stress Test and it has to be done in the hospital because of multiple cancellations. I gave the patient the number to Central Scheduling.     [phone]

## 2023-01-13 PROCEDURE — 99285 EMERGENCY DEPT VISIT HI MDM: CPT

## 2023-01-14 ENCOUNTER — HOSPITAL ENCOUNTER (OUTPATIENT)
Age: 53
Setting detail: OBSERVATION
Discharge: HOME | End: 2023-01-15
Attending: EMERGENCY MEDICINE | Admitting: INTERNAL MEDICINE
Payer: MEDICARE

## 2023-01-14 ENCOUNTER — APPOINTMENT (OUTPATIENT)
Dept: GENERAL RADIOLOGY | Age: 53
End: 2023-01-14
Attending: EMERGENCY MEDICINE
Payer: MEDICARE

## 2023-01-14 ENCOUNTER — APPOINTMENT (OUTPATIENT)
Dept: CT IMAGING | Age: 53
End: 2023-01-14
Attending: EMERGENCY MEDICINE
Payer: MEDICARE

## 2023-01-14 DIAGNOSIS — R56.9 SEIZURES (HCC): Primary | ICD-10-CM

## 2023-01-14 LAB
ALBUMIN SERPL-MCNC: 3.4 G/DL (ref 3.5–5)
ALBUMIN/GLOB SERPL: 1 (ref 1.1–2.2)
ALP SERPL-CCNC: 177 U/L (ref 45–117)
ALT SERPL-CCNC: 73 U/L (ref 12–78)
ANION GAP SERPL CALC-SCNC: 8 MMOL/L (ref 5–15)
AST SERPL W P-5'-P-CCNC: 54 U/L (ref 15–37)
BASOPHILS # BLD: 0 K/UL (ref 0–0.1)
BASOPHILS NFR BLD: 0 % (ref 0–1)
BILIRUB SERPL-MCNC: <0.1 MG/DL (ref 0.2–1)
BUN SERPL-MCNC: 16 MG/DL (ref 6–20)
BUN/CREAT SERPL: 25 (ref 12–20)
CA-I BLD-MCNC: 8.9 MG/DL (ref 8.5–10.1)
CHLORIDE SERPL-SCNC: 113 MMOL/L (ref 97–108)
CO2 SERPL-SCNC: 20 MMOL/L (ref 21–32)
CREAT SERPL-MCNC: 0.64 MG/DL (ref 0.55–1.02)
DIFFERENTIAL METHOD BLD: ABNORMAL
EOSINOPHIL # BLD: 0 K/UL (ref 0–0.4)
EOSINOPHIL NFR BLD: 0 % (ref 0–7)
ERYTHROCYTE [DISTWIDTH] IN BLOOD BY AUTOMATED COUNT: 15.6 % (ref 11.5–14.5)
ETHANOL SERPL-MCNC: <10 MG/DL
GLOBULIN SER CALC-MCNC: 3.5 G/DL (ref 2–4)
GLUCOSE SERPL-MCNC: 129 MG/DL (ref 65–100)
HCT VFR BLD AUTO: 36.7 % (ref 35–47)
HGB BLD-MCNC: 11.7 G/DL (ref 11.5–16)
IMM GRANULOCYTES # BLD AUTO: 0 K/UL (ref 0–0.04)
IMM GRANULOCYTES NFR BLD AUTO: 0 % (ref 0–0.5)
LACTATE SERPL-SCNC: 1.9 MMOL/L (ref 0.4–2)
LYMPHOCYTES # BLD: 0.7 K/UL (ref 0.8–3.5)
LYMPHOCYTES NFR BLD: 11 % (ref 12–49)
MCH RBC QN AUTO: 27.4 PG (ref 26–34)
MCHC RBC AUTO-ENTMCNC: 31.9 G/DL (ref 30–36.5)
MCV RBC AUTO: 85.9 FL (ref 80–99)
MONOCYTES # BLD: 0.2 K/UL (ref 0–1)
MONOCYTES NFR BLD: 3 % (ref 5–13)
NEUTS SEG # BLD: 5.7 K/UL (ref 1.8–8)
NEUTS SEG NFR BLD: 86 % (ref 32–75)
NRBC # BLD: 0 K/UL (ref 0–0.01)
NRBC BLD-RTO: 0 PER 100 WBC
PLATELET # BLD AUTO: 385 K/UL (ref 150–400)
PMV BLD AUTO: 10.2 FL (ref 8.9–12.9)
POTASSIUM SERPL-SCNC: 4.8 MMOL/L (ref 3.5–5.1)
PROT SERPL-MCNC: 6.9 G/DL (ref 6.4–8.2)
RBC # BLD AUTO: 4.27 M/UL (ref 3.8–5.2)
SODIUM SERPL-SCNC: 141 MMOL/L (ref 136–145)
T4 FREE SERPL-MCNC: 0.7 NG/DL (ref 0.8–1.5)
TROPONIN-HIGH SENSITIVITY: 5 NG/L (ref 0–51)
WBC # BLD AUTO: 6.7 K/UL (ref 3.6–11)

## 2023-01-14 PROCEDURE — 84484 ASSAY OF TROPONIN QUANT: CPT

## 2023-01-14 PROCEDURE — 74011000250 HC RX REV CODE- 250: Performed by: INTERNAL MEDICINE

## 2023-01-14 PROCEDURE — 36415 COLL VENOUS BLD VENIPUNCTURE: CPT

## 2023-01-14 PROCEDURE — 93005 ELECTROCARDIOGRAM TRACING: CPT

## 2023-01-14 PROCEDURE — 74011250636 HC RX REV CODE- 250/636: Performed by: INTERNAL MEDICINE

## 2023-01-14 PROCEDURE — 96376 TX/PRO/DX INJ SAME DRUG ADON: CPT

## 2023-01-14 PROCEDURE — 80235 DRUG ASSAY LACOSAMIDE: CPT

## 2023-01-14 PROCEDURE — 96374 THER/PROPH/DIAG INJ IV PUSH: CPT

## 2023-01-14 PROCEDURE — 84439 ASSAY OF FREE THYROXINE: CPT

## 2023-01-14 PROCEDURE — 70450 CT HEAD/BRAIN W/O DYE: CPT

## 2023-01-14 PROCEDURE — 80053 COMPREHEN METABOLIC PANEL: CPT

## 2023-01-14 PROCEDURE — G0378 HOSPITAL OBSERVATION PER HR: HCPCS

## 2023-01-14 PROCEDURE — 74011250637 HC RX REV CODE- 250/637: Performed by: INTERNAL MEDICINE

## 2023-01-14 PROCEDURE — 80177 DRUG SCRN QUAN LEVETIRACETAM: CPT

## 2023-01-14 PROCEDURE — 74011250636 HC RX REV CODE- 250/636: Performed by: EMERGENCY MEDICINE

## 2023-01-14 PROCEDURE — 96375 TX/PRO/DX INJ NEW DRUG ADDON: CPT

## 2023-01-14 PROCEDURE — 71045 X-RAY EXAM CHEST 1 VIEW: CPT

## 2023-01-14 PROCEDURE — 82077 ASSAY SPEC XCP UR&BREATH IA: CPT

## 2023-01-14 PROCEDURE — 85025 COMPLETE CBC W/AUTO DIFF WBC: CPT

## 2023-01-14 PROCEDURE — 83605 ASSAY OF LACTIC ACID: CPT

## 2023-01-14 RX ORDER — LACOSAMIDE 200 MG/1
200 TABLET ORAL 2 TIMES DAILY
Status: DISCONTINUED | OUTPATIENT
Start: 2023-01-14 | End: 2023-01-15 | Stop reason: HOSPADM

## 2023-01-14 RX ORDER — LEVOTHYROXINE SODIUM 100 UG/1
50 TABLET ORAL
Status: DISCONTINUED | OUTPATIENT
Start: 2023-01-14 | End: 2023-01-14

## 2023-01-14 RX ORDER — ONDANSETRON 2 MG/ML
4 INJECTION INTRAMUSCULAR; INTRAVENOUS
Status: DISCONTINUED | OUTPATIENT
Start: 2023-01-14 | End: 2023-01-15 | Stop reason: HOSPADM

## 2023-01-14 RX ORDER — SODIUM CHLORIDE 0.9 % (FLUSH) 0.9 %
5-40 SYRINGE (ML) INJECTION AS NEEDED
Status: DISCONTINUED | OUTPATIENT
Start: 2023-01-14 | End: 2023-01-15 | Stop reason: HOSPADM

## 2023-01-14 RX ORDER — TOPIRAMATE 25 MG/1
100 TABLET ORAL 2 TIMES DAILY
Status: DISCONTINUED | OUTPATIENT
Start: 2023-01-14 | End: 2023-01-15 | Stop reason: HOSPADM

## 2023-01-14 RX ORDER — CLONAZEPAM 1 MG/1
1 TABLET ORAL
Status: DISCONTINUED | OUTPATIENT
Start: 2023-01-14 | End: 2023-01-14

## 2023-01-14 RX ORDER — POLYETHYLENE GLYCOL 3350 17 G/17G
17 POWDER, FOR SOLUTION ORAL DAILY PRN
Status: DISCONTINUED | OUTPATIENT
Start: 2023-01-14 | End: 2023-01-15 | Stop reason: HOSPADM

## 2023-01-14 RX ORDER — DICYCLOMINE HYDROCHLORIDE 20 MG/1
20 TABLET ORAL EVERY 6 HOURS
Status: DISCONTINUED | OUTPATIENT
Start: 2023-01-14 | End: 2023-01-15 | Stop reason: HOSPADM

## 2023-01-14 RX ORDER — ALBUTEROL SULFATE 90 UG/1
2 AEROSOL, METERED RESPIRATORY (INHALATION)
Status: DISCONTINUED | OUTPATIENT
Start: 2023-01-14 | End: 2023-01-15 | Stop reason: HOSPADM

## 2023-01-14 RX ORDER — SCOLOPAMINE TRANSDERMAL SYSTEM 1 MG/1
1 PATCH, EXTENDED RELEASE TRANSDERMAL
Status: DISCONTINUED | OUTPATIENT
Start: 2023-01-16 | End: 2023-01-15 | Stop reason: HOSPADM

## 2023-01-14 RX ORDER — LEVETIRACETAM 500 MG/5ML
1000 INJECTION, SOLUTION, CONCENTRATE INTRAVENOUS EVERY 12 HOURS
Status: DISCONTINUED | OUTPATIENT
Start: 2023-01-14 | End: 2023-01-15

## 2023-01-14 RX ORDER — MONTELUKAST SODIUM 10 MG/1
10 TABLET ORAL DAILY
Status: DISCONTINUED | OUTPATIENT
Start: 2023-01-14 | End: 2023-01-15 | Stop reason: HOSPADM

## 2023-01-14 RX ORDER — NORTRIPTYLINE HYDROCHLORIDE 25 MG/1
100 CAPSULE ORAL
Status: DISCONTINUED | OUTPATIENT
Start: 2023-01-14 | End: 2023-01-15 | Stop reason: HOSPADM

## 2023-01-14 RX ORDER — CLONAZEPAM 0.5 MG/1
1 TABLET ORAL
Status: DISCONTINUED | OUTPATIENT
Start: 2023-01-14 | End: 2023-01-15 | Stop reason: HOSPADM

## 2023-01-14 RX ORDER — SODIUM CHLORIDE 0.9 % (FLUSH) 0.9 %
5-40 SYRINGE (ML) INJECTION EVERY 8 HOURS
Status: DISCONTINUED | OUTPATIENT
Start: 2023-01-14 | End: 2023-01-15 | Stop reason: HOSPADM

## 2023-01-14 RX ORDER — ONDANSETRON 4 MG/1
4 TABLET, ORALLY DISINTEGRATING ORAL
Status: DISCONTINUED | OUTPATIENT
Start: 2023-01-14 | End: 2023-01-15 | Stop reason: HOSPADM

## 2023-01-14 RX ORDER — ESCITALOPRAM OXALATE 10 MG/1
20 TABLET ORAL DAILY
Status: DISCONTINUED | OUTPATIENT
Start: 2023-01-14 | End: 2023-01-15 | Stop reason: HOSPADM

## 2023-01-14 RX ORDER — OXCARBAZEPINE 150 MG/1
300 TABLET, FILM COATED ORAL 2 TIMES DAILY
Status: DISCONTINUED | OUTPATIENT
Start: 2023-01-14 | End: 2023-01-15 | Stop reason: HOSPADM

## 2023-01-14 RX ORDER — CALCIUM CARBONATE/VITAMIN D3 600MG-5MCG
2 TABLET ORAL
Status: DISCONTINUED | OUTPATIENT
Start: 2023-01-14 | End: 2023-01-15 | Stop reason: HOSPADM

## 2023-01-14 RX ORDER — LATANOPROST 50 UG/ML
1 SOLUTION/ DROPS OPHTHALMIC EVERY EVENING
Status: DISCONTINUED | OUTPATIENT
Start: 2023-01-14 | End: 2023-01-15 | Stop reason: HOSPADM

## 2023-01-14 RX ADMIN — NORTRIPTYLINE HYDROCHLORIDE 100 MG: 25 CAPSULE ORAL at 22:31

## 2023-01-14 RX ADMIN — Medication 2 TABLET: at 08:56

## 2023-01-14 RX ADMIN — OXCARBAZEPINE 300 MG: 300 TABLET, FILM COATED ORAL at 08:57

## 2023-01-14 RX ADMIN — TOPIRAMATE 100 MG: 25 TABLET, FILM COATED ORAL at 22:31

## 2023-01-14 RX ADMIN — DICYCLOMINE HYDROCHLORIDE 20 MG: 20 TABLET ORAL at 18:56

## 2023-01-14 RX ADMIN — LEVETIRACETAM 1000 MG: 100 INJECTION INTRAVENOUS at 13:27

## 2023-01-14 RX ADMIN — APIXABAN 5 MG: 5 TABLET, FILM COATED ORAL at 22:32

## 2023-01-14 RX ADMIN — ESCITALOPRAM OXALATE 20 MG: 10 TABLET ORAL at 08:57

## 2023-01-14 RX ADMIN — LATANOPROST 1 DROP: 50 SOLUTION OPHTHALMIC at 18:56

## 2023-01-14 RX ADMIN — APIXABAN 5 MG: 5 TABLET, FILM COATED ORAL at 08:56

## 2023-01-14 RX ADMIN — OXCARBAZEPINE 300 MG: 300 TABLET, FILM COATED ORAL at 22:51

## 2023-01-14 RX ADMIN — SODIUM CHLORIDE, PRESERVATIVE FREE 10 ML: 5 INJECTION INTRAVENOUS at 22:33

## 2023-01-14 RX ADMIN — LEVETIRACETAM 1000 MG: 100 INJECTION INTRAVENOUS at 00:50

## 2023-01-14 RX ADMIN — LACOSAMIDE 200 MG: 200 TABLET, FILM COATED ORAL at 08:57

## 2023-01-14 RX ADMIN — CLONAZEPAM 1 MG: 0.5 TABLET ORAL at 22:32

## 2023-01-14 RX ADMIN — DICYCLOMINE HYDROCHLORIDE 20 MG: 20 TABLET ORAL at 23:22

## 2023-01-14 RX ADMIN — MONTELUKAST 10 MG: 10 TABLET, FILM COATED ORAL at 08:57

## 2023-01-14 RX ADMIN — DICYCLOMINE HYDROCHLORIDE 20 MG: 20 TABLET ORAL at 06:15

## 2023-01-14 RX ADMIN — ONDANSETRON 4 MG: 2 INJECTION INTRAMUSCULAR; INTRAVENOUS at 13:35

## 2023-01-14 RX ADMIN — TOPIRAMATE 100 MG: 25 TABLET, FILM COATED ORAL at 08:57

## 2023-01-14 RX ADMIN — SODIUM CHLORIDE, PRESERVATIVE FREE 10 ML: 5 INJECTION INTRAVENOUS at 09:00

## 2023-01-14 RX ADMIN — DICYCLOMINE HYDROCHLORIDE 20 MG: 20 TABLET ORAL at 13:27

## 2023-01-14 RX ADMIN — LACOSAMIDE 200 MG: 200 TABLET, FILM COATED ORAL at 22:33

## 2023-01-14 RX ADMIN — SODIUM CHLORIDE, PRESERVATIVE FREE 10 ML: 5 INJECTION INTRAVENOUS at 13:34

## 2023-01-14 NOTE — H&P
History and Physical    Patient: Michael Velasco MRN: [id number]  SSN: [id number]    YOB: 1970  Age: 45 y.o. Sex: female      Subjective:      Michael Velasco is a 45 y.o. female with PMH of HFrEF, diabetes, hypothyroidism, seizure, pulmonary embolism, and other medical problems as below. Patient presented to ED with chief complaint of other mental status. Limited history obtained due to poor mentation. History from ED physician, family states patient has passed out multiple times throughout the day over the past 5 days. They will states she is just sitting there and she nods off. She frequently has episodes where she is disoriented afterward, not knowing where she is or what she was going to do. Reports compliance with medication. Per chart review, recently admitted for the same, discharged with trileptal, lacosamide. ? Keppra. In the ED, vitals stable. Lab work grossly within normal limits. CT head no acute findings. Chart review: Neurologist, Dr. Anai Peoples (1/5/2023) patient had difficulty filling aptiom and xcopri. For now continue vimpat 200 mg bid and keppra 750 mg bid. Outpatient f/u with Dr. Radha Alba.      Past Medical History:   Diagnosis Date    Arthritis     pt states knees and back    Asthma     Cardiomyopathy ([address] 2005    EF 35%, echo in 2021 normal EF     Colon polyp     Diabetes ([address]     Pt states not diabetic but has all the symptoms of it    Dizzy spells     GERD (gastroesophageal reflux disease)     Heart failure ([address] 2005    now combined systolic, diastolic, seen at [facility] 2021- Dr Reynaldo Schwab    Irritable bowel syndrome     Knee pain, bilateral     Migraines 2011    2-3 week    Neuropathy     pt states of both feet    Pulmonary embolism [facility])     August 2022    Seizures ([address]     petite mals last seizure 2-3 months ago    Thyroid disease     hypothyroid     Family History   Problem Relation Age of Onset    Hypertension Mother     Heart Disease Mother     Cancer Father         stomach cancer    Hypertension Father     No Known Problems Brother     Asthma Daughter     Asthma Son     Asthma Son     Breast Cancer Paternal Aunt         age unknown     Social History     Tobacco Use    Smoking status: Never    Smokeless tobacco: Never   Substance Use Topics    Alcohol use: No        Objective:     Physical Exam:   General: Somnolent, not cooperative, no distress  Eye: Omitted as patient is unable to cooperate. Throat and Neck: Omitted as patient is unable to cooperate. Lung: clear to auscultation bilaterally  Heart: regular rate and rhythm,   Abdomen: soft, non-tender. Bowel sounds normal. No masses,  Extremities:  able to move all extremities normal, atraumatic  Skin: Normal.  Neurologic: Omitted as patient is unable to cooperate. [facility]: Omitted as patient is unable to cooperate. Most recent lab work and imaging results reviewed in [address] [place] [address]. Assessment and plan:   # Breakthrough seizure  - Continue home dose PO vimpat, trileptal. IV keppra. - Consult neurology. ? Need MRI? No previous MRI brain in Epic. # Hypothyroidism  - Recent TSH 0.31. Check T4  - Hold home PO levothyroxine for now    # CHF  - Not fluid overload clinically  - not on any home medications. Outpatient cardiology f/u. # Diabetes  - Uncertain home medications   - Recent HbA1c 5.4. Hold insulin for now    # History of pulmonary embolism   - Continue home medications. PO eliquis. # Social Determents of health: None    # Full code by default, need further clarification    # Medication reconciliation: Medication list reviewed on Epic and/or outside documentation. Not reviewed with patient. However, medication reconciliation incomplete, appreciate assistance from pharmacy or nursing staff.     Signed By: Myke Garza MD     January 14, 2023

## 2023-01-14 NOTE — PROGRESS NOTES
Medicare Outpatient Observation Notice (MOON)/ [place] Outpatient Observation Notice (Felipe Mccormick) provided to patient/representative with verbal explanation of the notice. Time allotted for questions regarding the notice. Patient /representative provided a completed copy of the MOON/VOON notice. Copy placed on bedside chart.

## 2023-01-14 NOTE — ED PROVIDER NOTES
EMERGENCY DEPARTMENT HISTORY AND PHYSICAL EXAM      Date: 1/14/2023  Patient Name: Mark Arzola    History of Presenting Illness     Chief Complaint   Patient presents with    Lethargy       History Provided By: Patient    HPI: Mark Arzola, 78-year-old female with history of complex partial seizures, migraines, diabetes, heart failure, hypothyroidism, PE presenting with altered mental status. Family states patient has passed out multiple times throughout the day over the past 5 days. They will states she is just sitting there and she nods off. She frequently has episodes where she is disoriented afterward, not knowing where she is or what she was going to do. Denies any recent nausea, vomiting, diarrhea, cough, chest pain, shortness of breath. They report her seizures are usually confusion. Denies any full body shaking. Patient is on [address] for seizures. Reports compliance with medications    There are no other complaints, changes, or physical findings at this time.     Past History     Past Medical History:  Past Medical History:   Diagnosis Date    Arthritis     pt states knees and back    Asthma     Cardiomyopathy ([address] 2005    EF 35%, echo in 2021 normal EF     Colon polyp     Diabetes ([address]     Pt states not diabetic but has all the symptoms of it    Dizzy spells     GERD (gastroesophageal reflux disease)     Heart failure ([address] 2005    now combined systolic, diastolic, seen [facility] 2021- Dr Gloria Newby    Irritable bowel syndrome     Knee pain, bilateral     Migraines 2011    2-3 week    Neuropathy     pt states of both feet    Pulmonary embolism [facility])     August 2022    Seizures ([address]     petite mals last seizure 2-3 months ago    Thyroid disease     hypothyroid       Past Surgical History:  Past Surgical History:   Procedure Laterality Date    COLONOSCOPY N/A 11/28/2022    COLONOSCOPY performed by Ann Davidson MD at [address]  5/2006 HX COLONOSCOPY      HX ENDOSCOPY      HX GASTRIC BYPASS  2013    HX HYSTERECTOMY  2007    HX KNEE ARTHROSCOPY Right 05/2021    HX ORTHOPAEDIC Bilateral 2013    CTR    HX ORTHOPAEDIC      right knee procedure 2021    HX OTHER SURGICAL      left axillary lymph node biopsy    HX SMALL BOWEL RESECTION      WI APPENDECTOMY      WI COLECTOMY PARTIAL W/ANASTOMOSIS      WI ENTERECTOMY RESCJ SMALL INTESTINE W/ENTEROSTOMY      WI LAPAROSCOPY SURG CHOLECYSTECTOMY      WI UNLISTED PROCEDURE ABDOMEN PERITONEUM & OMENTUM  2008    cholecystectomy    WI UNLISTED PROCEDURE CARDIAC SURGERY      Loop recorder implanted july 2020    WI UNLISTED PROCEDURE STOMACH         Family History:  Family History   Problem Relation Age of Onset    Hypertension Mother     Heart Disease Mother     Cancer Father         stomach cancer    Hypertension Father     No Known Problems Brother     Asthma Daughter     Asthma Son     Asthma Son     Breast Cancer Paternal Aunt         age unknown       Social History:  Social History     Tobacco Use    Smoking status: Never    Smokeless tobacco: Never   Vaping Use    Vaping Use: Never used   Substance Use Topics    Alcohol use: No    Drug use: No       Allergies: Allergies   Allergen Reactions    Acetaminophen Other (comments)     Advised not to take due to Liver Issues    Morphine Other (comments)     SOB/Chest Pressure - in hosp for a week. States DILAUDID Ok    Oxycodone Shortness of Breath     Reaction Type: Allergy; Severity: Severe    Shellfish Derived Anaphylaxis    Codeine Other (comments)     SOB chest pain    Peanut Oil Other (comments)     Reaction Type: Intolerance; Severity: Severe    Sulfa (Sulfonamide Antibiotics) Rash     itching       PCP: Vikram Alexis MD    No current facility-administered medications on file prior to encounter.      Current Outpatient Medications on File Prior to Encounter   Medication Sig Dispense Refill    ondansetron (ZOFRAN ODT) 4 mg disintegrating tablet Take 1 Tablet by mouth every eight (8) hours as needed for Nausea or Vomiting. 20 Tablet 0    OXcarbazepine (TRILEPTAL) 300 mg tablet Take 1 Tablet by mouth two (2) times a day. 60 Tablet 0    clonazePAM (KlonoPIN) 1 mg tablet Take 1 mg by mouth nightly.      ergocalciferol (Vitamin D2) 1,250 mcg (50,000 unit) capsule Take 50,000 Units by mouth once. Every Friday      cenobamate (Xcopri) 200 mg tab Take 200 mg by mouth daily. eslicarbazepine (Aptiom) 800 mg tab tablet Take 800 mg by mouth daily. erenumab-aooe (Aimovig Autoinjector) 140 mg/mL injection 140 mg by SubCUTAneous route once.      scopolamine (TRANSDERM-SCOP) 1 mg over 3 days pt3d 1 Patch by TransDERmal route every seventy-two (72) hours. Present on pt at this time, right ear      nitroglycerin (NITROSTAT) 0.4 mg SL tablet DISSOLVE 1 TABLET UNDER THE TONGUE EVERY 5 MINUTES AS  NEEDED FOR CHEST PAIN. MAX  OF 3 TABLETS IN 15 MINUTES. CALL 911 IF PAIN PERSISTS. 30 Tablet 5    apixaban (ELIQUIS) 5 mg tablet Take 5 mg by mouth two (2) times a day. lacosamide (Vimpat) 200 mg tab tablet Take 1 Tablet by mouth two (2) times a day. Max Daily Amount: 400 mg. 30 Tablet 0    escitalopram oxalate (LEXAPRO) 10 mg tablet Take 20 mg by mouth daily. latanoprost (XALATAN) 0.005 % ophthalmic solution INSTILL 1 DROP INTO EACH EYE AT NIGHT      Linzess 145 mcg cap capsule Take 290 mcg by mouth daily. rizatriptan (MAXALT) 5 mg tablet Take 5 mg by mouth once as needed for Migraine. May repeat in 2 hours if needed       montelukast (SINGULAIR) 10 mg tablet Take 10 mg by mouth daily. 3    nortriptyline (PAMELOR) 50 mg capsule Take 100 mg by mouth nightly. 5    topiramate (TOPAMAX) 100 mg tablet Take 100 mg by mouth two (2) times a day.  5    VENTOLIN HFA 90 mcg/actuation inhaler Take 2 Puffs by inhalation every four (4) hours as needed. 4    dexlansoprazole (DEXILANT) 60 mg CpDB capsule (delayed release) Take 60 mg by mouth daily.       dicyclomine (BENTYL) 20 mg tablet Take 20 mg by mouth every six (6) hours. levothyroxine (SYNTHROID) 50 mcg tablet Take 50 mcg by mouth Daily (before breakfast). Calcium-Cholecalciferol, D3, 600 mg-10 mcg (400 unit) cap Take 2 Tablets by mouth daily. Review of Systems   Review of Systems   Constitutional:  Negative for chills and fever. HENT:  Negative for sore throat. Eyes:  Negative for redness. Respiratory:  Negative for shortness of breath. Cardiovascular:  Negative for chest pain. Gastrointestinal:  Negative for abdominal pain, nausea and vomiting. Genitourinary:  Negative for flank pain. Musculoskeletal:  Negative for myalgias. Skin:  Negative for rash. Neurological:  Negative for headaches. Physical Exam   Physical Exam  Vitals and nursing note reviewed. Constitutional:       General: She is not in acute distress. Appearance: Normal appearance. HENT:      Head: Normocephalic and atraumatic. Mouth/Throat:      Mouth: Mucous membranes are moist.   Eyes:      Extraocular Movements: Extraocular movements intact. Conjunctiva/sclera: Conjunctivae normal.   Cardiovascular:      Rate and Rhythm: Normal rate and regular rhythm. Pulmonary:      Effort: Pulmonary effort is normal. No respiratory distress. Breath sounds: Normal breath sounds. No wheezing, rhonchi or rales. Abdominal:      General: There is no distension. Palpations: Abdomen is soft. Tenderness: There is no abdominal tenderness. Musculoskeletal:         General: Normal range of motion. Cervical back: Normal range of motion. Skin:     General: Skin is warm and dry. Neurological:      General: No focal deficit present. Mental Status: She is alert and oriented to person, place, and time. Mental status is at baseline.       Comments: +slurred speech  - has been intermittent for the past few days        Lab and Diagnostic Study Results   Labs -     Recent Results (from the past 12 hour(s)) METABOLIC PANEL, COMPREHENSIVE    Collection Time: 01/14/23 12:36 AM   Result Value Ref Range    Sodium 141 136 - 145 mmol/L    Potassium 4.8 3.5 - 5.1 mmol/L    Chloride 113 (H) 97 - 108 mmol/L    CO2 20 (L) 21 - 32 mmol/L    Anion gap 8 5 - 15 mmol/L    Glucose 129 (H) 65 - 100 mg/dL    BUN 16 6 - 20 mg/dL    Creatinine 0.64 0.55 - 1.02 mg/dL    BUN/Creatinine ratio 25 (H) 12 - 20      eGFR >60 >60 ml/min/1.73m2    Calcium 8.9 8.5 - 10.1 mg/dL    Bilirubin, total <0.1 (L) 0.2 - 1.0 mg/dL    AST (SGOT) 54 (H) 15 - 37 U/L    ALT (SGPT) 73 12 - 78 U/L    Alk. phosphatase 177 (H) 45 - 117 U/L    Protein, total 6.9 6.4 - 8.2 g/dL    Albumin 3.4 (L) 3.5 - 5.0 g/dL    Globulin 3.5 2.0 - 4.0 g/dL    A-G Ratio 1.0 (L) 1.1 - 2.2     CBC WITH AUTOMATED DIFF    Collection Time: 01/14/23 12:36 AM   Result Value Ref Range    WBC 6.7 3.6 - 11.0 K/uL    RBC 4.27 3.80 - 5.20 M/uL    HGB 11.7 11.5 - 16.0 g/dL    HCT 36.7 35.0 - 47.0 %    MCV 85.9 80.0 - 99.0 FL    MCH 27.4 26.0 - 34.0 PG    MCHC 31.9 30.0 - 36.5 g/dL    RDW 15.6 (H) 11.5 - 14.5 %    PLATELET 176 730 - 962 K/uL    MPV 10.2 8.9 - 12.9 FL    NRBC 0.0 0.0  WBC    ABSOLUTE NRBC 0.00 0.00 - 0.01 K/uL    NEUTROPHILS 86 (H) 32 - 75 %    LYMPHOCYTES 11 (L) 12 - 49 %    MONOCYTES 3 (L) 5 - 13 %    EOSINOPHILS 0 0 - 7 %    BASOPHILS 0 0 - 1 %    IMMATURE GRANULOCYTES 0 0 - 0.5 %    ABS. NEUTROPHILS 5.7 1.8 - 8.0 K/UL    ABS. LYMPHOCYTES 0.7 (L) 0.8 - 3.5 K/UL    ABS. MONOCYTES 0.2 0.0 - 1.0 K/UL    ABS. EOSINOPHILS 0.0 0.0 - 0.4 K/UL    ABS. BASOPHILS 0.0 0.0 - 0.1 K/UL    ABS. IMM.  GRANS. 0.0 0.00 - 0.04 K/UL    DF AUTOMATED     TROPONIN-HIGH SENSITIVITY    Collection Time: 01/14/23 12:36 AM   Result Value Ref Range    Troponin-High Sensitivity 5 0 - 51 ng/L   ETHYL ALCOHOL    Collection Time: 01/14/23 12:36 AM   Result Value Ref Range    ALCOHOL(ETHYL),SERUM <10 <10 mg/dL   LACTIC ACID    Collection Time: 01/14/23 12:36 AM   Result Value Ref Range    Lactic acid 1.9 0.4 - 2.0 mmol/L       Radiologic Studies -   @lastxrresult@  CT Results  (Last 48 hours)                 01/14/23 0052  CT HEAD WO CONT Final result    Impression:      No acute intracranial abnormality. Narrative:  EXAM:  CT HEAD WO CONT       INDICATION: Weakness and lethargy       COMPARISON: CT 1/3/2023       TECHNIQUE: Noncontrast head CT. Coronal and sagittal reformats. CT dose   reduction was achieved through use of a standardized protocol tailored for this   examination and automatic exposure control for dose modulation. FINDINGS: The ventricles and sulci are age-appropriate without hydrocephalus. There is no mass effect or midline shift. There is no intracranial hemorrhage or   extra-axial fluid collection. There is no abnormal parenchymal attenuation. The   gray-white matter differentiation is maintained. The basal cisterns are patent. The osseous structures are intact. The visualized paranasal sinuses and mastoid   air cells are clear. CXR Results  (Last 48 hours)                 01/14/23 0044  XR CHEST PORT Final result    Impression:      Mild bibasilar atelectasis. Narrative:  EXAM:  XR CHEST PORT       INDICATION: Syncope       COMPARISON: 1/3/2023       TECHNIQUE: Portable AP semiupright chest view at 0041 hours       FINDINGS: The cardiomediastinal contours are stable. The pulmonary vasculature   is within normal limits. There is mild bibasilar linear opacity. There is no pleural effusion or   pneumothorax. The bones and upper abdomen are stable. Medical Decision Making and ED Course   Differential Diagnosis & Medical Decision Making Provider Note:   69-year-old female with history of migraines, diabetes, heart failure, complex partial seizures presenting with frequent episodes of change in mental status, described as passing out episodes.   Patient is generally weak appearing with slurred speech which has been intermittent over the past week on arrival.  Differential includes seizures, syncopal episodes, arrhythmia, migraine, electrolyte abnormality, ACS. Description of episodes does seem consistent with patient's history of complex partial seizures. Patient recently admitted for similar and at that time was discharged although there was question of whether patient was able to get one of her seizure medications. Loaded with IV Keppra. Head CT shows no evidence of mass or hemorrhage. Will admit to medicine for continued work-up and neurology eval.  Patient may benefit from EEG.    - I am the first provider for this patient. I reviewed the vital signs, available nursing notes, past medical history, past surgical history, family history and social history. The patients presenting problems have been discussed, and they are in agreement with the care plan formulated and outlined with them. I have encouraged them to ask questions as they arise throughout their visit. Vital Signs-Reviewed the patient's vital signs. Patient Vitals for the past 12 hrs:   Temp Pulse Resp BP SpO2   01/14/23 0233 -- 64 13 107/70 100 %   01/14/23 0203 -- 68 14 105/66 100 %   01/14/23 0133 -- 71 11 106/76 99 %   01/14/23 0003 97.3 °F (36.3 °C) 83 20 110/84 98 %       ED Course:             Disposition   Disposition: Admitted to Floor Medical Floor the case was discussed with the admitting physician Dr Sue Salcido     Diagnosis/Clinical Impression     Clinical Impression:   1. Seizures (HCC)        Attestations: Сергей Conley MD, am the primary clinician of record. Please note that this dictation was completed with [employer], the computer voice recognition software. Quite often unanticipated grammatical, syntax, homophones, and other interpretive errors are inadvertently transcribed by the computer software. Please disregard these errors. Please excuse any errors that have escaped final proofreading. Thank you.

## 2023-01-14 NOTE — ED NOTES
Assumed care of pt from [facility], [facility]. Pt resting comfortably at this time with eyes closed.

## 2023-01-14 NOTE — ED TRIAGE NOTES
Ems reports family called for ams and lethargy all day pt was recently her for same on last visit it was reported that pt took an unknown amount of benadryl and Imitrex

## 2023-01-14 NOTE — MED STUDENT NOTES
*ATTENTION:  This note has been created by a medical student for educational purposes only. Please do not refer to the content of this note for clinical decision-making, billing, or other purposes. Please see attending physicians note to obtain clinical information on this patient. *     History and Physical    NAME: Judy Villatoro   :  1970   MRN:  [id number]     Date/Time:  2023 9:26 AM    Patient PCP: Mitra Yu MD  ______________________________________________________________  Subjective:     Judy Villatoro is a 45y.o. year old female with a past medical hx of seizures, PE, heart failure, cardiomyopathy (EF of 30%), asthma, migraines, and thyroid disease who presents to the ER with recent episodes of loss consciousness. Family states patient passed out multiple times over the past 5 days. Patient remembers Rosary Abts in an abnormal position the first time she lost consciousness and feeling warm and sweaty after she lost consciousness a second time. She reports having bilateral tremors in her hands that started 1 year ago and was frustrated about this movement interfering with her daily life. She denies dizziness, nausea, and vomiting. Hematology is unremarkable.    Chemistry shows slightly elevated BUN/Creatinine ratio at 25   AST elevated to 54  Alk Phos 177     CT Head WO CONT: 23  No acute intracranial abnormality     Chest X-Ray: 23  Mild bibasilar atelectasis       Past Medical History:   Diagnosis Date    Arthritis     pt states knees and back    Asthma     Cardiomyopathy ([address]     EF 35%, echo in  normal EF     Colon polyp     Diabetes ([address]     Pt states not diabetic but has all the symptoms of it    Dizzy spells     GERD (gastroesophageal reflux disease)     Heart failure ([address]     now combined systolic, diastolic, seen at [facility], [facility] - Dr Gissell May    Irritable bowel syndrome     Knee pain, bilateral     Migraines     2-3 week Neuropathy     pt states of both feet    Pulmonary embolism [facility])     August 2022    Seizures ([address]     petite mals last seizure 2-3 months ago    Thyroid disease     hypothyroid        Past Surgical History:   Procedure Laterality Date    COLONOSCOPY N/A 11/28/2022    COLONOSCOPY performed by Javy Corcoran MD at [address]  5/2006    HX COLONOSCOPY      HX ENDOSCOPY      HX GASTRIC BYPASS  2013    HX HYSTERECTOMY  2007    HX KNEE ARTHROSCOPY Right 05/2021    HX ORTHOPAEDIC Bilateral 2013    CTR    HX ORTHOPAEDIC      right knee procedure 2021    HX OTHER SURGICAL      left axillary lymph node biopsy    HX SMALL BOWEL RESECTION      VA APPENDECTOMY      VA COLECTOMY PARTIAL W/ANASTOMOSIS      VA ENTERECTOMY RESCJ SMALL INTESTINE W/ENTEROSTOMY      VA LAPAROSCOPY SURG CHOLECYSTECTOMY      VA UNLISTED PROCEDURE ABDOMEN PERITONEUM & OMENTUM  2008    cholecystectomy    VA UNLISTED PROCEDURE CARDIAC SURGERY      Loop recorder implanted july 2020    VA UNLISTED PROCEDURE STOMACH         Social History     Tobacco Use    Smoking status: Never    Smokeless tobacco: Never   Substance Use Topics    Alcohol use: No        Family History   Problem Relation Age of Onset    Hypertension Mother     Heart Disease Mother     Cancer Father         stomach cancer    Hypertension Father     No Known Problems Brother     Asthma Daughter     Asthma Son     Asthma Son     Breast Cancer Paternal Aunt         age unknown       Allergies   Allergen Reactions    Acetaminophen Other (comments)     Advised not to take due to Liver Issues    Morphine Other (comments)     SOB/Chest Pressure - in hosp for a week. States DILAUDID Ok    Oxycodone Shortness of Breath     Reaction Type: Allergy; Severity: Severe    Shellfish Derived Anaphylaxis    Codeine Other (comments)     SOB chest pain    Peanut Oil Other (comments)     Reaction Type:  Intolerance; Severity: Severe    Sulfa (Sulfonamide Antibiotics) Rash     itching Prior to Admission medications    Medication Sig Start Date End Date Taking? Authorizing Provider   ondansetron (ZOFRAN ODT) 4 mg disintegrating tablet Take 1 Tablet by mouth every eight (8) hours as needed for Nausea or Vomiting. 1/5/23   Gm Contreras MD   OXcarbazepine (TRILEPTAL) 300 mg tablet Take 1 Tablet by mouth two (2) times a day. 1/5/23   Gm Contreras MD   clonazePAM (KlonoPIN) 1 mg tablet Take 1 mg by mouth nightly. Provider, Historical   ergocalciferol (Vitamin D2) 1,250 mcg (50,000 unit) capsule Take 50,000 Units by mouth once. Every Friday    Provider, Historical   cenobamate (Xcopri) 200 mg tab Take 200 mg by mouth daily. Provider, Historical   eslicarbazepine (Aptiom) 800 mg tab tablet Take 800 mg by mouth daily. Provider, Historical   erenumab-aooe (Aimovig Autoinjector) 140 mg/mL injection 140 mg by SubCUTAneous route once. Other, MD Kenzie   scopolamine (TRANSDERM-SCOP) 1 mg over 3 days pt3d 1 Patch by TransDERmal route every seventy-two (72) hours. Present on pt at this time, right ear    Provider, Historical   nitroglycerin (NITROSTAT) 0.4 mg SL tablet DISSOLVE 1 TABLET UNDER THE TONGUE EVERY 5 MINUTES AS  NEEDED FOR CHEST PAIN. MAX  OF 3 TABLETS IN 15 MINUTES. CALL 911 IF PAIN PERSISTS. 10/19/22   Vishnu Zamorano MD   apixaban (ELIQUIS) 5 mg tablet Take 5 mg by mouth two (2) times a day. Provider, Historical   lacosamide (Vimpat) 200 mg tab tablet Take 1 Tablet by mouth two (2) times a day. Max Daily Amount: 400 mg. 5/10/22   Moises Rogers PA-C   escitalopram oxalate (LEXAPRO) 10 mg tablet Take 20 mg by mouth daily. 2/4/22   Provider, Historical   latanoprost (XALATAN) 0.005 % ophthalmic solution INSTILL 1 DROP INTO [facility] EYE AT NIGHT 1/28/22   Provider, Historical   Linzess 145 mcg cap capsule Take 290 mcg by mouth daily. 8/11/21   Provider, Historical   rizatriptan (MAXALT) 5 mg tablet Take 5 mg by mouth once as needed for Migraine.  May repeat in 2 hours if needed     Provider, Historical   montelukast (SINGULAIR) 10 mg tablet Take 10 mg by mouth daily. 7/15/19   Provider, Historical   nortriptyline (PAMELOR) 50 mg capsule Take 100 mg by mouth nightly. 7/19/19   Provider, Historical   topiramate (TOPAMAX) 100 mg tablet Take 100 mg by mouth two (2) times a day. 7/19/19   Provider, Historical   VENTOLIN HFA 90 mcg/actuation inhaler Take 2 Puffs by inhalation every four (4) hours as needed. 7/15/19   Provider, Historical   dexlansoprazole (DEXILANT) 60 mg CpDB capsule (delayed release) Take 60 mg by mouth daily. Provider, Historical   dicyclomine (BENTYL) 20 mg tablet Take 20 mg by mouth every six (6) hours. Provider, Historical   levothyroxine (SYNTHROID) 50 mcg tablet Take 50 mcg by mouth Daily (before breakfast). Provider, Historical   Calcium-Cholecalciferol, D3, 600 mg-10 mcg (400 unit) cap Take 2 Tablets by mouth daily.     Provider, Historical         Current Facility-Administered Medications:     levETIRAcetam (KEPPRA) injection 1,000 mg, 1,000 mg, IntraVENous, Q12H, Sarah Pierre MD, 1,000 mg at 01/14/23 0050    apixaban (ELIQUIS) tablet 5 mg, 5 mg, Oral, BID, Sarah Pierre MD, 5 mg at 01/14/23 0856    calcium-vitamin D 600 mg-5 mcg (200 unit) per tablet 2 Tablet, 2 Tablet, Oral, DAILY WITH Mili Carter MD, 2 Tablet at 01/14/23 0856    dicyclomine (BENTYL) tablet 20 mg, 20 mg, Oral, Q6H, Emilia Melvin MD, 20 mg at 01/14/23 0615    escitalopram oxalate (LEXAPRO) tablet 20 mg, 20 mg, Oral, DAILY, Sarah Pierre MD, 20 mg at 01/14/23 0857    lacosamide (VIMPAT) tablet 200 mg, 200 mg, Oral, BID, Sarah Pierre MD, 200 mg at 01/14/23 0857    latanoprost (XALATAN) 0.005 % ophthalmic solution 1 Drop, 1 Drop, Both Eyes, QPM, Steve Pierre MD    montelukast (SINGULAIR) tablet 10 mg, 10 mg, Oral, DAILY, Gabby, Sarah Lozano MD, 10 mg at 01/14/23 0857    nortriptyline (PAMELOR) capsule 100 mg, 100 mg, Oral, QHS, Emilia Melvin MD    OXcarbazepine (TRILEPTAL) tablet 300 mg, 300 mg, Oral, BID, Emma Pierre MD, 300 mg at 01/14/23 0857    [START ON 1/16/2023] scopolamine (TRANSDERM-SCOP) 1 mg over 3 days 1 Patch, 1 Patch, TransDERmal, Q72H, Emma Pierre MD    topiramate (TOPAMAX) tablet 100 mg, 100 mg, Oral, BID, Wellington Martinez MD, 100 mg at 01/14/23 0857    albuterol (PROVENTIL HFA, VENTOLIN HFA, PROAIR HFA) inhaler 2 Puff, 2 Puff, Inhalation, Q4H PRN, Wellington Martinez MD    sodium chloride (NS) flush 5-40 mL, 5-40 mL, IntraVENous, Q8H, Emma Pierre MD, 10 mL at 01/14/23 0900    sodium chloride (NS) flush 5-40 mL, 5-40 mL, IntraVENous, PRN, Wellington Martinez MD    polyethylene glycol (MIRALAX) packet 17 g, 17 g, Oral, DAILY PRN, Wellington Martinez MD    ondansetron (ZOFRAN ODT) tablet 4 mg, 4 mg, Oral, Q8H PRN **OR** ondansetron (ZOFRAN) injection 4 mg, 4 mg, IntraVENous, Q6H PRN, Wellington Martinez MD    clonazePAM (KlonoPIN) tablet 1 mg, 1 mg, Oral, QHS, Emma Pierre MD    Current Outpatient Medications:     ondansetron (ZOFRAN ODT) 4 mg disintegrating tablet, Take 1 Tablet by mouth every eight (8) hours as needed for Nausea or Vomiting., Disp: 20 Tablet, Rfl: 0    OXcarbazepine (TRILEPTAL) 300 mg tablet, Take 1 Tablet by mouth two (2) times a day., Disp: 60 Tablet, Rfl: 0    clonazePAM (KlonoPIN) 1 mg tablet, Take 1 mg by mouth nightly., Disp: , Rfl:     ergocalciferol (Vitamin D2) 1,250 mcg (50,000 unit) capsule, Take 50,000 Units by mouth once. Every Friday, Disp: , Rfl:     cenobamate (Xcopri) 200 mg tab, Take 200 mg by mouth daily. , Disp: , Rfl:     eslicarbazepine (Aptiom) 800 mg tab tablet, Take 800 mg by mouth daily. , Disp: , Rfl:     erenumab-aooe (Aimovig Autoinjector) 140 mg/mL injection, 140 mg by SubCUTAneous route once., Disp: , Rfl:     scopolamine (TRANSDERM-SCOP) 1 mg over 3 days pt3d, 1 Patch by TransDERmal route every seventy-two (72) hours.  Present on pt at this time, right ear, Disp: , Rfl:     nitroglycerin (NITROSTAT) 0.4 mg SL tablet, DISSOLVE 1 TABLET UNDER THE TONGUE EVERY 5 MINUTES AS  NEEDED FOR CHEST PAIN. MAX  OF 3 TABLETS IN 15 MINUTES. CALL 911 IF PAIN PERSISTS., Disp: 30 Tablet, Rfl: 5    apixaban (ELIQUIS) 5 mg tablet, Take 5 mg by mouth two (2) times a day., Disp: , Rfl:     lacosamide (Vimpat) 200 mg tab tablet, Take 1 Tablet by mouth two (2) times a day. Max Daily Amount: 400 mg., Disp: 30 Tablet, Rfl: 0    escitalopram oxalate (LEXAPRO) 10 mg tablet, Take 20 mg by mouth daily. , Disp: , Rfl:     latanoprost (XALATAN) 0.005 % ophthalmic solution, INSTILL 1 DROP INTO EACH EYE AT NIGHT, Disp: , Rfl:     Linzess 145 mcg cap capsule, Take 290 mcg by mouth daily. , Disp: , Rfl:     rizatriptan (MAXALT) 5 mg tablet, Take 5 mg by mouth once as needed for Migraine. May repeat in 2 hours if needed , Disp: , Rfl:     montelukast (SINGULAIR) 10 mg tablet, Take 10 mg by mouth daily. , Disp: , Rfl: 3    nortriptyline (PAMELOR) 50 mg capsule, Take 100 mg by mouth nightly., Disp: , Rfl: 5    topiramate (TOPAMAX) 100 mg tablet, Take 100 mg by mouth two (2) times a day., Disp: , Rfl: 5    VENTOLIN HFA 90 mcg/actuation inhaler, Take 2 Puffs by inhalation every four (4) hours as needed. , Disp: , Rfl: 4    dexlansoprazole (DEXILANT) 60 mg CpDB capsule (delayed release), Take 60 mg by mouth daily. , Disp: , Rfl:     dicyclomine (BENTYL) 20 mg tablet, Take 20 mg by mouth every six (6) hours. , Disp: , Rfl:     levothyroxine (SYNTHROID) 50 mcg tablet, Take 50 mcg by mouth Daily (before breakfast). , Disp: , Rfl:     Calcium-Cholecalciferol, D3, 600 mg-10 mcg (400 unit) cap, Take 2 Tablets by mouth daily. , Disp: , Rfl:     LAB DATA REVIEWED:    Recent Results (from the past 24 hour(s))   METABOLIC PANEL, COMPREHENSIVE    Collection Time: 01/14/23 12:36 AM   Result Value Ref Range    Sodium 141 136 - 145 mmol/L    Potassium 4.8 3.5 - 5.1 mmol/L    Chloride 113 (H) 97 - 108 mmol/L    CO2 20 (L) 21 - 32 mmol/L    Anion gap 8 5 - 15 mmol/L    Glucose 129 (H) 65 - 100 mg/dL    BUN 16 6 - 20 mg/dL    Creatinine 0.64 0.55 - 1.02 mg/dL    BUN/Creatinine ratio 25 (H) 12 - 20      eGFR >60 >60 ml/min/1.73m2    Calcium 8.9 8.5 - 10.1 mg/dL    Bilirubin, total <0.1 (L) 0.2 - 1.0 mg/dL    AST (SGOT) 54 (H) 15 - 37 U/L    ALT (SGPT) 73 12 - 78 U/L    Alk. phosphatase 177 (H) 45 - 117 U/L    Protein, total 6.9 6.4 - 8.2 g/dL    Albumin 3.4 (L) 3.5 - 5.0 g/dL    Globulin 3.5 2.0 - 4.0 g/dL    A-G Ratio 1.0 (L) 1.1 - 2.2     CBC WITH AUTOMATED DIFF    Collection Time: 01/14/23 12:36 AM   Result Value Ref Range    WBC 6.7 3.6 - 11.0 K/uL    RBC 4.27 3.80 - 5.20 M/uL    HGB 11.7 11.5 - 16.0 g/dL    HCT 36.7 35.0 - 47.0 %    MCV 85.9 80.0 - 99.0 FL    MCH 27.4 26.0 - 34.0 PG    MCHC 31.9 30.0 - 36.5 g/dL    RDW 15.6 (H) 11.5 - 14.5 %    PLATELET 398 279 - 232 K/uL    MPV 10.2 8.9 - 12.9 FL    NRBC 0.0 0.0  WBC    ABSOLUTE NRBC 0.00 0.00 - 0.01 K/uL    NEUTROPHILS 86 (H) 32 - 75 %    LYMPHOCYTES 11 (L) 12 - 49 %    MONOCYTES 3 (L) 5 - 13 %    EOSINOPHILS 0 0 - 7 %    BASOPHILS 0 0 - 1 %    IMMATURE GRANULOCYTES 0 0 - 0.5 %    ABS. NEUTROPHILS 5.7 1.8 - 8.0 K/UL    ABS. LYMPHOCYTES 0.7 (L) 0.8 - 3.5 K/UL    ABS. MONOCYTES 0.2 0.0 - 1.0 K/UL    ABS. EOSINOPHILS 0.0 0.0 - 0.4 K/UL    ABS. BASOPHILS 0.0 0.0 - 0.1 K/UL    ABS. IMM.  GRANS. 0.0 0.00 - 0.04 K/UL    DF AUTOMATED     TROPONIN-HIGH SENSITIVITY    Collection Time: 01/14/23 12:36 AM   Result Value Ref Range    Troponin-High Sensitivity 5 0 - 51 ng/L   ETHYL ALCOHOL    Collection Time: 01/14/23 12:36 AM   Result Value Ref Range    ALCOHOL(ETHYL),SERUM <10 <10 mg/dL   LACTIC ACID    Collection Time: 01/14/23 12:36 AM   Result Value Ref Range    Lactic acid 1.9 0.4 - 2.0 mmol/L       XR Results (most recent):  Results from Hospital Encounter encounter on 01/14/23    XR CHEST PORT    Narrative  EXAM:  XR CHEST PORT    INDICATION: Syncope    COMPARISON: 1/3/2023    TECHNIQUE: Portable AP semiupright chest view at 0041 hours    FINDINGS: The cardiomediastinal contours are stable. The pulmonary vasculature  is within normal limits. There is mild bibasilar linear opacity. There is no pleural effusion or  pneumothorax. The bones and upper abdomen are stable. Impression  Mild bibasilar atelectasis. CT HEAD WO CONT   Final Result      No acute intracranial abnormality. XR CHEST PORT   Final Result      Mild bibasilar atelectasis. Review of Systems:  Constitutional: Negative for chills and fever. HENT: Negative. Eyes: Negative. Respiratory: Negative. Cardiovascular: Negative. Gastrointestinal: Negative for abdominal pain and nausea. Skin: Negative. Neurological: Negative. Objective:   VITALS:    Visit Vitals  /79   Pulse 63   Temp 97.4 °F (36.3 °C)   Resp 14   Ht 5' 5\" (1.651 m)   Wt 178 lb (80.7 kg)   SpO2 100%   BMI 29.62 kg/m²       Physical Exam:   Constitutional: pt is oriented to person, place, and time. HENT:   Head: Normocephalic and atraumatic. Eyes: Pupils are equal, round, and reactive to light. EOM are normal.   Cardiovascular: Normal rate, regular rhythm and normal heart sounds. Pulmonary/Chest: Breath sounds normal. No wheezes. No rales. Exhibits no tenderness. Abdominal: Soft. Bowel sounds are normal. There is no abdominal tenderness. There is no rebound and no guarding. Musculoskeletal: Normal range of motion. Neurological: pt is alert and oriented to person, place, and time. Alert. Normal strength. No cranial nerve deficit or sensory deficit. Displays a negative Romberg sign.         ASSESSMENT AND PLAN:    Seizures   Continue Lacosamide   Continue clonazepam   Continue IV levetiracetam   Continue scopolamine    Bilateral Distal UE tremors   Currently on anti-seizure medications   Reports not being evaluated for this in the past  Could be due to anti-seizure medications  Consult neurology    Hypothyroidism   TSH is 0.31  PO levothyroxine held at this time PE  Continue at home Eliquis     CHF  No on any home medications   Follow up with cardiology as outpatient    Diabetes   Consider insulin    Continue supportive care   Consult Neurology for bilateral hand tremors           ________________________________________________________________________    Signed: Lyn Ugarte

## 2023-01-14 NOTE — PROGRESS NOTES
Reason for Admission:  Lethargy, Seizures, Pt admitted a week ago with AMS                     RUR Score:  N/A                   Plan for utilizing home health:   Not at this time       PCP: First and Last name:  Misha Jiménez MD     Name of Practice:    Are you a current patient: Yes/No:    Approximate date of last visit: 3 weeks ago   Can you participate in a virtual visit with your PCP:                     Current Advanced Directive/Advance Care Plan: Full Code      Healthcare Decision Maker:   Click here to complete [address] including selection of the Healthcare Decision Maker Relationship (ie \"Primary\")             Primary Decision MakerSena Nations - Mother - [phone]                  Transition of Care Plan:      Met f/f with Pt, she confirmed that the information on the face sheet is correct. Pt stated that she lives with her mother and daughter at [facility]. Pt stated no HH, she has a walker and is independent with ADL    Pt stated that she uses [address] in [place] [address]. Pt stated that family will give her a ride home when she is D/C. CM dispo: home vs HH, will need PT/OT eval/recommendations.

## 2023-01-14 NOTE — CONSULTS
NEUROLOGY CONSULT    Name Zoila Askew Age 45 y.o. MRN [id number]  1970     Referring Physician: Ravin Jean MD    Chief Complaint: Partial seizure     This is a 45 y.o. right handed -American female with PMH of HFrEF, diabetes, hypothyroidism, seizure, pulmonary embolism, and other medical problems as below. Patient presented to ED with chief complaint of altered mental status. Limited history obtained due to poor mentation. As per notes in the chart quoting the family patient has passed out multiple times throughout the day over the past 5 days. They will states she is just sitting there and she nods off. She frequently has episodes where she is disoriented afterward, not knowing where she is or what she was going to do. Reports compliance with medication. Per chart review, recently admitted for the same, discharged with trileptal, lacosamide. ? Keppra. In the ED, vitals stable. Lab work grossly within normal limits. CT head no acute findings. Assessment and Plan:  1. Multiple spells of passing out, details not available, try to call the daughter to get any objective account of the events and left a message on the voicemail. She has history of seizures but these spells sounds more like syncopal episodes. She was sweating and feeling tired when she came to after a spell on the toilet seat. For now we will continue the same dose of the seizure medicines. No additional work-up is indicated at this time. Once stabilized she can be discharged  2. History of seizures: On multiple medications  3. History of cardiomyopathy with EF of 35% as per echo in  showed normal EF  4. Diabetes mellitus  5. Migraine headaches  6. History of pulmonary embolism  7.   Hypothyroidism    Thank you for the consult,    Allergies   Allergen Reactions    Acetaminophen Other (comments)     Advised not to take due to Liver Issues    Morphine Other (comments)     SOB/Chest Pressure - in hosp for a week. States DILAUDID Ok    Oxycodone Shortness of Breath     Reaction Type: Allergy; Severity: Severe    Shellfish Derived Anaphylaxis    Codeine Other (comments)     SOB chest pain    Peanut Oil Other (comments)     Reaction Type: Intolerance; Severity: Severe    Sulfa (Sulfonamide Antibiotics) Rash     itching     Current Facility-Administered Medications   Medication Dose Route Frequency    levETIRAcetam (KEPPRA) injection 1,000 mg  1,000 mg IntraVENous Q12H    apixaban (ELIQUIS) tablet 5 mg  5 mg Oral BID    calcium-vitamin D 600 mg-5 mcg (200 unit) per tablet 2 Tablet  2 Tablet Oral DAILY WITH BREAKFAST    dicyclomine (BENTYL) tablet 20 mg  20 mg Oral Q6H    escitalopram oxalate (LEXAPRO) tablet 20 mg  20 mg Oral DAILY    lacosamide (VIMPAT) tablet 200 mg  200 mg Oral BID    latanoprost (XALATAN) 0.005 % ophthalmic solution 1 Drop  1 Drop Both Eyes QPM    montelukast (SINGULAIR) tablet 10 mg  10 mg Oral DAILY    nortriptyline (PAMELOR) capsule 100 mg  100 mg Oral QHS    OXcarbazepine (TRILEPTAL) tablet 300 mg  300 mg Oral BID    [START ON 1/16/2023] scopolamine (TRANSDERM-SCOP) 1 mg over 3 days 1 Patch  1 Patch TransDERmal Q72H    topiramate (TOPAMAX) tablet 100 mg  100 mg Oral BID    albuterol (PROVENTIL HFA, VENTOLIN HFA, PROAIR HFA) inhaler 2 Puff  2 Puff Inhalation Q4H PRN    sodium chloride (NS) flush 5-40 mL  5-40 mL IntraVENous Q8H    sodium chloride (NS) flush 5-40 mL  5-40 mL IntraVENous PRN    polyethylene glycol (MIRALAX) packet 17 g  17 g Oral DAILY PRN    ondansetron (ZOFRAN ODT) tablet 4 mg  4 mg Oral Q8H PRN    Or    ondansetron (ZOFRAN) injection 4 mg  4 mg IntraVENous Q6H PRN    clonazePAM (KlonoPIN) tablet 1 mg  1 mg Oral QHS     Current Outpatient Medications   Medication Sig    ondansetron (ZOFRAN ODT) 4 mg disintegrating tablet Take 1 Tablet by mouth every eight (8) hours as needed for Nausea or Vomiting.     OXcarbazepine (TRILEPTAL) 300 mg tablet Take 1 Tablet by mouth two (2) times a day. clonazePAM (KlonoPIN) 1 mg tablet Take 1 mg by mouth nightly.    ergocalciferol (Vitamin D2) 1,250 mcg (50,000 unit) capsule Take 50,000 Units by mouth once. Every Friday    cenobamate (Xcopri) 200 mg tab Take 200 mg by mouth daily. eslicarbazepine (Aptiom) 800 mg tab tablet Take 800 mg by mouth daily. erenumab-aooe (Aimovig Autoinjector) 140 mg/mL injection 140 mg by SubCUTAneous route once.    scopolamine (TRANSDERM-SCOP) 1 mg over 3 days pt3d 1 Patch by TransDERmal route every seventy-two (72) hours. Present on pt at this time, right ear    nitroglycerin (NITROSTAT) 0.4 mg SL tablet DISSOLVE 1 TABLET UNDER THE TONGUE EVERY 5 MINUTES AS  NEEDED FOR CHEST PAIN. MAX  OF 3 TABLETS IN 15 MINUTES. CALL 911 IF PAIN PERSISTS. apixaban (ELIQUIS) 5 mg tablet Take 5 mg by mouth two (2) times a day. lacosamide (Vimpat) 200 mg tab tablet Take 1 Tablet by mouth two (2) times a day. Max Daily Amount: 400 mg.    escitalopram oxalate (LEXAPRO) 10 mg tablet Take 20 mg by mouth daily. latanoprost (XALATAN) 0.005 % ophthalmic solution INSTILL 1 DROP INTO EACH EYE AT NIGHT    Linzess 145 mcg cap capsule Take 290 mcg by mouth daily. rizatriptan (MAXALT) 5 mg tablet Take 5 mg by mouth once as needed for Migraine. May repeat in 2 hours if needed     montelukast (SINGULAIR) 10 mg tablet Take 10 mg by mouth daily. nortriptyline (PAMELOR) 50 mg capsule Take 100 mg by mouth nightly. topiramate (TOPAMAX) 100 mg tablet Take 100 mg by mouth two (2) times a day. VENTOLIN HFA 90 mcg/actuation inhaler Take 2 Puffs by inhalation every four (4) hours as needed. dexlansoprazole (DEXILANT) 60 mg CpDB capsule (delayed release) Take 60 mg by mouth daily. dicyclomine (BENTYL) 20 mg tablet Take 20 mg by mouth every six (6) hours. levothyroxine (SYNTHROID) 50 mcg tablet Take 50 mcg by mouth Daily (before breakfast).     Calcium-Cholecalciferol, D3, 600 mg-10 mcg (400 unit) cap Take 2 Tablets by mouth daily. Past Medical History:   Diagnosis Date    Arthritis     pt states knees and back    Asthma     Cardiomyopathy ([address] 2005    EF 35%, echo in 2021 normal EF     Colon polyp     Diabetes ([address]     Pt states not diabetic but has all the symptoms of it    Dizzy spells     GERD (gastroesophageal reflux disease)     Heart failure ([address] 2005    now combined systolic, diastolic, seen at [facility], [facility] 2021- Dr Curtis Lazo    Irritable bowel syndrome     Knee pain, bilateral     Migraines 2011    2-3 week    Neuropathy     pt states of both feet    Pulmonary embolism ([address]     August 2022    Seizures ([facility] [facility]     petite mals last seizure 2-3 months ago    Thyroid disease     hypothyroid     Social History     Tobacco Use    Smoking status: Never    Smokeless tobacco: Never   Vaping Use    Vaping Use: Never used   Substance Use Topics    Alcohol use: No    Drug use: No     Exam  Visit Vitals  /65   Pulse 81   Temp 97.4 °F (36.3 °C)   Resp 17   Ht 5' 5\" (1.651 m)   Wt 80.7 kg (178 lb)   SpO2 94%   BMI 29.62 kg/m²     General: Well developed, well nourished. Patient in no distress   Head: Normocephalic, atraumatic, anicteric sclera   Neck Normal ROM, No thyromegally   Lungs:  Clear to auscultation    Cardiac: Regular rate and rhythm with no murmurs. Abd: Bowel sounds were audible   Ext: No pedal edema   Skin: Supple no rash     NeurologicExam:  Mental Status: Alert and oriented to person place and time   Speech: Fluent no aphasia or dysarthria. Cranial Nerves:   Pupils are equal round and reactive to light and accommodation, extraocular movements are intact and full, visual fields are intact by confrontation, no nystagmus noted, face is symmetric, sensation on face is intact and symmetric, hearing is intact and symmetric, tongue and uvula are in midline with normal movements, palate is elevating equally, shoulder shrug is intact and symmetric.    Motor:  Full and symmetric strength of left upper and lower ext and giveaway type of weakness noted in the right upper and the lower extremities which improves with reinforcement. Normal bulk and tone. Reflexes:   Deep tendon reflexes 2+/4 and symmetric. Sensory:   Symmetric and intact    Gait:  Gait is deferred   Tremor:   No tremor noted. Cerebellar:  Coordination intact for finger-nose-finger. Neurovascular: No carotid bruits.  No JVD      Lab Review  Lab Results   Component Value Date/Time    WBC 6.7 01/14/2023 12:36 AM    HCT 36.7 01/14/2023 12:36 AM    HGB 11.7 01/14/2023 12:36 AM    PLATELET 941 95/63/9405 12:36 AM     Lab Results   Component Value Date/Time    Sodium 141 01/14/2023 12:36 AM    Potassium 4.8 01/14/2023 12:36 AM    Chloride 113 (H) 01/14/2023 12:36 AM    CO2 20 (L) 01/14/2023 12:36 AM    Glucose 129 (H) 01/14/2023 12:36 AM    BUN 16 01/14/2023 12:36 AM    Creatinine 0.64 01/14/2023 12:36 AM    Calcium 8.9 01/14/2023 12:36 AM     No results found for: B12LT, FOL, RBCF  No results found for: LDL, LDLC, DLDLP  Lab Results   Component Value Date/Time    Hemoglobin A1c 5.4 01/03/2023 01:28 PM    Hemoglobin A1c, External 5.4 09/11/2020 12:00 AM     No components found for: TROPQUANT  No results found for: GULSHAN

## 2023-01-15 VITALS
SYSTOLIC BLOOD PRESSURE: 109 MMHG | HEART RATE: 66 BPM | HEIGHT: 65 IN | DIASTOLIC BLOOD PRESSURE: 70 MMHG | RESPIRATION RATE: 18 BRPM | BODY MASS INDEX: 29.66 KG/M2 | OXYGEN SATURATION: 95 % | WEIGHT: 178 LBS | TEMPERATURE: 97.7 F

## 2023-01-15 LAB
ANION GAP SERPL CALC-SCNC: 6 MMOL/L (ref 5–15)
ATRIAL RATE: 70 BPM
BUN SERPL-MCNC: 15 MG/DL (ref 6–20)
BUN/CREAT SERPL: 19 (ref 12–20)
CA-I BLD-MCNC: 8.4 MG/DL (ref 8.5–10.1)
CALCULATED P AXIS, ECG09: 42 DEGREES
CALCULATED R AXIS, ECG10: 31 DEGREES
CALCULATED T AXIS, ECG11: 0 DEGREES
CHLORIDE SERPL-SCNC: 114 MMOL/L (ref 97–108)
CO2 SERPL-SCNC: 22 MMOL/L (ref 21–32)
CREAT SERPL-MCNC: 0.81 MG/DL (ref 0.55–1.02)
DIAGNOSIS, 93000: NORMAL
ERYTHROCYTE [DISTWIDTH] IN BLOOD BY AUTOMATED COUNT: 15.9 % (ref 11.5–14.5)
GLUCOSE BLD STRIP.AUTO-MCNC: 54 MG/DL (ref 65–100)
GLUCOSE BLD STRIP.AUTO-MCNC: 75 MG/DL (ref 65–100)
GLUCOSE BLD STRIP.AUTO-MCNC: 82 MG/DL (ref 65–100)
GLUCOSE SERPL-MCNC: 73 MG/DL (ref 65–100)
HCT VFR BLD AUTO: 34.2 % (ref 35–47)
HGB BLD-MCNC: 10.6 G/DL (ref 11.5–16)
MCH RBC QN AUTO: 27.5 PG (ref 26–34)
MCHC RBC AUTO-ENTMCNC: 31 G/DL (ref 30–36.5)
MCV RBC AUTO: 88.8 FL (ref 80–99)
NRBC # BLD: 0 K/UL (ref 0–0.01)
NRBC BLD-RTO: 0 PER 100 WBC
P-R INTERVAL, ECG05: 202 MS
PERFORMED BY, TECHID: ABNORMAL
PERFORMED BY, TECHID: NORMAL
PERFORMED BY, TECHID: NORMAL
PLATELET # BLD AUTO: 362 K/UL (ref 150–400)
PMV BLD AUTO: 9.6 FL (ref 8.9–12.9)
POTASSIUM SERPL-SCNC: 3.9 MMOL/L (ref 3.5–5.1)
Q-T INTERVAL, ECG07: 404 MS
QRS DURATION, ECG06: 98 MS
QTC CALCULATION (BEZET), ECG08: 436 MS
RBC # BLD AUTO: 3.85 M/UL (ref 3.8–5.2)
SODIUM SERPL-SCNC: 142 MMOL/L (ref 136–145)
VENTRICULAR RATE, ECG03: 70 BPM
WBC # BLD AUTO: 3.9 K/UL (ref 3.6–11)

## 2023-01-15 PROCEDURE — 96376 TX/PRO/DX INJ SAME DRUG ADON: CPT

## 2023-01-15 PROCEDURE — 74011000250 HC RX REV CODE- 250: Performed by: INTERNAL MEDICINE

## 2023-01-15 PROCEDURE — 82962 GLUCOSE BLOOD TEST: CPT

## 2023-01-15 PROCEDURE — 74011250637 HC RX REV CODE- 250/637: Performed by: INTERNAL MEDICINE

## 2023-01-15 PROCEDURE — 74011250636 HC RX REV CODE- 250/636: Performed by: INTERNAL MEDICINE

## 2023-01-15 PROCEDURE — 96375 TX/PRO/DX INJ NEW DRUG ADDON: CPT

## 2023-01-15 PROCEDURE — 74011250636 HC RX REV CODE- 250/636: Performed by: PSYCHIATRY & NEUROLOGY

## 2023-01-15 PROCEDURE — G0378 HOSPITAL OBSERVATION PER HR: HCPCS

## 2023-01-15 PROCEDURE — 80048 BASIC METABOLIC PNL TOTAL CA: CPT

## 2023-01-15 PROCEDURE — 36415 COLL VENOUS BLD VENIPUNCTURE: CPT

## 2023-01-15 PROCEDURE — 74011250637 HC RX REV CODE- 250/637: Performed by: PSYCHIATRY & NEUROLOGY

## 2023-01-15 PROCEDURE — 85027 COMPLETE CBC AUTOMATED: CPT

## 2023-01-15 RX ORDER — SUMATRIPTAN 25 MG/1
100 TABLET, FILM COATED ORAL
Status: COMPLETED | OUTPATIENT
Start: 2023-01-15 | End: 2023-01-15

## 2023-01-15 RX ORDER — LEVETIRACETAM 500 MG/5ML
500 INJECTION, SOLUTION, CONCENTRATE INTRAVENOUS EVERY 12 HOURS
Status: DISCONTINUED | OUTPATIENT
Start: 2023-01-15 | End: 2023-01-15 | Stop reason: HOSPADM

## 2023-01-15 RX ORDER — BUTALBITAL, ACETAMINOPHEN AND CAFFEINE 50; 325; 40 MG/1; MG/1; MG/1
1 TABLET ORAL
Status: DISCONTINUED | OUTPATIENT
Start: 2023-01-15 | End: 2023-01-15 | Stop reason: HOSPADM

## 2023-01-15 RX ORDER — LEVETIRACETAM 500 MG/5ML
500 INJECTION, SOLUTION, CONCENTRATE INTRAVENOUS EVERY 12 HOURS
Qty: 300 ML | Refills: 0 | Status: SHIPPED | OUTPATIENT
Start: 2023-01-16 | End: 2023-01-19 | Stop reason: DRUGHIGH

## 2023-01-15 RX ADMIN — LEVETIRACETAM 1000 MG: 100 INJECTION INTRAVENOUS at 00:34

## 2023-01-15 RX ADMIN — LACOSAMIDE 200 MG: 200 TABLET, FILM COATED ORAL at 08:46

## 2023-01-15 RX ADMIN — Medication 2 TABLET: at 08:45

## 2023-01-15 RX ADMIN — SUMATRIPTAN SUCCINATE 100 MG: 25 TABLET ORAL at 12:19

## 2023-01-15 RX ADMIN — MONTELUKAST 10 MG: 10 TABLET, FILM COATED ORAL at 08:46

## 2023-01-15 RX ADMIN — SODIUM CHLORIDE, PRESERVATIVE FREE 10 ML: 5 INJECTION INTRAVENOUS at 06:46

## 2023-01-15 RX ADMIN — TOPIRAMATE 100 MG: 25 TABLET, FILM COATED ORAL at 08:45

## 2023-01-15 RX ADMIN — OXCARBAZEPINE 300 MG: 300 TABLET, FILM COATED ORAL at 08:47

## 2023-01-15 RX ADMIN — DICYCLOMINE HYDROCHLORIDE 20 MG: 20 TABLET ORAL at 06:40

## 2023-01-15 RX ADMIN — BUTALBITAL, ACETAMINOPHEN, AND CAFFEINE 1 TABLET: 50; 325; 40 TABLET ORAL at 06:43

## 2023-01-15 RX ADMIN — APIXABAN 5 MG: 5 TABLET, FILM COATED ORAL at 08:46

## 2023-01-15 RX ADMIN — ESCITALOPRAM OXALATE 20 MG: 10 TABLET ORAL at 08:47

## 2023-01-15 RX ADMIN — LEVETIRACETAM 500 MG: 100 INJECTION, SOLUTION INTRAVENOUS at 12:22

## 2023-01-15 RX ADMIN — DICYCLOMINE HYDROCHLORIDE 20 MG: 20 TABLET ORAL at 12:19

## 2023-01-15 NOTE — MED STUDENT NOTES
*ATTENTION:  This note has been created by a medical student for educational purposes only. Please do not refer to the content of this note for clinical decision-making, billing, or other purposes. Please see attending physicians note to obtain clinical information on this patient. *     History and Physical    NAME: Cindi Block   :  1970   MRN:  [id number]     Date/Time:  1/15/2023 9:26 AM    Patient PCP: Mariah Collazo MD  ______________________________________________________________  Subjective:     Cindi Block is a 45y.o. year old female with a past medical hx of seizures, PE, heart failure, cardiomyopathy (EF of 30%), asthma, migraines, and thyroid disease who presents to the ER with recent episodes of loss consciousness. Family states patient passed out multiple times over the past 5 days. Patient remembers Sofy Greyson in an abnormal position the first time she lost consciousness and feeling warm and sweaty after she lost consciousness a second time. She reports having bilateral tremors in her hands that started 1 year ago and was frustrated about this movement interfering with her daily life. She denies dizziness, nausea, and vomiting.   ----  Patient seen and examined. Seen sleeping comfortably in bed, in no apparent distress. Hematology and chemistry show no remarkable findings.    Drug Screen is negative       Chemistry shows slightly elevated BUN/Creatinine ratio at 25   AST elevated to 54  Alk Phos 177     CT Head WO CONT: 23  No acute intracranial abnormality     Chest X-Ray: 23  Mild bibasilar atelectasis       Past Medical History:   Diagnosis Date    Arthritis     pt states knees and back    Asthma     Cardiomyopathy ([address]     EF 35%, echo in  normal EF     Colon polyp     Diabetes ([address]     Pt states not diabetic but has all the symptoms of it    Dizzy spells     GERD (gastroesophageal reflux disease)     Heart failure ([address]     now combined systolic, diastolic, seen at [facility], [facility] 2021- Dr Driss Briones    Irritable bowel syndrome     Knee pain, bilateral     Migraines 2011    2-3 week    Neuropathy     pt states of both feet    Pulmonary embolism [facility])     August 2022    Seizures ([address]     petite mals last seizure 2-3 months ago    Thyroid disease     hypothyroid        Past Surgical History:   Procedure Laterality Date    COLONOSCOPY N/A 11/28/2022    COLONOSCOPY performed by Freya Black MD at [address]  5/2006    HX COLONOSCOPY      HX ENDOSCOPY      HX GASTRIC BYPASS  2013    HX HYSTERECTOMY  2007    HX KNEE ARTHROSCOPY Right 05/2021    HX ORTHOPAEDIC Bilateral 2013    CTR    HX ORTHOPAEDIC      right knee procedure 2021    HX OTHER SURGICAL      left axillary lymph node biopsy    HX SMALL BOWEL RESECTION      PA APPENDECTOMY      PA COLECTOMY PARTIAL W/ANASTOMOSIS      PA ENTERECTOMY RESCJ SMALL INTESTINE W/ENTEROSTOMY      PA LAPAROSCOPY SURG CHOLECYSTECTOMY      PA UNLISTED PROCEDURE ABDOMEN PERITONEUM & OMENTUM  2008    cholecystectomy    PA UNLISTED PROCEDURE CARDIAC SURGERY      Loop recorder implanted july 2020    PA UNLISTED PROCEDURE STOMACH         Social History     Tobacco Use    Smoking status: Never    Smokeless tobacco: Never   Substance Use Topics    Alcohol use: No        Family History   Problem Relation Age of Onset    Hypertension Mother     Heart Disease Mother     Cancer Father         stomach cancer    Hypertension Father     No Known Problems Brother     Asthma Daughter     Asthma Son     Asthma Son     Breast Cancer Paternal Aunt         age unknown       Allergies   Allergen Reactions    Acetaminophen Other (comments)     Advised not to take due to Liver Issues    Morphine Other (comments)     SOB/Chest Pressure - in hosp for a week. States DILAUDID Ok    Oxycodone Shortness of Breath     Reaction Type: Allergy;  Severity: Severe    Shellfish Derived Anaphylaxis    Codeine Other (comments) SOB chest pain    Peanut Oil Other (comments)     Reaction Type: Intolerance; Severity: Severe    Sulfa (Sulfonamide Antibiotics) Rash     itching        Prior to Admission medications    Medication Sig Start Date End Date Taking? Authorizing Provider   ondansetron (ZOFRAN ODT) 4 mg disintegrating tablet Take 1 Tablet by mouth every eight (8) hours as needed for Nausea or Vomiting. 1/5/23  Yes Mayur Contreras MD   OXcarbazepine (TRILEPTAL) 300 mg tablet Take 1 Tablet by mouth two (2) times a day. 1/5/23  Yes Mayur Contreras MD   clonazePAM (KlonoPIN) 1 mg tablet Take 1 mg by mouth nightly. Yes Provider, Historical   ergocalciferol (ERGOCALCIFEROL) 1,250 mcg (50,000 unit) capsule Take 50,000 Units by mouth once. Every Friday   Yes Provider, Historical   scopolamine (TRANSDERM-SCOP) 1 mg over 3 days pt3d 1 Patch by TransDERmal route every seventy-two (72) hours. Present on pt at this time, right ear   Yes Provider, Historical   apixaban (ELIQUIS) 5 mg tablet Take 5 mg by mouth two (2) times a day. Yes Provider, Historical   lacosamide (Vimpat) 200 mg tab tablet Take 1 Tablet by mouth two (2) times a day. Max Daily Amount: 400 mg. 5/10/22  Yes Reasoner, Gabe Veras PA-C   Linzess 145 mcg cap capsule Take 290 mcg by mouth daily. 8/11/21  Yes Provider, Historical   rizatriptan (MAXALT) 5 mg tablet Take 5 mg by mouth once as needed for Migraine. May repeat in 2 hours if needed    Yes Provider, Historical   montelukast (SINGULAIR) 10 mg tablet Take 10 mg by mouth daily. 7/15/19  Yes Provider, Historical   nortriptyline (PAMELOR) 50 mg capsule Take 100 mg by mouth nightly. 7/19/19  Yes Provider, Historical   topiramate (TOPAMAX) 100 mg tablet Take 100 mg by mouth two (2) times a day. 7/19/19  Yes Provider, Historical   VENTOLIN HFA 90 mcg/actuation inhaler Take 2 Puffs by inhalation every four (4) hours as needed.  7/15/19  Yes Provider, Historical   dexlansoprazole (DEXILANT) 60 mg CpDB capsule (delayed release) Take 60 mg by mouth daily. Yes Provider, Historical   dicyclomine (BENTYL) 20 mg tablet Take 20 mg by mouth every six (6) hours. Yes Provider, Historical   levothyroxine (SYNTHROID) 50 mcg tablet Take 50 mcg by mouth Daily (before breakfast). Yes Provider, Historical   Calcium-Cholecalciferol, D3, 600 mg-10 mcg (400 unit) cap Take 2 Tablets by mouth daily. Yes Provider, Historical   cenobamate (Xcopri) 200 mg tab Take 200 mg by mouth daily. Provider, Historical   eslicarbazepine (Aptiom) 800 mg tab tablet Take 800 mg by mouth daily. Provider, Historical   erenumab-aooe (Aimovig Autoinjector) 140 mg/mL injection 140 mg by SubCUTAneous route once. Other, MD Kenzie   nitroglycerin (NITROSTAT) 0.4 mg SL tablet DISSOLVE 1 TABLET UNDER THE TONGUE EVERY 5 MINUTES AS  NEEDED FOR CHEST PAIN. MAX  OF 3 TABLETS IN 15 MINUTES. CALL 911 IF PAIN PERSISTS. 10/19/22   Vishnu Zamorano MD   escitalopram oxalate (LEXAPRO) 10 mg tablet Take 20 mg by mouth daily.  2/4/22   Provider, Historical   latanoprost (XALATAN) 0.005 % ophthalmic solution INSTILL 1 DROP INTO [facility] EYE AT NIGHT 1/28/22   Provider, Historical         Current Facility-Administered Medications:     butalbital-acetaminophen-caffeine (FIORICET, ESGIC) -40 mg per tablet 1 Tablet, 1 Tablet, Oral, Q6H PRN, Adria Jolly MD, 1 Tablet at 01/15/23 0643    levETIRAcetam (KEPPRA) injection 500 mg, 500 mg, IntraVENous, Q12H, Felix Wild MD    SUMAtriptan (IMITREX) tablet 100 mg, 100 mg, Oral, NOW, Felix Wild MD    apixaban (ELIQUIS) tablet 5 mg, 5 mg, Oral, BID, Adria Jolly MD, 5 mg at 01/15/23 0846    calcium-vitamin D 600 mg-5 mcg (200 unit) per tablet 2 Tablet, 2 Tablet, Oral, DAILY WITH Geeta Acevedo MD, 2 Tablet at 01/15/23 0845    dicyclomine (BENTYL) tablet 20 mg, 20 mg, Oral, Q6H, Geeta Pierre MD, 20 mg at 01/15/23 0640    escitalopram oxalate (LEXAPRO) tablet 20 mg, 20 mg, Oral, DAILY, Geeta Pierre MD, 20 mg at 01/15/23 7248    lacosamide (VIMPAT) tablet 200 mg, 200 mg, Oral, BID, Soham Pierre MD, 200 mg at 01/15/23 0846    latanoprost (XALATAN) 0.005 % ophthalmic solution 1 Drop, 1 Drop, Both Eyes, QPM, Rui Rodriguez MD, 1 Drop at 01/14/23 1856    montelukast (SINGULAIR) tablet 10 mg, 10 mg, Oral, DAILY, Rui Rodriguez MD, 10 mg at 01/15/23 0846    nortriptyline (PAMELOR) capsule 100 mg, 100 mg, Oral, QHS, Steve Pierre MD, 100 mg at 01/14/23 2231    OXcarbazepine (TRILEPTAL) tablet 300 mg, 300 mg, Oral, BID, Soham Pierre MD, 300 mg at 01/15/23 0847    [START ON 1/16/2023] scopolamine (TRANSDERM-SCOP) 1 mg over 3 days 1 Patch, 1 Patch, TransDERmal, Q72H, Soham Pierre MD    topiramate (TOPAMAX) tablet 100 mg, 100 mg, Oral, BID, Soham Pierre MD, 100 mg at 01/15/23 0845    albuterol (PROVENTIL HFA, VENTOLIN HFA, PROAIR HFA) inhaler 2 Puff, 2 Puff, Inhalation, Q4H PRN, Rui Rodriguez MD    sodium chloride (NS) flush 5-40 mL, 5-40 mL, IntraVENous, Q8H, Soham Pierre MD, 10 mL at 01/15/23 0646    sodium chloride (NS) flush 5-40 mL, 5-40 mL, IntraVENous, PRN, Rui Rodriguez MD    polyethylene glycol (MIRALAX) packet 17 g, 17 g, Oral, DAILY PRN, Soham Pierre MD    ondansetron (ZOFRAN ODT) tablet 4 mg, 4 mg, Oral, Q8H PRN **OR** ondansetron (ZOFRAN) injection 4 mg, 4 mg, IntraVENous, Q6H PRN, Rui Rodriguez MD, 4 mg at 01/14/23 1335    clonazePAM (KlonoPIN) tablet 1 mg, 1 mg, Oral, QHS, Gabby, Soham Ramos MD, 1 mg at 01/14/23 4018    LAB DATA REVIEWED:    Recent Results (from the past 24 hour(s))   GLUCOSE, POC    Collection Time: 01/15/23  7:22 AM   Result Value Ref Range    Glucose (POC) 75 65 - 100 mg/dL    Performed by ROLF MILLS        XR Results (most recent):  Results from [place] encounter on 01/14/23    XR CHEST PORT    Narrative  EXAM:  XR CHEST PORT    INDICATION: Syncope    COMPARISON: 1/3/2023    TECHNIQUE: Portable AP semiupright chest view at 0041 hours    FINDINGS: The cardiomediastinal contours are stable. The pulmonary vasculature  is within normal limits. There is mild bibasilar linear opacity. There is no pleural effusion or  pneumothorax. The bones and upper abdomen are stable. Impression  Mild bibasilar atelectasis. CT HEAD WO CONT   Final Result      No acute intracranial abnormality. XR CHEST PORT   Final Result      Mild bibasilar atelectasis. Review of Systems:  Constitutional: Negative for chills and fever. HENT: Negative. Eyes: Negative. Respiratory: Negative. Cardiovascular: Negative. Gastrointestinal: Negative for abdominal pain and nausea. Skin: Negative. Neurological: Negative. Objective:   VITALS:    Visit Vitals  /70 (BP 1 Location: Right upper arm, BP Patient Position: At rest)   Pulse 63   Temp 97.7 °F (36.5 °C)   Resp 18   Ht 5' 5\" (1.651 m)   Wt 178 lb (80.7 kg)   SpO2 95%   BMI 29.62 kg/m²       Physical Exam:   Constitutional: pt is oriented to person, place, and time. HENT:   Head: Normocephalic and atraumatic. Eyes: Pupils are equal, round, and reactive to light. EOM are normal.   Cardiovascular: Normal rate, regular rhythm and normal heart sounds. Pulmonary/Chest: Breath sounds normal. No wheezes. No rales. Exhibits no tenderness. Abdominal: Soft. Bowel sounds are normal. There is no abdominal tenderness. There is no rebound and no guarding. Musculoskeletal: Normal range of motion. Neurological: pt is alert and oriented to person, place, and time. Alert. Normal strength. No cranial nerve deficit or sensory deficit. Displays a negative Romberg sign.         ASSESSMENT AND PLAN:    Seizures   Continue Lacosamide   Continue clonazepam   Continue IV levetiracetam   Continue scopolamine    Bilateral Distal UE tremors   Currently on anti-seizure medications   Reports not being evaluated for this in the past  Could be due to anti-seizure medications  Consult neurology    Hypothyroidism   TSH is 0.31  PO levothyroxine held at this time     PE  Continue at home Eliquis     CHF  No on any home medications   Follow up with cardiology as outpatient    Diabetes   Consider insulin    Continue supportive care   Patient seen by Neurology: Continue current doses of seizure medications, most likely syncopal seizure, no additional work up required at this time.      ________________________________________________________________________    Signed: Jessica Velasquez

## 2023-01-15 NOTE — PROGRESS NOTES
No CM intervention required for this Home Self Care discharge. Please call [phone] if status changes and assist is needed.

## 2023-01-15 NOTE — PROGRESS NOTES
Problem: Falls - Risk of  Goal: *Absence of Falls  Description: Document Casey Erps Fall Risk and appropriate interventions in the flowsheet. Note: Fall Risk Interventions:                                Problem: Pressure Injury - Risk of  Goal: *Prevention of pressure injury  Description: Document Meir Scale and appropriate interventions in the flowsheet.   Note: Pressure Injury Interventions:       Moisture Interventions: Minimize layers, Absorbent underpads, Apply protective barrier, creams and emollients    Activity Interventions: Increase time out of bed, PT/OT evaluation    Mobility Interventions: HOB 30 degrees or less, PT/OT evaluation    Nutrition Interventions: Document food/fluid/supplement intake, Discuss nutritional consult with provider, Offer support with meals,snacks and hydration    Friction and Shear Interventions: HOB 30 degrees or less, Minimize layers, Apply protective barrier, creams and emollients

## 2023-01-15 NOTE — PROGRESS NOTES
Neurology Progress Note    Patient ID:  Mary Ann Mak  [id number]  45 y.o.  1970    Subjective: The patient is seen in follow-up this morning and she is feeling better with no more spells of passing out. She is having these jerking movements of the arms consistent with asterixis. She is not on any blood pressure pills since she is here. Patient is a 45 y.o. right handed -American female with PMH of HFrEF, diabetes, hypothyroidism, seizure, pulmonary embolism, and other medical problems as below. Patient presented to ED with chief complaint of altered mental status. Limited history obtained due to poor mentation. As per notes in the chart quoting the family patient has passed out multiple times throughout the day over the past 5 days. They will states she is just sitting there and she nods off. She frequently has episodes where she is disoriented afterward, not knowing where she is or what she was going to do. Reports compliance with medication. Per chart review, recently admitted for the same, discharged with trileptal, lacosamide. ? Keppra. In the ED, vitals stable. Lab work grossly within normal limits. CT head no acute findings. .    Current Facility-Administered Medications   Medication Dose Route Frequency    butalbital-acetaminophen-caffeine (FIORICET, ESGIC) -40 mg per tablet 1 Tablet  1 Tablet Oral Q6H PRN    levETIRAcetam (KEPPRA) injection 1,000 mg  1,000 mg IntraVENous Q12H    apixaban (ELIQUIS) tablet 5 mg  5 mg Oral BID    calcium-vitamin D 600 mg-5 mcg (200 unit) per tablet 2 Tablet  2 Tablet Oral DAILY WITH BREAKFAST    dicyclomine (BENTYL) tablet 20 mg  20 mg Oral Q6H    escitalopram oxalate (LEXAPRO) tablet 20 mg  20 mg Oral DAILY    lacosamide (VIMPAT) tablet 200 mg  200 mg Oral BID    latanoprost (XALATAN) 0.005 % ophthalmic solution 1 Drop  1 Drop Both Eyes QPM    montelukast (SINGULAIR) tablet 10 mg  10 mg Oral DAILY    nortriptyline (PAMELOR) capsule 100 mg  100 mg Oral QHS    OXcarbazepine (TRILEPTAL) tablet 300 mg  300 mg Oral BID    [START ON 1/16/2023] scopolamine (TRANSDERM-SCOP) 1 mg over 3 days 1 Patch  1 Patch TransDERmal Q72H    topiramate (TOPAMAX) tablet 100 mg  100 mg Oral BID    albuterol (PROVENTIL HFA, VENTOLIN HFA, PROAIR HFA) inhaler 2 Puff  2 Puff Inhalation Q4H PRN    sodium chloride (NS) flush 5-40 mL  5-40 mL IntraVENous Q8H    sodium chloride (NS) flush 5-40 mL  5-40 mL IntraVENous PRN    polyethylene glycol (MIRALAX) packet 17 g  17 g Oral DAILY PRN    ondansetron (ZOFRAN ODT) tablet 4 mg  4 mg Oral Q8H PRN    Or    ondansetron (ZOFRAN) injection 4 mg  4 mg IntraVENous Q6H PRN    clonazePAM (KlonoPIN) tablet 1 mg  1 mg Oral QHS          Objective:     Patient Vitals for the past 8 hrs:   BP Temp Pulse Resp SpO2   01/15/23 0800 -- -- 63 -- --   01/15/23 0729 109/70 97.7 °F (36.5 °C) 63 18 95 %   01/15/23 0644 103/67 -- 64 -- --       No intake/output data recorded. No intake/output data recorded. Lab Review   Recent Results (from the past 24 hour(s))   GLUCOSE, POC    Collection Time: 01/15/23  7:22 AM   Result Value Ref Range    Glucose (POC) 75 65 - 100 mg/dL    Performed by [address] [place] [address]      Additional comments: The patient seems to have syncopal episodes rather than seizures on the basis of her history and no seizure activity noted by the daughter with those spells other than she was clammy and sweaty    NEUROLOGICAL EXAM:  Appearance: The patient is well developed, well nourished, provides a coherent history and is in no acute distress. Mental Status: Oriented to time, place and person. Mood and affect appropriate. Cranial Nerves:   Intact visual fields. LYLY, EOM's full, no nystagmus, no ptosis. Facial sensation is normal.  Facial movement is symmetric. Hearing is normal bilaterally. Motor:  5/5 strength in upper and lower proximal and distal muscles. Normal bulk and tone.     Reflexes:   Deep tendon reflexes 2+/4 and symmetrical.   Sensory:   Normal to touch, pinprick. Gait:  Deferred. Tremor:   No tremor noted. Cerebellar:  No cerebellar signs present. Neurovascular:  Normal heart sounds and regular rhythm. Assessment:   1. Multiple spells of passing out: Spoke to the daughter and she witnessed those spells. The patient was clammy and sweaty and the daughter did not notice any seizure activity with any of the spells she has history of seizures but these spells sounds more like syncopal episodes. She was sweating and feeling tired when she came to after a spell on the toilet seat. The patient has asterixis since she is here, could be because of the boluses she received her seizure medicines, will reduce the dose of Keppra to 500 twice a day and continue the same dose of the other seizure medicines. No additional work-up is indicated at this time. Once stabilized she can be discharged. 2.  Asterixis: Probably secondary to boluses she received for the seizure medicines. I am reducing the dose of the Keppra to 500 twice a day. 3.  History of seizures: On multiple medications. She did not have any seizure this time and had multiple syncopal episodes  4. History of cardiomyopathy with EF of 35% as per echo in 2021 showed normal EF  5. Diabetes mellitus  6. Migraine headaches: We will start her sumatriptan  7. History of pulmonary embolism  8. Hypothyroidism     Plan:   1. Reduce the dose of Keppra to 5 mg twice a day. 2.  Start sumatriptan 100 mg at headache onset repeat in 2 hours if needed maximum 2 tablets in 24 hours. 3.  The possible etiology of the syncopal episodes could be orthostatic hypotension, dehydration. She is not on any blood pressure medicines since she is here and did not have any syncopal episode either. Discussed with Dr. Charity Brown he will look into it.     Thank you,    Signed:  Ronda Becker MD  1/15/2023  9:41 AM

## 2023-01-15 NOTE — DISCHARGE SUMMARY
Physician Discharge Summary     Patient ID:    Bita Quintanilla  [id number]  45 y.o.  1970    Admit date: 1/14/2023    Discharge date : 1/15/2023    Chronic Diagnoses:    Problem List as of 1/15/2023 Date Reviewed: 9/8/2022            Codes Class Noted - Resolved    Partial seizure ([facility] ICD-10-CM: R56.9  ICD-9-CM: 780.39  1/14/2023 - Present        AMS (altered mental status) ICD-10-CM: R41.82  ICD-9-CM: 780.97  1/3/2023 - Present        Syncope ICD-10-CM: R55  ICD-9-CM: 780.2  5/8/2022 - Present        Abdominal pain ICD-10-CM: R10.9  ICD-9-CM: 789.00  12/13/2021 - Present        Hypotension due to drugs ICD-10-CM: I95.2  ICD-9-CM: 458.8, E947.9  10/21/2021 - Present        Chronic combined systolic and diastolic congestive heart failure ([facility] ICD-10-CM: I50.42  ICD-9-CM: 428.42, 428.0  4/10/2021 - Present        Atypical chest pain ICD-10-CM: R07.89  ICD-9-CM: 786.59  4/10/2021 - Present        Regional wall motion abnormality of heart ICD-10-CM: R93.1  ICD-9-CM: 793.2  4/10/2021 - Present        Vasovagal syncope ICD-10-CM: R55  ICD-9-CM: 780.2  4/10/2021 - Present        NSVT (nonsustained ventricular tachycardia) ICD-10-CM: I47.29  ICD-9-CM: 427.1  4/10/2021 - Present        Right knee pain ICD-10-CM: M25.561  ICD-9-CM: 719.46  1/4/2021 - Present        Generalized abdominal pain ICD-10-CM: R10.84  ICD-9-CM: 789.07  7/21/2020 - Present        Diarrhea ICD-10-CM: R19.7  ICD-9-CM: 787.91  7/21/2020 - Present        H/O Clostridium difficile infection ICD-10-CM: Z86.19  ICD-9-CM: V12.09  7/21/2020 - Present        History of bowel resection ICD-10-CM: Z90.49  ICD-9-CM: V15.89  4/21/2020 - Present        Elevated liver enzymes ICD-10-CM: R74.8  ICD-9-CM: 790.5  7/26/2019 - Present        NICM (nonischemic cardiomyopathy) ([facility] ICD-10-CM: I42.8  ICD-9-CM: 425.4  7/26/2019 - Present        Hypothyroidism ICD-10-CM: E03.9  ICD-9-CM: 244.9  7/26/2019 - Present        H/O gastric bypass ICD-10-CM: U72.79  ICD-9-CM: V45.86  7/26/2019 - Present        History of cholecystectomy ICD-10-CM: Z90.49  ICD-9-CM: V45.79  7/26/2019 - Present        History of appendectomy ICD-10-CM: Z90.49  ICD-9-CM: V45.89  7/26/2019 - Present        Neuropathy ICD-10-CM: G62.9  ICD-9-CM: 355.9  7/26/2019 - Present        Migraine headache ICD-10-CM: T32.648  ICD-9-CM: 346.90  7/26/2019 - Present        Seizures ([facility] ICD-10-CM: R56.9  ICD-9-CM: 780.39  7/26/2019 - Present        Fibrocystic breast changes of both breasts ICD-10-CM: N60.11, N60.12  ICD-9-CM: 610.1  7/8/2015 - Present        Heart failure ([facility] ICD-10-CM: I50.9  ICD-9-CM: 428.9  2005 - Present    Overview Signed 4/10/2021 12:43 PM by Helena Torres MD     now combined systolic, diastolic, seen at [facility], [facility] 2021- Dr Jerson Navarro: Breast pain ICD-10-CM: N64.4  ICD-9-CM: 611.71  7/8/2015 - 7/26/2019       22    Final Diagnoses:   Partial seizure ([facility] [R56.9]    Reason for Hospitalization:    Altered mental status    Hospital Course:     49-year-old lady with history of seizure disorder admitted for alteration in mentation. She was seen and evaluated by her neurologist.  Monitored in hospital without any seizure events noted. Advise close follow-up with primary cardiologist for possible need of loop recorder. She is felt stable for discharge to home with outpatient follow-up. Blood pressure has been marginal in the hospital.  No need for antihypertensives. Recent 2D echocardiogram in November showed a normal ejection fraction of 55 to 60% without wall motion abnormalities. No essentialneed for additional cardiac work-up at this time            Discharge Medications:   Current Discharge Medication List        CONTINUE these medications which have NOT CHANGED    Details   ondansetron (ZOFRAN ODT) 4 mg disintegrating tablet Take 1 Tablet by mouth every eight (8) hours as needed for Nausea or Vomiting.   Qty: 20 Tablet, Refills: 0      OXcarbazepine (TRILEPTAL) 300 mg tablet Take 1 Tablet by mouth two (2) times a day. Qty: 60 Tablet, Refills: 0      clonazePAM (KlonoPIN) 1 mg tablet Take 1 mg by mouth nightly.      ergocalciferol (ERGOCALCIFEROL) 1,250 mcg (50,000 unit) capsule Take 50,000 Units by mouth once. Every Friday      scopolamine (TRANSDERM-SCOP) 1 mg over 3 days pt3d 1 Patch by TransDERmal route every seventy-two (72) hours. Present on pt at this time, right ear      apixaban (ELIQUIS) 5 mg tablet Take 5 mg by mouth two (2) times a day. lacosamide (Vimpat) 200 mg tab tablet Take 1 Tablet by mouth two (2) times a day. Max Daily Amount: 400 mg. Qty: 30 Tablet, Refills: 0    Associated Diagnoses: Carotid sinus syncope      Linzess 145 mcg cap capsule Take 290 mcg by mouth daily. rizatriptan (MAXALT) 5 mg tablet Take 5 mg by mouth once as needed for Migraine. May repeat in 2 hours if needed       montelukast (SINGULAIR) 10 mg tablet Take 10 mg by mouth daily. Refills: 3      nortriptyline (PAMELOR) 50 mg capsule Take 100 mg by mouth nightly. Refills: 5      topiramate (TOPAMAX) 100 mg tablet Take 100 mg by mouth two (2) times a day. Refills: 5      VENTOLIN HFA 90 mcg/actuation inhaler Take 2 Puffs by inhalation every four (4) hours as needed. Refills: 4      dexlansoprazole (DEXILANT) 60 mg CpDB capsule (delayed release) Take 60 mg by mouth daily. dicyclomine (BENTYL) 20 mg tablet Take 20 mg by mouth every six (6) hours. levothyroxine (SYNTHROID) 50 mcg tablet Take 50 mcg by mouth Daily (before breakfast). Calcium-Cholecalciferol, D3, 600 mg-10 mcg (400 unit) cap Take 2 Tablets by mouth daily. cenobamate (Xcopri) 200 mg tab Take 200 mg by mouth daily. eslicarbazepine (Aptiom) 800 mg tab tablet Take 800 mg by mouth daily. erenumab-aooe (Aimovig Autoinjector) 140 mg/mL injection 140 mg by SubCUTAneous route once.       nitroglycerin (NITROSTAT) 0.4 mg SL tablet DISSOLVE 1 TABLET UNDER THE TONGUE EVERY 5 MINUTES AS  NEEDED FOR CHEST PAIN. MAX  OF 3 TABLETS IN 15 MINUTES. CALL 911 IF PAIN PERSISTS. Qty: 30 Tablet, Refills: 5    Comments: Requesting 1 year supply      escitalopram oxalate (LEXAPRO) 10 mg tablet Take 20 mg by mouth daily. latanoprost (XALATAN) 0.005 % ophthalmic solution INSTILL 1 DROP INTO EACH EYE AT NIGHT               Follow up Care:    Yaneth Rivera MD in 1-2 weeks. Please call to set up an appointment shortly after discharge. Diet:  Cardiac Diet    Disposition:  Home. Advanced Directive:   FULL    DNR      Discharge Exam:  Visit Vitals  /70 (BP 1 Location: Right upper arm, BP Patient Position: At rest)   Pulse 63   Temp 97.7 °F (36.5 °C)   Resp 18   Ht 5' 5\" (1.651 m)   Wt 80.7 kg (178 lb)   SpO2 95%   BMI 29.62 kg/m²      O2 Device: None (Room air)    Temp (24hrs), Av.8 °F (36.6 °C), Min:97.7 °F (36.5 °C), Max:97.8 °F (36.6 °C)    No intake/output data recorded. No intake/output data recorded. General:  Alert, cooperative, no distress, appears stated age. Lungs:   Clear to auscultation bilaterally. Chest wall:  No tenderness or deformity. Heart:  Regular rate and rhythm, S1, S2 normal, no murmur, click, rub or gallop. Abdomen:   Soft, non-tender. Bowel sounds normal. No masses,  No organomegaly. Extremities: Extremities normal, atraumatic, no cyanosis or edema. Pulses: 2+ and symmetric all extremities. Skin: Skin color, texture, turgor normal. No rashes or lesions   Neurologic: CNII-XII intact. No gross sensory or motor deficits         CONSULTATIONS: Neurology    Significant Diagnostic Studies:   2023: BUN 16 mg/dL (Ref range: 6 - 20 mg/dL); Calcium 8.9 mg/dL (Ref range: 8.5 - 10.1 mg/dL); CO2 20 mmol/L (L; Ref range: 21 - 32 mmol/L); Creatinine 0.64 mg/dL (Ref range: 0.55 - 1.02 mg/dL); Glucose 129 mg/dL (H; Ref range: 65 - 100 mg/dL); HCT 36.7 % (Ref range: 35.0 - 47.0 %);  HGB 11.7 g/dL (Ref range: 11.5 - 16.0 g/dL); Potassium 4.8 mmol/L (Ref range: 3.5 - 5.1 mmol/L); Sodium 141 mmol/L (Ref range: 136 - 145 mmol/L)  Recent Labs     01/14/23 0036   WBC 6.7   HGB 11.7   HCT 36.7        Recent Labs     01/14/23 0036      K 4.8   *   CO2 20*   BUN 16   CREA 0.64   *   CA 8.9     Recent Labs     01/14/23 0036   ALT 73   *   TBILI <0.1*   TP 6.9   ALB 3.4*   GLOB 3.5     No results for input(s): INR, PTP, APTT, INREXT in the last 72 hours. No results for input(s): FE, TIBC, PSAT, FERR in the last 72 hours. No results for input(s): PH, PCO2, PO2 in the last 72 hours. No results for input(s): CPK, CKMB in the last 72 hours.     No lab exists for component: TROPONINI  Lab Results   Component Value Date/Time    Glucose (POC) 75 01/15/2023 07:22 AM    Glucose (POC) 78 01/05/2023 11:52 AM    Glucose (POC) 79 01/05/2023 08:27 AM    Glucose (POC) 79 01/04/2023 08:01 PM    Glucose (POC) 92 01/04/2023 04:35 PM       Total Time: 35 minutes    Signed:  Taran Valles MD  1/15/2023  11:02 AM

## 2023-01-15 NOTE — ROUTINE PROCESS
TRANSFER - OUT REPORT:    Verbal report given to Andry COLE(name) on Odilon Brandee  being transferred to [facility](unit) for routine progression of care       Report consisted of patients Situation, Background, Assessment and   Recommendations(SBAR). Information from the following report(s) SBAR, ED Summary, and MAR was reviewed with the receiving nurse. Opportunity for questions and clarification was provided.       Patient transported with:   Monitor  Tech

## 2023-01-15 NOTE — PROGRESS NOTES
..Discharge plan of care/case management plan validated with provider discharge order. Pt. Being discharged home with self care. Discharge instructions were reviewed with the pt. Pt. Verbalized an understanding of the discharge instructions.

## 2023-01-17 LAB — LEVETIRACETAM SERPL-MCNC: <2 UG/ML (ref 10–40)

## 2023-01-18 ENCOUNTER — TRANSCRIBE ORDER (OUTPATIENT)
Dept: SCHEDULING | Age: 53
End: 2023-01-18

## 2023-01-18 DIAGNOSIS — R55 SYNCOPE: Primary | ICD-10-CM

## 2023-01-18 LAB — LACOSAMIDE SERPL-MCNC: 8 UG/ML (ref 5–10)

## 2023-01-19 ENCOUNTER — APPOINTMENT (OUTPATIENT)
Dept: GENERAL RADIOLOGY | Age: 53
End: 2023-01-19
Attending: STUDENT IN AN ORGANIZED HEALTH CARE EDUCATION/TRAINING PROGRAM
Payer: MEDICARE

## 2023-01-19 ENCOUNTER — APPOINTMENT (OUTPATIENT)
Dept: CT IMAGING | Age: 53
End: 2023-01-19
Attending: STUDENT IN AN ORGANIZED HEALTH CARE EDUCATION/TRAINING PROGRAM
Payer: MEDICARE

## 2023-01-19 ENCOUNTER — HOSPITAL ENCOUNTER (OUTPATIENT)
Age: 53
Setting detail: OBSERVATION
LOS: 1 days | Discharge: HOME HEALTH | End: 2023-01-21
Attending: STUDENT IN AN ORGANIZED HEALTH CARE EDUCATION/TRAINING PROGRAM | Admitting: HOSPITALIST
Payer: MEDICARE

## 2023-01-19 ENCOUNTER — TRANSCRIBE ORDER (OUTPATIENT)
Dept: SCHEDULING | Age: 53
End: 2023-01-19

## 2023-01-19 DIAGNOSIS — G81.91 ACUTE RIGHT HEMIPARESIS (HCC): ICD-10-CM

## 2023-01-19 DIAGNOSIS — G40.909 EPILEPSY (HCC): Primary | ICD-10-CM

## 2023-01-19 DIAGNOSIS — R56.9 SEIZURES (HCC): ICD-10-CM

## 2023-01-19 DIAGNOSIS — R41.82 ALTERED MENTAL STATUS, UNSPECIFIED ALTERED MENTAL STATUS TYPE: Primary | ICD-10-CM

## 2023-01-19 LAB
ALBUMIN SERPL-MCNC: 3 G/DL (ref 3.5–5)
ALBUMIN SERPL-MCNC: 3 G/DL (ref 3.5–5)
ALBUMIN/GLOB SERPL: 0.8 (ref 1.1–2.2)
ALBUMIN/GLOB SERPL: 1.1 (ref 1.1–2.2)
ALP SERPL-CCNC: 154 U/L (ref 45–117)
ALP SERPL-CCNC: 159 U/L (ref 45–117)
ALT SERPL-CCNC: 70 U/L (ref 12–78)
ALT SERPL-CCNC: ABNORMAL U/L (ref 12–78)
AMPHET UR QL SCN: NEGATIVE
ANION GAP SERPL CALC-SCNC: 4 MMOL/L (ref 5–15)
ANION GAP SERPL CALC-SCNC: 6 MMOL/L (ref 5–15)
APPEARANCE UR: ABNORMAL
AST SERPL W P-5'-P-CCNC: 39 U/L (ref 15–37)
AST SERPL W P-5'-P-CCNC: ABNORMAL U/L (ref 15–37)
BACTERIA URNS QL MICRO: NEGATIVE /HPF
BARBITURATES UR QL SCN: POSITIVE
BASOPHILS # BLD: 0 K/UL (ref 0–0.1)
BASOPHILS NFR BLD: 1 % (ref 0–1)
BENZODIAZ UR QL: NEGATIVE
BILIRUB SERPL-MCNC: 0.2 MG/DL (ref 0.2–1)
BILIRUB SERPL-MCNC: 0.5 MG/DL (ref 0.2–1)
BILIRUB UR QL: NEGATIVE
BNP SERPL-MCNC: 39 PG/ML
BUN SERPL-MCNC: 11 MG/DL (ref 6–20)
BUN SERPL-MCNC: 12 MG/DL (ref 6–20)
BUN/CREAT SERPL: 13 (ref 12–20)
BUN/CREAT SERPL: 18 (ref 12–20)
CA-I BLD-MCNC: 8.5 MG/DL (ref 8.5–10.1)
CA-I BLD-MCNC: 8.7 MG/DL (ref 8.5–10.1)
CANNABINOIDS UR QL SCN: NEGATIVE
CHLORIDE SERPL-SCNC: 110 MMOL/L (ref 97–108)
CHLORIDE SERPL-SCNC: 114 MMOL/L (ref 97–108)
CO2 SERPL-SCNC: 21 MMOL/L (ref 21–32)
CO2 SERPL-SCNC: 21 MMOL/L (ref 21–32)
COCAINE UR QL SCN: NEGATIVE
COLOR UR: ABNORMAL
CREAT SERPL-MCNC: 0.62 MG/DL (ref 0.55–1.02)
CREAT SERPL-MCNC: 0.9 MG/DL (ref 0.55–1.02)
DIFFERENTIAL METHOD BLD: ABNORMAL
DRUG SCRN COMMENT,DRGCM: ABNORMAL
EOSINOPHIL # BLD: 0.1 K/UL (ref 0–0.4)
EOSINOPHIL NFR BLD: 2 % (ref 0–7)
EPITH CASTS URNS QL MICRO: ABNORMAL /LPF
ERYTHROCYTE [DISTWIDTH] IN BLOOD BY AUTOMATED COUNT: 15.6 % (ref 11.5–14.5)
ETHANOL SERPL-MCNC: <10 MG/DL
GLOBULIN SER CALC-MCNC: 2.8 G/DL (ref 2–4)
GLOBULIN SER CALC-MCNC: 4 G/DL (ref 2–4)
GLUCOSE SERPL-MCNC: 81 MG/DL (ref 65–100)
GLUCOSE SERPL-MCNC: 86 MG/DL (ref 65–100)
GLUCOSE UR STRIP.AUTO-MCNC: NEGATIVE MG/DL
HCT VFR BLD AUTO: 33.7 % (ref 35–47)
HGB BLD-MCNC: 10.6 G/DL (ref 11.5–16)
HGB UR QL STRIP: ABNORMAL
IMM GRANULOCYTES # BLD AUTO: 0 K/UL (ref 0–0.04)
IMM GRANULOCYTES NFR BLD AUTO: 0 % (ref 0–0.5)
KETONES UR QL STRIP.AUTO: NEGATIVE MG/DL
LACTATE SERPL-SCNC: 1.1 MMOL/L (ref 0.4–2)
LEUKOCYTE ESTERASE UR QL STRIP.AUTO: ABNORMAL
LYMPHOCYTES # BLD: 1.2 K/UL (ref 0.8–3.5)
LYMPHOCYTES NFR BLD: 19 % (ref 12–49)
MAGNESIUM SERPL-MCNC: NORMAL MG/DL (ref 1.6–2.4)
MCH RBC QN AUTO: 27.7 PG (ref 26–34)
MCHC RBC AUTO-ENTMCNC: 31.5 G/DL (ref 30–36.5)
MCV RBC AUTO: 88 FL (ref 80–99)
METHADONE UR QL: NEGATIVE
MONOCYTES # BLD: 0.8 K/UL (ref 0–1)
MONOCYTES NFR BLD: 12 % (ref 5–13)
MUCOUS THREADS URNS QL MICRO: ABNORMAL /LPF
NEUTS SEG # BLD: 4.4 K/UL (ref 1.8–8)
NEUTS SEG NFR BLD: 66 % (ref 32–75)
NITRITE UR QL STRIP.AUTO: NEGATIVE
NRBC # BLD: 0 K/UL (ref 0–0.01)
NRBC BLD-RTO: 0 PER 100 WBC
OPIATES UR QL: NEGATIVE
OTHER,OTHU: ABNORMAL
PCP UR QL: NEGATIVE
PH UR STRIP: 6 (ref 5–8)
PLATELET # BLD AUTO: 331 K/UL (ref 150–400)
PMV BLD AUTO: 10.3 FL (ref 8.9–12.9)
POTASSIUM SERPL-SCNC: 4 MMOL/L (ref 3.5–5.1)
POTASSIUM SERPL-SCNC: ABNORMAL MMOL/L (ref 3.5–5.1)
PROT SERPL-MCNC: 5.8 G/DL (ref 6.4–8.2)
PROT SERPL-MCNC: 7 G/DL (ref 6.4–8.2)
PROT UR STRIP-MCNC: NEGATIVE MG/DL
RBC # BLD AUTO: 3.83 M/UL (ref 3.8–5.2)
RBC #/AREA URNS HPF: ABNORMAL /HPF (ref 0–5)
SODIUM SERPL-SCNC: 135 MMOL/L (ref 136–145)
SODIUM SERPL-SCNC: 141 MMOL/L (ref 136–145)
SP GR UR REFRACTOMETRY: 1.01 (ref 1–1.03)
TROPONIN-HIGH SENSITIVITY: 4 NG/L (ref 0–51)
TROPONIN-HIGH SENSITIVITY: 5 NG/L (ref 0–51)
UA: UC IF INDICATED,UAUC: ABNORMAL
UROBILINOGEN UR QL STRIP.AUTO: 0.1 EU/DL (ref 0.1–1)
WBC # BLD AUTO: 6.6 K/UL (ref 3.6–11)
WBC URNS QL MICRO: ABNORMAL /HPF (ref 0–4)

## 2023-01-19 PROCEDURE — 74011250636 HC RX REV CODE- 250/636: Performed by: STUDENT IN AN ORGANIZED HEALTH CARE EDUCATION/TRAINING PROGRAM

## 2023-01-19 PROCEDURE — 83605 ASSAY OF LACTIC ACID: CPT

## 2023-01-19 PROCEDURE — 80307 DRUG TEST PRSMV CHEM ANLYZR: CPT

## 2023-01-19 PROCEDURE — 36415 COLL VENOUS BLD VENIPUNCTURE: CPT

## 2023-01-19 PROCEDURE — 71045 X-RAY EXAM CHEST 1 VIEW: CPT

## 2023-01-19 PROCEDURE — 85025 COMPLETE CBC W/AUTO DIFF WBC: CPT

## 2023-01-19 PROCEDURE — 87186 SC STD MICRODIL/AGAR DIL: CPT

## 2023-01-19 PROCEDURE — 81001 URINALYSIS AUTO W/SCOPE: CPT

## 2023-01-19 PROCEDURE — 99285 EMERGENCY DEPT VISIT HI MDM: CPT

## 2023-01-19 PROCEDURE — 87077 CULTURE AEROBIC IDENTIFY: CPT

## 2023-01-19 PROCEDURE — 74011000636 HC RX REV CODE- 636: Performed by: STUDENT IN AN ORGANIZED HEALTH CARE EDUCATION/TRAINING PROGRAM

## 2023-01-19 PROCEDURE — 83880 ASSAY OF NATRIURETIC PEPTIDE: CPT

## 2023-01-19 PROCEDURE — 94640 AIRWAY INHALATION TREATMENT: CPT

## 2023-01-19 PROCEDURE — 70450 CT HEAD/BRAIN W/O DYE: CPT

## 2023-01-19 PROCEDURE — 93005 ELECTROCARDIOGRAM TRACING: CPT

## 2023-01-19 PROCEDURE — 87086 URINE CULTURE/COLONY COUNT: CPT

## 2023-01-19 PROCEDURE — 74011250637 HC RX REV CODE- 250/637: Performed by: HOSPITALIST

## 2023-01-19 PROCEDURE — 94761 N-INVAS EAR/PLS OXIMETRY MLT: CPT

## 2023-01-19 PROCEDURE — 83735 ASSAY OF MAGNESIUM: CPT

## 2023-01-19 PROCEDURE — 84484 ASSAY OF TROPONIN QUANT: CPT

## 2023-01-19 PROCEDURE — 82077 ASSAY SPEC XCP UR&BREATH IA: CPT

## 2023-01-19 PROCEDURE — 70496 CT ANGIOGRAPHY HEAD: CPT

## 2023-01-19 PROCEDURE — 80053 COMPREHEN METABOLIC PANEL: CPT

## 2023-01-19 PROCEDURE — G0378 HOSPITAL OBSERVATION PER HR: HCPCS

## 2023-01-19 RX ORDER — SODIUM CHLORIDE 0.9 % (FLUSH) 0.9 %
5-40 SYRINGE (ML) INJECTION AS NEEDED
Status: DISCONTINUED | OUTPATIENT
Start: 2023-01-19 | End: 2023-01-21 | Stop reason: HOSPADM

## 2023-01-19 RX ORDER — FLUTICASONE FUROATE, UMECLIDINIUM BROMIDE AND VILANTEROL TRIFENATATE 200; 62.5; 25 UG/1; UG/1; UG/1
1 POWDER RESPIRATORY (INHALATION) DAILY
COMMUNITY
Start: 2022-11-28

## 2023-01-19 RX ORDER — HYDROXYZINE PAMOATE 25 MG/1
30 CAPSULE ORAL
COMMUNITY
Start: 2022-12-01

## 2023-01-19 RX ORDER — ALBUTEROL SULFATE 90 UG/1
2 AEROSOL, METERED RESPIRATORY (INHALATION)
Status: DISCONTINUED | OUTPATIENT
Start: 2023-01-19 | End: 2023-01-21 | Stop reason: HOSPADM

## 2023-01-19 RX ORDER — CLONAZEPAM 0.5 MG/1
1 TABLET ORAL
Status: DISCONTINUED | OUTPATIENT
Start: 2023-01-19 | End: 2023-01-21

## 2023-01-19 RX ORDER — RIZATRIPTAN BENZOATE 10 MG/1
10 TABLET, ORALLY DISINTEGRATING ORAL
COMMUNITY
Start: 2022-12-13

## 2023-01-19 RX ORDER — LEVETIRACETAM 250 MG/1
750 TABLET ORAL 2 TIMES DAILY
Status: DISCONTINUED | OUTPATIENT
Start: 2023-01-20 | End: 2023-01-20

## 2023-01-19 RX ORDER — TOPIRAMATE 100 MG/1
100 TABLET, FILM COATED ORAL 2 TIMES DAILY
Status: DISCONTINUED | OUTPATIENT
Start: 2023-01-19 | End: 2023-01-21 | Stop reason: HOSPADM

## 2023-01-19 RX ORDER — ONDANSETRON 2 MG/ML
4 INJECTION INTRAMUSCULAR; INTRAVENOUS
Status: DISCONTINUED | OUTPATIENT
Start: 2023-01-19 | End: 2023-01-21 | Stop reason: HOSPADM

## 2023-01-19 RX ORDER — LINACLOTIDE 290 UG/1
290 CAPSULE, GELATIN COATED ORAL DAILY
COMMUNITY
Start: 2022-12-14 | End: 2023-01-21

## 2023-01-19 RX ORDER — LATANOPROST 50 UG/ML
1 SOLUTION/ DROPS OPHTHALMIC EVERY EVENING
Status: DISCONTINUED | OUTPATIENT
Start: 2023-01-19 | End: 2023-01-21 | Stop reason: HOSPADM

## 2023-01-19 RX ORDER — FAMOTIDINE 40 MG/1
40 TABLET, FILM COATED ORAL DAILY
COMMUNITY
Start: 2023-01-13

## 2023-01-19 RX ORDER — ESCITALOPRAM OXALATE 20 MG/1
20 TABLET ORAL DAILY
COMMUNITY
Start: 2023-01-13

## 2023-01-19 RX ORDER — FAMOTIDINE 20 MG/1
40 TABLET, FILM COATED ORAL DAILY
Status: DISCONTINUED | OUTPATIENT
Start: 2023-01-20 | End: 2023-01-21 | Stop reason: HOSPADM

## 2023-01-19 RX ORDER — NITROGLYCERIN 0.4 MG/1
0.4 TABLET SUBLINGUAL AS NEEDED
Status: DISCONTINUED | OUTPATIENT
Start: 2023-01-19 | End: 2023-01-21 | Stop reason: HOSPADM

## 2023-01-19 RX ORDER — MONTELUKAST SODIUM 10 MG/1
10 TABLET ORAL DAILY
Status: DISCONTINUED | OUTPATIENT
Start: 2023-01-20 | End: 2023-01-21 | Stop reason: HOSPADM

## 2023-01-19 RX ORDER — CYANOCOBALAMIN 1000 UG/ML
1000 INJECTION, SOLUTION INTRAMUSCULAR; SUBCUTANEOUS
COMMUNITY
Start: 2022-12-16

## 2023-01-19 RX ORDER — ESCITALOPRAM OXALATE 10 MG/1
20 TABLET ORAL DAILY
Status: DISCONTINUED | OUTPATIENT
Start: 2023-01-20 | End: 2023-01-21 | Stop reason: HOSPADM

## 2023-01-19 RX ORDER — NORTRIPTYLINE HYDROCHLORIDE 50 MG/1
100 CAPSULE ORAL
Status: DISCONTINUED | OUTPATIENT
Start: 2023-01-20 | End: 2023-01-20

## 2023-01-19 RX ORDER — SUMATRIPTAN 6 MG/.5ML
6 INJECTION, SOLUTION SUBCUTANEOUS
COMMUNITY
Start: 2022-12-28 | End: 2023-01-21

## 2023-01-19 RX ORDER — LEVETIRACETAM 750 MG/1
1500 TABLET, EXTENDED RELEASE ORAL DAILY
COMMUNITY
Start: 2023-01-16 | End: 2023-01-21

## 2023-01-19 RX ORDER — LACOSAMIDE 100 MG/1
200 TABLET ORAL 2 TIMES DAILY
Status: DISCONTINUED | OUTPATIENT
Start: 2023-01-19 | End: 2023-01-20

## 2023-01-19 RX ORDER — BUDESONIDE AND FORMOTEROL FUMARATE DIHYDRATE 160; 4.5 UG/1; UG/1
2 AEROSOL RESPIRATORY (INHALATION)
Status: DISCONTINUED | OUTPATIENT
Start: 2023-01-19 | End: 2023-01-21 | Stop reason: HOSPADM

## 2023-01-19 RX ORDER — LEVOTHYROXINE SODIUM 25 UG/1
50 TABLET ORAL
Status: DISCONTINUED | OUTPATIENT
Start: 2023-01-20 | End: 2023-01-21 | Stop reason: HOSPADM

## 2023-01-19 RX ORDER — SODIUM CHLORIDE 0.9 % (FLUSH) 0.9 %
5-40 SYRINGE (ML) INJECTION EVERY 8 HOURS
Status: DISCONTINUED | OUTPATIENT
Start: 2023-01-19 | End: 2023-01-21 | Stop reason: HOSPADM

## 2023-01-19 RX ORDER — HYDROXYZINE PAMOATE 25 MG/1
25 CAPSULE ORAL
Status: DISCONTINUED | OUTPATIENT
Start: 2023-01-19 | End: 2023-01-21 | Stop reason: HOSPADM

## 2023-01-19 RX ADMIN — SODIUM CHLORIDE 1000 ML: 9 INJECTION, SOLUTION INTRAVENOUS at 12:41

## 2023-01-19 RX ADMIN — BUDESONIDE AND FORMOTEROL FUMARATE DIHYDRATE 2 PUFF: 160; 4.5 AEROSOL RESPIRATORY (INHALATION) at 21:26

## 2023-01-19 RX ADMIN — IOPAMIDOL 100 ML: 755 INJECTION, SOLUTION INTRAVENOUS at 19:52

## 2023-01-19 NOTE — ED TRIAGE NOTES
Pt arrives to ED by EMS from home. Pt lethargic and responds to verbal stimuli, follows commands, eyes PERRLA. Pt reports multiple episodes of syncope since 1/1. Hx of seizures and cardiac hx unknown.

## 2023-01-19 NOTE — Clinical Note
Status[de-identified] INPATIENT [101]   Type of Bed: Medical [8]   Inpatient Hospitalization Certified Necessary for the Following Reasons: 3.  Patient receiving treatment that can only be provided in an inpatient setting (further clarification in H&P documentation)   Admitting Diagnosis: AMS (altered mental status) [5704117]   Admitting Physician: Adam Lucas [[id number]]   Attending Physician: Adam Lucas [[id number]]   Estimated Length of Stay: 2 Midnights   Discharge Plan[de-identified] Home with Office Follow-up

## 2023-01-20 ENCOUNTER — APPOINTMENT (OUTPATIENT)
Dept: MRI IMAGING | Age: 53
End: 2023-01-20
Attending: PSYCHIATRY & NEUROLOGY
Payer: MEDICARE

## 2023-01-20 LAB
25(OH)D3 SERPL-MCNC: 43.6 NG/ML (ref 30–100)
ALBUMIN SERPL-MCNC: 2.9 G/DL (ref 3.5–5)
ANION GAP SERPL CALC-SCNC: 7 MMOL/L (ref 5–15)
ATRIAL RATE: 87 BPM
BUN SERPL-MCNC: 8 MG/DL (ref 6–20)
BUN/CREAT SERPL: 12 (ref 12–20)
CA-I BLD-MCNC: 8.6 MG/DL (ref 8.5–10.1)
CALCULATED P AXIS, ECG09: 30 DEGREES
CALCULATED R AXIS, ECG10: -10 DEGREES
CHLORIDE SERPL-SCNC: 113 MMOL/L (ref 97–108)
CO2 SERPL-SCNC: 22 MMOL/L (ref 21–32)
CREAT SERPL-MCNC: 0.66 MG/DL (ref 0.55–1.02)
CRP SERPL-MCNC: 2.39 MG/DL (ref 0–0.6)
DIAGNOSIS, 93000: NORMAL
ERYTHROCYTE [SEDIMENTATION RATE] IN BLOOD: 22 MM/HR (ref 0–30)
FOLATE SERPL-MCNC: 32 NG/ML (ref 5–21)
GLUCOSE SERPL-MCNC: 86 MG/DL (ref 65–100)
P-R INTERVAL, ECG05: 186 MS
PHOSPHATE SERPL-MCNC: 3.3 MG/DL (ref 2.6–4.7)
POTASSIUM SERPL-SCNC: 3.7 MMOL/L (ref 3.5–5.1)
Q-T INTERVAL, ECG07: 350 MS
QRS DURATION, ECG06: 100 MS
QTC CALCULATION (BEZET), ECG08: 421 MS
SODIUM SERPL-SCNC: 142 MMOL/L (ref 136–145)
T4 FREE SERPL-MCNC: 0.7 NG/DL (ref 0.8–1.5)
TSH SERPL DL<=0.05 MIU/L-ACNC: 0.48 UIU/ML (ref 0.36–3.74)
VENTRICULAR RATE, ECG03: 87 BPM
VIT B12 SERPL-MCNC: 801 PG/ML (ref 193–986)

## 2023-01-20 PROCEDURE — 82306 VITAMIN D 25 HYDROXY: CPT

## 2023-01-20 PROCEDURE — 74011250637 HC RX REV CODE- 250/637: Performed by: PSYCHIATRY & NEUROLOGY

## 2023-01-20 PROCEDURE — 82607 VITAMIN B-12: CPT

## 2023-01-20 PROCEDURE — 86140 C-REACTIVE PROTEIN: CPT

## 2023-01-20 PROCEDURE — 94640 AIRWAY INHALATION TREATMENT: CPT

## 2023-01-20 PROCEDURE — 36415 COLL VENOUS BLD VENIPUNCTURE: CPT

## 2023-01-20 PROCEDURE — 70551 MRI BRAIN STEM W/O DYE: CPT

## 2023-01-20 PROCEDURE — 84439 ASSAY OF FREE THYROXINE: CPT

## 2023-01-20 PROCEDURE — 74011000250 HC RX REV CODE- 250: Performed by: HOSPITALIST

## 2023-01-20 PROCEDURE — 84443 ASSAY THYROID STIM HORMONE: CPT

## 2023-01-20 PROCEDURE — 80069 RENAL FUNCTION PANEL: CPT

## 2023-01-20 PROCEDURE — G0378 HOSPITAL OBSERVATION PER HR: HCPCS

## 2023-01-20 PROCEDURE — 74011250637 HC RX REV CODE- 250/637: Performed by: HOSPITALIST

## 2023-01-20 PROCEDURE — 85652 RBC SED RATE AUTOMATED: CPT

## 2023-01-20 RX ORDER — NORTRIPTYLINE HYDROCHLORIDE 50 MG/1
50 CAPSULE ORAL
Status: DISCONTINUED | OUTPATIENT
Start: 2023-01-21 | End: 2023-01-21 | Stop reason: HOSPADM

## 2023-01-20 RX ORDER — LEVETIRACETAM 500 MG/1
500 TABLET ORAL 2 TIMES DAILY
Status: DISCONTINUED | OUTPATIENT
Start: 2023-01-21 | End: 2023-01-21 | Stop reason: HOSPADM

## 2023-01-20 RX ORDER — LACOSAMIDE 100 MG/1
100 TABLET ORAL 2 TIMES DAILY
Status: DISCONTINUED | OUTPATIENT
Start: 2023-01-20 | End: 2023-01-21

## 2023-01-20 RX ADMIN — APIXABAN 5 MG: 5 TABLET, FILM COATED ORAL at 20:37

## 2023-01-20 RX ADMIN — LATANOPROST 1 DROP: 50 SOLUTION OPHTHALMIC at 02:36

## 2023-01-20 RX ADMIN — LACOSAMIDE 200 MG: 100 TABLET, FILM COATED ORAL at 02:36

## 2023-01-20 RX ADMIN — CLONAZEPAM 1 MG: 1 TABLET ORAL at 02:36

## 2023-01-20 RX ADMIN — ESCITALOPRAM OXALATE 20 MG: 10 TABLET ORAL at 08:54

## 2023-01-20 RX ADMIN — TOPIRAMATE 100 MG: 100 TABLET, FILM COATED ORAL at 08:55

## 2023-01-20 RX ADMIN — TIOTROPIUM BROMIDE INHALATION SPRAY 2 PUFF: 3.12 SPRAY, METERED RESPIRATORY (INHALATION) at 09:10

## 2023-01-20 RX ADMIN — TOPIRAMATE 100 MG: 100 TABLET, FILM COATED ORAL at 20:37

## 2023-01-20 RX ADMIN — LEVETIRACETAM 750 MG: 250 TABLET, FILM COATED ORAL at 20:37

## 2023-01-20 RX ADMIN — LACOSAMIDE 200 MG: 100 TABLET, FILM COATED ORAL at 08:55

## 2023-01-20 RX ADMIN — APIXABAN 5 MG: 5 TABLET, FILM COATED ORAL at 02:36

## 2023-01-20 RX ADMIN — LEVOTHYROXINE SODIUM 50 MCG: 0.03 TABLET ORAL at 07:36

## 2023-01-20 RX ADMIN — BUDESONIDE AND FORMOTEROL FUMARATE DIHYDRATE 2 PUFF: 160; 4.5 AEROSOL RESPIRATORY (INHALATION) at 18:15

## 2023-01-20 RX ADMIN — SODIUM CHLORIDE, PRESERVATIVE FREE 10 ML: 5 INJECTION INTRAVENOUS at 17:06

## 2023-01-20 RX ADMIN — FAMOTIDINE 40 MG: 20 TABLET, FILM COATED ORAL at 08:54

## 2023-01-20 RX ADMIN — MONTELUKAST 10 MG: 10 TABLET, FILM COATED ORAL at 08:55

## 2023-01-20 RX ADMIN — TOPIRAMATE 100 MG: 100 TABLET, FILM COATED ORAL at 02:36

## 2023-01-20 RX ADMIN — LEVETIRACETAM 750 MG: 250 TABLET, FILM COATED ORAL at 08:55

## 2023-01-20 RX ADMIN — APIXABAN 5 MG: 5 TABLET, FILM COATED ORAL at 08:53

## 2023-01-20 RX ADMIN — LACOSAMIDE 100 MG: 100 TABLET, FILM COATED ORAL at 20:37

## 2023-01-20 RX ADMIN — SODIUM CHLORIDE, PRESERVATIVE FREE 10 ML: 5 INJECTION INTRAVENOUS at 20:44

## 2023-01-20 RX ADMIN — BUDESONIDE AND FORMOTEROL FUMARATE DIHYDRATE 2 PUFF: 160; 4.5 AEROSOL RESPIRATORY (INHALATION) at 09:10

## 2023-01-20 RX ADMIN — NORTRIPTYLINE HYDROCHLORIDE 100 MG: 50 CAPSULE ORAL at 17:15

## 2023-01-20 NOTE — H&P
's  Hospitalist History & Physical Notes. [facility]. Name : Bita Quintanilla      MRN number : [id number]     YOB: 1970     Subjective :   Chief Complaint : Lethargy with altered mental status, history of complex partial seizures    Source of information : From records, no family members available. From ED provider and nursing staff    History of present illness:   45 y.o. female with history of complex partial seizures difficult to control on treatment, migraine headaches, nonischemic cardiomyopathy, pulmonary embolism on anticoagulation presents to the emergency room as patient family felt that she is with altered mental status, not much responding. It is not her baseline. Did not have any fever or chills. They did not notice any respiratory distress or symptoms. She was just admitted and discharged 3 days ago home. Usually she is completely oriented and ambulatory. Recent admission she has medication adjustment done for the seizures.     Past Medical History:   Diagnosis Date    Arthritis     pt states knees and back    Asthma     Cardiomyopathy ([address] 2005    EF 35%, echo in 2021 normal EF     Colon polyp     Diabetes ([address]     Pt states not diabetic but has all the symptoms of it    Dizzy spells     GERD (gastroesophageal reflux disease)     Heart failure ([address] 2005    now combined systolic, diastolic, seen at [facility], [facility] 2021- Dr Jean Martinez    Irritable bowel syndrome     Knee pain, bilateral     Migraines 2011    2-3 week    Neuropathy     pt states of both feet    Pulmonary embolism [facility])     August 2022    Seizures ([address]     petite mals last seizure 2-3 months ago    Thyroid disease     hypothyroid     Past Surgical History:   Procedure Laterality Date    COLONOSCOPY N/A 11/28/2022    COLONOSCOPY performed by Court Rowley MD at [address]  05/2006    HX COLONOSCOPY      HX ENDOSCOPY      HX GASTRIC BYPASS  2013 HX HYSTERECTOMY  2007    HX KNEE ARTHROSCOPY Right 05/2021    HX ORTHOPAEDIC Bilateral 2013    CTR    HX ORTHOPAEDIC      right knee procedure 2021    HX OTHER SURGICAL      left axillary lymph node biopsy    HX SMALL BOWEL RESECTION      NH APPENDECTOMY      NH COLECTOMY PARTIAL W/ANASTOMOSIS      NH ENTERECTOMY RESCJ SMALL INTESTINE W/ENTEROSTOMY      NH LAPAROSCOPY SURG CHOLECYSTECTOMY      NH UNLISTED PROCEDURE ABDOMEN PERITONEUM & OMENTUM  2008    cholecystectomy    NH UNLISTED PROCEDURE CARDIAC SURGERY      Loop recorder implanted july 2020    NH UNLISTED PROCEDURE STOMACH       Family History   Problem Relation Age of Onset    Hypertension Mother     Heart Disease Mother     Cancer Father         stomach cancer    Hypertension Father     No Known Problems Brother     Asthma Daughter     Asthma Son     Asthma Son     Breast Cancer Paternal Aunt         age unknown      Social History     Tobacco Use    Smoking status: Never    Smokeless tobacco: Never   Substance Use Topics    Alcohol use: No       Allergies   Allergen Reactions    Acetaminophen Other (comments)     Advised not to take due to Liver Issues    Morphine Other (comments)     SOB/Chest Pressure - in hosp for a week. States DILAUDID Ok    Oxycodone Shortness of Breath     Reaction Type: Allergy; Severity: Severe    Shellfish Derived Anaphylaxis    Codeine Other (comments)     SOB chest pain    Peanut Oil Other (comments)     Reaction Type:  Intolerance; Severity: Severe    Sulfa (Sulfonamide Antibiotics) Rash     itching      Current Facility-Administered Medications   Medication Dose Route Frequency Provider Last Rate Last Admin    [START ON 1/20/2023] nortriptyline (PAMELOR) capsule 100 mg  100 mg Oral PCD Mini Mota MD        [START ON 1/20/2023] levothyroxine (SYNTHROID) tablet 50 mcg  50 mcg Oral ACB Mini Mota MD        [START ON 1/20/2023] montelukast (SINGULAIR) tablet 10 mg  10 mg Oral DAILY Mini Mota MD        topiramate (TOPAMAX) tablet 100 mg  100 mg Oral BID Catherine Ji MD        albuterol (PROVENTIL HFA, VENTOLIN HFA, PROAIR HFA) inhaler 2 Puff  2 Puff Inhalation Q4H PRN Catherine Ji MD        latanoprost (XALATAN) 0.005 % ophthalmic solution 1 Drop  1 Drop Both Eyes QPM Catherine Ji MD        lacosamide (VIMPAT) tablet 200 mg  200 mg Oral BID Catherine Ji MD        apixaban (ELIQUIS) tablet 5 mg  5 mg Oral BID Catherine Ji MD        nitroglycerin (NITROSTAT) tablet 0.4 mg  0.4 mg SubLINGual PRN Catherine Ji MD        clonazePAM (KlonoPIN) tablet 1 mg  1 mg Oral QHS Catherine Ji MD        [START ON 1/20/2023] cenobamate tab 200 mg  200 mg Oral DAILY Catherine Ji MD        hydrOXYzine pamoate (VISTARIL) capsule 25 mg  25 mg Oral QHS PRN Catherine Ji MD        [START ON 1/20/2023] levETIRAcetam (KEPPRA XR) ER tablet 1,500 mg  1,500 mg Oral DAILY Catherine Ji MD        Dotty Abler ON 1/20/2023] famotidine (PEPCID) tablet 40 mg  40 mg Oral DAILY Catherine Ji MD        Dotty Abler ON 1/20/2023] escitalopram oxalate (LEXAPRO) tablet 20 mg  20 mg Oral DAILY Catherine Ji MD        Dotty Abler ON 4/89/4747] eslicarbazepine tab 163 mg  800 mg Oral DAILY Catherine Ji MD        budesonide-formoteroL (SYMBICORT) 160-4.5 mcg/actuation HFA inhaler 2 Puff  2 Puff Inhalation BID RT Catherine Ji MD        And    [START ON 1/20/2023] tiotropium bromide (SPIRIVA RESPIMAT) 2.5 mcg /actuation  2 Puff Inhalation DAILY Catherine Ji MD        sodium chloride (NS) flush 5-40 mL  5-40 mL IntraVENous Q8H Catherine Ji MD        sodium chloride (NS) flush 5-40 mL  5-40 mL IntraVENous PRN Catherine Ji MD        ondansetron (ZOFRAN) injection 4 mg  4 mg IntraVENous Q6H PRN Catherine Ji MD         Current Outpatient Medications   Medication Sig Dispense Refill    hydrOXYzine pamoate (VISTARIL) 25 mg capsule Take 30 mg by mouth nightly as needed for Anxiety. SUMAtriptan (IMITREX) 6 mg/0.5 mL injection 6 mg by SubCUTAneous route daily as needed for Migraine. rizatriptan (MAXALT-MLT) 10 mg disintegrating tablet Take 10 mg by mouth daily as needed for Migraine. Trelegy Ellipta 200-62.5-25 mcg inhaler Take 1 Puff by inhalation daily. levETIRAcetam (KEPPRA XR) 750 mg ER tablet Take 1,500 mg by mouth daily. famotidine (PEPCID) 40 mg tablet Take 40 mg by mouth daily. Linzess 290 mcg cap capsule Take 290 mcg by mouth daily. escitalopram oxalate (LEXAPRO) 20 mg tablet Take 20 mg by mouth daily. cyanocobalamin (VITAMIN B12) 1,000 mcg/mL injection 1,000 mcg by IntraMUSCular route every month. ondansetron (ZOFRAN ODT) 4 mg disintegrating tablet Take 1 Tablet by mouth every eight (8) hours as needed for Nausea or Vomiting. 20 Tablet 0    OXcarbazepine (TRILEPTAL) 300 mg tablet Take 1 Tablet by mouth two (2) times a day. 60 Tablet 0    clonazePAM (KlonoPIN) 1 mg tablet Take 1 mg by mouth nightly.      ergocalciferol (ERGOCALCIFEROL) 1,250 mcg (50,000 unit) capsule Take 50,000 Units by mouth once. Every Friday      cenobamate (Xcopri) 200 mg tab Take 200 mg by mouth daily. eslicarbazepine (Aptiom) 800 mg tab tablet Take 800 mg by mouth daily. erenumab-aooe (Aimovig Autoinjector) 140 mg/mL injection 140 mg by SubCUTAneous route every thirty (30) days. nitroglycerin (NITROSTAT) 0.4 mg SL tablet DISSOLVE 1 TABLET UNDER THE TONGUE EVERY 5 MINUTES AS  NEEDED FOR CHEST PAIN. MAX  OF 3 TABLETS IN 15 MINUTES. CALL 911 IF PAIN PERSISTS. 30 Tablet 5    apixaban (ELIQUIS) 5 mg tablet Take 5 mg by mouth two (2) times a day. lacosamide (Vimpat) 200 mg tab tablet Take 1 Tablet by mouth two (2) times a day.  Max Daily Amount: 400 mg. 30 Tablet 0    latanoprost (XALATAN) 0.005 % ophthalmic solution INSTILL 1 DROP INTO EACH EYE AT NIGHT      montelukast (SINGULAIR) 10 mg tablet Take 10 mg by mouth daily. 3    nortriptyline (PAMELOR) 50 mg capsule Take 100 mg by mouth nightly. 5    topiramate (TOPAMAX) 100 mg tablet Take 100 mg by mouth two (2) times a day.  5    VENTOLIN HFA 90 mcg/actuation inhaler Take 2 Puffs by inhalation every four (4) hours as needed. 4    levothyroxine (SYNTHROID) 50 mcg tablet Take 50 mcg by mouth Daily (before breakfast). Calcium-Cholecalciferol, D3, 600 mg-10 mcg (400 unit) cap Take 2 Tablets by mouth daily. Vitals:   Patient Vitals for the past 12 hrs:   Temp Pulse Resp BP SpO2   01/19/23 1923 -- 79 21 122/74 98 %   01/19/23 1835 -- 82 21 -- 97 %   01/19/23 1833 -- 82 21 -- 97 %   01/19/23 1830 -- 84 21 116/73 98 %   01/19/23 1818 -- 83 17 -- 96 %   01/19/23 1808 -- 79 17 -- 98 %   01/19/23 1758 -- -- -- 119/75 99 %   01/19/23 1208 98.2 °F (36.8 °C) 86 18 112/73 96 %       Physical Exam: Very limited. General : Looks sleeping comfortably in the bed, responded to verbal command immediately but stayed calm. No distress is noted. Neck : Supple, no JVD,   Lungs : Breath sounds with good air entry bilaterally, no wheezes or rales, no accessory muscle use. CVS : Rhythm rate regular, S1+, S2+, no murmur or gallop. Abdomen : Soft, nontender,   Extremities : No edema noted,  pedal pulses palpable. Musculoskeletal : Fair range of motion, no joint swelling or effusion, muscle tone and power appears good. Skin : Moist, warm,  Lymphatic : No cervical lymphadenopathy. Neurological : Awake, alert, oriented to time place person. Psychiatric : Mood and affect appears appropriate to the situation.          Data Review:   Recent Results (from the past 24 hour(s))   CBC WITH AUTOMATED DIFF    Collection Time: 01/19/23 12:40 PM   Result Value Ref Range    WBC 6.6 3.6 - 11.0 K/uL    RBC 3.83 3.80 - 5.20 M/uL    HGB 10.6 (L) 11.5 - 16.0 g/dL    HCT 33.7 (L) 35.0 - 47.0 %    MCV 88.0 80.0 - 99.0 FL    MCH 27.7 26.0 - 34.0 PG MCHC 31.5 30.0 - 36.5 g/dL    RDW 15.6 (H) 11.5 - 14.5 %    PLATELET 490 239 - 568 K/uL    MPV 10.3 8.9 - 12.9 FL    NRBC 0.0 0.0  WBC    ABSOLUTE NRBC 0.00 0.00 - 0.01 K/uL    NEUTROPHILS 66 32 - 75 %    LYMPHOCYTES 19 12 - 49 %    MONOCYTES 12 5 - 13 %    EOSINOPHILS 2 0 - 7 %    BASOPHILS 1 0 - 1 %    IMMATURE GRANULOCYTES 0 0 - 0.5 %    ABS. NEUTROPHILS 4.4 1.8 - 8.0 K/UL    ABS. LYMPHOCYTES 1.2 0.8 - 3.5 K/UL    ABS. MONOCYTES 0.8 0.0 - 1.0 K/UL    ABS. EOSINOPHILS 0.1 0.0 - 0.4 K/UL    ABS. BASOPHILS 0.0 0.0 - 0.1 K/UL    ABS. IMM. GRANS. 0.0 0.00 - 0.04 K/UL    DF AUTOMATED     TROPONIN-HIGH SENSITIVITY    Collection Time: 01/19/23 12:40 PM   Result Value Ref Range    Troponin-High Sensitivity 4 0 - 51 ng/L   NT-PRO BNP    Collection Time: 01/19/23 12:40 PM   Result Value Ref Range    NT pro-BNP 39 <211 pg/mL   METABOLIC PANEL, COMPREHENSIVE    Collection Time: 01/19/23 12:40 PM   Result Value Ref Range    Sodium 135 (L) 136 - 145 mmol/L    Potassium Hemolyzed, recollect requested 3.5 - 5.1 mmol/L    Chloride 110 (H) 97 - 108 mmol/L    CO2 21 21 - 32 mmol/L    Anion gap 4 (L) 5 - 15 mmol/L    Glucose 81 65 - 100 mg/dL    BUN 12 6 - 20 mg/dL    Creatinine 0.90 0.55 - 1.02 mg/dL    BUN/Creatinine ratio 13 12 - 20      eGFR >60 >60 ml/min/1.73m2    Calcium 8.7 8.5 - 10.1 mg/dL    Bilirubin, total 0.5 0.2 - 1.0 mg/dL    AST (SGOT) Hemolyzed, recollect requested 15 - 37 U/L    ALT (SGPT) Hemolyzed, recollect requested 12 - 78 U/L    Alk.  phosphatase 159 (H) 45 - 117 U/L    Protein, total 7.0 6.4 - 8.2 g/dL    Albumin 3.0 (L) 3.5 - 5.0 g/dL    Globulin 4.0 2.0 - 4.0 g/dL    A-G Ratio 0.8 (L) 1.1 - 2.2     MAGNESIUM    Collection Time: 01/19/23 12:40 PM   Result Value Ref Range    Magnesium Hemolyzed, recollect requested 1.6 - 2.4 mg/dL   ETHYL ALCOHOL    Collection Time: 01/19/23  4:26 PM   Result Value Ref Range    ALCOHOL(ETHYL),SERUM <10 <10 mg/dL   LACTIC ACID    Collection Time: 01/19/23  4:26 PM Result Value Ref Range    Lactic acid 1.1 0.4 - 2.0 mmol/L   URINALYSIS W/ REFLEX CULTURE    Collection Time: 01/19/23  6:30 PM    Specimen: Urine   Result Value Ref Range    Color Straw     Appearance Turbid Clear    Specific gravity 1.010 1.003 - 1.030      pH (UA) 6.0 5.0 - 8.0      Protein Negative Negative mg/dL    Glucose Negative Negative mg/dL    Ketone Negative Negative mg/dL    Bilirubin Negative Negative      Blood SMALL Negative    Urobilinogen 0.1 0.1 - 1.0 EU/dL    Nitrites Negative Negative      Leukocyte Esterase LARGE Negative    WBC 20-50 0 - 4 /hpf    RBC  0 - 5 /hpf    Epithelial cells Few Few /lpf    Bacteria Negative Negative /hpf    UA:UC IF INDICATED Urine Culture Ordered (A) Culture not indicated by UA result      Mucus Trace (A) Negative /lpf    Other: Renal Epithelial cells present     DRUG SCREEN, URINE    Collection Time: 01/19/23  6:30 PM   Result Value Ref Range    AMPHETAMINES Negative Negative      BARBITURATES Positive (A) Negative      BENZODIAZEPINES Negative Negative      COCAINE Negative Negative      METHADONE Negative Negative      OPIATES Negative Negative      PCP(PHENCYCLIDINE) Negative Negative      THC (TH-CANNABINOL) Negative Negative      Drug screen comment        This test is a screen for drugs of abuse in a medical setting only (i.e., they are unconfirmed results and as such must not be used for non-medical purposes, e.g.,employment testing, legal testing). Due to its inherent nature, false positive (FP) and false negative (FN) results may be obtained. Therefore, if necessary for medical care, recommend confirmation of positive findings by GC/MS. Radiologic Studies :   CT Results  (Last 48 hours)                 01/19/23 1951  CTA HEAD NECK W WO CONT Final result    Impression:  1. No evidence of large vessel occlusion or significant flow-limiting stenosis.            Narrative:  EXAM:  CTA HEAD NECK W WO CONT       INDICATION:   eval for occlusion COMPARISON:  None. CONTRAST: 100 mL of Isovue-370       TECHNIQUE: Unenhanced  images were obtained to localize the volume for   acquisition. Multislice helical axial CT angiography was performed from the   aortic arch to the top of the head during uneventful rapid bolus intravenous   contrast administration. Coronal and sagittal reformations and 3D post   processing was performed. CT dose reduction was achieved through use of a   standardized protocol tailored for this examination and automatic exposure   control for dose modulation. FINDINGS:       CTA NECK:       Great vessels: Patent arch and great vessel origins without stenosis. Right subclavian artery: Patent       Left subclavian artery: Patent        Right common carotid artery: Patent        Left common carotid artery: Patent        Cervical right internal carotid artery: Patent with no significant stenosis by   NASCET criteria. Cervical left internal carotid artery: Patent with no significant stenosis by   NASCET criteria. Right vertebral artery: Patent       Left vertebral artery: Patent       The lung apices are clear. The cervical soft tissues are unremarkable. No   suspicious osseous lesions. CTA HEAD:       Right cavernous internal carotid artery: Patent       Left cavernous internal carotid artery: Patent       Anterior cerebral arteries: Patent       Anterior communicating artery: Patent       Right middle cerebral artery: Patent       Left middle cerebral artery: Patent       Posterior communicating arteries: Nonvisualized       Posterior cerebral arteries: Patent       Basilar artery: Patent       Distal vertebral arteries: Patent       There is no intracranial aneurysm. The dural venous sinuses are patent. 01/19/23 1258  CT HEAD WO CONT Final result    Impression:  1. No acute intracranial abnormality. 2. Incidental right maxillary sinusitis.                Narrative:  EXAM: CT HEAD WO CONT INDICATION: syncope       COMPARISON: 1/14/2023. CONTRAST: None. TECHNIQUE: Unenhanced CT of the head was performed using 5 mm images. Brain and   bone windows were generated. Coronal and sagittal reformats. CT dose reduction   was achieved through use of a standardized protocol tailored for this   examination and automatic exposure control for dose modulation. FINDINGS:   The ventricles and sulci are normal in size, shape and configuration. There is   no significant white matter disease. There is no intracranial hemorrhage,   extra-axial collection, or mass effect. The basilar cisterns are open. No CT   evidence of acute infarct. The bone windows demonstrate no abnormalities. 4 minimal mucoperiosteal   thickening in the right maxillary sinus, with a retention cyst along the medial   wall. .                 CXR Results  (Last 48 hours)                 01/19/23 1310  XR CHEST SNGL V Final result    Impression:  Persistent left basilar atelectasis otherwise no acute abnormality. Narrative:  EXAM: XR CHEST SNGL V       INDICATION: syncope       COMPARISON: 1/14/2023. FINDINGS: A single view of the chest demonstrates persistent left basilar   atelectasis. Lungs are otherwise clear. The cardiac and mediastinal contours and   pulmonary vascularity are normal. The bones and soft tissues are within normal   limits. Assessment and Plan : Altered mental status: Etiology unclear, lab work did not show any significant metabolic abnormalities. May be related to the seizures, will consult neurology. We will follow with recommendations. Complex partial seizures: On multiple medications with adjustments, I recommended her to keep the home medications that are nonformulary so she can take them her home medications. History of venous thromboembolism: On anticoagulation with apixaban 5 mg twice a day we will continue    Hypothyroidism: On supplementation.   She does have history of thyroid goiter being evaluated by surgical oncology at [facility], she is going to follow-up with cardiac clearance. History of colectomy partial, I am not sure why she had colectomy. Admitted to medical telemetry, full CODE STATUS, home medications reviewed with recent discharge medications. Patient cannot make decisions at this point. CC : Aziza Botello MD  Signed By: Erlinda Dickens MD     January 19, 2023      This dictation was done by dragon, computer voice recognition software. Often unanticipated grammatical, syntax, [place] phones and other interpretive errors are inadvertently transcribed. Please excuse errors that have escaped final proofreading.

## 2023-01-20 NOTE — PROGRESS NOTES
1/20/23 Pt had therapist number in phone - called - agency is [facility]. Pt signed Choice Letter to continue with [facility]. Referral sent via [address] [place] [address] Ramya.

## 2023-01-20 NOTE — PROGRESS NOTES
Reason for Admission:  AMS                     RUR Score:    16%                 Plan for utilizing home health:  Uses cane/walker. Has HH , cannot recall agency name. PCP: First and Last name:  Mitra Yu MD     Name of Practice:    Are you a current patient: Yes/No: Yes   Approximate date of last visit: 1/17/23. Can you participate in a virtual visit with your PCP: Yes/call. Current Advanced Directive/Advance Care Plan: Full Code      Healthcare Decision Maker:              Primary Decision Maker: Rosaline Morrow - Mother - [phone]                  Transition of Care Plan:                    D/C Plan is home with family & daughter/family to transport. Send Rxs to [facility] in [place] [address] upon discharge.

## 2023-01-20 NOTE — ACP (ADVANCE CARE PLANNING)
Advance Care Planning   Healthcare Decision Maker:       Primary Decision Maker: Jorge Ornelas - Mother - [phone]

## 2023-01-20 NOTE — ED PROVIDER NOTES
[facility] EMERGENCY DEPT  EMERGENCY DEPARTMENT HISTORY AND PHYSICAL EXAM      Date: 1/19/2023  Patient Name: Juan David Roberts  MRN: [id number]  [place]: 1970  Date of evaluation: 1/19/2023  Provider: Lilly Singleton MD   Note Started: 7:25 PM 1/19/23    HISTORY OF PRESENT ILLNESS     Chief Complaint   Patient presents with    Syncope       History Provided By: Patient's Daughter    HPI: Juan David Roberts, 45 y.o. female with past medical history as reviewed below presents for evaluation of recurrent syncopal episodes. Per patient's daughter, since discharge 3 days ago she has had syncopal episodes at home. Syncopal episodes described as intermittent unresponsiveness with persistent altered mental status thereafter. No seizure-like activity today. No missed doses of her medications. No alcohol or other drug use.     PAST MEDICAL HISTORY   Past Medical History:  Past Medical History:   Diagnosis Date    Arthritis     pt states knees and back    Asthma     Cardiomyopathy ([address] 2005    EF 35%, echo in 2021 normal EF     Colon polyp     Diabetes ([address]     Pt states not diabetic but has all the symptoms of it    Dizzy spells     GERD (gastroesophageal reflux disease)     Heart failure ([address] 2005    now combined systolic, diastolic, seen at [facility], [facility] 2021- Dr Martine Nance    Irritable bowel syndrome     Knee pain, bilateral     Migraines 2011    2-3 week    Neuropathy     pt states of both feet    Pulmonary embolism [facility])     August 2022    Seizures ([address]     petite mals last seizure 2-3 months ago    Thyroid disease     hypothyroid       Past Surgical History:  Past Surgical History:   Procedure Laterality Date    COLONOSCOPY N/A 11/28/2022    COLONOSCOPY performed by King Fior MD at [facility] ENDOSCOPY    HX CHOLECYSTECTOMY  5/2006    HX COLONOSCOPY      HX ENDOSCOPY      HX GASTRIC BYPASS  2013    HX HYSTERECTOMY  2007    HX KNEE ARTHROSCOPY Right 05/2021    HX ORTHOPAEDIC Bilateral 2013    CTR    HX ORTHOPAEDIC      right knee procedure 2021    HX OTHER SURGICAL      left axillary lymph node biopsy    HX SMALL BOWEL RESECTION      HI APPENDECTOMY      HI COLECTOMY PARTIAL W/ANASTOMOSIS      HI ENTERECTOMY RESCJ SMALL INTESTINE W/ENTEROSTOMY      HI LAPAROSCOPY SURG CHOLECYSTECTOMY      HI UNLISTED PROCEDURE ABDOMEN PERITONEUM & OMENTUM  2008    cholecystectomy    HI UNLISTED PROCEDURE CARDIAC SURGERY      Loop recorder implanted july 2020    HI UNLISTED PROCEDURE STOMACH         Family History:  Family History   Problem Relation Age of Onset    Hypertension Mother     Heart Disease Mother     Cancer Father         stomach cancer    Hypertension Father     No Known Problems Brother     Asthma Daughter     Asthma Son     Asthma Son     Breast Cancer Paternal Aunt         age unknown       Social History:  Social History     Tobacco Use    Smoking status: Never    Smokeless tobacco: Never   Vaping Use    Vaping Use: Never used   Substance Use Topics    Alcohol use: No    Drug use: No       Allergies: Allergies   Allergen Reactions    Acetaminophen Other (comments)     Advised not to take due to Liver Issues    Morphine Other (comments)     SOB/Chest Pressure - in hosp for a week. States DILAUDID Ok    Oxycodone Shortness of Breath     Reaction Type: Allergy; Severity: Severe    Shellfish Derived Anaphylaxis    Codeine Other (comments)     SOB chest pain    Peanut Oil Other (comments)     Reaction Type: Intolerance; Severity: Severe    Sulfa (Sulfonamide Antibiotics) Rash     itching       PCP: Yaneth Berrios MD    Current Meds:   Previous Medications    APIXABAN (ELIQUIS) 5 MG TABLET    Take 5 mg by mouth two (2) times a day. CALCIUM-CHOLECALCIFEROL, D3, 600 MG-10 MCG (400 UNIT) CAP    Take 2 Tablets by mouth daily. CENOBAMATE (XCOPRI) 200 MG TAB    Take 200 mg by mouth daily. CLONAZEPAM (KLONOPIN) 1 MG TABLET    Take 1 mg by mouth nightly.     DEXLANSOPRAZOLE (DEXILANT) 60 MG CPDB CAPSULE (DELAYED RELEASE)    Take 60 mg by mouth daily. DICYCLOMINE (BENTYL) 20 MG TABLET    Take 20 mg by mouth every six (6) hours. ERENUMAB-AOOE (AIMOVIG AUTOINJECTOR) 140 MG/ML INJECTION    140 mg by SubCUTAneous route once. ERGOCALCIFEROL (ERGOCALCIFEROL) 1,250 MCG (50,000 UNIT) CAPSULE    Take 50,000 Units by mouth once. Every Friday    ESCITALOPRAM OXALATE (LEXAPRO) 10 MG TABLET    Take 20 mg by mouth daily. ESLICARBAZEPINE (APTIOM) 800 MG TAB TABLET    Take 800 mg by mouth daily. LACOSAMIDE (VIMPAT) 200 MG TAB TABLET    Take 1 Tablet by mouth two (2) times a day. Max Daily Amount: 400 mg. LATANOPROST (XALATAN) 0.005 % OPHTHALMIC SOLUTION    INSTILL 1 DROP INTO EACH EYE AT NIGHT    LEVETIRACETAM (KEPPRA) 500 MG/5 ML INJECTION    5 mL by IntraVENous route every twelve (12) hours for 30 days. LEVOTHYROXINE (SYNTHROID) 50 MCG TABLET    Take 50 mcg by mouth Daily (before breakfast). LINZESS 145 MCG CAP CAPSULE    Take 290 mcg by mouth daily. MONTELUKAST (SINGULAIR) 10 MG TABLET    Take 10 mg by mouth daily. NITROGLYCERIN (NITROSTAT) 0.4 MG SL TABLET    DISSOLVE 1 TABLET UNDER THE TONGUE EVERY 5 MINUTES AS  NEEDED FOR CHEST PAIN. MAX  OF 3 TABLETS IN 15 MINUTES. CALL 911 IF PAIN PERSISTS. NORTRIPTYLINE (PAMELOR) 50 MG CAPSULE    Take 100 mg by mouth nightly. ONDANSETRON (ZOFRAN ODT) 4 MG DISINTEGRATING TABLET    Take 1 Tablet by mouth every eight (8) hours as needed for Nausea or Vomiting. OXCARBAZEPINE (TRILEPTAL) 300 MG TABLET    Take 1 Tablet by mouth two (2) times a day. RIZATRIPTAN (MAXALT) 5 MG TABLET    Take 5 mg by mouth once as needed for Migraine. May repeat in 2 hours if needed     SCOPOLAMINE (TRANSDERM-SCOP) 1 MG OVER 3 DAYS PT3D    1 Patch by TransDERmal route every seventy-two (72) hours. Present on pt at this time, right ear    TOPIRAMATE (TOPAMAX) 100 MG TABLET    Take 100 mg by mouth two (2) times a day.     VENTOLIN HFA 90 MCG/ACTUATION INHALER    Take 2 Puffs by inhalation every four (4) hours as needed. REVIEW OF SYSTEMS   Review of Systems   Reason unable to perform ROS: Altered mental status. Positives and Pertinent negatives as per HPI. PHYSICAL EXAM     ED Triage Vitals [01/19/23 1208]   ED Encounter Vitals Group      /73      Pulse (Heart Rate) 86      Resp Rate 18      Temp 98.2 °F (36.8 °C)      Temp src       O2 Sat (%) 96 %      Weight 177 lb      Height 5' 5\"      Physical Exam  Constitutional:       Comments: Confused, somnolent   HENT:      Head: Normocephalic and atraumatic. Nose: Nose normal.      Mouth/Throat:      Mouth: Mucous membranes are moist.   Eyes:      Conjunctiva/sclera: Conjunctivae normal.      Pupils: Pupils are equal, round, and reactive to light. Cardiovascular:      Rate and Rhythm: Normal rate and regular rhythm. Heart sounds: Normal heart sounds. Pulmonary:      Effort: Pulmonary effort is normal.      Breath sounds: Normal breath sounds. Abdominal:      General: There is no distension. Palpations: Abdomen is soft. Tenderness: There is no abdominal tenderness. Musculoskeletal:         General: No tenderness or deformity. Normal range of motion. Cervical back: Normal range of motion and neck supple. Skin:     General: Skin is warm and dry. Neurological:      Comments: Moving all extremities. Sensation intact throughout.   Incompletely following instructions for neurologic testing due to having to be reawaken repeatedly   Psychiatric:         Mood and Affect: Mood normal.         Behavior: Behavior normal.       SCREENINGS               No data recorded        LAB, EKG AND DIAGNOSTIC RESULTS   Labs:  Recent Results (from the past 12 hour(s))   CBC WITH AUTOMATED DIFF    Collection Time: 01/19/23 12:40 PM   Result Value Ref Range    WBC 6.6 3.6 - 11.0 K/uL    RBC 3.83 3.80 - 5.20 M/uL    HGB 10.6 (L) 11.5 - 16.0 g/dL    HCT 33.7 (L) 35.0 - 47.0 %    MCV 88.0 80.0 - 99.0 FL    MCH 27.7 26.0 - 34.0 PG    MCHC 31.5 30.0 - 36.5 g/dL    RDW 15.6 (H) 11.5 - 14.5 %    PLATELET 608 351 - 648 K/uL    MPV 10.3 8.9 - 12.9 FL    NRBC 0.0 0.0  WBC    ABSOLUTE NRBC 0.00 0.00 - 0.01 K/uL    NEUTROPHILS 66 32 - 75 %    LYMPHOCYTES 19 12 - 49 %    MONOCYTES 12 5 - 13 %    EOSINOPHILS 2 0 - 7 %    BASOPHILS 1 0 - 1 %    IMMATURE GRANULOCYTES 0 0 - 0.5 %    ABS. NEUTROPHILS 4.4 1.8 - 8.0 K/UL    ABS. LYMPHOCYTES 1.2 0.8 - 3.5 K/UL    ABS. MONOCYTES 0.8 0.0 - 1.0 K/UL    ABS. EOSINOPHILS 0.1 0.0 - 0.4 K/UL    ABS. BASOPHILS 0.0 0.0 - 0.1 K/UL    ABS. IMM. GRANS. 0.0 0.00 - 0.04 K/UL    DF AUTOMATED     TROPONIN-HIGH SENSITIVITY    Collection Time: 01/19/23 12:40 PM   Result Value Ref Range    Troponin-High Sensitivity 4 0 - 51 ng/L   NT-PRO BNP    Collection Time: 01/19/23 12:40 PM   Result Value Ref Range    NT pro-BNP 39 <632 pg/mL   METABOLIC PANEL, COMPREHENSIVE    Collection Time: 01/19/23 12:40 PM   Result Value Ref Range    Sodium 135 (L) 136 - 145 mmol/L    Potassium Hemolyzed, recollect requested 3.5 - 5.1 mmol/L    Chloride 110 (H) 97 - 108 mmol/L    CO2 21 21 - 32 mmol/L    Anion gap 4 (L) 5 - 15 mmol/L    Glucose 81 65 - 100 mg/dL    BUN 12 6 - 20 mg/dL    Creatinine 0.90 0.55 - 1.02 mg/dL    BUN/Creatinine ratio 13 12 - 20      eGFR >60 >60 ml/min/1.73m2    Calcium 8.7 8.5 - 10.1 mg/dL    Bilirubin, total 0.5 0.2 - 1.0 mg/dL    AST (SGOT) Hemolyzed, recollect requested 15 - 37 U/L    ALT (SGPT) Hemolyzed, recollect requested 12 - 78 U/L    Alk.  phosphatase 159 (H) 45 - 117 U/L    Protein, total 7.0 6.4 - 8.2 g/dL    Albumin 3.0 (L) 3.5 - 5.0 g/dL    Globulin 4.0 2.0 - 4.0 g/dL    A-G Ratio 0.8 (L) 1.1 - 2.2     MAGNESIUM    Collection Time: 01/19/23 12:40 PM   Result Value Ref Range    Magnesium Hemolyzed, recollect requested 1.6 - 2.4 mg/dL   ETHYL ALCOHOL    Collection Time: 01/19/23  4:26 PM   Result Value Ref Range    ALCOHOL(ETHYL),SERUM <10 <10 mg/dL   LACTIC ACID    Collection Time: 01/19/23 4:26 PM   Result Value Ref Range    Lactic acid 1.1 0.4 - 2.0 mmol/L           Radiologic Studies:  Non-plain film images such as CT, Ultrasound and MRI are read by the radiologist. Plain radiographic images are visualized and preliminarily interpreted by the ED Provider with the below findings:        Interpretation per the Radiologist below, if available at the time of this note:  XR CHEST SNGL V    Result Date: 1/19/2023  EXAM: XR CHEST SNGL V INDICATION: syncope COMPARISON: 1/14/2023. FINDINGS: A single view of the chest demonstrates persistent left basilar atelectasis. Lungs are otherwise clear. The cardiac and mediastinal contours and pulmonary vascularity are normal. The bones and soft tissues are within normal limits. Persistent left basilar atelectasis otherwise no acute abnormality. CT HEAD WO CONT    Result Date: 1/19/2023  EXAM: CT HEAD WO CONT INDICATION: syncope COMPARISON: 1/14/2023. CONTRAST: None. TECHNIQUE: Unenhanced CT of the head was performed using 5 mm images. Brain and bone windows were generated. Coronal and sagittal reformats. CT dose reduction was achieved through use of a standardized protocol tailored for this examination and automatic exposure control for dose modulation. FINDINGS: The ventricles and sulci are normal in size, shape and configuration. There is no significant white matter disease. There is no intracranial hemorrhage, extra-axial collection, or mass effect. The basilar cisterns are open. No CT evidence of acute infarct. The bone windows demonstrate no abnormalities. 4 minimal mucoperiosteal thickening in the right maxillary sinus, with a retention cyst along the medial wall. .     1. No acute intracranial abnormality. 2. Incidental right maxillary sinusitis. PROCEDURES   Unless otherwise noted below, none.   Performed by: Tanner Goldberg, MD   Procedures      CRITICAL CARE TIME        ED COURSE and DIFFERENTIAL DIAGNOSIS/MDM   Vitals:    Vitals:    01/19/23 1818 01/19/23 1830 01/19/23 1833 01/19/23 1835   BP:  116/73     Pulse: 83 84 82 82   Resp: 17 21 21 21   Temp:       SpO2: 96% 98% 97% 97%   Weight:       Height:            Patient was given the following medications:  Medications   sodium chloride 0.9 % bolus infusion 1,000 mL (0 mL IntraVENous IV Completed 1/19/23 1752)       CONSULTS: (Who and What was discussed)  None           CC/HPI Summary, DDx, ED Course, and Reassessment:   18-year-old female presents for persistent altered mental status following a syncopal event. Multiple recurrent syncopal events recently and was admitted from the 14th to the 15th for evaluation of the same. Differential includes metabolic etiologies including alcohol or drug use, medications, electrolyte imbalance, will evaluate with urine drug screen, EtOH, metabolic panel. Infectious etiologies considered if she is nontoxic on exam and has normal vitals. Will check UA for evidence of UTI. CT scan of the head to rule out intracranial mass or bleed. Patient is uncoordinated likely as result of her encephalopathy but will perform CTA of the head to rule out posterior circulation deficit             FINAL IMPRESSION     1. Altered mental status, unspecified altered mental status type          DISPOSITION/PLAN       Admit Note: Pt is being admitted by [facility]. The results of their tests and reason(s) for their admission have been discussed with pt and/or available family. They convey agreement and understanding for the need to be admitted and for the admission diagnosis. I am the Primary Clinician of Record: Marii Rodriguez MD (electronically signed)    (Please note that parts of this dictation were completed with voice recognition software. Quite often unanticipated grammatical, syntax, homophones, and other interpretive errors are inadvertently transcribed by the computer software. Please disregards these errors.  Please excuse any errors that have escaped final proofreading.)

## 2023-01-20 NOTE — PROGRESS NOTES
Dr. Yohana Torrez is aware that cenobamate and eslicarbazepine are not stocked at [facility]. The patient and/or family will need to provide these two medications.

## 2023-01-20 NOTE — PROGRESS NOTES
Hospitalist Progress Note            Daily Progress Note: 1/20/2023 7:43 AM  Hospital course:     History of present illness:   45 y.o. female with history of complex partial seizures difficult to control on treatment, migraine headaches, nonischemic cardiomyopathy, pulmonary embolism on anticoagulation, hx of partial colectomy that  presents to the emergency room with altered mental status. Per family she is not as responsive and at her baseline. Family states that she is usually oriented and ambulatory. She was discharged home 3 days ago. During that admission she had adjustment done to her seizure medications. CT head without contrast reveals no acute intracranial abnormality. CTA head and neck with and without contrast reveals no evidence of large vessel occlusion or significant flow-limiting stenosis Patient was admitted for management of altered mental status and seizures. Subjective:   Seen and examined at bedside. Patient reports that she has experienced generalized weakness, venous, \"dizzy spells\", headaches. She reports that she presented to the emergency department because apparently she passed out. Assessment/Plan:   Active Problems:    AMS (altered mental status) (1/3/2023)      Altered mental status (1/19/2023)    Altered mental status/acute encephalopathy  - Etiology unclear  --Urinalysis not showing any evidence of UTI  - Lab work did not show any significant metabolic abnormalities  - Potentially related to seizures  --Cranial nerve exam was difficult to assess secondary to patient inability or lack of participation  --Will probably need MRI of Brain. Will defer to Neuro to see if it is necessary.   -Urine drug screen positive for barbiturates. However patient has a history of being on Fioricet which contains butalbital  -- CT head without contrast reveals no acute intracranial abnormality.   CTA head and neck with and without contrast reveals no evidence of large vessel occlusion or significant flow-limiting stenosis  - Neurology consulted (Dr. Desiree Wells)    Complex partial seizures  - On multiple medications with adjustments  - We will keep her home medications that are nonformulary so she can take her home medications  --Neuro consulted    History of VTE  - On Eliquis 5 mg twice daily    Hypothyroidism  - Continue levothyroxine  - She does have a history of thyroid goiter being evaluated by surgical oncology at [facility] [place].   She is to follow-up with cardiac clearance      DVT Prophylaxis: Eliquis  Code Status: Full Code  POA/NOK:    Disposition and discharge barriers:   Neurology consulted recommendations, 24 to 48 hours  Care Plan discussed with: Patient, nursing, case management    Current Facility-Administered Medications   Medication Dose Route Frequency    nortriptyline (PAMELOR) capsule 100 mg  100 mg Oral PCD    levothyroxine (SYNTHROID) tablet 50 mcg  50 mcg Oral ACB    montelukast (SINGULAIR) tablet 10 mg  10 mg Oral DAILY    topiramate (TOPAMAX) tablet 100 mg  100 mg Oral BID    albuterol (PROVENTIL HFA, VENTOLIN HFA, PROAIR HFA) inhaler 2 Puff  2 Puff Inhalation Q4H PRN    latanoprost (XALATAN) 0.005 % ophthalmic solution 1 Drop  1 Drop Both Eyes QPM    lacosamide (VIMPAT) tablet 200 mg  200 mg Oral BID    apixaban (ELIQUIS) tablet 5 mg  5 mg Oral BID    nitroglycerin (NITROSTAT) tablet 0.4 mg  0.4 mg SubLINGual PRN    clonazePAM (KlonoPIN) tablet 1 mg  1 mg Oral QHS    cenobamate tab 200 mg  200 mg Oral DAILY    hydrOXYzine pamoate (VISTARIL) capsule 25 mg  25 mg Oral QHS PRN    levETIRAcetam (KEPPRA) tablet 750 mg  750 mg Oral BID    famotidine (PEPCID) tablet 40 mg  40 mg Oral DAILY    escitalopram oxalate (LEXAPRO) tablet 20 mg  20 mg Oral DAILY    eslicarbazepine tab 317 mg  800 mg Oral DAILY    budesonide-formoteroL (SYMBICORT) 160-4.5 mcg/actuation HFA inhaler 2 Puff  2 Puff Inhalation BID RT    And    tiotropium bromide (SPIRIVA RESPIMAT) 2.5 mcg /actuation  2 Puff Inhalation DAILY sodium chloride (NS) flush 5-40 mL  5-40 mL IntraVENous Q8H    sodium chloride (NS) flush 5-40 mL  5-40 mL IntraVENous PRN    ondansetron (ZOFRAN) injection 4 mg  4 mg IntraVENous Q6H PRN     Current Outpatient Medications   Medication Sig    hydrOXYzine pamoate (VISTARIL) 25 mg capsule Take 30 mg by mouth nightly as needed for Anxiety. SUMAtriptan (IMITREX) 6 mg/0.5 mL injection 6 mg by SubCUTAneous route daily as needed for Migraine. rizatriptan (MAXALT-MLT) 10 mg disintegrating tablet Take 10 mg by mouth daily as needed for Migraine. Trelegy Ellipta 200-62.5-25 mcg inhaler Take 1 Puff by inhalation daily. levETIRAcetam (KEPPRA XR) 750 mg ER tablet Take 1,500 mg by mouth daily. famotidine (PEPCID) 40 mg tablet Take 40 mg by mouth daily. Linzess 290 mcg cap capsule Take 290 mcg by mouth daily. escitalopram oxalate (LEXAPRO) 20 mg tablet Take 20 mg by mouth daily. cyanocobalamin (VITAMIN B12) 1,000 mcg/mL injection 1,000 mcg by IntraMUSCular route every month. ondansetron (ZOFRAN ODT) 4 mg disintegrating tablet Take 1 Tablet by mouth every eight (8) hours as needed for Nausea or Vomiting. OXcarbazepine (TRILEPTAL) 300 mg tablet Take 1 Tablet by mouth two (2) times a day. clonazePAM (KlonoPIN) 1 mg tablet Take 1 mg by mouth nightly.    ergocalciferol (ERGOCALCIFEROL) 1,250 mcg (50,000 unit) capsule Take 50,000 Units by mouth once. Every Friday    cenobamate (Xcopri) 200 mg tab Take 200 mg by mouth daily. eslicarbazepine (Aptiom) 800 mg tab tablet Take 800 mg by mouth daily. erenumab-aooe (Aimovig Autoinjector) 140 mg/mL injection 140 mg by SubCUTAneous route every thirty (30) days. nitroglycerin (NITROSTAT) 0.4 mg SL tablet DISSOLVE 1 TABLET UNDER THE TONGUE EVERY 5 MINUTES AS  NEEDED FOR CHEST PAIN. MAX  OF 3 TABLETS IN 15 MINUTES. CALL 911 IF PAIN PERSISTS. apixaban (ELIQUIS) 5 mg tablet Take 5 mg by mouth two (2) times a day.     lacosamide (Vimpat) 200 mg tab tablet Take 1 Tablet by mouth two (2) times a day. Max Daily Amount: 400 mg.    latanoprost (XALATAN) 0.005 % ophthalmic solution INSTILL 1 DROP INTO EACH EYE AT NIGHT    montelukast (SINGULAIR) 10 mg tablet Take 10 mg by mouth daily. nortriptyline (PAMELOR) 50 mg capsule Take 100 mg by mouth nightly. topiramate (TOPAMAX) 100 mg tablet Take 100 mg by mouth two (2) times a day. VENTOLIN HFA 90 mcg/actuation inhaler Take 2 Puffs by inhalation every four (4) hours as needed. levothyroxine (SYNTHROID) 50 mcg tablet Take 50 mcg by mouth Daily (before breakfast). Calcium-Cholecalciferol, D3, 600 mg-10 mcg (400 unit) cap Take 2 Tablets by mouth daily. REVIEW OF SYSTEMS    Review of Systems   Constitutional:  Positive for weight loss. Negative for diaphoresis, fever and malaise/fatigue. HENT:  Negative for hearing loss. Respiratory:  Negative for cough, shortness of breath and wheezing. Cardiovascular:  Negative for chest pain, palpitations and leg swelling. Gastrointestinal:  Negative for abdominal pain, diarrhea, nausea and vomiting. Genitourinary: Negative. Musculoskeletal:  Positive for myalgias. Skin: Negative. Neurological:  Positive for dizziness, weakness and headaches. Objective:     Visit Vitals  /75   Pulse 66   Temp 99.6 °F (37.6 °C)   Resp 9   Ht 5' 5\" (1.651 m)   Wt 80.3 kg (177 lb)   SpO2 99%   BMI 29.45 kg/m²      O2 Device: None (Room air)    Temp (24hrs), Av.9 °F (37.2 °C), Min:98.2 °F (36.8 °C), Max:99.6 °F (37.6 °C)        PHYSICAL EXAM:    Physical Exam  Vitals and nursing note reviewed. Constitutional:       General: She is not in acute distress. Appearance: Normal appearance. She is ill-appearing. Comments: Resting comfortably in bed. Patient is speaking and answering questions appropriately   HENT:      Head: Normocephalic and atraumatic.       Right Ear: External ear normal.      Left Ear: External ear normal.      Nose: Nose normal. Eyes:      Comments: Struggles to open eyes. Cardiovascular:      Rate and Rhythm: Normal rate and regular rhythm. Heart sounds: Normal heart sounds. No murmur heard. Pulmonary:      Effort: Pulmonary effort is normal. No respiratory distress. Breath sounds: No wheezing. Abdominal:      General: There is no distension. Tenderness: There is abdominal tenderness (epigastric tenderness). Musculoskeletal:         General: Normal range of motion. Cervical back: Normal range of motion. Comments: Move all but range of motion on R. Ride seems weaker than left   Skin:     General: Skin is warm and dry. Neurological:      Mental Status: She is alert. Cranial Nerves: Cranial nerve deficit present. Motor: Weakness (Notably on on R.side) present. Comments: Unable to asses CN 2,3,4,6 secondary to patient ability to open eyes at time of examination    Unable to assess CN 9,12 secondary to patient inability to open mouth wide enough to examine uvula. Tongue protrudes midline, but ROM seemed limited    CN 7 showed asymmetric smile. Elevated only on L. side    Cranial nerve 5,8,10 intact            Data Review    Recent Results (from the past 24 hour(s))   CBC WITH AUTOMATED DIFF    Collection Time: 01/19/23 12:40 PM   Result Value Ref Range    WBC 6.6 3.6 - 11.0 K/uL    RBC 3.83 3.80 - 5.20 M/uL    HGB 10.6 (L) 11.5 - 16.0 g/dL    HCT 33.7 (L) 35.0 - 47.0 %    MCV 88.0 80.0 - 99.0 FL    MCH 27.7 26.0 - 34.0 PG    MCHC 31.5 30.0 - 36.5 g/dL    RDW 15.6 (H) 11.5 - 14.5 %    PLATELET 078 919 - 905 K/uL    MPV 10.3 8.9 - 12.9 FL    NRBC 0.0 0.0  WBC    ABSOLUTE NRBC 0.00 0.00 - 0.01 K/uL    NEUTROPHILS 66 32 - 75 %    LYMPHOCYTES 19 12 - 49 %    MONOCYTES 12 5 - 13 %    EOSINOPHILS 2 0 - 7 %    BASOPHILS 1 0 - 1 %    IMMATURE GRANULOCYTES 0 0 - 0.5 %    ABS. NEUTROPHILS 4.4 1.8 - 8.0 K/UL    ABS. LYMPHOCYTES 1.2 0.8 - 3.5 K/UL    ABS. MONOCYTES 0.8 0.0 - 1.0 K/UL    ABS. EOSINOPHILS 0.1 0.0 - 0.4 K/UL    ABS. BASOPHILS 0.0 0.0 - 0.1 K/UL    ABS. IMM. GRANS. 0.0 0.00 - 0.04 K/UL    DF AUTOMATED     TROPONIN-HIGH SENSITIVITY    Collection Time: 01/19/23 12:40 PM   Result Value Ref Range    Troponin-High Sensitivity 4 0 - 51 ng/L   NT-PRO BNP    Collection Time: 01/19/23 12:40 PM   Result Value Ref Range    NT pro-BNP 39 <642 pg/mL   METABOLIC PANEL, COMPREHENSIVE    Collection Time: 01/19/23 12:40 PM   Result Value Ref Range    Sodium 135 (L) 136 - 145 mmol/L    Potassium Hemolyzed, recollect requested 3.5 - 5.1 mmol/L    Chloride 110 (H) 97 - 108 mmol/L    CO2 21 21 - 32 mmol/L    Anion gap 4 (L) 5 - 15 mmol/L    Glucose 81 65 - 100 mg/dL    BUN 12 6 - 20 mg/dL    Creatinine 0.90 0.55 - 1.02 mg/dL    BUN/Creatinine ratio 13 12 - 20      eGFR >60 >60 ml/min/1.73m2    Calcium 8.7 8.5 - 10.1 mg/dL    Bilirubin, total 0.5 0.2 - 1.0 mg/dL    AST (SGOT) Hemolyzed, recollect requested 15 - 37 U/L    ALT (SGPT) Hemolyzed, recollect requested 12 - 78 U/L    Alk.  phosphatase 159 (H) 45 - 117 U/L    Protein, total 7.0 6.4 - 8.2 g/dL    Albumin 3.0 (L) 3.5 - 5.0 g/dL    Globulin 4.0 2.0 - 4.0 g/dL    A-G Ratio 0.8 (L) 1.1 - 2.2     MAGNESIUM    Collection Time: 01/19/23 12:40 PM   Result Value Ref Range    Magnesium Hemolyzed, recollect requested 1.6 - 2.4 mg/dL   ETHYL ALCOHOL    Collection Time: 01/19/23  4:26 PM   Result Value Ref Range    ALCOHOL(ETHYL),SERUM <10 <10 mg/dL   LACTIC ACID    Collection Time: 01/19/23  4:26 PM   Result Value Ref Range    Lactic acid 1.1 0.4 - 2.0 mmol/L   URINALYSIS W/ REFLEX CULTURE    Collection Time: 01/19/23  6:30 PM    Specimen: Urine   Result Value Ref Range    Color Straw     Appearance Turbid Clear    Specific gravity 1.010 1.003 - 1.030      pH (UA) 6.0 5.0 - 8.0      Protein Negative Negative mg/dL    Glucose Negative Negative mg/dL    Ketone Negative Negative mg/dL    Bilirubin Negative Negative      Blood SMALL Negative    Urobilinogen 0.1 0.1 - 1.0 EU/dL    Nitrites Negative Negative      Leukocyte Esterase LARGE Negative    WBC 20-50 0 - 4 /hpf    RBC  0 - 5 /hpf    Epithelial cells Few Few /lpf    Bacteria Negative Negative /hpf    UA:UC IF INDICATED Urine Culture Ordered (A) Culture not indicated by UA result      Mucus Trace (A) Negative /lpf    Other: Renal Epithelial cells present     DRUG SCREEN, URINE    Collection Time: 01/19/23  6:30 PM   Result Value Ref Range    AMPHETAMINES Negative Negative      BARBITURATES Positive (A) Negative      BENZODIAZEPINES Negative Negative      COCAINE Negative Negative      METHADONE Negative Negative      OPIATES Negative Negative      PCP(PHENCYCLIDINE) Negative Negative      THC (TH-CANNABINOL) Negative Negative      Drug screen comment        This test is a screen for drugs of abuse in a medical setting only (i.e., they are unconfirmed results and as such must not be used for non-medical purposes, e.g.,employment testing, legal testing). Due to its inherent nature, false positive (FP) and false negative (FN) results may be obtained. Therefore, if necessary for medical care, recommend confirmation of positive findings by GC/MS. METABOLIC PANEL, COMPREHENSIVE    Collection Time: 01/19/23  7:31 PM   Result Value Ref Range    Sodium 141 136 - 145 mmol/L    Potassium 4.0 3.5 - 5.1 mmol/L    Chloride 114 (H) 97 - 108 mmol/L    CO2 21 21 - 32 mmol/L    Anion gap 6 5 - 15 mmol/L    Glucose 86 65 - 100 mg/dL    BUN 11 6 - 20 mg/dL    Creatinine 0.62 0.55 - 1.02 mg/dL    BUN/Creatinine ratio 18 12 - 20      eGFR >60 >60 ml/min/1.73m2    Calcium 8.5 8.5 - 10.1 mg/dL    Bilirubin, total 0.2 0.2 - 1.0 mg/dL    AST (SGOT) 39 (H) 15 - 37 U/L    ALT (SGPT) 70 12 - 78 U/L    Alk.  phosphatase 154 (H) 45 - 117 U/L    Protein, total 5.8 (L) 6.4 - 8.2 g/dL    Albumin 3.0 (L) 3.5 - 5.0 g/dL    Globulin 2.8 2.0 - 4.0 g/dL    A-G Ratio 1.1 1.1 - 2.2     TROPONIN-HIGH SENSITIVITY    Collection Time: 01/19/23  7:31 PM   Result Value Ref Range    Troponin-High Sensitivity 5 0 - 51 ng/L   RENAL FUNCTION PANEL    Collection Time: 01/20/23  6:26 AM   Result Value Ref Range    Sodium 142 136 - 145 mmol/L    Potassium 3.7 3.5 - 5.1 mmol/L    Chloride 113 (H) 97 - 108 mmol/L    CO2 22 21 - 32 mmol/L    Anion gap 7 5 - 15 mmol/L    Glucose 86 65 - 100 mg/dL    BUN 8 6 - 20 mg/dL    Creatinine 0.66 0.55 - 1.02 mg/dL    BUN/Creatinine ratio 12 12 - 20      eGFR >60 >60 ml/min/1.73m2    Calcium 8.6 8.5 - 10.1 mg/dL    Phosphorus 3.3 2.6 - 4.7 mg/dL    Albumin 2.9 (L) 3.5 - 5.0 g/dL   TSH 3RD GENERATION    Collection Time: 01/20/23  6:26 AM   Result Value Ref Range    TSH 0.48 0.36 - 3.74 uIU/mL   C REACTIVE PROTEIN, QT    Collection Time: 01/20/23  6:26 AM   Result Value Ref Range    C-Reactive protein 2.39 (H) 0.00 - 0.60 mg/dL   SED RATE (ESR)    Collection Time: 01/20/23  6:26 AM   Result Value Ref Range    Sed rate, automated 22 0 - 30 mm/hr       CTA HEAD NECK W WO CONT   Final Result   1. No evidence of large vessel occlusion or significant flow-limiting stenosis. XR CHEST SNGL V   Final Result   Persistent left basilar atelectasis otherwise no acute abnormality. CT HEAD WO CONT   Final Result   1. No acute intracranial abnormality. 2. Incidental right maxillary sinusitis. Intake and Output:  Current Shift: No intake/output data recorded.   Last three shifts: 01/18 1901 - 01/20 0700  In: 1000 [I.V.:1000]  Out: -       Lab/Data Review:  Recent Labs     01/19/23  1240   WBC 6.6   HGB 10.6*   HCT 33.7*        Recent Labs     01/20/23  0626 01/19/23  1931 01/19/23  1240    141 135*   K 3.7 4.0 Hemolyzed, recollect requested   * 114* 110*   CO2 22 21 21   GLU 86 86 81   BUN 8 11 12   CREA 0.66 0.62 0.90   CA 8.6 8.5 8.7   MG  --   --  Hemolyzed, recollect requested   PHOS 3.3  --   --    ALB 2.9* 3.0* 3.0*   TBILI  --  0.2 0.5   ALT  --  70 Hemolyzed, recollect requested     No results for input(s): PH, PCO2, PO2, HCO3, FIO2 in the last 72 hours. Recent Results (from the past 24 hour(s))   CBC WITH AUTOMATED DIFF    Collection Time: 01/19/23 12:40 PM   Result Value Ref Range    WBC 6.6 3.6 - 11.0 K/uL    RBC 3.83 3.80 - 5.20 M/uL    HGB 10.6 (L) 11.5 - 16.0 g/dL    HCT 33.7 (L) 35.0 - 47.0 %    MCV 88.0 80.0 - 99.0 FL    MCH 27.7 26.0 - 34.0 PG    MCHC 31.5 30.0 - 36.5 g/dL    RDW 15.6 (H) 11.5 - 14.5 %    PLATELET 290 985 - 171 K/uL    MPV 10.3 8.9 - 12.9 FL    NRBC 0.0 0.0  WBC    ABSOLUTE NRBC 0.00 0.00 - 0.01 K/uL    NEUTROPHILS 66 32 - 75 %    LYMPHOCYTES 19 12 - 49 %    MONOCYTES 12 5 - 13 %    EOSINOPHILS 2 0 - 7 %    BASOPHILS 1 0 - 1 %    IMMATURE GRANULOCYTES 0 0 - 0.5 %    ABS. NEUTROPHILS 4.4 1.8 - 8.0 K/UL    ABS. LYMPHOCYTES 1.2 0.8 - 3.5 K/UL    ABS. MONOCYTES 0.8 0.0 - 1.0 K/UL    ABS. EOSINOPHILS 0.1 0.0 - 0.4 K/UL    ABS. BASOPHILS 0.0 0.0 - 0.1 K/UL    ABS. IMM. GRANS. 0.0 0.00 - 0.04 K/UL    DF AUTOMATED     TROPONIN-HIGH SENSITIVITY    Collection Time: 01/19/23 12:40 PM   Result Value Ref Range    Troponin-High Sensitivity 4 0 - 51 ng/L   NT-PRO BNP    Collection Time: 01/19/23 12:40 PM   Result Value Ref Range    NT pro-BNP 39 <416 pg/mL   METABOLIC PANEL, COMPREHENSIVE    Collection Time: 01/19/23 12:40 PM   Result Value Ref Range    Sodium 135 (L) 136 - 145 mmol/L    Potassium Hemolyzed, recollect requested 3.5 - 5.1 mmol/L    Chloride 110 (H) 97 - 108 mmol/L    CO2 21 21 - 32 mmol/L    Anion gap 4 (L) 5 - 15 mmol/L    Glucose 81 65 - 100 mg/dL    BUN 12 6 - 20 mg/dL    Creatinine 0.90 0.55 - 1.02 mg/dL    BUN/Creatinine ratio 13 12 - 20      eGFR >60 >60 ml/min/1.73m2    Calcium 8.7 8.5 - 10.1 mg/dL    Bilirubin, total 0.5 0.2 - 1.0 mg/dL    AST (SGOT) Hemolyzed, recollect requested 15 - 37 U/L    ALT (SGPT) Hemolyzed, recollect requested 12 - 78 U/L    Alk.  phosphatase 159 (H) 45 - 117 U/L    Protein, total 7.0 6.4 - 8.2 g/dL    Albumin 3.0 (L) 3.5 - 5.0 g/dL    Globulin 4.0 2.0 - 4.0 g/dL    A-G Ratio 0.8 (L) 1.1 - 2.2     MAGNESIUM    Collection Time: 01/19/23 12:40 PM   Result Value Ref Range    Magnesium Hemolyzed, recollect requested 1.6 - 2.4 mg/dL   ETHYL ALCOHOL    Collection Time: 01/19/23  4:26 PM   Result Value Ref Range    ALCOHOL(ETHYL),SERUM <10 <10 mg/dL   LACTIC ACID    Collection Time: 01/19/23  4:26 PM   Result Value Ref Range    Lactic acid 1.1 0.4 - 2.0 mmol/L   URINALYSIS W/ REFLEX CULTURE    Collection Time: 01/19/23  6:30 PM    Specimen: Urine   Result Value Ref Range    Color Straw     Appearance Turbid Clear    Specific gravity 1.010 1.003 - 1.030      pH (UA) 6.0 5.0 - 8.0      Protein Negative Negative mg/dL    Glucose Negative Negative mg/dL    Ketone Negative Negative mg/dL    Bilirubin Negative Negative      Blood SMALL Negative    Urobilinogen 0.1 0.1 - 1.0 EU/dL    Nitrites Negative Negative      Leukocyte Esterase LARGE Negative    WBC 20-50 0 - 4 /hpf    RBC  0 - 5 /hpf    Epithelial cells Few Few /lpf    Bacteria Negative Negative /hpf    UA:UC IF INDICATED Urine Culture Ordered (A) Culture not indicated by UA result      Mucus Trace (A) Negative /lpf    Other: Renal Epithelial cells present     DRUG SCREEN, URINE    Collection Time: 01/19/23  6:30 PM   Result Value Ref Range    AMPHETAMINES Negative Negative      BARBITURATES Positive (A) Negative      BENZODIAZEPINES Negative Negative      COCAINE Negative Negative      METHADONE Negative Negative      OPIATES Negative Negative      PCP(PHENCYCLIDINE) Negative Negative      THC (TH-CANNABINOL) Negative Negative      Drug screen comment        This test is a screen for drugs of abuse in a medical setting only (i.e., they are unconfirmed results and as such must not be used for non-medical purposes, e.g.,employment testing, legal testing). Due to its inherent nature, false positive (FP) and false negative (FN) results may be obtained.  Therefore, if necessary for medical care, recommend confirmation of positive findings by GC/MS. METABOLIC PANEL, COMPREHENSIVE    Collection Time: 01/19/23  7:31 PM   Result Value Ref Range    Sodium 141 136 - 145 mmol/L    Potassium 4.0 3.5 - 5.1 mmol/L    Chloride 114 (H) 97 - 108 mmol/L    CO2 21 21 - 32 mmol/L    Anion gap 6 5 - 15 mmol/L    Glucose 86 65 - 100 mg/dL    BUN 11 6 - 20 mg/dL    Creatinine 0.62 0.55 - 1.02 mg/dL    BUN/Creatinine ratio 18 12 - 20      eGFR >60 >60 ml/min/1.73m2    Calcium 8.5 8.5 - 10.1 mg/dL    Bilirubin, total 0.2 0.2 - 1.0 mg/dL    AST (SGOT) 39 (H) 15 - 37 U/L    ALT (SGPT) 70 12 - 78 U/L    Alk.  phosphatase 154 (H) 45 - 117 U/L    Protein, total 5.8 (L) 6.4 - 8.2 g/dL    Albumin 3.0 (L) 3.5 - 5.0 g/dL    Globulin 2.8 2.0 - 4.0 g/dL    A-G Ratio 1.1 1.1 - 2.2     TROPONIN-HIGH SENSITIVITY    Collection Time: 01/19/23  7:31 PM   Result Value Ref Range    Troponin-High Sensitivity 5 0 - 51 ng/L   RENAL FUNCTION PANEL    Collection Time: 01/20/23  6:26 AM   Result Value Ref Range    Sodium 142 136 - 145 mmol/L    Potassium 3.7 3.5 - 5.1 mmol/L    Chloride 113 (H) 97 - 108 mmol/L    CO2 22 21 - 32 mmol/L    Anion gap 7 5 - 15 mmol/L    Glucose 86 65 - 100 mg/dL    BUN 8 6 - 20 mg/dL    Creatinine 0.66 0.55 - 1.02 mg/dL    BUN/Creatinine ratio 12 12 - 20      eGFR >60 >60 ml/min/1.73m2    Calcium 8.6 8.5 - 10.1 mg/dL    Phosphorus 3.3 2.6 - 4.7 mg/dL    Albumin 2.9 (L) 3.5 - 5.0 g/dL   TSH 3RD GENERATION    Collection Time: 01/20/23  6:26 AM   Result Value Ref Range    TSH 0.48 0.36 - 3.74 uIU/mL   C REACTIVE PROTEIN, QT    Collection Time: 01/20/23  6:26 AM   Result Value Ref Range    C-Reactive protein 2.39 (H) 0.00 - 0.60 mg/dL   SED RATE (ESR)    Collection Time: 01/20/23  6:26 AM   Result Value Ref Range    Sed rate, automated 22 0 - 30 mm/hr           _____________________________________________________________________________  Time spent in direct care including coordination of service, review of data and examination: > 35 minutes    ______________________________________________________________________________    Ether MAYE Crane    This is dictation was done by dragon, computer voice recognition software. Quite often unanticipated grammatical, syntax, homophones and other interpretive errors or inadvertently transcribed by the computer software. Please excuse errors that have escaped final proofreading. Thank you.

## 2023-01-20 NOTE — ED NOTES
Spoke to [facility] on 2E and notified of handoff report being tubed. Report sent at [phone].

## 2023-01-20 NOTE — ED NOTES
PT. SOILED SELF. LINENS CHANGED AT THIS TIME AND PT. MADE COMFORTABLE. PT. DENIES ANY NEEDS AT THIS TIME.

## 2023-01-20 NOTE — PROGRESS NOTES
Medicare Outpatient Observation Notice (MOON)/ [place] Outpatient Observation Notice (Hema Christensen) provided to patient/representative with verbal explanation of the notice. Time allotted for questions regarding the notice. Patient /representative provided a completed copy of the MOON/VOON notice. Copy placed on bedside chart.

## 2023-01-20 NOTE — CONSULTS
Consult Date: 1/20/2023    Consults Ms. Hilda Gómez is a 64-year-old woman with past medical history of migraines and episodes of loss of consciousness being treated for seizures with multiple medications including Vimpat, Keppra, Xcopri, aptiom. She has had multiple hospitalizations where she has been having alteration or loss of consciousness and then returns back to baseline. She was brought in yesterday because her daughter tells me that she was falling which started with sweating in her hands and shaking, followed by lethargy and then falls. Patient does not remember these events. She is back to baseline now. There are no focal neurological deficits. CT scan unrevealing.     Subjective     Past Medical History:   Diagnosis Date    Arthritis     pt states knees and back    Asthma     Cardiomyopathy ([address] 2005    EF 35%, echo in 2021 normal EF     Colon polyp     Diabetes ([address]     Pt states not diabetic but has all the symptoms of it    Dizzy spells     GERD (gastroesophageal reflux disease)     Heart failure ([address] 2005    now combined systolic, diastolic, seen at [facility], [facility] 2021- Dr Emmanuel Nguyen    Irritable bowel syndrome     Knee pain, bilateral     Migraines 2011    2-3 week    Neuropathy     pt states of both feet    Pulmonary embolism ([address]     August 2022    Seizures ([address]     petite mals last seizure 2-3 months ago    Thyroid disease     hypothyroid      Past Surgical History:   Procedure Laterality Date    COLONOSCOPY N/A 11/28/2022    COLONOSCOPY performed by Kuldip Maciel MD at [address]  05/2006    HX COLONOSCOPY      HX ENDOSCOPY      HX GASTRIC BYPASS  2013    HX HYSTERECTOMY  2007    HX KNEE ARTHROSCOPY Right 05/2021    HX ORTHOPAEDIC Bilateral 2013    CTR    HX ORTHOPAEDIC      right knee procedure 2021    HX OTHER SURGICAL      left axillary lymph node biopsy    HX SMALL BOWEL RESECTION      CA APPENDECTOMY      CA COLECTOMY PARTIAL W/ANASTOMOSIS AL ENTERECTOMY RESCJ SMALL INTESTINE W/ENTEROSTOMY      AL LAPAROSCOPY SURG CHOLECYSTECTOMY      AL UNLISTED PROCEDURE ABDOMEN PERITONEUM & OMENTUM  2008    cholecystectomy    AL UNLISTED PROCEDURE CARDIAC SURGERY      Loop recorder implanted july 2020    AL UNLISTED PROCEDURE STOMACH       Family History   Problem Relation Age of Onset    Hypertension Mother     Heart Disease Mother     Cancer Father         stomach cancer    Hypertension Father     No Known Problems Brother     Asthma Daughter     Asthma Son     Asthma Son     Breast Cancer Paternal Aunt         age unknown      Social History     Tobacco Use    Smoking status: Never    Smokeless tobacco: Never   Substance Use Topics    Alcohol use: No       Current Facility-Administered Medications   Medication Dose Route Frequency Provider Last Rate Last Admin    nortriptyline (PAMELOR) capsule 100 mg  100 mg Oral PCD Yolette Gillespie MD        levothyroxine (SYNTHROID) tablet 50 mcg  50 mcg Oral ACB Yolette Gillespie MD   50 mcg at 01/20/23 0736    montelukast (SINGULAIR) tablet 10 mg  10 mg Oral DAILY Yolette Gillespie MD   10 mg at 01/20/23 0855    topiramate (TOPAMAX) tablet 100 mg  100 mg Oral BID Yolette Gillespie MD   100 mg at 01/20/23 0855    albuterol (PROVENTIL HFA, VENTOLIN HFA, PROAIR HFA) inhaler 2 Puff  2 Puff Inhalation Q4H PRN Yolette Gillespie MD        latanoprost (XALATAN) 0.005 % ophthalmic solution 1 Drop  1 Drop Both Eyes QPM Yolette Gillespie MD   1 Drop at 01/20/23 0236    lacosamide (VIMPAT) tablet 200 mg  200 mg Oral BID Yolette Gillespie MD   200 mg at 01/20/23 0855    apixaban (ELIQUIS) tablet 5 mg  5 mg Oral BID Yolette Gillespie MD   5 mg at 01/20/23 0853    nitroglycerin (NITROSTAT) tablet 0.4 mg  0.4 mg SubLINGual PRN Yolette Gillespie MD        clonazePAM (KlonoPIN) tablet 1 mg  1 mg Oral QHS Yolette Gillespie MD   1 mg at 01/20/23 0236    cenobamate tab 200 mg  200 mg Oral DAILY Kristan Argueta MD        hydrOXYzine pamoate (VISTARIL) capsule 25 mg  25 mg Oral QHS PRN Kristan Argueta MD        levETIRAcetam (KEPPRA) tablet 750 mg  750 mg Oral BID Kristan Argueta MD   750 mg at 01/20/23 0855    famotidine (PEPCID) tablet 40 mg  40 mg Oral DAILY Kristan Argueta MD   40 mg at 01/20/23 0854    escitalopram oxalate (LEXAPRO) tablet 20 mg  20 mg Oral DAILY Kristan Argueta MD   20 mg at 14/96/33 7882    eslicarbazepine tab 418 mg  800 mg Oral DAILY Kristan Argueta MD        budesonide-formoteroL (SYMBICORT) 160-4.5 mcg/actuation HFA inhaler 2 Puff  2 Puff Inhalation BID RT Kristan Argueta MD   2 Puff at 01/20/23 0910    And    tiotropium bromide (SPIRIVA RESPIMAT) 2.5 mcg /actuation  2 Puff Inhalation DAILY Kristan Argueta MD   2 Puff at 01/20/23 0910    sodium chloride (NS) flush 5-40 mL  5-40 mL IntraVENous Q8H Kristan Argueta MD        sodium chloride (NS) flush 5-40 mL  5-40 mL IntraVENous PRN Kristan Argueta MD        ondansetron (ZOFRAN) injection 4 mg  4 mg IntraVENous Q6H PRN Kristan Argueta MD         Current Outpatient Medications   Medication Sig Dispense Refill    hydrOXYzine pamoate (VISTARIL) 25 mg capsule Take 30 mg by mouth nightly as needed for Anxiety. SUMAtriptan (IMITREX) 6 mg/0.5 mL injection 6 mg by SubCUTAneous route daily as needed for Migraine. rizatriptan (MAXALT-MLT) 10 mg disintegrating tablet Take 10 mg by mouth daily as needed for Migraine. Trelegy Ellipta 200-62.5-25 mcg inhaler Take 1 Puff by inhalation daily. levETIRAcetam (KEPPRA XR) 750 mg ER tablet Take 1,500 mg by mouth daily. famotidine (PEPCID) 40 mg tablet Take 40 mg by mouth daily. Linzess 290 mcg cap capsule Take 290 mcg by mouth daily. escitalopram oxalate (LEXAPRO) 20 mg tablet Take 20 mg by mouth daily.       cyanocobalamin (VITAMIN B12) 1,000 mcg/mL injection 1,000 mcg by IntraMUSCular route every month. ondansetron (ZOFRAN ODT) 4 mg disintegrating tablet Take 1 Tablet by mouth every eight (8) hours as needed for Nausea or Vomiting. 20 Tablet 0    OXcarbazepine (TRILEPTAL) 300 mg tablet Take 1 Tablet by mouth two (2) times a day. 60 Tablet 0    clonazePAM (KlonoPIN) 1 mg tablet Take 1 mg by mouth nightly.      ergocalciferol (ERGOCALCIFEROL) 1,250 mcg (50,000 unit) capsule Take 50,000 Units by mouth once. Every Friday      cenobamate (Xcopri) 200 mg tab Take 200 mg by mouth daily. eslicarbazepine (Aptiom) 800 mg tab tablet Take 800 mg by mouth daily. erenumab-aooe (Aimovig Autoinjector) 140 mg/mL injection 140 mg by SubCUTAneous route every thirty (30) days. nitroglycerin (NITROSTAT) 0.4 mg SL tablet DISSOLVE 1 TABLET UNDER THE TONGUE EVERY 5 MINUTES AS  NEEDED FOR CHEST PAIN. MAX  OF 3 TABLETS IN 15 MINUTES. CALL 911 IF PAIN PERSISTS. 30 Tablet 5    apixaban (ELIQUIS) 5 mg tablet Take 5 mg by mouth two (2) times a day. lacosamide (Vimpat) 200 mg tab tablet Take 1 Tablet by mouth two (2) times a day. Max Daily Amount: 400 mg. 30 Tablet 0    latanoprost (XALATAN) 0.005 % ophthalmic solution INSTILL 1 DROP INTO EACH EYE AT NIGHT      montelukast (SINGULAIR) 10 mg tablet Take 10 mg by mouth daily. 3    nortriptyline (PAMELOR) 50 mg capsule Take 100 mg by mouth nightly. 5    topiramate (TOPAMAX) 100 mg tablet Take 100 mg by mouth two (2) times a day.  5    VENTOLIN HFA 90 mcg/actuation inhaler Take 2 Puffs by inhalation every four (4) hours as needed. 4    levothyroxine (SYNTHROID) 50 mcg tablet Take 50 mcg by mouth Daily (before breakfast). Calcium-Cholecalciferol, D3, 600 mg-10 mcg (400 unit) cap Take 2 Tablets by mouth daily. Review of Systems   All other systems reviewed and are negative.     Objective     Vital signs for last 24 hours:  Visit Vitals  /77   Pulse 74   Temp 99.6 °F (37.6 °C)   Resp 9   Ht 5' 5\" (1.651 m)   Wt 80.3 kg (177 lb)   SpO2 94%   BMI 29.45 kg/m²       Intake/Output this shift:  Current Shift: No intake/output data recorded. Last 3 Shifts: 01/18 1901 - 01/20 0700  In: 1000 [I.V.:1000]  Out: -     Data Review:   Recent Results (from the past 24 hour(s))   CBC WITH AUTOMATED DIFF    Collection Time: 01/19/23 12:40 PM   Result Value Ref Range    WBC 6.6 3.6 - 11.0 K/uL    RBC 3.83 3.80 - 5.20 M/uL    HGB 10.6 (L) 11.5 - 16.0 g/dL    HCT 33.7 (L) 35.0 - 47.0 %    MCV 88.0 80.0 - 99.0 FL    MCH 27.7 26.0 - 34.0 PG    MCHC 31.5 30.0 - 36.5 g/dL    RDW 15.6 (H) 11.5 - 14.5 %    PLATELET 514 623 - 288 K/uL    MPV 10.3 8.9 - 12.9 FL    NRBC 0.0 0.0  WBC    ABSOLUTE NRBC 0.00 0.00 - 0.01 K/uL    NEUTROPHILS 66 32 - 75 %    LYMPHOCYTES 19 12 - 49 %    MONOCYTES 12 5 - 13 %    EOSINOPHILS 2 0 - 7 %    BASOPHILS 1 0 - 1 %    IMMATURE GRANULOCYTES 0 0 - 0.5 %    ABS. NEUTROPHILS 4.4 1.8 - 8.0 K/UL    ABS. LYMPHOCYTES 1.2 0.8 - 3.5 K/UL    ABS. MONOCYTES 0.8 0.0 - 1.0 K/UL    ABS. EOSINOPHILS 0.1 0.0 - 0.4 K/UL    ABS. BASOPHILS 0.0 0.0 - 0.1 K/UL    ABS. IMM.  GRANS. 0.0 0.00 - 0.04 K/UL    DF AUTOMATED     TROPONIN-HIGH SENSITIVITY    Collection Time: 01/19/23 12:40 PM   Result Value Ref Range    Troponin-High Sensitivity 4 0 - 51 ng/L   NT-PRO BNP    Collection Time: 01/19/23 12:40 PM   Result Value Ref Range    NT pro-BNP 39 <603 pg/mL   METABOLIC PANEL, COMPREHENSIVE    Collection Time: 01/19/23 12:40 PM   Result Value Ref Range    Sodium 135 (L) 136 - 145 mmol/L    Potassium Hemolyzed, recollect requested 3.5 - 5.1 mmol/L    Chloride 110 (H) 97 - 108 mmol/L    CO2 21 21 - 32 mmol/L    Anion gap 4 (L) 5 - 15 mmol/L    Glucose 81 65 - 100 mg/dL    BUN 12 6 - 20 mg/dL    Creatinine 0.90 0.55 - 1.02 mg/dL    BUN/Creatinine ratio 13 12 - 20      eGFR >60 >60 ml/min/1.73m2    Calcium 8.7 8.5 - 10.1 mg/dL    Bilirubin, total 0.5 0.2 - 1.0 mg/dL    AST (SGOT) Hemolyzed, recollect requested 15 - 37 U/L    ALT (SGPT) Hemolyzed, recollect requested 12 - 78 U/L    Alk. phosphatase 159 (H) 45 - 117 U/L    Protein, total 7.0 6.4 - 8.2 g/dL    Albumin 3.0 (L) 3.5 - 5.0 g/dL    Globulin 4.0 2.0 - 4.0 g/dL    A-G Ratio 0.8 (L) 1.1 - 2.2     MAGNESIUM    Collection Time: 01/19/23 12:40 PM   Result Value Ref Range    Magnesium Hemolyzed, recollect requested 1.6 - 2.4 mg/dL   ETHYL ALCOHOL    Collection Time: 01/19/23  4:26 PM   Result Value Ref Range    ALCOHOL(ETHYL),SERUM <10 <10 mg/dL   LACTIC ACID    Collection Time: 01/19/23  4:26 PM   Result Value Ref Range    Lactic acid 1.1 0.4 - 2.0 mmol/L   URINALYSIS W/ REFLEX CULTURE    Collection Time: 01/19/23  6:30 PM    Specimen: Urine   Result Value Ref Range    Color Straw     Appearance Turbid Clear    Specific gravity 1.010 1.003 - 1.030      pH (UA) 6.0 5.0 - 8.0      Protein Negative Negative mg/dL    Glucose Negative Negative mg/dL    Ketone Negative Negative mg/dL    Bilirubin Negative Negative      Blood SMALL Negative    Urobilinogen 0.1 0.1 - 1.0 EU/dL    Nitrites Negative Negative      Leukocyte Esterase LARGE Negative    WBC 20-50 0 - 4 /hpf    RBC  0 - 5 /hpf    Epithelial cells Few Few /lpf    Bacteria Negative Negative /hpf    UA:UC IF INDICATED Urine Culture Ordered (A) Culture not indicated by UA result      Mucus Trace (A) Negative /lpf    Other: Renal Epithelial cells present     DRUG SCREEN, URINE    Collection Time: 01/19/23  6:30 PM   Result Value Ref Range    AMPHETAMINES Negative Negative      BARBITURATES Positive (A) Negative      BENZODIAZEPINES Negative Negative      COCAINE Negative Negative      METHADONE Negative Negative      OPIATES Negative Negative      PCP(PHENCYCLIDINE) Negative Negative      THC (TH-CANNABINOL) Negative Negative      Drug screen comment        This test is a screen for drugs of abuse in a medical setting only (i.e., they are unconfirmed results and as such must not be used for non-medical purposes, e.g.,employment testing, legal testing).  Due to its inherent nature, false positive (FP) and false negative (FN) results may be obtained. Therefore, if necessary for medical care, recommend confirmation of positive findings by GC/MS. METABOLIC PANEL, COMPREHENSIVE    Collection Time: 01/19/23  7:31 PM   Result Value Ref Range    Sodium 141 136 - 145 mmol/L    Potassium 4.0 3.5 - 5.1 mmol/L    Chloride 114 (H) 97 - 108 mmol/L    CO2 21 21 - 32 mmol/L    Anion gap 6 5 - 15 mmol/L    Glucose 86 65 - 100 mg/dL    BUN 11 6 - 20 mg/dL    Creatinine 0.62 0.55 - 1.02 mg/dL    BUN/Creatinine ratio 18 12 - 20      eGFR >60 >60 ml/min/1.73m2    Calcium 8.5 8.5 - 10.1 mg/dL    Bilirubin, total 0.2 0.2 - 1.0 mg/dL    AST (SGOT) 39 (H) 15 - 37 U/L    ALT (SGPT) 70 12 - 78 U/L    Alk.  phosphatase 154 (H) 45 - 117 U/L    Protein, total 5.8 (L) 6.4 - 8.2 g/dL    Albumin 3.0 (L) 3.5 - 5.0 g/dL    Globulin 2.8 2.0 - 4.0 g/dL    A-G Ratio 1.1 1.1 - 2.2     TROPONIN-HIGH SENSITIVITY    Collection Time: 01/19/23  7:31 PM   Result Value Ref Range    Troponin-High Sensitivity 5 0 - 51 ng/L   RENAL FUNCTION PANEL    Collection Time: 01/20/23  6:26 AM   Result Value Ref Range    Sodium 142 136 - 145 mmol/L    Potassium 3.7 3.5 - 5.1 mmol/L    Chloride 113 (H) 97 - 108 mmol/L    CO2 22 21 - 32 mmol/L    Anion gap 7 5 - 15 mmol/L    Glucose 86 65 - 100 mg/dL    BUN 8 6 - 20 mg/dL    Creatinine 0.66 0.55 - 1.02 mg/dL    BUN/Creatinine ratio 12 12 - 20      eGFR >60 >60 ml/min/1.73m2    Calcium 8.6 8.5 - 10.1 mg/dL    Phosphorus 3.3 2.6 - 4.7 mg/dL    Albumin 2.9 (L) 3.5 - 5.0 g/dL   TSH 3RD GENERATION    Collection Time: 01/20/23  6:26 AM   Result Value Ref Range    TSH 0.48 0.36 - 3.74 uIU/mL   C REACTIVE PROTEIN, QT    Collection Time: 01/20/23  6:26 AM   Result Value Ref Range    C-Reactive protein 2.39 (H) 0.00 - 0.60 mg/dL   SED RATE (ESR)    Collection Time: 01/20/23  6:26 AM   Result Value Ref Range    Sed rate, automated 22 0 - 30 mm/hr       Physical Exam    Neuro Physical Exam      General: Well developed, well nourished. Patient in no apparent distress. Cardiac: Regular rate and rhythm     Neurological Exam:  Mental Status: Awake alert oriented x4, normal speech   Cranial Nerves:   Intact visual fields. PERRL, EOM's full, no nystagmus, no ptosis. Facial sensation is normal. Facial movement is symmetric. Palate is midline. Normal sternocleidomastoid strength. Tongue is midline. Hearing is intact bilaterally. Motor:  5/5 strength in upper and lower proximal and distal muscles. Some 4-5 giveaway weakness in right upper extremity and biceps and triceps. Reflexes:   Deep tendon reflexes 2+/4 and symmetrical.   Sensory:   Normal to light touch throughout. Gait:  Deferred   Tremor:   No tremor noted. Cerebellar:  No cerebellar signs present. Babinski:      Down bilaterally    Assessment and plan: Ms. Dmitry Vora is a 61-year-old woman with past medical history of migraines and episodes of alteration in consciousness. She is on multiple medications for seizures as listed in HPI. I am suspicious that it is likely side effects of these medications that is causing her issues.    -We will do an MRI of the brain without contrast to rule out stroke. -We will cut down Vimpat to 100 mg twice daily, stop clonazepam which she takes for sleep. Will follow clinically.

## 2023-01-20 NOTE — ED NOTES
Confirmed with lab that they have the urine on the patient and will start running it. Dr. Estela Cramer updated.

## 2023-01-21 VITALS
BODY MASS INDEX: 29.49 KG/M2 | WEIGHT: 177 LBS | SYSTOLIC BLOOD PRESSURE: 109 MMHG | HEIGHT: 65 IN | RESPIRATION RATE: 18 BRPM | TEMPERATURE: 97.7 F | OXYGEN SATURATION: 96 % | HEART RATE: 90 BPM | DIASTOLIC BLOOD PRESSURE: 71 MMHG

## 2023-01-21 LAB
ANION GAP SERPL CALC-SCNC: 5 MMOL/L (ref 5–15)
BUN SERPL-MCNC: 7 MG/DL (ref 6–20)
BUN/CREAT SERPL: 11 (ref 12–20)
CA-I BLD-MCNC: 8.5 MG/DL (ref 8.5–10.1)
CHLORIDE SERPL-SCNC: 114 MMOL/L (ref 97–108)
CO2 SERPL-SCNC: 23 MMOL/L (ref 21–32)
CREAT SERPL-MCNC: 0.63 MG/DL (ref 0.55–1.02)
ERYTHROCYTE [DISTWIDTH] IN BLOOD BY AUTOMATED COUNT: 15.2 % (ref 11.5–14.5)
GLUCOSE BLD STRIP.AUTO-MCNC: 83 MG/DL (ref 65–100)
GLUCOSE BLD STRIP.AUTO-MCNC: 86 MG/DL (ref 65–100)
GLUCOSE BLD STRIP.AUTO-MCNC: 96 MG/DL (ref 65–100)
GLUCOSE SERPL-MCNC: 90 MG/DL (ref 65–100)
HCT VFR BLD AUTO: 34.3 % (ref 35–47)
HGB BLD-MCNC: 11 G/DL (ref 11.5–16)
MCH RBC QN AUTO: 27.3 PG (ref 26–34)
MCHC RBC AUTO-ENTMCNC: 32.1 G/DL (ref 30–36.5)
MCV RBC AUTO: 85.1 FL (ref 80–99)
NRBC # BLD: 0 K/UL (ref 0–0.01)
NRBC BLD-RTO: 0 PER 100 WBC
PERFORMED BY, TECHID: NORMAL
PLATELET # BLD AUTO: 296 K/UL (ref 150–400)
PMV BLD AUTO: 9.7 FL (ref 8.9–12.9)
POTASSIUM SERPL-SCNC: 3.6 MMOL/L (ref 3.5–5.1)
RBC # BLD AUTO: 4.03 M/UL (ref 3.8–5.2)
SODIUM SERPL-SCNC: 142 MMOL/L (ref 136–145)
WBC # BLD AUTO: 3.9 K/UL (ref 3.6–11)

## 2023-01-21 PROCEDURE — 94640 AIRWAY INHALATION TREATMENT: CPT

## 2023-01-21 PROCEDURE — 36415 COLL VENOUS BLD VENIPUNCTURE: CPT

## 2023-01-21 PROCEDURE — G0378 HOSPITAL OBSERVATION PER HR: HCPCS

## 2023-01-21 PROCEDURE — 74011250637 HC RX REV CODE- 250/637: Performed by: HOSPITALIST

## 2023-01-21 PROCEDURE — 74011000250 HC RX REV CODE- 250: Performed by: HOSPITALIST

## 2023-01-21 PROCEDURE — 96372 THER/PROPH/DIAG INJ SC/IM: CPT

## 2023-01-21 PROCEDURE — 74011636637 HC RX REV CODE- 636/637: Performed by: LICENSED PRACTICAL NURSE

## 2023-01-21 PROCEDURE — 97530 THERAPEUTIC ACTIVITIES: CPT

## 2023-01-21 PROCEDURE — 97165 OT EVAL LOW COMPLEX 30 MIN: CPT

## 2023-01-21 PROCEDURE — 82962 GLUCOSE BLOOD TEST: CPT

## 2023-01-21 PROCEDURE — 80048 BASIC METABOLIC PNL TOTAL CA: CPT

## 2023-01-21 PROCEDURE — 74011250637 HC RX REV CODE- 250/637: Performed by: PSYCHIATRY & NEUROLOGY

## 2023-01-21 PROCEDURE — 85027 COMPLETE CBC AUTOMATED: CPT

## 2023-01-21 RX ORDER — ESLICARBAZEPINE ACETATE 800 MG/1
800 TABLET ORAL DAILY
Qty: 5 TABLET | Refills: 0 | Status: SHIPPED | OUTPATIENT
Start: 2023-01-21 | End: 2023-01-26

## 2023-01-21 RX ORDER — LEVETIRACETAM 500 MG/1
500 TABLET ORAL 2 TIMES DAILY
Qty: 60 TABLET | Refills: 0 | Status: SHIPPED | OUTPATIENT
Start: 2023-01-21 | End: 2023-02-20

## 2023-01-21 RX ORDER — CENOBAMATE 200 MG/1
200 TABLET, FILM COATED ORAL DAILY
Qty: 5 EACH | Refills: 0 | Status: SHIPPED | OUTPATIENT
Start: 2023-01-21 | End: 2023-01-26

## 2023-01-21 RX ORDER — SUMATRIPTAN 6 MG/.5ML
6 INJECTION, SOLUTION SUBCUTANEOUS ONCE
Status: COMPLETED | OUTPATIENT
Start: 2023-01-21 | End: 2023-01-21

## 2023-01-21 RX ADMIN — LEVETIRACETAM 500 MG: 500 TABLET, FILM COATED ORAL at 09:09

## 2023-01-21 RX ADMIN — SUMATRIPTAN 6 MG: 6 INJECTION, SOLUTION SUBCUTANEOUS at 12:53

## 2023-01-21 RX ADMIN — ESCITALOPRAM OXALATE 20 MG: 10 TABLET ORAL at 09:09

## 2023-01-21 RX ADMIN — SODIUM CHLORIDE, PRESERVATIVE FREE 10 ML: 5 INJECTION INTRAVENOUS at 06:07

## 2023-01-21 RX ADMIN — TOPIRAMATE 100 MG: 100 TABLET, FILM COATED ORAL at 09:09

## 2023-01-21 RX ADMIN — LEVOTHYROXINE SODIUM 50 MCG: 0.03 TABLET ORAL at 06:03

## 2023-01-21 RX ADMIN — BUDESONIDE AND FORMOTEROL FUMARATE DIHYDRATE 2 PUFF: 160; 4.5 AEROSOL RESPIRATORY (INHALATION) at 08:24

## 2023-01-21 RX ADMIN — HYDROXYZINE PAMOATE 25 MG: 25 CAPSULE ORAL at 06:03

## 2023-01-21 RX ADMIN — APIXABAN 5 MG: 5 TABLET, FILM COATED ORAL at 09:09

## 2023-01-21 RX ADMIN — LACOSAMIDE 100 MG: 100 TABLET, FILM COATED ORAL at 09:09

## 2023-01-21 RX ADMIN — MONTELUKAST 10 MG: 10 TABLET, FILM COATED ORAL at 09:09

## 2023-01-21 RX ADMIN — TIOTROPIUM BROMIDE INHALATION SPRAY 2 PUFF: 3.12 SPRAY, METERED RESPIRATORY (INHALATION) at 08:24

## 2023-01-21 RX ADMIN — FAMOTIDINE 40 MG: 20 TABLET, FILM COATED ORAL at 09:09

## 2023-01-21 NOTE — PROGRESS NOTES
Hospitalist Progress Note            Daily Progress Note: 1/21/2023 7:43 AM  Hospital course:     History of present illness:   45 y.o. female with history of complex partial seizures difficult to control on treatment, migraine headaches, nonischemic cardiomyopathy, pulmonary embolism on anticoagulation, hx of partial colectomy that  presents to the emergency room with altered mental status. Per family she is not as responsive and at her baseline. Family states that she is usually oriented and ambulatory. She was discharged home 3 days ago. During that admission she had adjustment done to her seizure medications. CT head without contrast reveals no acute intracranial abnormality. CTA head and neck with and without contrast reveals no evidence of large vessel occlusion or significant flow-limiting stenosis Patient was admitted for management of altered mental status and seizures. Subjective:   Seen and examined at bedside. Reports headace and mild abdominal pain. Discussed at length treatment plans and goals. Gave results of MRI of Brain. Update: Neurology believes episodes of altered mental status are from too many sedating medications. They are going to make adjustments to medications and then patient can be discharged. Spoke with neurology about giving sumatriptan for current headache to which she said that is fine. .    Assessment/Plan:   Active Problems:    AMS (altered mental status) (1/3/2023)      Altered mental status (1/19/2023)  Altered mental status/acute encephalopathy  - Etiology unclear  --Urinalysis not showing any evidence of UTI  - Lab work did not show any significant metabolic abnormalities  - Potentially related to seizures  --Cranial nerve exam was difficult to assess secondary to patient inability or lack of participation  -Urine drug screen positive for barbiturates.   However patient has a history of being on Fioricet which contains butalbital  -- CT head without contrast reveals no acute intracranial abnormality. CTA head and neck with and without contrast reveals no evidence of large vessel occlusion or significant flow-limiting stenosis  --MRI of Brain reveals no acute infarct or intracranial hemorrhage  -- Neurology thinks that acute alteration is consciousness is secondary to to medication side effect profile. --Vimpat reduced to 100mg BID. Clonazepam discontinued. --Per nursing record, we don't carry cenobamate and eslicabazepine. Ordered 5 days worth of both [facility] for family to  and deliver. - Neurology (Viktor Sanchez) following    Complex partial seizures  - On multiple medications with adjustments  - We will keep her home medications that are nonformulary so she can take her home medications  --Neuro following    History of VTE  - On Eliquis 5 mg twice daily    Hypothyroidism  - Continue levothyroxine  - She does have a history of thyroid goiter being evaluated by surgical oncology at [facility].   She is to follow-up with cardiac clearance    Headache  --One time injection of sumatriptan     DVT Prophylaxis: Eliquis  Code Status: Full Code  POA/NOK:    Disposition and discharge barriers:   Neurology consulted recommendations, 24 to 48 hours  Care Plan discussed with: Patient, nursing, case management    Current Facility-Administered Medications   Medication Dose Route Frequency    lacosamide (VIMPAT) tablet 100 mg  100 mg Oral BID    levETIRAcetam (KEPPRA) tablet 500 mg  500 mg Oral BID    nortriptyline (PAMELOR) capsule 50 mg  50 mg Oral PCD    levothyroxine (SYNTHROID) tablet 50 mcg  50 mcg Oral ACB    montelukast (SINGULAIR) tablet 10 mg  10 mg Oral DAILY    topiramate (TOPAMAX) tablet 100 mg  100 mg Oral BID    albuterol (PROVENTIL HFA, VENTOLIN HFA, PROAIR HFA) inhaler 2 Puff  2 Puff Inhalation Q4H PRN    latanoprost (XALATAN) 0.005 % ophthalmic solution 1 Drop  1 Drop Both Eyes QPM    apixaban (ELIQUIS) tablet 5 mg  5 mg Oral BID nitroglycerin (NITROSTAT) tablet 0.4 mg  0.4 mg SubLINGual PRN    [Held by provider] clonazePAM (KlonoPIN) tablet 1 mg  1 mg Oral QHS    cenobamate tab 200 mg  200 mg Oral DAILY    hydrOXYzine pamoate (VISTARIL) capsule 25 mg  25 mg Oral QHS PRN    famotidine (PEPCID) tablet 40 mg  40 mg Oral DAILY    escitalopram oxalate (LEXAPRO) tablet 20 mg  20 mg Oral DAILY    eslicarbazepine tab 487 mg  800 mg Oral DAILY    budesonide-formoteroL (SYMBICORT) 160-4.5 mcg/actuation HFA inhaler 2 Puff  2 Puff Inhalation BID RT    And    tiotropium bromide (SPIRIVA RESPIMAT) 2.5 mcg /actuation  2 Puff Inhalation DAILY    sodium chloride (NS) flush 5-40 mL  5-40 mL IntraVENous Q8H    sodium chloride (NS) flush 5-40 mL  5-40 mL IntraVENous PRN    ondansetron (ZOFRAN) injection 4 mg  4 mg IntraVENous Q6H PRN        REVIEW OF SYSTEMS    Review of Systems   Constitutional:  Positive for weight loss. Negative for diaphoresis, fever and malaise/fatigue. HENT:  Negative for hearing loss. Respiratory:  Negative for cough, shortness of breath and wheezing. Cardiovascular:  Negative for chest pain, palpitations and leg swelling. Gastrointestinal:  Negative for abdominal pain, diarrhea, nausea and vomiting. Genitourinary: Negative. Musculoskeletal:  Positive for myalgias. Skin: Negative. Neurological:  Positive for dizziness, weakness and headaches. Objective:     Visit Vitals  BP 93/62 (BP 1 Location: Left upper arm, BP Patient Position: At rest)   Pulse 79   Temp 98 °F (36.7 °C)   Resp 18   Ht 5' 5\" (1.651 m)   Wt 80.3 kg (177 lb)   SpO2 98%   Breastfeeding No   BMI 29.45 kg/m²      O2 Device: None (Room air)    Temp (24hrs), Av.8 °F (36.6 °C), Min:97.5 °F (36.4 °C), Max:98.1 °F (36.7 °C)        PHYSICAL EXAM:    Physical Exam  Vitals and nursing note reviewed. Constitutional:       General: She is not in acute distress. Appearance: Normal appearance. She is ill-appearing.       Comments: Resting comfortably in bed. Patient is speaking and answering questions appropriately   HENT:      Head: Normocephalic and atraumatic. Right Ear: External ear normal.      Left Ear: External ear normal.      Nose: Nose normal.   Cardiovascular:      Rate and Rhythm: Normal rate and regular rhythm. Heart sounds: Normal heart sounds. No murmur heard. Pulmonary:      Effort: Pulmonary effort is normal. No respiratory distress. Breath sounds: No wheezing. Abdominal:      General: Abdomen is flat. There is no distension. Palpations: Abdomen is soft. Tenderness: There is abdominal tenderness (epigastric tenderness improved). Musculoskeletal:         General: Normal range of motion. Cervical back: Normal range of motion. Comments: Move all but range of motion on R. Ride seems weaker than left   Skin:     General: Skin is warm and dry. Neurological:      General: No focal deficit present. Mental Status: She is alert. Motor: Weakness (Notably on on R.side) present.       Comments:           Data Review    Recent Results (from the past 24 hour(s))   METABOLIC PANEL, BASIC    Collection Time: 01/21/23  6:37 AM   Result Value Ref Range    Sodium 142 136 - 145 mmol/L    Potassium 3.6 3.5 - 5.1 mmol/L    Chloride 114 (H) 97 - 108 mmol/L    CO2 23 21 - 32 mmol/L    Anion gap 5 5 - 15 mmol/L    Glucose 90 65 - 100 mg/dL    BUN 7 6 - 20 mg/dL    Creatinine 0.63 0.55 - 1.02 mg/dL    BUN/Creatinine ratio 11 (L) 12 - 20      eGFR >60 >60 ml/min/1.73m2    Calcium 8.5 8.5 - 10.1 mg/dL   CBC W/O DIFF    Collection Time: 01/21/23  6:37 AM   Result Value Ref Range    WBC 3.9 3.6 - 11.0 K/uL    RBC 4.03 3.80 - 5.20 M/uL    HGB 11.0 (L) 11.5 - 16.0 g/dL    HCT 34.3 (L) 35.0 - 47.0 %    MCV 85.1 80.0 - 99.0 FL    MCH 27.3 26.0 - 34.0 PG    MCHC 32.1 30.0 - 36.5 g/dL    RDW 15.2 (H) 11.5 - 14.5 %    PLATELET 930 763 - 785 K/uL    MPV 9.7 8.9 - 12.9 FL    NRBC 0.0 0.0  WBC    ABSOLUTE NRBC 0.00 0.00 - 0.01 K/uL   GLUCOSE, POC    Collection Time: 01/21/23  7:41 AM   Result Value Ref Range    Glucose (POC) 83 65 - 100 mg/dL    Performed by Snow Soriano        MRI BRAIN WO CONT   Final Result   1. No acute infarct or intracranial hemorrhage. CTA HEAD NECK W WO CONT   Final Result   1. No evidence of large vessel occlusion or significant flow-limiting stenosis. XR CHEST SNGL V   Final Result   Persistent left basilar atelectasis otherwise no acute abnormality. CT HEAD WO CONT   Final Result   1. No acute intracranial abnormality. 2. Incidental right maxillary sinusitis. Intake and Output:  Current Shift: No intake/output data recorded. Last three shifts: 01/19 1901 - 01/21 0700  In: -   Out: 2350 [Urine:2350]      Lab/Data Review:  Recent Labs     01/21/23  0637 01/19/23  1240   WBC 3.9 6.6   HGB 11.0* 10.6*   HCT 34.3* 33.7*    331       Recent Labs     01/21/23  0637 01/20/23  0626 01/19/23  1931 01/19/23  1240    142 141 135*   K 3.6 3.7 4.0 Hemolyzed, recollect requested   * 113* 114* 110*   CO2 23 22 21 21   GLU 90 86 86 81   BUN 7 8 11 12   CREA 0.63 0.66 0.62 0.90   CA 8.5 8.6 8.5 8.7   MG  --   --   --  Hemolyzed, recollect requested   PHOS  --  3.3  --   --    ALB  --  2.9* 3.0* 3.0*   TBILI  --   --  0.2 0.5   ALT  --   --  70 Hemolyzed, recollect requested       No results for input(s): PH, PCO2, PO2, HCO3, FIO2 in the last 72 hours.   Recent Results (from the past 24 hour(s))   METABOLIC PANEL, BASIC    Collection Time: 01/21/23  6:37 AM   Result Value Ref Range    Sodium 142 136 - 145 mmol/L    Potassium 3.6 3.5 - 5.1 mmol/L    Chloride 114 (H) 97 - 108 mmol/L    CO2 23 21 - 32 mmol/L    Anion gap 5 5 - 15 mmol/L    Glucose 90 65 - 100 mg/dL    BUN 7 6 - 20 mg/dL    Creatinine 0.63 0.55 - 1.02 mg/dL    BUN/Creatinine ratio 11 (L) 12 - 20      eGFR >60 >60 ml/min/1.73m2    Calcium 8.5 8.5 - 10.1 mg/dL   CBC W/O DIFF    Collection Time: 01/21/23 6: 37 AM   Result Value Ref Range    WBC 3.9 3.6 - 11.0 K/uL    RBC 4.03 3.80 - 5.20 M/uL    HGB 11.0 (L) 11.5 - 16.0 g/dL    HCT 34.3 (L) 35.0 - 47.0 %    MCV 85.1 80.0 - 99.0 FL    MCH 27.3 26.0 - 34.0 PG    MCHC 32.1 30.0 - 36.5 g/dL    RDW 15.2 (H) 11.5 - 14.5 %    PLATELET 192 677 - 729 K/uL    MPV 9.7 8.9 - 12.9 FL    NRBC 0.0 0.0  WBC    ABSOLUTE NRBC 0.00 0.00 - 0.01 K/uL   GLUCOSE, POC    Collection Time: 01/21/23  7:41 AM   Result Value Ref Range    Glucose (POC) 83 65 - 100 mg/dL    Performed by Haven Knott              _____________________________________________________________________________  Time spent in direct care including coordination of service, review of data and examination: > 35 minutes    ______________________________________________________________________________    MAYE Youssef    This is dictation was done by dragon, computer voice recognition software. Quite often unanticipated grammatical, syntax, homophones and other interpretive errors or inadvertently transcribed by the computer software. Please excuse errors that have escaped final proofreading. Thank you.

## 2023-01-21 NOTE — PROGRESS NOTES
OCCUPATIONAL THERAPY EVALUATION  Patient: Steffanie Piper (23 y.o. female)  Date: 1/21/2023  Primary Diagnosis: AMS (altered mental status) [R41.82]  Altered mental status [R41.82]       Precautions: fall risk       ASSESSMENT  Pt is a 45 y.o. female presenting to [facility] with c/o lethargy w/ AMS, admitted 1/19 and currently being treated for acute encephalopathy, complex partial seizures, and headache. PMHx consists of complex partial seizures; per medical chart, medication recently adjusted. Pt received semi-supine in bed upon arrival, AXO x4, and agreeable to OT evaluation. Based on current observations, pt presents with deficits in generalized strength/AROM, bed mobility, static/dynamic sitting balance, static/dynamic standing balance (see PT note for gait details), and functional activity tolerance currently impacting overall performance of ADLs and functional transfers/mobility (see below for objective details and assist levels). Overall, pt tolerates session fair w/out c/o pain, dizziness, or SOB. Pt completed bed mobility w/ SBA and req'd CGA/SBA for all functional mobility using RW. Pt reported slight lightheadedness after first stand and req'd seated rest break but lightheadedness quickly subsided and pt able to complete w/out LOB or unsteadiness. She complete bathroom commode transfer w/ CGA/SBA using R grab bar (reports she has sinktop at home to use if needed). OT educated pt home safety techniques while ambulating (using DME to reduce falls) and energy conservation techniques for ADLs; pt verbalized understanding. OT educated pt on the importance of 3-in-1 commode to reduce falls risk for showering/toileting; if 3-in-1 commode does not fit in tub pt would benefit from tub transfer bench for increased safety. Pt verbalized understanding to all education.  Pt would benefit from continued skilled OT services to address current impairments and improve IND and safety with self cares and functional transfers/mobility. Current OT d/c recommendation home w/ family care and HHOT once medically appropriate. Pt would benefit from 3-in-1 commode to reduce falls while completing ADLs. Other factors to consider for discharge: family/social support, DME, time since onset, severity of deficits, functional baseline     Patient will benefit from skilled therapy intervention to address the above noted impairments. PLAN :  Recommendations and Planned Interventions: self care training, functional mobility training, therapeutic exercise, balance training, therapeutic activities, endurance activities, patient education, and home safety training    Recommend with staff: Amb to bathroom for toileting with gt belt and AD    Recommend next session: LB dressing , LB bathing, and Standing grooming    Frequency/Duration: Patient will be followed by occupational therapy:  3-5x/week to address goals. Recommendation for discharge: (in order for the patient to meet his/her long term goals)  Home w/ family care and HHOT    This discharge recommendation:  Has been made in collaboration with the attending provider and/or case management    IF patient discharges home will need the following DME: 3-in-1 commode       SUBJECTIVE:   Patient stated I tend to fall when I don't use the walker.     OBJECTIVE DATA SUMMARY:   HISTORY:   Past Medical History:   Diagnosis Date    Arthritis     pt states knees and back    Asthma     Cardiomyopathy ([facility] 2005    EF 35%, echo in 2021 normal EF     Colon polyp     Diabetes ([address]     Pt states not diabetic but has all the symptoms of it    Dizzy spells     GERD (gastroesophageal reflux disease)     Heart failure ([address] 2005    now combined systolic, diastolic, seen at [facility] 2021- Dr Martine Nance    Irritable bowel syndrome     Knee pain, bilateral     Migraines 2011    2-3 week    Neuropathy     pt states of both feet    Pulmonary embolism [facility])     August 2022    Seizures ([facility]     petite mals last seizure 2-3 months ago    Thyroid disease     hypothyroid     Past Surgical History:   Procedure Laterality Date    COLONOSCOPY N/A 11/28/2022    COLONOSCOPY performed by Ermalene Alpers, MD at [address]  05/2006    HX COLONOSCOPY      HX ENDOSCOPY      HX GASTRIC BYPASS  2013    HX HYSTERECTOMY  2007    HX KNEE ARTHROSCOPY Right 05/2021    HX ORTHOPAEDIC Bilateral 2013    CTR    HX ORTHOPAEDIC      right knee procedure 2021    HX OTHER SURGICAL      left axillary lymph node biopsy    HX SMALL BOWEL RESECTION      AZ APPENDECTOMY      AZ COLECTOMY PARTIAL W/ANASTOMOSIS      AZ ENTERECTOMY RESCJ SMALL INTESTINE W/ENTEROSTOMY      AZ LAPAROSCOPY SURG CHOLECYSTECTOMY      AZ UNLISTED PROCEDURE ABDOMEN PERITONEUM & OMENTUM  2008    cholecystectomy    AZ UNLISTED PROCEDURE CARDIAC SURGERY      Loop recorder implanted july 2020    AZ UNLISTED PROCEDURE STOMACH         Per pt:   Home Situation  Home Environment: Private residence  # Steps to Enter: 3  Rails to Enter: Yes  Hand Rails : Bilateral  One/Two Story Residence: One story  Living Alone: No  Support Systems: Child(shahid), Parent(s) (mother and daughter)  Patient Expects to be Discharged to[de-identified] Home  Current DME Used/Available at Home: Walker, rolling  Tub or Shower Type: Tub/Shower combination      EXAMINATION OF PERFORMANCE DEFICITS:  Cognitive/Behavioral Status:  Neurologic State: Alert  Orientation Level: Oriented X4  Cognition: Follows commands             Hearing: Auditory  Auditory Impairment: None      Range of Motion:  AROM: Generally decreased, functional                         Strength:  Strength: Generally decreased, functional                Coordination:     Fine Motor Skills-Upper: Left Intact; Right Intact    Gross Motor Skills-Upper: Left Intact; Right Intact    Tone & Sensation:     Sensation: Intact                      Balance:  Sitting: Intact; Without support  Standing: Impaired; With support  Standing - Static: Good;Occasional  Standing - Dynamic : Fair;Constant support    Functional Mobility and Transfers for ADLs:  Bed Mobility:  Supine to Sit: Stand-by assistance  Sit to Supine: Stand-by assistance    Transfers:  Sit to Stand: Stand-by assistance;Contact guard assistance  Stand to Sit: Stand-by assistance;Contact guard assistance  Bathroom Mobility: Stand-by assistance;Contact guard assistance  Toilet Transfer : Stand-by assistance;Contact guard assistance  Assistive Device : Walker, rolling      ADL Intervention and task modifications:                           Lower Body Dressing Assistance  Socks: Independent (at EOB)              Therapeutic Exercise:  Pt will benefit from BUE HEP to improve participation in ADLs and mobility. Plan will be initiated at next session. Functional Measure:    [facility] AM-PACTM \"6 Clicks\"                                                       Daily Activity Inpatient Short Form  How much help from another person does the patient currently need. .. Total; A Lot A Little None   1. Putting on and taking off regular lower body clothing? []  1 []  2 []  3 [x]  4   2. Bathing (including washing, rinsing, drying)? []  1 []  2 [x]  3 []  4   3. Toileting, which includes using toilet, bedpan or urinal? [] 1 []  2 [x]  3 []  4   4. Putting on and taking off regular upper body clothing? []  1 []  2 [x]  3 []  4   5. Taking care of personal grooming such as brushing teeth? []  1 []  2 [x]  3 []  4   6. Eating meals? []  1 []  2 []  3 [x]  4   © 2007, Trustees of [facility], under license to [employer]. All rights reserved     Score: 20/24     Interpretation of Tool:  Represents clinically-significant functional categories (i.e. Activities of daily living).   Percentage of Impairment CH    0%   CI    1-19% CJ    20-39% CK    40-59% CL    60-79% CM    80-99% CN     100%   AMPA  Score 6-24 24 23 20-22 15-19 10-14 7-9 6     Occupational Therapy Evaluation Charge Determination   History Examination Decision-Making   LOW Complexity : Brief history review  LOW Complexity : 1-3 performance deficits relating to physical, cognitive , or psychosocial skils that result in activity limitations and / or participation restrictions  MEDIUM Complexity : Patient may present with comorbidities that affect occupational performnce. Miniml to moderate modification of tasks or assistance (eg, physical or verbal ) with assesment(s) is necessary to enable patient to complete evaluation       Based on the above components, the patient evaluation is determined to be of the following complexity level: LOW     Pain Rating:  No reports of pain    Activity Tolerance:   Fair and tolerates ADLs without rest breaks    After treatment patient left in no apparent distress:    Sitting in chair and Call bell within reach; OT educated pt on the importance of calling staff for OOB A; pt verbalized understanding. COMMUNICATION/EDUCATION:   The patients plan of care was discussed with: Registered nurse. Patient/family have participated as able in goal setting and plan of care. and Patient/family agree to work toward stated goals and plan of care. This patients plan of care is appropriate for delegation to EDITH. Thank you for this referral.  Michele Perdue OT  Time Calculation: 30 mins   Problem: Self Care Deficits Care Plan (Adult)  Goal: *Acute Goals and Plan of Care (Insert Text)  Description: FUNCTIONAL STATUS PRIOR TO ADMISSION: Pt reports she was mod I using RW and IND w/ ADLs. She reports having \"a lot\" of falls within the past 3 months; pt reports she is mostly falling at home when she doesn't use her RW. HOME SUPPORT: pt lives with daughter and mother. She has HHOT/PT set-up.      Occupational Therapy Goals  Initiated 1/21/2023    Pt stated goal I want to get better  Pt will be IND sup <> sit in prep for EOB ADLs  Pt will be Mod I grooming standing sink side LRAD  Pt will be IND UB dressing sitting EOB/long sit   Pt will be IND LE dressing sitting EOB/long sit  Pt will be Mod I sit <>  prep for toileting LRAD  Pt will be Mod I toileting/toilet transfer/cloth mgmt LRAD  Pt will be IND following UE HEP in prep for self care tasks   Outcome: Not Met

## 2023-01-21 NOTE — PROGRESS NOTES
10:05: Telephoned Katerin, to inform of need for PT/OT due to walker use at home and AMS. Also requested PRN Fioricet for migraine headache. He states he would place orders as appropriate. SQ imitrex placed awaiting dose from pharmacy. Plan is for DC today. 14:47: Telephoned PT/OT for a second time. They will be coming to bedside to eval patient for safe discharge given that patient uses ambulatory aids at home. Discharge:  I have reviewed discharge instructions with the patient  The patient verbalized understanding. Discharge plan of care/case management plan validated with provider discharge order. PIV X1 removed with immediate hemostasis. Pt. Dressed in her own clothing. Provided pt. With DC instruction/papers. Pt. To receive home health and DME prescription for multi-use commode written by MAYE Campbell. Pt. Verbalized understanding of all discharge information provided including plan of care, importance of follow up with neurology and when to seek medical care. Patient to exit via [facility] with all personal belongings in hand for family ride home.

## 2023-01-21 NOTE — PROGRESS NOTES
Spoke with pharmacy regarding patient medications cenobamate and eslicabazepine, not available from hospital pharmacy at present. Patient unable to provide her own supply at this time as she has taken the pills out of their original containers and placed them in a separate pill organizer, unable to be relabeled.  Pharmacy inquiring if nursing can request day shift hospitalist or neurologist call in temporary refills of these two prescriptions to the [facility] if not full refills from patient's own pharmacy to then have the filed prescriptions brought to the hospital.

## 2023-01-21 NOTE — DISCHARGE SUMMARY
Hospitalist Discharge Summary     Patient ID:    Jasmine Dunn  [id number]  23 y.o.  1970    Admit date: 1/19/2023    Discharge date : 1/21/2023      Final Diagnoses: Active Problems:    AMS (altered mental status) (1/3/2023)      Altered mental status (1/19/2023)      Reason for Hospitalization/Hospital Course:   45 y.o. female with history of complex partial seizures difficult to control on treatment, migraine headaches, nonischemic cardiomyopathy, pulmonary embolism on anticoagulation, hx of partial colectomy that  presents to the emergency room with altered mental status. Per family she is not as responsive and at her baseline. Family states that she is usually oriented and ambulatory. She was discharged home 3 days ago. During that admission she had adjustment done to her seizure medications. CT head without contrast reveals no acute intracranial abnormality. CTA head and neck with and without contrast reveals no evidence of large vessel occlusion or significant flow-limiting stenosis Patient was admitted for management of altered mental status and seizures. MRI of Brain reveals no acute infarct or intracranial hemorrhage. Neurology thinks that acute alteration is consciousness is secondary to to medication side effect profile. Medications have been adjusted. Patient is cleared by neurology and medically stable for discharge. Patient can follow-up with Neurology outpatient in 3-4 weeks. Discharge Medications:   Current Discharge Medication List        START taking these medications    Details   levETIRAcetam (KEPPRA) 500 mg tablet Take 1 Tablet by mouth two (2) times a day for 30 days.   Qty: 60 Tablet, Refills: 0  Start date: 1/21/2023, End date: 2/20/2023      Bedside Commode XX 3 in 1 bed side commode  Qty: 1 Each, Refills: 0  Start date: 1/21/2023           CONTINUE these medications which have CHANGED    Details   cenobamate (Xcopri) 200 mg tab Take 200 mg by mouth daily for 5 days. Max Daily Amount: 200 mg. Qty: 5 Each, Refills: 0  Start date: 1/21/2023, End date: 1/26/2023    Associated Diagnoses: Seizures (HCC)      eslicarbazepine (Aptiom) 800 mg tab tablet Take 1 Tablet by mouth daily for 5 days. Qty: 5 Tablet, Refills: 0  Start date: 1/21/2023, End date: 1/26/2023           CONTINUE these medications which have NOT CHANGED    Details   rizatriptan (MAXALT-MLT) 10 mg disintegrating tablet Take 10 mg by mouth daily as needed for Migraine. cyanocobalamin (VITAMIN B12) 1,000 mcg/mL injection 1,000 mcg by IntraMUSCular route every month. topiramate (TOPAMAX) 100 mg tablet Take 100 mg by mouth two (2) times a day. Refills: 5      levothyroxine (SYNTHROID) 50 mcg tablet Take 50 mcg by mouth Daily (before breakfast). Calcium-Cholecalciferol, D3, 600 mg-10 mcg (400 unit) cap Take 2 Tablets by mouth daily. hydrOXYzine pamoate (VISTARIL) 25 mg capsule Take 30 mg by mouth nightly as needed for Anxiety. Trelegy Ellipta 200-62.5-25 mcg inhaler Take 1 Puff by inhalation daily. famotidine (PEPCID) 40 mg tablet Take 40 mg by mouth daily. escitalopram oxalate (LEXAPRO) 20 mg tablet Take 20 mg by mouth daily. ondansetron (ZOFRAN ODT) 4 mg disintegrating tablet Take 1 Tablet by mouth every eight (8) hours as needed for Nausea or Vomiting. Qty: 20 Tablet, Refills: 0      ergocalciferol (ERGOCALCIFEROL) 1,250 mcg (50,000 unit) capsule Take 50,000 Units by mouth once. Every Friday      erenumab-aooe (Aimovig Autoinjector) 140 mg/mL injection 140 mg by SubCUTAneous route every thirty (30) days. nitroglycerin (NITROSTAT) 0.4 mg SL tablet DISSOLVE 1 TABLET UNDER THE TONGUE EVERY 5 MINUTES AS  NEEDED FOR CHEST PAIN. MAX  OF 3 TABLETS IN 15 MINUTES. CALL 911 IF PAIN PERSISTS. Qty: 30 Tablet, Refills: 5    Comments: Requesting 1 year supply      apixaban (ELIQUIS) 5 mg tablet Take 5 mg by mouth two (2) times a day.       latanoprost (XALATAN) 0.005 % ophthalmic solution INSTILL 1 DROP INTO EACH EYE AT NIGHT      montelukast (SINGULAIR) 10 mg tablet Take 10 mg by mouth daily. Refills: 3      nortriptyline (PAMELOR) 50 mg capsule Take 100 mg by mouth nightly. Refills: 5      VENTOLIN HFA 90 mcg/actuation inhaler Take 2 Puffs by inhalation every four (4) hours as needed. Refills: 4           STOP taking these medications       levETIRAcetam (KEPPRA XR) 750 mg ER tablet Comments:   Reason for Stopping:         Linzess 290 mcg cap capsule Comments:   Reason for Stopping:         SUMAtriptan (IMITREX) 6 mg/0.5 mL injection Comments:   Reason for Stopping:         levETIRAcetam (KEPPRA) 500 mg/5 mL injection Comments:   Reason for Stopping:         OXcarbazepine (TRILEPTAL) 300 mg tablet Comments:   Reason for Stopping:         clonazePAM (KlonoPIN) 1 mg tablet Comments:   Reason for Stopping:         lacosamide (Vimpat) 200 mg tab tablet Comments:   Reason for Stopping:         Linzess 145 mcg cap capsule Comments:   Reason for Stopping:         dexlansoprazole (DEXILANT) 60 mg CpDB capsule (delayed release) Comments:   Reason for Stopping: Follow up Care:    1. Cornell Holcomb MD in 1-2 weeks. Follow-up Information       Follow up With Specialties Details Why Contact Info    Cornell Holcomb, [address] [facility] [address] [phone]      Esperanza Armstrong MD Neurology Follow up in 3 week(s)  [address]  [phone]                Patient Follow Up Instructions:    Activity: Activity as tolerated  Diet:  Cardiac Diet and Diabetic Diet  Wound Care: None needed     Condition at Discharge:  Stable  __________________________________________________________________    Disposition  [place] [facility]  ____________________________________________________________________    Code Status:  Full Code  ___________________________________________________________________    Discharge Exam:  Patient seen and examined by me on discharge day. Pertinent Findings:  Gen:    Not in distress  Chest: Clear lungs  CVS:   Regular rhythm. No edema  Abd:  Soft, not distended, not tender  Neuro:  Alert        CONSULTATIONS: Neurology    Significant Diagnostic Studies:   Recent Results (from the past 24 hour(s))   METABOLIC PANEL, BASIC    Collection Time: 01/21/23  6:37 AM   Result Value Ref Range    Sodium 142 136 - 145 mmol/L    Potassium 3.6 3.5 - 5.1 mmol/L    Chloride 114 (H) 97 - 108 mmol/L    CO2 23 21 - 32 mmol/L    Anion gap 5 5 - 15 mmol/L    Glucose 90 65 - 100 mg/dL    BUN 7 6 - 20 mg/dL    Creatinine 0.63 0.55 - 1.02 mg/dL    BUN/Creatinine ratio 11 (L) 12 - 20      eGFR >60 >60 ml/min/1.73m2    Calcium 8.5 8.5 - 10.1 mg/dL   CBC W/O DIFF    Collection Time: 01/21/23  6:37 AM   Result Value Ref Range    WBC 3.9 3.6 - 11.0 K/uL    RBC 4.03 3.80 - 5.20 M/uL    HGB 11.0 (L) 11.5 - 16.0 g/dL    HCT 34.3 (L) 35.0 - 47.0 %    MCV 85.1 80.0 - 99.0 FL    MCH 27.3 26.0 - 34.0 PG    MCHC 32.1 30.0 - 36.5 g/dL    RDW 15.2 (H) 11.5 - 14.5 %    PLATELET 634 817 - 901 K/uL    MPV 9.7 8.9 - 12.9 FL    NRBC 0.0 0.0  WBC    ABSOLUTE NRBC 0.00 0.00 - 0.01 K/uL   GLUCOSE, POC    Collection Time: 01/21/23  7:41 AM   Result Value Ref Range    Glucose (POC) 83 65 - 100 mg/dL    Performed by Jenny Gonzales    GLUCOSE, POC    Collection Time: 01/21/23 11:27 AM   Result Value Ref Range    Glucose (POC) 86 65 - 100 mg/dL    Performed by Jenny Gonzales      MRI BRAIN WO CONT   Final Result   1. No acute infarct or intracranial hemorrhage. CTA HEAD NECK W WO CONT   Final Result   1. No evidence of large vessel occlusion or significant flow-limiting stenosis. XR CHEST SNGL V   Final Result   Persistent left basilar atelectasis otherwise no acute abnormality. CT HEAD WO CONT   Final Result   1.  No acute intracranial abnormality. 2. Incidental right maxillary sinusitis.                 Time spent in direct and indirect care including coordination of services: Greater than 35 minutes    Signed:  Anson Hodges  1/21/2023  2:57 PM

## 2023-01-21 NOTE — PROGRESS NOTES
Problem: Falls - Risk of  Goal: *Absence of Falls  Description: Document [place] Dailey Fall Risk and appropriate interventions in the flowsheet. Outcome: Progressing Towards Goal  Note: Fall Risk Interventions:            Medication Interventions: Bed/chair exit alarm, Teach patient to arise slowly                   Problem: Patient Education: Go to Patient Education Activity  Goal: Patient/Family Education  Outcome: Progressing Towards Goal     Problem: Pressure Injury - Risk of  Goal: *Prevention of pressure injury  Description: Document Meir Scale and appropriate interventions in the flowsheet.   Outcome: Progressing Towards Goal  Note: Pressure Injury Interventions:  Sensory Interventions: Assess changes in LOC, Minimize linen layers, Keep linens dry and wrinkle-free    Moisture Interventions: Absorbent underpads, Minimize layers    Activity Interventions: PT/OT evaluation    Mobility Interventions: HOB 30 degrees or less, PT/OT evaluation    Nutrition Interventions: Document food/fluid/supplement intake                     Problem: Patient Education: Go to Patient Education Activity  Goal: Patient/Family Education  Outcome: Progressing Towards Goal     Problem: Discharge Planning  Goal: *Discharge to safe environment  Outcome: Progressing Towards Goal  Goal: *Knowledge of medication management  Outcome: Progressing Towards Goal  Goal: *Knowledge of discharge instructions  Outcome: Progressing Towards Goal     Problem: General Medical Care Plan  Goal: *Vital signs within specified parameters  Outcome: Progressing Towards Goal  Goal: *Labs within defined limits  Outcome: Progressing Towards Goal  Goal: *Absence of infection signs and symptoms  Outcome: Progressing Towards Goal  Goal: *Optimal pain control at patient's stated goal  Outcome: Progressing Towards Goal  Goal: *Skin integrity maintained  Outcome: Progressing Towards Goal  Goal: *Fluid volume balance  Outcome: Progressing Towards Goal  Goal: *Optimize nutritional status  Outcome: Progressing Towards Goal  Goal: *Anxiety reduced or absent  Outcome: Progressing Towards Goal  Goal: *Progressive mobility and function (eg: ADL's)  Outcome: Progressing Towards Goal     Problem: Pain  Goal: *Control of Pain  Outcome: Progressing Towards Goal  Goal: *PALLIATIVE CARE:  Alleviation of Pain  Outcome: Progressing Towards Goal

## 2023-01-21 NOTE — CONSULTS
Consult Date: 1/21/2023    Consults Ms. Becka Zimmerman is a 29-year-old woman with past medical history of migraines and episodes of loss of consciousness being treated for seizures with multiple medications including Vimpat, Keppra, Xcopri, aptiom. She has had multiple hospitalizations where she has been having alteration or loss of consciousness and then returns back to baseline. She was brought in yesterday because her daughter tells me that she was falling which started with sweating in her hands and shaking, followed by lethargy and then falls. Patient does not remember these events. She is back to baseline now. There are no focal neurological deficits. CT scan unrevealing.     Subjective has HA 7/10    Past Medical History:   Diagnosis Date    Arthritis     pt states knees and back    Asthma     Cardiomyopathy ([address] 2005    EF 35%, echo in 2021 normal EF     Colon polyp     Diabetes ([address]     Pt states not diabetic but has all the symptoms of it    Dizzy spells     GERD (gastroesophageal reflux disease)     Heart failure ([address] 2005    now combined systolic, diastolic, seen at [facility] 2021- Dr Heydi Palafox    Irritable bowel syndrome     Knee pain, bilateral     Migraines 2011    2-3 week    Neuropathy     pt states of both feet    Pulmonary embolism ([address]     August 2022    Seizures ([address]     petite mals last seizure 2-3 months ago    Thyroid disease     hypothyroid      Past Surgical History:   Procedure Laterality Date    COLONOSCOPY N/A 11/28/2022    COLONOSCOPY performed by Carla Christensen MD at [address]  05/2006    HX COLONOSCOPY      HX ENDOSCOPY      HX GASTRIC BYPASS  2013    HX HYSTERECTOMY  2007    HX KNEE ARTHROSCOPY Right 05/2021    HX ORTHOPAEDIC Bilateral 2013    CTR    HX ORTHOPAEDIC      right knee procedure 2021    HX OTHER SURGICAL      left axillary lymph node biopsy    HX SMALL BOWEL RESECTION      MT APPENDECTOMY      MT COLECTOMY PARTIAL W/ANASTOMOSIS      VT ENTERECTOMY RESCJ SMALL INTESTINE W/ENTEROSTOMY      VT LAPAROSCOPY SURG CHOLECYSTECTOMY      VT UNLISTED PROCEDURE ABDOMEN PERITONEUM & OMENTUM  2008    cholecystectomy    VT UNLISTED PROCEDURE CARDIAC SURGERY      Loop recorder implanted july 2020    VT UNLISTED PROCEDURE STOMACH       Family History   Problem Relation Age of Onset    Hypertension Mother     Heart Disease Mother     Cancer Father         stomach cancer    Hypertension Father     No Known Problems Brother     Asthma Daughter     Asthma Son     Asthma Son     Breast Cancer Paternal Aunt         age unknown      Social History     Tobacco Use    Smoking status: Never    Smokeless tobacco: Never   Substance Use Topics    Alcohol use: No       Current Facility-Administered Medications   Medication Dose Route Frequency Provider Last Rate Last Admin    SUMAtriptan (IMITREX) injection 6 mg  6 mg SubCUTAneous ONCE Yosi Henriquez PA        lacosamide (VIMPAT) tablet 100 mg  100 mg Oral BID Teresa Crowell MD   100 mg at 01/21/23 0909    levETIRAcetam (KEPPRA) tablet 500 mg  500 mg Oral BID Teresa Crowell MD   500 mg at 01/21/23 0909    nortriptyline (PAMELOR) capsule 50 mg  50 mg Oral PCD Teresa Crowell MD        levothyroxine (SYNTHROID) tablet 50 mcg  50 mcg Oral ACB Mia Saunders MD   50 mcg at 01/21/23 0603    montelukast (SINGULAIR) tablet 10 mg  10 mg Oral DAILY Mia Saunders MD   10 mg at 01/21/23 0909    topiramate (TOPAMAX) tablet 100 mg  100 mg Oral BID Mia Saunders MD   100 mg at 01/21/23 0909    albuterol (PROVENTIL HFA, VENTOLIN HFA, PROAIR HFA) inhaler 2 Puff  2 Puff Inhalation Q4H PRN Mia Saunders MD        latanoprost (XALATAN) 0.005 % ophthalmic solution 1 Drop  1 Drop Both Eyes QPM Mia Saunders MD   1 Drop at 01/20/23 0236    apixaban (ELIQUIS) tablet 5 mg  5 mg Oral BID Mia Saunders MD   5 mg at 01/21/23 0909    nitroglycerin (NITROSTAT) tablet 0.4 mg  0.4 mg SubLINGual PRN Josias Chase MD        [facility] by provider] clonazePAM (KlonoPIN) tablet 1 mg  1 mg Oral QHS Josias Chase MD   1 mg at 01/20/23 0236    cenobamate tab 200 mg  200 mg Oral DAILY Josias Chase MD        hydrOXYzine pamoate (VISTARIL) capsule 25 mg  25 mg Oral QHS PRN Josias Chase MD   25 mg at 01/21/23 0603    famotidine (PEPCID) tablet 40 mg  40 mg Oral DAILY Josias Chase MD   40 mg at 01/21/23 0909    escitalopram oxalate (LEXAPRO) tablet 20 mg  20 mg Oral DAILY Josias Chase MD   20 mg at 11/69/87 2227    eslicarbazepine tab 614 mg  800 mg Oral DAILY Josias Chase MD        budesonide-formoteroL (SYMBICORT) 160-4.5 mcg/actuation HFA inhaler 2 Puff  2 Puff Inhalation BID RT Josias Chase MD   2 Puff at 01/21/23 0824    And    tiotropium bromide (SPIRIVA RESPIMAT) 2.5 mcg /actuation  2 Puff Inhalation DAILY Josias Chase MD   2 Puff at 01/21/23 0824    sodium chloride (NS) flush 5-40 mL  5-40 mL IntraVENous Q8H Josias Chase MD   10 mL at 01/21/23 2741    sodium chloride (NS) flush 5-40 mL  5-40 mL IntraVENous PRN Josias Chase MD        ondansetron [facility]) injection 4 mg  4 mg IntraVENous Q6H PRN Josias Chase MD            Review of Systems   All other systems reviewed and are negative.     Objective     Vital signs for last 24 hours:  Visit Vitals  /71 (BP 1 Location: Left upper arm, BP Patient Position: At rest;Semi fowlers)   Pulse (!) 52   Temp 97.7 °F (36.5 °C)   Resp 18   Ht 5' 5\" (1.651 m)   Wt 80.3 kg (177 lb)   SpO2 96%   Breastfeeding No   BMI 29.45 kg/m²       Intake/Output this shift:  Current Shift: 01/21 0701 - 01/21 1900  In: -   Out: 350 [Urine:350]  Last 3 Shifts: 01/19 1901 - 01/21 0700  In: -   Out: 2350 [Urine:2350]    Data Review:   Recent Results (from the past 24 hour(s))   METABOLIC PANEL, BASIC    Collection Time: 01/21/23  6:37 AM   Result Value Ref Range Sodium 142 136 - 145 mmol/L    Potassium 3.6 3.5 - 5.1 mmol/L    Chloride 114 (H) 97 - 108 mmol/L    CO2 23 21 - 32 mmol/L    Anion gap 5 5 - 15 mmol/L    Glucose 90 65 - 100 mg/dL    BUN 7 6 - 20 mg/dL    Creatinine 0.63 0.55 - 1.02 mg/dL    BUN/Creatinine ratio 11 (L) 12 - 20      eGFR >60 >60 ml/min/1.73m2    Calcium 8.5 8.5 - 10.1 mg/dL   CBC W/O DIFF    Collection Time: 01/21/23  6:37 AM   Result Value Ref Range    WBC 3.9 3.6 - 11.0 K/uL    RBC 4.03 3.80 - 5.20 M/uL    HGB 11.0 (L) 11.5 - 16.0 g/dL    HCT 34.3 (L) 35.0 - 47.0 %    MCV 85.1 80.0 - 99.0 FL    MCH 27.3 26.0 - 34.0 PG    MCHC 32.1 30.0 - 36.5 g/dL    RDW 15.2 (H) 11.5 - 14.5 %    PLATELET 858 989 - 139 K/uL    MPV 9.7 8.9 - 12.9 FL    NRBC 0.0 0.0  WBC    ABSOLUTE NRBC 0.00 0.00 - 0.01 K/uL   GLUCOSE, POC    Collection Time: 01/21/23  7:41 AM   Result Value Ref Range    Glucose (POC) 83 65 - 100 mg/dL    Performed by Jaya Pinta    GLUCOSE, POC    Collection Time: 01/21/23 11:27 AM   Result Value Ref Range    Glucose (POC) 86 65 - 100 mg/dL    Performed by Jaya Pinta        Physical Exam    Neuro Physical Exam      General: Well developed, well nourished. Patient in no apparent distress. Cardiac: Regular rate and rhythm     Neurological Exam:  Mental Status: Awake alert oriented x4, normal speech   Cranial Nerves:   Intact visual fields. PERRL, EOM's full, no nystagmus, no ptosis. Facial sensation is normal. Facial movement is symmetric. Palate is midline. Normal sternocleidomastoid strength. Tongue is midline. Hearing is intact bilaterally. Motor:  5/5 strength in upper and lower proximal and distal muscles. Some 4-5 giveaway weakness in right upper extremity and biceps and triceps. Reflexes:   Deep tendon reflexes 2+/4 and symmetrical.   Sensory:   Normal to light touch throughout. Gait:  Deferred   Tremor:   No tremor noted. Cerebellar:  No cerebellar signs present.    Babinski:      Down bilaterally    Assessment and plan: Ms. Ander Lombard is a 61-year-old woman with past medical history of migraines and episodes of alteration in consciousness. She is on multiple medications for seizures as listed in HPI. I am suspicious that it is likely side effects of these medications that is causing her issues.    -We will do an MRI of the brain without contrast to rule out stroke: no acute stroke on mri   -Stop Vimpat. -Cut Keppra to 500 twice a day  -Stop clonazepam  -Cut down nortriptyline to 50 mg daily. Continue Aptiom 800 mg daily, Xcopri 200 daily. Continue Topamax 100 mg twice a day for migraines. Migraines: Sumatriptan injection as needed. Follow-up with Dr. Trip Shah in clinic in 3 to 4 weeks.

## 2023-01-22 LAB
BACTERIA SPEC CULT: ABNORMAL
COLONY COUNT,CNT: ABNORMAL
SPECIAL REQUESTS,SREQ: ABNORMAL

## 2023-01-23 ENCOUNTER — VIRTUAL VISIT (OUTPATIENT)
Dept: HEMATOLOGY | Age: 53
End: 2023-01-23
Payer: MEDICARE

## 2023-01-23 DIAGNOSIS — R56.9 SEIZURES (HCC): ICD-10-CM

## 2023-01-23 DIAGNOSIS — D50.9 IRON DEFICIENCY ANEMIA, UNSPECIFIED IRON DEFICIENCY ANEMIA TYPE: ICD-10-CM

## 2023-01-23 DIAGNOSIS — R74.8 ELEVATED LIVER ENZYMES: Primary | ICD-10-CM

## 2023-01-23 PROCEDURE — G8427 DOCREV CUR MEDS BY ELIG CLIN: HCPCS | Performed by: NURSE PRACTITIONER

## 2023-01-23 PROCEDURE — G8432 DEP SCR NOT DOC, RNG: HCPCS | Performed by: NURSE PRACTITIONER

## 2023-01-23 PROCEDURE — 1111F DSCHRG MED/CURRENT MED MERGE: CPT | Performed by: NURSE PRACTITIONER

## 2023-01-23 PROCEDURE — 99214 OFFICE O/P EST MOD 30 MIN: CPT | Performed by: NURSE PRACTITIONER

## 2023-01-23 PROCEDURE — 3017F COLORECTAL CA SCREEN DOC REV: CPT | Performed by: NURSE PRACTITIONER

## 2023-01-23 RX ORDER — OXCARBAZEPINE 300 MG/1
300 TABLET, FILM COATED ORAL DAILY
COMMUNITY

## 2023-01-23 NOTE — PROGRESS NOTES
[address]80 [facility], [place], [address], [place], [place]      MANISH Lunsford, [facility]     Perla Russo, [facility]   Bharat Hoffman FNP-SERA Woods, [facility]       Johanne Coyle [facility] 136    at [address], [address] [place] [address], [facility] 22.    [phone]    FAX: [address] [place] [address] Avenue    at [address], [address] [place] [address],[address]    [address] [facility] Street: [phone]       Patient Care Team:  Barbra Singh MD as PCP - General (Family Medicine)  Randy Dunbar MD (Surgery General)  Shamar Menendez MD as Referring Provider (Obstetrics & Gynecology)  Randy Dunbar MD (Surgery General)  Jessica Eduardo MD (Cardiovascular Disease Physician)  Zainab West MD (Gastroenterology)  Vidya Briscoe NP (Nurse Practitioner)      Problem List  Date Reviewed: 1/19/2023            Codes Class Noted    Altered mental status ICD-10-CM: R41.82  ICD-9-CM: 780.97  1/19/2023        Partial seizure ([facility] ICD-10-CM: R56.9  ICD-9-CM: 780.39  1/14/2023        AMS (altered mental status) ICD-10-CM: R41.82  ICD-9-CM: 780.97  1/3/2023        Syncope ICD-10-CM: R55  ICD-9-CM: 780.2  5/8/2022        Abdominal pain ICD-10-CM: R10.9  ICD-9-CM: 789.00  12/13/2021        Hypotension due to drugs ICD-10-CM: I95.2  ICD-9-CM: 458.8, E947.9  10/21/2021        Chronic combined systolic and diastolic congestive heart failure ([facility] ICD-10-CM: I50.42  ICD-9-CM: 428.42, 428.0  4/10/2021        Atypical chest pain ICD-10-CM: R07.89  ICD-9-CM: 786.59  4/10/2021        Regional wall motion abnormality of heart ICD-10-CM: R93.1  ICD-9-CM: 793.2  4/10/2021        Vasovagal syncope ICD-10-CM: R55  ICD-9-CM: 780.2  4/10/2021        NSVT (nonsustained ventricular tachycardia) ICD-10-CM: I47.29  ICD-9-CM: 427.1  4/10/2021        Right knee pain ICD-10-CM: M25.561  ICD-9-CM: 719.46  1/4/2021        Generalized abdominal pain ICD-10-CM: R10.84  ICD-9-CM: 789.07  7/21/2020        Diarrhea ICD-10-CM: R19.7  ICD-9-CM: 787.91  7/21/2020        H/O Clostridium difficile infection ICD-10-CM: Z86.19  ICD-9-CM: V12.09  7/21/2020        History of bowel resection ICD-10-CM: Z90.49  ICD-9-CM: V15.89  4/21/2020        Elevated liver enzymes ICD-10-CM: R74.8  ICD-9-CM: 790.5  7/26/2019        NICM (nonischemic cardiomyopathy) ([facility].) ICD-10-CM: I42.8  ICD-9-CM: 425.4  7/26/2019        Hypothyroidism ICD-10-CM: E03.9  ICD-9-CM: 244.9  7/26/2019        H/O gastric bypass ICD-10-CM: Z98.84  ICD-9-CM: V45.86  7/26/2019        History of cholecystectomy ICD-10-CM: Z90.49  ICD-9-CM: V45.79  7/26/2019        History of appendectomy ICD-10-CM: Z90.49  ICD-9-CM: V45.89  7/26/2019        Neuropathy ICD-10-CM: G62.9  ICD-9-CM: 355.9  7/26/2019        Migraine headache ICD-10-CM: F98.869  ICD-9-CM: 346.90  7/26/2019        Seizures ([facility].) ICD-10-CM: R56.9  ICD-9-CM: 780.39  7/26/2019        Fibrocystic breast changes of both breasts ICD-10-CM: N60.11, N60.12  ICD-9-CM: 610.1  7/8/2015        Heart failure ([facility].) ICD-10-CM: I50.9  ICD-9-CM: 428.9  2005    Overview Signed 4/10/2021 12:43 PM by Sharmila Jessica MD     now combined systolic, diastolic, seen at [facility], [facility]- Dr Branden Villalobos:    Rena Whitten, was evaluated through a synchronous (real-time) audio-video encounter. The patient (or guardian if applicable) is aware that this is a billable service, which includes applicable co-pays. This Virtual Visit was conducted with patient's (and/or legal guardian's) consent.  The visit was conducted pursuant to the emergency declaration under the [address], 1135 waiver authority and the Coronavirus Preparedness and Response Supplemental Appropriations Act. Patient identification was verified, and a caregiver was present when appropriate. The patient was located at: Home  The provider was located at: [address] [facility] Cheryl Agustin returns to the [place] [address] for management of elevated liver enzymes. The active problem list, all pertinent past medical history, medications, radiologic findings and laboratory findings related to the liver disorder were reviewed with the patient. The patient is a 45 y.o. Black female who was found to have abnormalities in liver enzymes in 2018. ECHO performed 1/2021 demonstrated mildly reduced systolic function. LVEF 50-55%. This was recently repeated 5/2022 which demonstrated an LVEF of 45-50%. Grade 1 diastolic dysfunction. Mildly thickened tricuspid valve, Moderate regurgitation of pulmonic valve. Serologic evaluation for markers of chronic liver disease were negative. The patient underwent a liver biopsy in 12/2020 which demonstrated centrolobular sinusoidal dilation with no inflammaiton, steatosis or fibrosis. A single granuloma was present. Assessment of liver fibrosis was performed with Fibroscan 10/2021. The result was 3.7 kPa which correlates with no fibrosis. EGD was performed 12/2021 which demonstrated dyskinesia of the esophagus and a chronic gastric ulcer. She is currently on sucralfate, dexilant and rabeprazole. Since the last office visit the patient has been hospitalized several times with the last being 1/14/2023-1/22/2023 for AMS. There was confusion about what she should be taking with anti-seizure medication. This was changed in the hospital and symptoms have improved. The liver enzymes elevated and began improving. The patient has the following symptoms which are thought to be due to the liver disease:  fatigue, nausea, and diffuse abdominal pain. This is chronic.      The patient has not experienced the following symptoms:  Itching or swelling of the abdomen. The patient has Mild limitations in functional activities which can be attributed to other medical problems that are not related to the liver disease. ASSESSMENT AND PLAN:  Elevated liver enzymes  Intermittent elevation in liver transaminases and ALP. Serologic testing for causes of chronic liver disease were negative. A liver biopsy in 12/2020 demonstrated centrolobular sinusoidal dilation and a granuloma but no steatosis, no inflammaiton and no fibrosis. Assessment of liver fibrosis was performed with Fibroscan 10/2021. The result was 3.7 kPa which correlates with no fibrosis. Have performed laboratory testing to monitor liver function and degree of liver injury. This included BMP, hepatic panel, and CBC with platelet count. Laboratory testing ordered during hospitalization from 1/19/2023 reviewed in detail. Follow-up testing ordered today. The AST is normal. The ALT is elevated. The ALP is elevated. The liver function is normal. The platelet count is normal. The Albumin is depressed but this may be nutritional.     The IGG4 and GGT are normal.     MRI/MRCP 6/2020 demonstrated a normal appearing liver with no ductal dilatation or stones. The elevation in liver enzymes may be due to other underlying medical conditions or medications. Will continue to monitor this closely. Pulmonary Embolism  Acute 8/28/2022, asymptomatic. Apixaban 5 mg started and current dose is 5 mg BID    Anemia  The HGB has been low in the 10-11 range. Will order ferritin and iron sat     History of Cardiomyopathy  The patient was being followed by cardiology at [facility] for cardiomyopathy. ECHO performed 1/2021 demonstrated an LVEF of 50-55%. Mild left ventricular diastolic dysfunction. Mildly dilated left [facility]. She had a follow up with Dr. Raad Tenorio which felt the ECHO was normal and cardiomyopathy has resolved with medical treatment.    ECHO again performed 5/2022 which demonstrated an LVEF of 45-50%. Mild diastolic dysfunction. A follow up with cardiology is scheduled. Hepatic granuloma  This has little clinical significance. The mild elevation in ALP may be due to the granuloma. Hepatic granulomas are in most cases benign and do not cause liver injury. Screening for Hepatocellular Carcinoma  HCC screening is not necessary since the patient has no evidence of cirrhosis. Treatment of other medical problems in patients with chronic liver disease  There are no contraindications for the patient to take most medications that are necessary for treatment of other medical issues. Counseling for alcohol in patients with chronic liver disease  The patient was counseled regarding alcohol consumption and the effect of alcohol on chronic liver disease. Patients who have undergone obesity surgery are at much greater risk to develop alcoholic liver injury. She has never consumed significant amounts of alcohol. Vaccinations   Vaccination for viral hepatitis A and B is recommended since the patient has no serologic evidence of previous exposure or vaccination with immunity. Routine vaccinations against other bacterial and viral agents can be performed as indicated. Annual flu vaccination should be administered if indicated. ALLERGIES  Allergies   Allergen Reactions    Acetaminophen Other (comments)     Advised not to take due to Liver Issues    Morphine Other (comments)     SOB/Chest Pressure - in hosp for a week. States DILAUDID Ok    Oxycodone Shortness of Breath     Reaction Type: Allergy; Severity: Severe    Shellfish Derived Anaphylaxis    Codeine Other (comments)     SOB chest pain    Peanut Oil Other (comments)     Reaction Type:  Intolerance; Severity: Severe    Sulfa (Sulfonamide Antibiotics) Rash     itching       MEDICATIONS  Current Outpatient Medications   Medication Sig    OXcarbazepine (TRILEPTAL) 300 mg tablet Take 300 mg by mouth daily. cenobamate (Xcopri) 200 mg tab Take 200 mg by mouth daily for 5 days. Max Daily Amount: 787 mg.    eslicarbazepine (Aptiom) 800 mg tab tablet Take 1 Tablet by mouth daily for 5 days. levETIRAcetam (KEPPRA) 500 mg tablet Take 1 Tablet by mouth two (2) times a day for 30 days. Bedside Commode XX 3 in 1 bed side commode    hydrOXYzine pamoate (VISTARIL) 25 mg capsule Take 30 mg by mouth nightly as needed for Anxiety. rizatriptan (MAXALT-MLT) 10 mg disintegrating tablet Take 10 mg by mouth daily as needed for Migraine. Trelegy Ellipta 200-62.5-25 mcg inhaler Take 1 Puff by inhalation daily. famotidine (PEPCID) 40 mg tablet Take 40 mg by mouth daily. escitalopram oxalate (LEXAPRO) 20 mg tablet Take 20 mg by mouth daily. cyanocobalamin (VITAMIN B12) 1,000 mcg/mL injection 1,000 mcg by IntraMUSCular route every month. ondansetron (ZOFRAN ODT) 4 mg disintegrating tablet Take 1 Tablet by mouth every eight (8) hours as needed for Nausea or Vomiting.    ergocalciferol (ERGOCALCIFEROL) 1,250 mcg (50,000 unit) capsule Take 50,000 Units by mouth once. Every Friday    erenumab-aooe (Aimovig Autoinjector) 140 mg/mL injection 140 mg by SubCUTAneous route every thirty (30) days. nitroglycerin (NITROSTAT) 0.4 mg SL tablet DISSOLVE 1 TABLET UNDER THE TONGUE EVERY 5 MINUTES AS  NEEDED FOR CHEST PAIN. MAX  OF 3 TABLETS IN 15 MINUTES. CALL 911 IF PAIN PERSISTS. apixaban (ELIQUIS) 5 mg tablet Take 5 mg by mouth two (2) times a day. latanoprost (XALATAN) 0.005 % ophthalmic solution INSTILL 1 DROP INTO EACH EYE AT NIGHT    montelukast (SINGULAIR) 10 mg tablet Take 10 mg by mouth daily. nortriptyline (PAMELOR) 50 mg capsule Take 100 mg by mouth nightly. topiramate (TOPAMAX) 100 mg tablet Take 100 mg by mouth two (2) times a day. VENTOLIN HFA 90 mcg/actuation inhaler Take 2 Puffs by inhalation every four (4) hours as needed.     levothyroxine (SYNTHROID) 50 mcg tablet Take 50 mcg by mouth Daily (before breakfast). Calcium-Cholecalciferol, D3, 600 mg-10 mcg (400 unit) cap Take 2 Tablets by mouth daily. No current facility-administered medications for this visit. SYSTEM REVIEW NOT RELATED TO LIVER DISEASE OR REVIEWED ABOVE:  Constitution systems: Negative for fever, chills, weight gain, weight loss. Eyes: Negative for visual changes. ENT: Negative for sore throat, painful swallowing. Respiratory: Negative for cough, hemoptysis, SOB. Cardiology: Negative for chest pain, palpitations. GI:  Negative for constipation or diarrhea. : Negative for urinary frequency, dysuria, hematuria, nocturia. Skin: Negative for rash. Hematology: Negative for easy bruising, blood clots. Musculo-skeletal: Negative for back pain, muscle pain, weakness. Neurologic: Negative for headaches, dizziness, vertigo, memory problems not related to HE. Psychology: Negative for anxiety, depression. FAMILY HISTORY:  The father  Has/had the following following chronic disease(s): cancer. The mother Has/had the following chronic disease(s): MI.    There is no family history of liver disease. SOCIAL HISTORY:  The patient is . The patient has 4 children,   The patient has never used tobacco products. The patient has never consumed significant amounts of alcohol. The patient currently receiving disability. PHYSICAL EXAMINATION PERFORMED BY VIRTUAL TELEHEALTH:  VS: Not performed   General: No acute distress. Eyes: Sclera anicteric. ENT: No oral lesions. Skin: No rashes. spider angiomata. No jaundice. Abdomen: No obvious distention suggesting ascites. Extremities: No edema. No muscle wasting. Neurologic: Alert and oriented. Cranial nerves grossly intact.     LABORATORY STUDIES:  Liver [place] of [address] Units 1/19/2023   WBC 3.6 - 11.0 K/uL 6.6   ANC 1.8 - 8.0 K/UL 4.4   HGB 11.5 - 16.0 g/dL 10.6 (L)    - 400 K/uL 331     Liver [address] Ref Rng & Units 1/19/2023   AST 15 - 37 U/L 39 (H)   ALT 12 - 78 U/L 70   Alk Phos 45 - 117 U/L 154 (H)   Bili, Total 0.2 - 1.0 mg/dL 0.2   Bili, Direct 0.00 - 0.40 mg/dL    Albumin 3.5 - 5.0 g/dL 3.0 (L)   BUN 6 - 20 mg/dL 11   Creat 0.55 - 1.02 mg/dL 0.62   Na 136 - 145 mmol/L 141   K 3.5 - 5.1 mmol/L 4.0   Cl 97 - 108 mmol/L 114 (H)   CO2 21 - 32 mmol/L 21   Glucose 65 - 100 mg/dL 86   Magnesium 1.6 - 2.4 mg/dL      From: 8/30/2022   AST/ALT/ALP/T Bili/ALB:70/74/154/0.1/3.3  WBC/HB/PLT:3.4/11.2/179  NA/BUN/CREAT:137/12/0.66    6/07/2022. IGG4 10, GGT 54    Laboratory testing from 1/19/2023 reviewed in detail. Additional testing included to evaluate progression or regression of disease. Laboratory testing results from today will be communicated by My Chart. SEROLOGIES:  Serologies Latest Ref Rng & Units 7/26/2019   Hep A Ab, Total Negative Negative   Hep B Surface Ag Negative Negative   Hep B Core Ab, Total Negative Negative   Hep B Surface AB QL  Non Reactive   Hep C Ab 0.0 - 0.9 s/co ratio <0.1   Ferritin 15 - 150 ng/mL 14 (L)   Iron % Saturation 15 - 55 % 12 (L)   GULSHAN, IFA  Negative   ASMCA 0 - 19 Units 6   M2 Ab 0.0 - 20.0 Units <20.0   Ceruloplasmin 19.0 - 39.0 mg/dL 23.2   Alpha-1 antitrypsin level 90 - 200 mg/dL 99     Serologies Latest Ref Rng & Units 4/29/2020   C-ANCA Neg:<1:20 titer <1:20   P-ANCA Neg:<1:20 titer <1:20   ANCA Neg:<1:20 titer <1:20     LIVER HISTOLOGY:  8/2019. [facility]. EkPa was 3.3. IQR/med 9%. . The results suggested a fibrosis level of F0. The CAP score suggests no evidence of fatty liver. 12/2020. Liver biopsy Slides reviewed by STEFANO. Sinusoidal dilation. A single periportal granuloma. No inflammation, No steatosis, No fibrosis. 10/2021. FibroScan performed at [facility]. EkPa was 3.7. IQR/med 5%. . The results suggested a fibrosis level of F0.  The CAP score suggests there is no significant hepatic steatosis. ENDOSCOPIC PROCEDURES:  Not available or performed    RADIOLOGY:  2019. Ultrasound of liver. Normal appearing liver. No liver mass lesions. 2020. MRI/MRCP of abdomen. Normal appearance of the liver and biliary tree status post cholecystectomy. No evidence of primary sclerosing cholangitis or biliary pathology. 2021. Ultrasound of liver. Mild intrahepatic dilatation within normal limits post Cholecystectomy. Hepatic mass unchanged. Normal appearing liver. 10/2022. CT of Abdomen. Hepatic steatosis. Mild ductal dilatation s/p cholecystectomy. No concerning mass. There is a hypodensity that may be a hemangioma. OTHER TESTIN2022.   2022. CTA. Acute PE. PHTN     FOLLOW-UP AFTER VIRTUAL VISIT:  Pursuant to the emergency declaration under the [address] [place] [address] waiver authority and the [employer] Act, this Virtual  Visit was conducted, with the patient's (and/or their legal guardian's) consent, to reduce the patient's risk of exposure to COVID-19 and provide necessary medical care. Services were provided through a video synchronous discussion virtually to substitute for an in-person clinic visit. The patient was located in their home. The provider was located in the [place] [address] office. All of the issues listed above in the Assessment and Plan were discussed with the patient. All questions were answered. The patient expressed a clear understanding of the above. Because of the COVID-19 epidemic a follow-up appointment will be performed via TeleHealth in 6 months for ongoing monitoring and treatment. Orders to obtain laboratory testing will be mailed to the patient. GABRIEL Guadalupe-BC  [facility] 76 [address] [address] 7  [place]  22.  [address] [facility] HEALTH

## 2023-01-23 NOTE — PROGRESS NOTES
Identified pt with two pt identifiers(name and ). Reviewed record in preparation for visit and have obtained necessary documentation. Chief Complaint   Patient presents with    Follow-up      There were no vitals filed for this visit. Health Maintenance Review: Patient reminded of \"due or due soon\" health maintenance. I have asked the patient to contact his/her primary care provider (PCP) for follow-up on his/her health maintenance. Coordination of Care Questionnaire:  :   1) Have you been to an emergency room, urgent care, or hospitalized since your last visit? If yes, where when, and reason for visit? yes per pt she passed out       2. Have seen or consulted any other health care provider since your last visit? If yes, where when, and reason for visit? YES      Patient is accompanied by self   I have received verbal consent from Aashish Castro to discuss any/all medical information while they are present in the room.

## 2023-01-23 NOTE — Clinical Note
NOTIFICATION RETURN TO WORK / SCHOOL    1/23/2023 4:32 PM    Ms. [address] [facility] [address]      To Whom It May Concern:    Keiry Green is currently under the care of [address]. She will return to work/school on: ***    If there are questions or concerns please have the patient contact our office.         Sincerely,      Perla Fong NP

## 2023-02-07 ENCOUNTER — TRANSCRIBE ORDER (OUTPATIENT)
Dept: REGISTRATION | Age: 53
End: 2023-02-07

## 2023-02-07 DIAGNOSIS — G40.909 EPILEPSY (HCC): Primary | ICD-10-CM

## 2023-02-27 ENCOUNTER — HOSPITAL ENCOUNTER (OUTPATIENT)
Dept: MRI IMAGING | Age: 53
Discharge: HOME | End: 2023-02-27
Payer: MEDICARE

## 2023-02-27 DIAGNOSIS — G40.909 EPILEPSY (HCC): ICD-10-CM

## 2023-02-27 DIAGNOSIS — G81.91 ACUTE RIGHT HEMIPARESIS (HCC): ICD-10-CM

## 2023-02-27 PROCEDURE — 70553 MRI BRAIN STEM W/O & W/DYE: CPT

## 2023-02-27 PROCEDURE — 74011250636 HC RX REV CODE- 250/636

## 2023-02-27 PROCEDURE — A9577 INJ MULTIHANCE: HCPCS

## 2023-02-27 RX ADMIN — GADOBENATE DIMEGLUMINE 20 ML: 529 INJECTION, SOLUTION INTRAVENOUS at 10:36

## 2023-04-25 ENCOUNTER — TRANSCRIBE ORDER (OUTPATIENT)
Dept: SCHEDULING | Age: 53
End: 2023-04-25

## 2023-04-25 DIAGNOSIS — K31.84 GASTROPARESIS: Primary | ICD-10-CM

## 2023-05-08 ENCOUNTER — TRANSCRIBE ORDERS (OUTPATIENT)
Facility: HOSPITAL | Age: 53
End: 2023-05-08

## 2023-05-08 DIAGNOSIS — K31.84 GASTROPARESIS: Primary | ICD-10-CM

## 2023-05-23 ENCOUNTER — TELEPHONE (OUTPATIENT)
Age: 53
End: 2023-05-23

## 2023-05-23 NOTE — TELEPHONE ENCOUNTER
Patient did not show for appointment today  Appears that she is following with [facility] cardiology/Dr. Didier Rush

## 2023-06-06 ENCOUNTER — HOSPITAL ENCOUNTER (EMERGENCY)
Facility: HOSPITAL | Age: 53
Discharge: HOME | End: 2023-06-07
Attending: STUDENT IN AN ORGANIZED HEALTH CARE EDUCATION/TRAINING PROGRAM
Payer: MEDICARE

## 2023-06-06 DIAGNOSIS — R56.9 SEIZURE (HCC): Primary | ICD-10-CM

## 2023-06-06 PROCEDURE — 99285 EMERGENCY DEPT VISIT HI MDM: CPT

## 2023-06-06 PROCEDURE — 99284 EMERGENCY DEPT VISIT MOD MDM: CPT

## 2023-06-06 ASSESSMENT — PAIN - FUNCTIONAL ASSESSMENT: PAIN_FUNCTIONAL_ASSESSMENT: NONE - DENIES PAIN

## 2023-06-07 ENCOUNTER — APPOINTMENT (OUTPATIENT)
Facility: HOSPITAL | Age: 53
End: 2023-06-07
Payer: MEDICARE

## 2023-06-07 VITALS
DIASTOLIC BLOOD PRESSURE: 76 MMHG | RESPIRATION RATE: 14 BRPM | WEIGHT: 190 LBS | OXYGEN SATURATION: 98 % | HEIGHT: 65 IN | SYSTOLIC BLOOD PRESSURE: 124 MMHG | BODY MASS INDEX: 31.65 KG/M2 | HEART RATE: 67 BPM | TEMPERATURE: 97.7 F

## 2023-06-07 LAB
ALBUMIN SERPL-MCNC: 3.4 G/DL (ref 3.5–5)
ALBUMIN/GLOB SERPL: 1.1 (ref 1.1–2.2)
ALP SERPL-CCNC: 176 U/L (ref 45–117)
ALT SERPL-CCNC: 140 U/L (ref 12–78)
ANION GAP SERPL CALC-SCNC: 6 MMOL/L (ref 5–15)
APPEARANCE UR: ABNORMAL
AST SERPL W P-5'-P-CCNC: 85 U/L (ref 15–37)
BACTERIA URNS QL MICRO: NEGATIVE /HPF
BASOPHILS # BLD: 0 K/UL (ref 0–0.1)
BASOPHILS NFR BLD: 1 % (ref 0–1)
BILIRUB SERPL-MCNC: 0.2 MG/DL (ref 0.2–1)
BILIRUB UR QL: NEGATIVE
BUN SERPL-MCNC: 18 MG/DL (ref 6–20)
BUN/CREAT SERPL: 20 (ref 12–20)
CA-I BLD-MCNC: 8.1 MG/DL (ref 8.5–10.1)
CAOX CRY URNS QL MICRO: ABNORMAL
CHLORIDE SERPL-SCNC: 103 MMOL/L (ref 97–108)
CO2 SERPL-SCNC: 25 MMOL/L (ref 21–32)
COLOR UR: ABNORMAL
CREAT SERPL-MCNC: 0.88 MG/DL (ref 0.55–1.02)
DIFFERENTIAL METHOD BLD: ABNORMAL
EOSINOPHIL # BLD: 0.2 K/UL (ref 0–0.4)
EOSINOPHIL NFR BLD: 3 % (ref 0–7)
EPITH CASTS URNS QL MICRO: ABNORMAL /LPF
ERYTHROCYTE [DISTWIDTH] IN BLOOD BY AUTOMATED COUNT: 16.1 % (ref 11.5–14.5)
GLOBULIN SER CALC-MCNC: 3 G/DL (ref 2–4)
GLUCOSE SERPL-MCNC: 104 MG/DL (ref 65–100)
GLUCOSE UR STRIP.AUTO-MCNC: NEGATIVE MG/DL
HCT VFR BLD AUTO: 32.9 % (ref 35–47)
HGB BLD-MCNC: 10.7 G/DL (ref 11.5–16)
HGB UR QL STRIP: NEGATIVE
HYALINE CASTS URNS QL MICRO: ABNORMAL /LPF (ref 0–5)
IMM GRANULOCYTES # BLD AUTO: 0 K/UL (ref 0–0.04)
IMM GRANULOCYTES NFR BLD AUTO: 0 % (ref 0–0.5)
KETONES UR QL STRIP.AUTO: NEGATIVE MG/DL
LEUKOCYTE ESTERASE UR QL STRIP.AUTO: NEGATIVE
LYMPHOCYTES # BLD: 2 K/UL (ref 0.8–3.5)
LYMPHOCYTES NFR BLD: 35 % (ref 12–49)
MCH RBC QN AUTO: 27.2 PG (ref 26–34)
MCHC RBC AUTO-ENTMCNC: 32.5 G/DL (ref 30–36.5)
MCV RBC AUTO: 83.5 FL (ref 80–99)
MONOCYTES # BLD: 0.5 K/UL (ref 0–1)
MONOCYTES NFR BLD: 8 % (ref 5–13)
MUCOUS THREADS URNS QL MICRO: ABNORMAL /LPF
NEUTS SEG # BLD: 3 K/UL (ref 1.8–8)
NEUTS SEG NFR BLD: 53 % (ref 32–75)
NITRITE UR QL STRIP.AUTO: NEGATIVE
NRBC # BLD: 0 K/UL (ref 0–0.01)
NRBC BLD-RTO: 0 PER 100 WBC
PH UR STRIP: 5 (ref 5–8)
PLATELET # BLD AUTO: 238 K/UL (ref 150–400)
PMV BLD AUTO: 10 FL (ref 8.9–12.9)
POTASSIUM SERPL-SCNC: 3.6 MMOL/L (ref 3.5–5.1)
PROT SERPL-MCNC: 6.4 G/DL (ref 6.4–8.2)
PROT UR STRIP-MCNC: NEGATIVE MG/DL
RBC # BLD AUTO: 3.94 M/UL (ref 3.8–5.2)
RBC #/AREA URNS HPF: ABNORMAL /HPF (ref 0–5)
SODIUM SERPL-SCNC: 134 MMOL/L (ref 136–145)
SP GR UR REFRACTOMETRY: 1.02 (ref 1–1.03)
URINE CULTURE IF INDICATED: ABNORMAL
UROBILINOGEN UR QL STRIP.AUTO: 0.1 EU/DL (ref 0.1–1)
WBC # BLD AUTO: 5.7 K/UL (ref 3.6–11)
WBC URNS QL MICRO: ABNORMAL /HPF (ref 0–4)

## 2023-06-07 PROCEDURE — 36415 COLL VENOUS BLD VENIPUNCTURE: CPT

## 2023-06-07 PROCEDURE — 85025 COMPLETE CBC W/AUTO DIFF WBC: CPT

## 2023-06-07 PROCEDURE — 70450 CT HEAD/BRAIN W/O DYE: CPT

## 2023-06-07 PROCEDURE — 81001 URINALYSIS AUTO W/SCOPE: CPT

## 2023-06-07 PROCEDURE — 71045 X-RAY EXAM CHEST 1 VIEW: CPT

## 2023-06-07 PROCEDURE — 80053 COMPREHEN METABOLIC PANEL: CPT

## 2023-06-07 RX ORDER — TRAMADOL HYDROCHLORIDE 50 MG/1
50 TABLET ORAL EVERY 6 HOURS PRN
COMMUNITY

## 2023-06-07 RX ORDER — ACARBOSE 25 MG/1
100 TABLET ORAL
COMMUNITY

## 2023-06-07 ASSESSMENT — LIFESTYLE VARIABLES
HOW MANY STANDARD DRINKS CONTAINING ALCOHOL DO YOU HAVE ON A TYPICAL DAY: PATIENT DOES NOT DRINK
HOW OFTEN DO YOU HAVE A DRINK CONTAINING ALCOHOL: NEVER

## 2023-06-07 ASSESSMENT — PAIN - FUNCTIONAL ASSESSMENT: PAIN_FUNCTIONAL_ASSESSMENT: NONE - DENIES PAIN

## 2023-06-07 NOTE — ED NOTES
Change of shift report given to [facility].      [facility]  [facility]
IV in [address] removed c bandaid pressure type drsg applied     Carlos Carmichael, [place]  06/07/23 6636
Patient up in wheelchair to bathroom. Able to provide urine sample but still very weak at this time.      [facility] [address]  29/42/38 1875
Pt placed on continuous cardiac monitoring, pulse ox, and blood pressure monitoring at this time.         Mike Cartwright RN  06/07/23 6355
unknown

## 2023-06-07 NOTE — ED TRIAGE NOTES
Pt from home. Pt had a seizure yesterday and fell and hit her head. No head pain complaint. Family called for pt being unresponsive. When EMS arrived pt talked to them and walked.

## 2023-06-07 NOTE — ED PROVIDER NOTES
mouth every 6 hours as needed for Pain. Max Daily Amount: 200 mg      acarbose (PRECOSE) 25 MG tablet Take 4 tablets by mouth 3 times daily (with meals)      albuterol sulfate HFA (VENTOLIN HFA) 108 (90 Base) MCG/ACT inhaler Inhale 2 puffs into the lungs every 4 hours as needed      apixaban (ELIQUIS) 5 MG TABS tablet Take 5 mg by mouth 2 times daily      Calcium Carb-Cholecalciferol 600-10 MG-MCG CAPS Take 2 tablets by mouth daily      cyanocobalamin 1000 MCG/ML injection Inject 1,000 mcg into the muscle      Erenumab-aooe (AIMOVIG) 140 MG/ML SOAJ Inject 140 mg into the skin every 30 days      ergocalciferol (ERGOCALCIFEROL) 1.25 MG (64671 UT) capsule Take 50,000 Units by mouth once      escitalopram (LEXAPRO) 20 MG tablet Take 20 mg by mouth daily      famotidine (PEPCID) 40 MG tablet Take 40 mg by mouth daily      fluticasone-umeclidin-vilant (TRELEGY ELLIPTA) 200-62.5-25 MCG/ACT AEPB inhaler Inhale 1 puff into the lungs daily      hydrOXYzine pamoate (VISTARIL) 25 MG capsule Take 30 mg by mouth      latanoprost (XALATAN) 0.005 % ophthalmic solution INSTILL 1 DROP INTO EACH EYE AT NIGHT      levothyroxine (SYNTHROID) 50 MCG tablet Take 2.5 tablets by mouth every morning (before breakfast)      montelukast (SINGULAIR) 10 MG tablet Take 10 mg by mouth daily      nitroGLYCERIN (NITROSTAT) 0.4 MG SL tablet DISSOLVE 1 TABLET UNDER THE TONGUE EVERY 5 MINUTES AS  NEEDED FOR CHEST PAIN. MAX  OF 3 TABLETS IN 15 MINUTES. CALL 911 IF PAIN PERSISTS.       nortriptyline (PAMELOR) 50 MG capsule Take 100 mg by mouth nightly      ondansetron (ZOFRAN-ODT) 4 MG disintegrating tablet Take 4 mg by mouth every 8 hours as needed      OXcarbazepine (TRILEPTAL) 300 MG tablet Take 300 mg by mouth daily      rizatriptan (MAXALT-MLT) 10 MG disintegrating tablet Take 10 mg by mouth daily as needed      topiramate (TOPAMAX) 100 MG tablet Take 100 mg by mouth 2 times daily         Social Determinants of Health:   Social Determinants of Health

## 2023-06-18 ENCOUNTER — APPOINTMENT (OUTPATIENT)
Facility: HOSPITAL | Age: 53
End: 2023-06-18
Payer: MEDICARE

## 2023-06-18 ENCOUNTER — HOSPITAL ENCOUNTER (EMERGENCY)
Facility: HOSPITAL | Age: 53
Discharge: HOME | End: 2023-06-18
Attending: FAMILY MEDICINE
Payer: MEDICARE

## 2023-06-18 ENCOUNTER — HOSPITAL ENCOUNTER (EMERGENCY)
Facility: HOSPITAL | Age: 53
Discharge: HOME | End: 2023-06-19
Attending: STUDENT IN AN ORGANIZED HEALTH CARE EDUCATION/TRAINING PROGRAM
Payer: MEDICARE

## 2023-06-18 VITALS
RESPIRATION RATE: 18 BRPM | DIASTOLIC BLOOD PRESSURE: 55 MMHG | HEIGHT: 65 IN | BODY MASS INDEX: 31.49 KG/M2 | HEART RATE: 94 BPM | SYSTOLIC BLOOD PRESSURE: 111 MMHG | WEIGHT: 189 LBS | TEMPERATURE: 97.7 F | OXYGEN SATURATION: 99 %

## 2023-06-18 DIAGNOSIS — J40 BRONCHITIS: Primary | ICD-10-CM

## 2023-06-18 DIAGNOSIS — G40.919 BREAKTHROUGH SEIZURE (HCC): Primary | ICD-10-CM

## 2023-06-18 DIAGNOSIS — J06.9 URI WITH COUGH AND CONGESTION: ICD-10-CM

## 2023-06-18 PROCEDURE — 93005 ELECTROCARDIOGRAM TRACING: CPT | Performed by: STUDENT IN AN ORGANIZED HEALTH CARE EDUCATION/TRAINING PROGRAM

## 2023-06-18 PROCEDURE — 99285 EMERGENCY DEPT VISIT HI MDM: CPT

## 2023-06-18 PROCEDURE — 71045 X-RAY EXAM CHEST 1 VIEW: CPT

## 2023-06-18 RX ORDER — BENZONATATE 100 MG/1
100 CAPSULE ORAL 3 TIMES DAILY PRN
Qty: 15 CAPSULE | Refills: 0 | Status: ON HOLD | OUTPATIENT
Start: 2023-06-18 | End: 2023-06-25

## 2023-06-18 ASSESSMENT — PAIN DESCRIPTION - ORIENTATION: ORIENTATION: LEFT

## 2023-06-18 ASSESSMENT — PAIN - FUNCTIONAL ASSESSMENT
PAIN_FUNCTIONAL_ASSESSMENT: NONE - DENIES PAIN
PAIN_FUNCTIONAL_ASSESSMENT: 0-10

## 2023-06-18 ASSESSMENT — PAIN SCALES - GENERAL: PAINLEVEL_OUTOF10: 5

## 2023-06-18 ASSESSMENT — PAIN DESCRIPTION - LOCATION: LOCATION: ARM

## 2023-06-19 ENCOUNTER — APPOINTMENT (OUTPATIENT)
Facility: HOSPITAL | Age: 53
End: 2023-06-19
Payer: MEDICARE

## 2023-06-19 VITALS
DIASTOLIC BLOOD PRESSURE: 72 MMHG | HEART RATE: 73 BPM | BODY MASS INDEX: 31.49 KG/M2 | OXYGEN SATURATION: 96 % | RESPIRATION RATE: 13 BRPM | TEMPERATURE: 98.3 F | WEIGHT: 189 LBS | SYSTOLIC BLOOD PRESSURE: 101 MMHG | HEIGHT: 65 IN

## 2023-06-19 LAB
ANION GAP SERPL CALC-SCNC: 6 MMOL/L (ref 5–15)
BASOPHILS # BLD: 0 K/UL (ref 0–0.1)
BASOPHILS NFR BLD: 1 % (ref 0–1)
BUN SERPL-MCNC: 16 MG/DL (ref 6–20)
BUN/CREAT SERPL: 18 (ref 12–20)
CA-I BLD-MCNC: 8.2 MG/DL (ref 8.5–10.1)
CHLORIDE SERPL-SCNC: 102 MMOL/L (ref 97–108)
CO2 SERPL-SCNC: 26 MMOL/L (ref 21–32)
CREAT SERPL-MCNC: 0.88 MG/DL (ref 0.55–1.02)
DIFFERENTIAL METHOD BLD: ABNORMAL
EKG ATRIAL RATE: 79 BPM
EKG DIAGNOSIS: NORMAL
EKG P AXIS: 49 DEGREES
EKG P-R INTERVAL: 196 MS
EKG Q-T INTERVAL: 368 MS
EKG QRS DURATION: 96 MS
EKG QTC CALCULATION (BAZETT): 421 MS
EKG R AXIS: 13 DEGREES
EKG T AXIS: -9 DEGREES
EKG VENTRICULAR RATE: 79 BPM
EOSINOPHIL # BLD: 0.4 K/UL (ref 0–0.4)
EOSINOPHIL NFR BLD: 10 % (ref 0–7)
ERYTHROCYTE [DISTWIDTH] IN BLOOD BY AUTOMATED COUNT: 16.1 % (ref 11.5–14.5)
GLUCOSE SERPL-MCNC: 79 MG/DL (ref 65–100)
HCT VFR BLD AUTO: 31.8 % (ref 35–47)
HGB BLD-MCNC: 10.3 G/DL (ref 11.5–16)
IMM GRANULOCYTES # BLD AUTO: 0 K/UL (ref 0–0.04)
IMM GRANULOCYTES NFR BLD AUTO: 0 % (ref 0–0.5)
LYMPHOCYTES # BLD: 1.6 K/UL (ref 0.8–3.5)
LYMPHOCYTES NFR BLD: 36 % (ref 12–49)
MCH RBC QN AUTO: 27.5 PG (ref 26–34)
MCHC RBC AUTO-ENTMCNC: 32.4 G/DL (ref 30–36.5)
MCV RBC AUTO: 84.8 FL (ref 80–99)
MONOCYTES # BLD: 0.5 K/UL (ref 0–1)
MONOCYTES NFR BLD: 13 % (ref 5–13)
NEUTS SEG # BLD: 1.8 K/UL (ref 1.8–8)
NEUTS SEG NFR BLD: 40 % (ref 32–75)
NRBC # BLD: 0 K/UL (ref 0–0.01)
NRBC BLD-RTO: 0 PER 100 WBC
PLATELET # BLD AUTO: 220 K/UL (ref 150–400)
PMV BLD AUTO: 10.2 FL (ref 8.9–12.9)
POTASSIUM SERPL-SCNC: 4.7 MMOL/L (ref 3.5–5.1)
RBC # BLD AUTO: 3.75 M/UL (ref 3.8–5.2)
SODIUM SERPL-SCNC: 134 MMOL/L (ref 136–145)
WBC # BLD AUTO: 4.3 K/UL (ref 3.6–11)

## 2023-06-19 PROCEDURE — 71045 X-RAY EXAM CHEST 1 VIEW: CPT

## 2023-06-19 PROCEDURE — 70450 CT HEAD/BRAIN W/O DYE: CPT

## 2023-06-19 PROCEDURE — 80048 BASIC METABOLIC PNL TOTAL CA: CPT

## 2023-06-19 PROCEDURE — 36415 COLL VENOUS BLD VENIPUNCTURE: CPT

## 2023-06-19 PROCEDURE — 6370000000 HC RX 637 (ALT 250 FOR IP): Performed by: STUDENT IN AN ORGANIZED HEALTH CARE EDUCATION/TRAINING PROGRAM

## 2023-06-19 PROCEDURE — 85025 COMPLETE CBC W/AUTO DIFF WBC: CPT

## 2023-06-19 RX ORDER — OXCARBAZEPINE 150 MG/1
150 TABLET, FILM COATED ORAL ONCE
Status: COMPLETED | OUTPATIENT
Start: 2023-06-19 | End: 2023-06-19

## 2023-06-19 RX ADMIN — OXCARBAZEPINE 150 MG: 150 TABLET, FILM COATED ORAL at 01:46

## 2023-06-19 ASSESSMENT — PAIN SCALES - GENERAL
PAINLEVEL_OUTOF10: 5
PAINLEVEL_OUTOF10: 3

## 2023-06-19 ASSESSMENT — PAIN DESCRIPTION - LOCATION
LOCATION: ARM
LOCATION: SHOULDER

## 2023-06-19 ASSESSMENT — PAIN DESCRIPTION - ORIENTATION
ORIENTATION: LEFT
ORIENTATION: LEFT

## 2023-06-19 ASSESSMENT — PAIN - FUNCTIONAL ASSESSMENT: PAIN_FUNCTIONAL_ASSESSMENT: 0-10

## 2023-06-19 NOTE — ED TRIAGE NOTES
Family states she was rocking back and forth then started foaming out the mouth they gave intranasal diazapam 10 mg. Upon arrival pt is groggy but able to answer questions. Pt presents with sling on left arm and was seen at another hospital today for bronchitis.

## 2023-06-19 NOTE — ED NOTES
Ambulation test completed on pt with  slippers, gait belt, and a walker. Pt able to ambulate around the room and in the hallway.  Pt states she feels comfortable going home     Rodrigo Richardson RN  06/19/23 0166

## 2023-06-19 NOTE — ED PROVIDER NOTES
[phone]  EMERGENCY DEPARTMENT ENCOUNTER NOTE    Date: 2023  Patient Name: Vance Montes    History of Presenting Illness     Chief Complaint   Patient presents with    Seizures       History obtained from: Patient    HPI: Vance Montes, 52 y.o. female with past medical history as listed and reviewed below presents for seizure-like activity. Patient was reported having seizure-like activity:    - Seizure description: petite mal - foaming of mouth while sitting down. No trauma  - Seizure onset: PTP  - Number of seizures: 1  - Seizure : 10mg diazepam PTP by family  - Post-ictal phase: was present, now resolved, mild grogginess due to diazepam  - Trauma: no  - Current symptoms: none  - History of seizures: yes  - Seizure medications: compliant. Currently the patient does not have any weakness, numbness, focal deficits, significant headache, vomiting, or other complaints. Post-ictal state not currently present.     Medical History   I reviewed the medical, surgical, family, and social history, as well as allergies:    PCP: Gayle Sales MD    Past Medical History:  Past Medical History:   Diagnosis Date    Arthritis     pt states knees and back    Asthma     Cardiomyopathy ([address]     EF 35%, echo in  normal EF     Colon polyp     Diabetes ([address]     Pt states not diabetic but has all the symptoms of it    Dizzy spells     GERD (gastroesophageal reflux disease)     Heart failure ([address]     now combined systolic, diastolic, seen at [facility], [facility] - Dr Oren Mata    Irritable bowel syndrome     Knee pain, bilateral     Migraines     2-3 week    Neuropathy     pt states of both feet    Pulmonary embolism [facility])     2022    Seizures ([address]     petite mals last seizure 2-3 months ago    Thyroid disease     hypothyroid     Past Surgical History:  Past Surgical History:   Procedure Laterality Date    CHOLECYSTECTOMY  2006

## 2023-06-19 NOTE — DISCHARGE INSTRUCTIONS
Thank you! Thank you for allowing me to care for you in the emergency department. I sincerely hope that you are satisfied with your visit today. It is my goal to provide you with excellent care. Below you will find a list of your labs and imaging from your visit today if applicable. Should you have any questions regarding these results please do not hesitate to call the emergency department. Please review [employer] for a more detailed result list since the below list may not be comprehensive. Instructions on how to sign up to [employer] should be provided in this packet.     Labs -  Recent Results (from the past 12 hour(s))   CBC with Auto Differential    Collection Time: 06/19/23 12:03 AM   Result Value Ref Range    WBC 4.3 3.6 - 11.0 K/uL    RBC 3.75 (L) 3.80 - 5.20 M/uL    Hemoglobin 10.3 (L) 11.5 - 16.0 g/dL    Hematocrit 31.8 (L) 35.0 - 47.0 %    MCV 84.8 80.0 - 99.0 FL    MCH 27.5 26.0 - 34.0 PG    MCHC 32.4 30.0 - 36.5 g/dL    RDW 16.1 (H) 11.5 - 14.5 %    Platelets 316 471 - 985 K/uL    MPV 10.2 8.9 - 12.9 FL    Nucleated RBCs 0.0 0.0  WBC    nRBC 0.00 0.00 - 0.01 K/uL    Neutrophils % 40 32 - 75 %    Lymphocytes % 36 12 - 49 %    Monocytes % 13 5 - 13 %    Eosinophils % 10 (H) 0 - 7 %    Basophils % 1 0 - 1 %    Immature Granulocytes 0 0 - 0.5 %    Neutrophils Absolute 1.8 1.8 - 8.0 K/UL    Lymphocytes Absolute 1.6 0.8 - 3.5 K/UL    Monocytes Absolute 0.5 0.0 - 1.0 K/UL    Eosinophils Absolute 0.4 0.0 - 0.4 K/UL    Basophils Absolute 0.0 0.0 - 0.1 K/UL    Absolute Immature Granulocyte 0.0 0.00 - 0.04 K/UL    Differential Type AUTOMATED     Basic Metabolic Panel    Collection Time: 06/19/23 12:03 AM   Result Value Ref Range    Sodium 134 (L) 136 - 145 mmol/L    Potassium 4.7 3.5 - 5.1 mmol/L    Chloride 102 97 - 108 mmol/L    CO2 26 21 - 32 mmol/L    Anion Gap 6 5 - 15 mmol/L    Glucose 79 65 - 100 mg/dL    BUN 16 6 - 20 mg/dL    Creatinine 0.88 0.55 - 1.02 mg/dL    Bun/Cre Ratio 18 12 - 20

## 2023-06-19 NOTE — ED NOTES
Pt stated that she was being threatened by EMS that they were gong to take her to the [facility]. Pt's daughter arrived and this writer stepped out to get a warm blanket and when the write returned, the pt had told her daughter that this Chad Alvarado had threatened to put her in a panic room and that the dr has given her permission to leave.           Tino Trinidad RN  06/19/23 1253

## 2023-06-25 ENCOUNTER — APPOINTMENT (OUTPATIENT)
Facility: HOSPITAL | Age: 53
DRG: 080 | End: 2023-06-25
Payer: MEDICARE

## 2023-06-25 ENCOUNTER — HOSPITAL ENCOUNTER (INPATIENT)
Facility: HOSPITAL | Age: 53
LOS: 4 days | Discharge: HOME HEALTH | DRG: 080 | End: 2023-06-29
Attending: EMERGENCY MEDICINE | Admitting: ANESTHESIOLOGY
Payer: MEDICARE

## 2023-06-25 DIAGNOSIS — R41.82 ALTERED MENTAL STATUS, UNSPECIFIED ALTERED MENTAL STATUS TYPE: Primary | ICD-10-CM

## 2023-06-25 PROBLEM — E03.9 MYXEDEMA: Status: ACTIVE | Noted: 2023-06-25

## 2023-06-25 LAB
ALBUMIN SERPL-MCNC: 3.7 G/DL (ref 3.5–5)
ALBUMIN/GLOB SERPL: 1.1 (ref 1.1–2.2)
ALP SERPL-CCNC: 196 U/L (ref 45–117)
ALT SERPL-CCNC: 181 U/L (ref 12–78)
AMMONIA PLAS-SCNC: 21 UMOL/L
ANION GAP SERPL CALC-SCNC: 3 MMOL/L (ref 5–15)
ARTERIAL PATENCY WRIST A: ABNORMAL
ARTERIAL PATENCY WRIST A: POSITIVE
AST SERPL-CCNC: 146 U/L (ref 15–37)
BASE DEFICIT BLD-SCNC: 0.2 MMOL/L
BASE DEFICIT BLD-SCNC: 0.9 MMOL/L
BASOPHILS # BLD: 0 K/UL (ref 0–0.1)
BASOPHILS NFR BLD: 1 % (ref 0–1)
BDY SITE: ABNORMAL
BDY SITE: ABNORMAL
BILIRUB SERPL-MCNC: 0.2 MG/DL (ref 0.2–1)
BUN SERPL-MCNC: 13 MG/DL (ref 6–20)
BUN/CREAT SERPL: 13 (ref 12–20)
CA-I BLD-SCNC: 1.2 MMOL/L (ref 1.12–1.32)
CALCIUM SERPL-MCNC: 8.7 MG/DL (ref 8.5–10.1)
CHLORIDE SERPL-SCNC: 99 MMOL/L (ref 97–108)
CO2 SERPL-SCNC: 28 MMOL/L (ref 21–32)
COMMENT:: NORMAL
CORTIS SERPL-MCNC: 2.9 UG/DL
CREAT SERPL-MCNC: 1.04 MG/DL (ref 0.55–1.02)
DIFFERENTIAL METHOD BLD: ABNORMAL
EOSINOPHIL # BLD: 0.4 K/UL (ref 0–0.4)
EOSINOPHIL NFR BLD: 8 % (ref 0–7)
ERYTHROCYTE [DISTWIDTH] IN BLOOD BY AUTOMATED COUNT: 15.7 % (ref 11.5–14.5)
ETHANOL SERPL-MCNC: <10 MG/DL (ref 0–0.08)
ETHANOL SERPL-MCNC: <10 MG/DL (ref 0–0.08)
GAS FLOW.O2 O2 DELIVERY SYS: ABNORMAL
GAS FLOW.O2 O2 DELIVERY SYS: ABNORMAL
GLOBULIN SER CALC-MCNC: 3.4 G/DL (ref 2–4)
GLUCOSE BLD STRIP.AUTO-MCNC: 114 MG/DL (ref 65–117)
GLUCOSE BLD STRIP.AUTO-MCNC: 70 MG/DL (ref 65–117)
GLUCOSE BLD STRIP.AUTO-MCNC: 82 MG/DL (ref 65–117)
GLUCOSE SERPL-MCNC: 88 MG/DL (ref 65–100)
HCO3 BLD-SCNC: 24.8 MMOL/L (ref 22–26)
HCO3 BLD-SCNC: 25 MMOL/L (ref 22–26)
HCT VFR BLD AUTO: 37.1 % (ref 35–47)
HGB BLD-MCNC: 12.1 G/DL (ref 11.5–16)
IMM GRANULOCYTES # BLD AUTO: 0 K/UL (ref 0–0.04)
IMM GRANULOCYTES NFR BLD AUTO: 0 % (ref 0–0.5)
INR PPP: 1 (ref 0.9–1.1)
LACTATE SERPL-SCNC: 1.3 MMOL/L (ref 0.4–2)
LIPASE SERPL-CCNC: 85 U/L (ref 73–393)
LYMPHOCYTES # BLD: 2.2 K/UL (ref 0.8–3.5)
LYMPHOCYTES NFR BLD: 40 % (ref 12–49)
MCH RBC QN AUTO: 27 PG (ref 26–34)
MCHC RBC AUTO-ENTMCNC: 32.6 G/DL (ref 30–36.5)
MCV RBC AUTO: 82.8 FL (ref 80–99)
MONOCYTES # BLD: 0.4 K/UL (ref 0–1)
MONOCYTES NFR BLD: 7 % (ref 5–13)
NEUTS SEG # BLD: 2.4 K/UL (ref 1.8–8)
NEUTS SEG NFR BLD: 44 % (ref 32–75)
NRBC # BLD: 0 K/UL (ref 0–0.01)
NRBC BLD-RTO: 0 PER 100 WBC
PCO2 BLD: 41.9 MMHG (ref 35–45)
PCO2 BLD: 44.3 MMHG (ref 35–45)
PH BLD: 7.36 (ref 7.35–7.45)
PH BLD: 7.38 (ref 7.35–7.45)
PLATELET # BLD AUTO: 287 K/UL (ref 150–400)
PMV BLD AUTO: 9.6 FL (ref 8.9–12.9)
PO2 BLD: 304 MMHG (ref 80–100)
PO2 BLD: 69 MMHG (ref 80–100)
POTASSIUM SERPL-SCNC: 3.9 MMOL/L (ref 3.5–5.1)
PROT SERPL-MCNC: 7.1 G/DL (ref 6.4–8.2)
PROTHROMBIN TIME: 10.6 SEC (ref 9–11.1)
RBC # BLD AUTO: 4.48 M/UL (ref 3.8–5.2)
SAO2 % BLD: 92.7 % (ref 92–97)
SAO2 % BLD: 99.9 % (ref 92–97)
SERVICE CMNT-IMP: NORMAL
SODIUM SERPL-SCNC: 130 MMOL/L (ref 136–145)
SPECIMEN HOLD: NORMAL
SPECIMEN TYPE: ABNORMAL
SPECIMEN TYPE: ABNORMAL
T3FREE SERPL-MCNC: 0.8 PG/ML (ref 2.2–4)
T4 FREE SERPL-MCNC: 0.4 NG/DL (ref 0.8–1.5)
TROPONIN I SERPL HS-MCNC: <3 NG/L (ref 0–37)
TSH SERPL DL<=0.05 MIU/L-ACNC: 25.2 UIU/ML (ref 0.36–3.74)
WBC # BLD AUTO: 5.5 K/UL (ref 3.6–11)

## 2023-06-25 PROCEDURE — APPNB180 APP NON BILLABLE TIME > 60 MINS: Performed by: NURSE PRACTITIONER

## 2023-06-25 PROCEDURE — 36415 COLL VENOUS BLD VENIPUNCTURE: CPT

## 2023-06-25 PROCEDURE — 2580000003 HC RX 258: Performed by: NURSE PRACTITIONER

## 2023-06-25 PROCEDURE — 31500 INSERT EMERGENCY AIRWAY: CPT

## 2023-06-25 PROCEDURE — 6360000002 HC RX W HCPCS

## 2023-06-25 PROCEDURE — 0042T CT BRAIN PERFUSION: CPT

## 2023-06-25 PROCEDURE — 99285 EMERGENCY DEPT VISIT HI MDM: CPT

## 2023-06-25 PROCEDURE — 0BH17EZ INSERTION OF ENDOTRACHEAL AIRWAY INTO TRACHEA, VIA NATURAL OR ARTIFICIAL OPENING: ICD-10-PCS | Performed by: HOSPITALIST

## 2023-06-25 PROCEDURE — 82533 TOTAL CORTISOL: CPT

## 2023-06-25 PROCEDURE — 85610 PROTHROMBIN TIME: CPT

## 2023-06-25 PROCEDURE — 2500000003 HC RX 250 WO HCPCS: Performed by: NURSE PRACTITIONER

## 2023-06-25 PROCEDURE — 5A1945Z RESPIRATORY VENTILATION, 24-96 CONSECUTIVE HOURS: ICD-10-PCS | Performed by: HOSPITALIST

## 2023-06-25 PROCEDURE — 36600 WITHDRAWAL OF ARTERIAL BLOOD: CPT

## 2023-06-25 PROCEDURE — 84482 T3 REVERSE: CPT

## 2023-06-25 PROCEDURE — 84443 ASSAY THYROID STIM HORMONE: CPT

## 2023-06-25 PROCEDURE — 82077 ASSAY SPEC XCP UR&BREATH IA: CPT

## 2023-06-25 PROCEDURE — 70450 CT HEAD/BRAIN W/O DYE: CPT

## 2023-06-25 PROCEDURE — 84439 ASSAY OF FREE THYROXINE: CPT

## 2023-06-25 PROCEDURE — 95717 EEG PHYS/QHP 2-12 HR W/O VID: CPT | Performed by: PSYCHIATRY & NEUROLOGY

## 2023-06-25 PROCEDURE — 6360000002 HC RX W HCPCS: Performed by: NURSE PRACTITIONER

## 2023-06-25 PROCEDURE — 6360000002 HC RX W HCPCS: Performed by: EMERGENCY MEDICINE

## 2023-06-25 PROCEDURE — 85025 COMPLETE CBC W/AUTO DIFF WBC: CPT

## 2023-06-25 PROCEDURE — 2000000000 HC ICU R&B

## 2023-06-25 PROCEDURE — 84484 ASSAY OF TROPONIN QUANT: CPT

## 2023-06-25 PROCEDURE — 70498 CT ANGIOGRAPHY NECK: CPT

## 2023-06-25 PROCEDURE — 80053 COMPREHEN METABOLIC PANEL: CPT

## 2023-06-25 PROCEDURE — 83605 ASSAY OF LACTIC ACID: CPT

## 2023-06-25 PROCEDURE — 82140 ASSAY OF AMMONIA: CPT

## 2023-06-25 PROCEDURE — 2500000003 HC RX 250 WO HCPCS

## 2023-06-25 PROCEDURE — 76705 ECHO EXAM OF ABDOMEN: CPT

## 2023-06-25 PROCEDURE — 83690 ASSAY OF LIPASE: CPT

## 2023-06-25 PROCEDURE — 93005 ELECTROCARDIOGRAM TRACING: CPT | Performed by: EMERGENCY MEDICINE

## 2023-06-25 PROCEDURE — 4A03X5D MEASUREMENT OF ARTERIAL FLOW, INTRACRANIAL, EXTERNAL APPROACH: ICD-10-PCS | Performed by: EMERGENCY MEDICINE

## 2023-06-25 PROCEDURE — 84481 FREE ASSAY (FT-3): CPT

## 2023-06-25 PROCEDURE — 82962 GLUCOSE BLOOD TEST: CPT

## 2023-06-25 PROCEDURE — 96374 THER/PROPH/DIAG INJ IV PUSH: CPT

## 2023-06-25 PROCEDURE — 6360000004 HC RX CONTRAST MEDICATION: Performed by: RADIOLOGY

## 2023-06-25 PROCEDURE — A4216 STERILE WATER/SALINE, 10 ML: HCPCS | Performed by: NURSE PRACTITIONER

## 2023-06-25 PROCEDURE — 94002 VENT MGMT INPAT INIT DAY: CPT

## 2023-06-25 PROCEDURE — 82803 BLOOD GASES ANY COMBINATION: CPT

## 2023-06-25 PROCEDURE — 71045 X-RAY EXAM CHEST 1 VIEW: CPT

## 2023-06-25 PROCEDURE — 96375 TX/PRO/DX INJ NEW DRUG ADDON: CPT

## 2023-06-25 RX ORDER — PROPOFOL 10 MG/ML
5-50 INJECTION, EMULSION INTRAVENOUS ONCE
Status: DISCONTINUED | OUTPATIENT
Start: 2023-06-25 | End: 2023-06-25 | Stop reason: CLARIF

## 2023-06-25 RX ORDER — NALOXONE HYDROCHLORIDE 0.4 MG/ML
0.4 INJECTION, SOLUTION INTRAMUSCULAR; INTRAVENOUS; SUBCUTANEOUS ONCE
Status: COMPLETED | OUTPATIENT
Start: 2023-06-25 | End: 2023-06-25

## 2023-06-25 RX ORDER — NALOXONE HYDROCHLORIDE 0.4 MG/ML
INJECTION, SOLUTION INTRAMUSCULAR; INTRAVENOUS; SUBCUTANEOUS
Status: COMPLETED
Start: 2023-06-25 | End: 2023-06-25

## 2023-06-25 RX ORDER — INSULIN LISPRO 100 [IU]/ML
0-8 INJECTION, SOLUTION INTRAVENOUS; SUBCUTANEOUS EVERY 4 HOURS
Status: DISCONTINUED | OUTPATIENT
Start: 2023-06-25 | End: 2023-06-26

## 2023-06-25 RX ORDER — ONDANSETRON 2 MG/ML
4 INJECTION INTRAMUSCULAR; INTRAVENOUS EVERY 6 HOURS PRN
Status: DISCONTINUED | OUTPATIENT
Start: 2023-06-25 | End: 2023-06-29 | Stop reason: HOSPADM

## 2023-06-25 RX ORDER — SODIUM CHLORIDE 0.9 % (FLUSH) 0.9 %
5-40 SYRINGE (ML) INJECTION PRN
Status: DISCONTINUED | OUTPATIENT
Start: 2023-06-25 | End: 2023-06-29 | Stop reason: HOSPADM

## 2023-06-25 RX ORDER — ROCURONIUM BROMIDE 10 MG/ML
INJECTION, SOLUTION INTRAVENOUS
Status: COMPLETED
Start: 2023-06-25 | End: 2023-06-25

## 2023-06-25 RX ORDER — LEVETIRACETAM 500 MG/5ML
1000 INJECTION, SOLUTION, CONCENTRATE INTRAVENOUS EVERY 12 HOURS
Status: DISCONTINUED | OUTPATIENT
Start: 2023-06-25 | End: 2023-06-29 | Stop reason: HOSPADM

## 2023-06-25 RX ORDER — ONDANSETRON 4 MG/1
4 TABLET, ORALLY DISINTEGRATING ORAL EVERY 8 HOURS PRN
Status: DISCONTINUED | OUTPATIENT
Start: 2023-06-25 | End: 2023-06-29 | Stop reason: HOSPADM

## 2023-06-25 RX ORDER — SODIUM CHLORIDE 0.9 % (FLUSH) 0.9 %
5-40 SYRINGE (ML) INJECTION EVERY 12 HOURS SCHEDULED
Status: DISCONTINUED | OUTPATIENT
Start: 2023-06-25 | End: 2023-06-29 | Stop reason: HOSPADM

## 2023-06-25 RX ORDER — PROPOFOL 10 MG/ML
0-25 INJECTION, EMULSION INTRAVENOUS
Status: DISCONTINUED | OUTPATIENT
Start: 2023-06-25 | End: 2023-06-26

## 2023-06-25 RX ORDER — NOREPINEPHRINE BITARTRATE 0.06 MG/ML
1-100 INJECTION, SOLUTION INTRAVENOUS CONTINUOUS
Status: DISCONTINUED | OUTPATIENT
Start: 2023-06-25 | End: 2023-06-26

## 2023-06-25 RX ORDER — FENTANYL CITRATE-0.9 % NACL/PF 10 MCG/ML
0-100 PLASTIC BAG, INJECTION (ML) INTRAVENOUS CONTINUOUS
Status: DISCONTINUED | OUTPATIENT
Start: 2023-06-25 | End: 2023-06-27

## 2023-06-25 RX ORDER — LORAZEPAM 2 MG/ML
INJECTION INTRAMUSCULAR
Status: COMPLETED
Start: 2023-06-25 | End: 2023-06-25

## 2023-06-25 RX ORDER — FLUMAZENIL 0.1 MG/ML
0.2 INJECTION INTRAVENOUS ONCE
Status: DISCONTINUED | OUTPATIENT
Start: 2023-06-25 | End: 2023-06-25

## 2023-06-25 RX ORDER — ENOXAPARIN SODIUM 100 MG/ML
40 INJECTION SUBCUTANEOUS EVERY 24 HOURS
Status: DISCONTINUED | OUTPATIENT
Start: 2023-06-25 | End: 2023-06-29 | Stop reason: HOSPADM

## 2023-06-25 RX ORDER — FENTANYL CITRATE 50 UG/ML
INJECTION, SOLUTION INTRAMUSCULAR; INTRAVENOUS
Status: COMPLETED
Start: 2023-06-25 | End: 2023-06-25

## 2023-06-25 RX ORDER — PROPOFOL 10 MG/ML
INJECTION, EMULSION INTRAVENOUS
Status: COMPLETED
Start: 2023-06-25 | End: 2023-06-25

## 2023-06-25 RX ORDER — LEVETIRACETAM 500 MG/5ML
1000 INJECTION, SOLUTION, CONCENTRATE INTRAVENOUS ONCE
Status: COMPLETED | OUTPATIENT
Start: 2023-06-25 | End: 2023-06-25

## 2023-06-25 RX ORDER — LORAZEPAM 2 MG/ML
2 INJECTION INTRAMUSCULAR ONCE
Status: COMPLETED | OUTPATIENT
Start: 2023-06-25 | End: 2023-06-25

## 2023-06-25 RX ORDER — PROPOFOL 10 MG/ML
INJECTION, EMULSION INTRAVENOUS
Status: DISCONTINUED
Start: 2023-06-25 | End: 2023-06-26

## 2023-06-25 RX ORDER — SODIUM CHLORIDE 9 MG/ML
INJECTION, SOLUTION INTRAVENOUS PRN
Status: DISCONTINUED | OUTPATIENT
Start: 2023-06-25 | End: 2023-06-29 | Stop reason: HOSPADM

## 2023-06-25 RX ORDER — FLUMAZENIL 0.1 MG/ML
INJECTION INTRAVENOUS
Status: DISCONTINUED
Start: 2023-06-25 | End: 2023-06-25 | Stop reason: WASHOUT

## 2023-06-25 RX ORDER — LEVOTHYROXINE SODIUM 20 UG/ML
100 INJECTION, SOLUTION INTRAVENOUS DAILY
Status: DISCONTINUED | OUTPATIENT
Start: 2023-06-26 | End: 2023-06-28

## 2023-06-25 RX ORDER — LEVETIRACETAM 500 MG/5ML
1000 INJECTION, SOLUTION, CONCENTRATE INTRAVENOUS EVERY 12 HOURS
Status: DISCONTINUED | OUTPATIENT
Start: 2023-06-25 | End: 2023-06-25 | Stop reason: SDUPTHER

## 2023-06-25 RX ORDER — LEVOTHYROXINE SODIUM 20 UG/ML
250 INJECTION, SOLUTION INTRAVENOUS ONCE
Status: COMPLETED | OUTPATIENT
Start: 2023-06-25 | End: 2023-06-25

## 2023-06-25 RX ORDER — PHENYLEPHRINE HCL IN 0.9% NACL 0.4MG/10ML
SYRINGE (ML) INTRAVENOUS
Status: COMPLETED
Start: 2023-06-25 | End: 2023-06-25

## 2023-06-25 RX ORDER — POLYETHYLENE GLYCOL 3350 17 G/17G
17 POWDER, FOR SOLUTION ORAL DAILY PRN
Status: DISCONTINUED | OUTPATIENT
Start: 2023-06-25 | End: 2023-06-29 | Stop reason: HOSPADM

## 2023-06-25 RX ADMIN — LEVETIRACETAM 1000 MG: 100 INJECTION, SOLUTION INTRAVENOUS at 18:29

## 2023-06-25 RX ADMIN — IOPAMIDOL 80 ML: 755 INJECTION, SOLUTION INTRAVENOUS at 16:51

## 2023-06-25 RX ADMIN — NOREPINEPHRINE BITARTRATE 5 MCG/MIN: 1 INJECTION, SOLUTION, CONCENTRATE INTRAVENOUS at 20:53

## 2023-06-25 RX ADMIN — PROPOFOL 50 MG: 10 INJECTION, EMULSION INTRAVENOUS at 20:33

## 2023-06-25 RX ADMIN — NALXONE HYDROCHLORIDE 0.4 MG: 0.4 INJECTION INTRAMUSCULAR; INTRAVENOUS; SUBCUTANEOUS at 16:09

## 2023-06-25 RX ADMIN — LEVOTHYROXINE SODIUM 250 MCG: 20 INJECTION, SOLUTION INTRAVENOUS at 22:09

## 2023-06-25 RX ADMIN — WATER 100 MG: 1 INJECTION INTRAMUSCULAR; INTRAVENOUS; SUBCUTANEOUS at 18:26

## 2023-06-25 RX ADMIN — FENTANYL CITRATE 100 MCG: 50 INJECTION, SOLUTION INTRAMUSCULAR; INTRAVENOUS at 20:33

## 2023-06-25 RX ADMIN — FAMOTIDINE 20 MG: 10 INJECTION INTRAVENOUS at 20:54

## 2023-06-25 RX ADMIN — ROCURONIUM BROMIDE 60 MG: 50 INJECTION, SOLUTION INTRAVENOUS at 20:33

## 2023-06-25 RX ADMIN — NALOXONE HYDROCHLORIDE 0.4 MG: 0.4 INJECTION, SOLUTION INTRAMUSCULAR; INTRAVENOUS; SUBCUTANEOUS at 16:09

## 2023-06-25 RX ADMIN — Medication 50 MCG/HR: at 22:47

## 2023-06-25 RX ADMIN — IOPAMIDOL 40 ML: 755 INJECTION, SOLUTION INTRAVENOUS at 16:49

## 2023-06-25 RX ADMIN — LORAZEPAM 2 MG: 2 INJECTION INTRAMUSCULAR at 16:32

## 2023-06-25 RX ADMIN — Medication: at 20:34

## 2023-06-25 RX ADMIN — LEVETIRACETAM 1000 MG: 100 INJECTION, SOLUTION INTRAVENOUS at 17:01

## 2023-06-25 RX ADMIN — PROPOFOL 50 MCG/KG/MIN: 10 INJECTION, EMULSION INTRAVENOUS at 21:30

## 2023-06-25 RX ADMIN — LORAZEPAM 2 MG: 2 INJECTION INTRAMUSCULAR; INTRAVENOUS at 16:32

## 2023-06-25 RX ADMIN — ENOXAPARIN SODIUM 40 MG: 100 INJECTION SUBCUTANEOUS at 18:27

## 2023-06-25 RX ADMIN — SODIUM CHLORIDE, PRESERVATIVE FREE 10 ML: 5 INJECTION INTRAVENOUS at 20:40

## 2023-06-25 ASSESSMENT — PULMONARY FUNCTION TESTS: PIF_VALUE: 21

## 2023-06-25 ASSESSMENT — PAIN SCALES - GENERAL: PAINLEVEL_OUTOF10: 0

## 2023-06-26 PROBLEM — Z87.898 HISTORY OF SEIZURES: Status: ACTIVE | Noted: 2023-06-26

## 2023-06-26 LAB
ALBUMIN SERPL-MCNC: 3.2 G/DL (ref 3.5–5)
ALBUMIN/GLOB SERPL: 1 (ref 1.1–2.2)
ALP SERPL-CCNC: 166 U/L (ref 45–117)
ALT SERPL-CCNC: 156 U/L (ref 12–78)
AMPHET UR QL SCN: NEGATIVE
ANION GAP SERPL CALC-SCNC: 5 MMOL/L (ref 5–15)
APPEARANCE UR: ABNORMAL
AST SERPL-CCNC: 116 U/L (ref 15–37)
BACTERIA URNS QL MICRO: NEGATIVE /HPF
BARBITURATES UR QL SCN: NEGATIVE
BASE DEFICIT BLD-SCNC: 0.2 MMOL/L
BASOPHILS # BLD: 0 K/UL (ref 0–0.1)
BASOPHILS NFR BLD: 1 % (ref 0–1)
BDY SITE: ABNORMAL
BENZODIAZ UR QL: POSITIVE
BILIRUB SERPL-MCNC: 0.3 MG/DL (ref 0.2–1)
BILIRUB UR QL: NEGATIVE
BUN SERPL-MCNC: 12 MG/DL (ref 6–20)
BUN/CREAT SERPL: 15 (ref 12–20)
CA-I BLD-SCNC: 1.21 MMOL/L (ref 1.12–1.32)
CALCIUM SERPL-MCNC: 8.5 MG/DL (ref 8.5–10.1)
CANNABINOIDS UR QL SCN: NEGATIVE
CHLORIDE SERPL-SCNC: 103 MMOL/L (ref 97–108)
CO2 SERPL-SCNC: 24 MMOL/L (ref 21–32)
COCAINE UR QL SCN: NEGATIVE
COLOR UR: ABNORMAL
CREAT SERPL-MCNC: 0.8 MG/DL (ref 0.55–1.02)
DIFFERENTIAL METHOD BLD: ABNORMAL
EKG ATRIAL RATE: 67 BPM
EKG DIAGNOSIS: NORMAL
EKG P AXIS: 45 DEGREES
EKG P-R INTERVAL: 204 MS
EKG Q-T INTERVAL: 414 MS
EKG QRS DURATION: 110 MS
EKG QTC CALCULATION (BAZETT): 437 MS
EKG R AXIS: 16 DEGREES
EKG T AXIS: -5 DEGREES
EKG VENTRICULAR RATE: 67 BPM
EOSINOPHIL # BLD: 0 K/UL (ref 0–0.4)
EOSINOPHIL NFR BLD: 1 % (ref 0–7)
EPITH CASTS URNS QL MICRO: ABNORMAL /LPF
ERYTHROCYTE [DISTWIDTH] IN BLOOD BY AUTOMATED COUNT: 15.7 % (ref 11.5–14.5)
GAS FLOW.O2 SETTING OXYMISER: 14 BPM
GLOBULIN SER CALC-MCNC: 3.1 G/DL (ref 2–4)
GLUCOSE BLD STRIP.AUTO-MCNC: 101 MG/DL (ref 65–117)
GLUCOSE BLD STRIP.AUTO-MCNC: 113 MG/DL (ref 65–117)
GLUCOSE BLD STRIP.AUTO-MCNC: 114 MG/DL (ref 65–117)
GLUCOSE BLD STRIP.AUTO-MCNC: 120 MG/DL (ref 65–117)
GLUCOSE SERPL-MCNC: 110 MG/DL (ref 65–100)
GLUCOSE UR STRIP.AUTO-MCNC: NEGATIVE MG/DL
HCO3 BLD-SCNC: 24.4 MMOL/L (ref 22–26)
HCT VFR BLD AUTO: 33.1 % (ref 35–47)
HGB BLD-MCNC: 10.6 G/DL (ref 11.5–16)
HGB UR QL STRIP: NEGATIVE
HYALINE CASTS URNS QL MICRO: ABNORMAL /LPF (ref 0–5)
IMM GRANULOCYTES # BLD AUTO: 0 K/UL (ref 0–0.04)
IMM GRANULOCYTES NFR BLD AUTO: 0 % (ref 0–0.5)
KETONES UR QL STRIP.AUTO: NEGATIVE MG/DL
LACTATE SERPL-SCNC: 1.3 MMOL/L (ref 0.4–2)
LEUKOCYTE ESTERASE UR QL STRIP.AUTO: NEGATIVE
LYMPHOCYTES # BLD: 0.6 K/UL (ref 0.8–3.5)
LYMPHOCYTES NFR BLD: 19 % (ref 12–49)
Lab: ABNORMAL
MCH RBC QN AUTO: 26.6 PG (ref 26–34)
MCHC RBC AUTO-ENTMCNC: 32 G/DL (ref 30–36.5)
MCV RBC AUTO: 83 FL (ref 80–99)
METHADONE UR QL: NEGATIVE
MONOCYTES # BLD: 0.1 K/UL (ref 0–1)
MONOCYTES NFR BLD: 4 % (ref 5–13)
NEUTS SEG # BLD: 2.7 K/UL (ref 1.8–8)
NEUTS SEG NFR BLD: 75 % (ref 32–75)
NITRITE UR QL STRIP.AUTO: NEGATIVE
NRBC # BLD: 0 K/UL (ref 0–0.01)
NRBC BLD-RTO: 0 PER 100 WBC
O2/TOTAL GAS SETTING VFR VENT: 50 %
OPIATES UR QL: NEGATIVE
PCO2 BLD: 39.2 MMHG (ref 35–45)
PCP UR QL: NEGATIVE
PEEP RESPIRATORY: 5 CMH2O
PH BLD: 7.4 (ref 7.35–7.45)
PH UR STRIP: 7.5 (ref 5–8)
PHOSPHATE SERPL-MCNC: 3.5 MG/DL (ref 2.6–4.7)
PLATELET # BLD AUTO: 261 K/UL (ref 150–400)
PMV BLD AUTO: 9.7 FL (ref 8.9–12.9)
PO2 BLD: 140 MMHG (ref 80–100)
POTASSIUM SERPL-SCNC: 3.9 MMOL/L (ref 3.5–5.1)
PROT SERPL-MCNC: 6.3 G/DL (ref 6.4–8.2)
PROT UR STRIP-MCNC: NEGATIVE MG/DL
RBC # BLD AUTO: 3.99 M/UL (ref 3.8–5.2)
RBC #/AREA URNS HPF: ABNORMAL /HPF (ref 0–5)
RBC MORPH BLD: ABNORMAL
SAO2 % BLD: 99.2 % (ref 92–97)
SERVICE CMNT-IMP: ABNORMAL
SERVICE CMNT-IMP: NORMAL
SODIUM SERPL-SCNC: 132 MMOL/L (ref 136–145)
SP GR UR REFRACTOMETRY: >1.03
SPECIMEN TYPE: ABNORMAL
UROBILINOGEN UR QL STRIP.AUTO: 0.2 EU/DL (ref 0.2–1)
VENTILATION MODE VENT: ABNORMAL
VT SETTING VENT: 360 ML
WBC # BLD AUTO: 3.4 K/UL (ref 3.6–11)
WBC URNS QL MICRO: ABNORMAL /HPF (ref 0–4)

## 2023-06-26 PROCEDURE — 80307 DRUG TEST PRSMV CHEM ANLYZR: CPT

## 2023-06-26 PROCEDURE — 36600 WITHDRAWAL OF ARTERIAL BLOOD: CPT

## 2023-06-26 PROCEDURE — 2500000003 HC RX 250 WO HCPCS: Performed by: INTERNAL MEDICINE

## 2023-06-26 PROCEDURE — 2000000000 HC ICU R&B

## 2023-06-26 PROCEDURE — 84100 ASSAY OF PHOSPHORUS: CPT

## 2023-06-26 PROCEDURE — 2500000003 HC RX 250 WO HCPCS: Performed by: NURSE PRACTITIONER

## 2023-06-26 PROCEDURE — 95718 EEG PHYS/QHP 2-12 HR W/VEEG: CPT | Performed by: PSYCHIATRY & NEUROLOGY

## 2023-06-26 PROCEDURE — 6360000002 HC RX W HCPCS: Performed by: INTERNAL MEDICINE

## 2023-06-26 PROCEDURE — 95714 VEEG EA 12-26 HR UNMNTR: CPT

## 2023-06-26 PROCEDURE — A4216 STERILE WATER/SALINE, 10 ML: HCPCS | Performed by: NURSE PRACTITIONER

## 2023-06-26 PROCEDURE — 6370000000 HC RX 637 (ALT 250 FOR IP): Performed by: INTERNAL MEDICINE

## 2023-06-26 PROCEDURE — 6360000002 HC RX W HCPCS: Performed by: NURSE PRACTITIONER

## 2023-06-26 PROCEDURE — 81002 URINALYSIS NONAUTO W/O SCOPE: CPT

## 2023-06-26 PROCEDURE — 2580000003 HC RX 258: Performed by: INTERNAL MEDICINE

## 2023-06-26 PROCEDURE — 6370000000 HC RX 637 (ALT 250 FOR IP): Performed by: NURSE PRACTITIONER

## 2023-06-26 PROCEDURE — 82803 BLOOD GASES ANY COMBINATION: CPT

## 2023-06-26 PROCEDURE — 94003 VENT MGMT INPAT SUBQ DAY: CPT

## 2023-06-26 PROCEDURE — 6360000002 HC RX W HCPCS: Performed by: EMERGENCY MEDICINE

## 2023-06-26 PROCEDURE — 2580000003 HC RX 258: Performed by: NURSE PRACTITIONER

## 2023-06-26 PROCEDURE — 82962 GLUCOSE BLOOD TEST: CPT

## 2023-06-26 PROCEDURE — 85025 COMPLETE CBC W/AUTO DIFF WBC: CPT

## 2023-06-26 PROCEDURE — 80053 COMPREHEN METABOLIC PANEL: CPT

## 2023-06-26 PROCEDURE — 6370000000 HC RX 637 (ALT 250 FOR IP)

## 2023-06-26 PROCEDURE — 99223 1ST HOSP IP/OBS HIGH 75: CPT | Performed by: PSYCHIATRY & NEUROLOGY

## 2023-06-26 PROCEDURE — 36415 COLL VENOUS BLD VENIPUNCTURE: CPT

## 2023-06-26 PROCEDURE — 83605 ASSAY OF LACTIC ACID: CPT

## 2023-06-26 RX ORDER — CHLORHEXIDINE GLUCONATE 0.12 MG/ML
15 RINSE ORAL 2 TIMES DAILY
Status: DISCONTINUED | OUTPATIENT
Start: 2023-06-26 | End: 2023-06-29 | Stop reason: HOSPADM

## 2023-06-26 RX ORDER — TOPIRAMATE 100 MG/1
100 TABLET, FILM COATED ORAL 2 TIMES DAILY
Status: DISCONTINUED | OUTPATIENT
Start: 2023-06-26 | End: 2023-06-29 | Stop reason: HOSPADM

## 2023-06-26 RX ORDER — INSULIN LISPRO 100 [IU]/ML
0-8 INJECTION, SOLUTION INTRAVENOUS; SUBCUTANEOUS EVERY 6 HOURS
Status: DISCONTINUED | OUTPATIENT
Start: 2023-06-26 | End: 2023-06-27

## 2023-06-26 RX ORDER — OXCARBAZEPINE 150 MG/1
300 TABLET, FILM COATED ORAL 2 TIMES DAILY
Status: DISCONTINUED | OUTPATIENT
Start: 2023-06-26 | End: 2023-06-26

## 2023-06-26 RX ORDER — DEXMEDETOMIDINE HYDROCHLORIDE 4 UG/ML
.1-1.5 INJECTION, SOLUTION INTRAVENOUS CONTINUOUS
Status: DISCONTINUED | OUTPATIENT
Start: 2023-06-26 | End: 2023-06-27

## 2023-06-26 RX ORDER — OXCARBAZEPINE 150 MG/1
150 TABLET, FILM COATED ORAL 2 TIMES DAILY
Status: DISCONTINUED | OUTPATIENT
Start: 2023-06-26 | End: 2023-06-29 | Stop reason: HOSPADM

## 2023-06-26 RX ADMIN — TOPIRAMATE 100 MG: 25 TABLET, FILM COATED ORAL at 09:05

## 2023-06-26 RX ADMIN — PROPOFOL 25 MCG/KG/MIN: 10 INJECTION, EMULSION INTRAVENOUS at 01:07

## 2023-06-26 RX ADMIN — DEXMEDETOMIDINE HYDROCHLORIDE 0.2 MCG/KG/HR: 400 INJECTION, SOLUTION INTRAVENOUS at 11:18

## 2023-06-26 RX ADMIN — WATER 100 MG: 1 INJECTION INTRAMUSCULAR; INTRAVENOUS; SUBCUTANEOUS at 09:06

## 2023-06-26 RX ADMIN — LEVOTHYROXINE SODIUM 100 MCG: 20 INJECTION, SOLUTION INTRAVENOUS at 09:07

## 2023-06-26 RX ADMIN — CHLORHEXIDINE GLUCONATE 15 ML: 1.2 RINSE ORAL at 11:19

## 2023-06-26 RX ADMIN — WATER 50 MG: 1 INJECTION INTRAMUSCULAR; INTRAVENOUS; SUBCUTANEOUS at 16:38

## 2023-06-26 RX ADMIN — OXCARBAZEPINE 300 MG: 150 TABLET, FILM COATED ORAL at 09:05

## 2023-06-26 RX ADMIN — WATER 100 MG: 1 INJECTION INTRAMUSCULAR; INTRAVENOUS; SUBCUTANEOUS at 01:44

## 2023-06-26 RX ADMIN — LEVETIRACETAM 1000 MG: 100 INJECTION, SOLUTION INTRAVENOUS at 16:39

## 2023-06-26 RX ADMIN — TOPIRAMATE 100 MG: 25 TABLET, FILM COATED ORAL at 20:17

## 2023-06-26 RX ADMIN — SODIUM CHLORIDE, PRESERVATIVE FREE 20 ML: 5 INJECTION INTRAVENOUS at 09:07

## 2023-06-26 RX ADMIN — PIPERACILLIN AND TAZOBACTAM 4500 MG: 4; .5 INJECTION, POWDER, LYOPHILIZED, FOR SOLUTION INTRAVENOUS at 01:09

## 2023-06-26 RX ADMIN — WATER 50 MG: 1 INJECTION INTRAMUSCULAR; INTRAVENOUS; SUBCUTANEOUS at 20:12

## 2023-06-26 RX ADMIN — LEVETIRACETAM 1000 MG: 100 INJECTION, SOLUTION INTRAVENOUS at 04:10

## 2023-06-26 RX ADMIN — ENOXAPARIN SODIUM 40 MG: 100 INJECTION SUBCUTANEOUS at 18:49

## 2023-06-26 RX ADMIN — OXCARBAZEPINE 150 MG: 150 TABLET, FILM COATED ORAL at 20:18

## 2023-06-26 RX ADMIN — FAMOTIDINE 20 MG: 10 INJECTION INTRAVENOUS at 09:05

## 2023-06-26 RX ADMIN — SODIUM CHLORIDE, PRESERVATIVE FREE 10 ML: 5 INJECTION INTRAVENOUS at 20:17

## 2023-06-26 RX ADMIN — FAMOTIDINE 20 MG: 10 INJECTION INTRAVENOUS at 20:14

## 2023-06-26 RX ADMIN — CHLORHEXIDINE GLUCONATE 15 ML: 1.2 RINSE ORAL at 20:12

## 2023-06-26 RX ADMIN — PIPERACILLIN AND TAZOBACTAM 3375 MG: 3; .375 INJECTION, POWDER, LYOPHILIZED, FOR SOLUTION INTRAVENOUS at 06:55

## 2023-06-26 ASSESSMENT — PULMONARY FUNCTION TESTS
PIF_VALUE: 17
PIF_VALUE: 16
PIF_VALUE: 17
PIF_VALUE: 22

## 2023-06-26 ASSESSMENT — PAIN SCALES - GENERAL
PAINLEVEL_OUTOF10: 0

## 2023-06-27 ENCOUNTER — APPOINTMENT (OUTPATIENT)
Facility: HOSPITAL | Age: 53
DRG: 080 | End: 2023-06-27
Payer: MEDICARE

## 2023-06-27 PROBLEM — R41.89 UNRESPONSIVE STATE: Status: ACTIVE | Noted: 2023-06-27

## 2023-06-27 LAB
ALBUMIN SERPL-MCNC: 2.8 G/DL (ref 3.5–5)
ALBUMIN/GLOB SERPL: 0.9 (ref 1.1–2.2)
ALP SERPL-CCNC: 146 U/L (ref 45–117)
ALT SERPL-CCNC: 123 U/L (ref 12–78)
ANION GAP SERPL CALC-SCNC: 6 MMOL/L (ref 5–15)
AST SERPL-CCNC: 69 U/L (ref 15–37)
BASOPHILS # BLD: 0 K/UL (ref 0–0.1)
BASOPHILS NFR BLD: 1 % (ref 0–1)
BILIRUB SERPL-MCNC: 0.2 MG/DL (ref 0.2–1)
BUN SERPL-MCNC: 11 MG/DL (ref 6–20)
BUN/CREAT SERPL: 15 (ref 12–20)
CALCIUM SERPL-MCNC: 8.7 MG/DL (ref 8.5–10.1)
CHLORIDE SERPL-SCNC: 107 MMOL/L (ref 97–108)
CO2 SERPL-SCNC: 25 MMOL/L (ref 21–32)
CREAT SERPL-MCNC: 0.71 MG/DL (ref 0.55–1.02)
DIFFERENTIAL METHOD BLD: ABNORMAL
EOSINOPHIL # BLD: 0.1 K/UL (ref 0–0.4)
EOSINOPHIL NFR BLD: 1 % (ref 0–7)
ERYTHROCYTE [DISTWIDTH] IN BLOOD BY AUTOMATED COUNT: 15.6 % (ref 11.5–14.5)
GLOBULIN SER CALC-MCNC: 3.2 G/DL (ref 2–4)
GLUCOSE BLD STRIP.AUTO-MCNC: 106 MG/DL (ref 65–117)
GLUCOSE BLD STRIP.AUTO-MCNC: 112 MG/DL (ref 65–117)
GLUCOSE BLD STRIP.AUTO-MCNC: 117 MG/DL (ref 65–117)
GLUCOSE BLD STRIP.AUTO-MCNC: 89 MG/DL (ref 65–117)
GLUCOSE BLD STRIP.AUTO-MCNC: 91 MG/DL (ref 65–117)
GLUCOSE SERPL-MCNC: 120 MG/DL (ref 65–100)
HCT VFR BLD AUTO: 32.1 % (ref 35–47)
HGB BLD-MCNC: 10.3 G/DL (ref 11.5–16)
IMM GRANULOCYTES # BLD AUTO: 0 K/UL (ref 0–0.04)
IMM GRANULOCYTES NFR BLD AUTO: 0 % (ref 0–0.5)
LYMPHOCYTES # BLD: 1.3 K/UL (ref 0.8–3.5)
LYMPHOCYTES NFR BLD: 21 % (ref 12–49)
MAGNESIUM SERPL-MCNC: 2.7 MG/DL (ref 1.6–2.4)
MCH RBC QN AUTO: 26.8 PG (ref 26–34)
MCHC RBC AUTO-ENTMCNC: 32.1 G/DL (ref 30–36.5)
MCV RBC AUTO: 83.6 FL (ref 80–99)
MONOCYTES # BLD: 0.4 K/UL (ref 0–1)
MONOCYTES NFR BLD: 7 % (ref 5–13)
NEUTS SEG # BLD: 4.4 K/UL (ref 1.8–8)
NEUTS SEG NFR BLD: 70 % (ref 32–75)
NRBC # BLD: 0 K/UL (ref 0–0.01)
NRBC BLD-RTO: 0 PER 100 WBC
PHOSPHATE SERPL-MCNC: 3.6 MG/DL (ref 2.6–4.7)
PLATELET # BLD AUTO: 280 K/UL (ref 150–400)
PMV BLD AUTO: 10 FL (ref 8.9–12.9)
POTASSIUM SERPL-SCNC: 3.5 MMOL/L (ref 3.5–5.1)
PROT SERPL-MCNC: 6 G/DL (ref 6.4–8.2)
RBC # BLD AUTO: 3.84 M/UL (ref 3.8–5.2)
SERVICE CMNT-IMP: NORMAL
SODIUM SERPL-SCNC: 138 MMOL/L (ref 136–145)
TRIGL SERPL-MCNC: 48 MG/DL
WBC # BLD AUTO: 6.3 K/UL (ref 3.6–11)

## 2023-06-27 PROCEDURE — 6360000002 HC RX W HCPCS: Performed by: EMERGENCY MEDICINE

## 2023-06-27 PROCEDURE — 6370000000 HC RX 637 (ALT 250 FOR IP)

## 2023-06-27 PROCEDURE — 6360000002 HC RX W HCPCS: Performed by: INTERNAL MEDICINE

## 2023-06-27 PROCEDURE — 2580000003 HC RX 258: Performed by: NURSE PRACTITIONER

## 2023-06-27 PROCEDURE — 6370000000 HC RX 637 (ALT 250 FOR IP): Performed by: NURSE PRACTITIONER

## 2023-06-27 PROCEDURE — A4216 STERILE WATER/SALINE, 10 ML: HCPCS | Performed by: NURSE PRACTITIONER

## 2023-06-27 PROCEDURE — 84478 ASSAY OF TRIGLYCERIDES: CPT

## 2023-06-27 PROCEDURE — 85025 COMPLETE CBC W/AUTO DIFF WBC: CPT

## 2023-06-27 PROCEDURE — 84100 ASSAY OF PHOSPHORUS: CPT

## 2023-06-27 PROCEDURE — 2580000003 HC RX 258: Performed by: INTERNAL MEDICINE

## 2023-06-27 PROCEDURE — 6360000002 HC RX W HCPCS: Performed by: NURSE PRACTITIONER

## 2023-06-27 PROCEDURE — 2500000003 HC RX 250 WO HCPCS: Performed by: INTERNAL MEDICINE

## 2023-06-27 PROCEDURE — 99232 SBSQ HOSP IP/OBS MODERATE 35: CPT | Performed by: PSYCHIATRY & NEUROLOGY

## 2023-06-27 PROCEDURE — 82962 GLUCOSE BLOOD TEST: CPT

## 2023-06-27 PROCEDURE — 94640 AIRWAY INHALATION TREATMENT: CPT

## 2023-06-27 PROCEDURE — 97535 SELF CARE MNGMENT TRAINING: CPT

## 2023-06-27 PROCEDURE — 71045 X-RAY EXAM CHEST 1 VIEW: CPT

## 2023-06-27 PROCEDURE — 2060000000 HC ICU INTERMEDIATE R&B

## 2023-06-27 PROCEDURE — 80053 COMPREHEN METABOLIC PANEL: CPT

## 2023-06-27 PROCEDURE — 83735 ASSAY OF MAGNESIUM: CPT

## 2023-06-27 PROCEDURE — 97165 OT EVAL LOW COMPLEX 30 MIN: CPT

## 2023-06-27 PROCEDURE — 36415 COLL VENOUS BLD VENIPUNCTURE: CPT

## 2023-06-27 PROCEDURE — 2500000003 HC RX 250 WO HCPCS: Performed by: NURSE PRACTITIONER

## 2023-06-27 PROCEDURE — 94003 VENT MGMT INPAT SUBQ DAY: CPT

## 2023-06-27 RX ORDER — M-VIT,TX,IRON,MINS/CALC/FOLIC 27MG-0.4MG
1 TABLET ORAL DAILY
COMMUNITY

## 2023-06-27 RX ORDER — INSULIN LISPRO 100 [IU]/ML
0-4 INJECTION, SOLUTION INTRAVENOUS; SUBCUTANEOUS NIGHTLY
Status: DISCONTINUED | OUTPATIENT
Start: 2023-06-27 | End: 2023-06-29 | Stop reason: HOSPADM

## 2023-06-27 RX ORDER — BUMETANIDE 1 MG/1
1 TABLET ORAL DAILY
COMMUNITY

## 2023-06-27 RX ORDER — CETIRIZINE HYDROCHLORIDE 10 MG/1
10 TABLET ORAL DAILY
COMMUNITY

## 2023-06-27 RX ORDER — METOCLOPRAMIDE 5 MG/1
5 TABLET ORAL 3 TIMES DAILY
COMMUNITY

## 2023-06-27 RX ORDER — DICYCLOMINE HCL 20 MG
20 TABLET ORAL EVERY 6 HOURS
Status: ON HOLD | COMMUNITY
End: 2023-06-29 | Stop reason: HOSPADM

## 2023-06-27 RX ORDER — CENOBAMATE 150 MG/1
150 TABLET, FILM COATED ORAL
Status: ON HOLD | COMMUNITY
End: 2023-06-29 | Stop reason: HOSPADM

## 2023-06-27 RX ORDER — POTASSIUM CHLORIDE 750 MG/1
10 CAPSULE, EXTENDED RELEASE ORAL DAILY
COMMUNITY

## 2023-06-27 RX ORDER — CLONAZEPAM 1 MG/1
1 TABLET ORAL 3 TIMES DAILY
Status: ON HOLD | COMMUNITY
End: 2023-06-29 | Stop reason: HOSPADM

## 2023-06-27 RX ORDER — HYOSCYAMINE SULFATE 0.125 MG
125 TABLET ORAL 3 TIMES DAILY PRN
Status: ON HOLD | COMMUNITY
End: 2023-06-27

## 2023-06-27 RX ORDER — LINACLOTIDE 290 UG/1
290 CAPSULE, GELATIN COATED ORAL
COMMUNITY

## 2023-06-27 RX ORDER — LEVETIRACETAM 500 MG/1
1000 TABLET, EXTENDED RELEASE ORAL DAILY
Status: ON HOLD | COMMUNITY
End: 2023-06-29 | Stop reason: SDUPTHER

## 2023-06-27 RX ORDER — FLUOXETINE 10 MG/1
10 CAPSULE ORAL DAILY
Status: ON HOLD | COMMUNITY
End: 2023-06-29 | Stop reason: HOSPADM

## 2023-06-27 RX ORDER — IPRATROPIUM BROMIDE AND ALBUTEROL SULFATE 2.5; .5 MG/3ML; MG/3ML
1 SOLUTION RESPIRATORY (INHALATION) EVERY 4 HOURS PRN
Status: DISCONTINUED | OUTPATIENT
Start: 2023-06-27 | End: 2023-06-29 | Stop reason: HOSPADM

## 2023-06-27 RX ORDER — DEXLANSOPRAZOLE 60 MG/1
60 CAPSULE, DELAYED RELEASE ORAL DAILY
COMMUNITY

## 2023-06-27 RX ORDER — DEXTROSE MONOHYDRATE 100 MG/ML
INJECTION, SOLUTION INTRAVENOUS CONTINUOUS PRN
Status: DISCONTINUED | OUTPATIENT
Start: 2023-06-27 | End: 2023-06-29 | Stop reason: HOSPADM

## 2023-06-27 RX ORDER — FAMOTIDINE 20 MG/1
20 TABLET, FILM COATED ORAL 2 TIMES DAILY
Status: DISCONTINUED | OUTPATIENT
Start: 2023-06-27 | End: 2023-06-29 | Stop reason: HOSPADM

## 2023-06-27 RX ORDER — PANTOPRAZOLE SODIUM 20 MG/1
20 TABLET, DELAYED RELEASE ORAL 2 TIMES DAILY
Status: ON HOLD | COMMUNITY
End: 2023-06-29 | Stop reason: HOSPADM

## 2023-06-27 RX ORDER — GABAPENTIN 300 MG/1
300 CAPSULE ORAL 3 TIMES DAILY
Status: ON HOLD | COMMUNITY
End: 2023-06-29 | Stop reason: HOSPADM

## 2023-06-27 RX ORDER — RABEPRAZOLE SODIUM 20 MG/1
20 TABLET, DELAYED RELEASE ORAL 2 TIMES DAILY
COMMUNITY

## 2023-06-27 RX ORDER — SODIUM CHLORIDE, SODIUM LACTATE, POTASSIUM CHLORIDE, CALCIUM CHLORIDE 600; 310; 30; 20 MG/100ML; MG/100ML; MG/100ML; MG/100ML
INJECTION, SOLUTION INTRAVENOUS CONTINUOUS
Status: DISCONTINUED | OUTPATIENT
Start: 2023-06-27 | End: 2023-06-27

## 2023-06-27 RX ORDER — INSULIN LISPRO 100 [IU]/ML
0-8 INJECTION, SOLUTION INTRAVENOUS; SUBCUTANEOUS
Status: DISCONTINUED | OUTPATIENT
Start: 2023-06-27 | End: 2023-06-29 | Stop reason: HOSPADM

## 2023-06-27 RX ORDER — MIDODRINE HYDROCHLORIDE 5 MG/1
5 TABLET ORAL 2 TIMES DAILY
COMMUNITY

## 2023-06-27 RX ADMIN — WATER 50 MG: 1 INJECTION INTRAMUSCULAR; INTRAVENOUS; SUBCUTANEOUS at 04:19

## 2023-06-27 RX ADMIN — ARFORMOTEROL TARTRATE: 15 SOLUTION RESPIRATORY (INHALATION) at 21:21

## 2023-06-27 RX ADMIN — OXCARBAZEPINE 150 MG: 150 TABLET, FILM COATED ORAL at 21:09

## 2023-06-27 RX ADMIN — WATER 50 MG: 1 INJECTION INTRAMUSCULAR; INTRAVENOUS; SUBCUTANEOUS at 21:09

## 2023-06-27 RX ADMIN — TOPIRAMATE 100 MG: 25 TABLET, FILM COATED ORAL at 21:09

## 2023-06-27 RX ADMIN — SODIUM CHLORIDE, PRESERVATIVE FREE 10 ML: 5 INJECTION INTRAVENOUS at 08:27

## 2023-06-27 RX ADMIN — OXCARBAZEPINE 150 MG: 150 TABLET, FILM COATED ORAL at 08:31

## 2023-06-27 RX ADMIN — ENOXAPARIN SODIUM 40 MG: 100 INJECTION SUBCUTANEOUS at 17:45

## 2023-06-27 RX ADMIN — TOPIRAMATE 100 MG: 25 TABLET, FILM COATED ORAL at 08:31

## 2023-06-27 RX ADMIN — CHLORHEXIDINE GLUCONATE 15 ML: 1.2 RINSE ORAL at 08:27

## 2023-06-27 RX ADMIN — SODIUM CHLORIDE, POTASSIUM CHLORIDE, SODIUM LACTATE AND CALCIUM CHLORIDE: 600; 310; 30; 20 INJECTION, SOLUTION INTRAVENOUS at 08:32

## 2023-06-27 RX ADMIN — WATER 50 MG: 1 INJECTION INTRAMUSCULAR; INTRAVENOUS; SUBCUTANEOUS at 15:31

## 2023-06-27 RX ADMIN — LEVETIRACETAM 1000 MG: 100 INJECTION, SOLUTION INTRAVENOUS at 16:23

## 2023-06-27 RX ADMIN — SODIUM CHLORIDE, PRESERVATIVE FREE 10 ML: 5 INJECTION INTRAVENOUS at 21:10

## 2023-06-27 RX ADMIN — DEXMEDETOMIDINE HYDROCHLORIDE 0.2 MCG/KG/HR: 400 INJECTION, SOLUTION INTRAVENOUS at 03:06

## 2023-06-27 RX ADMIN — WATER 50 MG: 1 INJECTION INTRAMUSCULAR; INTRAVENOUS; SUBCUTANEOUS at 08:21

## 2023-06-27 RX ADMIN — FAMOTIDINE 20 MG: 10 INJECTION INTRAVENOUS at 08:20

## 2023-06-27 RX ADMIN — LEVETIRACETAM 1000 MG: 100 INJECTION, SOLUTION INTRAVENOUS at 04:20

## 2023-06-27 RX ADMIN — LEVOTHYROXINE SODIUM 100 MCG: 20 INJECTION, SOLUTION INTRAVENOUS at 08:20

## 2023-06-27 RX ADMIN — FAMOTIDINE 20 MG: 20 TABLET ORAL at 21:09

## 2023-06-27 ASSESSMENT — PAIN SCALES - GENERAL
PAINLEVEL_OUTOF10: 0

## 2023-06-27 ASSESSMENT — PULMONARY FUNCTION TESTS
PIF_VALUE: 10
PEFR_L/MIN: 92
PIF_VALUE: 18
PIF_VALUE: 16

## 2023-06-28 PROBLEM — G92.8 TOXIC METABOLIC ENCEPHALOPATHY: Status: ACTIVE | Noted: 2023-06-28

## 2023-06-28 PROBLEM — F44.5 PSYCHOGENIC NONEPILEPTIC SEIZURE: Status: ACTIVE | Noted: 2023-06-28

## 2023-06-28 LAB
ALBUMIN SERPL-MCNC: 3.8 G/DL (ref 3.5–5)
ALBUMIN/GLOB SERPL: 1 (ref 1.1–2.2)
ALP SERPL-CCNC: 207 U/L (ref 45–117)
ALT SERPL-CCNC: 164 U/L (ref 12–78)
ANION GAP SERPL CALC-SCNC: 7 MMOL/L (ref 5–15)
AST SERPL-CCNC: 102 U/L (ref 15–37)
BASOPHILS # BLD: 0.1 K/UL (ref 0–0.1)
BASOPHILS NFR BLD: 1 % (ref 0–1)
BILIRUB SERPL-MCNC: 0.2 MG/DL (ref 0.2–1)
BUN SERPL-MCNC: 9 MG/DL (ref 6–20)
BUN/CREAT SERPL: 10 (ref 12–20)
CALCIUM SERPL-MCNC: 9.2 MG/DL (ref 8.5–10.1)
CHLORIDE SERPL-SCNC: 108 MMOL/L (ref 97–108)
CO2 SERPL-SCNC: 26 MMOL/L (ref 21–32)
CREAT SERPL-MCNC: 0.92 MG/DL (ref 0.55–1.02)
DIFFERENTIAL METHOD BLD: ABNORMAL
EOSINOPHIL # BLD: 0.1 K/UL (ref 0–0.4)
EOSINOPHIL NFR BLD: 2 % (ref 0–7)
ERYTHROCYTE [DISTWIDTH] IN BLOOD BY AUTOMATED COUNT: 15.8 % (ref 11.5–14.5)
EST. AVERAGE GLUCOSE BLD GHB EST-MCNC: 88 MG/DL
GLOBULIN SER CALC-MCNC: 4 G/DL (ref 2–4)
GLUCOSE BLD STRIP.AUTO-MCNC: 74 MG/DL (ref 65–117)
GLUCOSE BLD STRIP.AUTO-MCNC: 77 MG/DL (ref 65–117)
GLUCOSE BLD STRIP.AUTO-MCNC: 90 MG/DL (ref 65–117)
GLUCOSE BLD STRIP.AUTO-MCNC: 91 MG/DL (ref 65–117)
GLUCOSE SERPL-MCNC: 101 MG/DL (ref 65–100)
HBA1C MFR BLD: 4.7 % (ref 4–5.6)
HCT VFR BLD AUTO: 39.7 % (ref 35–47)
HGB BLD-MCNC: 13 G/DL (ref 11.5–16)
IMM GRANULOCYTES # BLD AUTO: 0 K/UL (ref 0–0.04)
IMM GRANULOCYTES NFR BLD AUTO: 0 % (ref 0–0.5)
LYMPHOCYTES # BLD: 1.7 K/UL (ref 0.8–3.5)
LYMPHOCYTES NFR BLD: 22 % (ref 12–49)
MCH RBC QN AUTO: 27.2 PG (ref 26–34)
MCHC RBC AUTO-ENTMCNC: 32.7 G/DL (ref 30–36.5)
MCV RBC AUTO: 83.1 FL (ref 80–99)
MONOCYTES # BLD: 0.5 K/UL (ref 0–1)
MONOCYTES NFR BLD: 6 % (ref 5–13)
NEUTS SEG # BLD: 5.2 K/UL (ref 1.8–8)
NEUTS SEG NFR BLD: 69 % (ref 32–75)
NRBC # BLD: 0 K/UL (ref 0–0.01)
NRBC BLD-RTO: 0 PER 100 WBC
PLATELET # BLD AUTO: 353 K/UL (ref 150–400)
PMV BLD AUTO: 10.4 FL (ref 8.9–12.9)
POTASSIUM SERPL-SCNC: 3.5 MMOL/L (ref 3.5–5.1)
PROT SERPL-MCNC: 7.8 G/DL (ref 6.4–8.2)
RBC # BLD AUTO: 4.78 M/UL (ref 3.8–5.2)
SERVICE CMNT-IMP: NORMAL
SODIUM SERPL-SCNC: 141 MMOL/L (ref 136–145)
WBC # BLD AUTO: 7.5 K/UL (ref 3.6–11)

## 2023-06-28 PROCEDURE — 6370000000 HC RX 637 (ALT 250 FOR IP)

## 2023-06-28 PROCEDURE — 6360000002 HC RX W HCPCS: Performed by: INTERNAL MEDICINE

## 2023-06-28 PROCEDURE — 97161 PT EVAL LOW COMPLEX 20 MIN: CPT

## 2023-06-28 PROCEDURE — 6360000002 HC RX W HCPCS: Performed by: NURSE PRACTITIONER

## 2023-06-28 PROCEDURE — 85025 COMPLETE CBC W/AUTO DIFF WBC: CPT

## 2023-06-28 PROCEDURE — 94640 AIRWAY INHALATION TREATMENT: CPT

## 2023-06-28 PROCEDURE — 2060000000 HC ICU INTERMEDIATE R&B

## 2023-06-28 PROCEDURE — 82962 GLUCOSE BLOOD TEST: CPT

## 2023-06-28 PROCEDURE — 2580000003 HC RX 258: Performed by: INTERNAL MEDICINE

## 2023-06-28 PROCEDURE — 6370000000 HC RX 637 (ALT 250 FOR IP): Performed by: NURSE PRACTITIONER

## 2023-06-28 PROCEDURE — 99222 1ST HOSP IP/OBS MODERATE 55: CPT | Performed by: INTERNAL MEDICINE

## 2023-06-28 PROCEDURE — 6360000002 HC RX W HCPCS: Performed by: EMERGENCY MEDICINE

## 2023-06-28 PROCEDURE — 80053 COMPREHEN METABOLIC PANEL: CPT

## 2023-06-28 PROCEDURE — 99231 SBSQ HOSP IP/OBS SF/LOW 25: CPT | Performed by: PSYCHIATRY & NEUROLOGY

## 2023-06-28 PROCEDURE — 2580000003 HC RX 258: Performed by: NURSE PRACTITIONER

## 2023-06-28 PROCEDURE — 97116 GAIT TRAINING THERAPY: CPT

## 2023-06-28 PROCEDURE — 83036 HEMOGLOBIN GLYCOSYLATED A1C: CPT

## 2023-06-28 PROCEDURE — 97535 SELF CARE MNGMENT TRAINING: CPT

## 2023-06-28 PROCEDURE — 6370000000 HC RX 637 (ALT 250 FOR IP): Performed by: FAMILY MEDICINE

## 2023-06-28 PROCEDURE — 36415 COLL VENOUS BLD VENIPUNCTURE: CPT

## 2023-06-28 RX ORDER — LEVOTHYROXINE SODIUM 0.15 MG/1
150 TABLET ORAL
Status: DISCONTINUED | OUTPATIENT
Start: 2023-06-29 | End: 2023-06-29 | Stop reason: HOSPADM

## 2023-06-28 RX ORDER — LEVOTHYROXINE SODIUM 0.12 MG/1
125 TABLET ORAL
Status: DISCONTINUED | OUTPATIENT
Start: 2023-06-28 | End: 2023-06-28

## 2023-06-28 RX ADMIN — LEVETIRACETAM 1000 MG: 100 INJECTION, SOLUTION INTRAVENOUS at 17:19

## 2023-06-28 RX ADMIN — SODIUM CHLORIDE, PRESERVATIVE FREE 10 ML: 5 INJECTION INTRAVENOUS at 09:54

## 2023-06-28 RX ADMIN — SODIUM CHLORIDE, PRESERVATIVE FREE 10 ML: 5 INJECTION INTRAVENOUS at 20:55

## 2023-06-28 RX ADMIN — OXCARBAZEPINE 150 MG: 150 TABLET, FILM COATED ORAL at 20:55

## 2023-06-28 RX ADMIN — FAMOTIDINE 20 MG: 20 TABLET ORAL at 09:52

## 2023-06-28 RX ADMIN — TOPIRAMATE 100 MG: 25 TABLET, FILM COATED ORAL at 20:55

## 2023-06-28 RX ADMIN — ARFORMOTEROL TARTRATE: 15 SOLUTION RESPIRATORY (INHALATION) at 08:45

## 2023-06-28 RX ADMIN — FAMOTIDINE 20 MG: 20 TABLET ORAL at 20:55

## 2023-06-28 RX ADMIN — TOPIRAMATE 100 MG: 25 TABLET, FILM COATED ORAL at 09:52

## 2023-06-28 RX ADMIN — OXCARBAZEPINE 150 MG: 150 TABLET, FILM COATED ORAL at 09:52

## 2023-06-28 RX ADMIN — LEVETIRACETAM 1000 MG: 100 INJECTION, SOLUTION INTRAVENOUS at 05:30

## 2023-06-28 RX ADMIN — POLYETHYLENE GLYCOL 3350 17 G: 17 POWDER, FOR SOLUTION ORAL at 13:24

## 2023-06-28 RX ADMIN — LEVOTHYROXINE SODIUM 125 MCG: 0.12 TABLET ORAL at 16:42

## 2023-06-28 RX ADMIN — WATER 50 MG: 1 INJECTION INTRAMUSCULAR; INTRAVENOUS; SUBCUTANEOUS at 04:13

## 2023-06-28 RX ADMIN — ENOXAPARIN SODIUM 40 MG: 100 INJECTION SUBCUTANEOUS at 18:56

## 2023-06-28 RX ADMIN — WATER 50 MG: 1 INJECTION INTRAMUSCULAR; INTRAVENOUS; SUBCUTANEOUS at 09:52

## 2023-06-28 RX ADMIN — ARFORMOTEROL TARTRATE: 15 SOLUTION RESPIRATORY (INHALATION) at 19:51

## 2023-06-28 ASSESSMENT — PAIN SCALES - GENERAL
PAINLEVEL_OUTOF10: 0
PAINLEVEL_OUTOF10: 0

## 2023-06-29 VITALS
HEART RATE: 81 BPM | RESPIRATION RATE: 13 BRPM | HEIGHT: 66 IN | SYSTOLIC BLOOD PRESSURE: 118 MMHG | WEIGHT: 198.8 LBS | TEMPERATURE: 97.9 F | DIASTOLIC BLOOD PRESSURE: 70 MMHG | BODY MASS INDEX: 31.95 KG/M2 | OXYGEN SATURATION: 94 %

## 2023-06-29 PROBLEM — G92.8 TOXIC METABOLIC ENCEPHALOPATHY: Status: RESOLVED | Noted: 2023-06-28 | Resolved: 2023-06-29

## 2023-06-29 PROBLEM — R41.89 UNRESPONSIVE STATE: Status: RESOLVED | Noted: 2023-06-27 | Resolved: 2023-06-29

## 2023-06-29 PROBLEM — Z87.898 HISTORY OF SEIZURES: Status: RESOLVED | Noted: 2023-06-26 | Resolved: 2023-06-29

## 2023-06-29 PROBLEM — E03.9 MYXEDEMA: Status: RESOLVED | Noted: 2023-06-25 | Resolved: 2023-06-29

## 2023-06-29 PROBLEM — F44.5 PSYCHOGENIC NONEPILEPTIC SEIZURE: Status: RESOLVED | Noted: 2023-06-28 | Resolved: 2023-06-29

## 2023-06-29 LAB
ALBUMIN SERPL-MCNC: 3.5 G/DL (ref 3.5–5)
ALBUMIN/GLOB SERPL: 1.1 (ref 1.1–2.2)
ALP SERPL-CCNC: 171 U/L (ref 45–117)
ALT SERPL-CCNC: 148 U/L (ref 12–78)
ANION GAP SERPL CALC-SCNC: 8 MMOL/L (ref 5–15)
AST SERPL-CCNC: 100 U/L (ref 15–37)
BASOPHILS # BLD: 0 K/UL (ref 0–0.1)
BASOPHILS NFR BLD: 1 % (ref 0–1)
BILIRUB SERPL-MCNC: 0.3 MG/DL (ref 0.2–1)
BUN SERPL-MCNC: 9 MG/DL (ref 6–20)
BUN/CREAT SERPL: 12 (ref 12–20)
CALCIUM SERPL-MCNC: 9.2 MG/DL (ref 8.5–10.1)
CHLORIDE SERPL-SCNC: 109 MMOL/L (ref 97–108)
CO2 SERPL-SCNC: 24 MMOL/L (ref 21–32)
CREAT SERPL-MCNC: 0.73 MG/DL (ref 0.55–1.02)
DIFFERENTIAL METHOD BLD: ABNORMAL
EOSINOPHIL # BLD: 0.3 K/UL (ref 0–0.4)
EOSINOPHIL NFR BLD: 3 % (ref 0–7)
ERYTHROCYTE [DISTWIDTH] IN BLOOD BY AUTOMATED COUNT: 15.9 % (ref 11.5–14.5)
GLOBULIN SER CALC-MCNC: 3.1 G/DL (ref 2–4)
GLUCOSE BLD STRIP.AUTO-MCNC: 76 MG/DL (ref 65–117)
GLUCOSE BLD STRIP.AUTO-MCNC: 78 MG/DL (ref 65–117)
GLUCOSE SERPL-MCNC: 83 MG/DL (ref 65–100)
HCT VFR BLD AUTO: 35 % (ref 35–47)
HGB BLD-MCNC: 11 G/DL (ref 11.5–16)
IMM GRANULOCYTES # BLD AUTO: 0 K/UL (ref 0–0.04)
IMM GRANULOCYTES NFR BLD AUTO: 0 % (ref 0–0.5)
LYMPHOCYTES # BLD: 2.5 K/UL (ref 0.8–3.5)
LYMPHOCYTES NFR BLD: 31 % (ref 12–49)
MCH RBC QN AUTO: 26.1 PG (ref 26–34)
MCHC RBC AUTO-ENTMCNC: 31.4 G/DL (ref 30–36.5)
MCV RBC AUTO: 83.1 FL (ref 80–99)
MONOCYTES # BLD: 0.5 K/UL (ref 0–1)
MONOCYTES NFR BLD: 6 % (ref 5–13)
NEUTS SEG # BLD: 4.7 K/UL (ref 1.8–8)
NEUTS SEG NFR BLD: 59 % (ref 32–75)
NRBC # BLD: 0 K/UL (ref 0–0.01)
NRBC BLD-RTO: 0 PER 100 WBC
PLATELET # BLD AUTO: 333 K/UL (ref 150–400)
PMV BLD AUTO: 10.1 FL (ref 8.9–12.9)
POTASSIUM SERPL-SCNC: 3.6 MMOL/L (ref 3.5–5.1)
PROT SERPL-MCNC: 6.6 G/DL (ref 6.4–8.2)
RBC # BLD AUTO: 4.21 M/UL (ref 3.8–5.2)
SERVICE CMNT-IMP: NORMAL
SERVICE CMNT-IMP: NORMAL
SODIUM SERPL-SCNC: 141 MMOL/L (ref 136–145)
T3REVERSE SERPL-MCNC: <5 NG/DL (ref 9.2–24.1)
WBC # BLD AUTO: 7.9 K/UL (ref 3.6–11)

## 2023-06-29 PROCEDURE — 36415 COLL VENOUS BLD VENIPUNCTURE: CPT

## 2023-06-29 PROCEDURE — 6370000000 HC RX 637 (ALT 250 FOR IP)

## 2023-06-29 PROCEDURE — 80053 COMPREHEN METABOLIC PANEL: CPT

## 2023-06-29 PROCEDURE — 6370000000 HC RX 637 (ALT 250 FOR IP): Performed by: INTERNAL MEDICINE

## 2023-06-29 PROCEDURE — 85025 COMPLETE CBC W/AUTO DIFF WBC: CPT

## 2023-06-29 PROCEDURE — 2580000003 HC RX 258: Performed by: NURSE PRACTITIONER

## 2023-06-29 PROCEDURE — 99232 SBSQ HOSP IP/OBS MODERATE 35: CPT | Performed by: PSYCHIATRY & NEUROLOGY

## 2023-06-29 PROCEDURE — 6370000000 HC RX 637 (ALT 250 FOR IP): Performed by: NURSE PRACTITIONER

## 2023-06-29 PROCEDURE — 94640 AIRWAY INHALATION TREATMENT: CPT

## 2023-06-29 PROCEDURE — 6360000002 HC RX W HCPCS: Performed by: NURSE PRACTITIONER

## 2023-06-29 PROCEDURE — 6360000002 HC RX W HCPCS: Performed by: EMERGENCY MEDICINE

## 2023-06-29 PROCEDURE — 82962 GLUCOSE BLOOD TEST: CPT

## 2023-06-29 RX ORDER — LEVETIRACETAM 500 MG/1
2000 TABLET, EXTENDED RELEASE ORAL DAILY
Qty: 60 TABLET | Refills: 1 | Status: SHIPPED | OUTPATIENT
Start: 2023-06-29

## 2023-06-29 RX ORDER — LEVOTHYROXINE SODIUM 0.15 MG/1
150 TABLET ORAL DAILY
Qty: 90 TABLET | Refills: 1 | Status: SHIPPED | OUTPATIENT
Start: 2023-06-29

## 2023-06-29 RX ORDER — LANOLIN ALCOHOL/MO/W.PET/CERES
3 CREAM (GRAM) TOPICAL NIGHTLY PRN
Status: DISCONTINUED | OUTPATIENT
Start: 2023-06-29 | End: 2023-06-29 | Stop reason: HOSPADM

## 2023-06-29 RX ADMIN — OXCARBAZEPINE 150 MG: 150 TABLET, FILM COATED ORAL at 09:42

## 2023-06-29 RX ADMIN — ARFORMOTEROL TARTRATE: 15 SOLUTION RESPIRATORY (INHALATION) at 08:00

## 2023-06-29 RX ADMIN — TOPIRAMATE 100 MG: 25 TABLET, FILM COATED ORAL at 09:42

## 2023-06-29 RX ADMIN — FAMOTIDINE 20 MG: 20 TABLET ORAL at 09:42

## 2023-06-29 RX ADMIN — SODIUM CHLORIDE, PRESERVATIVE FREE 10 ML: 5 INJECTION INTRAVENOUS at 09:48

## 2023-06-29 RX ADMIN — LEVETIRACETAM 1000 MG: 100 INJECTION, SOLUTION INTRAVENOUS at 04:22

## 2023-06-29 RX ADMIN — LEVOTHYROXINE SODIUM 150 MCG: 0.15 TABLET ORAL at 06:15

## 2023-06-30 RX ORDER — FENTANYL CITRATE 50 UG/ML
100 INJECTION, SOLUTION INTRAMUSCULAR; INTRAVENOUS ONCE
Status: COMPLETED | OUTPATIENT
Start: 2023-06-25 | End: 2023-06-25

## 2023-07-05 NOTE — PERIOP NOTE
Called Dr. Indira Plata office, spoke to  Gloriajean Simmonds , PCP clearance requested by MD not in chart, Mayte Tong stated she would check with MD and return call to PAT dept.     [address] returned call stated per MD PCP clearance is not needed

## 2023-07-06 ENCOUNTER — ANESTHESIA (OUTPATIENT)
Facility: HOSPITAL | Age: 53
End: 2023-07-06
Payer: MEDICARE

## 2023-07-06 ENCOUNTER — HOSPITAL ENCOUNTER (OUTPATIENT)
Facility: HOSPITAL | Age: 53
Discharge: HOME | End: 2023-07-06
Attending: ORTHOPAEDIC SURGERY | Admitting: ORTHOPAEDIC SURGERY
Payer: MEDICARE

## 2023-07-06 ENCOUNTER — ANESTHESIA EVENT (OUTPATIENT)
Facility: HOSPITAL | Age: 53
End: 2023-07-06
Payer: MEDICARE

## 2023-07-06 VITALS
BODY MASS INDEX: 32.09 KG/M2 | HEIGHT: 66 IN | TEMPERATURE: 97.7 F | OXYGEN SATURATION: 96 % | HEART RATE: 79 BPM | DIASTOLIC BLOOD PRESSURE: 61 MMHG | RESPIRATION RATE: 16 BRPM | SYSTOLIC BLOOD PRESSURE: 111 MMHG

## 2023-07-06 DIAGNOSIS — M75.102 NONTRAUMATIC TEAR OF LEFT ROTATOR CUFF, UNSPECIFIED TEAR EXTENT: Primary | ICD-10-CM

## 2023-07-06 LAB
CA-I BLD-MCNC: 1.15 MMOL/L (ref 1.12–1.32)
CHLORIDE BLD-SCNC: 105 MMOL/L (ref 98–107)
CREAT UR-MCNC: 0.71 MG/DL (ref 0.6–1.3)
GLUCOSE BLD STRIP.AUTO-MCNC: 72 MG/DL (ref 65–100)
GLUCOSE BLD STRIP.AUTO-MCNC: 96 MG/DL (ref 65–100)
PERFORMED BY:: NORMAL
POTASSIUM BLD-SCNC: 3.4 MMOL/L (ref 3.5–5.5)
SODIUM BLD-SCNC: 140 MMOL/L (ref 136–145)

## 2023-07-06 PROCEDURE — 3600000012 HC SURGERY LEVEL 2 ADDTL 15MIN: Performed by: ORTHOPAEDIC SURGERY

## 2023-07-06 PROCEDURE — 80047 BASIC METABLC PNL IONIZED CA: CPT

## 2023-07-06 PROCEDURE — 82962 GLUCOSE BLOOD TEST: CPT

## 2023-07-06 PROCEDURE — 7100000010 HC PHASE II RECOVERY - FIRST 15 MIN: Performed by: ORTHOPAEDIC SURGERY

## 2023-07-06 PROCEDURE — 2720000010 HC SURG SUPPLY STERILE: Performed by: ORTHOPAEDIC SURGERY

## 2023-07-06 PROCEDURE — 6360000002 HC RX W HCPCS: Performed by: NURSE ANESTHETIST, CERTIFIED REGISTERED

## 2023-07-06 PROCEDURE — 7100000001 HC PACU RECOVERY - ADDTL 15 MIN: Performed by: ORTHOPAEDIC SURGERY

## 2023-07-06 PROCEDURE — 2580000003 HC RX 258: Performed by: NURSE ANESTHETIST, CERTIFIED REGISTERED

## 2023-07-06 PROCEDURE — 64415 NJX AA&/STRD BRCH PLXS IMG: CPT | Performed by: ANESTHESIOLOGY

## 2023-07-06 PROCEDURE — 3600000002 HC SURGERY LEVEL 2 BASE: Performed by: ORTHOPAEDIC SURGERY

## 2023-07-06 PROCEDURE — 2500000003 HC RX 250 WO HCPCS: Performed by: NURSE ANESTHETIST, CERTIFIED REGISTERED

## 2023-07-06 PROCEDURE — 6360000002 HC RX W HCPCS: Performed by: ANESTHESIOLOGY

## 2023-07-06 PROCEDURE — 3700000001 HC ADD 15 MINUTES (ANESTHESIA): Performed by: ORTHOPAEDIC SURGERY

## 2023-07-06 PROCEDURE — C1713 ANCHOR/SCREW BN/BN,TIS/BN: HCPCS | Performed by: ORTHOPAEDIC SURGERY

## 2023-07-06 PROCEDURE — 7100000011 HC PHASE II RECOVERY - ADDTL 15 MIN: Performed by: ORTHOPAEDIC SURGERY

## 2023-07-06 PROCEDURE — 7100000000 HC PACU RECOVERY - FIRST 15 MIN: Performed by: ORTHOPAEDIC SURGERY

## 2023-07-06 PROCEDURE — L3660 SO 8 AB RSTR CAN/WEB PRE OTS: HCPCS | Performed by: ORTHOPAEDIC SURGERY

## 2023-07-06 PROCEDURE — 2709999900 HC NON-CHARGEABLE SUPPLY: Performed by: ORTHOPAEDIC SURGERY

## 2023-07-06 PROCEDURE — 6370000000 HC RX 637 (ALT 250 FOR IP): Performed by: ORTHOPAEDIC SURGERY

## 2023-07-06 PROCEDURE — 3700000000 HC ANESTHESIA ATTENDED CARE: Performed by: ORTHOPAEDIC SURGERY

## 2023-07-06 DEVICE — ANCHOR SUT L14.7MM DIA5.5MM PEEK W/ 3 SZ 2 FIBERWIRE CRKSCR: Type: IMPLANTABLE DEVICE | Site: SHOULDER | Status: FUNCTIONAL

## 2023-07-06 RX ORDER — DEXAMETHASONE SODIUM PHOSPHATE 10 MG/ML
INJECTION, SOLUTION INTRAMUSCULAR; INTRAVENOUS
Status: COMPLETED
Start: 2023-07-06 | End: 2023-07-06

## 2023-07-06 RX ORDER — DEXAMETHASONE SODIUM PHOSPHATE 4 MG/ML
INJECTION, SOLUTION INTRA-ARTICULAR; INTRALESIONAL; INTRAMUSCULAR; INTRAVENOUS; SOFT TISSUE PRN
Status: DISCONTINUED | OUTPATIENT
Start: 2023-07-06 | End: 2023-07-06 | Stop reason: SDUPTHER

## 2023-07-06 RX ORDER — OXYCODONE HYDROCHLORIDE 5 MG/1
5 TABLET ORAL EVERY 6 HOURS PRN
Qty: 30 TABLET | Refills: 0 | Status: SHIPPED | OUTPATIENT
Start: 2023-07-06 | End: 2023-07-20

## 2023-07-06 RX ORDER — MIDAZOLAM HYDROCHLORIDE 1 MG/ML
INJECTION INTRAMUSCULAR; INTRAVENOUS
Status: COMPLETED
Start: 2023-07-06 | End: 2023-07-06

## 2023-07-06 RX ORDER — DEXAMETHASONE SODIUM PHOSPHATE 10 MG/ML
INJECTION, SOLUTION INTRAMUSCULAR; INTRAVENOUS
Status: COMPLETED | OUTPATIENT
Start: 2023-07-06 | End: 2023-07-06

## 2023-07-06 RX ORDER — FENTANYL CITRATE 50 UG/ML
INJECTION, SOLUTION INTRAMUSCULAR; INTRAVENOUS PRN
Status: DISCONTINUED | OUTPATIENT
Start: 2023-07-06 | End: 2023-07-06 | Stop reason: SDUPTHER

## 2023-07-06 RX ORDER — SODIUM CHLORIDE, SODIUM LACTATE, POTASSIUM CHLORIDE, CALCIUM CHLORIDE 600; 310; 30; 20 MG/100ML; MG/100ML; MG/100ML; MG/100ML
INJECTION, SOLUTION INTRAVENOUS CONTINUOUS PRN
Status: DISCONTINUED | OUTPATIENT
Start: 2023-07-06 | End: 2023-07-06 | Stop reason: SDUPTHER

## 2023-07-06 RX ORDER — ROPIVACAINE HYDROCHLORIDE 5 MG/ML
INJECTION, SOLUTION EPIDURAL; INFILTRATION; PERINEURAL
Status: DISCONTINUED
Start: 2023-07-06 | End: 2023-07-06 | Stop reason: HOSPADM

## 2023-07-06 RX ORDER — FENTANYL CITRATE 50 UG/ML
INJECTION, SOLUTION INTRAMUSCULAR; INTRAVENOUS
Status: COMPLETED
Start: 2023-07-06 | End: 2023-07-06

## 2023-07-06 RX ORDER — MIDAZOLAM HYDROCHLORIDE 1 MG/ML
INJECTION INTRAMUSCULAR; INTRAVENOUS PRN
Status: DISCONTINUED | OUTPATIENT
Start: 2023-07-06 | End: 2023-07-06 | Stop reason: SDUPTHER

## 2023-07-06 RX ORDER — EPINEPHRINE 1 MG/ML
INJECTION, SOLUTION, CONCENTRATE INTRAVENOUS
Status: DISCONTINUED
Start: 2023-07-06 | End: 2023-07-06 | Stop reason: HOSPADM

## 2023-07-06 RX ORDER — SODIUM CHLORIDE, SODIUM LACTATE, POTASSIUM CHLORIDE, CALCIUM CHLORIDE 600; 310; 30; 20 MG/100ML; MG/100ML; MG/100ML; MG/100ML
INJECTION, SOLUTION INTRAVENOUS CONTINUOUS
Status: DISCONTINUED | OUTPATIENT
Start: 2023-07-06 | End: 2023-07-06 | Stop reason: HOSPADM

## 2023-07-06 RX ORDER — LIDOCAINE HYDROCHLORIDE 10 MG/ML
INJECTION, SOLUTION EPIDURAL; INFILTRATION; INTRACAUDAL; PERINEURAL
Status: DISCONTINUED
Start: 2023-07-06 | End: 2023-07-06 | Stop reason: HOSPADM

## 2023-07-06 RX ORDER — PREGABALIN 25 MG/1
75 CAPSULE ORAL ONCE
Status: COMPLETED | OUTPATIENT
Start: 2023-07-06 | End: 2023-07-06

## 2023-07-06 RX ORDER — CEFAZOLIN SODIUM 1 G/3ML
INJECTION, POWDER, FOR SOLUTION INTRAMUSCULAR; INTRAVENOUS PRN
Status: DISCONTINUED | OUTPATIENT
Start: 2023-07-06 | End: 2023-07-06 | Stop reason: SDUPTHER

## 2023-07-06 RX ORDER — ONDANSETRON 2 MG/ML
INJECTION INTRAMUSCULAR; INTRAVENOUS PRN
Status: DISCONTINUED | OUTPATIENT
Start: 2023-07-06 | End: 2023-07-06 | Stop reason: SDUPTHER

## 2023-07-06 RX ORDER — ROCURONIUM BROMIDE 10 MG/ML
INJECTION, SOLUTION INTRAVENOUS PRN
Status: DISCONTINUED | OUTPATIENT
Start: 2023-07-06 | End: 2023-07-06 | Stop reason: SDUPTHER

## 2023-07-06 RX ADMIN — MIDAZOLAM HYDROCHLORIDE 2 MG: 2 INJECTION, SOLUTION INTRAMUSCULAR; INTRAVENOUS at 12:17

## 2023-07-06 RX ADMIN — FENTANYL CITRATE 100 MCG: 50 INJECTION, SOLUTION INTRAMUSCULAR; INTRAVENOUS at 12:17

## 2023-07-06 RX ADMIN — CEFAZOLIN 2 G: 1 INJECTION, POWDER, FOR SOLUTION INTRAMUSCULAR; INTRAVENOUS at 13:34

## 2023-07-06 RX ADMIN — LIDOCAINE HYDROCHLORIDE 5 ML: 20 INJECTION, SOLUTION EPIDURAL; INFILTRATION; INTRACAUDAL; PERINEURAL at 13:26

## 2023-07-06 RX ADMIN — ROCURONIUM BROMIDE 5 MG: 10 INJECTION, SOLUTION INTRAVENOUS at 13:26

## 2023-07-06 RX ADMIN — DEXAMETHASONE SODIUM PHOSPHATE 4 MG: 10 INJECTION INTRAMUSCULAR; INTRAVENOUS at 12:23

## 2023-07-06 RX ADMIN — PHENYLEPHRINE HYDROCHLORIDE 50 MCG/MIN: 10 INJECTION INTRAVENOUS at 13:46

## 2023-07-06 RX ADMIN — PREGABALIN 75 MG: 25 CAPSULE ORAL at 11:44

## 2023-07-06 RX ADMIN — PROPOFOL 100 MG: 10 INJECTION, EMULSION INTRAVENOUS at 13:26

## 2023-07-06 RX ADMIN — SODIUM CHLORIDE, POTASSIUM CHLORIDE, SODIUM LACTATE AND CALCIUM CHLORIDE: 600; 310; 30; 20 INJECTION, SOLUTION INTRAVENOUS at 13:19

## 2023-07-06 RX ADMIN — ONDANSETRON 4 MG: 2 INJECTION INTRAMUSCULAR; INTRAVENOUS at 13:34

## 2023-07-06 RX ADMIN — SUGAMMADEX 200 MG: 100 INJECTION, SOLUTION INTRAVENOUS at 15:33

## 2023-07-06 RX ADMIN — TRANEXAMIC ACID 1 G: 1 INJECTION, SOLUTION INTRAVENOUS at 13:58

## 2023-07-06 RX ADMIN — ROCURONIUM BROMIDE 10 MG: 10 INJECTION, SOLUTION INTRAVENOUS at 14:45

## 2023-07-06 RX ADMIN — FENTANYL CITRATE 100 MCG: 50 INJECTION, SOLUTION INTRAMUSCULAR; INTRAVENOUS at 13:26

## 2023-07-06 RX ADMIN — ROCURONIUM BROMIDE 45 MG: 10 INJECTION, SOLUTION INTRAVENOUS at 13:27

## 2023-07-06 RX ADMIN — DEXAMETHASONE SODIUM PHOSPHATE 4 MG: 4 INJECTION, SOLUTION INTRAMUSCULAR; INTRAVENOUS at 13:34

## 2023-07-06 ASSESSMENT — PAIN - FUNCTIONAL ASSESSMENT: PAIN_FUNCTIONAL_ASSESSMENT: 0-10

## 2023-07-06 ASSESSMENT — PAIN DESCRIPTION - DESCRIPTORS: DESCRIPTORS: ACHING

## 2023-07-06 ASSESSMENT — PAIN SCALES - GENERAL
PAINLEVEL_OUTOF10: 0
PAINLEVEL_OUTOF10: 0

## 2023-07-06 NOTE — ANESTHESIA PROCEDURE NOTES
Peripheral Block    Patient location during procedure: holding area  Reason for block: post-op pain management and at surgeon's request  Start time: 7/6/2023 12:15 PM  End time: 7/6/2023 12:25 PM  Staffing  Performed: anesthesiologist   Anesthesiologist: Cony Gray MD  Peripheral Block   Patient position: supine  Prep: ChloraPrep  Block type: Brachial plexus  Interscalene  Laterality: left  Injection technique: single-shot  Guidance: ultrasound guided  Local infiltration: lidocaine  Infiltration strength: 1 %  Local infiltration: lidocaine  Dose: 3 mL    Needle   Needle gauge: 21 G  Needle localization: anatomical landmarks  Needle length: 10 cm  Assessment   Injection assessment: negative aspiration for heme, no paresthesia on injection, local visualized surrounding nerve on ultrasound and no intravascular symptoms  Paresthesia pain: none  Slow fractionated injection: yes  Hemodynamics: stable    Medications Administered  ropivacaine 0.5% with epinephrine 1:[id number] injection (ANESTHESIA USE ONLY) (Mixture components: EPINEPHrine PF 1 MG/ML Soln, 0.005 mL; ropivacaine 0.5% Soln, 1 mL) - Perineural   30 mL - 7/6/2023 12:23:00 PM  dexamethasone (DECADRON) (PF) 10 mg/mL injection - Other   4 mg - 7/6/2023 12:23:00 PM

## 2023-07-06 NOTE — PROGRESS NOTES
Spoke  with DR Lex Flannery  at  bedside  advised of  sulfa  allergy  and  celebrex  contraindicated order  cancelled

## 2023-07-06 NOTE — DISCHARGE INSTRUCTIONS
Polar ice to left shoulder  Left shoulder sling  Take dressings off in 3 days and swhower and apply bandaids

## 2023-07-06 NOTE — OP NOTE
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 Item Serial No.  Lot No. LRB No. Used Action   ANCHOR SUT L14.7MM DIA5. 5MM PEEK W/ 3 SZ 2 [employer]KS[employer] - S.  ANCHOR SUT L14.7MM DIA5. 5MM PEEK W/ 3 SZ 2 [employer] .  [address] [place] [employer]- [id number] Left 1 Implanted         Drains:   [REMOVED] NG/OG/NJ/NE Tube Orogastric (Removed)   Surrounding Skin Clean, dry & intact 06/27/23 0800   Securement device Tape 06/27/23 0800   Status Continuous feeding 06/27/23 0800   Placement Verified External Catheter Length 06/27/23 0800   NG/OG/NJ/NE External Measurement (cm) 74 cm 06/27/23 0800   Drainage Appearance None 06/27/23 0400   Tube Feeding Standard with Fiber 06/27/23 0800   Tube feeding/verify rate (mL/hr) 45 mL/hr 06/27/23 0500   Tube Feeding Supplement (Product) Protein Modular 06/27/23 0800   Tube Feeding Intake (mL) 45 ml 06/27/23 0800   Tube Feeding Supplement Amount (mL) 150 06/27/23 0800   Free Water/Flush (mL) 30 mL 06/27/23 0800   Action Taken Placement verified (comment) 06/27/23 0800       [REMOVED] Urinary Catheter 06/25/23 Jeff-Temperature (Removed)   $ Urethral catheter insertion Inserted for procedure 06/25/23 2034   Catheter Indications Need for fluid volume management of the critically ill patient in a critical care setting 06/27/23 0800   Site Assessment No urethral drainage Pt called with questions regarding Dr. Barakat's message.

## 2023-07-06 NOTE — PERIOP NOTE
Patient alert and oriented x 4 with respirations even and unlabored on RA. Patient discharged home per physician's order. Patient and patient's daughter Rama Jason verbalize understanding of patient's discharge instructions. Patient given polar ice machine per physician's order. Patient transported via wheelchair to front hospital entrance with patient's daughter present to transport patient via private vehicle.

## 2023-07-07 NOTE — ANESTHESIA POSTPROCEDURE EVALUATION
Department of Anesthesiology  Postprocedure Note    Patient: Erica Tian  MRN: [id number]  YOB: 1970  Date of evaluation: 7/6/2023      Procedure Summary     Date: 07/06/23 Room / Location: [facility] MAIN OR 08 / [facility] MAIN OR    Anesthesia Start: 1319 Anesthesia Stop: 3898    Procedure: LEFT SHOULDER ARTHROSCOPY WITH excision of nonunion Acromion (Left: Shoulder) Diagnosis:       Rotator cuff syndrome of left shoulder      (Rotator cuff syndrome of left shoulder [M75.102])    Surgeons: Jf Hinton MD Responsible Provider: Meliza Charles MD    Anesthesia Type: General, Regional ASA Status: 3          Anesthesia Type: General, Regional    Reina Phase I: Reina Score: 10    Reina Phase II: Reina Score: 10      Anesthesia Post Evaluation    Patient location during evaluation: PACU  Patient participation: complete - patient participated  Level of consciousness: awake  Pain score: 0  Airway patency: patent  Nausea & Vomiting: no nausea and no vomiting  Complications: no  Cardiovascular status: hemodynamically stable  Respiratory status: acceptable  Hydration status: stable

## 2023-07-18 ENCOUNTER — HOSPITAL ENCOUNTER (OUTPATIENT)
Facility: HOSPITAL | Age: 53
Discharge: HOME | End: 2023-07-21
Attending: INTERNAL MEDICINE
Payer: MEDICARE

## 2023-07-18 DIAGNOSIS — K31.84 GASTROPARESIS: ICD-10-CM

## 2023-07-18 PROCEDURE — 78264 GASTRIC EMPTYING IMG STUDY: CPT

## 2023-07-18 PROCEDURE — 3430000000 HC RX DIAGNOSTIC RADIOPHARMACEUTICAL: Performed by: INTERNAL MEDICINE

## 2023-07-18 PROCEDURE — A9541 TC99M SULFUR COLLOID: HCPCS | Performed by: INTERNAL MEDICINE

## 2023-07-18 RX ORDER — TECHNETIUM TC 99M SULFUR COLLOID 2 MG
0.97 KIT MISCELLANEOUS
Status: COMPLETED | OUTPATIENT
Start: 2023-07-18 | End: 2023-07-18

## 2023-07-18 RX ADMIN — TECHNETIUM TC 99M SULFUR COLLOID 0.97 MILLICURIE: KIT at 08:35

## 2023-07-27 ENCOUNTER — HOSPITAL ENCOUNTER (OUTPATIENT)
Facility: HOSPITAL | Age: 53
Setting detail: RECURRING SERIES
Discharge: HOME | End: 2023-07-30
Payer: MEDICARE

## 2023-07-27 PROCEDURE — 97110 THERAPEUTIC EXERCISES: CPT

## 2023-07-27 PROCEDURE — 97161 PT EVAL LOW COMPLEX 20 MIN: CPT

## 2023-07-27 NOTE — THERAPY EVALUATION
[address] [place] [address]  [address] [place] [address]  Ph: [phone]     Fax: [phone]         PHYSICAL THERAPY - MEDICARE EVALUATION/PLAN OF CARE NOTE (updated 3/23)      Date of Service: 2023          Patient Name:  Poonam Rolon :  1970   Medical   Diagnosis:  Encounter for other orthopedic aftercare [Z47.89] Treatment Diagnosis:  M25.512  LEFT SHOULDER PAIN    Referral Source:  Georgia Iraheta Provider #:  [id number]                Insurance: Payor: Ruth Shafer / Plan: [employer] Receipts / Product Type: *No Product type* /    [x] Patient's date of birth verified      Visit #   Current  / Total 1 10   Time   In / Out 1103 1158   Total Treatment Time 55 minutes   Total Timed Codes 15 minutes   1:1 Treatment Time 55 minutes      [facility] Totals Reminder:  bill using total billable   min of TIMED therapeutic procedures and modalities. 8-22 min = 1 unit; 23-37 min = 2 units; 38-52 min = 3 units;  53-67 min = 4 units; 68-82 min = 5 units       SUBJECTIVE  Pain Level (0-10 scale): 7/10  Changes in pain since onset: Improving    Any medication changes, allergies to medications, adverse drug reactions, diagnosis change, or new procedure performed?: [x] No    [] Yes (see summary sheet for update)  Medications: Verified on Patient Summary List    Subjective functional status/changes:     Patient is 52 y.o. -American female who presents for physical therapy evaluation post left shoulder arthroscopy and rotator cuff repair 2023. Pt reports she is in a lot of pain. Pt reports she had the surgery because she kept falling due to seizures and unsteadiness. Pt is unsure how often she has seizures, but the last time was about a month ago and she ended up in a coma. Pt reports she has had a fall since her surgery and thinks it is because she tripped. Pt currently ambulates with [facility], bc she is unable to use rollator due to pain.    Pt is

## 2023-08-01 ENCOUNTER — HOSPITAL ENCOUNTER (OUTPATIENT)
Facility: HOSPITAL | Age: 53
Setting detail: RECURRING SERIES
Discharge: HOME | End: 2023-08-04
Payer: MEDICARE

## 2023-08-01 PROCEDURE — 97150 GROUP THERAPEUTIC PROCEDURES: CPT

## 2023-08-01 PROCEDURE — 97016 VASOPNEUMATIC DEVICE THERAPY: CPT

## 2023-08-01 PROCEDURE — 97110 THERAPEUTIC EXERCISES: CPT

## 2023-08-01 NOTE — PROGRESS NOTES
PHYSICAL THERAPY - MEDICARE DAILY TREATMENT NOTE (updated 3/23)    Date of Service: 2023        Patient Name:  Poonam Rolon :  1970   Medical   Diagnosis:  Encounter for other orthopedic aftercare [Z47.89] Treatment Diagnosis:  M25.512  LEFT SHOULDER PAIN    Referral Source:  [place]ia Iraheta Insurance:   Payor: Ruth Shafer / Plan: [email or web address] / Product Type: *No Product type* /    [x] Patient's date of birth verified                      Visit #   Current  / Total 2 10   Time   In / Out 11:03 12:00   Total Treatment Time (in minutes) 57    Total Timed Codes (in minutes) 32    1:1 Treatment Time (in minutes) 28       [facility] Totals Reminder:  bill using total billable   min of TIMED therapeutic procedures and modalities. 8-22 min = 1 unit; 23-37 min = 2 units; 38-52 min = 3 units; 53-67 min = 4 units; 68-82 min = 5 units        SUBJECTIVE  Pain Level (0-10 scale): 8/10    Any medication changes, allergies to medications, adverse drug reactions, diagnosis change, or new procedure performed?: [x] No    [] Yes (see summary sheet for update)  Medications: [x] Verified on Patient Summary List    Subjective functional status/changes: \"My elbow has been hurting more than my shoulder. \"    OBJECTIVE  Therapeutic Procedures: Tx Min Billable or 1:1 Min (if diff from Tx Min) Procedure, Rationale, Specifics   32 32 96225 Therapeutic Exercise (timed):  increase ROM, strength, coordination, balance, and proprioception to improve patient's ability to progress to PLOF and address remaining functional goals. (see flow sheet as applicable)   15 0 68910 Group Therapy (untimed): Billed while completing therapeutic exercise during middle of treatment session to improve patient's ability to progress to PLOF and address remaining functional goals.   (see flow sheet as applicable)   47 32    Total Total     Modalities Rationale:     decrease inflammation, decrease pain, and increase tissue

## 2023-08-02 ENCOUNTER — HOSPITAL ENCOUNTER (OUTPATIENT)
Facility: HOSPITAL | Age: 53
Setting detail: RECURRING SERIES
Discharge: HOME | End: 2023-08-05
Payer: MEDICARE

## 2023-08-02 ENCOUNTER — TELEMEDICINE (OUTPATIENT)
Age: 53
End: 2023-08-02
Payer: MEDICARE

## 2023-08-02 DIAGNOSIS — R74.8 ELEVATED LIVER ENZYMES: Primary | ICD-10-CM

## 2023-08-02 DIAGNOSIS — R41.82 ALTERED MENTAL STATUS, UNSPECIFIED ALTERED MENTAL STATUS TYPE: ICD-10-CM

## 2023-08-02 DIAGNOSIS — R56.9 SEIZURES (HCC): ICD-10-CM

## 2023-08-02 PROCEDURE — 97016 VASOPNEUMATIC DEVICE THERAPY: CPT

## 2023-08-02 PROCEDURE — 99214 OFFICE O/P EST MOD 30 MIN: CPT | Performed by: NURSE PRACTITIONER

## 2023-08-02 PROCEDURE — 97140 MANUAL THERAPY 1/> REGIONS: CPT

## 2023-08-02 PROCEDURE — 97110 THERAPEUTIC EXERCISES: CPT

## 2023-08-02 PROCEDURE — 97150 GROUP THERAPEUTIC PROCEDURES: CPT

## 2023-08-02 NOTE — PROGRESS NOTES
PHYSICAL THERAPY - MEDICARE DAILY TREATMENT NOTE (updated 3/23)    Date of Service: 2023        Patient Name:  Sena Boo :  1970   Medical   Diagnosis:  Encounter for other orthopedic aftercare [Z47.89] Treatment Diagnosis:  M25.512  LEFT SHOULDER PAIN    Referral Source:  Anitha Palmer MD Insurance:   Payor: Jazmine Napoles / Plan: [email or web address] / Product Type: *No Product type* /    [x] Patient's date of birth verified                      Visit #   Current  / Total 3 10   Time   In / Out 9:56 11:00   Total Treatment Time (in minutes) 64    Total Timed Codes (in minutes) 32    1:1 Treatment Time (in minutes) 28       [facility] Totals Reminder:  bill using total billable   min of TIMED therapeutic procedures and modalities. 8-22 min = 1 unit; 23-37 min = 2 units; 38-52 min = 3 units; 53-67 min = 4 units; 68-82 min = 5 units        SUBJECTIVE  Pain Level (0-10 scale): 5/10    Any medication changes, allergies to medications, adverse drug reactions, diagnosis change, or new procedure performed?: [x] No    [] Yes (see summary sheet for update)  Medications: [x] Verified on Patient Summary List    Subjective functional status/changes: \"My shoulder is not as bad today, but my elbow is still bothering me. \"    OBJECTIVE  Therapeutic Procedures: Tx Min Billable or 1:1 Min (if diff from Tx Min) Procedure, Rationale, Specifics   20 20 06981 Therapeutic Exercise (timed):  increase ROM, strength, coordination, balance, and proprioception to improve patient's ability to progress to PLOF and address remaining functional goals. (see flow sheet as applicable)   12 12 28383 Manual Therapy (timed):  decrease pain, increase ROM, and increase tissue extensibility to improve patient's ability to progress to PLOF and address remaining functional goals. The manual therapy interventions were performed at a separate and distinct time from the therapeutic activities interventions .  (see flow

## 2023-08-02 NOTE — PROGRESS NOTES
Identified pt with two pt identifiers(name and ). Reviewed record in preparation for visit and have obtained necessary documentation. Chief Complaint   Patient presents with    Elevated Hepatic Enzymes     VV follow up     There were no vitals taken for this visit. 1. \"Have you been to the ER, urgent care clinic since your last visit? Hospitalized since your last visit? \" Yes [facility]  for thyroid issues. Outpt shoulder surgery    2. \"Have you seen or consulted any other health care providers outside of the [address] [place] [address] Avenue since your last visit? \" Yes PCP and Ortho      Patient is accompanied by self I have received verbal consent from Yunier Solo to discuss any/all medical information while they are present in the room.
with patient's (and/or legal guardian's) consent. Patient identification was verified, and a caregiver was present when appropriate. The patient was located at: Home  The provider was located at: [address] [place] [address] Fink Mark returns to the [facility] for management of elevated liver enzymes. The active problem list, all pertinent past medical history, medications, radiologic findings and laboratory findings related to the liver disorder were reviewed with the patient. The patient is a 52 y.o. Black female who was found to have abnormalities in liver enzymes in 2018. ECHO performed 1/2021 demonstrated mildly reduced systolic function. LVEF 50-55%. This was recently repeated 5/2022 which demonstrated an LVEF of 45-50%. Grade 1 diastolic dysfunction. Mildly thickened tricuspid valve, Moderate regurgitation of pulmonic valve. Serologic evaluation for markers of chronic liver disease were negative. The patient underwent a liver biopsy in 12/2020 which demonstrated centrolobular sinusoidal dilation with no inflammaiton, steatosis or fibrosis. A single granuloma was present. Assessment of liver fibrosis was performed with Fibroscan 10/2021. The result was 3.7 kPa which correlates with no fibrosis. EGD was performed 12/2021 which demonstrated dyskinesia of the esophagus and a chronic gastric ulcer. She is currently on sucralfate, dexilant and rabeprazole. Since the last office visit the patient was hospitalized 6/25/2023-6/29/2023 with status epilepticus. 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 She also had a left shoulder arthroscopy 7/06/2023. The liver enzymes were markedly elevated during hospitalization. She also notes having a thyroidectomy 3/2023 and there was concern for Myxedema. Symptoms of fatigue and abdominal pain are ongoing.      The patient has the following symptoms which are thought to be due to the liver

## 2023-08-04 ENCOUNTER — APPOINTMENT (OUTPATIENT)
Facility: HOSPITAL | Age: 53
End: 2023-08-04
Payer: MEDICARE

## 2023-08-06 DIAGNOSIS — I50.42 CHRONIC COMBINED SYSTOLIC (CONGESTIVE) AND DIASTOLIC (CONGESTIVE) HEART FAILURE (HCC): Primary | ICD-10-CM

## 2023-08-07 RX ORDER — BUMETANIDE 1 MG/1
1 TABLET ORAL DAILY
Qty: 90 TABLET | Refills: 3 | OUTPATIENT
Start: 2023-08-07

## 2023-08-08 ENCOUNTER — HOSPITAL ENCOUNTER (OUTPATIENT)
Facility: HOSPITAL | Age: 53
Setting detail: RECURRING SERIES
Discharge: HOME | End: 2023-08-11
Payer: MEDICARE

## 2023-08-08 PROCEDURE — 97150 GROUP THERAPEUTIC PROCEDURES: CPT

## 2023-08-08 PROCEDURE — 97016 VASOPNEUMATIC DEVICE THERAPY: CPT

## 2023-08-08 PROCEDURE — 97110 THERAPEUTIC EXERCISES: CPT

## 2023-08-08 NOTE — PROGRESS NOTES
PHYSICAL THERAPY - MEDICARE DAILY TREATMENT NOTE (updated 3/23)    Date of Service: 2023        Patient Name:  Vinod Cook :  1970   Medical   Diagnosis:  Encounter for other orthopedic aftercare [Z47.89] Treatment Diagnosis:  M25.512  LEFT SHOULDER PAIN    Referral Source:  Mimi Garland MD Insurance:   Payor: Madhu Aguayo / Plan: Rolley Pill / Product Type: *No Product type* /    [x] Patient's date of birth verified                      Visit #   Current  / Total 4 10   Time   In / Out 11:00 11:58   Total Treatment Time (in minutes) 58    Total Timed Codes (in minutes) 23    1:1 Treatment Time (in minutes) 23       [facility] Totals Reminder:  bill using total billable   min of TIMED therapeutic procedures and modalities. 8-22 min = 1 unit; 23-37 min = 2 units; 38-52 min = 3 units; 53-67 min = 4 units; 68-82 min = 5 units        SUBJECTIVE  Pain Level (0-10 scale): 4/10    Any medication changes, allergies to medications, adverse drug reactions, diagnosis change, or new procedure performed?: [x] No    [] Yes (see summary sheet for update)  Medications: [x] Verified on Patient Summary List    Subjective functional status/changes:     \"I'm wobbly today. I forgot my sling. \"    OBJECTIVE  Therapeutic Procedures: Tx Min Billable or 1:1 Min (if diff from Tx Min) Procedure, Rationale, Specifics   23 23 88605 Therapeutic Exercise (timed):  increase ROM, strength, coordination, balance, and proprioception to improve patient's ability to progress to PLOF and address remaining functional goals. (see flow sheet as applicable)   25 0 18801 Group Therapy (untimed): Billed while completing therapeutic exercise during beginning of treatment session to improve patient's ability to progress to PLOF and address remaining functional goals.   (see flow sheet as applicable)     48 23   Total Total     Modalities Rationale:     decrease edema, decrease inflammation, and decrease pain to improve

## 2023-08-10 ENCOUNTER — APPOINTMENT (OUTPATIENT)
Facility: HOSPITAL | Age: 53
End: 2023-08-10
Payer: MEDICARE

## 2023-08-11 ENCOUNTER — APPOINTMENT (OUTPATIENT)
Facility: HOSPITAL | Age: 53
End: 2023-08-11
Payer: MEDICARE

## 2023-08-11 ENCOUNTER — HOSPITAL ENCOUNTER (OUTPATIENT)
Facility: HOSPITAL | Age: 53
Setting detail: RECURRING SERIES
Discharge: HOME | End: 2023-08-14
Payer: MEDICARE

## 2023-08-11 PROCEDURE — 97110 THERAPEUTIC EXERCISES: CPT

## 2023-08-11 PROCEDURE — 97140 MANUAL THERAPY 1/> REGIONS: CPT

## 2023-08-11 PROCEDURE — 97016 VASOPNEUMATIC DEVICE THERAPY: CPT

## 2023-08-11 NOTE — PROGRESS NOTES
PHYSICAL THERAPY - MEDICARE DAILY TREATMENT NOTE (updated 3/23)    Date of Service: 2023        Patient Name:  Louise Alarcon :  1970   Medical   Diagnosis:  Encounter for other orthopedic aftercare [Z47.89] Treatment Diagnosis:  M25.512  LEFT SHOULDER PAIN    Referral Source:  Jesusita Mcadams MD Insurance:   Payor: pbsi Grew / Plan: [facility] / Product Type: *No Product type* /    [x] Patient's date of birth verified                      Visit #   Current  / Total 5 10   Time   In / Out 11:01 11:59   Total Treatment Time (in minutes) 58    Total Timed Codes (in minutes) 48    1:1 Treatment Time (in minutes) 50       [facility] Totals Reminder:  bill using total billable   min of TIMED therapeutic procedures and modalities. 8-22 min = 1 unit; 23-37 min = 2 units; 38-52 min = 3 units; 53-67 min = 4 units; 68-82 min = 5 units        SUBJECTIVE  Pain Level (0-10 scale): 5/10    Any medication changes, allergies to medications, adverse drug reactions, diagnosis change, or new procedure performed?: [x] No    [] Yes (see summary sheet for update)  Medications: [x] Verified on Patient Summary List    Subjective functional status/changes:     \"I am hurting some today. \"    OBJECTIVE  Therapeutic Procedures: Tx Min Billable or 1:1 Min (if diff from Tx Min) Procedure, Rationale, Specifics   38  15780 Therapeutic Exercise (timed):  increase ROM, strength, coordination, balance, and proprioception to improve patient's ability to progress to PLOF and address remaining functional goals. (see flow sheet as applicable)   10  70407 Manual Therapy (timed):  increase ROM and increase tissue extensibility to improve patient's ability to progress to PLOF and address remaining functional goals. The manual therapy interventions were performed at a separate and distinct time from the therapeutic activities interventions .  (see flow sheet as applicable)   48     Total Total     Modalities

## 2023-08-15 ENCOUNTER — APPOINTMENT (OUTPATIENT)
Facility: HOSPITAL | Age: 53
End: 2023-08-15
Payer: MEDICARE

## 2023-08-16 ENCOUNTER — APPOINTMENT (OUTPATIENT)
Facility: HOSPITAL | Age: 53
End: 2023-08-16
Payer: MEDICARE

## 2023-08-18 ENCOUNTER — APPOINTMENT (OUTPATIENT)
Facility: HOSPITAL | Age: 53
End: 2023-08-18
Payer: MEDICARE

## 2023-08-21 ENCOUNTER — HOSPITAL ENCOUNTER (OUTPATIENT)
Facility: HOSPITAL | Age: 53
Setting detail: RECURRING SERIES
Discharge: HOME | End: 2023-08-24
Payer: MEDICARE

## 2023-08-21 PROCEDURE — 97110 THERAPEUTIC EXERCISES: CPT

## 2023-08-21 PROCEDURE — 97150 GROUP THERAPEUTIC PROCEDURES: CPT

## 2023-08-21 PROCEDURE — 97016 VASOPNEUMATIC DEVICE THERAPY: CPT

## 2023-08-21 NOTE — PROGRESS NOTES
PHYSICAL THERAPY - MEDICARE DAILY TREATMENT NOTE (updated 3/23)    Date of Service: 2023        Patient Name:  Jeffrey Gerard :  1970   Medical   Diagnosis:  Encounter for other orthopedic aftercare [Z47.89] Treatment Diagnosis:  M25.512  LEFT SHOULDER PAIN    Referral Source:  Felisha Kebede MD Insurance:   Payor: Debora Loera / Plan: [place] [employer] / Product Type: *No Product type* /    [x] Patient's date of birth verified                      Visit #   Current  / Total 6 10   Time   In / Out 1110 am 1155 am   Total Treatment Time (in minutes) 45    Total Timed Codes (in minutes) 35    1:1 Treatment Time (in minutes) 20       [facility] Totals Reminder:  bill using total billable   min of TIMED therapeutic procedures and modalities. 8-22 min = 1 unit; 23-37 min = 2 units; 38-52 min = 3 units; 53-67 min = 4 units; 68-82 min = 5 units        SUBJECTIVE  Pain Level (0-10 scale): 5/10    Any medication changes, allergies to medications, adverse drug reactions, diagnosis change, or new procedure performed?: [x] No    [] Yes (see summary sheet for update)  Medications: [x] Verified on Patient Summary List    Subjective functional status/changes: \"Pt reports . \"    OBJECTIVE  Therapeutic Procedures: Tx Min Billable or 1:1 Min (if diff from Tx Min) Procedure, Rationale, Specifics   20 20 [id number] Therapeutic Exercise (timed):  increase ROM, strength, coordination, balance, and proprioception to improve patient's ability to progress to PLOF and address remaining functional goals. (see flow sheet as applicable)   15 0 17304 Group Therapy (untimed): Billed while completing therapeutic exercise during beginning of treatment session to improve patient's ability to progress to PLOF and address remaining functional goals.   (see flow sheet as applicable)   35 20    Total Total     Modalities Rationale:     decrease pain, increase tissue extensibility, and increase muscle contraction/control to

## 2023-08-23 ENCOUNTER — HOSPITAL ENCOUNTER (OUTPATIENT)
Facility: HOSPITAL | Age: 53
Setting detail: RECURRING SERIES
Discharge: HOME | End: 2023-08-26
Payer: MEDICARE

## 2023-08-23 PROCEDURE — 97110 THERAPEUTIC EXERCISES: CPT

## 2023-08-23 NOTE — PROGRESS NOTES
talked to patient's mother regarding weakness and drowsiness. Patient wanted to go to her MD in [facility]. Patient will continue to benefit from skilled PT services to analyze and address ROM deficits and analyze and address strength deficits to address functioPhysicnal deficits and attain remaining goals. Progress toward goals / Updated goals:    Short Term Goals, to be met within 9 treatments:  Patient will demonstrate independence and compliance with HEP in order to increase mobility and assist with carryover from PT services. Status at last Eval/Progress Note: provided  Current Status: independent   Goal Met?  yes     2. Patient will be able to complete ADLs at the level she did prior to surgery without pain greater than or equal to 5/10 to increase functional mobility. Status at last Eval/Progress Note: 10/10  Current Status: unable to complete   Goal Met? No     3. Patient will increase left shoulder flexion prom to 120 degrees  in order to reach overhead. Status at last Eval/Progress Note: 80 degrees  Current Status: see above, Initial Evaluation completed today  Goal Met? No     4. Patient will increase left shoulder abduction prom to 85 degrees in order to perform adls. Status at last Eval/Progress Note: 50 degrees  Current Status: 90 deg PROM  Goal Met? No         Long Term Goals, to be met within 15 treatments: 1. Patient will be able to lift/carry purse without pain greater than or equal to 4/10 to increase functional mobility. Status at last Eval/Progress Note: 10/10  Current Status: unable to lift per protocol   Goal Met? No     2. Patient will be able to open her drinks without pain greater than or equal to 4/10 to increase functional mobility. Status at last Eval/Progress Note: 10/10  Current Status: able to hold with LUE and open with RUE  Goal Met? No     3.  Patient will be able to drive riding shopping cart with 1 hand without pain greater than or equal to 4/10 to increase

## 2023-08-25 ENCOUNTER — HOSPITAL ENCOUNTER (EMERGENCY)
Facility: HOSPITAL | Age: 53
Discharge: HOME | End: 2023-08-25
Attending: EMERGENCY MEDICINE
Payer: MEDICARE

## 2023-08-25 ENCOUNTER — HOSPITAL ENCOUNTER (OUTPATIENT)
Facility: HOSPITAL | Age: 53
Setting detail: RECURRING SERIES
End: 2023-08-25
Payer: MEDICARE

## 2023-08-25 VITALS
BODY MASS INDEX: 27.16 KG/M2 | TEMPERATURE: 98.9 F | DIASTOLIC BLOOD PRESSURE: 72 MMHG | OXYGEN SATURATION: 99 % | HEIGHT: 65 IN | SYSTOLIC BLOOD PRESSURE: 93 MMHG | WEIGHT: 163 LBS | RESPIRATION RATE: 16 BRPM | HEART RATE: 102 BPM

## 2023-08-25 DIAGNOSIS — E16.2 HYPOGLYCEMIA: Primary | ICD-10-CM

## 2023-08-25 LAB
ALBUMIN SERPL-MCNC: 3.2 G/DL (ref 3.5–5)
ALBUMIN/GLOB SERPL: 1 (ref 1.1–2.2)
ALP SERPL-CCNC: 121 U/L (ref 45–117)
ALT SERPL-CCNC: 37 U/L (ref 12–78)
ANION GAP SERPL CALC-SCNC: 4 MMOL/L (ref 5–15)
AST SERPL W P-5'-P-CCNC: 26 U/L (ref 15–37)
BASOPHILS # BLD: 0 K/UL (ref 0–0.1)
BASOPHILS NFR BLD: 1 % (ref 0–1)
BILIRUB SERPL-MCNC: 0.2 MG/DL (ref 0.2–1)
BUN SERPL-MCNC: 7 MG/DL (ref 6–20)
BUN/CREAT SERPL: 8 (ref 12–20)
CA-I BLD-MCNC: 8.8 MG/DL (ref 8.5–10.1)
CHLORIDE SERPL-SCNC: 106 MMOL/L (ref 97–108)
CO2 SERPL-SCNC: 28 MMOL/L (ref 21–32)
CREAT SERPL-MCNC: 0.87 MG/DL (ref 0.55–1.02)
DIFFERENTIAL METHOD BLD: ABNORMAL
EOSINOPHIL # BLD: 0.2 K/UL (ref 0–0.4)
EOSINOPHIL NFR BLD: 4 % (ref 0–7)
ERYTHROCYTE [DISTWIDTH] IN BLOOD BY AUTOMATED COUNT: 15.9 % (ref 11.5–14.5)
GLOBULIN SER CALC-MCNC: 3.2 G/DL (ref 2–4)
GLUCOSE BLD STRIP.AUTO-MCNC: 84 MG/DL (ref 65–100)
GLUCOSE SERPL-MCNC: 89 MG/DL (ref 65–100)
HCT VFR BLD AUTO: 37.7 % (ref 35–47)
HGB BLD-MCNC: 12.1 G/DL (ref 11.5–16)
IMM GRANULOCYTES # BLD AUTO: 0 K/UL (ref 0–0.04)
IMM GRANULOCYTES NFR BLD AUTO: 0 % (ref 0–0.5)
LYMPHOCYTES # BLD: 1.2 K/UL (ref 0.8–3.5)
LYMPHOCYTES NFR BLD: 32 % (ref 12–49)
MCH RBC QN AUTO: 26.8 PG (ref 26–34)
MCHC RBC AUTO-ENTMCNC: 32.1 G/DL (ref 30–36.5)
MCV RBC AUTO: 83.4 FL (ref 80–99)
MONOCYTES # BLD: 0.3 K/UL (ref 0–1)
MONOCYTES NFR BLD: 9 % (ref 5–13)
NEUTS SEG # BLD: 2.1 K/UL (ref 1.8–8)
NEUTS SEG NFR BLD: 54 % (ref 32–75)
NRBC # BLD: 0 K/UL (ref 0–0.01)
NRBC BLD-RTO: 0 PER 100 WBC
PERFORMED BY:: NORMAL
PLATELET # BLD AUTO: 239 K/UL (ref 150–400)
PMV BLD AUTO: 9.8 FL (ref 8.9–12.9)
POTASSIUM SERPL-SCNC: 3.4 MMOL/L (ref 3.5–5.1)
PROT SERPL-MCNC: 6.4 G/DL (ref 6.4–8.2)
RBC # BLD AUTO: 4.52 M/UL (ref 3.8–5.2)
SODIUM SERPL-SCNC: 138 MMOL/L (ref 136–145)
WBC # BLD AUTO: 3.8 K/UL (ref 3.6–11)

## 2023-08-25 PROCEDURE — 99284 EMERGENCY DEPT VISIT MOD MDM: CPT

## 2023-08-25 PROCEDURE — 80053 COMPREHEN METABOLIC PANEL: CPT

## 2023-08-25 PROCEDURE — 2580000003 HC RX 258: Performed by: EMERGENCY MEDICINE

## 2023-08-25 PROCEDURE — 36415 COLL VENOUS BLD VENIPUNCTURE: CPT

## 2023-08-25 PROCEDURE — 85025 COMPLETE CBC W/AUTO DIFF WBC: CPT

## 2023-08-25 PROCEDURE — 82962 GLUCOSE BLOOD TEST: CPT

## 2023-08-25 RX ORDER — 0.9 % SODIUM CHLORIDE 0.9 %
1000 INTRAVENOUS SOLUTION INTRAVENOUS ONCE
Status: COMPLETED | OUTPATIENT
Start: 2023-08-25 | End: 2023-08-25

## 2023-08-25 RX ADMIN — SODIUM CHLORIDE 1000 ML: 9 INJECTION, SOLUTION INTRAVENOUS at 13:22

## 2023-08-25 NOTE — ED TRIAGE NOTES
Patient was at physical therapy and reports she was feeling weak and her sugar read 68 patient reports she has been having intermittent weakness for the last few weeks

## 2023-08-25 NOTE — ED PROVIDER NOTES
[facility] EMERGENCY DEPT  EMERGENCY DEPARTMENT HISTORY AND PHYSICAL EXAM      Date: 8/25/2023  Patient Name: Sena Boo  MRN: [id number]  [facility] [facility]: 1970  Date of evaluation: 8/25/2023  Provider: Lm Burgess MD   Note Started: 11:55 AM EDT 8/25/23    HISTORY OF PRESENT ILLNESS     Chief Complaint   Patient presents with    Fatigue    Hypoglycemia       History Provided By: Patient    HPI: Sena Boo is a 52 y.o. female     PAST MEDICAL HISTORY   Past Medical History:  Past Medical History:   Diagnosis Date    Arthritis     pt states knees and back    Asthma     Cardiomyopathy ([address]) 2005    EF 35%, echo in 2021 normal EF     Colon polyp     Diabetes ([address])     Pt states not diabetic but has all the symptoms of it    Dizzy spells     GERD (gastroesophageal reflux disease)     Heart failure ([address]) 2005    now combined systolic, diastolic, seen at [facility] [address], [facility] 2021- Dr Maegan Montanez    Irritable bowel syndrome     Knee pain, bilateral     Migraines 2011    2-3 week    Neuropathy     pt states of both feet    Pulmonary embolism ([address])     August 2022    Seizures ([address])     petite mals last seizure 2-3 months ago    Thyroid disease     hypothyroid       Past Surgical History:  Past Surgical History:   Procedure Laterality Date    CHOLECYSTECTOMY  05/2006    COLONOSCOPY N/A 11/28/2022    COLONOSCOPY performed by Dianna Bains MD at [address] [place] [address]  2013    HYSTERECTOMY (CERVIX STATUS UNKNOWN)  2007    KNEE ARTHROSCOPY Right 05/2021    LAP,CHOLECYSTECTOMY      ORTHOPEDIC SURGERY      right knee procedure 2021    ORTHOPEDIC SURGERY Bilateral 2013    CTR    OTHER SURGICAL HISTORY      left axillary lymph node biopsy    PART REMOVAL COLON W ANASTOMOSIS      AR APPENDECTOMY      AR UNLISTED PROCEDURE ABDOMEN PERITONEUM & OMENTUM  2008    cholecystectomy    AR UNLISTED PROCEDURE CARDIAC SURGERY      Loop recorder implanted july 2020    RESECT SMALL INTEST W ENTEROSTOMY      SHOULDER ARTHROSCOPY Left 7/6/2023    LEFT SHOULDER ARTHROSCOPY WITH rotator cuff tear repair Repair of nonunion Acromion and Left shoulder distal clavicle arthroscopic resection performed by Heidi Chan MD at [address] [place] [address] UNLISTED      UPPER GASTROINTESTINAL ENDOSCOPY         Family History:  Family History   Problem Relation Age of Onset    Breast Cancer Paternal Aunt         age unknown    Asthma Son     Asthma Son     Asthma Daughter     Hypertension Mother     Hypertension Father     Cancer Father         stomach cancer    Heart Disease Mother     No Known Problems Brother        Social History:  Social History     Tobacco Use    Smoking status: Never    Smokeless tobacco: Never   Substance Use Topics    Alcohol use: No    Drug use: No       Allergies: Allergies   Allergen Reactions    Acetaminophen Other (See Comments)     Advised not to take due to Liver Issues    Shellfish Allergy Anaphylaxis    Peanut Oil Other (See Comments)     Reaction Type: Intolerance; Severity: Severe    Sulfa Antibiotics Rash     itching       PCP: Jay Ramirez MD    Current Meds:   No current facility-administered medications for this encounter.      Current Outpatient Medications   Medication Sig Dispense Refill    levothyroxine (SYNTHROID) 150 MCG tablet Take 1 tablet by mouth daily 90 tablet 1    levETIRAcetam (KEPPRA XR) 500 MG TB24 extended release tablet Take 4 tablets by mouth daily 60 tablet 1    OXcarbazepine  MG TB24 Take 450 mg by mouth daily 30 tablet 1    linaclotide (LINZESS) 290 MCG CAPS capsule Take 1 capsule by mouth every morning (before breakfast)      bumetanide (BUMEX) 1 MG tablet Take 1 tablet by mouth daily      dexlansoprazole (DEXILANT) 60 MG CPDR delayed release capsule Take 1 capsule by mouth daily      metoclopramide (REGLAN) 5 MG tablet Take 1 tablet by mouth in the morning, at noon, and at bedtime      cetirizine

## 2023-08-25 NOTE — PROGRESS NOTES
Pt. Arrived in clinic for therapy services however very unstable with her transistion from sit to  waiting areas and was very unsteady with amb needing to sit and rest prior to getting back to gym areas. Pt noted that her sugar was low earlier at 70, offered orange juice and requested cardiac nurse help check pt due to sugar concerns. Initial sugar check was 68 at 1110 am. Crackers and apple juice given under nurse supervision. Pt still feeling unsteady and having issues at 1120 am nurse called ED and pt transported in w/c to ED @ 1122 am, were daughter  is to meet her called @ 1123 am.   No therapy services provided.

## 2023-08-28 ENCOUNTER — APPOINTMENT (OUTPATIENT)
Facility: HOSPITAL | Age: 53
End: 2023-08-28
Payer: MEDICARE

## 2023-08-30 ENCOUNTER — APPOINTMENT (OUTPATIENT)
Facility: HOSPITAL | Age: 53
End: 2023-08-30
Payer: MEDICARE

## 2023-09-01 ENCOUNTER — HOSPITAL ENCOUNTER (OUTPATIENT)
Facility: HOSPITAL | Age: 53
Setting detail: RECURRING SERIES
Discharge: HOME | End: 2023-09-04
Payer: MEDICARE

## 2023-09-01 PROCEDURE — 97110 THERAPEUTIC EXERCISES: CPT

## 2023-09-01 PROCEDURE — 97016 VASOPNEUMATIC DEVICE THERAPY: CPT

## 2023-09-01 NOTE — PROGRESS NOTES
[]w/heat  Position:  Location:            min []  Mechanical Traction,        []  Cervical      []  Lumbar        []  Prone         []  Supine           []  Intermitt. []  Cont. Lbs:    pounds  [] With heat  [] Simultaneously performed with E-Stim    min []  Ultrasound,    []Continuous   [] Pulsed  []1MHz   []3MHz Location:  W/cm2:    min  Unbilled []  Ice     []  Heat             Position:  Location:     10       min [x]  Vasopneumatic Device,      pre-treatment girth : 44.0 cm    post-treatment girth :  42.cm   measured at (landmark       location) :  Pressure:       [x] lo [] med [] hi   Temperature: [x] lo [] med [] Hi    [x] With ice    [] Simultaneously performed with Estim     Skin assessment post-treatment (if applicable):    [x]  intact    [x]  redness- no adverse reaction []redness - adverse reaction:        [x] Patient Education billed concurrently with other procedures   [x] Review HEP    [] Progressed/Changed HEP, detail:    [] Other:       Other Objective/Functional Measures  Shoulder:   Strength AROM PROM       Right Left Right Left Right Left     Flexion 4/5   155  WFL  165     Extension 5/5   80   WFL  WFL     Abduction 3+/5   126  WFL  170     Adduction     WFL   WFL       IR 4/5   40   WFL  55     ER 4/5   80   WFL 70       Pain Level at end of session (0-10 scale): 0/10    Assessment   Patient tolerated treatment with supervision. Patient has been struggling physically to attend therapy. She has been to the ED, but she continues to feel exhausted, no  energy and gait is unstable. Patient has completed 8 visit and continues to wear sling. However, PROM movements is progressing well. Patient is 6 week post-op surgery and can be progressed to the phase to include AAROM to regain mobility and weaned from arm sling. Exercises will need to performed in sitting due to patient instability.  Patient will continue to benefit from skilled PT services to analyze and address ROM deficits, analyze and
Period:  23 to 10/25/23      Medhat Chino PT,        2023       2:07 PM    ________________________________________________________________________  NOTE TO PHYSICIAN:  Please complete the following and fax to:  [facility]:   Fax: [phone] this original for your records. If you are unable to process this request in 24 hours, please contact our office. ____ I have read the above report and request that my patient continue therapy with the following changes/special instructions:  ____ I have read the above report and request that my patient be discharged from therapy    Physician's Signature:______________________________________ Date:___________Time:__________                              Sary Llanes MD    ** Signature, Date and Time must be completed for valid certification **  Please sign and fax to [phone].   Thank you    Patient Name:  Layla Rdz :  1970
yes

## 2023-09-06 ENCOUNTER — HOSPITAL ENCOUNTER (OUTPATIENT)
Facility: HOSPITAL | Age: 53
Setting detail: RECURRING SERIES
Discharge: HOME | End: 2023-09-09
Payer: MEDICARE

## 2023-09-06 PROCEDURE — 97110 THERAPEUTIC EXERCISES: CPT

## 2023-09-06 NOTE — PROGRESS NOTES
PHYSICAL THERAPY - MEDICARE DAILY TREATMENT NOTE (updated 3/23)    Date of Service: 2023        Patient Name:  Dinora Beavers :  1970   Medical   Diagnosis:  Encounter for other orthopedic aftercare [Z47.89] Treatment Diagnosis:  M25.512  LEFT SHOULDER PAIN    Referral Source:  Brittani Bueno MD Insurance:   Payor: Levi Claudette / Plan: [employer] / Product Type: *No Product type* /    [x] Patient's date of birth verified                      Visit #   Current  / Total 9 15   Time   In / Out 10:20 AM 11:00   AM   Total Treatment Time (in minutes) 40    Total Timed Codes (in minutes) 40    1:1 Treatment Time (in minutes) 40       [facility] Totals Reminder:  bill using total billable   min of TIMED therapeutic procedures and modalities. 8-22 min = 1 unit; 23-37 min = 2 units; 38-52 min = 3 units; 53-67 min = 4 units; 68-82 min = 5 units        SUBJECTIVE  Pain Level (0-10 scale): 0/10    Any medication changes, allergies to medications, adverse drug reactions, diagnosis change, or new procedure performed?: [x] No    [] Yes (see summary sheet for update)  Medications: [x] Verified on Patient Summary List    Subjective functional status/changes: \"My blood sugar is better, but I feel so tired. \"    OBJECTIVE  Therapeutic Procedures: Tx Min Billable or 1:1 Min (if diff from Tx Min) Procedure, Rationale, Specifics   40 40 91212 Therapeutic Exercise (timed):  increase ROM, strength, coordination, balance, and proprioception to improve patient's ability to progress to PLOF and address remaining functional goals. (see flow sheet as applicable)                   40 40    Total Total     [x] Patient Education billed concurrently with other procedures   [x] Review HEP    [] Progressed/Changed HEP, detail:    [] Other:           Pain Level at end of session (0-10 scale): 0/10    Assessment   Patient tolerated treatment and was more alert.  Performed exercises with supervision due to

## 2023-09-08 ENCOUNTER — APPOINTMENT (OUTPATIENT)
Facility: HOSPITAL | Age: 53
End: 2023-09-08
Payer: MEDICARE

## 2023-09-11 ENCOUNTER — HOSPITAL ENCOUNTER (OUTPATIENT)
Facility: HOSPITAL | Age: 53
Setting detail: RECURRING SERIES
Discharge: HOME | End: 2023-09-14
Payer: MEDICARE

## 2023-09-11 PROCEDURE — 97110 THERAPEUTIC EXERCISES: CPT

## 2023-09-11 PROCEDURE — 97150 GROUP THERAPEUTIC PROCEDURES: CPT

## 2023-09-11 PROCEDURE — 97016 VASOPNEUMATIC DEVICE THERAPY: CPT

## 2023-09-11 NOTE — PROGRESS NOTES
PHYSICAL THERAPY - MEDICARE DAILY TREATMENT NOTE (updated 3/23)    Date of Service: 2023        Patient Name:  Jocelyne Gardner :  1970   Medical   Diagnosis:  Encounter for other orthopedic aftercare [Z47.89] Treatment Diagnosis:  A72.470  LEFT SHOULDER PAIN    Referral Source:  Joey Bernard MD Insurance:   Payor: [id number] [place] [address] / [facility] / Product Type: *No Product type* /    [x] Patient's date of birth verified                      Visit #   Current  / Total 10 18   Time   In / Out 1103 am 1203 pm   Total Treatment Time (in minutes) 60    Total Timed Codes (in minutes) 50    1:1 Treatment Time (in minutes) 45       [facility] Totals Reminder:  bill using total billable   min of TIMED therapeutic procedures and modalities. 8-22 min = 1 unit; 23-37 min = 2 units; 38-52 min = 3 units; 53-67 min = 4 units; 68-82 min = 5 units        SUBJECTIVE  Pain Level (0-10 scale): 0/10    Any medication changes, allergies to medications, adverse drug reactions, diagnosis change, or new procedure performed?: [x] No    [] Yes (see summary sheet for update)  Medications: [x] Verified on Patient Summary List    Subjective functional status/changes: \"Pt reports that she is having more trouble with her balance and legs her shoulder is not hurting. \"    OBJECTIVE  Therapeutic Procedures: Tx Min Billable or 1:1 Min (if diff from Tx Min) Procedure, Rationale, Specifics   12 0 05582 Group Therapy (untimed): Billed while completing therapeutic exercise during beginning of treatment session to improve patient's ability to progress to PLOF and address remaining functional goals. (see flow sheet as applicable)   38 36 29200 Therapeutic Exercise (timed):  increase ROM, strength, coordination, balance, and proprioception to improve patient's ability to progress to PLOF and address remaining functional goals.  (see flow sheet as applicable)          50 38    Total Total     Modalities Rationale: 17-May-2017 16:39

## 2023-09-13 ENCOUNTER — APPOINTMENT (OUTPATIENT)
Facility: HOSPITAL | Age: 53
End: 2023-09-13
Payer: MEDICARE

## 2023-09-15 ENCOUNTER — HOSPITAL ENCOUNTER (OUTPATIENT)
Facility: HOSPITAL | Age: 53
Setting detail: RECURRING SERIES
Discharge: HOME | End: 2023-09-18
Payer: MEDICARE

## 2023-09-15 PROCEDURE — 97016 VASOPNEUMATIC DEVICE THERAPY: CPT

## 2023-09-15 PROCEDURE — 97110 THERAPEUTIC EXERCISES: CPT

## 2023-09-18 ENCOUNTER — APPOINTMENT (OUTPATIENT)
Facility: HOSPITAL | Age: 53
End: 2023-09-18
Payer: MEDICARE

## 2023-09-20 LAB
ALBUMIN SERPL-MCNC: 4.1 G/DL (ref 3.8–4.9)
ALP SERPL-CCNC: 120 IU/L (ref 44–121)
ALT SERPL-CCNC: 37 IU/L (ref 0–32)
AST SERPL-CCNC: 31 IU/L (ref 0–40)
BASOPHILS # BLD AUTO: 0 X10E3/UL (ref 0–0.2)
BASOPHILS NFR BLD AUTO: 1 %
BILIRUB DIRECT SERPL-MCNC: <0.1 MG/DL (ref 0–0.4)
BILIRUB SERPL-MCNC: 0.3 MG/DL (ref 0–1.2)
BUN SERPL-MCNC: 13 MG/DL (ref 6–24)
BUN/CREAT SERPL: 15 (ref 9–23)
CALCIUM SERPL-MCNC: 9.3 MG/DL (ref 8.7–10.2)
CHLORIDE SERPL-SCNC: 109 MMOL/L (ref 96–106)
CO2 SERPL-SCNC: 21 MMOL/L (ref 20–29)
CREAT SERPL-MCNC: 0.84 MG/DL (ref 0.57–1)
EGFRCR SERPLBLD CKD-EPI 2021: 82 ML/MIN/1.73
EOSINOPHIL # BLD AUTO: 0.2 X10E3/UL (ref 0–0.4)
EOSINOPHIL NFR BLD AUTO: 4 %
ERYTHROCYTE [DISTWIDTH] IN BLOOD BY AUTOMATED COUNT: 15.5 % (ref 11.7–15.4)
GLUCOSE SERPL-MCNC: 75 MG/DL (ref 70–99)
HCT VFR BLD AUTO: 37.7 % (ref 34–46.6)
HGB BLD-MCNC: 12.3 G/DL (ref 11.1–15.9)
IMM GRANULOCYTES # BLD AUTO: 0 X10E3/UL (ref 0–0.1)
IMM GRANULOCYTES NFR BLD AUTO: 0 %
INR PPP: 1 (ref 0.9–1.2)
LYMPHOCYTES # BLD AUTO: 1.7 X10E3/UL (ref 0.7–3.1)
LYMPHOCYTES NFR BLD AUTO: 37 %
MCH RBC QN AUTO: 27.6 PG (ref 26.6–33)
MCHC RBC AUTO-ENTMCNC: 32.6 G/DL (ref 31.5–35.7)
MCV RBC AUTO: 85 FL (ref 79–97)
MONOCYTES # BLD AUTO: 0.4 X10E3/UL (ref 0.1–0.9)
MONOCYTES NFR BLD AUTO: 9 %
NEUTROPHILS # BLD AUTO: 2.2 X10E3/UL (ref 1.4–7)
NEUTROPHILS NFR BLD AUTO: 49 %
PLATELET # BLD AUTO: 229 X10E3/UL (ref 150–450)
POTASSIUM SERPL-SCNC: 3.4 MMOL/L (ref 3.5–5.2)
PROT SERPL-MCNC: 6.3 G/DL (ref 6–8.5)
PROTHROMBIN TIME: 10.6 SEC (ref 9.1–12)
RBC # BLD AUTO: 4.45 X10E6/UL (ref 3.77–5.28)
SODIUM SERPL-SCNC: 145 MMOL/L (ref 134–144)
WBC # BLD AUTO: 4.6 X10E3/UL (ref 3.4–10.8)

## 2023-09-28 ENCOUNTER — HOSPITAL ENCOUNTER (OUTPATIENT)
Facility: HOSPITAL | Age: 53
Setting detail: RECURRING SERIES
End: 2023-09-28
Payer: MEDICARE

## 2023-09-28 PROCEDURE — 97110 THERAPEUTIC EXERCISES: CPT

## 2023-09-28 PROCEDURE — 97016 VASOPNEUMATIC DEVICE THERAPY: CPT

## 2023-09-28 NOTE — PROGRESS NOTES
[facility]  [address] [place] [address]  Ph: [phone]     Fax: [phone]    CONTINUED PLAN OF CARE/RECERTIFICATION FOR PHYSICAL THERAPY          Patient Name:              Tommy Christie :  1970   Treatment/Medical Diagnosis:  Encounter for other orthopedic aftercare [Z47.89]   Onset Date:  2023    Referral Source:  Isaac Grimaldo MD Start of Care [facility]):  2023   Prior Hospitalization:  See Medical History Provider #:  NPI      Prior Level of Function (PLOF): Independent    Comorbidities:  See Medical History   Medications:  Verified on Patient Summary List   Visits from [facility]:  12 Missed Visits:  9       Summary of Care / Assessment / Recommendations:   Patient tolerated treatment well today. We focused on strengthening of the shoulder as she is 12 weeks from surgery now. Her AROM is fair for this stage of healing. She is weak in both UE, so we will plan to focus on gaining UE strength as a whole. She progressed towards goals set at initial evaluation, so we added new goals to address remaining limitations in strength and ROM. She also noted some increased weakness in her L hand, so we are sending off a script to PCP for possible OT evaluation. She will decrease to twice a week for PT services to focus on strengthening and gaining remaining ROM. Patient will continue to benefit from skilled PT services to modify and progress therapeutic interventions, analyze and address functional mobility deficits, analyze and address ROM deficits, analyze and address strength deficits, analyze and address soft tissue restrictions, and analyze and cue for proper movement patterns to address functional deficits and attain remaining goals.     Progress toward goals / Updated goals:    Short Term Goals, to be met within 9 treatments:  Patient will demonstrate independence and compliance with HEP in order to increase mobility and assist with
[facility]  [address] [place] [address], [address] [place] [address]  Ph: [phone]    Fax: [phone]    Physician Communication    Name: Bianca Matias   : 1970   MD: Thomas Cruz       Treatment Diagnosis: Encounter for other orthopedic aftercare [Z47.89]      Patient would benefit from OT services to improve hand function of L hand. Noted weakness in hand and decreased ability to make fist. Patient reports noting this in last week. Unsure if related to L RTC surgery from July or last seizure 3 weeks prior.  Please respond with signing attach prescription upon Physician approval. Thank you for this referral.       Devin Gardner, PT 2023
skilled PT services to modify and progress therapeutic interventions, analyze and address functional mobility deficits, analyze and address ROM deficits, analyze and address strength deficits, analyze and address soft tissue restrictions, and analyze and cue for proper movement patterns to address functional deficits and attain remaining goals. Progress toward goals / Updated goals:    Short Term Goals, to be met within 9 treatments:  Patient will demonstrate independence and compliance with HEP in order to increase mobility and assist with carryover from PT services. Status at last Eval/Progress Note: provided  Current Status: independent   Goal Met? Yes     2. Patient will be able to complete ADLs at the level she did prior to surgery without pain greater than or equal to 5/10 to increase functional mobility. Status at last Eval/Progress Note: 10/10  Current Status: no pain with ADLs  Goal Met? Yes     3. Patient will increase left shoulder flexion prom to 120 degrees  in order to reach overhead. Status at last Eval/Progress Note: 80 degrees  Current Status: PROM is 165 degrees  Goal Met:  Yes     4. Patient will increase left shoulder abduction prom to 85 degrees in order to perform adls. Status at last Eval/Progress Note: 50 degrees  Current Status: 170 PROM  Goal Met? Yes    Long Term Goals, to be met within 15 treatments: 1. Patient will be able to lift/carry purse without pain greater than or equal to 4/10 to increase functional mobility. Status at last Eval/Progress Note: 10/10  Current Status: able to complete  Goal Met? Yes     2. Patient will be able to open her drinks without pain greater than or equal to 4/10 to increase functional mobility. Status at last Eval/Progress Note: 10/10  Current Status: can perform  Goal Met? Yes     3. Patient will be able to drive riding shopping cart with 1 hand without pain greater than or equal to 4/10 to increase functional mobility.   Status at last

## 2023-10-03 ENCOUNTER — HOSPITAL ENCOUNTER (OUTPATIENT)
Facility: HOSPITAL | Age: 53
Setting detail: RECURRING SERIES
Discharge: HOME | End: 2023-10-06
Payer: MEDICARE

## 2023-10-03 PROCEDURE — 97016 VASOPNEUMATIC DEVICE THERAPY: CPT

## 2023-10-03 PROCEDURE — 97110 THERAPEUTIC EXERCISES: CPT

## 2023-10-03 NOTE — PROGRESS NOTES
PHYSICAL THERAPY - MEDICARE DAILY TREATMENT NOTE (updated 3/23)    Date of Service: 10/3/2023        Patient Name:  Sean Benson :  1970   Medical   Diagnosis:  Encounter for other orthopedic aftercare [Z47.89] Treatment Diagnosis:  M25.512  LEFT SHOULDER PAIN    Referral Source:  Renetta Ness MD Insurance:   Payor: Jeremy Bower / Plan: [facility] / Product Type: *No Product type* /    [x] Patient's date of birth verified                      Visit #   Current  / Total 13 20   Time   In / Out 8:48 9:40   Total Treatment Time (in minutes) 52    Total Timed Codes (in minutes) 42    1:1 Treatment Time (in minutes) 43       [facility] Totals Reminder:  bill using total billable   min of TIMED therapeutic procedures and modalities. 8-22 min = 1 unit; 23-37 min = 2 units; 38-52 min = 3 units; 53-67 min = 4 units; 68-82 min = 5 units        SUBJECTIVE  Pain Level (0-10 scale): 2/10    Any medication changes, allergies to medications, adverse drug reactions, diagnosis change, or new procedure performed?: [x] No    [] Yes (see summary sheet for update)  Medications: [x] Verified on Patient Summary List    Subjective functional status/changes:     \"I put my shirt on without much pain today. \"    OBJECTIVE  Therapeutic Procedures: Tx Min Billable or 1:1 Min (if diff from Tx Min) Procedure, Rationale, Specifics   42 42 [id number] Therapeutic Exercise (timed):  increase ROM, strength, coordination, balance, and proprioception to improve patient's ability to progress to PLOF and address remaining functional goals. (see flow sheet as applicable)   42 42    Total Total       [x] Patient Education billed concurrently with other procedures   [x] Review HEP    [] Progressed/Changed HEP, detail:    [] Other:       Pain Level at end of session (0-10 scale): 0/10    Assessment   Patient tolerated treatment well today.  We increased TB resistance to red today with good tolerance and ability to continue

## 2023-10-05 ENCOUNTER — HOSPITAL ENCOUNTER (OUTPATIENT)
Facility: HOSPITAL | Age: 53
Setting detail: RECURRING SERIES
Discharge: HOME | End: 2023-10-08
Payer: MEDICARE

## 2023-10-05 PROCEDURE — 97166 OT EVAL MOD COMPLEX 45 MIN: CPT

## 2023-10-05 PROCEDURE — 97110 THERAPEUTIC EXERCISES: CPT

## 2023-10-05 PROCEDURE — 97016 VASOPNEUMATIC DEVICE THERAPY: CPT

## 2023-10-05 NOTE — THERAPY EVALUATION
above, Initial Evaluation completed today  Goal Met? No    4. Patient will be able to dress self independently using bilateral hands. Status at last Eval/Progress Note: unable  Current Status: see above, Initial Evaluation completed today  Goal Met? No      Frequency / Duration: Patient to be seen 2 times per week for 12 treatments    Patient/ Caregiver education and instruction: Diagnosis, prognosis, self care, activity modification, brace/ splint application, and exercises   [x]  Plan of care has been reviewed with GONZALEZ      Certification period: 10/5/2023 to 2023      GISSEL Corley       10/5/2023       9:46 AM        ===================================================================  I certify that the above Therapy Services are being furnished while the patient is under my care. I agree with the treatment plan and certify that this therapy is necessary. Physician's Signature:_________________________   DATE:_________   TIME:________                           Seth Zelaya MD    ** Signature, Date and Time must be completed for valid certification **  Please sign and fax to [phone].   Thank you      Patient Name:  Favian Coley :  1970

## 2023-10-05 NOTE — PROGRESS NOTES
weakest   Current Status: new goal 09/28/2023  Goal Met?   No    []  See Progress Note/Recertification  []  See Discharge Summary    PLAN  [x]  Continue plan of care  [x]  Upgrade activities as tolerated  []  Discharge due to :  []  Other:      Elaine Lombardo, PT, DPT       10/5/2023       8:56 AM

## 2023-10-10 ENCOUNTER — HOSPITAL ENCOUNTER (OUTPATIENT)
Facility: HOSPITAL | Age: 53
Setting detail: RECURRING SERIES
Discharge: HOME | End: 2023-10-13
Payer: MEDICARE

## 2023-10-10 PROCEDURE — 97110 THERAPEUTIC EXERCISES: CPT

## 2023-10-10 PROCEDURE — 97016 VASOPNEUMATIC DEVICE THERAPY: CPT

## 2023-10-10 NOTE — PROGRESS NOTES
Summary    PLAN  [x]  Continue plan of care  [x]  Upgrade activities as tolerated  []  Discharge due to :  []  Other:      Vic Beckham, CHAMP, LPTA       10/10/2023       8:34 AM

## 2023-10-12 ENCOUNTER — APPOINTMENT (OUTPATIENT)
Facility: HOSPITAL | Age: 53
End: 2023-10-12
Payer: MEDICARE

## 2023-10-13 ENCOUNTER — HOSPITAL ENCOUNTER (OUTPATIENT)
Facility: HOSPITAL | Age: 53
End: 2023-10-13
Payer: MEDICARE

## 2023-10-13 DIAGNOSIS — Z12.31 SCREENING MAMMOGRAM FOR BREAST CANCER: ICD-10-CM

## 2023-10-13 PROCEDURE — 77063 BREAST TOMOSYNTHESIS BI: CPT

## 2023-10-24 ENCOUNTER — HOSPITAL ENCOUNTER (OUTPATIENT)
Facility: HOSPITAL | Age: 53
Setting detail: RECURRING SERIES
Discharge: HOME | End: 2023-10-27
Payer: MEDICARE

## 2023-10-24 PROCEDURE — 97110 THERAPEUTIC EXERCISES: CPT

## 2023-10-24 NOTE — PROGRESS NOTES
PHYSICAL THERAPY - MEDICARE DAILY TREATMENT NOTE (updated 3/23)    Date of Service: 10/24/2023        Patient Name:  Louise Alarcon :  1970   Medical   Diagnosis:  Encounter for other orthopedic aftercare [Z47.89] Treatment Diagnosis:  M85.624  LEFT SHOULDER PAIN    Referral Source:  Jesusita Mcadams MD Insurance:   Payor: [email or web address] Grew / Plan: Effective Measure [place] / Product Type: *No Product type* /    [x] Patient's date of birth verified                      Visit #   Current  / Total 16 22   Time   In / Out 0958 1032   Total Treatment Time (in minutes) 34    Total Timed Codes (in minutes) 34    1:1 Treatment Time (in minutes) 29       [facility] Totals Reminder:  bill using total billable   min of TIMED therapeutic procedures and modalities. 8-22 min = 1 unit; 23-37 min = 2 units; 38-52 min = 3 units; 53-67 min = 4 units; 68-82 min = 5 units        SUBJECTIVE  Pain Level (0-10 scale): 0/10    Any medication changes, allergies to medications, adverse drug reactions, diagnosis change, or new procedure performed?: [x] No    [] Yes (see summary sheet for update)  Medications: [x] Verified on Patient Summary List    Subjective functional status/changes:     \"I saw the dr and he said I could be done with therapy. \"    OBJECTIVE  Therapeutic Procedures: Tx Min Billable or 1:1 Min (if diff from Tx Min) Procedure, Rationale, Specifics   34  14930 Therapeutic Exercise (timed):  increase ROM, strength, coordination, balance, and proprioception to improve patient's ability to progress to PLOF and address remaining functional goals.  (see flow sheet as applicable)   34     Total Total     [x] Patient Education billed concurrently with other procedures   [x] Review HEP    [] Progressed/Changed HEP, detail:    [] Other:       Other Objective/Functional Measures    Shoulder:   Strength AROM       Right Left Right Left     Flexion 4+/5  4-/5 176  170     Extension 5/5  4-/5 80  WFL     Abduction 4-/5  4/5 168

## 2023-10-24 NOTE — THERAPY DISCHARGE
[address] [place] [address]  [address] [place] [address]  Ph: [phone]     Fax: [phone]    PHYSICAL THERAPY DISCHARGE SUMMARY  Patient Name: Annel Almaraz : 1970   Treatment/Medical Diagnosis: Encounter for other orthopedic aftercare [Z47.89]   Referral Source: Rufino Quiñonez MD     Date of Initial Visit: 2023 Attended Visits: 16 Missed Visits: 10     SUMMARY OF TREATMENT/CURRENT STATUS  Patient has attended 16 PT visits after L rotator cuff repair. She reported at session today that her MD said she did not have to continue with therapy, so she wishes to be done. She is still weak in the L arm and has decreased ROM. She was given updated HEP and encouraged to continue it to regain full function. She is DC from PT services at this time. Short Term Goals, to be met within 9 treatments:  Patient will demonstrate independence and compliance with HEP in order to increase mobility and assist with carryover from PT services. Status at last Eval/Progress Note: provided  Current Status: independent   Goal Met? YES     2. Patient will be able to complete ADLs at the level she did prior to surgery without pain greater than or equal to 5/10 to increase functional mobility. Status at last Eval/Progress Note: 10/10  Current Status: no pain with ADLs  Goal Met? YES     3. Patient will increase left shoulder flexion prom to 120 degrees  in order to reach overhead. Status at last Eval/Progress Note: 80 degrees  Current Status: PROM is 165 degrees  Goal Met:  YES     4. Patient will increase left shoulder abduction prom to 85 degrees in order to perform adls. Status at last Eval/Progress Note: 50 degrees  Current Status: 170 PROM  Goal Met? YES     Long Term Goals, to be met within 15 treatments: 1. Patient will be able to lift/carry purse without pain greater than or equal to 4/10 to increase functional mobility.   Status at last Eval/Progress Note:

## 2023-10-24 NOTE — THERAPY DISCHARGE
[facility]  [address]  Ph: [phone]     Fax: [phone]    OCCUPATIONAL THERAPY DISCHARGE SUMMARY  Patient Name: Ara Chapa : 1970   Treatment/Medical Diagnosis: Left hand weakness [R29.898]   Referral Source: Toño Tuttle MD     Date of Initial Visit: 10/5/2023 Attended Visits: 2 Missed Visits: 0     SUMMARY OF TREATMENT / CURRENT STATUS:  Patient seen for occupational therapy for left hand weakness, evaluation and discharge visits only. Patient not seen for 2 weeks following evaluation then arrives for visit and requests discharge. Patient reports she has seen orthopedic MD last week s/p rotator cuff repair and was cleared to be done with therapy. Patient questioned regarding ending therapy for shoulder vs hand with patient reporting she would like to end both. Patient assessed this date and has gained significant ROM, no strength. Patient reports she is using HEP daily with AROM exercises but fell asleep with theraputty and had to throw it away. Patient provided with foam ball and instructed to complete  and pinch exercises daily with ball vs theraputty. Patient demonstrates understanding and in agreement. Patient reports she is unable to wring out washcloth or dress self independently, but has daughter assist daily. Patient achieved 2/4 short term goals and 0/4 long term goals. Patient discharged this date from occupational therapy services per patient request.     Short Term Goals, to be met within 6 treatments:  Patient will demonstrate independence and compliance with HEP in order to increase mobility and assist with carryover from OT services. Status at last Eval/Progress Note: provided  Current Status: completing daily  Goal Met? YES     2. Patient will increase L  strength by 5# to increase L hand function. Status at last Eval/Progress Note: 2#  Current Status: 2#  Goal Met? No     3.  Patient will

## 2023-10-24 NOTE — PROGRESS NOTES
provided  Current Status: completing frequently  Goal Met? No     2. Patient will increase L  strength by 5# to increase L hand function. Status at last Eval/Progress Note: 2#  Current Status: 2#  Goal Met? No     3. Patient will increase 3 point pinch strength by 2# to increase L hand function. Status at last Eval/Progress Note: 2#  Current Status: 2#  Goal Met? No     4. Patient will increase AROM of MC joints 2-5 by 10 degrees to increase L hand function. Status at last Eval/Progress Note:     MC AT REST MC AROM MC AROM 10/24   Involved 5TH   40 90   Involved  4TH   45 65   Involved  3RD   55 70   Involved  2ND   52 65      Current Status: see above, Initial Evaluation completed today  Goal Met? YES        Long Term Goals, to be met within 12 treatments:  Patient will increase L  strength by 10# to increase L hand function. Status at last Eval/Progress Note: 2#  Current Status: 2#  Goal Met? No     2. Patient will increase 3 point pinch strength by 2# to increase L hand function. Status at last Eval/Progress Note: 3#  Current Status: 3#  Goal Met? No     3. Patient will be able to ring wet washcloth independently with bilateral hands. Status at last Eval/Progress Note: unable   Current Status: unable  Goal Met? No     4. Patient will be able to dress self independently using bilateral hands. Status at last Eval/Progress Note: unable  Current Status: unable  Goal Met?   No    []  See Progress Note/Recertification  [x]  See Discharge Summary    PLAN  []  Continue plan of care  []  Upgrade activities as tolerated  [x]  Discharge due to : patient request  []  Other:      GISSEL Solano       10/24/2023       9:18 AM

## 2023-10-26 ENCOUNTER — APPOINTMENT (OUTPATIENT)
Facility: HOSPITAL | Age: 53
End: 2023-10-26
Payer: MEDICARE

## 2023-10-31 ENCOUNTER — APPOINTMENT (OUTPATIENT)
Facility: HOSPITAL | Age: 53
End: 2023-10-31
Payer: MEDICARE

## 2023-11-14 ENCOUNTER — TELEMEDICINE (OUTPATIENT)
Age: 53
End: 2023-11-14
Payer: MEDICARE

## 2023-11-14 DIAGNOSIS — R53.83 LETHARGY: ICD-10-CM

## 2023-11-14 DIAGNOSIS — R74.8 ELEVATED LIVER ENZYMES: Primary | ICD-10-CM

## 2023-11-14 PROCEDURE — 99214 OFFICE O/P EST MOD 30 MIN: CPT | Performed by: NURSE PRACTITIONER

## 2023-11-14 PROCEDURE — 3017F COLORECTAL CA SCREEN DOC REV: CPT | Performed by: NURSE PRACTITIONER

## 2023-11-14 PROCEDURE — G8427 DOCREV CUR MEDS BY ELIG CLIN: HCPCS | Performed by: NURSE PRACTITIONER

## 2023-11-14 NOTE — PROGRESS NOTES
Identified pt with two pt identifiers(name and ). Reviewed record in preparation for visit and have obtained necessary documentation. Chief Complaint   Patient presents with    Elevated Hepatic Enzymes     VV follow up     There were no vitals taken for this visit. 1. \"Have you been to the ER, urgent care clinic since your last visit? Hospitalized since your last visit? \" No    2. \"Have you seen or consulted any other health care providers outside of the [address] [place] [address] since your last visit? \" No     Patient is accompanied by self I have received verbal consent from Preston Wilkins to discuss any/all medical information while they are present in the room.
a video synchronous discussion virtually to substitute for an in-person clinic visit. The patient was located in their home. The provider was located in the [address] [place] [address],[address] office. All of the issues listed above in the Assessment and Plan were discussed with the patient. All questions were answered. The patient expressed a clear understanding of the above. A follow-up appointment will be performed via TeleHealth in 6 months for ongoing monitoring and treatment. Orders to obtain laboratory testing will be mailed to the patient. EVANGELIST MaresFormerly [facility] [address], 2 [facility], [address] [facility]  [address] [address] [place] [address]

## 2023-11-25 ENCOUNTER — APPOINTMENT (OUTPATIENT)
Facility: HOSPITAL | Age: 53
DRG: 101 | End: 2023-11-25
Payer: MEDICARE

## 2023-11-25 ENCOUNTER — HOSPITAL ENCOUNTER (INPATIENT)
Facility: HOSPITAL | Age: 53
LOS: 2 days | Discharge: LEFT BEFORE DISCHARGE | DRG: 101 | End: 2023-11-28
Attending: EMERGENCY MEDICINE | Admitting: INTERNAL MEDICINE
Payer: MEDICARE

## 2023-11-25 DIAGNOSIS — G40.909 SEIZURE DISORDER (HCC): Primary | ICD-10-CM

## 2023-11-25 DIAGNOSIS — R40.4 TRANSIENT ALTERATION OF AWARENESS: ICD-10-CM

## 2023-11-25 LAB
ALBUMIN SERPL-MCNC: 3.2 G/DL (ref 3.5–5)
ALBUMIN/GLOB SERPL: 1 (ref 1.1–2.2)
ALP SERPL-CCNC: 120 U/L (ref 45–117)
ALT SERPL-CCNC: 32 U/L (ref 12–78)
ANION GAP SERPL CALC-SCNC: 6 MMOL/L (ref 5–15)
AST SERPL W P-5'-P-CCNC: 19 U/L (ref 15–37)
BASOPHILS # BLD: 0 K/UL (ref 0–0.1)
BASOPHILS NFR BLD: 1 % (ref 0–1)
BILIRUB SERPL-MCNC: 0.2 MG/DL (ref 0.2–1)
BUN SERPL-MCNC: 15 MG/DL (ref 6–20)
BUN/CREAT SERPL: 14 (ref 12–20)
CA-I BLD-MCNC: 8.6 MG/DL (ref 8.5–10.1)
CHLORIDE SERPL-SCNC: 106 MMOL/L (ref 97–108)
CO2 SERPL-SCNC: 26 MMOL/L (ref 21–32)
CREAT SERPL-MCNC: 1.07 MG/DL (ref 0.55–1.02)
DIFFERENTIAL METHOD BLD: ABNORMAL
EKG ATRIAL RATE: 83 BPM
EKG DIAGNOSIS: NORMAL
EKG P AXIS: 30 DEGREES
EKG P-R INTERVAL: 192 MS
EKG Q-T INTERVAL: 386 MS
EKG QRS DURATION: 104 MS
EKG QTC CALCULATION (BAZETT): 453 MS
EKG R AXIS: 64 DEGREES
EKG T AXIS: 36 DEGREES
EKG VENTRICULAR RATE: 83 BPM
EOSINOPHIL # BLD: 0.3 K/UL (ref 0–0.4)
EOSINOPHIL NFR BLD: 5 % (ref 0–7)
ERYTHROCYTE [DISTWIDTH] IN BLOOD BY AUTOMATED COUNT: 15.5 % (ref 11.5–14.5)
GLOBULIN SER CALC-MCNC: 3.2 G/DL (ref 2–4)
GLUCOSE BLD STRIP.AUTO-MCNC: 69 MG/DL (ref 65–100)
GLUCOSE BLD STRIP.AUTO-MCNC: 76 MG/DL (ref 65–100)
GLUCOSE SERPL-MCNC: 86 MG/DL (ref 65–100)
HCT VFR BLD AUTO: 33.7 % (ref 35–47)
HGB BLD-MCNC: 10.9 G/DL (ref 11.5–16)
IMM GRANULOCYTES # BLD AUTO: 0 K/UL (ref 0–0.04)
IMM GRANULOCYTES NFR BLD AUTO: 0 % (ref 0–0.5)
LYMPHOCYTES # BLD: 1.8 K/UL (ref 0.8–3.5)
LYMPHOCYTES NFR BLD: 35 % (ref 12–49)
MAGNESIUM SERPL-MCNC: 2.1 MG/DL (ref 1.6–2.4)
MCH RBC QN AUTO: 26.5 PG (ref 26–34)
MCHC RBC AUTO-ENTMCNC: 32.3 G/DL (ref 30–36.5)
MCV RBC AUTO: 82 FL (ref 80–99)
MONOCYTES # BLD: 0.5 K/UL (ref 0–1)
MONOCYTES NFR BLD: 10 % (ref 5–13)
NEUTS SEG # BLD: 2.5 K/UL (ref 1.8–8)
NEUTS SEG NFR BLD: 49 % (ref 32–75)
NRBC # BLD: 0 K/UL (ref 0–0.01)
NRBC BLD-RTO: 0 PER 100 WBC
PERFORMED BY:: NORMAL
PERFORMED BY:: NORMAL
PLATELET # BLD AUTO: 279 K/UL (ref 150–400)
PMV BLD AUTO: 9.9 FL (ref 8.9–12.9)
POTASSIUM SERPL-SCNC: 3.3 MMOL/L (ref 3.5–5.1)
PROT SERPL-MCNC: 6.4 G/DL (ref 6.4–8.2)
RBC # BLD AUTO: 4.11 M/UL (ref 3.8–5.2)
SODIUM SERPL-SCNC: 138 MMOL/L (ref 136–145)
WBC # BLD AUTO: 5.1 K/UL (ref 3.6–11)

## 2023-11-25 PROCEDURE — 93005 ELECTROCARDIOGRAM TRACING: CPT | Performed by: EMERGENCY MEDICINE

## 2023-11-25 PROCEDURE — 80053 COMPREHEN METABOLIC PANEL: CPT

## 2023-11-25 PROCEDURE — 99285 EMERGENCY DEPT VISIT HI MDM: CPT

## 2023-11-25 PROCEDURE — 83735 ASSAY OF MAGNESIUM: CPT

## 2023-11-25 PROCEDURE — 80156 ASSAY CARBAMAZEPINE TOTAL: CPT

## 2023-11-25 PROCEDURE — 80177 DRUG SCRN QUAN LEVETIRACETAM: CPT

## 2023-11-25 PROCEDURE — 85025 COMPLETE CBC W/AUTO DIFF WBC: CPT

## 2023-11-25 PROCEDURE — 36415 COLL VENOUS BLD VENIPUNCTURE: CPT

## 2023-11-25 PROCEDURE — 82962 GLUCOSE BLOOD TEST: CPT

## 2023-11-25 PROCEDURE — 80201 ASSAY OF TOPIRAMATE: CPT

## 2023-11-25 PROCEDURE — 70450 CT HEAD/BRAIN W/O DYE: CPT

## 2023-11-25 ASSESSMENT — LIFESTYLE VARIABLES
HOW OFTEN DO YOU HAVE A DRINK CONTAINING ALCOHOL: NEVER
HOW MANY STANDARD DRINKS CONTAINING ALCOHOL DO YOU HAVE ON A TYPICAL DAY: PATIENT DOES NOT DRINK

## 2023-11-25 NOTE — ED TRIAGE NOTES
Family states increased falls, seizures and hypoglycemia x1 week. States last fall 1 hour ago, states multiple seizures today.

## 2023-11-25 NOTE — ED PROVIDER NOTES
BUMEX     cetirizine 10 MG tablet  Commonly known as: ZYRTEC     cyanocobalamin 1000 MCG/ML injection     dexlansoprazole 60 MG Cpdr delayed release capsule  Commonly known as: DEXILANT     hydrOXYzine pamoate 25 MG capsule  Commonly known as: VISTARIL     levETIRAcetam 500 MG Tb24 extended release tablet  Commonly known as: KEPPRA XR  Take 4 tablets by mouth daily     levothyroxine 150 MCG tablet  Commonly known as: SYNTHROID  Take 1 tablet by mouth daily     Linzess 290 MCG Caps capsule  Generic drug: linaclotide     metoclopramide 5 MG tablet  Commonly known as: REGLAN     midodrine 5 MG tablet  Commonly known as: PROAMATINE     montelukast 10 MG tablet  Commonly known as: SINGULAIR     ondansetron 4 MG disintegrating tablet  Commonly known as: ZOFRAN-ODT     OXcarbazepine  MG Tb24  Take 450 mg by mouth daily     OZEMPIC (2 MG/DOSE) SC     potassium chloride 10 MEQ extended release capsule  Commonly known as: MICRO-K     RABEprazole 20 MG tablet  Commonly known as: ACIPHEX     rizatriptan 10 MG disintegrating tablet  Commonly known as: MAXALT-MLT     therapeutic multivitamin-minerals tablet     topiramate 100 MG tablet  Commonly known as: TOPAMAX     Trelegy Ellipta 200-62.5-25 MCG/ACT Aepb inhaler  Generic drug: fluticasone-umeclidin-vilant                DISCONTINUED MEDICATIONS:  Current Discharge Medication List          I am the Primary Clinician of Record. Imani Mccall MD (electronically signed)    (Please note that parts of this dictation were completed with voice recognition software. Quite often unanticipated grammatical, syntax, homophones, and other interpretive errors are inadvertently transcribed by the computer software. Please disregards these errors.  Please excuse any errors that have escaped final proofreading.)     Joshua Mohamud MD  11/25/23 5902

## 2023-11-25 NOTE — ED NOTES
Pt moved from hallbed to ED bed 1. Pt placed in hospital gown and placed on cont cardiac monitor. Vital signs updated. Pt A&Ox4, slow to answer questions, able to follow commands but remains lethargic. Call bell in reach.       Nathanael Osgood, RN  11/25/23 1481

## 2023-11-26 ENCOUNTER — APPOINTMENT (OUTPATIENT)
Facility: HOSPITAL | Age: 53
DRG: 101 | End: 2023-11-26
Payer: MEDICARE

## 2023-11-26 PROBLEM — K21.9 GERD (GASTROESOPHAGEAL REFLUX DISEASE): Status: ACTIVE | Noted: 2023-02-15

## 2023-11-26 PROBLEM — H40.9 GLAUCOMA: Status: ACTIVE | Noted: 2020-07-31

## 2023-11-26 PROBLEM — G57.81: Status: ACTIVE | Noted: 2021-07-14

## 2023-11-26 PROBLEM — G40.209 COMPLEX PARTIAL EPILEPTIC SEIZURE (HCC): Status: ACTIVE | Noted: 2022-09-29

## 2023-11-26 PROBLEM — R41.82 AMS (ALTERED MENTAL STATUS): Status: ACTIVE | Noted: 2023-01-03

## 2023-11-26 PROBLEM — Z95.818 IMPLANTABLE LOOP RECORDER PRESENT: Status: ACTIVE | Noted: 2023-02-15

## 2023-11-26 PROBLEM — E04.2 MULTINODULAR GOITER: Status: ACTIVE | Noted: 2023-01-27

## 2023-11-26 PROBLEM — I50.42 CHRONIC COMBINED SYSTOLIC AND DIASTOLIC CONGESTIVE HEART FAILURE (HCC): Status: ACTIVE | Noted: 2021-04-10

## 2023-11-26 PROBLEM — I61.9 CVA (CEREBROVASCULAR ACCIDENT DUE TO INTRACEREBRAL HEMORRHAGE) (HCC): Status: ACTIVE | Noted: 2023-11-26

## 2023-11-26 PROBLEM — E11.8 TYPE 2 DIABETES MELLITUS WITH COMPLICATION, WITHOUT LONG-TERM CURRENT USE OF INSULIN (HCC): Status: ACTIVE | Noted: 2023-11-26

## 2023-11-26 PROBLEM — I26.99 PULMONARY EMBOLISM (HCC): Status: ACTIVE | Noted: 2023-02-15

## 2023-11-26 LAB
AMMONIA PLAS-SCNC: 29 UMOL/L
CARBAMAZEPINE SERPL-MCNC: <0.5 UG/ML (ref 4–12)
GLUCOSE BLD STRIP.AUTO-MCNC: 125 MG/DL (ref 65–100)
GLUCOSE BLD STRIP.AUTO-MCNC: 76 MG/DL (ref 65–100)
GLUCOSE BLD STRIP.AUTO-MCNC: 78 MG/DL (ref 65–100)
PERFORMED BY:: ABNORMAL
PERFORMED BY:: NORMAL
PERFORMED BY:: NORMAL

## 2023-11-26 PROCEDURE — 36415 COLL VENOUS BLD VENIPUNCTURE: CPT

## 2023-11-26 PROCEDURE — 94761 N-INVAS EAR/PLS OXIMETRY MLT: CPT

## 2023-11-26 PROCEDURE — 6370000000 HC RX 637 (ALT 250 FOR IP): Performed by: PHYSICIAN ASSISTANT

## 2023-11-26 PROCEDURE — 6370000000 HC RX 637 (ALT 250 FOR IP): Performed by: HOSPITALIST

## 2023-11-26 PROCEDURE — 82746 ASSAY OF FOLIC ACID SERUM: CPT

## 2023-11-26 PROCEDURE — 71045 X-RAY EXAM CHEST 1 VIEW: CPT

## 2023-11-26 PROCEDURE — 83036 HEMOGLOBIN GLYCOSYLATED A1C: CPT

## 2023-11-26 PROCEDURE — 83540 ASSAY OF IRON: CPT

## 2023-11-26 PROCEDURE — 84443 ASSAY THYROID STIM HORMONE: CPT

## 2023-11-26 PROCEDURE — 82140 ASSAY OF AMMONIA: CPT

## 2023-11-26 PROCEDURE — 82962 GLUCOSE BLOOD TEST: CPT

## 2023-11-26 PROCEDURE — 82607 VITAMIN B-12: CPT

## 2023-11-26 PROCEDURE — 94640 AIRWAY INHALATION TREATMENT: CPT

## 2023-11-26 PROCEDURE — G0378 HOSPITAL OBSERVATION PER HR: HCPCS

## 2023-11-26 PROCEDURE — 6360000002 HC RX W HCPCS: Performed by: PHYSICIAN ASSISTANT

## 2023-11-26 PROCEDURE — 2580000003 HC RX 258: Performed by: PHYSICIAN ASSISTANT

## 2023-11-26 PROCEDURE — 84439 ASSAY OF FREE THYROXINE: CPT

## 2023-11-26 PROCEDURE — 2580000003 HC RX 258: Performed by: HOSPITALIST

## 2023-11-26 PROCEDURE — 1100000000 HC RM PRIVATE

## 2023-11-26 PROCEDURE — 6370000000 HC RX 637 (ALT 250 FOR IP): Performed by: INTERNAL MEDICINE

## 2023-11-26 RX ORDER — LURASIDONE HYDROCHLORIDE 20 MG/1
20 TABLET, FILM COATED ORAL
Status: DISCONTINUED | OUTPATIENT
Start: 2023-11-27 | End: 2023-11-28 | Stop reason: HOSPADM

## 2023-11-26 RX ORDER — IPRATROPIUM BROMIDE AND ALBUTEROL SULFATE 2.5; .5 MG/3ML; MG/3ML
1 SOLUTION RESPIRATORY (INHALATION)
Status: DISCONTINUED | OUTPATIENT
Start: 2023-11-26 | End: 2023-11-26

## 2023-11-26 RX ORDER — ONDANSETRON 2 MG/ML
4 INJECTION INTRAMUSCULAR; INTRAVENOUS EVERY 6 HOURS PRN
Status: DISCONTINUED | OUTPATIENT
Start: 2023-11-26 | End: 2023-11-28 | Stop reason: HOSPADM

## 2023-11-26 RX ORDER — POTASSIUM CHLORIDE 20 MEQ/1
40 TABLET, EXTENDED RELEASE ORAL PRN
Status: DISCONTINUED | OUTPATIENT
Start: 2023-11-26 | End: 2023-11-28 | Stop reason: HOSPADM

## 2023-11-26 RX ORDER — TRAZODONE HYDROCHLORIDE 50 MG/1
150 TABLET ORAL NIGHTLY
Status: DISCONTINUED | OUTPATIENT
Start: 2023-11-26 | End: 2023-11-28 | Stop reason: HOSPADM

## 2023-11-26 RX ORDER — HYDROXYZINE PAMOATE 25 MG/1
25 CAPSULE ORAL NIGHTLY PRN
Status: DISCONTINUED | OUTPATIENT
Start: 2023-11-26 | End: 2023-11-28 | Stop reason: HOSPADM

## 2023-11-26 RX ORDER — LEVOTHYROXINE SODIUM 0.07 MG/1
150 TABLET ORAL DAILY
Status: DISCONTINUED | OUTPATIENT
Start: 2023-11-27 | End: 2023-11-28 | Stop reason: HOSPADM

## 2023-11-26 RX ORDER — ACETAMINOPHEN 325 MG/1
650 TABLET ORAL EVERY 6 HOURS PRN
Status: DISCONTINUED | OUTPATIENT
Start: 2023-11-26 | End: 2023-11-28 | Stop reason: HOSPADM

## 2023-11-26 RX ORDER — TOPIRAMATE 25 MG/1
100 TABLET ORAL 2 TIMES DAILY
Status: DISCONTINUED | OUTPATIENT
Start: 2023-11-26 | End: 2023-11-28 | Stop reason: HOSPADM

## 2023-11-26 RX ORDER — POLYETHYLENE GLYCOL 3350 17 G/17G
17 POWDER, FOR SOLUTION ORAL DAILY PRN
Status: DISCONTINUED | OUTPATIENT
Start: 2023-11-26 | End: 2023-11-28 | Stop reason: HOSPADM

## 2023-11-26 RX ORDER — ONDANSETRON 4 MG/1
4 TABLET, ORALLY DISINTEGRATING ORAL EVERY 8 HOURS PRN
Status: DISCONTINUED | OUTPATIENT
Start: 2023-11-26 | End: 2023-11-28 | Stop reason: HOSPADM

## 2023-11-26 RX ORDER — SODIUM CHLORIDE 9 MG/ML
INJECTION, SOLUTION INTRAVENOUS PRN
Status: DISCONTINUED | OUTPATIENT
Start: 2023-11-26 | End: 2023-11-28 | Stop reason: HOSPADM

## 2023-11-26 RX ORDER — OXCARBAZEPINE 300 MG/1
600 TABLET, FILM COATED ORAL 2 TIMES DAILY
Status: DISCONTINUED | OUTPATIENT
Start: 2023-11-26 | End: 2023-11-28 | Stop reason: HOSPADM

## 2023-11-26 RX ORDER — INSULIN LISPRO 100 [IU]/ML
0-8 INJECTION, SOLUTION INTRAVENOUS; SUBCUTANEOUS
Status: DISCONTINUED | OUTPATIENT
Start: 2023-11-26 | End: 2023-11-28 | Stop reason: HOSPADM

## 2023-11-26 RX ORDER — ENOXAPARIN SODIUM 100 MG/ML
40 INJECTION SUBCUTANEOUS DAILY
Status: DISCONTINUED | OUTPATIENT
Start: 2023-11-27 | End: 2023-11-28 | Stop reason: HOSPADM

## 2023-11-26 RX ORDER — SODIUM CHLORIDE 0.9 % (FLUSH) 0.9 %
5-40 SYRINGE (ML) INJECTION PRN
Status: DISCONTINUED | OUTPATIENT
Start: 2023-11-26 | End: 2023-11-28 | Stop reason: HOSPADM

## 2023-11-26 RX ORDER — BUMETANIDE 1 MG/1
1 TABLET ORAL DAILY
Status: DISCONTINUED | OUTPATIENT
Start: 2023-11-26 | End: 2023-11-28 | Stop reason: HOSPADM

## 2023-11-26 RX ORDER — INSULIN LISPRO 100 [IU]/ML
0-4 INJECTION, SOLUTION INTRAVENOUS; SUBCUTANEOUS NIGHTLY
Status: DISCONTINUED | OUTPATIENT
Start: 2023-11-26 | End: 2023-11-28 | Stop reason: HOSPADM

## 2023-11-26 RX ORDER — MIDODRINE HYDROCHLORIDE 5 MG/1
5 TABLET ORAL 2 TIMES DAILY
Status: DISCONTINUED | OUTPATIENT
Start: 2023-11-26 | End: 2023-11-28 | Stop reason: HOSPADM

## 2023-11-26 RX ORDER — SODIUM CHLORIDE 0.9 % (FLUSH) 0.9 %
5-40 SYRINGE (ML) INJECTION EVERY 12 HOURS SCHEDULED
Status: DISCONTINUED | OUTPATIENT
Start: 2023-11-26 | End: 2023-11-28 | Stop reason: HOSPADM

## 2023-11-26 RX ORDER — TRAZODONE HYDROCHLORIDE 150 MG/1
150 TABLET ORAL NIGHTLY
COMMUNITY

## 2023-11-26 RX ORDER — OXCARBAZEPINE 600 MG/1
600 TABLET, FILM COATED ORAL 2 TIMES DAILY
COMMUNITY

## 2023-11-26 RX ORDER — POLYETHYLENE GLYCOL 3350 17 G/17G
17 POWDER, FOR SOLUTION ORAL DAILY
Status: DISCONTINUED | OUTPATIENT
Start: 2023-11-26 | End: 2023-11-28 | Stop reason: HOSPADM

## 2023-11-26 RX ORDER — POTASSIUM CHLORIDE 750 MG/1
10 TABLET, FILM COATED, EXTENDED RELEASE ORAL DAILY
Status: DISCONTINUED | OUTPATIENT
Start: 2023-11-26 | End: 2023-11-28 | Stop reason: HOSPADM

## 2023-11-26 RX ORDER — BUDESONIDE AND FORMOTEROL FUMARATE DIHYDRATE 160; 4.5 UG/1; UG/1
2 AEROSOL RESPIRATORY (INHALATION)
Status: DISCONTINUED | OUTPATIENT
Start: 2023-11-26 | End: 2023-11-28 | Stop reason: HOSPADM

## 2023-11-26 RX ORDER — CETIRIZINE HYDROCHLORIDE 10 MG/1
10 TABLET ORAL DAILY
Status: DISCONTINUED | OUTPATIENT
Start: 2023-11-27 | End: 2023-11-28 | Stop reason: HOSPADM

## 2023-11-26 RX ORDER — LURASIDONE HYDROCHLORIDE 40 MG/1
20 TABLET, FILM COATED ORAL
COMMUNITY

## 2023-11-26 RX ORDER — LEVETIRACETAM 500 MG/1
2000 TABLET ORAL DAILY
Status: DISCONTINUED | OUTPATIENT
Start: 2023-11-27 | End: 2023-11-28 | Stop reason: HOSPADM

## 2023-11-26 RX ORDER — ACETAMINOPHEN 650 MG/1
650 SUPPOSITORY RECTAL EVERY 6 HOURS PRN
Status: DISCONTINUED | OUTPATIENT
Start: 2023-11-26 | End: 2023-11-28 | Stop reason: HOSPADM

## 2023-11-26 RX ORDER — PANTOPRAZOLE SODIUM 40 MG/1
40 TABLET, DELAYED RELEASE ORAL
Status: DISCONTINUED | OUTPATIENT
Start: 2023-11-27 | End: 2023-11-28 | Stop reason: HOSPADM

## 2023-11-26 RX ORDER — MONTELUKAST SODIUM 10 MG/1
10 TABLET ORAL DAILY
Status: DISCONTINUED | OUTPATIENT
Start: 2023-11-27 | End: 2023-11-28 | Stop reason: HOSPADM

## 2023-11-26 RX ORDER — DULAGLUTIDE 1.5 MG/.5ML
1.5 INJECTION, SOLUTION SUBCUTANEOUS WEEKLY
COMMUNITY

## 2023-11-26 RX ORDER — DEXTROSE MONOHYDRATE 100 MG/ML
INJECTION, SOLUTION INTRAVENOUS CONTINUOUS PRN
Status: DISCONTINUED | OUTPATIENT
Start: 2023-11-26 | End: 2023-11-28 | Stop reason: HOSPADM

## 2023-11-26 RX ORDER — SUMATRIPTAN 25 MG/1
50 TABLET, FILM COATED ORAL ONCE
Status: COMPLETED | OUTPATIENT
Start: 2023-11-26 | End: 2023-11-26

## 2023-11-26 RX ORDER — ATORVASTATIN CALCIUM 40 MG/1
80 TABLET, FILM COATED ORAL NIGHTLY
Status: DISCONTINUED | OUTPATIENT
Start: 2023-11-26 | End: 2023-11-28 | Stop reason: HOSPADM

## 2023-11-26 RX ORDER — MAGNESIUM SULFATE IN WATER 40 MG/ML
2000 INJECTION, SOLUTION INTRAVENOUS PRN
Status: DISCONTINUED | OUTPATIENT
Start: 2023-11-26 | End: 2023-11-28 | Stop reason: HOSPADM

## 2023-11-26 RX ORDER — POTASSIUM CHLORIDE 7.45 MG/ML
10 INJECTION INTRAVENOUS PRN
Status: DISCONTINUED | OUTPATIENT
Start: 2023-11-26 | End: 2023-11-28 | Stop reason: HOSPADM

## 2023-11-26 RX ORDER — ENOXAPARIN SODIUM 100 MG/ML
40 INJECTION SUBCUTANEOUS DAILY
Status: DISCONTINUED | OUTPATIENT
Start: 2023-11-26 | End: 2023-11-28 | Stop reason: HOSPADM

## 2023-11-26 RX ORDER — IPRATROPIUM BROMIDE AND ALBUTEROL SULFATE 2.5; .5 MG/3ML; MG/3ML
1 SOLUTION RESPIRATORY (INHALATION) 2 TIMES DAILY
Status: DISCONTINUED | OUTPATIENT
Start: 2023-11-26 | End: 2023-11-28

## 2023-11-26 RX ADMIN — POTASSIUM CHLORIDE 10 MEQ: 750 TABLET, EXTENDED RELEASE ORAL at 16:14

## 2023-11-26 RX ADMIN — SODIUM CHLORIDE, PRESERVATIVE FREE 10 ML: 5 INJECTION INTRAVENOUS at 20:53

## 2023-11-26 RX ADMIN — ATORVASTATIN CALCIUM 80 MG: 40 TABLET, FILM COATED ORAL at 20:52

## 2023-11-26 RX ADMIN — TOPIRAMATE 100 MG: 25 TABLET, FILM COATED ORAL at 20:52

## 2023-11-26 RX ADMIN — ENOXAPARIN SODIUM 40 MG: 100 INJECTION SUBCUTANEOUS at 16:12

## 2023-11-26 RX ADMIN — OXCARBAZEPINE 600 MG: 300 TABLET, FILM COATED ORAL at 20:53

## 2023-11-26 RX ADMIN — SUMATRIPTAN SUCCINATE 50 MG: 25 TABLET ORAL at 16:15

## 2023-11-26 RX ADMIN — IPRATROPIUM BROMIDE AND ALBUTEROL SULFATE 1 DOSE: 2.5; .5 SOLUTION RESPIRATORY (INHALATION) at 21:18

## 2023-11-26 RX ADMIN — Medication 2 PUFF: at 21:23

## 2023-11-26 RX ADMIN — SODIUM CHLORIDE, PRESERVATIVE FREE 10 ML: 5 INJECTION INTRAVENOUS at 20:55

## 2023-11-26 RX ADMIN — SODIUM CHLORIDE, PRESERVATIVE FREE 10 ML: 5 INJECTION INTRAVENOUS at 16:14

## 2023-11-26 RX ADMIN — ACETAMINOPHEN 650 MG: 325 TABLET ORAL at 17:49

## 2023-11-26 ASSESSMENT — PAIN DESCRIPTION - DESCRIPTORS: DESCRIPTORS: ACHING

## 2023-11-26 ASSESSMENT — PAIN SCALES - GENERAL
PAINLEVEL_OUTOF10: 0
PAINLEVEL_OUTOF10: 8
PAINLEVEL_OUTOF10: 0
PAINLEVEL_OUTOF10: 4

## 2023-11-26 ASSESSMENT — PAIN DESCRIPTION - ORIENTATION: ORIENTATION: RIGHT

## 2023-11-26 ASSESSMENT — PAIN - FUNCTIONAL ASSESSMENT: PAIN_FUNCTIONAL_ASSESSMENT: 0-10

## 2023-11-26 ASSESSMENT — ENCOUNTER SYMPTOMS
RESPIRATORY NEGATIVE: 1
GASTROINTESTINAL NEGATIVE: 1

## 2023-11-26 ASSESSMENT — PAIN DESCRIPTION - LOCATION
LOCATION: HEAD
LOCATION: KNEE

## 2023-11-26 NOTE — ASSESSMENT & PLAN NOTE
- Baseline MS unclear with confusion present on admission  - No acute pathology noted on CT Brain and no clear infectious, metabolic, idiopathic or hepatic derangement identified.    - Potential post-ictal changes or underlying subclinical seizure activity the suspected etiology  - Avoid narcotics, continue Neuro checks and follow up MRI and EEG ordered based on tele neuro rec's

## 2023-11-26 NOTE — ED NOTES
[facility] consult complete.      Ying Almodovar RN  11/25/23 7409

## 2023-11-26 NOTE — ASSESSMENT & PLAN NOTE
- Known hx w/ previous Neuro evaluations reviewed and repeat outpt seizure described by family  - No acute generalized seizure activity noted and established AED resumed  - ED reviewed case with Neuro and no cEEG required @ this time  - Additional EEG eval pending Neuro review

## 2023-11-26 NOTE — ED NOTES
Report received from [address] [place] [address], [address] [place] [address], [address] [place] [address]  11/26/23 2687

## 2023-11-26 NOTE — ASSESSMENT & PLAN NOTE
- Known hx with estimated LVF ~45% and grade I impaired diastolic dz noted on previous TTE (2022)  - No evidence of acute HF present on exam   - Established medical regimen and diuretic therapy resumed

## 2023-11-26 NOTE — CARE COORDINATION
11/26/23 1058   Service Assessment   Patient Orientation Alert and Oriented   Cognition Alert   History Provided By Patient   Primary [address] Parent; Children;Family Members   Patient's [address] is: Legal Next of [address] [place] [address]   PCP Verified by CM Yes   Last Visit to PCP Within last 3 months   Prior Functional Level Assistance with the following:;Bathing;Dressing; Toileting   Current Functional Level Assistance with the following:;Bathing;Dressing; Toileting   Can patient return to prior living arrangement Yes   Ability to make needs known: Good   Would you like for me to discuss the discharge plan with any other family members/significant others, and if so, who? No   Financial Resources None   [employer] None   Social/Functional History   Lives With Parent   Home Equipment Cane;Rollator   Receives Help From Personal care attendant; Family   ADL Assistance Needs assistance   Bath Moderate assistance   Dressing Moderate assistance   Grooming Moderate assistance   Feeding Independent   Toileting Independent   Ambulation Assistance Needs assistance   Transfer Assistance Needs assistance   Active  No   Occupation On disability   Services At/After Discharge   Mode of Transport at Discharge Other (see comment)   Confirm Follow Up Transport Family     CM met f/f with Pt, she stated that she lives at [address] [place] [address] Dr. Akil Billy, [place]  Pt stated that she lives with her mother. No HH, has a Rolator and cane, Pt stated that her daughter is her PCA, she comes in at 8-5 seven days a week. Pt stated that her daughter assist her with ADL. Pt stated that when she walks very far she uses her Rolator. Send RX to [facility] [address] [place] [address] in [place]. Family will transport Pt home when D/C. CM dispo: TBD    Will need PT/OT eval/recommendations.      Advance Care Planning     General Advance Care Planning (ACP) Conversation    Date of Conversation: 11/25/2023  Conducted with: Patient with

## 2023-11-26 NOTE — ASSESSMENT & PLAN NOTE
- A1c <7 w/o acute inpt hypoglycemia present but repeated outpt episodes noted  - Continue prandial Novolog for inpt management  - Discontinuation of Ozempic and adjusted oral regimen on DC recommended  - Resume low CHO diet

## 2023-11-26 NOTE — ED NOTES
Provider messaged for home medications and pain medication orders at this time.       Whit Chavez RN  11/26/23 8261

## 2023-11-26 NOTE — H&P
Hospitalist Admission Note    NAME: Azam Webster   :  1970   MRN:  [id number]     Date/Time:  2023 8:48 AM    Patient PCP: Pretty Hua MD    **STUDENT NOTE**    ______________________________________________________________________  Given the patient's current clinical presentation, I have a high level of concern for decompensation if discharged from the emergency department. Complex decision making was performed, which includes reviewing the patient's available past medical records, laboratory results, and x-ray films. My assessment of this patient's clinical condition and my plan of care is as follows. Assessment / Plan:    Repeated Falls, Syncope/Rule out CVA  49-year-old female with PMHx significant for seizure disorder, type 2 diabetes mellitus, hypothyroidism presented to hospital after repeated falls and worsening lethargy over the past few days. Patient states her blood sugar has been changing rapidly, dropping into the 40s. She also recently visited neurologist, changed seizure medication.    CT Head Non-Contrast: Negative for acute intracranial abnormality  CTA Head and neck  MRI Brain  Check orthostatic vital signs  Echocardiogram  Lipid Panel, Hemoglobin A1C  Start Aspirin  EKG: Normal sinus rhythm, without ischemic changes  Telemetry monitoring  Order CXR    Seizure Disorder  History of seizures since 4years-old  Patient afebrile, WBC 5.1  Followed by Dr. 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
V2.0  History and Physical      Name:  Sena Boo /Age/Sex: 1970  (52 y.o. female)   MRN & CSN:  [id number] & [id number] Encounter Date/Time: 23   Location:  [facility]/ PCP: Brandon Wall MD         Assessment and Plan:   Sena Boo is a 52 y.o. female with a pmh of seizures, DM and HF who presents with AMS (altered mental status) and reports of repeated falls and seizure    Hospital Problems             Last Modified POA    * (Principal) AMS (altered mental status) 2023 Yes    Chronic combined systolic and diastolic congestive heart failure ([address]) 2023 Yes    Complex partial epileptic seizure ([address]) 2023 Yes    Type 2 diabetes mellitus with complication, without long-term current use of insulin ([address]) 2023 Yes     Plan:  Chronic combined systolic and diastolic congestive heart failure (HCC)  - Known hx with estimated LVF ~45% and grade I impaired diastolic dz noted on previous TTE ()  - No evidence of acute HF present on exam   - Established medical regimen and diuretic therapy resumed    Type 2 diabetes mellitus with complication, without long-term current use of insulin (HCC)  - A1c <7 w/o acute inpt hypoglycemia present but repeated outpt episodes noted  - Continue prandial Novolog for inpt management  - Resume oral regimen and diet control and discharge    Complex partial epileptic seizure ([address])  - Known hx w/ previous Neuro evaluations reviewed and repeat outpt seizure described by family  - No acute generalized seizure activity noted and established AED resumed  - ED reviewed case with Neuro and no cEEG required @ this time  - Additional EEG eval pending Neuro review    AMS (altered mental status)  - Baseline MS unclear with confusion present on admission  - No acute pathology noted on CT Brain and no clear infectious, metabolic, idiopathic or hepatic derangement identified.    - Potential post-ictal changes or underlying subclinical seizure activity the
(from the past 12 hour(s))   POCT Glucose    Collection Time: 11/26/23  3:15 AM   Result Value Ref Range    POC Glucose 76 65 - 100 mg/dL    Performed by: Iris Griffiths          CT Result (most recent):  CT HEAD WO CONTRAST 11/25/2023    Narrative  EXAM: CT HEAD WO CONTRAST    INDICATION: acute process    COMPARISON: CT head June 25, 2023. CONTRAST: None. TECHNIQUE: Unenhanced CT of the head was performed using 5 mm images. Brain and  bone windows were generated. Coronal and sagittal reformats. CT dose reduction  was achieved through use of a standardized protocol tailored for this  examination and automatic exposure control for dose modulation. FINDINGS:  The ventricles and sulci are normal in size, shape and configuration. There is  no significant white matter disease. There is no intracranial hemorrhage,  extra-axial collection, or mass effect. The basilar cisterns are open. No CT  evidence of acute infarct. The bone windows demonstrate no abnormalities. The visualized portions of the  paranasal sinuses and mastoid air cells are clear. Impression  No evidence of acute intracranial abnormality. Xray Result (most recent):  XR CHEST PORTABLE 06/27/2023    Narrative  EXAM:  XR CHEST PORTABLE    INDICATION: Intubated. Left basilar atelectasis. COMPARISON: 6/25/2023    TECHNIQUE: Portable AP semiupright chest view at 0444 hours    FINDINGS: The endotracheal tube and enteric tube are stable. The  cardiomediastinal contours are stable. There is slightly increased left basilar opacity. The right lung and pleural  spaces are clear. There is no pneumothorax. The bones and upper abdomen are  stable. Impression  Slightly increased left basilar atelectasis.         _______________________________________________________________________    TOTAL TIME:  70 Minutes    Critical Care Provided     Minutes non procedure based    Signed: Dwight Bower PA-C    Procedures: see electronic medical

## 2023-11-26 NOTE — ED NOTES
Provider notified of pt requesting pain medications for headache at this time.       Leti Zapata RN  11/26/23 6545

## 2023-11-27 ENCOUNTER — APPOINTMENT (OUTPATIENT)
Facility: HOSPITAL | Age: 53
DRG: 101 | End: 2023-11-27
Payer: MEDICARE

## 2023-11-27 LAB
ALBUMIN SERPL-MCNC: 3 G/DL (ref 3.5–5)
ALBUMIN SERPL-MCNC: 3 G/DL (ref 3.5–5)
ALBUMIN/GLOB SERPL: 0.9 (ref 1.1–2.2)
ALBUMIN/GLOB SERPL: 1 (ref 1.1–2.2)
ALP SERPL-CCNC: 113 U/L (ref 45–117)
ALP SERPL-CCNC: 116 U/L (ref 45–117)
ALT SERPL-CCNC: 26 U/L (ref 12–78)
ALT SERPL-CCNC: 29 U/L (ref 12–78)
ANION GAP SERPL CALC-SCNC: 4 MMOL/L (ref 5–15)
ANION GAP SERPL CALC-SCNC: 6 MMOL/L (ref 5–15)
AST SERPL W P-5'-P-CCNC: 12 U/L (ref 15–37)
AST SERPL W P-5'-P-CCNC: 17 U/L (ref 15–37)
BASOPHILS # BLD: 0.1 K/UL (ref 0–0.1)
BASOPHILS NFR BLD: 1 % (ref 0–1)
BILIRUB SERPL-MCNC: 0.2 MG/DL (ref 0.2–1)
BILIRUB SERPL-MCNC: 0.2 MG/DL (ref 0.2–1)
BUN SERPL-MCNC: 8 MG/DL (ref 6–20)
BUN SERPL-MCNC: 9 MG/DL (ref 6–20)
BUN/CREAT SERPL: 12 (ref 12–20)
BUN/CREAT SERPL: 13 (ref 12–20)
CA-I BLD-MCNC: 8.7 MG/DL (ref 8.5–10.1)
CA-I BLD-MCNC: 8.7 MG/DL (ref 8.5–10.1)
CHLORIDE SERPL-SCNC: 111 MMOL/L (ref 97–108)
CHLORIDE SERPL-SCNC: 112 MMOL/L (ref 97–108)
CHOLEST SERPL-MCNC: 206 MG/DL
CO2 SERPL-SCNC: 23 MMOL/L (ref 21–32)
CO2 SERPL-SCNC: 24 MMOL/L (ref 21–32)
CREAT SERPL-MCNC: 0.69 MG/DL (ref 0.55–1.02)
CREAT SERPL-MCNC: 0.72 MG/DL (ref 0.55–1.02)
DIFFERENTIAL METHOD BLD: ABNORMAL
ECHO AO ASC DIAM: 2.5 CM
ECHO AO ASCENDING AORTA INDEX: 1.3 CM/M2
ECHO AO ROOT DIAM: 2.8 CM
ECHO AO ROOT INDEX: 1.45 CM/M2
ECHO AV AREA PEAK VELOCITY: 2.4 CM2
ECHO AV AREA VTI: 2.4 CM2
ECHO AV AREA/BSA PEAK VELOCITY: 1.2 CM2/M2
ECHO AV AREA/BSA VTI: 1.2 CM2/M2
ECHO AV CUSP MM: 2.3 CM
ECHO AV MEAN GRADIENT: 3 MMHG
ECHO AV MEAN VELOCITY: 0.8 M/S
ECHO AV PEAK GRADIENT: 5 MMHG
ECHO AV PEAK VELOCITY: 1.1 M/S
ECHO AV VELOCITY RATIO: 0.82
ECHO AV VTI: 16.3 CM
ECHO BSA: 1.93 M2
ECHO LA AREA 2C: 8.9 CM2
ECHO LA AREA 4C: 13.6 CM2
ECHO LA DIAMETER INDEX: 1.5 CM/M2
ECHO LA DIAMETER: 2.9 CM
ECHO LA MAJOR AXIS: 5.1 CM
ECHO LA MINOR AXIS: 4.3 CM
ECHO LA TO AORTIC ROOT RATIO: 1.04
ECHO LA VOL BP: 22 ML (ref 22–52)
ECHO LA VOL MOD A2C: 15 ML (ref 22–52)
ECHO LA VOL MOD A4C: 27 ML (ref 22–52)
ECHO LA VOL/BSA BIPLANE: 11 ML/M2 (ref 16–34)
ECHO LA VOLUME INDEX MOD A2C: 8 ML/M2 (ref 16–34)
ECHO LA VOLUME INDEX MOD A4C: 14 ML/M2 (ref 16–34)
ECHO LV E' LATERAL VELOCITY: 11 CM/S
ECHO LV E' SEPTAL VELOCITY: 8 CM/S
ECHO LV EDV A2C: 77 ML
ECHO LV EDV A4C: 64 ML
ECHO LV EDV INDEX A4C: 33 ML/M2
ECHO LV EDV NDEX A2C: 40 ML/M2
ECHO LV EJECTION FRACTION A2C: 56 %
ECHO LV EJECTION FRACTION A4C: 51 %
ECHO LV EJECTION FRACTION BIPLANE: 53 % (ref 55–100)
ECHO LV ESV A2C: 34 ML
ECHO LV ESV A4C: 31 ML
ECHO LV ESV INDEX A2C: 18 ML/M2
ECHO LV ESV INDEX A4C: 16 ML/M2
ECHO LV FRACTIONAL SHORTENING: 45 % (ref 28–44)
ECHO LV INTERNAL DIMENSION DIASTOLE INDEX: 2.28 CM/M2
ECHO LV INTERNAL DIMENSION DIASTOLIC MMODE: 5 CM (ref 3.9–5.3)
ECHO LV INTERNAL DIMENSION DIASTOLIC: 4.4 CM (ref 3.9–5.3)
ECHO LV INTERNAL DIMENSION SYSTOLIC INDEX: 1.24 CM/M2
ECHO LV INTERNAL DIMENSION SYSTOLIC MMODE: 2.3 CM
ECHO LV INTERNAL DIMENSION SYSTOLIC: 2.4 CM
ECHO LV IVSD MMODE: 0.9 CM (ref 0.6–0.9)
ECHO LV IVSD: 1.3 CM (ref 0.6–0.9)
ECHO LV MASS 2D: 191.3 G (ref 67–162)
ECHO LV MASS INDEX 2D: 99.1 G/M2 (ref 43–95)
ECHO LV POSTERIOR WALL DIASTOLIC MMODE: 1 CM (ref 0.6–0.9)
ECHO LV POSTERIOR WALL DIASTOLIC: 1.1 CM (ref 0.6–0.9)
ECHO LV RELATIVE WALL THICKNESS RATIO: 0.5
ECHO LVOT AREA: 3.1 CM2
ECHO LVOT AV VTI INDEX: 0.75
ECHO LVOT DIAM: 2 CM
ECHO LVOT MEAN GRADIENT: 2 MMHG
ECHO LVOT PEAK GRADIENT: 3 MMHG
ECHO LVOT PEAK VELOCITY: 0.9 M/S
ECHO LVOT STROKE VOLUME INDEX: 19.8 ML/M2
ECHO LVOT SV: 38.3 ML
ECHO LVOT VTI: 12.2 CM
ECHO MV A VELOCITY: 0.75 M/S
ECHO MV E DECELERATION TIME (DT): 155 MS
ECHO MV E VELOCITY: 0.5 M/S
ECHO MV E/A RATIO: 0.67
ECHO MV E/E' LATERAL: 4.55
ECHO MV E/E' RATIO (AVERAGED): 5.4
ECHO PV MAX VELOCITY: 1 M/S
ECHO PV PEAK GRADIENT: 4 MMHG
ECHO RA AREA 4C: 14.6 CM2
ECHO RA END SYSTOLIC VOLUME APICAL 4 CHAMBER INDEX BSA: 15 ML/M2
ECHO RA VOLUME: 28 ML
ECHO RV BASAL DIMENSION: 2.8 CM
ECHO RV LONGITUDINAL DIMENSION: 7.7 CM
ECHO RV MID DIMENSION: 3.5 CM
ECHO RV TAPSE: 2.6 CM (ref 1.7–?)
ECHO RVOT MEAN GRADIENT: 3 MMHG
ECHO RVOT PEAK GRADIENT: 4 MMHG
ECHO RVOT PEAK VELOCITY: 1 M/S
ECHO RVOT VTI: 18.9 CM
EOSINOPHIL # BLD: 0.2 K/UL (ref 0–0.4)
EOSINOPHIL NFR BLD: 6 % (ref 0–7)
ERYTHROCYTE [DISTWIDTH] IN BLOOD BY AUTOMATED COUNT: 15.5 % (ref 11.5–14.5)
EST. AVERAGE GLUCOSE BLD GHB EST-MCNC: 85 MG/DL
EST. AVERAGE GLUCOSE BLD GHB EST-MCNC: 88 MG/DL
FOLATE SERPL-MCNC: 29.6 NG/ML (ref 5–21)
GLOBULIN SER CALC-MCNC: 3.1 G/DL (ref 2–4)
GLOBULIN SER CALC-MCNC: 3.2 G/DL (ref 2–4)
GLUCOSE BLD STRIP.AUTO-MCNC: 78 MG/DL (ref 65–100)
GLUCOSE BLD STRIP.AUTO-MCNC: 80 MG/DL (ref 65–100)
GLUCOSE BLD STRIP.AUTO-MCNC: 90 MG/DL (ref 65–100)
GLUCOSE BLD STRIP.AUTO-MCNC: 92 MG/DL (ref 65–100)
GLUCOSE BLD STRIP.AUTO-MCNC: 95 MG/DL (ref 65–100)
GLUCOSE SERPL-MCNC: 95 MG/DL (ref 65–100)
GLUCOSE SERPL-MCNC: 98 MG/DL (ref 65–100)
HBA1C MFR BLD: 4.6 % (ref 4–5.6)
HBA1C MFR BLD: 4.7 % (ref 4–5.6)
HCT VFR BLD AUTO: 34.6 % (ref 35–47)
HDLC SERPL-MCNC: 105 MG/DL
HDLC SERPL: 2 (ref 0–5)
HGB BLD-MCNC: 11.4 G/DL (ref 11.5–16)
IMM GRANULOCYTES # BLD AUTO: 0 K/UL (ref 0–0.04)
IMM GRANULOCYTES NFR BLD AUTO: 0 % (ref 0–0.5)
IRON SATN MFR SERPL: 11 % (ref 20–50)
IRON SERPL-MCNC: 60 UG/DL (ref 35–150)
LACTATE SERPL-SCNC: 2.4 MMOL/L (ref 0.4–2)
LACTATE SERPL-SCNC: 2.4 MMOL/L (ref 0.4–2)
LDLC SERPL CALC-MCNC: 94.8 MG/DL (ref 0–100)
LIPID PANEL: ABNORMAL
LYMPHOCYTES # BLD: 1.1 K/UL (ref 0.8–3.5)
LYMPHOCYTES NFR BLD: 27 % (ref 12–49)
MCH RBC QN AUTO: 27 PG (ref 26–34)
MCHC RBC AUTO-ENTMCNC: 32.9 G/DL (ref 30–36.5)
MCV RBC AUTO: 81.8 FL (ref 80–99)
MONOCYTES # BLD: 0.3 K/UL (ref 0–1)
MONOCYTES NFR BLD: 7 % (ref 5–13)
NEUTS SEG # BLD: 2.5 K/UL (ref 1.8–8)
NEUTS SEG NFR BLD: 59 % (ref 32–75)
NRBC # BLD: 0 K/UL (ref 0–0.01)
NRBC BLD-RTO: 0 PER 100 WBC
PERFORMED BY:: NORMAL
PHOSPHATE SERPL-MCNC: 2.8 MG/DL (ref 2.6–4.7)
PLATELET # BLD AUTO: 288 K/UL (ref 150–400)
PMV BLD AUTO: 9.9 FL (ref 8.9–12.9)
POTASSIUM SERPL-SCNC: 3.4 MMOL/L (ref 3.5–5.1)
POTASSIUM SERPL-SCNC: 3.6 MMOL/L (ref 3.5–5.1)
PROT SERPL-MCNC: 6.1 G/DL (ref 6.4–8.2)
PROT SERPL-MCNC: 6.2 G/DL (ref 6.4–8.2)
RBC # BLD AUTO: 4.23 M/UL (ref 3.8–5.2)
SODIUM SERPL-SCNC: 139 MMOL/L (ref 136–145)
SODIUM SERPL-SCNC: 141 MMOL/L (ref 136–145)
T4 FREE SERPL-MCNC: 0.7 NG/DL (ref 0.8–1.5)
TIBC SERPL-MCNC: 566 UG/DL (ref 250–450)
TRIGL SERPL-MCNC: 31 MG/DL
TSH SERPL DL<=0.05 MIU/L-ACNC: 0.06 UIU/ML (ref 0.36–3.74)
VIT B12 SERPL-MCNC: 1100 PG/ML (ref 193–986)
VLDLC SERPL CALC-MCNC: 6.2 MG/DL
WBC # BLD AUTO: 4.1 K/UL (ref 3.6–11)

## 2023-11-27 PROCEDURE — 83605 ASSAY OF LACTIC ACID: CPT

## 2023-11-27 PROCEDURE — 2580000003 HC RX 258: Performed by: PHYSICIAN ASSISTANT

## 2023-11-27 PROCEDURE — 83036 HEMOGLOBIN GLYCOSYLATED A1C: CPT

## 2023-11-27 PROCEDURE — 6370000000 HC RX 637 (ALT 250 FOR IP): Performed by: PHYSICIAN ASSISTANT

## 2023-11-27 PROCEDURE — 1100000000 HC RM PRIVATE

## 2023-11-27 PROCEDURE — 6370000000 HC RX 637 (ALT 250 FOR IP): Performed by: HOSPITALIST

## 2023-11-27 PROCEDURE — 6360000002 HC RX W HCPCS: Performed by: PHYSICIAN ASSISTANT

## 2023-11-27 PROCEDURE — 97165 OT EVAL LOW COMPLEX 30 MIN: CPT

## 2023-11-27 PROCEDURE — 80053 COMPREHEN METABOLIC PANEL: CPT

## 2023-11-27 PROCEDURE — 97530 THERAPEUTIC ACTIVITIES: CPT

## 2023-11-27 PROCEDURE — 6370000000 HC RX 637 (ALT 250 FOR IP): Performed by: INTERNAL MEDICINE

## 2023-11-27 PROCEDURE — 92610 EVALUATE SWALLOWING FUNCTION: CPT

## 2023-11-27 PROCEDURE — 36415 COLL VENOUS BLD VENIPUNCTURE: CPT

## 2023-11-27 PROCEDURE — 94640 AIRWAY INHALATION TREATMENT: CPT

## 2023-11-27 PROCEDURE — 80061 LIPID PANEL: CPT

## 2023-11-27 PROCEDURE — 82962 GLUCOSE BLOOD TEST: CPT

## 2023-11-27 PROCEDURE — 84100 ASSAY OF PHOSPHORUS: CPT

## 2023-11-27 PROCEDURE — 94761 N-INVAS EAR/PLS OXIMETRY MLT: CPT

## 2023-11-27 PROCEDURE — 85025 COMPLETE CBC W/AUTO DIFF WBC: CPT

## 2023-11-27 PROCEDURE — 93308 TTE F-UP OR LMTD: CPT

## 2023-11-27 PROCEDURE — 97161 PT EVAL LOW COMPLEX 20 MIN: CPT

## 2023-11-27 RX ORDER — OXYCODONE HYDROCHLORIDE 5 MG/1
5 TABLET ORAL EVERY 6 HOURS PRN
Status: DISCONTINUED | OUTPATIENT
Start: 2023-11-27 | End: 2023-11-28 | Stop reason: HOSPADM

## 2023-11-27 RX ORDER — SODIUM CHLORIDE AND POTASSIUM CHLORIDE 150; 450 MG/100ML; MG/100ML
INJECTION, SOLUTION INTRAVENOUS CONTINUOUS
Status: DISCONTINUED | OUTPATIENT
Start: 2023-11-27 | End: 2023-11-28

## 2023-11-27 RX ADMIN — LEVETIRACETAM 2000 MG: 500 TABLET, FILM COATED ORAL at 08:55

## 2023-11-27 RX ADMIN — CETIRIZINE HYDROCHLORIDE 10 MG: 10 TABLET, FILM COATED ORAL at 12:53

## 2023-11-27 RX ADMIN — OXCARBAZEPINE 600 MG: 300 TABLET, FILM COATED ORAL at 08:55

## 2023-11-27 RX ADMIN — PANTOPRAZOLE SODIUM 40 MG: 40 TABLET, DELAYED RELEASE ORAL at 05:42

## 2023-11-27 RX ADMIN — ATORVASTATIN CALCIUM 80 MG: 40 TABLET, FILM COATED ORAL at 22:06

## 2023-11-27 RX ADMIN — POTASSIUM CHLORIDE AND SODIUM CHLORIDE: 450; 150 INJECTION, SOLUTION INTRAVENOUS at 11:38

## 2023-11-27 RX ADMIN — MIDODRINE HYDROCHLORIDE 5 MG: 5 TABLET ORAL at 22:07

## 2023-11-27 RX ADMIN — ENOXAPARIN SODIUM 40 MG: 100 INJECTION SUBCUTANEOUS at 08:56

## 2023-11-27 RX ADMIN — OXCARBAZEPINE 600 MG: 300 TABLET, FILM COATED ORAL at 22:06

## 2023-11-27 RX ADMIN — Medication 2 PUFF: at 07:33

## 2023-11-27 RX ADMIN — BUMETANIDE 1 MG: 1 TABLET ORAL at 08:56

## 2023-11-27 RX ADMIN — SODIUM CHLORIDE, PRESERVATIVE FREE 10 ML: 5 INJECTION INTRAVENOUS at 09:00

## 2023-11-27 RX ADMIN — Medication 2 PUFF: at 21:03

## 2023-11-27 RX ADMIN — TOPIRAMATE 100 MG: 25 TABLET, FILM COATED ORAL at 08:56

## 2023-11-27 RX ADMIN — HYDROXYZINE PAMOATE 25 MG: 25 CAPSULE ORAL at 00:47

## 2023-11-27 RX ADMIN — SODIUM CHLORIDE, PRESERVATIVE FREE 10 ML: 5 INJECTION INTRAVENOUS at 22:07

## 2023-11-27 RX ADMIN — LURASIDONE HYDROCHLORIDE 20 MG: 20 TABLET, FILM COATED ORAL at 17:16

## 2023-11-27 RX ADMIN — IPRATROPIUM BROMIDE AND ALBUTEROL SULFATE 1 DOSE: 2.5; .5 SOLUTION RESPIRATORY (INHALATION) at 13:32

## 2023-11-27 RX ADMIN — OXYCODONE 5 MG: 5 TABLET ORAL at 17:36

## 2023-11-27 RX ADMIN — MONTELUKAST 10 MG: 10 TABLET, FILM COATED ORAL at 08:56

## 2023-11-27 RX ADMIN — POTASSIUM CHLORIDE 10 MEQ: 750 TABLET, EXTENDED RELEASE ORAL at 08:55

## 2023-11-27 RX ADMIN — MIDODRINE HYDROCHLORIDE 5 MG: 5 TABLET ORAL at 08:57

## 2023-11-27 RX ADMIN — POLYETHYLENE GLYCOL 3350 17 G: 17 POWDER, FOR SOLUTION ORAL at 08:56

## 2023-11-27 RX ADMIN — TOPIRAMATE 100 MG: 25 TABLET, FILM COATED ORAL at 22:06

## 2023-11-27 RX ADMIN — IPRATROPIUM BROMIDE AND ALBUTEROL SULFATE 1 DOSE: 2.5; .5 SOLUTION RESPIRATORY (INHALATION) at 07:33

## 2023-11-27 RX ADMIN — LEVOTHYROXINE SODIUM 150 MCG: 0.07 TABLET ORAL at 08:56

## 2023-11-27 ASSESSMENT — PAIN SCALES - GENERAL
PAINLEVEL_OUTOF10: 0
PAINLEVEL_OUTOF10: 0
PAINLEVEL_OUTOF10: 6

## 2023-11-27 ASSESSMENT — ENCOUNTER SYMPTOMS
VOMITING: 0
SHORTNESS OF BREATH: 0
BACK PAIN: 0
DIARRHEA: 0
COUGH: 0
WHEEZING: 0
NAUSEA: 0

## 2023-11-27 ASSESSMENT — PAIN DESCRIPTION - LOCATION: LOCATION: HEAD

## 2023-11-27 NOTE — PROGRESS NOTES
OCCUPATIONAL THERAPY EVALUATION AND DISCHARGE  Patient: Yomi Mccracken (28 y.o. female)  Date: 11/27/2023  Primary Diagnosis: Transient alteration of awareness [R40.4]  CVA (cerebrovascular accident due to intracerebral hemorrhage) ([address]) [I61.9]  Seizure disorder ([address]) [G40.909]  AMS (altered mental status) [R41.82]       Precautions: none                 Recommendations for nursing mobility: Out of bed to chair for meals, AD and gt belt for bed to chair , and Amb to bathroom with AD and gait belt    In place during session:External Catheter and EKG/telemetry   ASSESSMENT  Pt is a 52 y.o. female presenting to [facility] after several falls and worsening lethargy over the past few days, admitted 11/25 and currently being treated for CVA work up. CT of head is negative. Pt received semi-supine in bed upon arrival, AXO x4, and agreeable to OT evaluation. Pt does report multiple falls in the last 3 months; pt unable to describe falls. Based on the objective data described below pt currently present at baseline mod I for ADLs and function mobility/transfers at this time. (See below for objective details and assist levels). Overall pt tolerated session fair today w/out c/o pain, dizziness, or SOB. Pt req'd mod I for bed mobility and SBA-CGA for OOB mobility using RW; no LOB or unsteadiness noted. Pt able to adjust B socks and tolerate standing at sinktop for hand hygiene w/ SUP. Pt w/ generally decreased B ; no differences appreciated. Pt reports intact BUE sensation and reports no sensation in RLE until mid shin and below d/t previous sx. OT educated pt on fall prevention strategies such as leaving lights on in the hallway/bathroom, using BSC at night to reduce falls going to bathroom, and using DME to ensure safety; pt verbalized understanding. Pt has no skilled acute OT needs at this time either noted by OT or reported by pt, will DC skilled OT following evaluation; pt verbalized understanding and agreement.

## 2023-11-27 NOTE — PROGRESS NOTES
Hospitalist Progress Note    NAME:   Alejandrina Castellano   : 1970   MRN: [id number]     Date/Time: 2023 11:07 AM  Patient PCP: Jose Raul Sampson MD    Estimated discharge date:48 hours  Barriers: MRI, ECHO, tele-neuro consult, improvement in lactic acidosis      Assessment / Plan:  Repeated Falls, Syncope/Rule out CVA     CT Head Non-Contrast: Negative for acute intracranial abnormality  CTA Head and neck consider if MRI positive  MRI Brain pending  Orthostatic vitals pending  Echo pending  Lipid Panel   A1c  ASA allergy  EKG: Normal sinus rhythm, without ischemic changes  Telemetry monitoring  CXR with no acute process     Seizure Disorder  History of seizures since 4years-old  Followed by Dr. 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  Followed by [address] [place] [address]  Last Echo 2022:  EF of 45 - 50%  ECHO pending  CXR with no acute process  Continue Bumetanide  Telemetry  Could not tolerate BB and hasa been on midodrine     Diabetes Mellitus  States her sugars have been dropping into 40s at home  A1c pending  Hold ozempic, acarbose, trulicity  May need to discontinue long acting medications  Hypoglycemic protocol   Accu-checks before meals and at bedtime  SSI  POC glucose have been stable     Hypothyroidism  TSH/T4 pending  Continue Levothyroxine     GERD  Continue Dexlansoprazole      Asthma  Continue albuterol, singulair, trelegy   O2 stable on room air     Irritable Bowel Syndrome   On linactolide    Lactate elevation  Lactate 2.4  Initiate low IVF due to history of heart failure, monitor for exacerbation pending ECHO results     Medical Decision Making:   I personally reviewed labs: CMP, CBC, lactic, Ph  I personally reviewed imaging:CXR  Toxic drug monitoring: Lovenox, risk of bleeding  Discussed case with:Patient, nurse

## 2023-11-28 ENCOUNTER — APPOINTMENT (OUTPATIENT)
Facility: HOSPITAL | Age: 53
DRG: 101 | End: 2023-11-28
Payer: MEDICARE

## 2023-11-28 VITALS
SYSTOLIC BLOOD PRESSURE: 133 MMHG | OXYGEN SATURATION: 100 % | DIASTOLIC BLOOD PRESSURE: 80 MMHG | TEMPERATURE: 98.2 F | WEIGHT: 188.27 LBS | BODY MASS INDEX: 31.37 KG/M2 | HEART RATE: 145 BPM | HEIGHT: 65 IN | RESPIRATION RATE: 22 BRPM

## 2023-11-28 LAB
ALBUMIN SERPL-MCNC: 3.4 G/DL (ref 3.5–5)
ALBUMIN/GLOB SERPL: 1.1 (ref 1.1–2.2)
ALP SERPL-CCNC: 117 U/L (ref 45–117)
ALT SERPL-CCNC: 27 U/L (ref 12–78)
ANION GAP SERPL CALC-SCNC: 5 MMOL/L (ref 5–15)
AST SERPL W P-5'-P-CCNC: 11 U/L (ref 15–37)
BASOPHILS # BLD: 0 K/UL (ref 0–0.1)
BASOPHILS NFR BLD: 1 % (ref 0–1)
BILIRUB SERPL-MCNC: 0.2 MG/DL (ref 0.2–1)
BUN SERPL-MCNC: 8 MG/DL (ref 6–20)
BUN/CREAT SERPL: 14 (ref 12–20)
CA-I BLD-MCNC: 8.9 MG/DL (ref 8.5–10.1)
CHLORIDE SERPL-SCNC: 112 MMOL/L (ref 97–108)
CO2 SERPL-SCNC: 23 MMOL/L (ref 21–32)
CREAT SERPL-MCNC: 0.59 MG/DL (ref 0.55–1.02)
DIFFERENTIAL METHOD BLD: ABNORMAL
EOSINOPHIL # BLD: 0.1 K/UL (ref 0–0.4)
EOSINOPHIL NFR BLD: 3 % (ref 0–7)
ERYTHROCYTE [DISTWIDTH] IN BLOOD BY AUTOMATED COUNT: 15.4 % (ref 11.5–14.5)
GLOBULIN SER CALC-MCNC: 3.1 G/DL (ref 2–4)
GLUCOSE BLD STRIP.AUTO-MCNC: 82 MG/DL (ref 65–100)
GLUCOSE BLD STRIP.AUTO-MCNC: 83 MG/DL (ref 65–100)
GLUCOSE BLD STRIP.AUTO-MCNC: 89 MG/DL (ref 65–100)
GLUCOSE SERPL-MCNC: 90 MG/DL (ref 65–100)
HCT VFR BLD AUTO: 35.8 % (ref 35–47)
HGB BLD-MCNC: 11.7 G/DL (ref 11.5–16)
IMM GRANULOCYTES # BLD AUTO: 0 K/UL (ref 0–0.04)
IMM GRANULOCYTES NFR BLD AUTO: 0 % (ref 0–0.5)
LACTATE SERPL-SCNC: 1.4 MMOL/L (ref 0.4–2)
LEVETIRACETAM SERPL-MCNC: 25.1 UG/ML (ref 10–40)
LYMPHOCYTES # BLD: 1.2 K/UL (ref 0.8–3.5)
LYMPHOCYTES NFR BLD: 25 % (ref 12–49)
MCH RBC QN AUTO: 26.8 PG (ref 26–34)
MCHC RBC AUTO-ENTMCNC: 32.7 G/DL (ref 30–36.5)
MCV RBC AUTO: 81.9 FL (ref 80–99)
MONOCYTES # BLD: 0.3 K/UL (ref 0–1)
MONOCYTES NFR BLD: 7 % (ref 5–13)
NEUTS SEG # BLD: 3 K/UL (ref 1.8–8)
NEUTS SEG NFR BLD: 64 % (ref 32–75)
NRBC # BLD: 0 K/UL (ref 0–0.01)
NRBC BLD-RTO: 0 PER 100 WBC
PERFORMED BY:: NORMAL
PLATELET # BLD AUTO: 292 K/UL (ref 150–400)
PMV BLD AUTO: 10.1 FL (ref 8.9–12.9)
POTASSIUM SERPL-SCNC: 3.6 MMOL/L (ref 3.5–5.1)
PROT SERPL-MCNC: 6.5 G/DL (ref 6.4–8.2)
RBC # BLD AUTO: 4.37 M/UL (ref 3.8–5.2)
SODIUM SERPL-SCNC: 140 MMOL/L (ref 136–145)
TOPIRAMATE SERPL-MCNC: 3.6 UG/ML (ref 2–25)
WBC # BLD AUTO: 4.7 K/UL (ref 3.6–11)

## 2023-11-28 PROCEDURE — 6360000002 HC RX W HCPCS: Performed by: PHYSICIAN ASSISTANT

## 2023-11-28 PROCEDURE — 6370000000 HC RX 637 (ALT 250 FOR IP): Performed by: PHYSICIAN ASSISTANT

## 2023-11-28 PROCEDURE — 95819 EEG AWAKE AND ASLEEP: CPT

## 2023-11-28 PROCEDURE — 70553 MRI BRAIN STEM W/O & W/DYE: CPT

## 2023-11-28 PROCEDURE — 94761 N-INVAS EAR/PLS OXIMETRY MLT: CPT

## 2023-11-28 PROCEDURE — 6360000002 HC RX W HCPCS: Performed by: HOSPITALIST

## 2023-11-28 PROCEDURE — 6360000004 HC RX CONTRAST MEDICATION: Performed by: HOSPITALIST

## 2023-11-28 PROCEDURE — 82962 GLUCOSE BLOOD TEST: CPT

## 2023-11-28 PROCEDURE — 80053 COMPREHEN METABOLIC PANEL: CPT

## 2023-11-28 PROCEDURE — 6370000000 HC RX 637 (ALT 250 FOR IP): Performed by: INTERNAL MEDICINE

## 2023-11-28 PROCEDURE — A9577 INJ MULTIHANCE: HCPCS | Performed by: HOSPITALIST

## 2023-11-28 PROCEDURE — 83605 ASSAY OF LACTIC ACID: CPT

## 2023-11-28 PROCEDURE — 94640 AIRWAY INHALATION TREATMENT: CPT

## 2023-11-28 PROCEDURE — 2580000003 HC RX 258: Performed by: PHYSICIAN ASSISTANT

## 2023-11-28 PROCEDURE — 85025 COMPLETE CBC W/AUTO DIFF WBC: CPT

## 2023-11-28 PROCEDURE — 36415 COLL VENOUS BLD VENIPUNCTURE: CPT

## 2023-11-28 PROCEDURE — 6370000000 HC RX 637 (ALT 250 FOR IP): Performed by: HOSPITALIST

## 2023-11-28 RX ORDER — CLONIDINE HYDROCHLORIDE 0.1 MG/1
0.1 TABLET ORAL ONCE
Status: DISCONTINUED | OUTPATIENT
Start: 2023-11-28 | End: 2023-11-28 | Stop reason: HOSPADM

## 2023-11-28 RX ORDER — IPRATROPIUM BROMIDE AND ALBUTEROL SULFATE 2.5; .5 MG/3ML; MG/3ML
1 SOLUTION RESPIRATORY (INHALATION) EVERY 4 HOURS PRN
Status: DISCONTINUED | OUTPATIENT
Start: 2023-11-28 | End: 2023-11-28 | Stop reason: HOSPADM

## 2023-11-28 RX ADMIN — MONTELUKAST 10 MG: 10 TABLET, FILM COATED ORAL at 10:35

## 2023-11-28 RX ADMIN — CETIRIZINE HYDROCHLORIDE 10 MG: 10 TABLET, FILM COATED ORAL at 10:35

## 2023-11-28 RX ADMIN — Medication 2 PUFF: at 08:24

## 2023-11-28 RX ADMIN — LEVETIRACETAM 2000 MG: 500 TABLET, FILM COATED ORAL at 10:34

## 2023-11-28 RX ADMIN — BUMETANIDE 1 MG: 1 TABLET ORAL at 10:34

## 2023-11-28 RX ADMIN — MIDODRINE HYDROCHLORIDE 5 MG: 5 TABLET ORAL at 10:34

## 2023-11-28 RX ADMIN — OXCARBAZEPINE 600 MG: 300 TABLET, FILM COATED ORAL at 10:35

## 2023-11-28 RX ADMIN — TOPIRAMATE 100 MG: 25 TABLET, FILM COATED ORAL at 10:32

## 2023-11-28 RX ADMIN — SODIUM CHLORIDE, PRESERVATIVE FREE 10 ML: 5 INJECTION INTRAVENOUS at 10:39

## 2023-11-28 RX ADMIN — ENOXAPARIN SODIUM 40 MG: 100 INJECTION SUBCUTANEOUS at 10:37

## 2023-11-28 RX ADMIN — LEVOTHYROXINE SODIUM 150 MCG: 0.07 TABLET ORAL at 10:34

## 2023-11-28 RX ADMIN — POTASSIUM CHLORIDE 10 MEQ: 750 TABLET, EXTENDED RELEASE ORAL at 10:35

## 2023-11-28 RX ADMIN — POTASSIUM CHLORIDE AND SODIUM CHLORIDE: 450; 150 INJECTION, SOLUTION INTRAVENOUS at 02:26

## 2023-11-28 RX ADMIN — IPRATROPIUM BROMIDE AND ALBUTEROL SULFATE 1 DOSE: 2.5; .5 SOLUTION RESPIRATORY (INHALATION) at 08:24

## 2023-11-28 RX ADMIN — PANTOPRAZOLE SODIUM 40 MG: 40 TABLET, DELAYED RELEASE ORAL at 10:44

## 2023-11-28 RX ADMIN — LURASIDONE HYDROCHLORIDE 20 MG: 20 TABLET, FILM COATED ORAL at 17:36

## 2023-11-28 RX ADMIN — GADOBENATE DIMEGLUMINE 17 ML: 529 INJECTION, SOLUTION INTRAVENOUS at 17:08

## 2023-11-28 RX ADMIN — ENOXAPARIN SODIUM 40 MG: 100 INJECTION SUBCUTANEOUS at 10:34

## 2023-11-28 ASSESSMENT — PAIN DESCRIPTION - ORIENTATION
ORIENTATION: RIGHT
ORIENTATION: MID

## 2023-11-28 ASSESSMENT — ENCOUNTER SYMPTOMS
SHORTNESS OF BREATH: 0
DIARRHEA: 0
COUGH: 0
NAUSEA: 0
BACK PAIN: 0
WHEEZING: 0
VOMITING: 0

## 2023-11-28 ASSESSMENT — PAIN DESCRIPTION - DESCRIPTORS: DESCRIPTORS: ACHING

## 2023-11-28 ASSESSMENT — PAIN SCALES - GENERAL
PAINLEVEL_OUTOF10: 3
PAINLEVEL_OUTOF10: 6

## 2023-11-28 ASSESSMENT — PAIN DESCRIPTION - LOCATION
LOCATION: HEAD
LOCATION: CHEST

## 2023-11-28 NOTE — PROGRESS NOTES
Hospitalist Progress Note    NAME:   Milton Salgado   : 1970   MRN: [id number]     Date/Time: 2023 10:05 AM  Patient PCP: Shayy Begum MD    Estimated discharge date:48 hours  Barriers: MRI, tele-neuro consult      Assessment / Plan:  Repeated Falls, Syncope/Rule out CVA     CT Head Non-Contrast: Negative for acute intracranial abnormality  CTA Head and neck consider if MRI positive  MRI Brain pending, patient's ILR is [employer] Reveal  Orthostatic vitals pending  Echo revealed EF 50-55%  Lipid Panel normal  A1c 4.6  ASA allergy  EKG: Normal sinus rhythm, without ischemic changes  Telemetry monitoring  CXR with no acute process     Seizure Disorder  History of seizures since 4years-old  Followed by Dr. Geronimo Evans  Currently taking Keppra, Oxcarbazepine  Takes Topamax for headaches  Tele neuro consult pending, unclear if ordered previously as no note in chart, reorder  Seizure precautions  EEG pending     Dilated Cardiomyopathy with combined systolic and diastolic heart failure  Followed by [address] [place] [address]  Last Echo 2022:  EF of 45 - 50%  ECHO 50-55% with mild diastolic dysfunction  CXR with no acute process  Continue Bumetanide  Telemetry  Could not tolerate BB and has been on midodrine     Diabetes Mellitus  A1c 4.6  Hold ozempic, acarbose, trulicity  Hypoglycemic protocol   Accu-checks before meals and at bedtime  SSI  POC glucose have been stable without any medications while inpatient and a1c is low, consider discontinuing long acting medications upon discharge     Hypothyroidism  TSH/T4 both decreased  MRI pending to assess pituitary  Continue Levothyroxine  Patient will need to follow up with her endocrinologist closely once discharged, may need adjustment in medication     GERD  Continue Dexlansoprazole      Asthma  Continue albuterol, singulair, trelegy   O2 stable on room air     Irritable Bowel Syndrome   On linactolide    Lactate elevation  Lactate 2.4 -> 1.4  Stop

## 2023-11-28 NOTE — CARE COORDINATION
CM attempted to meet with patient at bedside to discuss discharge recommendations for home health. Patient is currently off of the unit. CM team will have to follow up at a later time.

## 2023-11-29 NOTE — PROGRESS NOTES
At 1940 pm telemetry called and stated patient HR is 150 this nurse assessed patient HR is 145 blood pressure is 133/80 patient states that is feeling some anxitey. This nurse informed the patient that she has vistal PRN and ask would she like that medication patient states that medction gives her nightmare and refused patient requested for a different mediction. This nurse reached out to on call provider to order medication , awaiting orders to be placed.

## 2023-11-29 NOTE — PROGRESS NOTES
Requested by nursing to talk to patient who wants to leave AMA. She reports nursing staff was talking behind her back. I told her that I'm sorry that happened, although I cannot vouched about what actually happened. From medical standpoint, I explained to her that the workup from day team is still ongoing. If she leaves now, she has the risk of falling and injure herself. She verbalizes understanding of the risk. She is awake and alert, has capacity to make medical decision. AMA form signed.      Stephanie Calderon MD  Internal Medicine  8:46 PM   11/28/23

## 2023-11-29 NOTE — PROGRESS NOTES
At 2050 nurse went to patient bedside to give patient medical record number due to patient requesting AMA Paper. Patient was not at the bedside.

## 2023-11-29 NOTE — PROGRESS NOTES
Patient daughter arrived at bedside requesting to speak to primary nurse. This nurse arrived at bedside Family member requested to know what was going on with the patient and wants to know why is the staff talking about the patient. This nurse explained to the patient and family member that staff is not talking about the patient. Patient is at the bedside upset and will like to leave AMA. This nurses called the nursing supervisors and the on call provider. Patient is at the bedside getting dressed at 23-14-20-09 pm This nurse explained to the patient and family member at bedside that the hospitalitis will come to the bed. At 827 pm hospitalitis arrived at the bedside to talk to patient.

## 2024-01-18 NOTE — PATIENT INSTRUCTIONS
Let her know I was able to review the records from her previous stays at [facility] including the previous cardiomyopathy. Looks like her ejection fraction is now normal which is great news she should continue on meds to control her edema and blood pressure which include clonidine carvedilol Bumex losartan and spironolactone. I would like to see her back in 6 months in the office and then we will see her back again in 1 year for an echo and visit same day she needs a yearly echo. Asked her if she knows any further about her son's history of noncompaction.   We know that she does not have that based on the MRI done at [facility] <-- Click to add NO significant Past Surgical History

## 2024-03-03 ENCOUNTER — APPOINTMENT (OUTPATIENT)
Facility: HOSPITAL | Age: 54
DRG: 312 | End: 2024-03-03
Payer: MEDICARE

## 2024-03-03 ENCOUNTER — HOSPITAL ENCOUNTER (INPATIENT)
Facility: HOSPITAL | Age: 54
LOS: 4 days | Discharge: HOME HEALTH | DRG: 312 | End: 2024-03-07
Attending: STUDENT IN AN ORGANIZED HEALTH CARE EDUCATION/TRAINING PROGRAM | Admitting: HOSPITALIST
Payer: MEDICARE

## 2024-03-03 DIAGNOSIS — R55 SYNCOPE AND COLLAPSE: Primary | ICD-10-CM

## 2024-03-03 PROBLEM — R26.9 GAIT DISTURBANCE: Status: ACTIVE | Noted: 2024-03-03

## 2024-03-03 LAB
ALBUMIN SERPL-MCNC: 3.2 G/DL (ref 3.5–5)
ALBUMIN/GLOB SERPL: 1 (ref 1.1–2.2)
ALP SERPL-CCNC: 120 U/L (ref 45–117)
ALT SERPL-CCNC: 39 U/L (ref 12–78)
ANION GAP SERPL CALC-SCNC: 6 MMOL/L (ref 5–15)
AST SERPL W P-5'-P-CCNC: 42 U/L (ref 15–37)
BASOPHILS # BLD: 0.1 K/UL (ref 0–0.1)
BASOPHILS NFR BLD: 1 % (ref 0–1)
BILIRUB SERPL-MCNC: 0.2 MG/DL (ref 0.2–1)
BNP SERPL-MCNC: 14 PG/ML
BUN SERPL-MCNC: 7 MG/DL (ref 6–20)
BUN/CREAT SERPL: 10 (ref 12–20)
CA-I BLD-MCNC: 8.5 MG/DL (ref 8.5–10.1)
CHLORIDE SERPL-SCNC: 107 MMOL/L (ref 97–108)
CO2 SERPL-SCNC: 27 MMOL/L (ref 21–32)
CREAT SERPL-MCNC: 0.68 MG/DL (ref 0.55–1.02)
DIFFERENTIAL METHOD BLD: ABNORMAL
EOSINOPHIL # BLD: 0.3 K/UL (ref 0–0.4)
EOSINOPHIL NFR BLD: 4 % (ref 0–7)
ERYTHROCYTE [DISTWIDTH] IN BLOOD BY AUTOMATED COUNT: 16.8 % (ref 11.5–14.5)
GLOBULIN SER CALC-MCNC: 3.2 G/DL (ref 2–4)
GLUCOSE SERPL-MCNC: 80 MG/DL (ref 65–100)
HCT VFR BLD AUTO: 37.3 % (ref 35–47)
HGB BLD-MCNC: 11.8 G/DL (ref 11.5–16)
IMM GRANULOCYTES # BLD AUTO: 0 K/UL (ref 0–0.04)
IMM GRANULOCYTES NFR BLD AUTO: 0 % (ref 0–0.5)
LACTATE SERPL-SCNC: 0.9 MMOL/L (ref 0.4–2)
LYMPHOCYTES # BLD: 3.3 K/UL (ref 0.8–3.5)
LYMPHOCYTES NFR BLD: 44 % (ref 12–49)
MAGNESIUM SERPL-MCNC: 2.3 MG/DL (ref 1.6–2.4)
MCH RBC QN AUTO: 25.2 PG (ref 26–34)
MCHC RBC AUTO-ENTMCNC: 31.6 G/DL (ref 30–36.5)
MCV RBC AUTO: 79.7 FL (ref 80–99)
MONOCYTES # BLD: 0.5 K/UL (ref 0–1)
MONOCYTES NFR BLD: 7 % (ref 5–13)
NEUTS SEG # BLD: 3.2 K/UL (ref 1.8–8)
NEUTS SEG NFR BLD: 44 % (ref 32–75)
NRBC # BLD: 0 K/UL (ref 0–0.01)
NRBC BLD-RTO: 0 PER 100 WBC
PLATELET # BLD AUTO: 315 K/UL (ref 150–400)
PMV BLD AUTO: 9.9 FL (ref 8.9–12.9)
POTASSIUM SERPL-SCNC: 3.4 MMOL/L (ref 3.5–5.1)
PROT SERPL-MCNC: 6.4 G/DL (ref 6.4–8.2)
RBC # BLD AUTO: 4.68 M/UL (ref 3.8–5.2)
SODIUM SERPL-SCNC: 140 MMOL/L (ref 136–145)
TROPONIN I SERPL HS-MCNC: 5 NG/L (ref 0–51)
TROPONIN I SERPL HS-MCNC: 8 NG/L (ref 0–51)
WBC # BLD AUTO: 7.3 K/UL (ref 3.6–11)

## 2024-03-03 PROCEDURE — 83880 ASSAY OF NATRIURETIC PEPTIDE: CPT

## 2024-03-03 PROCEDURE — 99285 EMERGENCY DEPT VISIT HI MDM: CPT

## 2024-03-03 PROCEDURE — 80053 COMPREHEN METABOLIC PANEL: CPT

## 2024-03-03 PROCEDURE — 85025 COMPLETE CBC W/AUTO DIFF WBC: CPT

## 2024-03-03 PROCEDURE — 1100000000 HC RM PRIVATE

## 2024-03-03 PROCEDURE — 71045 X-RAY EXAM CHEST 1 VIEW: CPT

## 2024-03-03 PROCEDURE — 83605 ASSAY OF LACTIC ACID: CPT

## 2024-03-03 PROCEDURE — 84484 ASSAY OF TROPONIN QUANT: CPT

## 2024-03-03 PROCEDURE — 70450 CT HEAD/BRAIN W/O DYE: CPT

## 2024-03-03 PROCEDURE — 83735 ASSAY OF MAGNESIUM: CPT

## 2024-03-03 PROCEDURE — 36415 COLL VENOUS BLD VENIPUNCTURE: CPT

## 2024-03-03 PROCEDURE — 80177 DRUG SCRN QUAN LEVETIRACETAM: CPT

## 2024-03-03 PROCEDURE — 93005 ELECTROCARDIOGRAM TRACING: CPT | Performed by: STUDENT IN AN ORGANIZED HEALTH CARE EDUCATION/TRAINING PROGRAM

## 2024-03-03 RX ORDER — ONDANSETRON 2 MG/ML
4 INJECTION INTRAMUSCULAR; INTRAVENOUS EVERY 6 HOURS PRN
Status: DISCONTINUED | OUTPATIENT
Start: 2024-03-03 | End: 2024-03-07 | Stop reason: HOSPADM

## 2024-03-03 RX ORDER — LATANOPROST 50 UG/ML
1 SOLUTION/ DROPS OPHTHALMIC NIGHTLY
COMMUNITY
Start: 2024-01-30

## 2024-03-03 RX ORDER — LEVETIRACETAM 500 MG/1
1500 TABLET ORAL 2 TIMES DAILY
Status: DISCONTINUED | OUTPATIENT
Start: 2024-03-03 | End: 2024-03-07 | Stop reason: HOSPADM

## 2024-03-03 RX ORDER — FLUOXETINE HYDROCHLORIDE 20 MG/1
40 CAPSULE ORAL DAILY
Status: DISCONTINUED | OUTPATIENT
Start: 2024-03-04 | End: 2024-03-07 | Stop reason: HOSPADM

## 2024-03-03 RX ORDER — DIAZEPAM 7.5 MG/100UL
1 SPRAY NASAL PRN
COMMUNITY
Start: 2024-02-02

## 2024-03-03 RX ORDER — CLONAZEPAM 1 MG/1
1 TABLET ORAL 3 TIMES DAILY
Status: DISCONTINUED | OUTPATIENT
Start: 2024-03-03 | End: 2024-03-07 | Stop reason: HOSPADM

## 2024-03-03 RX ORDER — CARIPRAZINE 3 MG/1
3 CAPSULE, GELATIN COATED ORAL DAILY
COMMUNITY
Start: 2024-02-05

## 2024-03-03 RX ORDER — LEVOTHYROXINE SODIUM 0.07 MG/1
150 TABLET ORAL DAILY
Status: DISCONTINUED | OUTPATIENT
Start: 2024-03-04 | End: 2024-03-07 | Stop reason: HOSPADM

## 2024-03-03 RX ORDER — LEVETIRACETAM 750 MG/1
1500 TABLET, FILM COATED, EXTENDED RELEASE ORAL 2 TIMES DAILY
COMMUNITY
Start: 2023-12-27

## 2024-03-03 RX ORDER — DEXTROSE MONOHYDRATE 100 MG/ML
INJECTION, SOLUTION INTRAVENOUS CONTINUOUS PRN
Status: DISCONTINUED | OUTPATIENT
Start: 2024-03-03 | End: 2024-03-07 | Stop reason: HOSPADM

## 2024-03-03 RX ORDER — SODIUM CHLORIDE 9 MG/ML
INJECTION, SOLUTION INTRAVENOUS PRN
Status: DISCONTINUED | OUTPATIENT
Start: 2024-03-03 | End: 2024-03-07 | Stop reason: HOSPADM

## 2024-03-03 RX ORDER — ONDANSETRON 4 MG/1
4 TABLET, ORALLY DISINTEGRATING ORAL EVERY 8 HOURS PRN
Status: DISCONTINUED | OUTPATIENT
Start: 2024-03-03 | End: 2024-03-07 | Stop reason: HOSPADM

## 2024-03-03 RX ORDER — CLONAZEPAM 1 MG/1
1 TABLET ORAL 3 TIMES DAILY
COMMUNITY
Start: 2024-02-18

## 2024-03-03 RX ORDER — FLUOXETINE HYDROCHLORIDE 40 MG/1
40 CAPSULE ORAL DAILY
COMMUNITY
Start: 2024-02-10

## 2024-03-03 RX ORDER — ACARBOSE 50 MG/1
100 TABLET ORAL
Status: DISCONTINUED | OUTPATIENT
Start: 2024-03-04 | End: 2024-03-07 | Stop reason: HOSPADM

## 2024-03-03 RX ORDER — TRAZODONE HYDROCHLORIDE 50 MG/1
100 TABLET ORAL NIGHTLY
Status: DISCONTINUED | OUTPATIENT
Start: 2024-03-03 | End: 2024-03-07 | Stop reason: HOSPADM

## 2024-03-03 RX ORDER — MULTIVITAMIN WITH IRON
1 TABLET ORAL DAILY
Status: DISCONTINUED | OUTPATIENT
Start: 2024-03-04 | End: 2024-03-07 | Stop reason: HOSPADM

## 2024-03-03 RX ORDER — ALBUTEROL SULFATE 90 UG/1
2 AEROSOL, METERED RESPIRATORY (INHALATION) EVERY 4 HOURS PRN
Status: DISCONTINUED | OUTPATIENT
Start: 2024-03-03 | End: 2024-03-07 | Stop reason: HOSPADM

## 2024-03-03 RX ORDER — SODIUM CHLORIDE 0.9 % (FLUSH) 0.9 %
5-40 SYRINGE (ML) INJECTION PRN
Status: DISCONTINUED | OUTPATIENT
Start: 2024-03-03 | End: 2024-03-07 | Stop reason: HOSPADM

## 2024-03-03 RX ORDER — INSULIN LISPRO 100 [IU]/ML
0-8 INJECTION, SOLUTION INTRAVENOUS; SUBCUTANEOUS
Status: DISCONTINUED | OUTPATIENT
Start: 2024-03-04 | End: 2024-03-07 | Stop reason: HOSPADM

## 2024-03-03 RX ORDER — ENOXAPARIN SODIUM 100 MG/ML
40 INJECTION SUBCUTANEOUS DAILY
Status: DISCONTINUED | OUTPATIENT
Start: 2024-03-04 | End: 2024-03-07 | Stop reason: HOSPADM

## 2024-03-03 RX ORDER — SODIUM CHLORIDE 0.9 % (FLUSH) 0.9 %
5-40 SYRINGE (ML) INJECTION EVERY 12 HOURS SCHEDULED
Status: DISCONTINUED | OUTPATIENT
Start: 2024-03-03 | End: 2024-03-07 | Stop reason: HOSPADM

## 2024-03-03 RX ORDER — TRAZODONE HYDROCHLORIDE 100 MG/1
100 TABLET ORAL NIGHTLY
COMMUNITY
Start: 2024-02-04

## 2024-03-03 RX ORDER — LATANOPROST 50 UG/ML
1 SOLUTION/ DROPS OPHTHALMIC NIGHTLY
Status: DISCONTINUED | OUTPATIENT
Start: 2024-03-03 | End: 2024-03-07 | Stop reason: HOSPADM

## 2024-03-03 RX ORDER — OXCARBAZEPINE 600 MG/1
1200 TABLET ORAL EVERY EVENING
COMMUNITY
Start: 2024-02-21

## 2024-03-03 RX ORDER — INSULIN LISPRO 100 [IU]/ML
0-4 INJECTION, SOLUTION INTRAVENOUS; SUBCUTANEOUS NIGHTLY
Status: DISCONTINUED | OUTPATIENT
Start: 2024-03-03 | End: 2024-03-07 | Stop reason: HOSPADM

## 2024-03-03 NOTE — ED TRIAGE NOTES
Patient states she had several syncope episodes that has been occurring since last night. Patient said she had a syncopal episode last night and fell hitting her head. Denies loc and blood thinners. Patient states she had several passing out spells today as well.

## 2024-03-03 NOTE — ED PROVIDER NOTES
Generalized weakness  Last had this with HANANE  Labs today  Will check T, and refer back to urology  EKG - normal  Neuro exam normal  Exam otherwise appears normal  If symptoms worsen or persist, go to the ER    May need to hold chlorthalidone?   [facility] EMERGENCY DEPT  EMERGENCY DEPARTMENT HISTORY AND PHYSICAL EXAM      Date: 3/3/2024  Patient Name: Effie Rain  MRN: [id number]  Birthdate 1970  Date of evaluation: 3/3/2024  Provider: Toñito Reyes DO   Note Started: 6:42 PM EST 3/3/24    HISTORY OF PRESENT ILLNESS     Chief Complaint   Patient presents with    Loss of Consciousness    Fall       History Provided By: Patient    HPI: Effie Rain is a 53 y.o. female with past medical history as reviewed below who presents to the emergency department due to near syncopal episode that occurred prior to arrival.  Patient states that she has been feeling more lightheaded over the past few days, and had a worsening episode of this lightheadedness and generalized weakness in which she fell x-rays, and passed out.  Denies any actual loss conscious or syncope.  Denies any prodromal chest pain, shortness breath or palpitations.  Does note above feeling lightheaded, nauseous and with tunnel vision prior to near syncopal episode.  Denies any recent fever, vomiting, diarrhea, urinary symptoms, chest pain, shortness of breath or any other symptoms complaints.  Patient was recently admitted here in November of last year for similar symptoms, with extensive syncope workup at that time, that was unremarkable.  Patient does note that she has a follow-up appointment with primary care tomorrow.    PAST MEDICAL HISTORY   Past Medical History:  Past Medical History:   Diagnosis Date    Colon polyp     Fibrocystic breast changes of both breasts 07/08/2015    GERD (gastroesophageal reflux disease)     Glaucoma 07/31/2020    Formatting of this note might be different from the original. Legacy Annotated Display: Glaucoma Comments: 07/31/2020 09:27 - Angi Linares right eye    H/O gastric bypass 07/26/2019    History of appendectomy 07/26/2019    History of bowel resection 04/21/2020    History of cholecystectomy 07/26/2019    Hypercholesterolemia 08/15/2013

## 2024-03-04 ENCOUNTER — APPOINTMENT (OUTPATIENT)
Facility: HOSPITAL | Age: 54
DRG: 312 | End: 2024-03-04
Payer: MEDICARE

## 2024-03-04 LAB
ALBUMIN SERPL-MCNC: 3.1 G/DL (ref 3.5–5)
ANION GAP SERPL CALC-SCNC: 4 MMOL/L (ref 5–15)
APPEARANCE UR: ABNORMAL
BACTERIA URNS QL MICRO: NEGATIVE /HPF
BILIRUB UR QL: NEGATIVE
BUN SERPL-MCNC: 7 MG/DL (ref 6–20)
BUN/CREAT SERPL: 11 (ref 12–20)
CA-I BLD-MCNC: 8.8 MG/DL (ref 8.5–10.1)
CHLORIDE SERPL-SCNC: 110 MMOL/L (ref 97–108)
CO2 SERPL-SCNC: 27 MMOL/L (ref 21–32)
COLOR UR: ABNORMAL
CREAT SERPL-MCNC: 0.66 MG/DL (ref 0.55–1.02)
EKG ATRIAL RATE: 81 BPM
EKG DIAGNOSIS: NORMAL
EKG P AXIS: 40 DEGREES
EKG P-R INTERVAL: 166 MS
EKG Q-T INTERVAL: 398 MS
EKG QRS DURATION: 104 MS
EKG QTC CALCULATION (BAZETT): 462 MS
EKG R AXIS: -13 DEGREES
EKG T AXIS: 20 DEGREES
EKG VENTRICULAR RATE: 81 BPM
EPITH CASTS URNS QL MICRO: ABNORMAL /LPF
EST. AVERAGE GLUCOSE BLD GHB EST-MCNC: 97 MG/DL
GLUCOSE BLD STRIP.AUTO-MCNC: 164 MG/DL (ref 65–100)
GLUCOSE BLD STRIP.AUTO-MCNC: 71 MG/DL (ref 65–100)
GLUCOSE BLD STRIP.AUTO-MCNC: 80 MG/DL (ref 65–100)
GLUCOSE BLD STRIP.AUTO-MCNC: 90 MG/DL (ref 65–100)
GLUCOSE BLD STRIP.AUTO-MCNC: 93 MG/DL (ref 65–100)
GLUCOSE SERPL-MCNC: 76 MG/DL (ref 65–100)
GLUCOSE UR STRIP.AUTO-MCNC: NEGATIVE MG/DL
HBA1C MFR BLD: 5 % (ref 4–5.6)
HGB UR QL STRIP: NEGATIVE
KETONES UR QL STRIP.AUTO: NEGATIVE MG/DL
LACTATE SERPL-SCNC: 1.7 MMOL/L (ref 0.4–2)
LEUKOCYTE ESTERASE UR QL STRIP.AUTO: NEGATIVE
MUCOUS THREADS URNS QL MICRO: ABNORMAL /LPF
NITRITE UR QL STRIP.AUTO: NEGATIVE
PERFORMED BY:: ABNORMAL
PERFORMED BY:: NORMAL
PH UR STRIP: 6 (ref 5–8)
PHOSPHATE SERPL-MCNC: 3.6 MG/DL (ref 2.6–4.7)
POTASSIUM SERPL-SCNC: 2.7 MMOL/L (ref 3.5–5.1)
PROT UR STRIP-MCNC: NEGATIVE MG/DL
RBC #/AREA URNS HPF: ABNORMAL /HPF (ref 0–5)
SODIUM SERPL-SCNC: 141 MMOL/L (ref 136–145)
SP GR UR REFRACTOMETRY: 1.02 (ref 1–1.03)
T4 FREE SERPL-MCNC: 0.6 NG/DL (ref 0.8–1.5)
TSH SERPL DL<=0.05 MIU/L-ACNC: 0.77 UIU/ML (ref 0.36–3.74)
URINE CULTURE IF INDICATED: ABNORMAL
UROBILINOGEN UR QL STRIP.AUTO: 0.1 EU/DL (ref 0.1–1)
WBC URNS QL MICRO: ABNORMAL /HPF (ref 0–4)

## 2024-03-04 PROCEDURE — 6360000002 HC RX W HCPCS

## 2024-03-04 PROCEDURE — A9577 INJ MULTIHANCE: HCPCS | Performed by: HOSPITALIST

## 2024-03-04 PROCEDURE — 6360000002 HC RX W HCPCS: Performed by: NURSE PRACTITIONER

## 2024-03-04 PROCEDURE — 84443 ASSAY THYROID STIM HORMONE: CPT

## 2024-03-04 PROCEDURE — 84439 ASSAY OF FREE THYROXINE: CPT

## 2024-03-04 PROCEDURE — 83605 ASSAY OF LACTIC ACID: CPT

## 2024-03-04 PROCEDURE — 83036 HEMOGLOBIN GLYCOSYLATED A1C: CPT

## 2024-03-04 PROCEDURE — 81001 URINALYSIS AUTO W/SCOPE: CPT

## 2024-03-04 PROCEDURE — 70553 MRI BRAIN STEM W/O & W/DYE: CPT

## 2024-03-04 PROCEDURE — 97530 THERAPEUTIC ACTIVITIES: CPT

## 2024-03-04 PROCEDURE — 6360000002 HC RX W HCPCS: Performed by: HOSPITALIST

## 2024-03-04 PROCEDURE — 97165 OT EVAL LOW COMPLEX 30 MIN: CPT

## 2024-03-04 PROCEDURE — 2580000003 HC RX 258: Performed by: HOSPITALIST

## 2024-03-04 PROCEDURE — 80069 RENAL FUNCTION PANEL: CPT

## 2024-03-04 PROCEDURE — 2060000000 HC ICU INTERMEDIATE R&B

## 2024-03-04 PROCEDURE — 6360000004 HC RX CONTRAST MEDICATION: Performed by: HOSPITALIST

## 2024-03-04 PROCEDURE — 36415 COLL VENOUS BLD VENIPUNCTURE: CPT

## 2024-03-04 PROCEDURE — 97161 PT EVAL LOW COMPLEX 20 MIN: CPT

## 2024-03-04 PROCEDURE — 82962 GLUCOSE BLOOD TEST: CPT

## 2024-03-04 PROCEDURE — 6370000000 HC RX 637 (ALT 250 FOR IP): Performed by: HOSPITALIST

## 2024-03-04 RX ORDER — POTASSIUM CHLORIDE 7.45 MG/ML
10 INJECTION INTRAVENOUS
Status: DISCONTINUED | OUTPATIENT
Start: 2024-03-04 | End: 2024-03-04

## 2024-03-04 RX ORDER — OXCARBAZEPINE 300 MG/1
600 TABLET, FILM COATED ORAL 2 TIMES DAILY
Status: DISCONTINUED | OUTPATIENT
Start: 2024-03-04 | End: 2024-03-07 | Stop reason: HOSPADM

## 2024-03-04 RX ORDER — POTASSIUM CHLORIDE 7.45 MG/ML
10 INJECTION INTRAVENOUS
Status: COMPLETED | OUTPATIENT
Start: 2024-03-04 | End: 2024-03-04

## 2024-03-04 RX ADMIN — ACARBOSE 100 MG: 50 TABLET ORAL at 13:00

## 2024-03-04 RX ADMIN — CLONAZEPAM 1 MG: 1 TABLET ORAL at 20:21

## 2024-03-04 RX ADMIN — POTASSIUM CHLORIDE 10 MEQ: 7.46 INJECTION, SOLUTION INTRAVENOUS at 10:53

## 2024-03-04 RX ADMIN — THERA TABS 1 TABLET: TAB at 09:39

## 2024-03-04 RX ADMIN — ENOXAPARIN SODIUM 40 MG: 100 INJECTION SUBCUTANEOUS at 09:40

## 2024-03-04 RX ADMIN — POTASSIUM CHLORIDE 10 MEQ: 7.46 INJECTION, SOLUTION INTRAVENOUS at 13:00

## 2024-03-04 RX ADMIN — FLUOXETINE HYDROCHLORIDE 40 MG: 20 CAPSULE ORAL at 09:47

## 2024-03-04 RX ADMIN — OXCARBAZEPINE 600 MG: 300 TABLET, FILM COATED ORAL at 01:04

## 2024-03-04 RX ADMIN — LEVOTHYROXINE SODIUM 150 MCG: 75 TABLET ORAL at 09:40

## 2024-03-04 RX ADMIN — CLONAZEPAM 1 MG: 1 TABLET ORAL at 09:38

## 2024-03-04 RX ADMIN — LATANOPROST 1 DROP: 50 SOLUTION OPHTHALMIC at 22:29

## 2024-03-04 RX ADMIN — LEVETIRACETAM 1500 MG: 500 TABLET, FILM COATED ORAL at 01:04

## 2024-03-04 RX ADMIN — CARIPRAZINE 3 MG: 3 CAPSULE, GELATIN COATED ORAL at 10:24

## 2024-03-04 RX ADMIN — ACARBOSE 100 MG: 50 TABLET ORAL at 16:59

## 2024-03-04 RX ADMIN — POTASSIUM CHLORIDE 10 MEQ: 7.46 INJECTION, SOLUTION INTRAVENOUS at 12:01

## 2024-03-04 RX ADMIN — TRAZODONE HYDROCHLORIDE 100 MG: 50 TABLET ORAL at 20:21

## 2024-03-04 RX ADMIN — LATANOPROST 1 DROP: 50 SOLUTION OPHTHALMIC at 01:00

## 2024-03-04 RX ADMIN — ACARBOSE 100 MG: 50 TABLET ORAL at 10:24

## 2024-03-04 RX ADMIN — POTASSIUM CHLORIDE 10 MEQ: 7.46 INJECTION, SOLUTION INTRAVENOUS at 09:45

## 2024-03-04 RX ADMIN — SODIUM CHLORIDE, PRESERVATIVE FREE 10 ML: 5 INJECTION INTRAVENOUS at 01:10

## 2024-03-04 RX ADMIN — LEVETIRACETAM 1500 MG: 500 TABLET, FILM COATED ORAL at 20:20

## 2024-03-04 RX ADMIN — SODIUM CHLORIDE, PRESERVATIVE FREE 10 ML: 5 INJECTION INTRAVENOUS at 20:20

## 2024-03-04 RX ADMIN — LEVETIRACETAM 1500 MG: 500 TABLET, FILM COATED ORAL at 09:38

## 2024-03-04 RX ADMIN — OXCARBAZEPINE 600 MG: 300 TABLET, FILM COATED ORAL at 09:39

## 2024-03-04 RX ADMIN — CLONAZEPAM 1 MG: 1 TABLET ORAL at 15:49

## 2024-03-04 RX ADMIN — OXCARBAZEPINE 600 MG: 300 TABLET, FILM COATED ORAL at 20:20

## 2024-03-04 RX ADMIN — GADOBENATE DIMEGLUMINE 16 ML: 529 INJECTION, SOLUTION INTRAVENOUS at 15:16

## 2024-03-04 RX ADMIN — TRAZODONE HYDROCHLORIDE 100 MG: 50 TABLET ORAL at 01:06

## 2024-03-04 RX ADMIN — SODIUM CHLORIDE, PRESERVATIVE FREE 10 ML: 5 INJECTION INTRAVENOUS at 09:40

## 2024-03-04 RX ADMIN — CLONAZEPAM 1 MG: 1 TABLET ORAL at 01:16

## 2024-03-04 ASSESSMENT — PAIN SCALES - GENERAL
PAINLEVEL_OUTOF10: 0

## 2024-03-04 NOTE — H&P
Anaphylaxis    Tramadol Shortness Of Breath    Milk-Related Compounds Diarrhea    Nsaids Other (See Comments)     Hx of gastric bypass    Peanut Oil Other (See Comments)     Reaction Type: Intolerance; Severity: Severe    Other Other (See Comments) and Rash     itching    Sulfa Antibiotics Rash     itching      Current Facility-Administered Medications   Medication Dose Route Frequency Provider Last Rate Last Admin    albuterol sulfate HFA (PROVENTIL;VENTOLIN;PROAIR) 108 (90 Base) MCG/ACT inhaler 2 puff  2 puff Inhalation Q4H PRN Salvador Hodges MD        [START ON 3/4/2024] acarbose (PRECOSE) tablet 100 mg  100 mg Oral TID WC Salvador Hodges MD        [START ON 3/4/2024] therapeutic multivitamin-minerals 1 tablet  1 tablet Oral Daily Salvador Hodges MD        [START ON 3/4/2024] levothyroxine (SYNTHROID) tablet 150 mcg  150 mcg Oral Daily Salvador Hodges MD        [START ON 3/4/2024] cariprazine hcl (VRAYLAR) capsule 3 mg  3 mg Oral Daily Salvador Hodges MD        clonazePAM (KLONOPIN) tablet 1 mg  1 mg Oral TID Salvador Hodges MD        [START ON 3/4/2024] FLUoxetine (PROZAC) capsule 40 mg  40 mg Oral Daily Salvador Hodges MD        latanoprost (XALATAN) 0.005 % ophthalmic solution 1 drop  1 drop Both Eyes Nightly Salvador Hodges MD        OXcarbazepine ER TB24 1,200 mg  1,200 mg Oral QPM Salvador Hodges MD        traZODone (DESYREL) tablet 100 mg  100 mg Oral Nightly Salvador Hodges MD        levETIRAcetam (KEPPRA) tablet 1,500 mg  1,500 mg Oral BID Salvador Hodges MD        sodium chloride flush 0.9 % injection 5-40 mL  5-40 mL IntraVENous 2 times per day Salvador Hodges MD        sodium chloride flush 0.9 % injection 5-40 mL  5-40 mL IntraVENous PRN Salvador Hodges MD        0.9 % sodium chloride infusion   IntraVENous PRN Salvador Hodges MD        [START ON 3/4/2024] enoxaparin (LOVENOX) injection 40 mg  40 mg

## 2024-03-04 NOTE — CARE COORDINATION
03/04/24 1600   Service Assessment   Patient Orientation Alert and Oriented   Cognition Alert   History Provided By Patient   Primary Caregiver Self   Support Systems Family Members   Patient's Healthcare Decision Maker is: Legal Next of Kin   PCP Verified by CM Yes   Prior Functional Level Independent in ADLs/IADLs  (rollator)   Current Functional Level Independent in ADLs/IADLs   Can patient return to prior living arrangement Yes   Ability to make needs known: Good   Family able to assist with home care needs: Yes   Would you like for me to discuss the discharge plan with any other family members/significant others, and if so, who? No   Financial Resources Medicaid;Medicare   Community Resources None   Social/Functional History   Lives With Family     Patient lives at home with mother. Patient has PCAs 7 days a week. Agreeable to Meds to bed. DME rollator. Uses [facility]. Current disp: home with HH.    Advance Care Planning     General Advance Care Planning (ACP) Conversation    Date of Conversation: 3/4/2024  Conducted with: Patient with Decision Making Capacity    Healthcare Decision Maker:    Primary Decision Maker: GianniDiane - [facility] - [phone]  Click here to complete Healthcare Decision Makers including selection of the Healthcare Decision Maker Relationship (ie \"Primary\").   Today we documented Decision Maker(s) consistent with Legal Next of Kin hierarchy.    Content/Action Overview:  Has ACP document(s) on file - reflects the patient's care preferences  Reviewed DNR/DNI and patient elects Full Code (Attempt Resuscitation)        Length of Voluntary ACP Conversation in minutes:  <16 minutes (Non-Billable)    David Yeung RN

## 2024-03-04 NOTE — CONSULTS
Cardiology Consult    NAME: Effie Rain   :  1970   MRN:  [id number]     Date/Time:  3/4/2024 5:16 PM    Patient PCP: Juan Hartley MD  ________________________________________________________________________    Problem List  -Syncope  -History of nonsustained ventricular tachycardia  -Diabetes mellitus  -Status post loop recorder insertion  -History of pulmonary embolism  -Hypothyroidism  -GERD  -IBS  -Leg pain  -Colon polyp  -Fibrocystic disease of the breast         Assessment and Plan:   Note dictated 2024  -53-year-old -American female with no known established coronary artery disease presented to the emergency department with multiple episodes of syncopal episodes since last week.  -When I saw the patient she was lying comfortably in the bed eating dinner and in no acute distress.  -Monitor reveals sinus rhythm.  EKG shows normal sinus rhythm with no acute ST-T wave changes consistent with significant ischemia or injury pattern or any cardiac arrhythmias or any evidence of prolonged intervals.  -Review of our advanced MD chart shows ejection fraction of 40 to 45% as of 2023.  -Patient received a loop recorder at [facility] in the past the details of which is not available to me and patient is a poor historian and explaining the details.  -Will check serial cardiac enzymes scheduled for Cardiolite stress test in the morning and then make further cardiovascular management decision as clinically warranted.    Last echocardiogram in the system reveals ejection fraction of 40 to 45% in 2023    Thank you for the consultation and letting me participate in the care of Ms. Rain          []        High complexity decision making was performed        Subjective:   CHIEF COMPLAINT: Syncopal episode      REASON FOR CONSULT: Syncopal episode and cardiomyopathy      HISTORY OF PRESENT ILLNESS:     Effie Rain is a 53 y.o. Black /  female who has no

## 2024-03-04 NOTE — ED NOTES
Assumed care of patient at this time. Patient resting comfortably in ER stretcher, no signs of distress noted. Connected to continuous cardiac and sp02 monitoring.

## 2024-03-04 NOTE — ED NOTES
ED TO INPATIENT SBAR HANDOFF    Patient Name: Effie Rain   Preferred Name: Effie  : 1970  53 y.o.   Family/Caregiver Present: no   Code Status Order: Full Code  PO Status: Regular diet  Telemetry Order: yes  C-SSRS: Risk of Suicide: No Risk  Sitter no   Restraints:   Sepsis Risk Score Sepsis Risk Score: 0.48    Situation  Chief Complaint   Patient presents with    Loss of Consciousness    Fall     Brief Description of Patient's Condition: Patient comes in for several syncopal episodes. Last night she fell and hit head, no thinners, no LOC. Patient was originally up for DC but had orders for a road test, unable to keep steady gait. Then, got admitted for cardiology.  Mental Status: oriented, but appears drowsy  Arrived from:Home  Imaging:   XR CHEST PORTABLE   Final Result   Very mild left basilar linear scarring.          CT HEAD WO CONTRAST   Final Result   No acute intracranial abnormality.              Abnormal labs:   Abnormal Labs Reviewed   CBC WITH AUTO DIFFERENTIAL - Abnormal; Notable for the following components:       Result Value    MCV 79.7 (*)     MCH 25.2 (*)     RDW 16.8 (*)     All other components within normal limits   COMPREHENSIVE METABOLIC PANEL - Abnormal; Notable for the following components:    Potassium 3.4 (*)     Bun/Cre Ratio 10 (*)     AST 42 (*)     Alk Phosphatase 120 (*)     Albumin 3.2 (*)     Albumin/Globulin Ratio 1.0 (*)     All other components within normal limits       Background  Allergies:   Allergies   Allergen Reactions    Acetaminophen Other (See Comments)     Advised not to take due to Liver Issues    Shellfish Allergy Anaphylaxis    Tramadol Shortness Of Breath    Milk-Related Compounds Diarrhea    Nsaids Other (See Comments)     Hx of gastric bypass    Peanut Oil Other (See Comments)     Reaction Type: Intolerance; Severity: Severe    Other Other (See Comments) and Rash     itching    Sulfa Antibiotics Rash     itching     History:   Past Medical History:

## 2024-03-04 NOTE — DISCHARGE INSTRUCTIONS
Thank you!  Thank you for allowing me to care for you in the emergency department. It is my goal to provide you with excellent care.  Please fill out the survey that will come to you by mail or email since we listen to your feedback!     Below you will find a list of your tests from today's visit.  Should you have any questions, please do not hesitate to call the emergency department.    Labs  Recent Results (from the past 12 hour(s))   Brain Natriuretic Peptide    Collection Time: 03/03/24  6:23 PM   Result Value Ref Range    NT Pro-BNP 14 <125 pg/mL   CBC with Auto Differential    Collection Time: 03/03/24  6:23 PM   Result Value Ref Range    WBC 7.3 3.6 - 11.0 K/uL    RBC 4.68 3.80 - 5.20 M/uL    Hemoglobin 11.8 11.5 - 16.0 g/dL    Hematocrit 37.3 35.0 - 47.0 %    MCV 79.7 (L) 80.0 - 99.0 FL    MCH 25.2 (L) 26.0 - 34.0 PG    MCHC 31.6 30.0 - 36.5 g/dL    RDW 16.8 (H) 11.5 - 14.5 %    Platelets 315 150 - 400 K/uL    MPV 9.9 8.9 - 12.9 FL    Nucleated RBCs 0.0 0.0  WBC    nRBC 0.00 0.00 - 0.01 K/uL    Neutrophils % 44 32 - 75 %    Lymphocytes % 44 12 - 49 %    Monocytes % 7 5 - 13 %    Eosinophils % 4 0 - 7 %    Basophils % 1 0 - 1 %    Immature Granulocytes 0 0 - 0.5 %    Neutrophils Absolute 3.2 1.8 - 8.0 K/UL    Lymphocytes Absolute 3.3 0.8 - 3.5 K/UL    Monocytes Absolute 0.5 0.0 - 1.0 K/UL    Eosinophils Absolute 0.3 0.0 - 0.4 K/UL    Basophils Absolute 0.1 0.0 - 0.1 K/UL    Absolute Immature Granulocyte 0.0 0.00 - 0.04 K/UL    Differential Type AUTOMATED     Comprehensive Metabolic Panel    Collection Time: 03/03/24  6:23 PM   Result Value Ref Range    Sodium 140 136 - 145 mmol/L    Potassium 3.4 (L) 3.5 - 5.1 mmol/L    Chloride 107 97 - 108 mmol/L    CO2 27 21 - 32 mmol/L    Anion Gap 6 5 - 15 mmol/L    Glucose 80 65 - 100 mg/dL    BUN 7 6 - 20 mg/dL    Creatinine 0.68 0.55 - 1.02 mg/dL    Bun/Cre Ratio 10 (L) 12 - 20      Est, Glom Filt Rate >60 >60 ml/min/1.73m2    Calcium 8.5 8.5 - 10.1 mg/dL  Spoke with mother and states that patient seems to be having a little raspyness to her crying.  States that she is eating normally, having wet diapers, normal BM, and behaving normally.  Instructed mother to monitor her sx and if anything seems to change to call and let us know other if anything else develops or anything else develops.

## 2024-03-04 NOTE — ED NOTES
Attempted to road test patient at this time. However, patient unable to remain with steady gait. Patient appears to be drowsy. MD notified. Patient now being admitted.

## 2024-03-05 ENCOUNTER — HOSPITAL ENCOUNTER (INPATIENT)
Facility: HOSPITAL | Age: 54
Discharge: HOME | DRG: 312 | End: 2024-03-07
Payer: MEDICARE

## 2024-03-05 ENCOUNTER — APPOINTMENT (OUTPATIENT)
Facility: HOSPITAL | Age: 54
DRG: 312 | End: 2024-03-05
Payer: MEDICARE

## 2024-03-05 LAB
ALBUMIN SERPL-MCNC: 3 G/DL (ref 3.5–5)
ANION GAP SERPL CALC-SCNC: 2 MMOL/L (ref 5–15)
BASOPHILS # BLD: 0 K/UL (ref 0–0.1)
BASOPHILS NFR BLD: 1 % (ref 0–1)
BUN SERPL-MCNC: 9 MG/DL (ref 6–20)
BUN/CREAT SERPL: 12 (ref 12–20)
CA-I BLD-MCNC: 8.8 MG/DL (ref 8.5–10.1)
CHLORIDE SERPL-SCNC: 111 MMOL/L (ref 97–108)
CO2 SERPL-SCNC: 28 MMOL/L (ref 21–32)
CREAT SERPL-MCNC: 0.74 MG/DL (ref 0.55–1.02)
D DIMER PPP FEU-MCNC: 0.46 UG/ML(FEU)
DIFFERENTIAL METHOD BLD: ABNORMAL
EOSINOPHIL # BLD: 0.3 K/UL (ref 0–0.4)
EOSINOPHIL NFR BLD: 5 % (ref 0–7)
ERYTHROCYTE [DISTWIDTH] IN BLOOD BY AUTOMATED COUNT: 16.7 % (ref 11.5–14.5)
GLUCOSE BLD STRIP.AUTO-MCNC: 112 MG/DL (ref 65–100)
GLUCOSE BLD STRIP.AUTO-MCNC: 64 MG/DL (ref 65–100)
GLUCOSE BLD STRIP.AUTO-MCNC: 71 MG/DL (ref 65–100)
GLUCOSE BLD STRIP.AUTO-MCNC: 81 MG/DL (ref 65–100)
GLUCOSE BLD STRIP.AUTO-MCNC: 83 MG/DL (ref 65–100)
GLUCOSE SERPL-MCNC: 85 MG/DL (ref 65–100)
HCT VFR BLD AUTO: 36.2 % (ref 35–47)
HGB BLD-MCNC: 11.5 G/DL (ref 11.5–16)
IMM GRANULOCYTES # BLD AUTO: 0 K/UL (ref 0–0.04)
IMM GRANULOCYTES NFR BLD AUTO: 0 % (ref 0–0.5)
LYMPHOCYTES # BLD: 1.9 K/UL (ref 0.8–3.5)
LYMPHOCYTES NFR BLD: 39 % (ref 12–49)
MAGNESIUM SERPL-MCNC: 2.4 MG/DL (ref 1.6–2.4)
MCH RBC QN AUTO: 25.2 PG (ref 26–34)
MCHC RBC AUTO-ENTMCNC: 31.8 G/DL (ref 30–36.5)
MCV RBC AUTO: 79.4 FL (ref 80–99)
MONOCYTES # BLD: 0.3 K/UL (ref 0–1)
MONOCYTES NFR BLD: 7 % (ref 5–13)
NEUTS SEG # BLD: 2.3 K/UL (ref 1.8–8)
NEUTS SEG NFR BLD: 48 % (ref 32–75)
NRBC # BLD: 0 K/UL (ref 0–0.01)
NRBC BLD-RTO: 0 PER 100 WBC
PERFORMED BY:: ABNORMAL
PERFORMED BY:: ABNORMAL
PERFORMED BY:: NORMAL
PHOSPHATE SERPL-MCNC: 3.2 MG/DL (ref 2.6–4.7)
PLATELET # BLD AUTO: 294 K/UL (ref 150–400)
PMV BLD AUTO: 10.1 FL (ref 8.9–12.9)
POTASSIUM SERPL-SCNC: 2.9 MMOL/L (ref 3.5–5.1)
POTASSIUM SERPL-SCNC: 3.7 MMOL/L (ref 3.5–5.1)
RBC # BLD AUTO: 4.56 M/UL (ref 3.8–5.2)
SODIUM SERPL-SCNC: 141 MMOL/L (ref 136–145)
WBC # BLD AUTO: 4.8 K/UL (ref 3.6–11)

## 2024-03-05 PROCEDURE — 6370000000 HC RX 637 (ALT 250 FOR IP): Performed by: HOSPITALIST

## 2024-03-05 PROCEDURE — 2060000000 HC ICU INTERMEDIATE R&B

## 2024-03-05 PROCEDURE — 6360000002 HC RX W HCPCS: Performed by: HOSPITALIST

## 2024-03-05 PROCEDURE — 85025 COMPLETE CBC W/AUTO DIFF WBC: CPT

## 2024-03-05 PROCEDURE — 3430000000 HC RX DIAGNOSTIC RADIOPHARMACEUTICAL: Performed by: INTERNAL MEDICINE

## 2024-03-05 PROCEDURE — 6360000002 HC RX W HCPCS: Performed by: NURSE PRACTITIONER

## 2024-03-05 PROCEDURE — 85379 FIBRIN DEGRADATION QUANT: CPT

## 2024-03-05 PROCEDURE — 83735 ASSAY OF MAGNESIUM: CPT

## 2024-03-05 PROCEDURE — 84132 ASSAY OF SERUM POTASSIUM: CPT

## 2024-03-05 PROCEDURE — 82962 GLUCOSE BLOOD TEST: CPT

## 2024-03-05 PROCEDURE — 36415 COLL VENOUS BLD VENIPUNCTURE: CPT

## 2024-03-05 PROCEDURE — 80069 RENAL FUNCTION PANEL: CPT

## 2024-03-05 PROCEDURE — 2580000003 HC RX 258: Performed by: HOSPITALIST

## 2024-03-05 PROCEDURE — A9500 TC99M SESTAMIBI: HCPCS | Performed by: INTERNAL MEDICINE

## 2024-03-05 RX ORDER — TETRAKIS(2-METHOXYISOBUTYLISOCYANIDE)COPPER(I) TETRAFLUOROBORATE 1 MG/ML
9.44 INJECTION, POWDER, LYOPHILIZED, FOR SOLUTION INTRAVENOUS
Status: COMPLETED | OUTPATIENT
Start: 2024-03-05 | End: 2024-03-05

## 2024-03-05 RX ORDER — POTASSIUM CHLORIDE 7.45 MG/ML
10 INJECTION INTRAVENOUS PRN
Status: DISCONTINUED | OUTPATIENT
Start: 2024-03-05 | End: 2024-03-07 | Stop reason: HOSPADM

## 2024-03-05 RX ORDER — POTASSIUM CHLORIDE 20 MEQ/1
40 TABLET, EXTENDED RELEASE ORAL PRN
Status: DISCONTINUED | OUTPATIENT
Start: 2024-03-05 | End: 2024-03-07 | Stop reason: HOSPADM

## 2024-03-05 RX ORDER — MAGNESIUM SULFATE IN WATER 40 MG/ML
2000 INJECTION, SOLUTION INTRAVENOUS PRN
Status: DISCONTINUED | OUTPATIENT
Start: 2024-03-05 | End: 2024-03-07 | Stop reason: HOSPADM

## 2024-03-05 RX ORDER — POTASSIUM CHLORIDE 20 MEQ/1
40 TABLET, EXTENDED RELEASE ORAL ONCE
Status: COMPLETED | OUTPATIENT
Start: 2024-03-05 | End: 2024-03-05

## 2024-03-05 RX ADMIN — CLONAZEPAM 1 MG: 1 TABLET ORAL at 20:25

## 2024-03-05 RX ADMIN — LEVOTHYROXINE SODIUM 150 MCG: 75 TABLET ORAL at 08:33

## 2024-03-05 RX ADMIN — LEVETIRACETAM 1500 MG: 500 TABLET, FILM COATED ORAL at 20:25

## 2024-03-05 RX ADMIN — POTASSIUM CHLORIDE 40 MEQ: 1500 TABLET, EXTENDED RELEASE ORAL at 06:59

## 2024-03-05 RX ADMIN — ENOXAPARIN SODIUM 40 MG: 100 INJECTION SUBCUTANEOUS at 08:33

## 2024-03-05 RX ADMIN — POTASSIUM CHLORIDE 10 MEQ: 7.46 INJECTION, SOLUTION INTRAVENOUS at 08:32

## 2024-03-05 RX ADMIN — TETRAKIS(2-METHOXYISOBUTYLISOCYANIDE)COPPER(I) TETRAFLUOROBORATE 9.44 MILLICURIE: 1 INJECTION, POWDER, LYOPHILIZED, FOR SOLUTION INTRAVENOUS at 09:30

## 2024-03-05 RX ADMIN — CLONAZEPAM 1 MG: 1 TABLET ORAL at 14:13

## 2024-03-05 RX ADMIN — SODIUM CHLORIDE, PRESERVATIVE FREE 10 ML: 5 INJECTION INTRAVENOUS at 08:48

## 2024-03-05 RX ADMIN — LATANOPROST 1 DROP: 50 SOLUTION OPHTHALMIC at 20:28

## 2024-03-05 RX ADMIN — OXCARBAZEPINE 600 MG: 300 TABLET, FILM COATED ORAL at 08:33

## 2024-03-05 RX ADMIN — POTASSIUM CHLORIDE 10 MEQ: 7.46 INJECTION, SOLUTION INTRAVENOUS at 12:30

## 2024-03-05 RX ADMIN — TRAZODONE HYDROCHLORIDE 100 MG: 50 TABLET ORAL at 20:25

## 2024-03-05 RX ADMIN — CLONAZEPAM 1 MG: 1 TABLET ORAL at 08:33

## 2024-03-05 RX ADMIN — ACARBOSE 100 MG: 50 TABLET ORAL at 17:12

## 2024-03-05 RX ADMIN — LEVETIRACETAM 1500 MG: 500 TABLET, FILM COATED ORAL at 08:32

## 2024-03-05 RX ADMIN — FLUOXETINE HYDROCHLORIDE 40 MG: 20 CAPSULE ORAL at 08:56

## 2024-03-05 RX ADMIN — OXCARBAZEPINE 600 MG: 300 TABLET, FILM COATED ORAL at 20:26

## 2024-03-05 RX ADMIN — POTASSIUM CHLORIDE 10 MEQ: 7.46 INJECTION, SOLUTION INTRAVENOUS at 10:56

## 2024-03-05 RX ADMIN — SODIUM CHLORIDE, PRESERVATIVE FREE 10 ML: 5 INJECTION INTRAVENOUS at 20:26

## 2024-03-05 RX ADMIN — ACARBOSE 100 MG: 50 TABLET ORAL at 08:56

## 2024-03-05 RX ADMIN — ACARBOSE 100 MG: 50 TABLET ORAL at 12:29

## 2024-03-05 RX ADMIN — THERA TABS 1 TABLET: TAB at 08:33

## 2024-03-05 RX ADMIN — CARIPRAZINE 3 MG: 3 CAPSULE, GELATIN COATED ORAL at 08:56

## 2024-03-05 ASSESSMENT — PAIN SCALES - GENERAL
PAINLEVEL_OUTOF10: 0

## 2024-03-05 NOTE — CARE COORDINATION
CM reviewed chart. DCP Home with [facility], their 1st available next week. Patient states she has PCAs at home. CM will continue to follow.

## 2024-03-06 ENCOUNTER — APPOINTMENT (OUTPATIENT)
Facility: HOSPITAL | Age: 54
DRG: 312 | End: 2024-03-06
Payer: MEDICARE

## 2024-03-06 LAB
ALBUMIN SERPL-MCNC: 3 G/DL (ref 3.5–5)
ANION GAP SERPL CALC-SCNC: 1 MMOL/L (ref 5–15)
BASOPHILS NFR BLD: 1 % (ref 0–1)
BUN SERPL-MCNC: 9 MG/DL (ref 6–20)
CO2 SERPL-SCNC: 30 MMOL/L (ref 21–32)
CREAT SERPL-MCNC: 0.59 MG/DL (ref 0.55–1.02)
DIFFERENTIAL METHOD BLD: ABNORMAL
ECHO BSA: 1.93 M2
EOSINOPHIL NFR BLD: 5 % (ref 0–7)
ERYTHROCYTE [DISTWIDTH] IN BLOOD BY AUTOMATED COUNT: 17 % (ref 11.5–14.5)
GLUCOSE BLD STRIP.AUTO-MCNC: 76 MG/DL (ref 65–100)
GLUCOSE BLD STRIP.AUTO-MCNC: 83 MG/DL (ref 65–100)
GLUCOSE BLD STRIP.AUTO-MCNC: 91 MG/DL (ref 65–100)
GLUCOSE SERPL-MCNC: 88 MG/DL (ref 65–100)
HCT VFR BLD AUTO: 33.6 % (ref 35–47)
HGB BLD-MCNC: 11 G/DL (ref 11.5–16)
IMM GRANULOCYTES # BLD AUTO: 0 K/UL (ref 0–0.04)
IMM GRANULOCYTES NFR BLD AUTO: 0 % (ref 0–0.5)
LEVETIRACETAM SERPL-MCNC: 32.2 UG/ML (ref 10–40)
MCH RBC QN AUTO: 25.7 PG (ref 26–34)
MCHC RBC AUTO-ENTMCNC: 32.7 G/DL (ref 30–36.5)
MCV RBC AUTO: 78.5 FL (ref 80–99)
MONOCYTES # BLD: 0.4 K/UL (ref 0–1)
MONOCYTES NFR BLD: 7 % (ref 5–13)
NEUTS SEG NFR BLD: 54 % (ref 32–75)
NRBC BLD-RTO: 0 PER 100 WBC
NUC STRESS EJECTION FRACTION: 61 %
PERFORMED BY:: NORMAL
PHOSPHATE SERPL-MCNC: 2.7 MG/DL (ref 2.6–4.7)
PLATELET # BLD AUTO: 290 K/UL (ref 150–400)
PMV BLD AUTO: 10.7 FL (ref 8.9–12.9)
POTASSIUM SERPL-SCNC: 3.9 MMOL/L (ref 3.5–5.1)
RBC # BLD AUTO: 4.28 M/UL (ref 3.8–5.2)
STRESS BASELINE DIAS BP: 70 MMHG
STRESS BASELINE HR: 72 BPM
STRESS BASELINE SYS BP: 110 MMHG
STRESS STAGE 1 BP: NORMAL MMHG
STRESS STAGE 1 DURATION: 1 MIN:SEC
STRESS STAGE 1 HR: 100 BPM
STRESS STAGE 2 DURATION: 2 MIN:SEC
STRESS STAGE 2 HR: 100 BPM
STRESS STAGE 3 BP: NORMAL MMHG
STRESS STAGE 3 DURATION: 3 MIN:SEC
STRESS STAGE 3 HR: 99 BPM
STRESS STAGE 4 DURATION: 4 MIN:SEC
STRESS STAGE 4 HR: 99 BPM
STRESS TARGET HR: 165 BPM
WBC # BLD AUTO: 5.7 K/UL (ref 3.6–11)

## 2024-03-06 PROCEDURE — 80069 RENAL FUNCTION PANEL: CPT

## 2024-03-06 PROCEDURE — 2060000000 HC ICU INTERMEDIATE R&B

## 2024-03-06 PROCEDURE — 6370000000 HC RX 637 (ALT 250 FOR IP): Performed by: HOSPITALIST

## 2024-03-06 PROCEDURE — A9500 TC99M SESTAMIBI: HCPCS | Performed by: INTERNAL MEDICINE

## 2024-03-06 PROCEDURE — 85025 COMPLETE CBC W/AUTO DIFF WBC: CPT

## 2024-03-06 PROCEDURE — 6360000002 HC RX W HCPCS: Performed by: HOSPITALIST

## 2024-03-06 PROCEDURE — 2580000003 HC RX 258: Performed by: HOSPITALIST

## 2024-03-06 PROCEDURE — 3430000000 HC RX DIAGNOSTIC RADIOPHARMACEUTICAL: Performed by: INTERNAL MEDICINE

## 2024-03-06 PROCEDURE — 82962 GLUCOSE BLOOD TEST: CPT

## 2024-03-06 PROCEDURE — 36415 COLL VENOUS BLD VENIPUNCTURE: CPT

## 2024-03-06 PROCEDURE — 6360000002 HC RX W HCPCS

## 2024-03-06 PROCEDURE — 6370000000 HC RX 637 (ALT 250 FOR IP): Performed by: NURSE PRACTITIONER

## 2024-03-06 RX ORDER — TOPIRAMATE 100 MG/1
100 TABLET, FILM COATED ORAL 2 TIMES DAILY
Status: DISCONTINUED | OUTPATIENT
Start: 2024-03-06 | End: 2024-03-07 | Stop reason: HOSPADM

## 2024-03-06 RX ORDER — SUMATRIPTAN 25 MG/1
50 TABLET, FILM COATED ORAL ONCE
Status: COMPLETED | OUTPATIENT
Start: 2024-03-06 | End: 2024-03-06

## 2024-03-06 RX ORDER — MONTELUKAST SODIUM 10 MG/1
10 TABLET ORAL DAILY
Status: DISCONTINUED | OUTPATIENT
Start: 2024-03-06 | End: 2024-03-07 | Stop reason: HOSPADM

## 2024-03-06 RX ORDER — REGADENOSON 0.08 MG/ML
INJECTION, SOLUTION INTRAVENOUS
Status: COMPLETED
Start: 2024-03-06 | End: 2024-03-06

## 2024-03-06 RX ORDER — MIDODRINE HYDROCHLORIDE 5 MG/1
5 TABLET ORAL 2 TIMES DAILY
Status: DISCONTINUED | OUTPATIENT
Start: 2024-03-06 | End: 2024-03-07 | Stop reason: HOSPADM

## 2024-03-06 RX ORDER — TETRAKIS(2-METHOXYISOBUTYLISOCYANIDE)COPPER(I) TETRAFLUOROBORATE 1 MG/ML
33 INJECTION, POWDER, LYOPHILIZED, FOR SOLUTION INTRAVENOUS
Status: COMPLETED | OUTPATIENT
Start: 2024-03-06 | End: 2024-03-06

## 2024-03-06 RX ADMIN — MIDODRINE HYDROCHLORIDE 5 MG: 5 TABLET ORAL at 16:36

## 2024-03-06 RX ADMIN — MONTELUKAST 10 MG: 10 TABLET, FILM COATED ORAL at 16:36

## 2024-03-06 RX ADMIN — ENOXAPARIN SODIUM 40 MG: 100 INJECTION SUBCUTANEOUS at 10:51

## 2024-03-06 RX ADMIN — ACARBOSE 100 MG: 50 TABLET ORAL at 10:46

## 2024-03-06 RX ADMIN — LATANOPROST 1 DROP: 50 SOLUTION OPHTHALMIC at 22:57

## 2024-03-06 RX ADMIN — FLUOXETINE HYDROCHLORIDE 40 MG: 20 CAPSULE ORAL at 10:45

## 2024-03-06 RX ADMIN — LEVETIRACETAM 1500 MG: 500 TABLET, FILM COATED ORAL at 22:47

## 2024-03-06 RX ADMIN — CLONAZEPAM 1 MG: 1 TABLET ORAL at 22:47

## 2024-03-06 RX ADMIN — TRAZODONE HYDROCHLORIDE 100 MG: 50 TABLET ORAL at 22:47

## 2024-03-06 RX ADMIN — TETRAKIS(2-METHOXYISOBUTYLISOCYANIDE)COPPER(I) TETRAFLUOROBORATE 33 MILLICURIE: 1 INJECTION, POWDER, LYOPHILIZED, FOR SOLUTION INTRAVENOUS at 09:10

## 2024-03-06 RX ADMIN — LEVETIRACETAM 1500 MG: 500 TABLET, FILM COATED ORAL at 10:45

## 2024-03-06 RX ADMIN — SUMATRIPTAN SUCCINATE 50 MG: 25 TABLET ORAL at 23:27

## 2024-03-06 RX ADMIN — REGADENOSON 0.4 MG: 0.08 INJECTION, SOLUTION INTRAVENOUS at 09:04

## 2024-03-06 RX ADMIN — SODIUM CHLORIDE, PRESERVATIVE FREE 10 ML: 5 INJECTION INTRAVENOUS at 22:48

## 2024-03-06 RX ADMIN — ACARBOSE 100 MG: 50 TABLET ORAL at 14:00

## 2024-03-06 RX ADMIN — LEVOTHYROXINE SODIUM 150 MCG: 75 TABLET ORAL at 10:45

## 2024-03-06 RX ADMIN — SODIUM CHLORIDE, PRESERVATIVE FREE 10 ML: 5 INJECTION INTRAVENOUS at 10:48

## 2024-03-06 RX ADMIN — CARIPRAZINE 3 MG: 3 CAPSULE, GELATIN COATED ORAL at 10:46

## 2024-03-06 RX ADMIN — THERA TABS 1 TABLET: TAB at 10:45

## 2024-03-06 RX ADMIN — ACARBOSE 100 MG: 50 TABLET ORAL at 16:36

## 2024-03-06 RX ADMIN — CLONAZEPAM 1 MG: 1 TABLET ORAL at 14:00

## 2024-03-06 RX ADMIN — OXCARBAZEPINE 600 MG: 300 TABLET, FILM COATED ORAL at 22:47

## 2024-03-06 RX ADMIN — TOPIRAMATE 100 MG: 100 TABLET, FILM COATED ORAL at 22:47

## 2024-03-06 RX ADMIN — OXCARBAZEPINE 600 MG: 300 TABLET, FILM COATED ORAL at 10:45

## 2024-03-06 RX ADMIN — CLONAZEPAM 1 MG: 1 TABLET ORAL at 10:47

## 2024-03-06 ASSESSMENT — PAIN SCALES - GENERAL
PAINLEVEL_OUTOF10: 0
PAINLEVEL_OUTOF10: 3

## 2024-03-06 ASSESSMENT — PAIN DESCRIPTION - DESCRIPTORS: DESCRIPTORS: ACHING

## 2024-03-06 ASSESSMENT — PAIN DESCRIPTION - LOCATION: LOCATION: HEAD

## 2024-03-06 NOTE — CARE COORDINATION
CM met with patient at bedside to confirm discharge disposition.  Patient has been set up with [facility] and has Personal Care aides at home.  Patient states her daughter will transport her home at time of discharge.  CM informed patient that she may have a transportation benefit with her medicaid to appointments for future reference, she would just have to call the telephone number on the back of her card.

## 2024-03-07 VITALS
SYSTOLIC BLOOD PRESSURE: 111 MMHG | OXYGEN SATURATION: 100 % | HEART RATE: 67 BPM | BODY MASS INDEX: 29.97 KG/M2 | WEIGHT: 179.9 LBS | DIASTOLIC BLOOD PRESSURE: 75 MMHG | RESPIRATION RATE: 18 BRPM | HEIGHT: 65 IN | TEMPERATURE: 97.9 F

## 2024-03-07 LAB
ALBUMIN SERPL-MCNC: 3 G/DL (ref 3.5–5)
ANION GAP SERPL CALC-SCNC: 3 MMOL/L (ref 5–15)
BASOPHILS # BLD: 0 K/UL (ref 0–0.1)
BASOPHILS NFR BLD: 1 % (ref 0–1)
BUN SERPL-MCNC: 6 MG/DL (ref 6–20)
BUN/CREAT SERPL: 11 (ref 12–20)
CA-I BLD-MCNC: 8.5 MG/DL (ref 8.5–10.1)
CO2 SERPL-SCNC: 28 MMOL/L (ref 21–32)
CREAT SERPL-MCNC: 0.53 MG/DL (ref 0.55–1.02)
DIFFERENTIAL METHOD BLD: ABNORMAL
EOSINOPHIL # BLD: 0.3 K/UL (ref 0–0.4)
EOSINOPHIL NFR BLD: 6 % (ref 0–7)
GLUCOSE BLD STRIP.AUTO-MCNC: 103 MG/DL (ref 65–100)
GLUCOSE BLD STRIP.AUTO-MCNC: 67 MG/DL (ref 65–100)
GLUCOSE SERPL-MCNC: 79 MG/DL (ref 65–100)
HCT VFR BLD AUTO: 34.6 % (ref 35–47)
IMM GRANULOCYTES # BLD AUTO: 0 K/UL (ref 0–0.04)
IMM GRANULOCYTES NFR BLD AUTO: 0 % (ref 0–0.5)
LYMPHOCYTES # BLD: 2.4 K/UL (ref 0.8–3.5)
LYMPHOCYTES NFR BLD: 47 % (ref 12–49)
MCH RBC QN AUTO: 25.7 PG (ref 26–34)
MCHC RBC AUTO-ENTMCNC: 32.7 G/DL (ref 30–36.5)
MCV RBC AUTO: 78.8 FL (ref 80–99)
MONOCYTES # BLD: 0.4 K/UL (ref 0–1)
MONOCYTES NFR BLD: 7 % (ref 5–13)
NEUTS SEG NFR BLD: 39 % (ref 32–75)
NRBC # BLD: 0 K/UL (ref 0–0.01)
NRBC BLD-RTO: 0 PER 100 WBC
PERFORMED BY:: ABNORMAL
PERFORMED BY:: NORMAL
PERFORMED BY:: NORMAL
PHOSPHATE SERPL-MCNC: 2.9 MG/DL (ref 2.6–4.7)
PMV BLD AUTO: 10.3 FL (ref 8.9–12.9)
POTASSIUM SERPL-SCNC: 3.8 MMOL/L (ref 3.5–5.1)
RBC # BLD AUTO: 4.39 M/UL (ref 3.8–5.2)
SODIUM SERPL-SCNC: 141 MMOL/L (ref 136–145)
WBC # BLD AUTO: 5.1 K/UL (ref 3.6–11)

## 2024-03-07 PROCEDURE — 6370000000 HC RX 637 (ALT 250 FOR IP): Performed by: NURSE PRACTITIONER

## 2024-03-07 PROCEDURE — 82962 GLUCOSE BLOOD TEST: CPT

## 2024-03-07 PROCEDURE — 85025 COMPLETE CBC W/AUTO DIFF WBC: CPT

## 2024-03-07 PROCEDURE — 97530 THERAPEUTIC ACTIVITIES: CPT

## 2024-03-07 PROCEDURE — 6370000000 HC RX 637 (ALT 250 FOR IP): Performed by: HOSPITALIST

## 2024-03-07 PROCEDURE — 2580000003 HC RX 258: Performed by: HOSPITALIST

## 2024-03-07 PROCEDURE — 80069 RENAL FUNCTION PANEL: CPT

## 2024-03-07 PROCEDURE — 36415 COLL VENOUS BLD VENIPUNCTURE: CPT

## 2024-03-07 PROCEDURE — 6360000002 HC RX W HCPCS: Performed by: HOSPITALIST

## 2024-03-07 RX ORDER — DEXLANSOPRAZOLE 60 MG/1
60 CAPSULE, DELAYED RELEASE ORAL DAILY
Qty: 30 CAPSULE | Refills: 0 | Status: SHIPPED | OUTPATIENT
Start: 2024-03-07

## 2024-03-07 RX ADMIN — FLUOXETINE HYDROCHLORIDE 40 MG: 20 CAPSULE ORAL at 09:09

## 2024-03-07 RX ADMIN — THERA TABS 1 TABLET: TAB at 09:09

## 2024-03-07 RX ADMIN — OXCARBAZEPINE 600 MG: 300 TABLET, FILM COATED ORAL at 09:09

## 2024-03-07 RX ADMIN — MONTELUKAST 10 MG: 10 TABLET, FILM COATED ORAL at 09:01

## 2024-03-07 RX ADMIN — CARIPRAZINE 3 MG: 3 CAPSULE, GELATIN COATED ORAL at 09:09

## 2024-03-07 RX ADMIN — ENOXAPARIN SODIUM 40 MG: 100 INJECTION SUBCUTANEOUS at 09:01

## 2024-03-07 RX ADMIN — TOPIRAMATE 100 MG: 100 TABLET, FILM COATED ORAL at 09:01

## 2024-03-07 RX ADMIN — LEVETIRACETAM 1500 MG: 500 TABLET, FILM COATED ORAL at 09:01

## 2024-03-07 RX ADMIN — LEVOTHYROXINE SODIUM 150 MCG: 75 TABLET ORAL at 09:01

## 2024-03-07 RX ADMIN — CLONAZEPAM 1 MG: 1 TABLET ORAL at 09:01

## 2024-03-07 RX ADMIN — SODIUM CHLORIDE, PRESERVATIVE FREE 10 ML: 5 INJECTION INTRAVENOUS at 09:01

## 2024-03-07 RX ADMIN — MIDODRINE HYDROCHLORIDE 5 MG: 5 TABLET ORAL at 09:01

## 2024-03-07 RX ADMIN — ACARBOSE 100 MG: 50 TABLET ORAL at 09:08

## 2024-03-07 NOTE — DISCHARGE SUMMARY
Discharge Summary    Name: Effie Rain  [id number]  YOB: 1970 (Age: 53 y.o.)   Date of Admission: 3/3/2024  Date of Discharge: 3/7/2024  Attending Physician: Fernando Aldana MD    Discharge Diagnosis:   Principal Problem:    Syncope and collapse  Active Problems:    Gait disturbance    Recurrent syncope  Resolved Problems:    * No resolved hospital problems. *       Consultations:  IP CONSULT TO CARDIOLOGY      Brief Admission History/Reason for Admission Per Salvador Hodges MD:   Effie Rain is  53 y.o. female with IBS, GERD, h/o gastric bypass, HLD, hypothyroidism, IBS, migraines, NVST, DM2, and history of seizures who was admitted to the hospital for syncope. Patient has had multiple presentations to the hospital with similar complaints. She has had multiple, extensive  workups completed. Currently with an implantable loop recorder in place.  Patient denies lightheadedness, dizziness, or headaches. She states her \"legs give out\". Denies  chest pain, palpitation, or shortness of breath. Patient was to be discharged from the ED; however at the time of discharge the patient was unsteady on her feet and with gait disturbance. Patient uses both a cane and walker at home for ambulation. Cardiology evaluated the patient and schedule her for NMST today, however patient's K 2.9 unable to complete study. Patient to complete the study 3/6/24. MRI showed normal brain, small pituitary adenoma. Unable to perform MRA neck due to equipment problems. NMST negative. Patient is clinically stable for discharge home with home health.     Brief Hospital Course by Main Problems:     Syncope recurrent: Please follow-up with primary EP doctor for further management of implantable loop recorder.   Implantable loop recorder last interrogation 02/14/24, showed normal device function, no significant arrhythmia events  11/28/23 MRI showed no acute abnormality, stable probable

## 2024-03-07 NOTE — PLAN OF CARE
PHYSICAL THERAPY TREATMENT     Patient: Effie Rain (53 y.o. female)  Date: 3/7/2024  Diagnosis: Syncope and collapse [R55] Syncope and collapse      Precautions: Fall Risk, General Precautions                      Recommendations for nursing mobility: Out of bed to chair for meals, Encourage HEP in prep for ADLs/mobility; see handout for details, and Amb in hallway    In place during session: EKG/telemetry   Chart, physical therapy assessment, plan of care and goals were reviewed.  ASSESSMENT  Patient continues with skilled PT services and is progressing towards goals. Pt semi supine upon PT arrival, agreeable to session. Pt A&O x 4. (See below for objective details and assist levels).     Overall pt tolerated session well today with bed mobility, transfers and ambulation. Pt demo's improved functional mobility to date, requiring mostly SBA for OOB mobility. Tolerated increased ambulation distances with education on energy conservation and rest breaks. Pt left seated EOB with all needs in reach.  Will continue to benefit from skilled PT services, and will continue to progress as tolerated. Potential barriers for safe discharge: concern for pt safely navigating or managing the home environment. Current PT DC recommendation Home with Home Health Therapy and family assist once medically appropriate.    GOALS:    Problem: Physical Therapy - Adult  Goal: By Discharge: Performs mobility at highest level of function for planned discharge setting.  See evaluation for individualized goals.  Description: FUNCTIONAL STATUS PRIOR TO ADMISSION: Patient was ambulating with ROllator  HOME SUPPORT PRIOR TO ADMISSION: The patient lived with daughter but  required assistance for gait and ADLs.    Physical Therapy Goals  Initiated 3/4/2024  Pt stated goal: get better  Pt will be I with LE HEP in 7 days.  Pt will perform bed mobility with Modified [place] in 7 days.  Pt will perform transfers with Modified 
  Problem: Skin/Tissue Integrity  Goal: Absence of new skin breakdown  Description: 1.  Monitor for areas of redness and/or skin breakdown  2.  Assess vascular access sites hourly  3.  Every 4-6 hours minimum:  Change oxygen saturation probe site  4.  Every 4-6 hours:  If on nasal continuous positive airway pressure, respiratory therapy assess nares and determine need for appliance change or resting period.  Outcome: Progressing     Problem: Safety - Adult  Goal: Free from fall injury  Outcome: Progressing     Problem: Pain  Goal: Verbalizes/displays adequate comfort level or baseline comfort level  Outcome: Progressing     
3/4/2024 )   Interpretation of Tool:  Represents activities that are increasingly more difficult (i.e. Bed mobility, Transfers, Gait).  Score 24 23 22-20 19-15 14-10 9-7 6   Modifier CH CI CJ CK CL CM CN         Physical Therapy Evaluation Charge Determination   History Examination Presentation Decision-Making   LOW Complexity : Zero comorbidities / personal factors that will impact the outcome / POC LOW Complexity : 1-2 Standardized tests and measures addressing body structure, function, activity limitation and / or participation in recreation  LOW Complexity : Stable, uncomplicated  Other outcome measures [facility] 6  LOW      Based on the above components, the patient evaluation is determined to be of the following complexity level: LOW    Pain Ratin/10   Pain Intervention(s):       Activity Tolerance:   Fair     After treatment patient left in no apparent distress:   Bed locked and in lowest position Patient left in no apparent distress sitting up in chair and Call bell within reach and nsg updated.    COMMUNICATION/EDUCATION:   The patient’s plan of care was discussed with: Occupational therapist, Registered nurse, and Case management    Patient Education  Education Given To: Patient  Education Provided: Role of Therapy;Plan of Care;Home Exercise Program;Fall Prevention Strategies;Transfer Training;Precautions  Education Method: Demonstration;Verbal;Teach Back  Barriers to Learning: Cognition  Education Outcome: Verbalized understanding;Continued education needed         Thank you for this referral.  Anyi Padron, PT  Minutes: 22

## 2024-03-07 NOTE — PROGRESS NOTES
Progress Note      3/5/2024 3:22 PM  NAME: Effie Rain   MRN:  [id number]   Admit Diagnosis: Syncope and collapse [R55]      Problem List:   -Syncope  -History of nonsustained ventricular tachycardia  -Diabetes mellitus  -Status post loop recorder insertion  -History of pulmonary embolism  -Hypothyroidism  -GERD  -IBS  -Leg pain  -Colon polyp  -Fibrocystic disease of the breast           Assessment/Plan:     Potassium is 2.9.  Replete potassium.  Telemetry showed normal sinus rhythm without arrhythmias.  Echo from November here showed EF of 50 to 55%.  Bubble study was negative  Check orthostatics  Follow-up on the stress test results    Head CT was nonacute  Check D-dimer         []       High complexity decision making was performed in this patient at high risk for decompensation with multiple organ involvement.    Subjective:     Effie Rain denies chest pain, dyspnea.  Discussed with RN events overnight.     Review of Systems:   Negative except for as noted above.    Objective:      Physical Exam:    Last 24hrs VS reviewed since prior progress note. Most recent are:    /78   Pulse 71   Temp 97.9 °F (36.6 °C) (Oral)   Resp 14   Ht 1.651 m (5' 5\")   Wt 81.5 kg (179 lb 10.8 oz)   SpO2 98%   BMI 29.90 kg/m²     Intake/Output Summary (Last 24 hours) at 3/5/2024 1522  Last data filed at 3/4/2024 1748  Gross per 24 hour   Intake --   Output 300 ml   Net -300 ml        General Appearance: Alert; no acute distress.  Ears/Nose/Mouth/Throat: moist mucous membranes  Neck: Supple.  Chest: Lungs clear to auscultation bilaterally.  Cardiovascular: Regular rate and rhythm, S1S2 normal  Abdomen: Soft, non-tender, bowel sounds are active.  Extremities: No edema bilaterally.  Skin: Warm and dry.      PMH/SH reviewed - no change compared to H&P    Telemetry: Normal sinus rhythm    EKG:     No valid procedures specified.    []  No new EKG for review    Lab Data Personally Reviewed:    Recent Labs     
        Progress Note      3/6/2024 2:59 PM  NAME: Effie Rain   MRN:  [id number]   Admit Diagnosis: Syncope and collapse [R55]      Problem List:   -Syncope  -History of nonsustained ventricular tachycardia  -Diabetes mellitus  -Status post loop recorder insertion  -History of pulmonary embolism  -Hypothyroidism  -GERD  -IBS  -Leg pain  -Colon polyp  -Fibrocystic disease of the breast           Assessment/Plan:     D-dimer is negative.  Rhythm is stable.  Stress test is negative.  Currently on levothyroxine and midodrine.  Hold antihypertensives  Once she is discharged I will see in the office in 1 to 2 weeks or sooner if needed  Potassium 3.9         []       High complexity decision making was performed in this patient at high risk for decompensation with multiple organ involvement.    Subjective:     Effie Rain denies chest pain, dyspnea.  Discussed with RN events overnight.     Review of Systems:   Negative except for as noted above.    Objective:      Physical Exam:    Last 24hrs VS reviewed since prior progress note. Most recent are:    /71   Pulse 87   Temp 97.7 °F (36.5 °C) (Oral)   Resp 20   Ht 1.651 m (5' 5\")   Wt 81.2 kg (179 lb)   SpO2 100%   BMI 29.79 kg/m²   No intake or output data in the 24 hours ending 03/06/24 1459       General Appearance: Alert; no acute distress.  Ears/Nose/Mouth/Throat: moist mucous membranes  Neck: Supple.  Chest: Lungs clear to auscultation bilaterally.  Cardiovascular: Regular rate and rhythm, S1S2 normal  Abdomen: Soft, non-tender, bowel sounds are active.  Extremities: No edema bilaterally.  Skin: Warm and dry.      PMH/SH reviewed - no change compared to H&P    Telemetry: Normal sinus rhythm    EKG:     No valid procedures specified.    []  No new EKG for review    Lab Data Personally Reviewed:    Recent Labs     03/05/24  0505 03/06/24  0515   WBC 4.8 5.7   HGB 11.5 11.0*   HCT 36.2 33.6*    290       No results for input(s): \"INR\", \"APTT\" 
        Progress Note      3/7/2024 1:51 PM  NAME: Effie Rain   MRN:  [id number]   Admit Diagnosis: Syncope and collapse [R55]      Problem List:   -Syncope  -History of nonsustained ventricular tachycardia  -Diabetes mellitus  -Status post loop recorder insertion  -History of pulmonary embolism  -Hypothyroidism  -GERD  -IBS  -Leg pain  -Colon polyp  -Fibrocystic disease of the breast           Assessment/Plan:     No acute events overnight.  Denied having any dizzy spells.  I will see patient in 2 to 4 weeks after discharge or sooner if needed         []       High complexity decision making was performed in this patient at high risk for decompensation with multiple organ involvement.    Subjective:     Effie Rain denies chest pain, dyspnea.  Discussed with RN events overnight.     Review of Systems:   Negative except for as noted above.    Objective:      Physical Exam:    Last 24hrs VS reviewed since prior progress note. Most recent are:    /75   Pulse 67   Temp 97.9 °F (36.6 °C) (Oral)   Resp 18   Ht 1.651 m (5' 5\")   Wt 81.6 kg (179 lb 14.3 oz)   SpO2 100%   BMI 29.94 kg/m²   No intake or output data in the 24 hours ending 03/07/24 1351       General Appearance: Alert; no acute distress.  Ears/Nose/Mouth/Throat: moist mucous membranes  Neck: Supple.  Chest: Lungs clear to auscultation bilaterally.  Cardiovascular: Regular rate and rhythm, S1S2 normal  Abdomen: Soft, non-tender, bowel sounds are active.  Extremities: No edema bilaterally.  Skin: Warm and dry.      PMH/SH reviewed - no change compared to H&P    Telemetry: Normal sinus rhythm    EKG:     No valid procedures specified.    []  No new EKG for review    Lab Data Personally Reviewed:    Recent Labs     03/06/24  0515 03/07/24  0320   WBC 5.7 5.1   HGB 11.0* 11.3*   HCT 33.6* 34.6*    259       No results for input(s): \"INR\", \"APTT\" in the last 72 hours.    Invalid input(s): \"PTP\"   Recent Labs     03/05/24  0507 
    Hospitalist Progress Note    NAME:   Effie Rain   : 1970   MRN: [id number]     Date/Time: 3/5/2024 10:40 AM  Patient PCP: Juan Hartley MD    Estimated discharge date:> 48 hours  Barriers: MRI neck, PT/OT recommendations, cardiology recommendations      HOSPITAL COURSE:    Effie Rain is  53 y.o. female with IBS, GERD, h/o gastric bypass, HLD, hypothyroidism, IBS, migraines, NVST, DM2, and history of seizures who was admitted to the hospital for syncope. Patient has had multiple presentations to the hospital with similar complaints. She has had multiple, extensive  workups completed. Currently with an implantable loop recorder in place.  Patient denies lightheadedness, dizziness, or headaches. She states her \"legs give out\". Denies  chest pain, palpitation, or shortness of breath. Patient was to be discharged from the ED; however at the time of discharge the patient was unsteady on her feet and with gait disturbance. Patient uses both a cane and walker at home for ambulation. Cardiology evaluated the patient and schedule her for NMST today, however patient's K 2.9 unable to complete study. Patient to complete the study 3/6/24. MRI showed normal brain, small pituitary adenoma. MRA head neck pending.        Assessment / Plan:    Syncope recurrent:   Implantable loop recorder last interrogation 24, showed normal device function, no significant arrhythmia events  23 MRI showed no acute abnormality, stable probable cystic pituitary microadenoma measuring to 8 mm   Echo showed EF 50-55%, normal function and wall motion  MRA of the neck pending  MRI brain: normal brain; small pituitary adenoma  MRA neck pending  Cardiology following  NMST schedule as 2 parts, to be completed 3/6     Unsteady gait:    Ambulates at home with a cane or walker.   Etiology unclear, need to make sure there is no posterior circulation issues.   PT/OT recommending H     History of hypertension: On midodrine as 
    Hospitalist Progress Note    NAME:   Effie Rain   : 1970   MRN: [id number]     Date/Time: 3/6/2024 11:53 AM  Patient PCP: Juan Hartley MD    Estimated discharge date:> 48 hours  Barriers: MRI neck, PT/OT recommendations, cardiology recommendations      HOSPITAL COURSE:    Effie Rain is  53 y.o. female with IBS, GERD, h/o gastric bypass, HLD, hypothyroidism, IBS, migraines, NVST, DM2, and history of seizures who was admitted to the hospital for syncope. Patient has had multiple presentations to the hospital with similar complaints. She has had multiple, extensive  workups completed. Currently with an implantable loop recorder in place.  Patient denies lightheadedness, dizziness, or headaches. She states her \"legs give out\". Denies  chest pain, palpitation, or shortness of breath. Patient was to be discharged from the ED; however at the time of discharge the patient was unsteady on her feet and with gait disturbance. Patient uses both a cane and walker at home for ambulation. Cardiology evaluated the patient and schedule her for NMST today, however patient's K 2.9 unable to complete study. Patient to complete the study 3/6/24. MRI showed normal brain, small pituitary adenoma. Unable to perform MRA neck due to equipment problems. NMST completed with official results pending.        Assessment / Plan:    Syncope recurrent:   Implantable loop recorder last interrogation 24, showed normal device function, no significant arrhythmia events  23 MRI showed no acute abnormality, stable probable cystic pituitary microadenoma measuring to 8 mm   Echo showed EF 50-55%, normal function and wall motion  MRA of the neck pending  MRI brain: normal brain; small pituitary adenoma  MRA neck cancelled, issues with the MRI machine  Cardiology following  NMST complete, results pending     Unsteady gait:    Ambulates at home with a cane or walker.   Etiology unclear, need to make sure there is no 
Dual skin assessment performed with second RN. Patients skin is dry and intact.  
Orthostatics complete, see flowsheets for data.  
Patient going down for MRI. Patient's two bracelets, 1 necklace, and 1 ring placed in clear bag and left in room.   
Potassium resulted at 2.9. Patient has no PRN potassium replacement orders. Contacted  on call to inform, see MAR.  
Review of Systems    Physical Exam  Skin:     General: Skin is warm and dry.             Comments: Dual skin assessment done w/ VEL Orr       
depression: On multiple antipsychotic medications, needs to be evaluated.  Polypharmacy may be the issue for her syncopal episodes.          Medical Decision Making:     [x] High (any 2)    A. Problems (any 1)  [x] Acute/Chronic Illness/injury posing threat to life or bodily function:    [] Severe exacerbation of chronic illness:    ---------------------------------------------------------------------  B. Risk of Treatment (any 1)   [] Drugs/treatments that require intensive monitoring for toxicity include:    [] IV ABX requiring serial renal monitoring for nephrotoxicity:     [] IV Narcotic analgesia for adverse drug reaction  [] Aggressive IV diuresis requiring serial monitoring for renal impairment and electrolyte derangements  [] Critical electrolyte abnormalities requiring IV replacement and close serial monitoring  [] Insulin - monitoring serial FSBS for Hypoglycemic adverse drug reaction  [] Other -   [] Change in code status:    [] Decision to escalate care:    [] Major surgery/procedure with associated risk factors:    ----------------------------------------------------------------------  C. Data (any 2)  [x] Discussed current management and discharge planning options with Case Management.  [x] Discussed management of the case with:  Patient, nursing  [] Telemetry personally reviewed and interpreted as documented above    [] Imaging personally reviewed and interpreted, includes:    [] Data Review (any 3)  [x] All available Consultant notes from yesterday/today were reviewed  [x] All current labs were reviewed and interpreted for clinical significance   [x] Appropriate follow-up labs were ordered  [] Collateral history obtained from:         Code Status: full   DVT Prophylaxis:Lovenox  GI Prophylaxis:None    Subjective:     Chief Complaint / Reason for Physician Visit  Patient was seen and examined at the bedside. Patient denies lightheadedness or dizziness. States her legs are feeling weak. Offers no 
Yes (reports 5-10 falls)  Social/Functional History  Lives With: Parent, Daughter (Daughter is PCA-5 hrs)  Type of Home: House  Home Layout: One level  Home Access: Stairs to enter with rails  Entrance Stairs - Number of Steps: 3  Bathroom Shower/Tub:  (sponge baths)  Bathroom Equipment: 3-in-1 commode  Home Equipment: Cane, Walker, rolling, Rollator  Has the patient had two or more falls in the past year or any fall with injury in the past year?: Yes (reports 5-10 falls)  ADL Assistance: Needs assistance  Ambulation Assistance:  (utilizes SPC/rollator)      EXAMINATION OF PERFORMANCE DEFICITS:    Cognitive/Behavioral Status:  Orientation  Orientation Level: Oriented X4     Range of Motion:   AROM: Generally decreased, functional       Strength:  Strength: Generally decreased, functional (3/5)        Tone & Sensation:      Sensation: Intact      Functional Mobility and Transfers for ADLs:  Bed Mobility:  Bed Mobility Training  Bed Mobility Training: Yes  Supine to Sit: Stand-by assistance;Additional time;Assist X1  Sit to Supine: Stand-by assistance;Additional time;Assist X1    Transfers:  Transfer Training  Transfer Training: Yes  Sit to Stand: Contact-guard assistance;Assist X1  Stand to Sit: Contact-guard assistance;Assist X1  Bed to Chair: Contact-guard assistance;Assist X1  Toilet Transfer: Contact-guard assistance;Assist X1      Balance:  Balance  Sitting: Intact  Standing: Impaired  Standing - Static: Fair;Constant support  Standing - Dynamic: Fair;Constant support      ADL Assessment:           LE Dressing: Increased time to complete;Stand by assistance  LE Dressing Skilled Clinical Factors: at EOB                       Functional Measure:    [facility] AM-PACTM \"6 Clicks\"                                                       Daily Activity Inpatient Short Form  How much help from another person does the patient currently need... Total; A Lot A Little None   1.  Putting on and taking off regular lower

## 2024-03-07 NOTE — CARE COORDINATION
CM noted discharge order. Patient going home with [facility]. Nurse aware.     Transition of Care Plan:    RUR: 15%  Prior Level of Functioning: Independent  Disposition: Home with HH  If SNF or IPR: Date FOC offered: n/a  Date FOC received: n/a  Accepting facility: n/a  Date authorization started with reference number: n/a  Date authorization received and expires: n/a  Follow up appointments: Per MD  DME needed: n/a  Transportation at discharge: Family  IM/IMM Medicare/ letter given: no, communicated but d/c'd before signing  Is patient a  and connected with [facility]? N/a   If yes, was  transfer form completed and VA notified?   Caregiver Contact: n/a  Discharge Caregiver contacted prior to discharge? N/a  Care Conference needed? no  Barriers to discharge: none

## 2024-05-08 LAB
ALBUMIN SERPL-MCNC: 4.2 G/DL (ref 3.8–4.9)
ALP SERPL-CCNC: 160 IU/L (ref 44–121)
ALT SERPL-CCNC: 86 IU/L (ref 0–32)
AMMONIA PLAS-MCNC: 50 UG/DL (ref 34–178)
AST SERPL-CCNC: 40 IU/L (ref 0–40)
BASOPHILS # BLD AUTO: 0 X10E3/UL (ref 0–0.2)
BASOPHILS NFR BLD AUTO: 1 %
BILIRUB DIRECT SERPL-MCNC: <0.1 MG/DL (ref 0–0.4)
BILIRUB SERPL-MCNC: <0.2 MG/DL (ref 0–1.2)
BUN SERPL-MCNC: 14 MG/DL (ref 6–24)
BUN/CREAT SERPL: 19 (ref 9–23)
CALCIUM SERPL-MCNC: 8.8 MG/DL (ref 8.7–10.2)
CHLORIDE SERPL-SCNC: 98 MMOL/L (ref 96–106)
CO2 SERPL-SCNC: 21 MMOL/L (ref 20–29)
CREAT SERPL-MCNC: 0.72 MG/DL (ref 0.57–1)
EGFRCR SERPLBLD CKD-EPI 2021: 99 ML/MIN/1.73
EOSINOPHIL # BLD AUTO: 0.2 X10E3/UL (ref 0–0.4)
EOSINOPHIL NFR BLD AUTO: 3 %
ERYTHROCYTE [DISTWIDTH] IN BLOOD BY AUTOMATED COUNT: 17.7 % (ref 11.7–15.4)
GGT SERPL-CCNC: 54 IU/L (ref 0–60)
GLUCOSE SERPL-MCNC: 70 MG/DL (ref 70–99)
HCT VFR BLD AUTO: 37.9 % (ref 34–46.6)
HGB BLD-MCNC: 12.1 G/DL (ref 11.1–15.9)
IMM GRANULOCYTES # BLD AUTO: 0 X10E3/UL (ref 0–0.1)
IMM GRANULOCYTES NFR BLD AUTO: 0 %
LYMPHOCYTES # BLD AUTO: 2.5 X10E3/UL (ref 0.7–3.1)
LYMPHOCYTES NFR BLD AUTO: 38 %
MCH RBC QN AUTO: 26.1 PG (ref 26.6–33)
MCHC RBC AUTO-ENTMCNC: 31.9 G/DL (ref 31.5–35.7)
MCV RBC AUTO: 82 FL (ref 79–97)
MONOCYTES # BLD AUTO: 0.4 X10E3/UL (ref 0.1–0.9)
MONOCYTES NFR BLD AUTO: 6 %
NEUTROPHILS # BLD AUTO: 3.5 X10E3/UL (ref 1.4–7)
NEUTROPHILS NFR BLD AUTO: 52 %
PLATELET # BLD AUTO: 315 X10E3/UL (ref 150–450)
POTASSIUM SERPL-SCNC: 4 MMOL/L (ref 3.5–5.2)
PROT SERPL-MCNC: 6.4 G/DL (ref 6–8.5)
RBC # BLD AUTO: 4.63 X10E6/UL (ref 3.77–5.28)
SODIUM SERPL-SCNC: 134 MMOL/L (ref 134–144)
WBC # BLD AUTO: 6.5 X10E3/UL (ref 3.4–10.8)

## 2024-05-15 ENCOUNTER — TELEMEDICINE (OUTPATIENT)
Age: 54
End: 2024-05-15
Payer: MEDICARE

## 2024-05-15 DIAGNOSIS — R26.9 GAIT DISTURBANCE: ICD-10-CM

## 2024-05-15 DIAGNOSIS — G40.209 COMPLEX PARTIAL EPILEPTIC SEIZURE (HCC): ICD-10-CM

## 2024-05-15 DIAGNOSIS — R74.8 ELEVATED LIVER ENZYMES: Primary | ICD-10-CM

## 2024-05-15 PROCEDURE — G8427 DOCREV CUR MEDS BY ELIG CLIN: HCPCS | Performed by: NURSE PRACTITIONER

## 2024-05-15 PROCEDURE — 99213 OFFICE O/P EST LOW 20 MIN: CPT | Performed by: NURSE PRACTITIONER

## 2024-05-15 PROCEDURE — 3017F COLORECTAL CA SCREEN DOC REV: CPT | Performed by: NURSE PRACTITIONER

## 2024-05-15 ASSESSMENT — PATIENT HEALTH QUESTIONNAIRE - PHQ9
SUM OF ALL RESPONSES TO PHQ QUESTIONS 1-9: 0
SUM OF ALL RESPONSES TO PHQ9 QUESTIONS 1 & 2: 0
2. FEELING DOWN, DEPRESSED OR HOPELESS: NOT AT ALL
1. LITTLE INTEREST OR PLEASURE IN DOING THINGS: NOT AT ALL
SUM OF ALL RESPONSES TO PHQ QUESTIONS 1-9: 0

## 2024-05-15 NOTE — PROGRESS NOTES
Rm    Chief Complaint   Patient presents with    6 Month Follow-Up        There were no vitals taken for this visit.     1. Have you been to the ER, urgent care clinic since your last visit?  Hospitalized since your last visit? No     2. Have you seen or consulted any other health care providers outside of the [facility] since your last visit?  Include any pap smears or colon screening. No     Health Maintenance Due   Topic Date Due    Hepatitis B vaccine (1 of 3 - 3-dose series) Never done    Pneumococcal 0-64 years Vaccine (1 of 2 - PCV) Never done    Diabetic foot exam  Never done    HIV screen  Never done    Diabetic Alb to Cr ratio (uACR) test  Never done    Diabetic retinal exam  Never done    DTaP/Tdap/Td vaccine (1 - Tdap) Never done    Shingles vaccine (1 of 2) Never done    COVID-19 Vaccine (3 - 2023-24 season) 09/01/2023    Depression Screen  11/21/2023    Annual Wellness Visit (Medicare Advantage)  Never done             No data to display                 Failed to redirect to the Timeline version of the [employer] SmartLink.    Failed to redirect to the Timeline version of the [employer] SmartLink.

## 2024-05-15 NOTE — PROGRESS NOTES
[facility]      Uriel Laura MD, FACP, FACG, FAASLD      Mirtha Christine, PA-TEJAS Martínez, [facility]   Vicki Shelleydawood, [facility]   Vi Luke, [facility]  Salvador De La Paz, [facility]   Lanny Lozada, [facility]   Katherine Aston, [facility]   [address], [address]   [place]  [address] [phone]   FAX: [phone]  [facility]   [address], [address]   [place]  [address] [phone]   FAX: [phone]       Patient Care Team:  Juan Hartley MD as PCP - General  Anand Hines MD as Referring Physician    Patient Active Problem List   Diagnosis    Chronic combined systolic and diastolic congestive heart failure (HCC)    AMS (altered mental status)    Complex partial epileptic seizure (HCC)    Type 2 diabetes mellitus with complication, without long-term current use of insulin (HCC)    Gait disturbance    Recurrent syncope    Syncope and collapse       VIRTUAL TELEHEALTH VISIT PERFORMED DUE TO COVID-19 EPIDEMIC    CONSENT:    Effie Rain, was evaluated through a synchronous (real-time) audio-video encounter. The patient (or guardian if applicable) is aware that this is a billable service, which includes applicable co-pays. This Virtual Visit was conducted with patient's (and/or legal guardian's) consent. Patient identification was verified, and a caregiver was present when appropriate.  The patient was located at: Home  The provider was located at: Facility     Effie Rain returns to the [facility] for management of elevated liver enzymes. The active problem list, all pertinent past medical history, medications, radiologic findings and laboratory findings related to the liver disorder were reviewed with the patient.      The patient is a 53 y.o. Black

## 2024-06-04 ENCOUNTER — HOSPITAL ENCOUNTER (OUTPATIENT)
Facility: HOSPITAL | Age: 54
Setting detail: RECURRING SERIES
Discharge: HOME | End: 2024-06-07
Payer: MEDICARE

## 2024-06-04 PROCEDURE — 97161 PT EVAL LOW COMPLEX 20 MIN: CPT

## 2024-06-04 NOTE — THERAPY EVALUATION
above Therapy Services are being furnished while the patient is under my care. I agree with the treatment plan and certify that this therapy is necessary.    Physician's Signature:_________________________   DATE:_________   TIME:________                           Paris Cole DPM    ** Signature, Date and Time must be completed for valid certification **  Please sign and fax to [phone].  Thank you    Patient Name:  Effie Rain :  1970

## 2024-06-06 ENCOUNTER — APPOINTMENT (OUTPATIENT)
Facility: HOSPITAL | Age: 54
End: 2024-06-06
Payer: MEDICARE

## 2024-06-13 ENCOUNTER — APPOINTMENT (OUTPATIENT)
Facility: HOSPITAL | Age: 54
End: 2024-06-13
Payer: MEDICARE

## 2024-06-18 ENCOUNTER — APPOINTMENT (OUTPATIENT)
Facility: HOSPITAL | Age: 54
End: 2024-06-18
Payer: MEDICARE

## 2024-06-20 ENCOUNTER — APPOINTMENT (OUTPATIENT)
Facility: HOSPITAL | Age: 54
End: 2024-06-20
Payer: MEDICARE

## 2024-07-09 ENCOUNTER — APPOINTMENT (OUTPATIENT)
Facility: HOSPITAL | Age: 54
End: 2024-07-09
Payer: MEDICARE

## 2024-07-09 ENCOUNTER — HOSPITAL ENCOUNTER (EMERGENCY)
Facility: HOSPITAL | Age: 54
Discharge: HOME | End: 2024-07-09
Attending: EMERGENCY MEDICINE
Payer: MEDICARE

## 2024-07-09 VITALS
DIASTOLIC BLOOD PRESSURE: 85 MMHG | HEART RATE: 61 BPM | TEMPERATURE: 97.5 F | RESPIRATION RATE: 14 BRPM | BODY MASS INDEX: 28.32 KG/M2 | SYSTOLIC BLOOD PRESSURE: 142 MMHG | WEIGHT: 170 LBS | OXYGEN SATURATION: 99 % | HEIGHT: 65 IN

## 2024-07-09 DIAGNOSIS — R56.9 SEIZURE (HCC): Primary | ICD-10-CM

## 2024-07-09 DIAGNOSIS — R11.2 NAUSEA AND VOMITING, UNSPECIFIED VOMITING TYPE: ICD-10-CM

## 2024-07-09 LAB
ALBUMIN SERPL-MCNC: 3.8 G/DL (ref 3.5–5)
ALBUMIN/GLOB SERPL: 1.3 (ref 1.1–2.2)
ALP SERPL-CCNC: 203 U/L (ref 45–117)
ALT SERPL-CCNC: 65 U/L (ref 12–78)
ANION GAP SERPL CALC-SCNC: 7 MMOL/L (ref 5–15)
AST SERPL W P-5'-P-CCNC: 37 U/L (ref 15–37)
BASOPHILS # BLD: 0 K/UL (ref 0–0.1)
BASOPHILS NFR BLD: 1 % (ref 0–1)
BILIRUB SERPL-MCNC: 0.2 MG/DL (ref 0.2–1)
BUN SERPL-MCNC: 5 MG/DL (ref 6–20)
BUN/CREAT SERPL: 9 (ref 12–20)
CA-I BLD-MCNC: 9.1 MG/DL (ref 8.5–10.1)
CHLORIDE SERPL-SCNC: 98 MMOL/L (ref 97–108)
CO2 SERPL-SCNC: 26 MMOL/L (ref 21–32)
CREAT SERPL-MCNC: 0.54 MG/DL (ref 0.55–1.02)
DIFFERENTIAL METHOD BLD: ABNORMAL
EKG ATRIAL RATE: 73 BPM
EKG DIAGNOSIS: NORMAL
EKG P AXIS: 41 DEGREES
EKG P-R INTERVAL: 170 MS
EKG Q-T INTERVAL: 422 MS
EKG QRS DURATION: 100 MS
EKG QTC CALCULATION (BAZETT): 464 MS
EKG R AXIS: -6 DEGREES
EKG T AXIS: 34 DEGREES
EKG VENTRICULAR RATE: 73 BPM
EOSINOPHIL # BLD: 0 K/UL (ref 0–0.4)
EOSINOPHIL NFR BLD: 0 % (ref 0–7)
ERYTHROCYTE [DISTWIDTH] IN BLOOD BY AUTOMATED COUNT: 15.9 % (ref 11.5–14.5)
GLOBULIN SER CALC-MCNC: 3 G/DL (ref 2–4)
GLUCOSE SERPL-MCNC: 87 MG/DL (ref 65–100)
HCT VFR BLD AUTO: 34.3 % (ref 35–47)
HGB BLD-MCNC: 11.5 G/DL (ref 11.5–16)
IMM GRANULOCYTES # BLD AUTO: 0 K/UL (ref 0–0.04)
IMM GRANULOCYTES NFR BLD AUTO: 0 % (ref 0–0.5)
LYMPHOCYTES # BLD: 0.6 K/UL (ref 0.8–3.5)
LYMPHOCYTES NFR BLD: 17 % (ref 12–49)
MAGNESIUM SERPL-MCNC: 2 MG/DL (ref 1.6–2.4)
MCH RBC QN AUTO: 25.8 PG (ref 26–34)
MCHC RBC AUTO-ENTMCNC: 33.5 G/DL (ref 30–36.5)
MCV RBC AUTO: 77.1 FL (ref 80–99)
MONOCYTES # BLD: 0.2 K/UL (ref 0–1)
MONOCYTES NFR BLD: 5 % (ref 5–13)
NEUTS SEG # BLD: 2.6 K/UL (ref 1.8–8)
NEUTS SEG NFR BLD: 77 % (ref 32–75)
NRBC # BLD: 0 K/UL (ref 0–0.01)
NRBC BLD-RTO: 0 PER 100 WBC
PLATELET # BLD AUTO: 294 K/UL (ref 150–400)
PMV BLD AUTO: 9.7 FL (ref 8.9–12.9)
POTASSIUM SERPL-SCNC: 3.8 MMOL/L (ref 3.5–5.1)
PROT SERPL-MCNC: 6.8 G/DL (ref 6.4–8.2)
RBC # BLD AUTO: 4.45 M/UL (ref 3.8–5.2)
SODIUM SERPL-SCNC: 131 MMOL/L (ref 136–145)
TROPONIN I SERPL HS-MCNC: 4 NG/L (ref 0–51)
TSH SERPL DL<=0.05 MIU/L-ACNC: 4.31 UIU/ML (ref 0.36–3.74)
WBC # BLD AUTO: 3.4 K/UL (ref 3.6–11)

## 2024-07-09 PROCEDURE — 85025 COMPLETE CBC W/AUTO DIFF WBC: CPT

## 2024-07-09 PROCEDURE — 70450 CT HEAD/BRAIN W/O DYE: CPT

## 2024-07-09 PROCEDURE — 80053 COMPREHEN METABOLIC PANEL: CPT

## 2024-07-09 PROCEDURE — 93005 ELECTROCARDIOGRAM TRACING: CPT | Performed by: EMERGENCY MEDICINE

## 2024-07-09 PROCEDURE — 80177 DRUG SCRN QUAN LEVETIRACETAM: CPT

## 2024-07-09 PROCEDURE — 96375 TX/PRO/DX INJ NEW DRUG ADDON: CPT

## 2024-07-09 PROCEDURE — 84484 ASSAY OF TROPONIN QUANT: CPT

## 2024-07-09 PROCEDURE — 99285 EMERGENCY DEPT VISIT HI MDM: CPT

## 2024-07-09 PROCEDURE — 84443 ASSAY THYROID STIM HORMONE: CPT

## 2024-07-09 PROCEDURE — 2580000003 HC RX 258: Performed by: EMERGENCY MEDICINE

## 2024-07-09 PROCEDURE — 6360000002 HC RX W HCPCS: Performed by: EMERGENCY MEDICINE

## 2024-07-09 PROCEDURE — 83735 ASSAY OF MAGNESIUM: CPT

## 2024-07-09 PROCEDURE — 36415 COLL VENOUS BLD VENIPUNCTURE: CPT

## 2024-07-09 PROCEDURE — 96361 HYDRATE IV INFUSION ADD-ON: CPT

## 2024-07-09 PROCEDURE — 71045 X-RAY EXAM CHEST 1 VIEW: CPT

## 2024-07-09 PROCEDURE — 94761 N-INVAS EAR/PLS OXIMETRY MLT: CPT

## 2024-07-09 PROCEDURE — 96374 THER/PROPH/DIAG INJ IV PUSH: CPT

## 2024-07-09 PROCEDURE — 6370000000 HC RX 637 (ALT 250 FOR IP): Performed by: EMERGENCY MEDICINE

## 2024-07-09 RX ORDER — ONDANSETRON 4 MG/1
4 TABLET, ORALLY DISINTEGRATING ORAL 3 TIMES DAILY PRN
Qty: 21 TABLET | Refills: 0 | Status: SHIPPED | OUTPATIENT
Start: 2024-07-09

## 2024-07-09 RX ORDER — 0.9 % SODIUM CHLORIDE 0.9 %
1000 INTRAVENOUS SOLUTION INTRAVENOUS ONCE
Status: COMPLETED | OUTPATIENT
Start: 2024-07-09 | End: 2024-07-09

## 2024-07-09 RX ORDER — ACETAMINOPHEN 500 MG
1000 TABLET ORAL
Status: COMPLETED | OUTPATIENT
Start: 2024-07-09 | End: 2024-07-09

## 2024-07-09 RX ORDER — LEVETIRACETAM 500 MG/5ML
1000 INJECTION, SOLUTION, CONCENTRATE INTRAVENOUS
Status: COMPLETED | OUTPATIENT
Start: 2024-07-09 | End: 2024-07-09

## 2024-07-09 RX ORDER — ONDANSETRON 2 MG/ML
4 INJECTION INTRAMUSCULAR; INTRAVENOUS ONCE
Status: COMPLETED | OUTPATIENT
Start: 2024-07-09 | End: 2024-07-09

## 2024-07-09 RX ADMIN — LEVETIRACETAM 1000 MG: 100 INJECTION, SOLUTION INTRAVENOUS at 13:18

## 2024-07-09 RX ADMIN — ONDANSETRON 4 MG: 2 INJECTION INTRAMUSCULAR; INTRAVENOUS at 13:27

## 2024-07-09 RX ADMIN — SODIUM CHLORIDE 1000 ML: 9 INJECTION, SOLUTION INTRAVENOUS at 13:26

## 2024-07-09 RX ADMIN — ACETAMINOPHEN 1000 MG: 500 TABLET ORAL at 13:28

## 2024-07-09 ASSESSMENT — PAIN SCALES - GENERAL
PAINLEVEL_OUTOF10: 0
PAINLEVEL_OUTOF10: 0
PAINLEVEL_OUTOF10: 8
PAINLEVEL_OUTOF10: 8

## 2024-07-09 ASSESSMENT — PAIN - FUNCTIONAL ASSESSMENT: PAIN_FUNCTIONAL_ASSESSMENT: NONE - DENIES PAIN

## 2024-07-09 NOTE — ED TRIAGE NOTES
Daughter reports that patient was complaint of nausea and vomiting last night, so she went to her house today around 11AM and found pt having a seizure. Daughter also states that patient has been real weak all over with cough, and has been passing out. Pt presents GCS 14, A&Ox3, able to follow commands.   Hx seizures, stroke, sleep apnea, vasovagal syncope

## 2024-07-09 NOTE — DISCHARGE INSTRUCTIONS
Thank you for choosing our Emergency Department for your care.  It is our privilege to care for you in your time of need.  In the next several days, you may receive a survey via email or mailed to your home about your experience with our team.  We would greatly appreciate you taking a few minutes to complete the survey, as we use this information to learn what we have done well and what we could be doing better. Thank you for trusting us with your care!    Below you will find a list of your tests from today's visit.   Labs  Recent Results (from the past 12 hour(s))   CBC with Auto Differential    Collection Time: 07/09/24 12:56 PM   Result Value Ref Range    WBC 3.4 (L) 3.6 - 11.0 K/uL    RBC 4.45 3.80 - 5.20 M/uL    Hemoglobin 11.5 11.5 - 16.0 g/dL    Hematocrit 34.3 (L) 35.0 - 47.0 %    MCV 77.1 (L) 80.0 - 99.0 FL    MCH 25.8 (L) 26.0 - 34.0 PG    MCHC 33.5 30.0 - 36.5 g/dL    RDW 15.9 (H) 11.5 - 14.5 %    Platelets 294 150 - 400 K/uL    MPV 9.7 8.9 - 12.9 FL    Nucleated RBCs 0.0 0.0  WBC    nRBC 0.00 0.00 - 0.01 K/uL    Neutrophils % 77 (H) 32 - 75 %    Lymphocytes % 17 12 - 49 %    Monocytes % 5 5 - 13 %    Eosinophils % 0 0 - 7 %    Basophils % 1 0 - 1 %    Immature Granulocytes % 0 0 - 0.5 %    Neutrophils Absolute 2.6 1.8 - 8.0 K/UL    Lymphocytes Absolute 0.6 (L) 0.8 - 3.5 K/UL    Monocytes Absolute 0.2 0.0 - 1.0 K/UL    Eosinophils Absolute 0.0 0.0 - 0.4 K/UL    Basophils Absolute 0.0 0.0 - 0.1 K/UL    Immature Granulocytes Absolute 0.0 0.00 - 0.04 K/UL    Differential Type AUTOMATED     Comprehensive Metabolic Panel    Collection Time: 07/09/24 12:56 PM   Result Value Ref Range    Sodium 131 (L) 136 - 145 mmol/L    Potassium 3.8 3.5 - 5.1 mmol/L    Chloride 98 97 - 108 mmol/L    CO2 26 21 - 32 mmol/L    Anion Gap 7 5 - 15 mmol/L    Glucose 87 65 - 100 mg/dL    BUN 5 (L) 6 - 20 mg/dL    Creatinine 0.54 (L) 0.55 - 1.02 mg/dL    BUN/Creatinine Ratio 9 (L) 12 - 20      Est, Glom Filt Rate >90 >60  ml/min/1.73m2    Calcium 9.1 8.5 - 10.1 mg/dL    Total Bilirubin 0.2 0.2 - 1.0 mg/dL    AST 37 15 - 37 U/L    ALT 65 12 - 78 U/L    Alk Phosphatase 203 (H) 45 - 117 U/L    Total Protein 6.8 6.4 - 8.2 g/dL    Albumin 3.8 3.5 - 5.0 g/dL    Globulin 3.0 2.0 - 4.0 g/dL    Albumin/Globulin Ratio 1.3 1.1 - 2.2     Magnesium    Collection Time: 07/09/24 12:56 PM   Result Value Ref Range    Magnesium 2.0 1.6 - 2.4 mg/dL   Troponin    Collection Time: 07/09/24 12:56 PM   Result Value Ref Range    Troponin, High Sensitivity 4 0 - 51 ng/L   TSH    Collection Time: 07/09/24 12:56 PM   Result Value Ref Range    TSH, 3rd Generation 4.31 (H) 0.36 - 3.74 uIU/mL       Radiologic Studies  XR CHEST PORTABLE   Final Result   No acute intrathoracic process is identified.             Electronically signed by GEORGE KAUFFMAN      CT Head W/O Contrast   Final Result   No acute intracranial process.   There is no intracranial mass or hemorrhage.      Electronically signed by GEORGE KAUFFMAN        ------------------------------------------------------------------------------------------------------------  The evaluation and treatment you received in the Emergency Department were for an urgent problem. It is important that you follow-up with a doctor, nurse practitioner, or physician assistant to:  (1) confirm your diagnosis,  (2) re-evaluation of changes in your illness and treatment, and (3) for ongoing care. Please take your discharge instructions with you when you go to your follow-up appointment.     If you have any problem arranging a follow-up appointment, contact us!  If your symptoms become worse or you do not improve as expected, please return to us. We are available 24 hours a day.     If a prescription has been provided, please fill it as soon as possible to prevent a delay in treatment. If you have any questions or reservations about taking the medication due to side effects or interactions with other medications, please call your primary

## 2024-07-09 NOTE — ED PROVIDER NOTES
[facility] EMERGENCY DEPT  EMERGENCY DEPARTMENT HISTORY AND PHYSICAL EXAM      Date: 7/9/2024  Patient Name: Effie Rain  MRN: [id number]  Birthdate 1970  Date of evaluation: 7/9/2024  Provider: Marily Andersen MD   Note Started: 1:19 PM EDT 7/9/24    HISTORY OF PRESENT ILLNESS     Chief Complaint   Patient presents with    Seizures    Nausea    Emesis    Fatigue       History Provided By: Patient    HPI: Effie Rain is a 54 y.o. female patient with a history of seizures.  Nausea vomiting starting last night.  No no head injury no abdominal pain.  Unable to keep her seizure medicine down.  Also notes some cough congestion recently no fevers.    PAST MEDICAL HISTORY   Past Medical History:  Past Medical History:   Diagnosis Date    Colon polyp     Fibrocystic breast changes of both breasts 07/08/2015    GERD (gastroesophageal reflux disease)     Glaucoma 07/31/2020    Hypercholesterolemia 08/15/2013    Hypothyroidism 07/26/2019    Implantable loop recorder present 02/15/2023    Irritable bowel syndrome     Migraine headache 07/26/2019    Multinodular goiter 01/27/2023    Neuritis of right saphenous nerve 07/14/2021    Nontraumatic tear of left rotator cuff, unspecified tear extent 07/06/2023    NSVT (nonsustained ventricular tachycardia) ([facility]) 04/10/2021    Pulmonary embolism ([facility]) 08/2022    Type 2 diabetes mellitus with complication, without long-term current use of insulin ([facility]) 11/26/2023    Vasovagal syncope 04/10/2021       Past Surgical History:  Past Surgical History:   Procedure Laterality Date    APPENDECTOMY      CHOLECYSTECTOMY  05/2006    COLONOSCOPY N/A 11/28/2022    COLONOSCOPY performed by Chaitanya Morales MD at [facility] ENDOSCOPY    COLONOSCOPY      GASTRIC BYPASS SURGERY  2013    HYSTERECTOMY (CERVIX STATUS UNKNOWN)  2007    KNEE ARTHROSCOPY Right 05/2021    LAP,CHOLECYSTECTOMY      ORTHOPEDIC SURGERY      right knee procedure 2021    ORTHOPEDIC SURGERY Bilateral 2013    CTR    OTHER SURGICAL  Patient has a history of seizures.  Missed a dose.  Keppra given here in the ER. [HP]   1453 XR CHEST PORTABLE  FINDINGS:  AP portable upright view of the chest demonstrates a stable, prominent  cardiopericardial silhouette.There is no pleural effusion.There is no focal  consolidation.There is no pneumothorax.     IMPRESSION:  No acute intrathoracic process is identified.            Electronically signed by GEORGE KAUFFMAN   [HP]      ED Course User Index  [HP] Marily Andersen MD       SEPSIS Reassessment: Sepsis reassessment not applicable    Clinical Management Tools:  Not Applicable    Patient was given the following medications:  Medications   levETIRAcetam (KEPPRA) injection 1,000 mg (1,000 mg IntraVENous Given 7/9/24 1318)   ondansetron (ZOFRAN) injection 4 mg (4 mg IntraVENous Given 7/9/24 1327)   acetaminophen (TYLENOL) tablet 1,000 mg (1,000 mg Oral Given 7/9/24 1328)   sodium chloride 0.9 % bolus 1,000 mL (0 mLs IntraVENous Stopped 7/9/24 1539)       CONSULTS: See ED Course/MDM for further details.  None     Social Determinants affecting Diagnosis/Treatment: None    Smoking Cessation: Not Applicable    PROCEDURES   Unless otherwise noted above, none  Procedures      CRITICAL CARE TIME   Patient does not meet Critical Care Time, 0 minutes    ED IMPRESSION     1. Seizure (HCC)    2. Nausea and vomiting, unspecified vomiting type          DISPOSITION/PLAN   DISPOSITION Decision To Discharge 07/09/2024 02:54:12 PM    Discharge Note: The patient is stable for discharge home. The signs, symptoms, diagnosis, and discharge instructions have been discussed, understanding conveyed, and agreed upon. The patient is to follow up as recommended or return to ER should their symptoms worsen.      PATIENT REFERRED TO:  Juan Hartley [place]  [address] [facility] [id number] [phone]              DISCHARGE MEDICATIONS:     Medication List        START taking these medications      ondansetron 4 MG disintegrating

## 2024-07-11 LAB — LEVETIRACETAM SERPL-MCNC: 15.1 UG/ML (ref 10–40)

## 2024-09-16 ENCOUNTER — HOSPITAL ENCOUNTER (INPATIENT)
Facility: HOSPITAL | Age: 54
LOS: 4 days | Discharge: HOME HEALTH | DRG: 917 | End: 2024-09-20
Attending: EMERGENCY MEDICINE | Admitting: FAMILY MEDICINE
Payer: MEDICARE

## 2024-09-16 ENCOUNTER — APPOINTMENT (OUTPATIENT)
Facility: HOSPITAL | Age: 54
DRG: 917 | End: 2024-09-16
Payer: MEDICARE

## 2024-09-16 DIAGNOSIS — R07.9 CHEST PAIN, UNSPECIFIED TYPE: Primary | ICD-10-CM

## 2024-09-16 DIAGNOSIS — R55 RECURRENT SYNCOPE: ICD-10-CM

## 2024-09-16 LAB
ALBUMIN SERPL-MCNC: 3.5 G/DL (ref 3.5–5)
ALBUMIN/GLOB SERPL: 1.3 (ref 1.1–2.2)
ALP SERPL-CCNC: 164 U/L (ref 45–117)
ALT SERPL-CCNC: 30 U/L (ref 12–78)
ANION GAP SERPL CALC-SCNC: 7 MMOL/L (ref 2–12)
APAP SERPL-MCNC: 9 UG/ML (ref 10–30)
APPEARANCE UR: ABNORMAL
AST SERPL W P-5'-P-CCNC: 28 U/L (ref 15–37)
BACTERIA URNS QL MICRO: NEGATIVE /HPF
BILIRUB SERPL-MCNC: 0.3 MG/DL (ref 0.2–1)
BILIRUB UR QL: NEGATIVE
BNP SERPL-MCNC: 58 PG/ML
BUN SERPL-MCNC: 6 MG/DL (ref 6–20)
BUN/CREAT SERPL: 10 (ref 12–20)
CA-I BLD-MCNC: 8.4 MG/DL (ref 8.5–10.1)
CHLORIDE SERPL-SCNC: 102 MMOL/L (ref 97–108)
CO2 SERPL-SCNC: 20 MMOL/L (ref 21–32)
COLOR UR: ABNORMAL
CREAT SERPL-MCNC: 0.59 MG/DL (ref 0.55–1.02)
DATE LAST DOSE: ABNORMAL
DATE LAST DOSE: ABNORMAL
DOSE AMOUNT: ABNORMAL UNITS
DOSE AMOUNT: ABNORMAL UNITS
EPITH CASTS URNS QL MICRO: ABNORMAL /LPF
ETHANOL SERPL-MCNC: <10 MG/DL (ref 0–0.08)
GLOBULIN SER CALC-MCNC: 2.6 G/DL (ref 2–4)
GLUCOSE BLD STRIP.AUTO-MCNC: 65 MG/DL (ref 65–100)
GLUCOSE BLD STRIP.AUTO-MCNC: 78 MG/DL (ref 65–100)
GLUCOSE BLD STRIP.AUTO-MCNC: 82 MG/DL (ref 65–100)
GLUCOSE SERPL-MCNC: 77 MG/DL (ref 65–100)
GLUCOSE UR STRIP.AUTO-MCNC: NEGATIVE MG/DL
HGB UR QL STRIP: NEGATIVE
KETONES UR QL STRIP.AUTO: 5 MG/DL
LEUKOCYTE ESTERASE UR QL STRIP.AUTO: NEGATIVE
NITRITE UR QL STRIP.AUTO: NEGATIVE
PERFORMED BY:: NORMAL
PH UR STRIP: 7 (ref 5–8)
POTASSIUM SERPL-SCNC: 4.6 MMOL/L (ref 3.5–5.1)
POTASSIUM SERPL-SCNC: NORMAL MMOL/L (ref 3.5–5.1)
PROT SERPL-MCNC: 6.1 G/DL (ref 6.4–8.2)
PROT UR STRIP-MCNC: NEGATIVE MG/DL
RBC #/AREA URNS HPF: ABNORMAL /HPF (ref 0–5)
SALICYLATES SERPL-MCNC: <1.7 MG/DL (ref 2.8–20)
SODIUM SERPL-SCNC: 129 MMOL/L (ref 136–145)
SP GR UR REFRACTOMETRY: 1.01 (ref 1–1.03)
TROPONIN I SERPL HS-MCNC: 4 NG/L (ref 0–51)
TROPONIN I SERPL HS-MCNC: 4 NG/L (ref 0–51)
URINE CULTURE IF INDICATED: ABNORMAL
UROBILINOGEN UR QL STRIP.AUTO: 0.1 EU/DL (ref 0.1–1)
WBC URNS QL MICRO: ABNORMAL /HPF (ref 0–4)

## 2024-09-16 PROCEDURE — 85025 COMPLETE CBC W/AUTO DIFF WBC: CPT

## 2024-09-16 PROCEDURE — 1100000000 HC RM PRIVATE

## 2024-09-16 PROCEDURE — 81001 URINALYSIS AUTO W/SCOPE: CPT

## 2024-09-16 PROCEDURE — 84132 ASSAY OF SERUM POTASSIUM: CPT

## 2024-09-16 PROCEDURE — 93005 ELECTROCARDIOGRAM TRACING: CPT | Performed by: EMERGENCY MEDICINE

## 2024-09-16 PROCEDURE — 84484 ASSAY OF TROPONIN QUANT: CPT

## 2024-09-16 PROCEDURE — 83036 HEMOGLOBIN GLYCOSYLATED A1C: CPT

## 2024-09-16 PROCEDURE — 70450 CT HEAD/BRAIN W/O DYE: CPT

## 2024-09-16 PROCEDURE — 2580000003 HC RX 258: Performed by: FAMILY MEDICINE

## 2024-09-16 PROCEDURE — 82077 ASSAY SPEC XCP UR&BREATH IA: CPT

## 2024-09-16 PROCEDURE — 6360000004 HC RX CONTRAST MEDICATION: Performed by: FAMILY MEDICINE

## 2024-09-16 PROCEDURE — 2580000003 HC RX 258: Performed by: EMERGENCY MEDICINE

## 2024-09-16 PROCEDURE — 80143 DRUG ASSAY ACETAMINOPHEN: CPT

## 2024-09-16 PROCEDURE — 80053 COMPREHEN METABOLIC PANEL: CPT

## 2024-09-16 PROCEDURE — 70496 CT ANGIOGRAPHY HEAD: CPT

## 2024-09-16 PROCEDURE — 74177 CT ABD & PELVIS W/CONTRAST: CPT

## 2024-09-16 PROCEDURE — 6370000000 HC RX 637 (ALT 250 FOR IP): Performed by: FAMILY MEDICINE

## 2024-09-16 PROCEDURE — 84443 ASSAY THYROID STIM HORMONE: CPT

## 2024-09-16 PROCEDURE — 73562 X-RAY EXAM OF KNEE 3: CPT

## 2024-09-16 PROCEDURE — 83880 ASSAY OF NATRIURETIC PEPTIDE: CPT

## 2024-09-16 PROCEDURE — 82962 GLUCOSE BLOOD TEST: CPT

## 2024-09-16 PROCEDURE — 80179 DRUG ASSAY SALICYLATE: CPT

## 2024-09-16 PROCEDURE — 80307 DRUG TEST PRSMV CHEM ANLYZR: CPT

## 2024-09-16 PROCEDURE — 36415 COLL VENOUS BLD VENIPUNCTURE: CPT

## 2024-09-16 PROCEDURE — 84439 ASSAY OF FREE THYROXINE: CPT

## 2024-09-16 PROCEDURE — 99285 EMERGENCY DEPT VISIT HI MDM: CPT

## 2024-09-16 RX ORDER — MONTELUKAST SODIUM 10 MG/1
10 TABLET ORAL DAILY
Status: DISCONTINUED | OUTPATIENT
Start: 2024-09-17 | End: 2024-09-20 | Stop reason: HOSPADM

## 2024-09-16 RX ORDER — LEVETIRACETAM 500 MG/1
1500 TABLET ORAL 2 TIMES DAILY
Status: DISCONTINUED | OUTPATIENT
Start: 2024-09-16 | End: 2024-09-17

## 2024-09-16 RX ORDER — POTASSIUM CHLORIDE 1500 MG/1
40 TABLET, EXTENDED RELEASE ORAL PRN
Status: DISCONTINUED | OUTPATIENT
Start: 2024-09-16 | End: 2024-09-20 | Stop reason: HOSPADM

## 2024-09-16 RX ORDER — ONDANSETRON 2 MG/ML
4 INJECTION INTRAMUSCULAR; INTRAVENOUS EVERY 6 HOURS PRN
Status: DISCONTINUED | OUTPATIENT
Start: 2024-09-16 | End: 2024-09-20 | Stop reason: HOSPADM

## 2024-09-16 RX ORDER — MAGNESIUM SULFATE IN WATER 40 MG/ML
2000 INJECTION, SOLUTION INTRAVENOUS PRN
Status: DISCONTINUED | OUTPATIENT
Start: 2024-09-16 | End: 2024-09-20 | Stop reason: HOSPADM

## 2024-09-16 RX ORDER — SODIUM CHLORIDE 0.9 % (FLUSH) 0.9 %
5-40 SYRINGE (ML) INJECTION EVERY 12 HOURS SCHEDULED
Status: DISCONTINUED | OUTPATIENT
Start: 2024-09-16 | End: 2024-09-20 | Stop reason: HOSPADM

## 2024-09-16 RX ORDER — SODIUM CHLORIDE 9 MG/ML
INJECTION, SOLUTION INTRAVENOUS CONTINUOUS
Status: DISCONTINUED | OUTPATIENT
Start: 2024-09-16 | End: 2024-09-20 | Stop reason: HOSPADM

## 2024-09-16 RX ORDER — IPRATROPIUM BROMIDE AND ALBUTEROL SULFATE 2.5; .5 MG/3ML; MG/3ML
1 SOLUTION RESPIRATORY (INHALATION)
Status: DISCONTINUED | OUTPATIENT
Start: 2024-09-17 | End: 2024-09-17

## 2024-09-16 RX ORDER — SODIUM CHLORIDE 0.9 % (FLUSH) 0.9 %
5-40 SYRINGE (ML) INJECTION PRN
Status: DISCONTINUED | OUTPATIENT
Start: 2024-09-16 | End: 2024-09-20 | Stop reason: HOSPADM

## 2024-09-16 RX ORDER — ALBUTEROL SULFATE 90 UG/1
2 INHALANT RESPIRATORY (INHALATION) EVERY 4 HOURS PRN
Status: DISCONTINUED | OUTPATIENT
Start: 2024-09-16 | End: 2024-09-20 | Stop reason: HOSPADM

## 2024-09-16 RX ORDER — 0.9 % SODIUM CHLORIDE 0.9 %
1000 INTRAVENOUS SOLUTION INTRAVENOUS ONCE
Status: COMPLETED | OUTPATIENT
Start: 2024-09-16 | End: 2024-09-16

## 2024-09-16 RX ORDER — BUDESONIDE AND FORMOTEROL FUMARATE DIHYDRATE 80; 4.5 UG/1; UG/1
2 AEROSOL RESPIRATORY (INHALATION)
Status: DISCONTINUED | OUTPATIENT
Start: 2024-09-16 | End: 2024-09-20 | Stop reason: HOSPADM

## 2024-09-16 RX ORDER — ENOXAPARIN SODIUM 100 MG/ML
40 INJECTION SUBCUTANEOUS DAILY
Status: DISCONTINUED | OUTPATIENT
Start: 2024-09-17 | End: 2024-09-20 | Stop reason: HOSPADM

## 2024-09-16 RX ORDER — LATANOPROST 50 UG/ML
1 SOLUTION/ DROPS OPHTHALMIC NIGHTLY
Status: DISCONTINUED | OUTPATIENT
Start: 2024-09-16 | End: 2024-09-20 | Stop reason: HOSPADM

## 2024-09-16 RX ORDER — INSULIN LISPRO 100 [IU]/ML
0-16 INJECTION, SOLUTION INTRAVENOUS; SUBCUTANEOUS
Status: DISCONTINUED | OUTPATIENT
Start: 2024-09-17 | End: 2024-09-20 | Stop reason: HOSPADM

## 2024-09-16 RX ORDER — SODIUM CHLORIDE 9 MG/ML
INJECTION, SOLUTION INTRAVENOUS PRN
Status: DISCONTINUED | OUTPATIENT
Start: 2024-09-16 | End: 2024-09-20 | Stop reason: HOSPADM

## 2024-09-16 RX ORDER — IOPAMIDOL 755 MG/ML
100 INJECTION, SOLUTION INTRAVASCULAR
Status: COMPLETED | OUTPATIENT
Start: 2024-09-16 | End: 2024-09-16

## 2024-09-16 RX ORDER — ATORVASTATIN CALCIUM 40 MG/1
40 TABLET, FILM COATED ORAL NIGHTLY
Status: DISCONTINUED | OUTPATIENT
Start: 2024-09-16 | End: 2024-09-17

## 2024-09-16 RX ORDER — POLYETHYLENE GLYCOL 3350 17 G/17G
17 POWDER, FOR SOLUTION ORAL DAILY PRN
Status: DISCONTINUED | OUTPATIENT
Start: 2024-09-16 | End: 2024-09-20 | Stop reason: HOSPADM

## 2024-09-16 RX ORDER — ONDANSETRON 4 MG/1
4 TABLET, ORALLY DISINTEGRATING ORAL EVERY 8 HOURS PRN
Status: DISCONTINUED | OUTPATIENT
Start: 2024-09-16 | End: 2024-09-20 | Stop reason: HOSPADM

## 2024-09-16 RX ORDER — DEXTROSE MONOHYDRATE 100 MG/ML
INJECTION, SOLUTION INTRAVENOUS CONTINUOUS PRN
Status: DISCONTINUED | OUTPATIENT
Start: 2024-09-16 | End: 2024-09-20 | Stop reason: HOSPADM

## 2024-09-16 RX ORDER — POTASSIUM CHLORIDE 7.45 MG/ML
10 INJECTION INTRAVENOUS PRN
Status: DISCONTINUED | OUTPATIENT
Start: 2024-09-16 | End: 2024-09-20 | Stop reason: HOSPADM

## 2024-09-16 RX ORDER — OXCARBAZEPINE 300 MG/1
900 TABLET, FILM COATED ORAL 2 TIMES DAILY
Status: DISCONTINUED | OUTPATIENT
Start: 2024-09-16 | End: 2024-09-17

## 2024-09-16 RX ORDER — INSULIN LISPRO 100 [IU]/ML
0-4 INJECTION, SOLUTION INTRAVENOUS; SUBCUTANEOUS NIGHTLY
Status: DISCONTINUED | OUTPATIENT
Start: 2024-09-16 | End: 2024-09-20 | Stop reason: HOSPADM

## 2024-09-16 RX ADMIN — IOPAMIDOL 100 ML: 755 INJECTION, SOLUTION INTRAVENOUS at 21:42

## 2024-09-16 RX ADMIN — SODIUM CHLORIDE: 9 INJECTION, SOLUTION INTRAVENOUS at 22:39

## 2024-09-16 RX ADMIN — LATANOPROST 1 DROP: 50 SOLUTION OPHTHALMIC at 22:40

## 2024-09-16 RX ADMIN — SODIUM CHLORIDE, PRESERVATIVE FREE 10 ML: 5 INJECTION INTRAVENOUS at 22:39

## 2024-09-16 RX ADMIN — SODIUM CHLORIDE 1000 ML: 9 INJECTION, SOLUTION INTRAVENOUS at 20:20

## 2024-09-16 RX ADMIN — ATORVASTATIN CALCIUM 40 MG: 40 TABLET, FILM COATED ORAL at 22:41

## 2024-09-16 RX ADMIN — LEVETIRACETAM 1500 MG: 500 TABLET, FILM COATED ORAL at 22:40

## 2024-09-16 ASSESSMENT — PAIN - FUNCTIONAL ASSESSMENT
PAIN_FUNCTIONAL_ASSESSMENT: 0-10
PAIN_FUNCTIONAL_ASSESSMENT: 0-10

## 2024-09-16 ASSESSMENT — PAIN SCALES - GENERAL
PAINLEVEL_OUTOF10: 2
PAINLEVEL_OUTOF10: 2
PAINLEVEL_OUTOF10: 5

## 2024-09-16 ASSESSMENT — HEART SCORE: ECG: NORMAL

## 2024-09-16 ASSESSMENT — PAIN DESCRIPTION - LOCATION
LOCATION: KNEE
LOCATION: KNEE

## 2024-09-17 ENCOUNTER — HOSPITAL ENCOUNTER (INPATIENT)
Facility: HOSPITAL | Age: 54
Discharge: HOME | DRG: 917 | End: 2024-09-19
Attending: FAMILY MEDICINE
Payer: MEDICARE

## 2024-09-17 ENCOUNTER — APPOINTMENT (OUTPATIENT)
Facility: HOSPITAL | Age: 54
DRG: 917 | End: 2024-09-17
Attending: FAMILY MEDICINE
Payer: MEDICARE

## 2024-09-17 LAB
ALBUMIN SERPL-MCNC: 3.6 G/DL (ref 3.5–5)
ALBUMIN/GLOB SERPL: 1.1 (ref 1.1–2.2)
ALP SERPL-CCNC: 185 U/L (ref 45–117)
ALT SERPL-CCNC: 36 U/L (ref 12–78)
AMPHET UR QL SCN: NEGATIVE
AMPHET UR QL SCN: NEGATIVE
ANION GAP SERPL CALC-SCNC: 7 MMOL/L (ref 2–12)
APPEARANCE UR: CLEAR
AST SERPL W P-5'-P-CCNC: 23 U/L (ref 15–37)
BACTERIA URNS QL MICRO: NEGATIVE /HPF
BARBITURATES UR QL SCN: NEGATIVE
BARBITURATES UR QL SCN: NEGATIVE
BASOPHILS # BLD: 0 K/UL (ref 0–0.1)
BASOPHILS NFR BLD: 1 % (ref 0–1)
BENZODIAZ UR QL: NEGATIVE
BENZODIAZ UR QL: NEGATIVE
BILIRUB SERPL-MCNC: 0.2 MG/DL (ref 0.2–1)
BILIRUB UR QL: NEGATIVE
BNP SERPL-MCNC: 61 PG/ML
BUN SERPL-MCNC: 4 MG/DL (ref 6–20)
BUN/CREAT SERPL: 5 (ref 12–20)
CA-I BLD-MCNC: 8.7 MG/DL (ref 8.5–10.1)
CANNABINOIDS UR QL SCN: NEGATIVE
CANNABINOIDS UR QL SCN: NEGATIVE
CHLORIDE SERPL-SCNC: 110 MMOL/L (ref 97–108)
CO2 SERPL-SCNC: 17 MMOL/L (ref 21–32)
COCAINE UR QL SCN: NEGATIVE
COCAINE UR QL SCN: NEGATIVE
COLOR UR: ABNORMAL
CREAT SERPL-MCNC: 0.84 MG/DL (ref 0.55–1.02)
DIFFERENTIAL METHOD BLD: ABNORMAL
ECHO AO ASC DIAM: 3.2 CM
ECHO AO ASCENDING AORTA INDEX: 1.73 CM/M2
ECHO AO ROOT DIAM: 2.9 CM
ECHO AO ROOT INDEX: 1.57 CM/M2
ECHO AV AREA PEAK VELOCITY: 3.9 CM2
ECHO AV AREA VTI: 3.9 CM2
ECHO AV AREA/BSA PEAK VELOCITY: 2.1 CM2/M2
ECHO AV AREA/BSA VTI: 2.1 CM2/M2
ECHO AV MEAN GRADIENT: 2 MMHG
ECHO AV MEAN VELOCITY: 0.7 M/S
ECHO AV PEAK GRADIENT: 3 MMHG
ECHO AV PEAK VELOCITY: 0.9 M/S
ECHO AV VELOCITY RATIO: 0.89
ECHO AV VTI: 15.7 CM
ECHO BSA: 1.88 M2
ECHO BSA: 1.88 M2
ECHO EST RA PRESSURE: 3 MMHG
ECHO LA AREA 4C: 12.2 CM2
ECHO LA DIAMETER INDEX: 1.73 CM/M2
ECHO LA DIAMETER: 3.2 CM
ECHO LA MAJOR AXIS: 5.1 CM
ECHO LA TO AORTIC ROOT RATIO: 1.1
ECHO LA VOL MOD A4C: 22 ML (ref 22–52)
ECHO LA VOLUME INDEX MOD A4C: 12 ML/M2 (ref 16–34)
ECHO LV E' SEPTAL VELOCITY: 7 CM/S
ECHO LV EDV 3D: 88 ML
ECHO LV EDV A4C: 83 ML
ECHO LV EDV INDEX 3D: 48 ML/M2
ECHO LV EDV INDEX A4C: 45 ML/M2
ECHO LV EF PHYSICIAN: 52 %
ECHO LV EJECTION FRACTION 3D: 44 %
ECHO LV EJECTION FRACTION A4C: 44 %
ECHO LV ESV 3D: 49 ML
ECHO LV ESV A4C: 47 ML
ECHO LV ESV INDEX 3D: 26 ML/M2
ECHO LV ESV INDEX A4C: 25 ML/M2
ECHO LV FRACTIONAL SHORTENING: 40 % (ref 28–44)
ECHO LV INTERNAL DIMENSION DIASTOLE INDEX: 2.27 CM/M2
ECHO LV INTERNAL DIMENSION DIASTOLIC: 4.2 CM (ref 3.9–5.3)
ECHO LV INTERNAL DIMENSION SYSTOLIC INDEX: 1.35 CM/M2
ECHO LV INTERNAL DIMENSION SYSTOLIC: 2.5 CM
ECHO LV IVSD: 1 CM (ref 0.6–0.9)
ECHO LV MASS 2D: 137.2 G (ref 67–162)
ECHO LV MASS 3D INDEX: 82.2 G/M2
ECHO LV MASS 3D: 152 G
ECHO LV MASS INDEX 2D: 74.2 G/M2 (ref 43–95)
ECHO LV POSTERIOR WALL DIASTOLIC: 1 CM (ref 0.6–0.9)
ECHO LV RELATIVE WALL THICKNESS RATIO: 0.48
ECHO LVOT AREA: 4.2 CM2
ECHO LVOT AV VTI INDEX: 0.95
ECHO LVOT DIAM: 2.3 CM
ECHO LVOT MEAN GRADIENT: 1 MMHG
ECHO LVOT PEAK GRADIENT: 3 MMHG
ECHO LVOT PEAK VELOCITY: 0.8 M/S
ECHO LVOT STROKE VOLUME INDEX: 33.4 ML/M2
ECHO LVOT SV: 61.9 ML
ECHO LVOT VTI: 14.9 CM
ECHO MV A VELOCITY: 0.75 M/S
ECHO MV AREA VTI: 3.6 CM2
ECHO MV E DECELERATION TIME (DT): 333 MS
ECHO MV E VELOCITY: 0.45 M/S
ECHO MV E/A RATIO: 0.6
ECHO MV E/E' SEPTAL: 6.43
ECHO MV LVOT VTI INDEX: 1.15
ECHO MV MAX VELOCITY: 0.9 M/S
ECHO MV MEAN GRADIENT: 1 MMHG
ECHO MV MEAN VELOCITY: 0.5 M/S
ECHO MV PEAK GRADIENT: 3 MMHG
ECHO MV VTI: 17.1 CM
ECHO PV MAX VELOCITY: 0.9 M/S
ECHO PV PEAK GRADIENT: 3 MMHG
ECHO RA AREA 4C: 10.9 CM2
ECHO RA END SYSTOLIC VOLUME APICAL 4 CHAMBER INDEX BSA: 11 ML/M2
ECHO RA VOLUME: 20 ML
ECHO RIGHT VENTRICULAR SYSTOLIC PRESSURE (RVSP): 19 MMHG
ECHO RV BASAL DIMENSION: 2.8 CM
ECHO RV FREE WALL PEAK S': 19 CM/S
ECHO RV TAPSE: 2.1 CM (ref 1.7–?)
ECHO TV REGURGITANT MAX VELOCITY: 1.98 M/S
ECHO TV REGURGITANT PEAK GRADIENT: 16 MMHG
EKG ATRIAL RATE: 64 BPM
EKG DIAGNOSIS: NORMAL
EKG P AXIS: 49 DEGREES
EKG P-R INTERVAL: 198 MS
EKG Q-T INTERVAL: 410 MS
EKG QRS DURATION: 98 MS
EKG QTC CALCULATION (BAZETT): 419 MS
EKG R AXIS: 12 DEGREES
EKG T AXIS: 19 DEGREES
EKG VENTRICULAR RATE: 63 BPM
EOSINOPHIL # BLD: 0.4 K/UL (ref 0–0.4)
EOSINOPHIL NFR BLD: 6 % (ref 0–7)
EPITH CASTS URNS QL MICRO: ABNORMAL /LPF
ERYTHROCYTE [DISTWIDTH] IN BLOOD BY AUTOMATED COUNT: 19.6 % (ref 11.5–14.5)
EST. AVERAGE GLUCOSE BLD GHB EST-MCNC: 82 MG/DL
GLOBULIN SER CALC-MCNC: 3.4 G/DL (ref 2–4)
GLUCOSE BLD STRIP.AUTO-MCNC: 63 MG/DL (ref 65–100)
GLUCOSE BLD STRIP.AUTO-MCNC: 77 MG/DL (ref 65–100)
GLUCOSE BLD STRIP.AUTO-MCNC: 95 MG/DL (ref 65–100)
GLUCOSE BLD STRIP.AUTO-MCNC: 96 MG/DL (ref 65–100)
GLUCOSE SERPL-MCNC: 106 MG/DL (ref 65–100)
GLUCOSE UR STRIP.AUTO-MCNC: NEGATIVE MG/DL
HBA1C MFR BLD: 4.5 % (ref 4–5.6)
HCT VFR BLD AUTO: 43.2 % (ref 35–47)
HGB BLD-MCNC: 14.3 G/DL (ref 11.5–16)
HGB UR QL STRIP: NEGATIVE
IMM GRANULOCYTES # BLD AUTO: 0 K/UL (ref 0–0.04)
IMM GRANULOCYTES NFR BLD AUTO: 0 % (ref 0–0.5)
KETONES UR QL STRIP.AUTO: NEGATIVE MG/DL
LEUKOCYTE ESTERASE UR QL STRIP.AUTO: NEGATIVE
LYMPHOCYTES # BLD: 1.6 K/UL (ref 0.8–3.5)
LYMPHOCYTES NFR BLD: 25 % (ref 12–49)
Lab: NORMAL
Lab: NORMAL
MCH RBC QN AUTO: 26.6 PG (ref 26–34)
MCHC RBC AUTO-ENTMCNC: 33.1 G/DL (ref 30–36.5)
MCV RBC AUTO: 80.4 FL (ref 80–99)
METHADONE UR QL: NEGATIVE
METHADONE UR QL: NEGATIVE
MONOCYTES # BLD: 0.3 K/UL (ref 0–1)
MONOCYTES NFR BLD: 4 % (ref 5–13)
MUCOUS THREADS URNS QL MICRO: ABNORMAL /LPF
NEUTS SEG # BLD: 4.2 K/UL (ref 1.8–8)
NEUTS SEG NFR BLD: 64 % (ref 32–75)
NITRITE UR QL STRIP.AUTO: NEGATIVE
NRBC # BLD: 0 K/UL (ref 0–0.01)
NRBC BLD-RTO: 0 PER 100 WBC
OPIATES UR QL: NEGATIVE
OPIATES UR QL: NEGATIVE
PCP UR QL: NEGATIVE
PCP UR QL: NEGATIVE
PERFORMED BY:: ABNORMAL
PERFORMED BY:: NORMAL
PH UR STRIP: 7 (ref 5–8)
PLATELET # BLD AUTO: 244 K/UL (ref 150–400)
PMV BLD AUTO: 9.6 FL (ref 8.9–12.9)
POTASSIUM SERPL-SCNC: 3.4 MMOL/L (ref 3.5–5.1)
PROT SERPL-MCNC: 7 G/DL (ref 6.4–8.2)
PROT UR STRIP-MCNC: NEGATIVE MG/DL
RBC # BLD AUTO: 5.37 M/UL (ref 3.8–5.2)
RBC #/AREA URNS HPF: ABNORMAL /HPF (ref 0–5)
SODIUM SERPL-SCNC: 134 MMOL/L (ref 136–145)
SP GR UR REFRACTOMETRY: 1.01 (ref 1–1.03)
T4 FREE SERPL-MCNC: 0.7 NG/DL (ref 0.8–1.5)
TSH SERPL DL<=0.05 MIU/L-ACNC: 1.19 UIU/ML (ref 0.36–3.74)
TSH SERPL DL<=0.05 MIU/L-ACNC: 1.27 UIU/ML (ref 0.36–3.74)
UROBILINOGEN UR QL STRIP.AUTO: 0.1 EU/DL (ref 0.1–1)
VAS LEFT CCA DIST EDV: 18.3 CM/S
VAS LEFT CCA DIST PSV: 72 CM/S
VAS LEFT CCA PROX EDV: 19 CM/S
VAS LEFT CCA PROX PSV: 78.1 CM/S
VAS LEFT ECA EDV: 13.2 CM/S
VAS LEFT ECA PSV: 61.8 CM/S
VAS LEFT ICA DIST EDV: 29.4 CM/S
VAS LEFT ICA DIST PSV: 72.6 CM/S
VAS LEFT ICA PROX EDV: 22.1 CM/S
VAS LEFT ICA PROX PSV: 59.1 CM/S
VAS LEFT ICA/CCA PSV: 0.82
VAS LEFT SUBCLAVIAN PROX EDV: 0 CM/S
VAS LEFT SUBCLAVIAN PROX PSV: 61.8 CM/S
VAS LEFT VERTEBRAL EDV: 13.4 CM/S
VAS LEFT VERTEBRAL PSV: 41.8 CM/S
VAS RIGHT CCA DIST EDV: 15.6 CM/S
VAS RIGHT CCA DIST PSV: 61.8 CM/S
VAS RIGHT CCA PROX EDV: 16.1 CM/S
VAS RIGHT CCA PROX PSV: 91 CM/S
VAS RIGHT ECA EDV: 15.1 CM/S
VAS RIGHT ECA PSV: 74.2 CM/S
VAS RIGHT ICA DIST EDV: 26.4 CM/S
VAS RIGHT ICA DIST PSV: 72.6 CM/S
VAS RIGHT ICA PROX EDV: 14.6 CM/S
VAS RIGHT ICA PROX PSV: 41.2 CM/S
VAS RIGHT ICA/CCA PSV: 0.67
VAS RIGHT SUBCLAVIAN PROX EDV: 0 CM/S
VAS RIGHT SUBCLAVIAN PROX PSV: 106 CM/S
VAS RIGHT VERTEBRAL EDV: 11.1 CM/S
VAS RIGHT VERTEBRAL PSV: 33.6 CM/S
WBC # BLD AUTO: 6.5 K/UL (ref 3.6–11)
WBC URNS QL MICRO: ABNORMAL /HPF (ref 0–4)

## 2024-09-17 PROCEDURE — 6370000000 HC RX 637 (ALT 250 FOR IP): Performed by: FAMILY MEDICINE

## 2024-09-17 PROCEDURE — 93306 TTE W/DOPPLER COMPLETE: CPT

## 2024-09-17 PROCEDURE — 83880 ASSAY OF NATRIURETIC PEPTIDE: CPT

## 2024-09-17 PROCEDURE — 97161 PT EVAL LOW COMPLEX 20 MIN: CPT

## 2024-09-17 PROCEDURE — 97530 THERAPEUTIC ACTIVITIES: CPT

## 2024-09-17 PROCEDURE — 80307 DRUG TEST PRSMV CHEM ANLYZR: CPT

## 2024-09-17 PROCEDURE — 80053 COMPREHEN METABOLIC PANEL: CPT

## 2024-09-17 PROCEDURE — 81003 URINALYSIS AUTO W/O SCOPE: CPT

## 2024-09-17 PROCEDURE — 82962 GLUCOSE BLOOD TEST: CPT

## 2024-09-17 PROCEDURE — 97165 OT EVAL LOW COMPLEX 30 MIN: CPT

## 2024-09-17 PROCEDURE — 94761 N-INVAS EAR/PLS OXIMETRY MLT: CPT

## 2024-09-17 PROCEDURE — 6360000002 HC RX W HCPCS: Performed by: FAMILY MEDICINE

## 2024-09-17 PROCEDURE — 6370000000 HC RX 637 (ALT 250 FOR IP): Performed by: PSYCHIATRY & NEUROLOGY

## 2024-09-17 PROCEDURE — 84443 ASSAY THYROID STIM HORMONE: CPT

## 2024-09-17 PROCEDURE — 1100000000 HC RM PRIVATE

## 2024-09-17 PROCEDURE — 92610 EVALUATE SWALLOWING FUNCTION: CPT

## 2024-09-17 PROCEDURE — 85025 COMPLETE CBC W/AUTO DIFF WBC: CPT

## 2024-09-17 PROCEDURE — 94640 AIRWAY INHALATION TREATMENT: CPT

## 2024-09-17 PROCEDURE — 36415 COLL VENOUS BLD VENIPUNCTURE: CPT

## 2024-09-17 PROCEDURE — 93880 EXTRACRANIAL BILAT STUDY: CPT

## 2024-09-17 PROCEDURE — 2580000003 HC RX 258: Performed by: FAMILY MEDICINE

## 2024-09-17 PROCEDURE — 87086 URINE CULTURE/COLONY COUNT: CPT

## 2024-09-17 PROCEDURE — 99221 1ST HOSP IP/OBS SF/LOW 40: CPT | Performed by: PSYCHIATRY & NEUROLOGY

## 2024-09-17 RX ORDER — POTASSIUM CHLORIDE 750 MG/1
10 CAPSULE, EXTENDED RELEASE ORAL DAILY
COMMUNITY

## 2024-09-17 RX ORDER — ACETAMINOPHEN 325 MG/1
650 TABLET ORAL EVERY 6 HOURS PRN
Status: DISCONTINUED | OUTPATIENT
Start: 2024-09-17 | End: 2024-09-20 | Stop reason: HOSPADM

## 2024-09-17 RX ORDER — NICOTINE 21 MG/24HR
1 PATCH, TRANSDERMAL 24 HOURS TRANSDERMAL DAILY
Status: DISCONTINUED | OUTPATIENT
Start: 2024-09-17 | End: 2024-09-20 | Stop reason: HOSPADM

## 2024-09-17 RX ORDER — LEVETIRACETAM 250 MG/1
750 TABLET ORAL 2 TIMES DAILY
Status: DISCONTINUED | OUTPATIENT
Start: 2024-09-17 | End: 2024-09-18

## 2024-09-17 RX ORDER — ASPIRIN 81 MG/1
81 TABLET ORAL DAILY
Status: DISCONTINUED | OUTPATIENT
Start: 2024-09-17 | End: 2024-09-20 | Stop reason: HOSPADM

## 2024-09-17 RX ORDER — IPRATROPIUM BROMIDE AND ALBUTEROL SULFATE 2.5; .5 MG/3ML; MG/3ML
1 SOLUTION RESPIRATORY (INHALATION)
Status: DISCONTINUED | OUTPATIENT
Start: 2024-09-17 | End: 2024-09-20

## 2024-09-17 RX ORDER — SORBITOL SOLUTION 70 %
30 SOLUTION, ORAL MISCELLANEOUS ONCE
Status: DISCONTINUED | OUTPATIENT
Start: 2024-09-17 | End: 2024-09-20 | Stop reason: HOSPADM

## 2024-09-17 RX ORDER — OXCARBAZEPINE 300 MG/1
600 TABLET, FILM COATED ORAL NIGHTLY
Status: DISCONTINUED | OUTPATIENT
Start: 2024-09-17 | End: 2024-09-20 | Stop reason: HOSPADM

## 2024-09-17 RX ORDER — POTASSIUM CHLORIDE 750 MG/1
40 TABLET, EXTENDED RELEASE ORAL ONCE
Status: DISCONTINUED | OUTPATIENT
Start: 2024-09-17 | End: 2024-09-20 | Stop reason: HOSPADM

## 2024-09-17 RX ORDER — ATORVASTATIN CALCIUM 40 MG/1
80 TABLET, FILM COATED ORAL NIGHTLY
Status: DISCONTINUED | OUTPATIENT
Start: 2024-09-17 | End: 2024-09-20 | Stop reason: HOSPADM

## 2024-09-17 RX ADMIN — ACETAMINOPHEN 650 MG: 325 TABLET ORAL at 23:05

## 2024-09-17 RX ADMIN — MONTELUKAST 10 MG: 10 TABLET, FILM COATED ORAL at 10:00

## 2024-09-17 RX ADMIN — SODIUM CHLORIDE, PRESERVATIVE FREE 10 ML: 5 INJECTION INTRAVENOUS at 10:01

## 2024-09-17 RX ADMIN — LATANOPROST 1 DROP: 50 SOLUTION OPHTHALMIC at 20:58

## 2024-09-17 RX ADMIN — ACETAMINOPHEN 650 MG: 325 TABLET ORAL at 12:56

## 2024-09-17 RX ADMIN — SODIUM CHLORIDE, PRESERVATIVE FREE 10 ML: 5 INJECTION INTRAVENOUS at 20:58

## 2024-09-17 RX ADMIN — LEVOTHYROXINE SODIUM 150 MCG: 0.03 TABLET ORAL at 10:00

## 2024-09-17 RX ADMIN — Medication 2 PUFF: at 21:11

## 2024-09-17 RX ADMIN — OXCARBAZEPINE 600 MG: 300 TABLET, FILM COATED ORAL at 20:58

## 2024-09-17 RX ADMIN — ENOXAPARIN SODIUM 40 MG: 100 INJECTION SUBCUTANEOUS at 10:01

## 2024-09-17 RX ADMIN — LEVETIRACETAM 750 MG: 250 TABLET, FILM COATED ORAL at 20:58

## 2024-09-17 RX ADMIN — ATORVASTATIN CALCIUM 80 MG: 40 TABLET, FILM COATED ORAL at 20:58

## 2024-09-17 RX ADMIN — LEVETIRACETAM 1500 MG: 500 TABLET, FILM COATED ORAL at 10:01

## 2024-09-17 RX ADMIN — IPRATROPIUM BROMIDE AND ALBUTEROL SULFATE 1 DOSE: 2.5; .5 SOLUTION RESPIRATORY (INHALATION) at 07:28

## 2024-09-17 RX ADMIN — ASPIRIN 81 MG: 81 TABLET, COATED ORAL at 16:59

## 2024-09-17 RX ADMIN — IPRATROPIUM BROMIDE AND ALBUTEROL SULFATE 1 DOSE: 2.5; .5 SOLUTION RESPIRATORY (INHALATION) at 21:11

## 2024-09-17 RX ADMIN — Medication 2 PUFF: at 07:28

## 2024-09-17 RX ADMIN — SODIUM CHLORIDE: 9 INJECTION, SOLUTION INTRAVENOUS at 06:38

## 2024-09-17 RX ADMIN — FLUOXETINE HYDROCHLORIDE 40 MG: 20 CAPSULE ORAL at 10:00

## 2024-09-17 ASSESSMENT — PAIN SCALES - GENERAL
PAINLEVEL_OUTOF10: 3
PAINLEVEL_OUTOF10: 3
PAINLEVEL_OUTOF10: 7
PAINLEVEL_OUTOF10: 7
PAINLEVEL_OUTOF10: 0

## 2024-09-17 ASSESSMENT — PAIN DESCRIPTION - LOCATION: LOCATION: BACK

## 2024-09-17 ASSESSMENT — PAIN DESCRIPTION - ORIENTATION: ORIENTATION: POSTERIOR

## 2024-09-18 ENCOUNTER — APPOINTMENT (OUTPATIENT)
Facility: HOSPITAL | Age: 54
DRG: 917 | End: 2024-09-18
Payer: MEDICARE

## 2024-09-18 LAB
ALBUMIN SERPL-MCNC: 3.5 G/DL (ref 3.5–5)
ALBUMIN/GLOB SERPL: 1.2 (ref 1.1–2.2)
ALP SERPL-CCNC: 165 U/L (ref 45–117)
ALT SERPL-CCNC: 36 U/L (ref 12–78)
AMPHET UR QL SCN: NEGATIVE
ANION GAP SERPL CALC-SCNC: 6 MMOL/L (ref 2–12)
AST SERPL W P-5'-P-CCNC: 23 U/L (ref 15–37)
BACTERIA SPEC CULT: NORMAL
BARBITURATES UR QL SCN: NEGATIVE
BENZODIAZ UR QL: NEGATIVE
BILIRUB SERPL-MCNC: 0.2 MG/DL (ref 0.2–1)
BUN SERPL-MCNC: 4 MG/DL (ref 6–20)
BUN/CREAT SERPL: 6 (ref 12–20)
CA-I BLD-MCNC: 8.8 MG/DL (ref 8.5–10.1)
CANNABINOIDS UR QL SCN: NEGATIVE
CHLORIDE SERPL-SCNC: 111 MMOL/L (ref 97–108)
CO2 SERPL-SCNC: 23 MMOL/L (ref 21–32)
COCAINE UR QL SCN: NEGATIVE
COLONY COUNT, CNT: NORMAL
COLONY COUNT, CNT: NORMAL
CREAT SERPL-MCNC: 0.64 MG/DL (ref 0.55–1.02)
ERYTHROCYTE [DISTWIDTH] IN BLOOD BY AUTOMATED COUNT: 19.3 % (ref 11.5–14.5)
GLOBULIN SER CALC-MCNC: 3 G/DL (ref 2–4)
GLUCOSE BLD STRIP.AUTO-MCNC: 77 MG/DL (ref 65–100)
GLUCOSE BLD STRIP.AUTO-MCNC: 80 MG/DL (ref 65–100)
GLUCOSE BLD STRIP.AUTO-MCNC: 85 MG/DL (ref 65–100)
GLUCOSE BLD STRIP.AUTO-MCNC: 93 MG/DL (ref 65–100)
GLUCOSE SERPL-MCNC: 83 MG/DL (ref 65–100)
HCT VFR BLD AUTO: 39.6 % (ref 35–47)
HGB BLD-MCNC: 13.3 G/DL (ref 11.5–16)
Lab: NORMAL
Lab: NORMAL
MCH RBC QN AUTO: 26.4 PG (ref 26–34)
MCHC RBC AUTO-ENTMCNC: 33.6 G/DL (ref 30–36.5)
MCV RBC AUTO: 78.6 FL (ref 80–99)
METHADONE UR QL: NEGATIVE
NRBC # BLD: 0 K/UL (ref 0–0.01)
NRBC BLD-RTO: 0 PER 100 WBC
OPIATES UR QL: NEGATIVE
PCP UR QL: NEGATIVE
PERFORMED BY:: NORMAL
PLATELET # BLD AUTO: 289 K/UL (ref 150–400)
PMV BLD AUTO: 10.2 FL (ref 8.9–12.9)
POTASSIUM SERPL-SCNC: 3.6 MMOL/L (ref 3.5–5.1)
PROT SERPL-MCNC: 6.5 G/DL (ref 6.4–8.2)
RBC # BLD AUTO: 5.04 M/UL (ref 3.8–5.2)
SODIUM SERPL-SCNC: 140 MMOL/L (ref 136–145)
WBC # BLD AUTO: 4.7 K/UL (ref 3.6–11)

## 2024-09-18 PROCEDURE — 94640 AIRWAY INHALATION TREATMENT: CPT

## 2024-09-18 PROCEDURE — 82962 GLUCOSE BLOOD TEST: CPT

## 2024-09-18 PROCEDURE — 6370000000 HC RX 637 (ALT 250 FOR IP): Performed by: FAMILY MEDICINE

## 2024-09-18 PROCEDURE — 80053 COMPREHEN METABOLIC PANEL: CPT

## 2024-09-18 PROCEDURE — 6360000002 HC RX W HCPCS: Performed by: FAMILY MEDICINE

## 2024-09-18 PROCEDURE — 85027 COMPLETE CBC AUTOMATED: CPT

## 2024-09-18 PROCEDURE — 99232 SBSQ HOSP IP/OBS MODERATE 35: CPT | Performed by: PSYCHIATRY & NEUROLOGY

## 2024-09-18 PROCEDURE — 1100000000 HC RM PRIVATE

## 2024-09-18 PROCEDURE — 70551 MRI BRAIN STEM W/O DYE: CPT

## 2024-09-18 PROCEDURE — 84466 ASSAY OF TRANSFERRIN: CPT

## 2024-09-18 PROCEDURE — 6370000000 HC RX 637 (ALT 250 FOR IP): Performed by: PSYCHIATRY & NEUROLOGY

## 2024-09-18 PROCEDURE — 94761 N-INVAS EAR/PLS OXIMETRY MLT: CPT

## 2024-09-18 PROCEDURE — 97530 THERAPEUTIC ACTIVITIES: CPT

## 2024-09-18 PROCEDURE — 36415 COLL VENOUS BLD VENIPUNCTURE: CPT

## 2024-09-18 PROCEDURE — 80177 DRUG SCRN QUAN LEVETIRACETAM: CPT

## 2024-09-18 PROCEDURE — 2580000003 HC RX 258: Performed by: FAMILY MEDICINE

## 2024-09-18 PROCEDURE — 80307 DRUG TEST PRSMV CHEM ANLYZR: CPT

## 2024-09-18 RX ORDER — TRAZODONE HYDROCHLORIDE 50 MG/1
100 TABLET, FILM COATED ORAL NIGHTLY
Status: DISCONTINUED | OUTPATIENT
Start: 2024-09-18 | End: 2024-09-20 | Stop reason: HOSPADM

## 2024-09-18 RX ORDER — LEVETIRACETAM 500 MG/1
1500 TABLET ORAL NIGHTLY
Status: DISCONTINUED | OUTPATIENT
Start: 2024-09-18 | End: 2024-09-20 | Stop reason: HOSPADM

## 2024-09-18 RX ORDER — LEVETIRACETAM 250 MG/1
750 TABLET ORAL DAILY
Status: DISCONTINUED | OUTPATIENT
Start: 2024-09-19 | End: 2024-09-20 | Stop reason: HOSPADM

## 2024-09-18 RX ADMIN — IPRATROPIUM BROMIDE AND ALBUTEROL SULFATE 1 DOSE: 2.5; .5 SOLUTION RESPIRATORY (INHALATION) at 13:32

## 2024-09-18 RX ADMIN — LEVETIRACETAM 750 MG: 250 TABLET, FILM COATED ORAL at 08:50

## 2024-09-18 RX ADMIN — LEVETIRACETAM 1500 MG: 500 TABLET, FILM COATED ORAL at 20:41

## 2024-09-18 RX ADMIN — ASPIRIN 81 MG: 81 TABLET, COATED ORAL at 08:51

## 2024-09-18 RX ADMIN — LATANOPROST 1 DROP: 50 SOLUTION OPHTHALMIC at 20:45

## 2024-09-18 RX ADMIN — Medication 2 PUFF: at 19:41

## 2024-09-18 RX ADMIN — TRAZODONE HYDROCHLORIDE 100 MG: 50 TABLET ORAL at 21:33

## 2024-09-18 RX ADMIN — ENOXAPARIN SODIUM 40 MG: 100 INJECTION SUBCUTANEOUS at 08:50

## 2024-09-18 RX ADMIN — OXCARBAZEPINE 600 MG: 300 TABLET, FILM COATED ORAL at 20:41

## 2024-09-18 RX ADMIN — LEVOTHYROXINE SODIUM 150 MCG: 0.03 TABLET ORAL at 08:50

## 2024-09-18 RX ADMIN — SODIUM CHLORIDE, PRESERVATIVE FREE 10 ML: 5 INJECTION INTRAVENOUS at 20:42

## 2024-09-18 RX ADMIN — IPRATROPIUM BROMIDE AND ALBUTEROL SULFATE 1 DOSE: 2.5; .5 SOLUTION RESPIRATORY (INHALATION) at 19:36

## 2024-09-18 RX ADMIN — MONTELUKAST 10 MG: 10 TABLET, FILM COATED ORAL at 08:50

## 2024-09-18 RX ADMIN — SODIUM CHLORIDE, PRESERVATIVE FREE 10 ML: 5 INJECTION INTRAVENOUS at 08:51

## 2024-09-18 RX ADMIN — ATORVASTATIN CALCIUM 80 MG: 40 TABLET, FILM COATED ORAL at 20:41

## 2024-09-18 RX ADMIN — Medication 2 PUFF: at 08:44

## 2024-09-18 RX ADMIN — IPRATROPIUM BROMIDE AND ALBUTEROL SULFATE 1 DOSE: 2.5; .5 SOLUTION RESPIRATORY (INHALATION) at 08:44

## 2024-09-18 RX ADMIN — FLUOXETINE HYDROCHLORIDE 40 MG: 20 CAPSULE ORAL at 08:50

## 2024-09-18 ASSESSMENT — PAIN SCALES - GENERAL
PAINLEVEL_OUTOF10: 5
PAINLEVEL_OUTOF10: 4
PAINLEVEL_OUTOF10: 3
PAINLEVEL_OUTOF10: 0
PAINLEVEL_OUTOF10: 2

## 2024-09-19 LAB
GLUCOSE BLD STRIP.AUTO-MCNC: 104 MG/DL (ref 65–100)
GLUCOSE BLD STRIP.AUTO-MCNC: 109 MG/DL (ref 65–100)
GLUCOSE BLD STRIP.AUTO-MCNC: 86 MG/DL (ref 65–100)
GLUCOSE BLD STRIP.AUTO-MCNC: 88 MG/DL (ref 65–100)
PERFORMED BY:: ABNORMAL
PERFORMED BY:: ABNORMAL
PERFORMED BY:: NORMAL
PERFORMED BY:: NORMAL

## 2024-09-19 PROCEDURE — 6370000000 HC RX 637 (ALT 250 FOR IP): Performed by: FAMILY MEDICINE

## 2024-09-19 PROCEDURE — 1100000000 HC RM PRIVATE

## 2024-09-19 PROCEDURE — 6360000002 HC RX W HCPCS: Performed by: FAMILY MEDICINE

## 2024-09-19 PROCEDURE — 82962 GLUCOSE BLOOD TEST: CPT

## 2024-09-19 PROCEDURE — 94761 N-INVAS EAR/PLS OXIMETRY MLT: CPT

## 2024-09-19 PROCEDURE — 2580000003 HC RX 258: Performed by: FAMILY MEDICINE

## 2024-09-19 PROCEDURE — 94640 AIRWAY INHALATION TREATMENT: CPT

## 2024-09-19 PROCEDURE — 6370000000 HC RX 637 (ALT 250 FOR IP): Performed by: PSYCHIATRY & NEUROLOGY

## 2024-09-19 PROCEDURE — 97530 THERAPEUTIC ACTIVITIES: CPT

## 2024-09-19 RX ADMIN — LEVETIRACETAM 1500 MG: 500 TABLET, FILM COATED ORAL at 21:12

## 2024-09-19 RX ADMIN — LEVETIRACETAM 750 MG: 250 TABLET, FILM COATED ORAL at 10:03

## 2024-09-19 RX ADMIN — TRAZODONE HYDROCHLORIDE 100 MG: 50 TABLET ORAL at 21:12

## 2024-09-19 RX ADMIN — MONTELUKAST 10 MG: 10 TABLET, FILM COATED ORAL at 10:03

## 2024-09-19 RX ADMIN — Medication 2 PUFF: at 07:54

## 2024-09-19 RX ADMIN — SODIUM CHLORIDE: 9 INJECTION, SOLUTION INTRAVENOUS at 10:44

## 2024-09-19 RX ADMIN — Medication 2 PUFF: at 19:41

## 2024-09-19 RX ADMIN — IPRATROPIUM BROMIDE AND ALBUTEROL SULFATE 1 DOSE: 2.5; .5 SOLUTION RESPIRATORY (INHALATION) at 19:31

## 2024-09-19 RX ADMIN — ENOXAPARIN SODIUM 40 MG: 100 INJECTION SUBCUTANEOUS at 10:03

## 2024-09-19 RX ADMIN — ASPIRIN 81 MG: 81 TABLET, COATED ORAL at 10:03

## 2024-09-19 RX ADMIN — IPRATROPIUM BROMIDE AND ALBUTEROL SULFATE 1 DOSE: 2.5; .5 SOLUTION RESPIRATORY (INHALATION) at 13:45

## 2024-09-19 RX ADMIN — OXCARBAZEPINE 600 MG: 300 TABLET, FILM COATED ORAL at 21:13

## 2024-09-19 RX ADMIN — LEVOTHYROXINE SODIUM 150 MCG: 0.03 TABLET ORAL at 10:03

## 2024-09-19 RX ADMIN — IPRATROPIUM BROMIDE AND ALBUTEROL SULFATE 1 DOSE: 2.5; .5 SOLUTION RESPIRATORY (INHALATION) at 07:54

## 2024-09-19 RX ADMIN — SODIUM CHLORIDE, PRESERVATIVE FREE 10 ML: 5 INJECTION INTRAVENOUS at 10:04

## 2024-09-19 RX ADMIN — LATANOPROST 1 DROP: 50 SOLUTION OPHTHALMIC at 21:14

## 2024-09-19 RX ADMIN — ATORVASTATIN CALCIUM 80 MG: 40 TABLET, FILM COATED ORAL at 21:13

## 2024-09-19 RX ADMIN — FLUOXETINE HYDROCHLORIDE 40 MG: 20 CAPSULE ORAL at 10:02

## 2024-09-19 RX ADMIN — SODIUM CHLORIDE, PRESERVATIVE FREE 10 ML: 5 INJECTION INTRAVENOUS at 21:12

## 2024-09-19 ASSESSMENT — PAIN SCALES - GENERAL: PAINLEVEL_OUTOF10: 0

## 2024-09-20 VITALS
RESPIRATION RATE: 18 BRPM | BODY MASS INDEX: 29.75 KG/M2 | HEIGHT: 65 IN | WEIGHT: 178.57 LBS | SYSTOLIC BLOOD PRESSURE: 112 MMHG | DIASTOLIC BLOOD PRESSURE: 71 MMHG | TEMPERATURE: 98.4 F | HEART RATE: 115 BPM | OXYGEN SATURATION: 94 %

## 2024-09-20 LAB
ALBUMIN SERPL-MCNC: 3.3 G/DL (ref 3.5–5)
ALBUMIN/GLOB SERPL: 1.2 (ref 1.1–2.2)
ALP SERPL-CCNC: 149 U/L (ref 45–117)
ALT SERPL-CCNC: 51 U/L (ref 12–78)
ANION GAP SERPL CALC-SCNC: 5 MMOL/L (ref 2–12)
AST SERPL W P-5'-P-CCNC: 38 U/L (ref 15–37)
BASOPHILS # BLD: 0 K/UL (ref 0–0.1)
BASOPHILS NFR BLD: 1 % (ref 0–1)
BILIRUB SERPL-MCNC: 0.3 MG/DL (ref 0.2–1)
BUN SERPL-MCNC: 4 MG/DL (ref 6–20)
BUN/CREAT SERPL: 6 (ref 12–20)
CA-I BLD-MCNC: 8.7 MG/DL (ref 8.5–10.1)
CHLORIDE SERPL-SCNC: 109 MMOL/L (ref 97–108)
CO2 SERPL-SCNC: 27 MMOL/L (ref 21–32)
CREAT SERPL-MCNC: 0.67 MG/DL (ref 0.55–1.02)
DIFFERENTIAL METHOD BLD: ABNORMAL
EOSINOPHIL # BLD: 0.5 K/UL (ref 0–0.4)
EOSINOPHIL NFR BLD: 14 % (ref 0–7)
ERYTHROCYTE [DISTWIDTH] IN BLOOD BY AUTOMATED COUNT: 18.2 % (ref 11.5–14.5)
ERYTHROCYTE [DISTWIDTH] IN BLOOD BY AUTOMATED COUNT: 18.7 % (ref 11.5–14.5)
GLOBULIN SER CALC-MCNC: 2.7 G/DL (ref 2–4)
GLUCOSE BLD STRIP.AUTO-MCNC: 91 MG/DL (ref 65–100)
GLUCOSE BLD STRIP.AUTO-MCNC: 94 MG/DL (ref 65–100)
GLUCOSE SERPL-MCNC: 89 MG/DL (ref 65–100)
HCT VFR BLD AUTO: 37.3 % (ref 35–47)
HCT VFR BLD AUTO: 38.1 % (ref 35–47)
HGB BLD-MCNC: 12.6 G/DL (ref 11.5–16)
HGB BLD-MCNC: 12.9 G/DL (ref 11.5–16)
IMM GRANULOCYTES # BLD AUTO: 0 K/UL (ref 0–0.04)
IMM GRANULOCYTES NFR BLD AUTO: 0 % (ref 0–0.5)
LEVETIRACETAM SERPL-MCNC: 13.7 UG/ML (ref 10–40)
LYMPHOCYTES # BLD: 1.7 K/UL (ref 0.8–3.5)
LYMPHOCYTES NFR BLD: 49 % (ref 12–49)
MCH RBC QN AUTO: 26.8 PG (ref 26–34)
MCH RBC QN AUTO: 26.8 PG (ref 26–34)
MCHC RBC AUTO-ENTMCNC: 33.8 G/DL (ref 30–36.5)
MCHC RBC AUTO-ENTMCNC: 33.9 G/DL (ref 30–36.5)
MCV RBC AUTO: 79.2 FL (ref 80–99)
MCV RBC AUTO: 79.4 FL (ref 80–99)
MONOCYTES # BLD: 0.3 K/UL (ref 0–1)
MONOCYTES NFR BLD: 8 % (ref 5–13)
NEUTS SEG # BLD: 1 K/UL (ref 1.8–8)
NEUTS SEG NFR BLD: 28 % (ref 32–75)
NRBC # BLD: 0 K/UL (ref 0–0.01)
NRBC # BLD: 0 K/UL (ref 0–0.01)
NRBC BLD-RTO: 0 PER 100 WBC
NRBC BLD-RTO: 0 PER 100 WBC
PATH REV BLD -IMP: ABNORMAL
PERFORMED BY:: NORMAL
PERFORMED BY:: NORMAL
PLATELET # BLD AUTO: 241 K/UL (ref 150–400)
PLATELET # BLD AUTO: 247 K/UL (ref 150–400)
PMV BLD AUTO: 9.5 FL (ref 8.9–12.9)
PMV BLD AUTO: 9.6 FL (ref 8.9–12.9)
POTASSIUM SERPL-SCNC: 3.7 MMOL/L (ref 3.5–5.1)
PROT SERPL-MCNC: 6 G/DL (ref 6.4–8.2)
RBC # BLD AUTO: 4.7 M/UL (ref 3.8–5.2)
RBC # BLD AUTO: 4.81 M/UL (ref 3.8–5.2)
RBC MORPH BLD: ABNORMAL
RBC MORPH BLD: ABNORMAL
SODIUM SERPL-SCNC: 141 MMOL/L (ref 136–145)
WBC # BLD AUTO: 3.5 K/UL (ref 3.6–11)
WBC # BLD AUTO: 5.8 K/UL (ref 3.6–11)
WBC MORPH BLD: ABNORMAL

## 2024-09-20 PROCEDURE — 94640 AIRWAY INHALATION TREATMENT: CPT

## 2024-09-20 PROCEDURE — 36415 COLL VENOUS BLD VENIPUNCTURE: CPT

## 2024-09-20 PROCEDURE — 80053 COMPREHEN METABOLIC PANEL: CPT

## 2024-09-20 PROCEDURE — 2580000003 HC RX 258: Performed by: FAMILY MEDICINE

## 2024-09-20 PROCEDURE — 6370000000 HC RX 637 (ALT 250 FOR IP): Performed by: PSYCHIATRY & NEUROLOGY

## 2024-09-20 PROCEDURE — 85027 COMPLETE CBC AUTOMATED: CPT

## 2024-09-20 PROCEDURE — 94761 N-INVAS EAR/PLS OXIMETRY MLT: CPT

## 2024-09-20 PROCEDURE — 82962 GLUCOSE BLOOD TEST: CPT

## 2024-09-20 PROCEDURE — 6360000002 HC RX W HCPCS: Performed by: FAMILY MEDICINE

## 2024-09-20 PROCEDURE — 97530 THERAPEUTIC ACTIVITIES: CPT

## 2024-09-20 PROCEDURE — 6370000000 HC RX 637 (ALT 250 FOR IP): Performed by: FAMILY MEDICINE

## 2024-09-20 RX ORDER — BUDESONIDE AND FORMOTEROL FUMARATE DIHYDRATE 80; 4.5 UG/1; UG/1
2 AEROSOL RESPIRATORY (INHALATION)
Qty: 10.2 G | Refills: 3 | Status: SHIPPED | OUTPATIENT
Start: 2024-09-20

## 2024-09-20 RX ORDER — NICOTINE 21 MG/24HR
1 PATCH, TRANSDERMAL 24 HOURS TRANSDERMAL DAILY
Qty: 30 PATCH | Refills: 3 | Status: SHIPPED | OUTPATIENT
Start: 2024-09-21 | End: 2024-09-25

## 2024-09-20 RX ORDER — ATORVASTATIN CALCIUM 80 MG/1
80 TABLET, FILM COATED ORAL NIGHTLY
Qty: 30 TABLET | Refills: 3 | Status: SHIPPED | OUTPATIENT
Start: 2024-09-20

## 2024-09-20 RX ORDER — IPRATROPIUM BROMIDE AND ALBUTEROL SULFATE 2.5; .5 MG/3ML; MG/3ML
3 SOLUTION RESPIRATORY (INHALATION) EVERY 4 HOURS PRN
Qty: 360 ML | Refills: 1 | Status: SHIPPED | OUTPATIENT
Start: 2024-09-20

## 2024-09-20 RX ORDER — IPRATROPIUM BROMIDE AND ALBUTEROL SULFATE 2.5; .5 MG/3ML; MG/3ML
1 SOLUTION RESPIRATORY (INHALATION) EVERY 4 HOURS PRN
Status: DISCONTINUED | OUTPATIENT
Start: 2024-09-20 | End: 2024-09-20 | Stop reason: HOSPADM

## 2024-09-20 RX ORDER — ASPIRIN 81 MG/1
81 TABLET ORAL DAILY
Qty: 30 TABLET | Refills: 3 | Status: SHIPPED | OUTPATIENT
Start: 2024-09-21

## 2024-09-20 RX ADMIN — Medication 2 PUFF: at 09:50

## 2024-09-20 RX ADMIN — FLUOXETINE HYDROCHLORIDE 40 MG: 20 CAPSULE ORAL at 09:40

## 2024-09-20 RX ADMIN — ENOXAPARIN SODIUM 40 MG: 100 INJECTION SUBCUTANEOUS at 09:40

## 2024-09-20 RX ADMIN — MONTELUKAST 10 MG: 10 TABLET, FILM COATED ORAL at 09:40

## 2024-09-20 RX ADMIN — SODIUM CHLORIDE, PRESERVATIVE FREE 10 ML: 5 INJECTION INTRAVENOUS at 11:14

## 2024-09-20 RX ADMIN — LEVETIRACETAM 750 MG: 250 TABLET, FILM COATED ORAL at 09:38

## 2024-09-20 RX ADMIN — IPRATROPIUM BROMIDE AND ALBUTEROL SULFATE 1 DOSE: 2.5; .5 SOLUTION RESPIRATORY (INHALATION) at 09:50

## 2024-09-20 RX ADMIN — ASPIRIN 81 MG: 81 TABLET, COATED ORAL at 09:39

## 2024-09-20 RX ADMIN — LEVOTHYROXINE SODIUM 150 MCG: 0.03 TABLET ORAL at 09:37

## 2024-09-23 LAB — OXCARBAZEPINE SERPL-MCNC: 14 UG/ML (ref 10–35)

## 2024-09-24 ENCOUNTER — TELEMEDICINE (OUTPATIENT)
Age: 54
End: 2024-09-24
Payer: MEDICARE

## 2024-09-24 DIAGNOSIS — R74.8 ELEVATED LIVER ENZYMES: Primary | ICD-10-CM

## 2024-09-24 DIAGNOSIS — R55 RECURRENT SYNCOPE: ICD-10-CM

## 2024-09-24 PROCEDURE — 99214 OFFICE O/P EST MOD 30 MIN: CPT | Performed by: NURSE PRACTITIONER

## 2024-09-24 PROCEDURE — G8427 DOCREV CUR MEDS BY ELIG CLIN: HCPCS | Performed by: NURSE PRACTITIONER

## 2024-09-24 PROCEDURE — 1111F DSCHRG MED/CURRENT MED MERGE: CPT | Performed by: NURSE PRACTITIONER

## 2024-09-24 PROCEDURE — 3017F COLORECTAL CA SCREEN DOC REV: CPT | Performed by: NURSE PRACTITIONER

## 2024-10-04 ENCOUNTER — HOSPITAL ENCOUNTER (EMERGENCY)
Facility: HOSPITAL | Age: 54
Discharge: HOME | End: 2024-10-04
Attending: EMERGENCY MEDICINE
Payer: MEDICARE

## 2024-10-04 ENCOUNTER — APPOINTMENT (OUTPATIENT)
Facility: HOSPITAL | Age: 54
End: 2024-10-04
Attending: EMERGENCY MEDICINE
Payer: MEDICARE

## 2024-10-04 VITALS
OXYGEN SATURATION: 98 % | TEMPERATURE: 97.4 F | RESPIRATION RATE: 16 BRPM | HEIGHT: 65 IN | WEIGHT: 178 LBS | BODY MASS INDEX: 29.66 KG/M2 | DIASTOLIC BLOOD PRESSURE: 71 MMHG | HEART RATE: 71 BPM | SYSTOLIC BLOOD PRESSURE: 110 MMHG

## 2024-10-04 DIAGNOSIS — S86.912D: ICD-10-CM

## 2024-10-04 DIAGNOSIS — S93.402A SPRAIN OF LEFT ANKLE, UNSPECIFIED LIGAMENT, INITIAL ENCOUNTER: Primary | ICD-10-CM

## 2024-10-04 PROCEDURE — 6360000002 HC RX W HCPCS: Performed by: EMERGENCY MEDICINE

## 2024-10-04 PROCEDURE — 96372 THER/PROPH/DIAG INJ SC/IM: CPT

## 2024-10-04 PROCEDURE — 6370000000 HC RX 637 (ALT 250 FOR IP): Performed by: EMERGENCY MEDICINE

## 2024-10-04 PROCEDURE — 99284 EMERGENCY DEPT VISIT MOD MDM: CPT

## 2024-10-04 PROCEDURE — 73610 X-RAY EXAM OF ANKLE: CPT

## 2024-10-04 PROCEDURE — 73562 X-RAY EXAM OF KNEE 3: CPT

## 2024-10-04 RX ORDER — HYDROCODONE BITARTRATE AND ACETAMINOPHEN 5; 325 MG/1; MG/1
1 TABLET ORAL
Status: COMPLETED | OUTPATIENT
Start: 2024-10-04 | End: 2024-10-04

## 2024-10-04 RX ORDER — HYDROCODONE BITARTRATE AND ACETAMINOPHEN 5; 325 MG/1; MG/1
1 TABLET ORAL EVERY 6 HOURS PRN
Qty: 12 TABLET | Refills: 0 | Status: SHIPPED | OUTPATIENT
Start: 2024-10-04 | End: 2024-10-07

## 2024-10-04 RX ORDER — KETOROLAC TROMETHAMINE 30 MG/ML
30 INJECTION, SOLUTION INTRAMUSCULAR; INTRAVENOUS ONCE
Status: COMPLETED | OUTPATIENT
Start: 2024-10-04 | End: 2024-10-04

## 2024-10-04 RX ADMIN — HYDROCODONE BITARTRATE AND ACETAMINOPHEN 1 TABLET: 5; 325 TABLET ORAL at 15:53

## 2024-10-04 RX ADMIN — KETOROLAC TROMETHAMINE 30 MG: 30 INJECTION, SOLUTION INTRAMUSCULAR at 14:40

## 2024-10-04 ASSESSMENT — PAIN DESCRIPTION - FREQUENCY: FREQUENCY: CONTINUOUS

## 2024-10-04 ASSESSMENT — PAIN - FUNCTIONAL ASSESSMENT
PAIN_FUNCTIONAL_ASSESSMENT: 0-10
PAIN_FUNCTIONAL_ASSESSMENT: PREVENTS OR INTERFERES WITH MANY ACTIVE NOT PASSIVE ACTIVITIES

## 2024-10-04 ASSESSMENT — PAIN SCALES - GENERAL
PAINLEVEL_OUTOF10: 7
PAINLEVEL_OUTOF10: 9
PAINLEVEL_OUTOF10: 9

## 2024-10-04 ASSESSMENT — PAIN DESCRIPTION - ORIENTATION
ORIENTATION: LEFT

## 2024-10-04 ASSESSMENT — PAIN DESCRIPTION - DESCRIPTORS: DESCRIPTORS: ACHING

## 2024-10-04 ASSESSMENT — PAIN DESCRIPTION - ONSET: ONSET: SUDDEN

## 2024-10-04 ASSESSMENT — PAIN DESCRIPTION - LOCATION
LOCATION: ANKLE
LOCATION: ANKLE
LOCATION: ANKLE;KNEE

## 2024-10-04 ASSESSMENT — PAIN DESCRIPTION - PAIN TYPE: TYPE: ACUTE PAIN

## 2024-10-04 NOTE — ED PROVIDER NOTES
[facility] EMERGENCY DEPT  EMERGENCY DEPARTMENT ENCOUNTER      Pt Name: Effie Rain  MRN: [id number]  Birthdate 1970  Date of evaluation: 10/4/2024  Provider: Amish Childers MD    CHIEF COMPLAINT       Chief Complaint   Patient presents with    Ankle Pain         HISTORY OF PRESENT ILLNESS   (Location/Symptom, Timing/Onset, Context/Setting, Quality, Duration, Modifying Factors, Severity)  Note limiting factors.   Effie Rain is a 54 y.o. female who presents to the emergency department suffered inversion injury of left lateral ankle minor injury to the left posterior knee at [facility] area.  No other injuries    HPI    Nursing Notes were reviewed.    REVIEW OF SYSTEMS    (2-9 systems for level 4, 10 or more for level 5)     Review of Systems   All other systems reviewed and are negative.      Except as noted above the remainder of the review of systems was reviewed and negative.       PAST MEDICAL HISTORY     Past Medical History:   Diagnosis Date    Colon polyp     Fibrocystic breast changes of both breasts 07/08/2015    GERD (gastroesophageal reflux disease)     Glaucoma 07/31/2020    Hypercholesterolemia 08/15/2013    Hypothyroidism 07/26/2019    Implantable loop recorder present 02/15/2023    Irritable bowel syndrome     Migraine headache 07/26/2019    Multinodular goiter 01/27/2023    Neuritis of right saphenous nerve 07/14/2021    Nontraumatic tear of left rotator cuff, unspecified tear extent 07/06/2023    NSVT (nonsustained ventricular tachycardia) ([facility]) 04/10/2021    Pulmonary embolism ([facility]) 08/2022    Type 2 diabetes mellitus with complication, without long-term current use of insulin ([facility]) 11/26/2023    Vasovagal syncope 04/10/2021         SURGICAL HISTORY       Past Surgical History:   Procedure Laterality Date    APPENDECTOMY      CHOLECYSTECTOMY  05/2006    COLONOSCOPY N/A 11/28/2022    COLONOSCOPY performed by Chaitanya Morales MD at [facility] ENDOSCOPY    COLONOSCOPY      GASTRIC BYPASS SURGERY  2013     by mouth daily    MULTIPLE VITAMINS-MINERALS (THERAPEUTIC MULTIVITAMIN-MINERALS) TABLET    Take 1 tablet by mouth daily    ONDANSETRON (ZOFRAN-ODT) 4 MG DISINTEGRATING TABLET    Take 1 tablet by mouth 3 times daily as needed for Nausea or Vomiting    OXTELLAR  MG TB24    Take 1,200 mg by mouth every evening    PERAMPANEL (FYCOMPA) 4 MG TABS TABLET    Take 1 tablet by mouth nightly. Max Daily Amount: 4 mg    POTASSIUM CHLORIDE (MICRO-K) 10 MEQ EXTENDED RELEASE CAPSULE    Take 1 capsule by mouth daily    TRAZODONE (DESYREL) 100 MG TABLET    Take 1 tablet by mouth nightly       ALLERGIES     Acetaminophen, Shellfish allergy, Milk-related compounds, Nsaids, Peanut oil, Other, and Sulfa antibiotics    FAMILY HISTORY       Family History   Problem Relation Age of Onset    Breast Cancer Paternal Aunt         age unknown    Asthma Son     Asthma Son     Asthma Daughter     Hypertension Mother     Hypertension Father     Cancer Father         stomach cancer    Heart Disease Mother     No Known Problems Brother           SOCIAL HISTORY       Social History     Socioeconomic History    Marital status:      Spouse name: None    Number of children: None    Years of education: None    Highest education level: None   Tobacco Use    Smoking status: Never    Smokeless tobacco: Never   Vaping Use    Vaping status: Never Used   Substance and Sexual Activity    Alcohol use: No    Drug use: No    Sexual activity: Defer     Social Determinants of Health     Financial Resource Strain: Low Risk  (12/7/2022)    Received from [facility], [facility]    Overall Financial Resource Strain (CARDIA)     Difficulty of Paying Living Expenses: Not very hard   Food Insecurity: No Food Insecurity (9/16/2024)    Hunger Vital Sign     Worried About Running Out of Food in the Last Year: Never true     Ran Out of Food in the Last Year: Never true   Transportation Needs: No Transportation Needs (9/16/2024)    PRAPARE - Transportation     Lack

## 2024-10-04 NOTE — ED NOTES
Dr. Childers reviewed discharge instructions with the patient.  The patient verbalized understanding.  All questions and concerns were addressed.  The patient is discharged via wheelchair in the care of family members with instructions and prescriptions in hand.  Pt is alert and oriented x 4.  Respirations are clear and unlabored.

## 2024-10-04 NOTE — ED TRIAGE NOTES
Pt reports falls with a hx of frequent falls PTA. PT reports her left ankle bent back after she fell. Reports 7/10 pain and is unable to bear weight on ankle. No obvious injury noted.

## 2024-11-04 ENCOUNTER — HOSPITAL ENCOUNTER (EMERGENCY)
Facility: HOSPITAL | Age: 54
Discharge: HOME | End: 2024-11-04
Attending: EMERGENCY MEDICINE
Payer: MEDICARE

## 2024-11-04 ENCOUNTER — APPOINTMENT (OUTPATIENT)
Facility: HOSPITAL | Age: 54
End: 2024-11-04
Attending: EMERGENCY MEDICINE
Payer: MEDICARE

## 2024-11-04 VITALS
DIASTOLIC BLOOD PRESSURE: 66 MMHG | HEART RATE: 82 BPM | RESPIRATION RATE: 17 BRPM | SYSTOLIC BLOOD PRESSURE: 104 MMHG | TEMPERATURE: 98 F | OXYGEN SATURATION: 96 %

## 2024-11-04 DIAGNOSIS — R07.9 CHEST PAIN, UNSPECIFIED TYPE: Primary | ICD-10-CM

## 2024-11-04 LAB
ALBUMIN SERPL-MCNC: 3.5 G/DL (ref 3.5–5)
ALBUMIN/GLOB SERPL: 1.5 (ref 1.1–2.2)
ALP SERPL-CCNC: 152 U/L (ref 45–117)
ALT SERPL-CCNC: 136 U/L (ref 12–78)
ANION GAP SERPL CALC-SCNC: 12 MMOL/L (ref 2–12)
AST SERPL W P-5'-P-CCNC: 57 U/L (ref 15–37)
BASOPHILS # BLD: 0 K/UL (ref 0–0.1)
BASOPHILS NFR BLD: 0 % (ref 0–1)
BILIRUB SERPL-MCNC: 0.3 MG/DL (ref 0.2–1)
BUN SERPL-MCNC: 8 MG/DL (ref 6–20)
BUN/CREAT SERPL: 12 (ref 12–20)
CA-I BLD-MCNC: 8.3 MG/DL (ref 8.5–10.1)
CHLORIDE SERPL-SCNC: 104 MMOL/L (ref 97–108)
CO2 SERPL-SCNC: 25 MMOL/L (ref 21–32)
CREAT SERPL-MCNC: 0.69 MG/DL (ref 0.55–1.02)
DIFFERENTIAL METHOD BLD: ABNORMAL
EOSINOPHIL # BLD: 0.2 K/UL (ref 0–0.4)
EOSINOPHIL NFR BLD: 3 % (ref 0–7)
ERYTHROCYTE [DISTWIDTH] IN BLOOD BY AUTOMATED COUNT: 16 % (ref 11.5–14.5)
GLOBULIN SER CALC-MCNC: 2.3 G/DL (ref 2–4)
GLUCOSE SERPL-MCNC: 85 MG/DL (ref 65–100)
HCT VFR BLD AUTO: 38.7 % (ref 35–47)
HGB BLD-MCNC: 12.8 G/DL (ref 11.5–16)
IMM GRANULOCYTES # BLD AUTO: 0 K/UL (ref 0–0.04)
IMM GRANULOCYTES NFR BLD AUTO: 0 % (ref 0–0.5)
INR PPP: 1 (ref 0.9–1.1)
LYMPHOCYTES # BLD: 1.5 K/UL (ref 0.8–3.5)
LYMPHOCYTES NFR BLD: 30 % (ref 12–49)
MAGNESIUM SERPL-MCNC: 2.2 MG/DL (ref 1.6–2.4)
MCH RBC QN AUTO: 27.6 PG (ref 26–34)
MCHC RBC AUTO-ENTMCNC: 33.1 G/DL (ref 30–36.5)
MCV RBC AUTO: 83.4 FL (ref 80–99)
MONOCYTES # BLD: 0.4 K/UL (ref 0–1)
MONOCYTES NFR BLD: 8 % (ref 5–13)
NEUTS SEG # BLD: 2.9 K/UL (ref 1.8–8)
NEUTS SEG NFR BLD: 59 % (ref 32–75)
NRBC # BLD: 0 K/UL (ref 0–0.01)
NRBC BLD-RTO: 0 PER 100 WBC
PLATELET # BLD AUTO: 235 K/UL (ref 150–400)
PMV BLD AUTO: 10 FL (ref 8.9–12.9)
POTASSIUM SERPL-SCNC: 3.2 MMOL/L (ref 3.5–5.1)
PROT SERPL-MCNC: 5.8 G/DL (ref 6.4–8.2)
PROTHROMBIN TIME: 13.8 SEC (ref 11.9–14.6)
RBC # BLD AUTO: 4.64 M/UL (ref 3.8–5.2)
SODIUM SERPL-SCNC: 141 MMOL/L (ref 136–145)
TROPONIN I SERPL HS-MCNC: 6 NG/L (ref 0–51)
TROPONIN I SERPL HS-MCNC: 7 NG/L (ref 0–51)
WBC # BLD AUTO: 5.1 K/UL (ref 3.6–11)

## 2024-11-04 PROCEDURE — 85025 COMPLETE CBC W/AUTO DIFF WBC: CPT

## 2024-11-04 PROCEDURE — 99285 EMERGENCY DEPT VISIT HI MDM: CPT

## 2024-11-04 PROCEDURE — 93005 ELECTROCARDIOGRAM TRACING: CPT | Performed by: EMERGENCY MEDICINE

## 2024-11-04 PROCEDURE — 80053 COMPREHEN METABOLIC PANEL: CPT

## 2024-11-04 PROCEDURE — 6370000000 HC RX 637 (ALT 250 FOR IP): Performed by: EMERGENCY MEDICINE

## 2024-11-04 PROCEDURE — 71045 X-RAY EXAM CHEST 1 VIEW: CPT

## 2024-11-04 PROCEDURE — 83735 ASSAY OF MAGNESIUM: CPT

## 2024-11-04 PROCEDURE — 85610 PROTHROMBIN TIME: CPT

## 2024-11-04 PROCEDURE — 36415 COLL VENOUS BLD VENIPUNCTURE: CPT

## 2024-11-04 PROCEDURE — 84484 ASSAY OF TROPONIN QUANT: CPT

## 2024-11-04 RX ORDER — NITROGLYCERIN 0.4 MG/1
0.4 TABLET SUBLINGUAL EVERY 5 MIN PRN
Status: DISCONTINUED | OUTPATIENT
Start: 2024-11-04 | End: 2024-11-04 | Stop reason: HOSPADM

## 2024-11-04 RX ORDER — ASPIRIN 81 MG/1
243 TABLET, CHEWABLE ORAL
Status: COMPLETED | OUTPATIENT
Start: 2024-11-04 | End: 2024-11-04

## 2024-11-04 RX ADMIN — NITROGLYCERIN 0.4 MG: 0.4 TABLET SUBLINGUAL at 16:26

## 2024-11-04 RX ADMIN — POTASSIUM BICARBONATE 40 MEQ: 782 TABLET, EFFERVESCENT ORAL at 17:18

## 2024-11-04 RX ADMIN — ASPIRIN 81 MG 243 MG: 81 TABLET ORAL at 16:24

## 2024-11-04 ASSESSMENT — PAIN DESCRIPTION - LOCATION
LOCATION: ARM;CHEST
LOCATION: JAW

## 2024-11-04 ASSESSMENT — PAIN - FUNCTIONAL ASSESSMENT
PAIN_FUNCTIONAL_ASSESSMENT: 0-10
PAIN_FUNCTIONAL_ASSESSMENT: 0-10

## 2024-11-04 ASSESSMENT — PAIN SCALES - GENERAL
PAINLEVEL_OUTOF10: 0
PAINLEVEL_OUTOF10: 4
PAINLEVEL_OUTOF10: 9

## 2024-11-04 ASSESSMENT — PAIN DESCRIPTION - ORIENTATION: ORIENTATION: RIGHT

## 2024-11-04 NOTE — ED PROVIDER NOTES
states she will follow-up with her cardiologist Dr. Bliss in [place] [SB]      ED Course User Index  [SB] Salma Torres MD       Disposition Considerations (Tests not done, Shared Decision Making, Pt Expectation of Test or Treatment.): See ED Course    Patient was given the following medications:  Medications   nitroGLYCERIN (NITROSTAT) SL tablet 0.4 mg (has no administration in time range)   aspirin chewable tablet 243 mg (has no administration in time range)       CONSULTS: (Who and What was discussed)  None     Social Determinants affecting Dx or Tx: None    Smoking Cessation: Not Applicable    PROCEDURES   Unless otherwise noted above, none.  Procedures      CRITICAL CARE TIME   Patient does not meet Critical Care Time, 0 minutes    FINAL IMPRESSION   No diagnosis found.      DISPOSITION/PLAN   DISPOSITION           Discharge Note: The patient is stable for discharge home. The signs, symptoms, diagnosis, and discharge instructions have been discussed, understanding conveyed, and agreed upon. The patient is to follow up as recommended or return to ER should their symptoms worsen.      PATIENT REFERRED TO:  No follow-up provider specified.      DISCHARGE MEDICATIONS:     Medication List        ASK your doctor about these medications      albuterol sulfate  (90 Base) MCG/ACT inhaler  Commonly known as: PROVENTIL;VENTOLIN;PROAIR     aspirin 81 MG EC tablet  Take 1 tablet by mouth daily     atorvastatin 80 MG tablet  Commonly known as: LIPITOR  Take 1 tablet by mouth nightly     budesonide-formoterol 80-4.5 MCG/ACT Aero  Commonly known as: SYMBICORT  Inhale 2 puffs into the lungs in the morning and 2 puffs in the evening.     dexlansoprazole 60 MG Cpdr delayed release capsule  Commonly known as: DEXILANT  Take 1 capsule by mouth daily     FLUoxetine 40 MG capsule  Commonly known as: PROzac     ipratropium 0.5 mg-albuterol 2.5 mg 0.5-2.5 (3) MG/3ML Soln nebulizer solution  Commonly known as:

## 2024-11-05 LAB
EKG ATRIAL RATE: 83 BPM
EKG DIAGNOSIS: NORMAL
EKG P AXIS: 63 DEGREES
EKG P-R INTERVAL: 152 MS
EKG Q-T INTERVAL: 395 MS
EKG QRS DURATION: 90 MS
EKG QTC CALCULATION (BAZETT): 465 MS
EKG R AXIS: 50 DEGREES
EKG T AXIS: 5 DEGREES
EKG VENTRICULAR RATE: 83 BPM

## 2024-12-19 NOTE — DISCHARGE SUMMARY
Physician Discharge Summary     Patient ID:    Vijay Tomlinson  [id number]  80 y.o.  1970    Admit date: 5/8/2022    Discharge date : 5/9/2022    Chronic Diagnoses:    Problem List as of 5/9/2022 Date Reviewed: 3/9/2022          Codes Class Noted - Resolved    Syncope ICD-10-CM: R55  ICD-9-CM: 780.2  5/8/2022 - Present        Abdominal pain ICD-10-CM: R10.9  ICD-9-CM: 789.00  12/13/2021 - Present        Hypotension due to drugs ICD-10-CM: I95.2  ICD-9-CM: 458.8, E947.9  10/21/2021 - Present        Chronic combined systolic and diastolic congestive heart failure ([facility] ICD-10-CM: I50.42  ICD-9-CM: 428.42, 428.0  4/10/2021 - Present        Atypical chest pain ICD-10-CM: R07.89  ICD-9-CM: 786.59  4/10/2021 - Present        Regional wall motion abnormality of heart ICD-10-CM: R93.1  ICD-9-CM: 793.2  4/10/2021 - Present        Vasovagal syncope ICD-10-CM: R55  ICD-9-CM: 780.2  4/10/2021 - Present        NSVT (nonsustained ventricular tachycardia) (HCC) ICD-10-CM: I47.2  ICD-9-CM: 427.1  4/10/2021 - Present        Right knee pain ICD-10-CM: M25.561  ICD-9-CM: 719.46  1/4/2021 - Present        Generalized abdominal pain ICD-10-CM: R10.84  ICD-9-CM: 789.07  7/21/2020 - Present        Diarrhea ICD-10-CM: R19.7  ICD-9-CM: 787.91  7/21/2020 - Present        H/O Clostridium difficile infection ICD-10-CM: Z86.19  ICD-9-CM: V12.09  7/21/2020 - Present        History of bowel resection ICD-10-CM: Z90.49  ICD-9-CM: V15.89  4/21/2020 - Present        Elevated liver enzymes ICD-10-CM: R74.8  ICD-9-CM: 790.5  7/26/2019 - Present        NICM (nonischemic cardiomyopathy) ([facility] ICD-10-CM: I42.8  ICD-9-CM: 425.4  7/26/2019 - Present        Hypothyroidism ICD-10-CM: E03.9  ICD-9-CM: 244.9  7/26/2019 - Present        H/O gastric bypass ICD-10-CM: Z98.84  ICD-9-CM: V45.86  7/26/2019 - Present        History of cholecystectomy ICD-10-CM: Z90.49  ICD-9-CM: V45.79  7/26/2019 - Present        History of appendectomy ICD-10-CM: Z90.49  ICD-9-CM: V45.89  7/26/2019 - Present        Neuropathy ICD-10-CM: G62.9  ICD-9-CM: 355.9  7/26/2019 - Present        Migraine headache ICD-10-CM: E67.266  ICD-9-CM: 346.90  7/26/2019 - Present        Seizures ([facility] ICD-10-CM: R56.9  ICD-9-CM: 780.39  7/26/2019 - Present        Fibrocystic breast changes of both breasts ICD-10-CM: N60.11, N60.12  ICD-9-CM: 610.1  7/8/2015 - Present        Heart failure ([facility] ICD-10-CM: I50.9  ICD-9-CM: 428.9  2005 - Present    Overview Signed 4/10/2021 12:43 PM by Jennifer Cadet MD     now combined systolic, diastolic, seen at [facility], [facility] 2021- Dr Perkins Mac: Breast pain ICD-10-CM: N64.4  ICD-9-CM: 611.71  7/8/2015 - 7/26/2019          22    Final Diagnoses:   Syncope [R55]    Reason for Hospitalization:  Syncope  left ankle sprain with avulsion fracture lateral talus  Seizures  Cardiomyopathy   Migraine headaches  History of seizures since the age of 6 years  Heart failure  Diabetes mellitus  Peripheral neuropathy. Hospital Course:       51F, h/o seizures with syncope complicated by left ankle sprain with avulsion fracture lateral talus    She was seen by neurology: she got up to use the bathroom and passed out, and woke up to see her daughter who came in for help. Patient has a history of seizures since that she was around 6 or 7 but was not diagnosed till she was 27years old as nobody could figure out what those spells were. She is currently on 4 seizure medicines including Vimpat 100 g twice a day, Keppra 500 mg twice a day which her neurologist is trying to taper off and she also takes Topamax for headaches but it is a seizure medicine and another new medicine she can tell the name    Any how, she fell and left ankle sprain with avulsion fracture lateral talus  , seen by ortho- boots placed and crutches provided with diclofenc PRN for pain.     ECHO was performed for possible reason for syncope, that showed CHFpEF grade 1 with EF 45%. Denies chest pain, no palpitations. She is to follow-up with PCP and neurology within a week        She was planned for discharge with the followin instructionsL    (1) please wean dean-boots and non weight bearing until you se ortho in 2 weeks  (2) Follow up with neurology and PCP in 1 week        Discharge Medications:   Current Discharge Medication List      START taking these medications    Details   HYDROcodone-acetaminophen (Lorcet, HYDROcodone,) 5-325 mg per tablet Take 1 Tablet by mouth every eight (8) hours as needed for Pain for up to 3 days. Max Daily Amount: 3 Tablets. Qty: 16 Tablet, Refills: 0  Start date: 5/9/2022, End date: 5/12/2022    Associated Diagnoses: Generalized abdominal pain         CONTINUE these medications which have CHANGED    Details   lacosamide (Vimpat) 200 mg tab tablet Take 1 Tablet by mouth two (2) times a day. Max Daily Amount: 400 mg. Qty: 30 Tablet, Refills: 0  Start date: 5/9/2022    Associated Diagnoses: Carotid sinus syncope      levETIRAcetam (KEPPRA) 500 mg tablet Take 1 Tablet by mouth two (2) times a day. Qty: 30 Tablet, Refills: 2  Start date: 5/9/2022         CONTINUE these medications which have NOT CHANGED    Details   escitalopram oxalate (LEXAPRO) 10 mg tablet Take 10 mg by mouth daily. hydrOXYzine HCL (ATARAX) 25 mg tablet TAKE 1 TABLET BY MOUTH THREE TIMES DAILY AS NEEDED FOR 10 DAYS      latanoprost (XALATAN) 0.005 % ophthalmic solution INSTILL 1 DROP INTO EACH EYE AT NIGHT      ofloxacin (FLOXIN) 0.3 % ophthalmic solution INSTILL 5 DROPS INTO AFFECTED EAR TWICE DAILY      scopolamine (TRANSDERM-SCOP) 1 mg over 3 days pt3d PLACE 1 PATCH BEHIND THE EAR EVERY 3 DAYS A NEEDED FOR NAUSEA      spironolactone (ALDACTONE) 50 mg tablet Take 1 Tablet by mouth daily.   Qty: 90 Tablet, Refills: 1    Comments: Dose increase  Associated Diagnoses: Chronic combined systolic and diastolic congestive heart failure (HCC); NICM (nonischemic cardiomyopathy) ([facility]      carvediloL (COREG) 6.25 mg tablet Take 1 Tablet by mouth two (2) times a day. Qty: 180 Tablet, Refills: 3    Associated Diagnoses: Chronic combined systolic and diastolic congestive heart failure (HCC); NICM (nonischemic cardiomyopathy) (HCC)      losartan (COZAAR) 25 mg tablet Take 1 Tablet by mouth daily (after breakfast). Qty: 90 Tablet, Refills: 3    Associated Diagnoses: Chronic combined systolic and diastolic congestive heart failure (HCC); NICM (nonischemic cardiomyopathy) ([facility])      bumetanide (BUMEX) 1 mg tablet Take 1 Tablet by mouth daily. Qty: 180 Tablet, Refills: 3    Associated Diagnoses: Chronic combined systolic and diastolic congestive heart failure (HCC); NICM (nonischemic cardiomyopathy) ([facility])      TENS units and TENS electrodes (TENS UNIT AND ELECTRODES) Tens Unit, 1 ea, Dispense: 1 EA, Refills: 0, Easyscript, Maintenance, 0      azelastine (ASTELIN) 137 mcg (0.1 %) nasal spray USE 2 SPRAY(S) IN EACH NOSTRIL TWICE DAILY      azelastine (OPTIVAR) 0.05 % ophthalmic solution 1 Drop two (2) times a day. augmented betamethasone dipropionate (DIPROLENE-AF) 0.05 % ointment       budesonide (PULMICORT) 0.5 mg/2 mL nbsp USE 1 VIAL IN NEBULIZER TWICE DAILY      citalopram (CELEXA) 20 mg tablet Take 20 mg by mouth daily. docusate sodium (COLACE) 100 mg capsule Take 100 mg by mouth two (2) times a day. Premarin 0.625 mg/gram vaginal cream       Ferrex 150 150 mg iron capsule       Linzess 145 mcg cap capsule Take 145 mcg by mouth daily. metoclopramide HCl (REGLAN) 5 mg tablet       Saccharomyces boulardii (FLORASTOR) 250 mg capsule Take 250 mg by mouth two (2) times a day. nitroglycerin (NITROSTAT) 0.4 mg SL tablet DISSOLVE 1 TABLET UNDER THE TONGUE EVERY 5 MINUTES AS  NEEDED FOR CHEST PAIN. MAX  OF 3 TABLETS IN 15 MINUTES. CALL 911 IF PAIN PERSISTS.   Qty: 100 Tablet, Refills: 3    Comments: Requesting 1 year supply      cetirizine (ZyrTEC) 10 mg tablet Take 10 mg by mouth daily. metFORMIN (GLUCOPHAGE) 500 mg tablet Take  by mouth two (2) times daily (with meals). cloNIDine (CATAPRES) 0.1 mg/24 hr ptwk       fluticasone propionate (FLONASE) 50 mcg/actuation nasal spray       BD Luer-Narda Syringe 3 mL 25 gauge x 1\" syrg USE FOR B12 INJECTIONS      RABEprazole (ACIPHEX) 20 mg TbEC Take 20 mg by mouth two (2) times a day. rizatriptan (MAXALT) 5 mg tablet Take 5 mg by mouth once as needed for Migraine. May repeat in 2 hours if needed       alum-mag hydroxide-simeth-lidocaine-sucralfate Take 30 mL by mouth daily. tiotropium (SPIRIVA) 18 mcg inhalation capsule Take 1 Cap by inhalation daily. ondansetron hcl (ZOFRAN) 8 mg tablet Take 8 mg by mouth every eight (8) hours as needed. montelukast (SINGULAIR) 10 mg tablet Take 10 mg by mouth daily. Refills: 3      nortriptyline (PAMELOR) 50 mg capsule Take 100 mg by mouth nightly. Refills: 5      sucralfate (CARAFATE) 100 mg/mL suspension Take 1 g by mouth four (4) times daily. topiramate (TOPAMAX) 100 mg tablet Take 100 mg by mouth two (2) times a day. Refills: 5      VENTOLIN HFA 90 mcg/actuation inhaler Take 2 Puffs by inhalation every four (4) hours as needed. Refills: 4      SYMBICORT 80-4.5 mcg/actuation HFAA INHALE 2 PUFFS BY MOUTH TWICE DAILY  Refills: 4      dexlansoprazole (DEXILANT) 60 mg CpDB capsule (delayed release) Take 60 mg by mouth daily. dicyclomine (BENTYL) 20 mg tablet Take 20 mg by mouth every six (6) hours. estradiol (ESTRACE) 0.01 % (0.1 mg/gram) vaginal cream Refills: 2      levothyroxine (SYNTHROID) 50 mcg tablet Take 50 mcg by mouth Daily (before breakfast). cyanocobalamin (VITAMIN B-12) 1,000 mcg/mL injection 1,000 mcg by IntraMUSCular route every thirty (30) days. Calcium-Cholecalciferol, D3, (CALCIUM 600 WITH VITAMIN D3) 600 mg(1,500mg) -400 unit cap Take 2 Tablets by mouth daily.       multivitamin (ONE A DAY) tablet Take 1 Tab by mouth daily.               Follow up Care:    1. Christie Nance MD in 1-2 weeks. Please call to set up an appointment shortly after discharge. Diet: cardioac    Disposition:  Home with HH PT OT     Advanced Directive:   FULL x   DNR      Discharge Exam:  Physical Exam:  General: alert, cooperative, no distress  Eye: conjunctivae/corneas clear. PERRL, EOM's intact. Throat and Neck: normal and no erythema or exudates noted. No mass   Lung: clear to auscultation bilaterally without wheezing, rhonchi, or rales. Heart: regular rate and rhythm, +J3/U9. 3/6 systolic murmur. Abdomen: soft, non-tender. Bowel sounds normal. No masses,  Extremities:  Left ankle in soft cast. Able to move all extremities normal, atraumatic  Skin: Warm, dry, intact. Neurologic: AOx3. Cranial nerves 2-12 and sensation grossly intact. Psychiatric: non focal    CONSULTATIONS: ortho neuro    Significant Diagnostic Studies:     Radiologic Studies -   Results from Hospital Encounter encounter on 05/08/22    XR KNEE LT MIN 4 V    Narrative  XR KNEE LT MIN 4 V    Comparison: None available    Findings: No radiographic abnormality of the bones, joints or soft tissues. Impression  Negative. CT Results  (Last 48 hours)               05/08/22 1229  CT HEAD WO CONT Final result    Impression:  No acute intracranial abnormality. Narrative:  CT head without contrast:       Comparison 10/13/2021. Dose reduction: All CT scans at this facility are performed using dose reduction   optimization techniques as appropriate to a performed exam including the   following: Automated exposure control, adjustments of the mA and/or kV according   to patient size, or use of iterative reconstruction technique. Noncontrast axial images were made with coronal and sagittal reconstructions. The ventricular size and position are normal. The posterior fossa images are   normal. There is no acute hemorrhage, midline shift or other mass.        There is no acute skull fracture. There is chronic bilateral maxillary sinusitis. The included paranasal sinuses   and mastoid air cells are otherwise clear.                      Discharge time spent 35 minutes    Signed:  Subha Go MD  5/9/2022  4:29 PM [Normal] : affect appropriate [de-identified] : anicteric [de-identified] : well-healed laparoscopic wounds

## 2024-12-27 ENCOUNTER — HOSPITAL ENCOUNTER (EMERGENCY)
Facility: HOSPITAL | Age: 54
Discharge: HOME | End: 2024-12-27
Attending: EMERGENCY MEDICINE
Payer: COMMERCIAL

## 2024-12-27 ENCOUNTER — APPOINTMENT (OUTPATIENT)
Facility: HOSPITAL | Age: 54
End: 2024-12-27
Attending: EMERGENCY MEDICINE
Payer: COMMERCIAL

## 2024-12-27 VITALS
WEIGHT: 170 LBS | OXYGEN SATURATION: 97 % | HEART RATE: 89 BPM | DIASTOLIC BLOOD PRESSURE: 62 MMHG | TEMPERATURE: 98.4 F | HEIGHT: 65 IN | RESPIRATION RATE: 18 BRPM | BODY MASS INDEX: 28.32 KG/M2 | SYSTOLIC BLOOD PRESSURE: 101 MMHG

## 2024-12-27 DIAGNOSIS — R10.84 GENERALIZED ABDOMINAL PAIN: ICD-10-CM

## 2024-12-27 DIAGNOSIS — R11.0 NAUSEA: Primary | ICD-10-CM

## 2024-12-27 LAB
ALBUMIN SERPL-MCNC: 3.2 G/DL (ref 3.5–5)
ALBUMIN/GLOB SERPL: 1 (ref 1.1–2.2)
ALP SERPL-CCNC: 225 U/L (ref 45–117)
ALT SERPL-CCNC: 76 U/L (ref 12–78)
ANION GAP SERPL CALC-SCNC: 13 MMOL/L (ref 2–12)
APPEARANCE UR: CLEAR
AST SERPL W P-5'-P-CCNC: 38 U/L (ref 15–37)
BACTERIA URNS QL MICRO: NEGATIVE /HPF
BASOPHILS # BLD: 0 K/UL (ref 0–0.1)
BASOPHILS NFR BLD: 0 % (ref 0–1)
BILIRUB SERPL-MCNC: 0.5 MG/DL (ref 0.2–1)
BILIRUB UR QL CFM: NEGATIVE
BUN SERPL-MCNC: 5 MG/DL (ref 6–20)
BUN/CREAT SERPL: 7 (ref 12–20)
CA-I BLD-MCNC: 8.7 MG/DL (ref 8.5–10.1)
CHLORIDE SERPL-SCNC: 94 MMOL/L (ref 97–108)
CO2 SERPL-SCNC: 28 MMOL/L (ref 21–32)
COLOR UR: ABNORMAL
CREAT SERPL-MCNC: 0.71 MG/DL (ref 0.55–1.02)
DIFFERENTIAL METHOD BLD: ABNORMAL
EKG ATRIAL RATE: 95 BPM
EKG DIAGNOSIS: NORMAL
EKG P AXIS: 59 DEGREES
EKG P-R INTERVAL: 155 MS
EKG Q-T INTERVAL: 370 MS
EKG QRS DURATION: 99 MS
EKG QTC CALCULATION (BAZETT): 465 MS
EKG R AXIS: 15 DEGREES
EKG T AXIS: 29 DEGREES
EKG VENTRICULAR RATE: 95 BPM
EOSINOPHIL # BLD: 0.1 K/UL (ref 0–0.4)
EOSINOPHIL NFR BLD: 2 % (ref 0–7)
ERYTHROCYTE [DISTWIDTH] IN BLOOD BY AUTOMATED COUNT: 15.1 % (ref 11.5–14.5)
GLOBULIN SER CALC-MCNC: 3.3 G/DL (ref 2–4)
GLUCOSE SERPL-MCNC: 71 MG/DL (ref 65–100)
GLUCOSE UR STRIP.AUTO-MCNC: NEGATIVE MG/DL
HCT VFR BLD AUTO: 41 % (ref 35–47)
HGB BLD-MCNC: 13.8 G/DL (ref 11.5–16)
HGB UR QL STRIP: NEGATIVE
IMM GRANULOCYTES # BLD AUTO: 0 K/UL (ref 0–0.04)
IMM GRANULOCYTES NFR BLD AUTO: 0 % (ref 0–0.5)
KETONES UR QL STRIP.AUTO: >80 MG/DL
LACTATE SERPL-SCNC: 0.8 MMOL/L (ref 0.4–2)
LEUKOCYTE ESTERASE UR QL STRIP.AUTO: NEGATIVE
LIPASE SERPL-CCNC: 17 U/L (ref 13–75)
LYMPHOCYTES # BLD: 0.7 K/UL (ref 0.8–3.5)
LYMPHOCYTES NFR BLD: 18 % (ref 12–49)
MCH RBC QN AUTO: 28.2 PG (ref 26–34)
MCHC RBC AUTO-ENTMCNC: 33.7 G/DL (ref 30–36.5)
MCV RBC AUTO: 83.8 FL (ref 80–99)
MONOCYTES # BLD: 0.3 K/UL (ref 0–1)
MONOCYTES NFR BLD: 9 % (ref 5–13)
NEUTS SEG # BLD: 2.6 K/UL (ref 1.8–8)
NEUTS SEG NFR BLD: 71 % (ref 32–75)
NITRITE UR QL STRIP.AUTO: NEGATIVE
NRBC # BLD: 0 K/UL (ref 0–0.01)
NRBC BLD-RTO: 0 PER 100 WBC
PH UR STRIP: 6.5 (ref 5–8)
PLATELET # BLD AUTO: 322 K/UL (ref 150–400)
PMV BLD AUTO: 9.6 FL (ref 8.9–12.9)
POTASSIUM SERPL-SCNC: 3.3 MMOL/L (ref 3.5–5.1)
PROT SERPL-MCNC: 6.5 G/DL (ref 6.4–8.2)
PROT UR STRIP-MCNC: NEGATIVE MG/DL
RBC # BLD AUTO: 4.89 M/UL (ref 3.8–5.2)
RBC #/AREA URNS HPF: ABNORMAL /HPF (ref 0–3)
SODIUM SERPL-SCNC: 135 MMOL/L (ref 136–145)
SP GR UR REFRACTOMETRY: 1.01 (ref 1–1.03)
URINE CULTURE IF INDICATED: ABNORMAL
UROBILINOGEN UR QL STRIP.AUTO: 0.2 EU/DL (ref 0.2–1)
WBC # BLD AUTO: 3.7 K/UL (ref 3.6–11)
WBC URNS QL MICRO: ABNORMAL /HPF (ref 0–5)

## 2024-12-27 PROCEDURE — 96374 THER/PROPH/DIAG INJ IV PUSH: CPT

## 2024-12-27 PROCEDURE — 36415 COLL VENOUS BLD VENIPUNCTURE: CPT

## 2024-12-27 PROCEDURE — 6360000002 HC RX W HCPCS: Performed by: EMERGENCY MEDICINE

## 2024-12-27 PROCEDURE — 85025 COMPLETE CBC W/AUTO DIFF WBC: CPT

## 2024-12-27 PROCEDURE — 80053 COMPREHEN METABOLIC PANEL: CPT

## 2024-12-27 PROCEDURE — 83690 ASSAY OF LIPASE: CPT

## 2024-12-27 PROCEDURE — 2580000003 HC RX 258: Performed by: EMERGENCY MEDICINE

## 2024-12-27 PROCEDURE — 83605 ASSAY OF LACTIC ACID: CPT

## 2024-12-27 PROCEDURE — 6360000004 HC RX CONTRAST MEDICATION: Performed by: EMERGENCY MEDICINE

## 2024-12-27 PROCEDURE — 81001 URINALYSIS AUTO W/SCOPE: CPT

## 2024-12-27 PROCEDURE — 74177 CT ABD & PELVIS W/CONTRAST: CPT

## 2024-12-27 PROCEDURE — 99285 EMERGENCY DEPT VISIT HI MDM: CPT

## 2024-12-27 PROCEDURE — 6370000000 HC RX 637 (ALT 250 FOR IP): Performed by: EMERGENCY MEDICINE

## 2024-12-27 RX ORDER — DICYCLOMINE HYDROCHLORIDE 10 MG/1
20 CAPSULE ORAL
Status: COMPLETED | OUTPATIENT
Start: 2024-12-27 | End: 2024-12-27

## 2024-12-27 RX ORDER — ONDANSETRON 4 MG/1
2 TABLET, ORALLY DISINTEGRATING ORAL ONCE
Status: COMPLETED | OUTPATIENT
Start: 2024-12-27 | End: 2024-12-27

## 2024-12-27 RX ORDER — DICYCLOMINE HYDROCHLORIDE 10 MG/1
10 CAPSULE ORAL
Qty: 120 CAPSULE | Refills: 0 | Status: SHIPPED | OUTPATIENT
Start: 2024-12-27

## 2024-12-27 RX ORDER — IOPAMIDOL 755 MG/ML
100 INJECTION, SOLUTION INTRAVASCULAR
Status: COMPLETED | OUTPATIENT
Start: 2024-12-27 | End: 2024-12-27

## 2024-12-27 RX ORDER — 0.9 % SODIUM CHLORIDE 0.9 %
1000 INTRAVENOUS SOLUTION INTRAVENOUS
Status: COMPLETED | OUTPATIENT
Start: 2024-12-27 | End: 2024-12-27

## 2024-12-27 RX ORDER — MORPHINE SULFATE 4 MG/ML
4 INJECTION, SOLUTION INTRAMUSCULAR; INTRAVENOUS
Status: COMPLETED | OUTPATIENT
Start: 2024-12-27 | End: 2024-12-27

## 2024-12-27 RX ORDER — METOCLOPRAMIDE 10 MG/1
10 TABLET ORAL 4 TIMES DAILY
Qty: 120 TABLET | Refills: 0 | Status: SHIPPED | OUTPATIENT
Start: 2024-12-27

## 2024-12-27 RX ADMIN — ONDANSETRON 2 MG: 4 TABLET, ORALLY DISINTEGRATING ORAL at 17:33

## 2024-12-27 RX ADMIN — SODIUM CHLORIDE 1000 ML: 9 INJECTION, SOLUTION INTRAVENOUS at 17:32

## 2024-12-27 RX ADMIN — MORPHINE SULFATE 4 MG: 4 INJECTION, SOLUTION INTRAMUSCULAR; INTRAVENOUS at 17:32

## 2024-12-27 RX ADMIN — IOPAMIDOL 100 ML: 755 INJECTION, SOLUTION INTRAVENOUS at 17:56

## 2024-12-27 RX ADMIN — DICYCLOMINE HYDROCHLORIDE 20 MG: 10 CAPSULE ORAL at 19:00

## 2024-12-27 ASSESSMENT — PAIN DESCRIPTION - ORIENTATION
ORIENTATION: ANTERIOR
ORIENTATION: MID
ORIENTATION: ANTERIOR

## 2024-12-27 ASSESSMENT — PAIN - FUNCTIONAL ASSESSMENT
PAIN_FUNCTIONAL_ASSESSMENT: ACTIVITIES ARE NOT PREVENTED
PAIN_FUNCTIONAL_ASSESSMENT: ACTIVITIES ARE NOT PREVENTED
PAIN_FUNCTIONAL_ASSESSMENT: PREVENTS OR INTERFERES SOME ACTIVE ACTIVITIES AND ADLS
PAIN_FUNCTIONAL_ASSESSMENT: 0-10
PAIN_FUNCTIONAL_ASSESSMENT: 0-10

## 2024-12-27 ASSESSMENT — PAIN DESCRIPTION - DESCRIPTORS
DESCRIPTORS: SHARP
DESCRIPTORS: ACHING
DESCRIPTORS: ACHING

## 2024-12-27 ASSESSMENT — PAIN DESCRIPTION - PAIN TYPE: TYPE: ACUTE PAIN

## 2024-12-27 ASSESSMENT — PAIN SCALES - GENERAL
PAINLEVEL_OUTOF10: 10
PAINLEVEL_OUTOF10: 10
PAINLEVEL_OUTOF10: 7
PAINLEVEL_OUTOF10: 10

## 2024-12-27 ASSESSMENT — PAIN DESCRIPTION - LOCATION
LOCATION: ABDOMEN

## 2024-12-27 ASSESSMENT — PAIN DESCRIPTION - ONSET: ONSET: ON-GOING

## 2024-12-27 ASSESSMENT — PAIN DESCRIPTION - FREQUENCY: FREQUENCY: CONTINUOUS

## 2024-12-27 NOTE — ED TRIAGE NOTES
Pt presents c/o mid abdmnnal pain. Pt states she has had nausea and is unable to eat or drink anything because it cause the pain to be more severe.

## 2024-12-27 NOTE — ED PROVIDER NOTES
SYMBICORT  Inhale 2 puffs into the lungs in the morning and 2 puffs in the evening.     dexlansoprazole 60 MG Cpdr delayed release capsule  Commonly known as: DEXILANT  Take 1 capsule by mouth daily     FLUoxetine 40 MG capsule  Commonly known as: PROzac     ipratropium 0.5 mg-albuterol 2.5 mg 0.5-2.5 (3) MG/3ML Soln nebulizer solution  Commonly known as: DUONEB  Inhale 3 mLs into the lungs every 4 hours as needed for Shortness of Breath     latanoprost 0.005 % ophthalmic solution  Commonly known as: XALATAN     levETIRAcetam 750 MG Tb24 extended release tablet  Commonly known as: KEPPRA XR     levothyroxine 150 MCG tablet  Commonly known as: SYNTHROID  Take 1 tablet by mouth daily     montelukast 10 MG tablet  Commonly known as: SINGULAIR     ondansetron 4 MG disintegrating tablet  Commonly known as: ZOFRAN-ODT  Take 1 tablet by mouth 3 times daily as needed for Nausea or Vomiting     Oxtellar  MG Tb24  Generic drug: OXcarbazepine ER     perampanel 4 MG Tabs tablet  Commonly known as: FYCOMPA     potassium chloride 10 MEQ extended release capsule  Commonly known as: MICRO-K     therapeutic multivitamin-minerals tablet     traZODone 100 MG tablet  Commonly known as: DESYREL               Where to Get Your Medications        These medications were sent to [address] -  [phone] - F [phone]  [address] [place] [id number]      Phone: [phone]   dicyclomine 10 MG capsule  metoclopramide 10 MG tablet           DISCONTINUED MEDICATIONS:  Current Discharge Medication List          I am the Primary Clinician of Record. Ildefonso Perrin MD (electronically signed)    (Please note that parts of this dictation were completed with voice recognition software. Quite often unanticipated grammatical, syntax, homophones, and other interpretive errors are inadvertently transcribed by the computer software. Please disregards these errors. Please excuse any errors that

## 2024-12-28 NOTE — ED NOTES
D/C home, via w/c, with family and responsible . D/C instructions, medications, fluid intake, diet, work note, and follow up reviewed; understanding verbalized. A&Ox4.

## 2024-12-30 LAB
EKG ATRIAL RATE: 95 BPM
EKG DIAGNOSIS: NORMAL
EKG P AXIS: 59 DEGREES
EKG P-R INTERVAL: 155 MS
EKG Q-T INTERVAL: 370 MS
EKG QRS DURATION: 99 MS
EKG QTC CALCULATION (BAZETT): 465 MS
EKG R AXIS: 15 DEGREES
EKG T AXIS: 29 DEGREES
EKG VENTRICULAR RATE: 95 BPM

## 2025-01-12 ENCOUNTER — HOSPITAL ENCOUNTER (EMERGENCY)
Facility: HOSPITAL | Age: 55
Discharge: HOME | End: 2025-01-12
Attending: STUDENT IN AN ORGANIZED HEALTH CARE EDUCATION/TRAINING PROGRAM
Payer: MEDICARE

## 2025-01-12 ENCOUNTER — APPOINTMENT (OUTPATIENT)
Facility: HOSPITAL | Age: 55
End: 2025-01-12
Payer: MEDICARE

## 2025-01-12 VITALS
HEIGHT: 66 IN | RESPIRATION RATE: 17 BRPM | WEIGHT: 155 LBS | BODY MASS INDEX: 24.91 KG/M2 | OXYGEN SATURATION: 96 % | HEART RATE: 61 BPM | DIASTOLIC BLOOD PRESSURE: 67 MMHG | TEMPERATURE: 98 F | SYSTOLIC BLOOD PRESSURE: 98 MMHG

## 2025-01-12 DIAGNOSIS — R10.9 ABDOMINAL PAIN, UNSPECIFIED ABDOMINAL LOCATION: Primary | ICD-10-CM

## 2025-01-12 LAB
ALBUMIN SERPL-MCNC: 3.5 G/DL (ref 3.5–5)
ALBUMIN/GLOB SERPL: 1.3 (ref 1.1–2.2)
ALP SERPL-CCNC: 154 U/L (ref 45–117)
ALT SERPL-CCNC: 49 U/L (ref 12–78)
ANION GAP SERPL CALC-SCNC: 5 MMOL/L (ref 2–12)
APPEARANCE UR: CLEAR
AST SERPL W P-5'-P-CCNC: 22 U/L (ref 15–37)
BACTERIA URNS QL MICRO: NEGATIVE /HPF
BASOPHILS # BLD: 0.02 K/UL (ref 0–0.1)
BASOPHILS NFR BLD: 0.6 % (ref 0–1)
BILIRUB SERPL-MCNC: 0.4 MG/DL (ref 0.2–1)
BILIRUB UR QL: NEGATIVE
BUN SERPL-MCNC: 4 MG/DL (ref 6–20)
BUN/CREAT SERPL: 6 (ref 12–20)
CA-I BLD-MCNC: 8.3 MG/DL (ref 8.5–10.1)
CHLORIDE SERPL-SCNC: 96 MMOL/L (ref 97–108)
CO2 SERPL-SCNC: 30 MMOL/L (ref 21–32)
COLOR UR: ABNORMAL
CREAT SERPL-MCNC: 0.66 MG/DL (ref 0.55–1.02)
DIFFERENTIAL METHOD BLD: ABNORMAL
EOSINOPHIL # BLD: 0.09 K/UL (ref 0–0.4)
EOSINOPHIL NFR BLD: 2.8 % (ref 0–7)
EPITH CASTS URNS QL MICRO: ABNORMAL /LPF
ERYTHROCYTE [DISTWIDTH] IN BLOOD BY AUTOMATED COUNT: 14.5 % (ref 11.5–14.5)
GLOBULIN SER CALC-MCNC: 2.6 G/DL (ref 2–4)
GLUCOSE SERPL-MCNC: 86 MG/DL (ref 65–100)
GLUCOSE UR STRIP.AUTO-MCNC: NEGATIVE MG/DL
HCT VFR BLD AUTO: 38.3 % (ref 35–47)
HGB BLD-MCNC: 13.1 G/DL (ref 11.5–16)
HGB UR QL STRIP: NEGATIVE
IMM GRANULOCYTES # BLD AUTO: 0.01 K/UL (ref 0–0.04)
IMM GRANULOCYTES NFR BLD AUTO: 0.3 % (ref 0–0.5)
KETONES UR QL STRIP.AUTO: 5 MG/DL
LEUKOCYTE ESTERASE UR QL STRIP.AUTO: NEGATIVE
LIPASE SERPL-CCNC: 18 U/L (ref 13–75)
LYMPHOCYTES # BLD: 1.41 K/UL (ref 0.8–3.5)
LYMPHOCYTES NFR BLD: 44.5 % (ref 12–49)
MCH RBC QN AUTO: 28.8 PG (ref 26–34)
MCHC RBC AUTO-ENTMCNC: 34.2 G/DL (ref 30–36.5)
MCV RBC AUTO: 84.2 FL (ref 80–99)
MONOCYTES # BLD: 0.37 K/UL (ref 0–1)
MONOCYTES NFR BLD: 11.7 % (ref 5–13)
NEUTS SEG # BLD: 1.27 K/UL (ref 1.8–8)
NEUTS SEG NFR BLD: 40.1 % (ref 32–75)
NITRITE UR QL STRIP.AUTO: NEGATIVE
NRBC # BLD: 0 K/UL (ref 0–0.01)
NRBC BLD-RTO: 0 PER 100 WBC
PH UR STRIP: 7 (ref 5–8)
PLATELET # BLD AUTO: 184 K/UL (ref 150–400)
PMV BLD AUTO: 9.7 FL (ref 8.9–12.9)
POTASSIUM SERPL-SCNC: 3.4 MMOL/L (ref 3.5–5.1)
PROT SERPL-MCNC: 6.1 G/DL (ref 6.4–8.2)
PROT UR STRIP-MCNC: NEGATIVE MG/DL
RBC # BLD AUTO: 4.55 M/UL (ref 3.8–5.2)
RBC #/AREA URNS HPF: ABNORMAL /HPF (ref 0–5)
SODIUM SERPL-SCNC: 131 MMOL/L (ref 136–145)
SP GR UR REFRACTOMETRY: <1.005 (ref 1–1.03)
URINE CULTURE IF INDICATED: ABNORMAL
UROBILINOGEN UR QL STRIP.AUTO: 0.1 EU/DL (ref 0.1–1)
WBC # BLD AUTO: 3.2 K/UL (ref 3.6–11)
WBC URNS QL MICRO: ABNORMAL /HPF (ref 0–4)

## 2025-01-12 PROCEDURE — 85025 COMPLETE CBC W/AUTO DIFF WBC: CPT

## 2025-01-12 PROCEDURE — 96375 TX/PRO/DX INJ NEW DRUG ADDON: CPT

## 2025-01-12 PROCEDURE — 36415 COLL VENOUS BLD VENIPUNCTURE: CPT

## 2025-01-12 PROCEDURE — 81001 URINALYSIS AUTO W/SCOPE: CPT

## 2025-01-12 PROCEDURE — 80053 COMPREHEN METABOLIC PANEL: CPT

## 2025-01-12 PROCEDURE — 74177 CT ABD & PELVIS W/CONTRAST: CPT

## 2025-01-12 PROCEDURE — 6360000004 HC RX CONTRAST MEDICATION: Performed by: STUDENT IN AN ORGANIZED HEALTH CARE EDUCATION/TRAINING PROGRAM

## 2025-01-12 PROCEDURE — 96376 TX/PRO/DX INJ SAME DRUG ADON: CPT

## 2025-01-12 PROCEDURE — 6360000002 HC RX W HCPCS: Performed by: STUDENT IN AN ORGANIZED HEALTH CARE EDUCATION/TRAINING PROGRAM

## 2025-01-12 PROCEDURE — 99285 EMERGENCY DEPT VISIT HI MDM: CPT

## 2025-01-12 PROCEDURE — 96374 THER/PROPH/DIAG INJ IV PUSH: CPT

## 2025-01-12 PROCEDURE — 83690 ASSAY OF LIPASE: CPT

## 2025-01-12 RX ORDER — IOPAMIDOL 755 MG/ML
100 INJECTION, SOLUTION INTRAVASCULAR
Status: COMPLETED | OUTPATIENT
Start: 2025-01-12 | End: 2025-01-12

## 2025-01-12 RX ORDER — ONDANSETRON 2 MG/ML
4 INJECTION INTRAMUSCULAR; INTRAVENOUS ONCE
Status: COMPLETED | OUTPATIENT
Start: 2025-01-12 | End: 2025-01-12

## 2025-01-12 RX ORDER — ONDANSETRON 4 MG/1
4 TABLET, ORALLY DISINTEGRATING ORAL 3 TIMES DAILY PRN
Qty: 21 TABLET | Refills: 0 | Status: SHIPPED | OUTPATIENT
Start: 2025-01-12

## 2025-01-12 RX ORDER — DEXLANSOPRAZOLE 60 MG/1
60 CAPSULE, DELAYED RELEASE ORAL DAILY
Qty: 30 CAPSULE | Refills: 0 | Status: SHIPPED | OUTPATIENT
Start: 2025-01-12

## 2025-01-12 RX ORDER — MORPHINE SULFATE 4 MG/ML
4 INJECTION, SOLUTION INTRAMUSCULAR; INTRAVENOUS
Status: COMPLETED | OUTPATIENT
Start: 2025-01-12 | End: 2025-01-12

## 2025-01-12 RX ADMIN — ONDANSETRON 4 MG: 2 INJECTION INTRAMUSCULAR; INTRAVENOUS at 15:51

## 2025-01-12 RX ADMIN — ONDANSETRON 4 MG: 2 INJECTION INTRAMUSCULAR; INTRAVENOUS at 19:18

## 2025-01-12 RX ADMIN — MORPHINE SULFATE 4 MG: 4 INJECTION, SOLUTION INTRAMUSCULAR; INTRAVENOUS at 15:51

## 2025-01-12 RX ADMIN — IOPAMIDOL 100 ML: 755 INJECTION, SOLUTION INTRAVENOUS at 18:04

## 2025-01-12 ASSESSMENT — PAIN DESCRIPTION - ORIENTATION
ORIENTATION: MID

## 2025-01-12 ASSESSMENT — PAIN DESCRIPTION - LOCATION
LOCATION: ABDOMEN

## 2025-01-12 ASSESSMENT — PAIN SCALES - GENERAL
PAINLEVEL_OUTOF10: 8

## 2025-01-12 ASSESSMENT — LIFESTYLE VARIABLES
HOW MANY STANDARD DRINKS CONTAINING ALCOHOL DO YOU HAVE ON A TYPICAL DAY: PATIENT DECLINED
HOW OFTEN DO YOU HAVE A DRINK CONTAINING ALCOHOL: NEVER

## 2025-01-12 NOTE — ED PROVIDER NOTES
computer software. Please disregards these errors. Please excuse any errors that have escaped final proofreading.)     Yolie Bucio MD  01/15/25 0711

## 2025-01-12 NOTE — ED TRIAGE NOTES
Arrives with abd pain near the umbilicus. States that she was seen at [place] last week for the same. Patient reports that she follows [facility] about a month ago for the same thing.  Reports nausea and vomiting this morning. Denies fever and chills.

## 2025-01-13 NOTE — DISCHARGE INSTRUCTIONS
Thank you for choosing our Emergency Department for your care.  It is our privilege to care for you in your time of need.  In the next several days, you may receive a survey via email or mailed to your home about your experience with our team.  We would greatly appreciate you taking a few minutes to complete the survey, as we use this information to learn what we have done well and what we could be doing better. Thank you for trusting us with your care!    Below you will find a list of your tests from today's visit.   Labs and Radiology Studies  Recent Results (from the past 12 hour(s))   CBC with Diff    Collection Time: 01/12/25  3:41 PM   Result Value Ref Range    WBC 3.2 (L) 3.6 - 11.0 K/uL    RBC 4.55 3.80 - 5.20 M/uL    Hemoglobin 13.1 11.5 - 16.0 g/dL    Hematocrit 38.3 35.0 - 47.0 %    MCV 84.2 80.0 - 99.0 FL    MCH 28.8 26.0 - 34.0 PG    MCHC 34.2 30.0 - 36.5 g/dL    RDW 14.5 11.5 - 14.5 %    Platelets 184 150 - 400 K/uL    MPV 9.7 8.9 - 12.9 FL    Nucleated RBCs 0.0 0.0  WBC    nRBC 0.00 0.00 - 0.01 K/uL    Neutrophils % 40.1 32.0 - 75.0 %    Lymphocytes % 44.5 12.0 - 49.0 %    Monocytes % 11.7 5.0 - 13.0 %    Eosinophils % 2.8 0.0 - 7.0 %    Basophils % 0.6 0.0 - 1.0 %    Immature Granulocytes % 0.3 0 - 0.5 %    Neutrophils Absolute 1.27 (L) 1.80 - 8.00 K/UL    Lymphocytes Absolute 1.41 0.80 - 3.50 K/UL    Monocytes Absolute 0.37 0.00 - 1.00 K/UL    Eosinophils Absolute 0.09 0.00 - 0.40 K/UL    Basophils Absolute 0.02 0.00 - 0.10 K/UL    Immature Granulocytes Absolute 0.01 0.00 - 0.04 K/UL    Differential Type AUTOMATED     CMP    Collection Time: 01/12/25  3:41 PM   Result Value Ref Range    Sodium 131 (L) 136 - 145 mmol/L    Potassium 3.4 (L) 3.5 - 5.1 mmol/L    Chloride 96 (L) 97 - 108 mmol/L    CO2 30 21 - 32 mmol/L    Anion Gap 5 2 - 12 mmol/L    Glucose 86 65 - 100 mg/dL    BUN 4 (L) 6 - 20 mg/dL    Creatinine 0.66 0.55 - 1.02 mg/dL    BUN/Creatinine Ratio 6 (L) 12 - 20      Est, Glom

## 2025-01-15 ENCOUNTER — TRANSCRIBE ORDERS (OUTPATIENT)
Facility: HOSPITAL | Age: 55
End: 2025-01-15

## 2025-01-15 DIAGNOSIS — R10.9 ABDOMINAL PAIN, UNSPECIFIED ABDOMINAL LOCATION: Primary | ICD-10-CM

## 2025-01-15 DIAGNOSIS — R11.0 NAUSEA: ICD-10-CM

## 2025-01-16 ENCOUNTER — HOSPITAL ENCOUNTER (OUTPATIENT)
Facility: HOSPITAL | Age: 55
Discharge: HOME | End: 2025-01-19
Payer: MEDICARE

## 2025-01-16 VITALS
RESPIRATION RATE: 16 BRPM | WEIGHT: 150.6 LBS | DIASTOLIC BLOOD PRESSURE: 66 MMHG | HEART RATE: 83 BPM | HEIGHT: 65 IN | SYSTOLIC BLOOD PRESSURE: 102 MMHG | OXYGEN SATURATION: 98 % | BODY MASS INDEX: 25.09 KG/M2

## 2025-01-16 LAB
ALBUMIN SERPL-MCNC: 3.5 G/DL (ref 3.5–5)
ALBUMIN/GLOB SERPL: 1.5 (ref 1.1–2.2)
ALP SERPL-CCNC: 146 U/L (ref 45–117)
ALT SERPL-CCNC: 45 U/L (ref 12–78)
ANION GAP SERPL CALC-SCNC: 4 MMOL/L (ref 2–12)
APTT PPP: 25.8 SEC (ref 21.2–34.1)
AST SERPL W P-5'-P-CCNC: 31 U/L (ref 15–37)
BILIRUB SERPL-MCNC: 0.4 MG/DL (ref 0.2–1)
BUN SERPL-MCNC: 3 MG/DL (ref 6–20)
BUN/CREAT SERPL: 5 (ref 12–20)
CA-I BLD-MCNC: 8.6 MG/DL (ref 8.5–10.1)
CHLORIDE SERPL-SCNC: 98 MMOL/L (ref 97–108)
CO2 SERPL-SCNC: 30 MMOL/L (ref 21–32)
CREAT SERPL-MCNC: 0.62 MG/DL (ref 0.55–1.02)
ERYTHROCYTE [DISTWIDTH] IN BLOOD BY AUTOMATED COUNT: 14.5 % (ref 11.5–14.5)
GLOBULIN SER CALC-MCNC: 2.4 G/DL (ref 2–4)
GLUCOSE SERPL-MCNC: 90 MG/DL (ref 65–100)
HCT VFR BLD AUTO: 37.3 % (ref 35–47)
HGB BLD-MCNC: 12.5 G/DL (ref 11.5–16)
INR PPP: 1 (ref 0.9–1.1)
MCH RBC QN AUTO: 28.3 PG (ref 26–34)
MCHC RBC AUTO-ENTMCNC: 33.5 G/DL (ref 30–36.5)
MCV RBC AUTO: 84.4 FL (ref 80–99)
NRBC # BLD: 0 K/UL (ref 0–0.01)
NRBC BLD-RTO: 0 PER 100 WBC
PLATELET # BLD AUTO: 174 K/UL (ref 150–400)
PMV BLD AUTO: 10 FL (ref 8.9–12.9)
POTASSIUM SERPL-SCNC: 3.5 MMOL/L (ref 3.5–5.1)
PROT SERPL-MCNC: 5.9 G/DL (ref 6.4–8.2)
PROTHROMBIN TIME: 13.9 SEC (ref 11.9–14.6)
RBC # BLD AUTO: 4.42 M/UL (ref 3.8–5.2)
SODIUM SERPL-SCNC: 132 MMOL/L (ref 136–145)
THERAPEUTIC RANGE: NORMAL SEC (ref 82–109)
WBC # BLD AUTO: 2.5 K/UL (ref 3.6–11)

## 2025-01-16 PROCEDURE — 80053 COMPREHEN METABOLIC PANEL: CPT

## 2025-01-16 PROCEDURE — 85610 PROTHROMBIN TIME: CPT

## 2025-01-16 PROCEDURE — 85730 THROMBOPLASTIN TIME PARTIAL: CPT

## 2025-01-16 PROCEDURE — 85027 COMPLETE CBC AUTOMATED: CPT

## 2025-01-16 PROCEDURE — 36415 COLL VENOUS BLD VENIPUNCTURE: CPT

## 2025-01-16 RX ORDER — ACARBOSE 25 MG/1
25 TABLET ORAL
COMMUNITY

## 2025-01-16 RX ORDER — FLUTICASONE FUROATE, UMECLIDINIUM BROMIDE AND VILANTEROL TRIFENATATE 200; 62.5; 25 UG/1; UG/1; UG/1
1 POWDER RESPIRATORY (INHALATION) DAILY
COMMUNITY

## 2025-01-16 RX ORDER — ACETAMINOPHEN 160 MG
2000 TABLET,DISINTEGRATING ORAL DAILY
COMMUNITY

## 2025-01-16 RX ORDER — CYANOCOBALAMIN 1000 UG/ML
1000 INJECTION, SOLUTION INTRAMUSCULAR; SUBCUTANEOUS
COMMUNITY

## 2025-01-16 RX ORDER — SUMATRIPTAN 50 MG/1
50 TABLET, FILM COATED ORAL
COMMUNITY

## 2025-01-16 ASSESSMENT — PAIN DESCRIPTION - LOCATION: LOCATION: ABDOMEN

## 2025-01-16 ASSESSMENT — PAIN DESCRIPTION - DESCRIPTORS: DESCRIPTORS: THROBBING

## 2025-01-16 NOTE — PERIOP NOTE
Dr. Nielson made aware patient stated she has Shellfish allergy - MD states patient should take 50mg of Benadryl the night before and the morning of planned procedure    Patient is aware - verbalized understanding       Pre-op lab results faxed to office - [phone]

## 2025-01-17 ENCOUNTER — TRANSCRIBE ORDERS (OUTPATIENT)
Facility: HOSPITAL | Age: 55
End: 2025-01-17

## 2025-01-17 ENCOUNTER — HOSPITAL ENCOUNTER (OUTPATIENT)
Facility: HOSPITAL | Age: 55
Discharge: HOME | End: 2025-01-20
Attending: SURGERY
Payer: MEDICARE

## 2025-01-17 ENCOUNTER — HOSPITAL ENCOUNTER (OUTPATIENT)
Facility: HOSPITAL | Age: 55
Discharge: HOME | End: 2025-01-20
Payer: MEDICARE

## 2025-01-17 DIAGNOSIS — R10.9 STOMACH ACHE: ICD-10-CM

## 2025-01-17 DIAGNOSIS — R10.10 UPPER ABDOMINAL PAIN: ICD-10-CM

## 2025-01-17 DIAGNOSIS — R10.9 STOMACH ACHE: Primary | ICD-10-CM

## 2025-01-17 DIAGNOSIS — R10.10 UPPER ABDOMINAL PAIN: Primary | ICD-10-CM

## 2025-01-17 DIAGNOSIS — R11.0 NAUSEA: ICD-10-CM

## 2025-01-17 DIAGNOSIS — R10.9 ABDOMINAL PAIN, UNSPECIFIED ABDOMINAL LOCATION: ICD-10-CM

## 2025-01-17 PROCEDURE — 2500000003 HC RX 250 WO HCPCS: Performed by: SURGERY

## 2025-01-17 PROCEDURE — 6370000000 HC RX 637 (ALT 250 FOR IP): Performed by: SURGERY

## 2025-01-17 PROCEDURE — 74248 X-RAY SM INT F-THRU STD: CPT

## 2025-01-17 RX ADMIN — BARIUM SULFATE 355 ML: 0.6 SUSPENSION ORAL at 11:47

## 2025-01-17 RX ADMIN — BARIUM SULFATE 100 ML: 980 POWDER, FOR SUSPENSION ORAL at 11:47

## 2025-01-17 RX ADMIN — ANTACID/ANTIFLATULENT 1 EACH: 380; 550; 10; 10 GRANULE, EFFERVESCENT ORAL at 11:47

## 2025-01-22 NOTE — DISCHARGE INSTRUCTIONS
into and out of a chair or bed and when opening doors.   Get regular exercise, after being cleared by your cardiologist.  Walking may be a good choice.  Try for at least 30 minutes on most days of the week.  If you smoke or use smokeless tobacco products, including vaping products, it is extremely important that you quit using these products.  Please ask you nurse or doctor for more information about smoking cessation.    Diet  Drink plenty of fluids to help you body flush out the dye.  If you have kidney, heart, or liver disease and have to limit fluids, talk to your doctor before increasing the amount of fluids you drink.  Keep eating a heart-healthy diet that has lots of fruits, vegetables, and whole grains.  If you have not been eating this way, talk to your doctor.  You also may want to talk to a dietician.  This expert can help you to learn about healthy foods and plan meals.    Medicines  Your doctor will tell you if and when you can restart your medicines.  You will also be given instructions about taking any new medicines.  Take these medicines as prescribed.  Continue taking your cholesterol lowering medications (“statins”), if you have been prescribed this.  You doctor may prescribe a blood-thinning medicine like aspirin or clopidogrel (Plavix), plasugrel (Effient), or ticagrelor (Brilinta).  If you had angioplasty or a stent placement, you must continue aspirin and Plavix, Effient, or Brilinta.  It is very important that you take these medicines exactly as directed to keep the coronary artery open and reduce your risk of heart attack.  Be safe with medicines.  Call your doctor if you think you are having a problem with taking or obtaining your medicines, notify your doctor immediately.  You cannot afford to miss your medications.  Do not stop taking these medications without speaking to your cardiologist first.   Do not strain to have a bowel movement.  Ask your doctor if you feel you may need a stool

## 2025-01-30 ENCOUNTER — APPOINTMENT (OUTPATIENT)
Facility: HOSPITAL | Age: 55
End: 2025-01-30
Payer: MEDICARE

## 2025-01-30 ENCOUNTER — HOSPITAL ENCOUNTER (EMERGENCY)
Facility: HOSPITAL | Age: 55
Discharge: HOME | End: 2025-01-30
Payer: MEDICARE

## 2025-01-30 VITALS
TEMPERATURE: 98 F | BODY MASS INDEX: 23.63 KG/M2 | WEIGHT: 147 LBS | HEIGHT: 66 IN | DIASTOLIC BLOOD PRESSURE: 80 MMHG | RESPIRATION RATE: 18 BRPM | OXYGEN SATURATION: 97 % | SYSTOLIC BLOOD PRESSURE: 111 MMHG | HEART RATE: 93 BPM

## 2025-01-30 DIAGNOSIS — R55 SYNCOPE AND COLLAPSE: Primary | ICD-10-CM

## 2025-01-30 DIAGNOSIS — S09.90XA MINOR HEAD INJURY, INITIAL ENCOUNTER: ICD-10-CM

## 2025-01-30 LAB
ALBUMIN SERPL-MCNC: 3.8 G/DL (ref 3.5–5)
ALBUMIN/GLOB SERPL: 1.4 (ref 1.1–2.2)
ALP SERPL-CCNC: 148 U/L (ref 45–117)
ALT SERPL-CCNC: 49 U/L (ref 12–78)
ANION GAP SERPL CALC-SCNC: 10 MMOL/L (ref 2–12)
AST SERPL W P-5'-P-CCNC: 30 U/L (ref 15–37)
BASOPHILS # BLD: 0 K/UL (ref 0–0.1)
BASOPHILS NFR BLD: 0 % (ref 0–1)
BILIRUB SERPL-MCNC: 0.4 MG/DL (ref 0.2–1)
BUN SERPL-MCNC: 5 MG/DL (ref 6–20)
BUN/CREAT SERPL: 10 (ref 12–20)
CA-I BLD-MCNC: 9.1 MG/DL (ref 8.5–10.1)
CHLORIDE SERPL-SCNC: 97 MMOL/L (ref 97–108)
CO2 SERPL-SCNC: 25 MMOL/L (ref 21–32)
CREAT SERPL-MCNC: 0.52 MG/DL (ref 0.55–1.02)
DIFFERENTIAL METHOD BLD: ABNORMAL
EOSINOPHIL # BLD: 0 K/UL (ref 0–0.4)
EOSINOPHIL NFR BLD: 0 % (ref 0–7)
ERYTHROCYTE [DISTWIDTH] IN BLOOD BY AUTOMATED COUNT: 14.1 % (ref 11.5–14.5)
GLOBULIN SER CALC-MCNC: 2.8 G/DL (ref 2–4)
GLUCOSE SERPL-MCNC: 92 MG/DL (ref 65–100)
HCT VFR BLD AUTO: 38.1 % (ref 35–47)
HGB BLD-MCNC: 13.1 G/DL (ref 11.5–16)
IMM GRANULOCYTES # BLD AUTO: 0.01 K/UL (ref 0–0.04)
IMM GRANULOCYTES NFR BLD AUTO: 0.5 % (ref 0–0.5)
LYMPHOCYTES # BLD: 0.46 K/UL (ref 0.8–3.5)
LYMPHOCYTES NFR BLD: 23.6 % (ref 12–49)
MCH RBC QN AUTO: 28.3 PG (ref 26–34)
MCHC RBC AUTO-ENTMCNC: 34.4 G/DL (ref 30–36.5)
MCV RBC AUTO: 82.3 FL (ref 80–99)
MONOCYTES # BLD: 0.09 K/UL (ref 0–1)
MONOCYTES NFR BLD: 4.6 % (ref 5–13)
NEUTS SEG # BLD: 1.39 K/UL (ref 1.8–8)
NEUTS SEG NFR BLD: 71.3 % (ref 32–75)
NRBC # BLD: 0 K/UL (ref 0–0.01)
NRBC BLD-RTO: 0 PER 100 WBC
PLATELET # BLD AUTO: 256 K/UL (ref 150–400)
PMV BLD AUTO: 10.6 FL (ref 8.9–12.9)
POTASSIUM SERPL-SCNC: 3.7 MMOL/L (ref 3.5–5.1)
PROT SERPL-MCNC: 6.6 G/DL (ref 6.4–8.2)
RBC # BLD AUTO: 4.63 M/UL (ref 3.8–5.2)
SODIUM SERPL-SCNC: 132 MMOL/L (ref 136–145)
TROPONIN I SERPL HS-MCNC: 4 NG/L (ref 0–51)
WBC # BLD AUTO: 2 K/UL (ref 3.6–11)

## 2025-01-30 PROCEDURE — 71045 X-RAY EXAM CHEST 1 VIEW: CPT

## 2025-01-30 PROCEDURE — 6360000002 HC RX W HCPCS: Performed by: NURSE PRACTITIONER

## 2025-01-30 PROCEDURE — 85025 COMPLETE CBC W/AUTO DIFF WBC: CPT

## 2025-01-30 PROCEDURE — 99285 EMERGENCY DEPT VISIT HI MDM: CPT

## 2025-01-30 PROCEDURE — 80053 COMPREHEN METABOLIC PANEL: CPT

## 2025-01-30 PROCEDURE — 70450 CT HEAD/BRAIN W/O DYE: CPT

## 2025-01-30 PROCEDURE — 96374 THER/PROPH/DIAG INJ IV PUSH: CPT

## 2025-01-30 PROCEDURE — 36415 COLL VENOUS BLD VENIPUNCTURE: CPT

## 2025-01-30 PROCEDURE — 93005 ELECTROCARDIOGRAM TRACING: CPT | Performed by: NURSE PRACTITIONER

## 2025-01-30 PROCEDURE — 6370000000 HC RX 637 (ALT 250 FOR IP): Performed by: NURSE PRACTITIONER

## 2025-01-30 PROCEDURE — 84484 ASSAY OF TROPONIN QUANT: CPT

## 2025-01-30 PROCEDURE — 72125 CT NECK SPINE W/O DYE: CPT

## 2025-01-30 RX ORDER — ONDANSETRON 2 MG/ML
4 INJECTION INTRAMUSCULAR; INTRAVENOUS ONCE
Status: COMPLETED | OUTPATIENT
Start: 2025-01-30 | End: 2025-01-30

## 2025-01-30 RX ORDER — TRAMADOL HYDROCHLORIDE 50 MG/1
50 TABLET ORAL
Status: COMPLETED | OUTPATIENT
Start: 2025-01-30 | End: 2025-01-30

## 2025-01-30 RX ADMIN — TRAMADOL HYDROCHLORIDE 50 MG: 50 TABLET, COATED ORAL at 20:30

## 2025-01-30 RX ADMIN — ONDANSETRON 4 MG: 2 INJECTION INTRAMUSCULAR; INTRAVENOUS at 20:30

## 2025-01-30 ASSESSMENT — PAIN SCALES - GENERAL
PAINLEVEL_OUTOF10: 4
PAINLEVEL_OUTOF10: 6

## 2025-01-30 ASSESSMENT — PAIN DESCRIPTION - LOCATION
LOCATION: HEAD
LOCATION: GENERALIZED

## 2025-01-30 ASSESSMENT — PAIN - FUNCTIONAL ASSESSMENT: PAIN_FUNCTIONAL_ASSESSMENT: 0-10

## 2025-01-31 LAB
EKG ATRIAL RATE: 92 BPM
EKG DIAGNOSIS: NORMAL
EKG P AXIS: 62 DEGREES
EKG P-R INTERVAL: 140 MS
EKG Q-T INTERVAL: 384 MS
EKG QRS DURATION: 84 MS
EKG QTC CALCULATION (BAZETT): 474 MS
EKG R AXIS: -22 DEGREES
EKG T AXIS: 25 DEGREES
EKG VENTRICULAR RATE: 92 BPM

## 2025-01-31 NOTE — DISCHARGE INSTRUCTIONS
Thank you for choosing our Emergency Department for your care.  It is our privilege to care for you in your time of need.  In the next several days, you may receive a survey via email or mailed to your home about your experience with our team.  We would greatly appreciate you taking a few minutes to complete the survey, as we use this information to learn what we have done well and what we could be doing better. Thank you for trusting us with your care!    Below you will find a list of your tests from today's visit.   Labs and Radiology Studies  Recent Results (from the past 12 hour(s))   CBC with Auto Differential    Collection Time: 01/30/25  7:42 PM   Result Value Ref Range    WBC 2.0 (L) 3.6 - 11.0 K/uL    RBC 4.63 3.80 - 5.20 M/uL    Hemoglobin 13.1 11.5 - 16.0 g/dL    Hematocrit 38.1 35.0 - 47.0 %    MCV 82.3 80.0 - 99.0 FL    MCH 28.3 26.0 - 34.0 PG    MCHC 34.4 30.0 - 36.5 g/dL    RDW 14.1 11.5 - 14.5 %    Platelets 256 150 - 400 K/uL    MPV 10.6 8.9 - 12.9 FL    Nucleated RBCs 0.0 0.0  WBC    nRBC 0.00 0.00 - 0.01 K/uL    Neutrophils % 71.3 32.0 - 75.0 %    Lymphocytes % 23.6 12.0 - 49.0 %    Monocytes % 4.6 (L) 5.0 - 13.0 %    Eosinophils % 0.0 0.0 - 7.0 %    Basophils % 0.0 0.0 - 1.0 %    Immature Granulocytes % 0.5 0 - 0.5 %    Neutrophils Absolute 1.39 (L) 1.80 - 8.00 K/UL    Lymphocytes Absolute 0.46 (L) 0.80 - 3.50 K/UL    Monocytes Absolute 0.09 0.00 - 1.00 K/UL    Eosinophils Absolute 0.00 0.00 - 0.40 K/UL    Basophils Absolute 0.00 0.00 - 0.10 K/UL    Immature Granulocytes Absolute 0.01 0.00 - 0.04 K/UL    Differential Type AUTOMATED     Comprehensive Metabolic Panel    Collection Time: 01/30/25  7:42 PM   Result Value Ref Range    Sodium 132 (L) 136 - 145 mmol/L    Potassium 3.7 3.5 - 5.1 mmol/L    Chloride 97 97 - 108 mmol/L    CO2 25 21 - 32 mmol/L    Anion Gap 10 2 - 12 mmol/L    Glucose 92 65 - 100 mg/dL    BUN 5 (L) 6 - 20 mg/dL    Creatinine 0.52 (L) 0.55 - 1.02 mg/dL

## 2025-02-04 ASSESSMENT — VISUAL ACUITY: OU: 1

## 2025-02-04 NOTE — ED PROVIDER NOTES
[facility] EMERGENCY DEPT  EMERGENCY DEPARTMENT HISTORY AND PHYSICAL EXAM      Date: 1/30/2025  Patient Name: Effie Rain  MRN: [id number]  Birthdate 1970  Date of evaluation: 1/30/2025  Provider: MIRACLE Blanton NP   Note Started: 1:44 PM EST 2/4/25    HISTORY OF PRESENT ILLNESS     Chief Complaint   Patient presents with    Fall       History Provided By: Patient    HPI: Effie Rain is a 54 y.o. female with a past medical history as noted below presents to the emergency room accompanied by her daughters with cc of syncope.  Patient states she was walking to the bathroom last night when she \"fell out \".  Patient states she came to she was on the floor.  She is complaining of a headache and neck pain.  She denies any associated chest pain or shortness of breath.  She denies any recent illness, specifically denies any fever/chills, N/V/D.  She denies any speech disturbances or extremity weakness.  Patient and daughter state patient frequently has syncopal episodes and has been evaluated by cardiology and neurology for the same with an unclear etiology.    PAST MEDICAL HISTORY   Past Medical History:  Past Medical History:   Diagnosis Date    Asthma     CAD (coronary artery disease)     Colon polyp     COPD (chronic obstructive pulmonary disease) ([facility]     Fibrocystic breast changes of both breasts 07/08/2015    GERD (gastroesophageal reflux disease)     Glaucoma 07/31/2020    Hypercholesterolemia 08/15/2013    Hypothyroidism 07/26/2019    Implantable loop recorder present 02/15/2023    Irritable bowel syndrome     Migraine headache 07/26/2019    Multinodular goiter 01/27/2023    Neuritis of right saphenous nerve 07/14/2021    Nontraumatic tear of left rotator cuff, unspecified tear extent 07/06/2023    NSVT (nonsustained ventricular tachycardia) ([facility]) 04/10/2021    Pulmonary embolism ([facility]) 08/2022    Seizures ([facility])     lst one - january 2025    Sleep apnea     cpap    Type 2 diabetes mellitus with

## 2025-02-28 ENCOUNTER — HOSPITAL ENCOUNTER (OUTPATIENT)
Facility: HOSPITAL | Age: 55
Discharge: HOME | End: 2025-02-28
Payer: MEDICARE

## 2025-02-28 VITALS
DIASTOLIC BLOOD PRESSURE: 57 MMHG | HEIGHT: 66 IN | BODY MASS INDEX: 22.05 KG/M2 | WEIGHT: 137.2 LBS | HEART RATE: 76 BPM | OXYGEN SATURATION: 95 % | SYSTOLIC BLOOD PRESSURE: 93 MMHG | TEMPERATURE: 97.3 F | RESPIRATION RATE: 16 BRPM

## 2025-02-28 LAB
ALBUMIN SERPL-MCNC: 3.4 G/DL (ref 3.5–5)
ALBUMIN/GLOB SERPL: 1.3 (ref 1.1–2.2)
ALP SERPL-CCNC: 152 U/L (ref 45–117)
ALT SERPL-CCNC: 47 U/L (ref 12–78)
ANION GAP SERPL CALC-SCNC: 6 MMOL/L (ref 2–12)
APTT PPP: 25.1 SEC (ref 21.2–34.1)
AST SERPL W P-5'-P-CCNC: 50 U/L (ref 15–37)
BILIRUB SERPL-MCNC: 0.4 MG/DL (ref 0.2–1)
BUN SERPL-MCNC: 5 MG/DL (ref 6–20)
BUN/CREAT SERPL: 6 (ref 12–20)
CA-I BLD-MCNC: 8.7 MG/DL (ref 8.5–10.1)
CHLORIDE SERPL-SCNC: 89 MMOL/L (ref 97–108)
CO2 SERPL-SCNC: 32 MMOL/L (ref 21–32)
CREAT SERPL-MCNC: 0.78 MG/DL (ref 0.55–1.02)
ERYTHROCYTE [DISTWIDTH] IN BLOOD BY AUTOMATED COUNT: 13.4 % (ref 11.5–14.5)
GLOBULIN SER CALC-MCNC: 2.7 G/DL (ref 2–4)
GLUCOSE SERPL-MCNC: 81 MG/DL (ref 65–100)
HCT VFR BLD AUTO: 37.5 % (ref 35–47)
HGB BLD-MCNC: 13.1 G/DL (ref 11.5–16)
INR PPP: 1 (ref 0.9–1.1)
MCH RBC QN AUTO: 27.8 PG (ref 26–34)
MCHC RBC AUTO-ENTMCNC: 34.9 G/DL (ref 30–36.5)
MCV RBC AUTO: 79.6 FL (ref 80–99)
NRBC # BLD: 0 K/UL (ref 0–0.01)
NRBC BLD-RTO: 0 PER 100 WBC
PLATELET # BLD AUTO: 283 K/UL (ref 150–400)
PMV BLD AUTO: 10.2 FL (ref 8.9–12.9)
POTASSIUM SERPL-SCNC: 2.8 MMOL/L (ref 3.5–5.1)
PROT SERPL-MCNC: 6.1 G/DL (ref 6.4–8.2)
PROTHROMBIN TIME: 13.9 SEC (ref 11.9–14.6)
RBC # BLD AUTO: 4.71 M/UL (ref 3.8–5.2)
SODIUM SERPL-SCNC: 127 MMOL/L (ref 136–145)
THERAPEUTIC RANGE: NORMAL SEC (ref 82–109)
WBC # BLD AUTO: 3 K/UL (ref 3.6–11)

## 2025-02-28 PROCEDURE — 85027 COMPLETE CBC AUTOMATED: CPT

## 2025-02-28 PROCEDURE — 85610 PROTHROMBIN TIME: CPT

## 2025-02-28 PROCEDURE — 85730 THROMBOPLASTIN TIME PARTIAL: CPT

## 2025-02-28 PROCEDURE — 80053 COMPREHEN METABOLIC PANEL: CPT

## 2025-02-28 PROCEDURE — 36415 COLL VENOUS BLD VENIPUNCTURE: CPT

## 2025-02-28 RX ORDER — FUROSEMIDE 20 MG/1
20 TABLET ORAL DAILY
COMMUNITY

## 2025-02-28 RX ORDER — MIDODRINE HYDROCHLORIDE 5 MG/1
5 TABLET ORAL 2 TIMES DAILY
COMMUNITY

## 2025-02-28 NOTE — PERIOP NOTE
Monet/Dr. Nielson's offce called patient and instructed her to take Benadryl 50 mg the night prior to cath due to shellfish allergy.  BP states her blood pressure runs low. Pt denies any dizziness, lightheadedness.  1234 - Dr. Nielson notified Sodium 127 K+ 2.8 via phone, abnormal labs faxed to office.

## 2025-03-05 ENCOUNTER — HOSPITAL ENCOUNTER (OUTPATIENT)
Facility: HOSPITAL | Age: 55
Discharge: HOME | End: 2025-03-05
Attending: INTERNAL MEDICINE | Admitting: INTERNAL MEDICINE
Payer: MEDICARE

## 2025-03-05 VITALS
TEMPERATURE: 97.4 F | HEART RATE: 77 BPM | OXYGEN SATURATION: 98 % | DIASTOLIC BLOOD PRESSURE: 64 MMHG | RESPIRATION RATE: 16 BRPM | SYSTOLIC BLOOD PRESSURE: 95 MMHG

## 2025-03-05 DIAGNOSIS — R94.39 ABNORMAL NUCLEAR STRESS TEST: ICD-10-CM

## 2025-03-05 DIAGNOSIS — R07.9 CHEST PAIN: ICD-10-CM

## 2025-03-05 LAB
CA-I BLD-MCNC: 1.11 MMOL/L (ref 1.12–1.32)
CHLORIDE BLD-SCNC: 89 MMOL/L (ref 98–107)
GLUCOSE BLD STRIP.AUTO-MCNC: 85 MG/DL (ref 65–100)
POTASSIUM BLD-SCNC: 2.8 MMOL/L (ref 3.5–5.5)
SODIUM BLD-SCNC: 135 MMOL/L (ref 136–145)

## 2025-03-05 PROCEDURE — 80047 BASIC METABLC PNL IONIZED CA: CPT

## 2025-03-05 ASSESSMENT — PAIN - FUNCTIONAL ASSESSMENT: PAIN_FUNCTIONAL_ASSESSMENT: 0-10

## 2025-03-05 ASSESSMENT — PAIN DESCRIPTION - DESCRIPTORS: DESCRIPTORS: ACHING

## 2025-03-05 NOTE — PROGRESS NOTES
Patient states ok to review d/c instructions with Diane matthew   
Patient's procedure canceled per Dr. Nielson. Pt given d/c instructions. Per Dr. Nielson patient is to increase potassium for 10 MEQ to 20 MEQ twice a day. Pt also to follow up with nephrologist. IV removed; cath tip intact.   
her mother during pre-op rounding. We spent some time together processing her family situation, her medical situation, her supports, and her kody. At patient's request, we prayed for a good procedure and for a removal of medical complications. As I assured her, further  support is available upon request.    Electronically signed by  Star Mcallister MDiv  Chaplain Resident   on 3/5/2025 at 8:35 AM

## 2025-03-12 ENCOUNTER — OFFICE VISIT (OUTPATIENT)
Age: 55
End: 2025-03-12
Payer: MEDICARE

## 2025-03-12 VITALS
TEMPERATURE: 97.6 F | SYSTOLIC BLOOD PRESSURE: 98 MMHG | HEART RATE: 101 BPM | DIASTOLIC BLOOD PRESSURE: 64 MMHG | WEIGHT: 144 LBS | BODY MASS INDEX: 23.14 KG/M2 | OXYGEN SATURATION: 94 % | HEIGHT: 66 IN

## 2025-03-12 DIAGNOSIS — R74.8 ELEVATED LIVER ENZYMES: Primary | ICD-10-CM

## 2025-03-12 PROCEDURE — 99214 OFFICE O/P EST MOD 30 MIN: CPT | Performed by: NURSE PRACTITIONER

## 2025-03-12 PROCEDURE — 91200 LIVER ELASTOGRAPHY: CPT | Performed by: NURSE PRACTITIONER

## 2025-03-12 ASSESSMENT — ANXIETY QUESTIONNAIRES
3. WORRYING TOO MUCH ABOUT DIFFERENT THINGS: NOT AT ALL
IF YOU CHECKED OFF ANY PROBLEMS ON THIS QUESTIONNAIRE, HOW DIFFICULT HAVE THESE PROBLEMS MADE IT FOR YOU TO DO YOUR WORK, TAKE CARE OF THINGS AT HOME, OR GET ALONG WITH OTHER PEOPLE: NOT DIFFICULT AT ALL
2. NOT BEING ABLE TO STOP OR CONTROL WORRYING: NOT AT ALL
4. TROUBLE RELAXING: NOT AT ALL
6. BECOMING EASILY ANNOYED OR IRRITABLE: NOT AT ALL
1. FEELING NERVOUS, ANXIOUS, OR ON EDGE: NOT AT ALL
5. BEING SO RESTLESS THAT IT IS HARD TO SIT STILL: NOT AT ALL
7. FEELING AFRAID AS IF SOMETHING AWFUL MIGHT HAPPEN: NOT AT ALL
GAD7 TOTAL SCORE: 0

## 2025-03-12 ASSESSMENT — PATIENT HEALTH QUESTIONNAIRE - PHQ9
2. FEELING DOWN, DEPRESSED OR HOPELESS: NOT AT ALL
SUM OF ALL RESPONSES TO PHQ QUESTIONS 1-9: 0
DEPRESSION UNABLE TO ASSESS: FUNCTIONAL CAPACITY MOTIVATION LIMITS ACCURACY
1. LITTLE INTEREST OR PLEASURE IN DOING THINGS: NOT AT ALL
SUM OF ALL RESPONSES TO PHQ QUESTIONS 1-9: 0

## 2025-03-12 NOTE — PROGRESS NOTES
Chief Complaint   Patient presents with    Follow-up     fibroscan     Vitals:    03/12/25 1255   BP: 98/64   BP Site: Left Upper Arm   Patient Position: Sitting   Pulse: (!) 101   Temp: 97.6 °F (36.4 °C)   TempSrc: Temporal   SpO2: 94%   Weight: 65.3 kg (144 lb)   Height: 1.676 m (5' 6\")     .  \"Have you been to the ER, urgent care clinic since your last visit?  Hospitalized since your last visit?\"    YES - When: approximately 2/25/25 ago.  Where and Why: [facility].    “Have you seen or consulted any other health care providers outside of [facility] since your last visit?”    NO            Click Here for Release of Records Request

## 2025-03-12 NOTE — PROGRESS NOTES
[facility]      Uriel Laura MD, FACP, FACG, FAASLD      Mirtha Christine, PA-TEJAS Martínez, [facility]   Vicki Shelleydawood, [facility]   Vi Luke, [facility]  Salvador De La Paz, [facility]   Lanny Lozada, [facility]   Katherine Chico, [facility]   [address], [address]   [place]  [address] [phone]   FAX: [phone]  [facility]   [address], [address]   [place]  [address] [phone]   FAX: [phone]       Patient Care Team:  Juan Hartley MD as PCP - General  Anand Hines MD as Referring Physician    Patient Active Problem List   Diagnosis    Chronic combined systolic and diastolic congestive heart failure (HCC)    AMS (altered mental status)    Complex partial epileptic seizure (HCC)    Type 2 diabetes mellitus with complication, without long-term current use of insulin (HCC)    Gait disturbance    Recurrent syncope    Syncope and collapse    Elevated liver enzymes    Chest pain       Effie Rain [facility] to the [facility] for management of elevated liver enzymes. The active problem list, all pertinent past medical history, medications, radiologic findings and laboratory findings related to the liver disorder were reviewed with the patient.      The patient is a 54 y.o. Black female who was found to have abnormalities in liver enzymes in 2018.    ECHO performed 1/2021 demonstrated mildly reduced systolic function. LVEF 50-55%.   This was recently repeated 5/2022 which demonstrated an LVEF of 45-50%. Grade 1 diastolic dysfunction. Mildly thickened tricuspid valve, Moderate regurgitation of pulmonic valve.     Serologic evaluation for markers of chronic liver disease were negative.      The patient underwent a liver biopsy in 12/2020 which demonstrated

## 2025-04-10 ENCOUNTER — HOSPITAL ENCOUNTER (EMERGENCY)
Facility: HOSPITAL | Age: 55
Discharge: HOME | End: 2025-04-10
Attending: EMERGENCY MEDICINE
Payer: MEDICARE

## 2025-04-10 ENCOUNTER — APPOINTMENT (OUTPATIENT)
Facility: HOSPITAL | Age: 55
End: 2025-04-10
Payer: MEDICARE

## 2025-04-10 ENCOUNTER — APPOINTMENT (OUTPATIENT)
Facility: HOSPITAL | Age: 55
End: 2025-04-10
Attending: EMERGENCY MEDICINE
Payer: MEDICARE

## 2025-04-10 VITALS
RESPIRATION RATE: 18 BRPM | WEIGHT: 140 LBS | DIASTOLIC BLOOD PRESSURE: 51 MMHG | BODY MASS INDEX: 22.5 KG/M2 | HEIGHT: 66 IN | TEMPERATURE: 98 F | OXYGEN SATURATION: 100 % | HEART RATE: 80 BPM | SYSTOLIC BLOOD PRESSURE: 100 MMHG

## 2025-04-10 DIAGNOSIS — R11.2 NAUSEA AND VOMITING, UNSPECIFIED VOMITING TYPE: Primary | ICD-10-CM

## 2025-04-10 LAB
ALBUMIN SERPL-MCNC: 3.4 G/DL (ref 3.5–5)
ALBUMIN/GLOB SERPL: 1.3 (ref 1.1–2.2)
ALP SERPL-CCNC: 102 U/L (ref 45–117)
ALT SERPL-CCNC: 35 U/L (ref 12–78)
ANION GAP SERPL CALC-SCNC: 6 MMOL/L (ref 2–12)
AST SERPL W P-5'-P-CCNC: 23 U/L (ref 15–37)
BASOPHILS # BLD: 0.03 K/UL (ref 0–0.1)
BASOPHILS NFR BLD: 1 % (ref 0–1)
BILIRUB SERPL-MCNC: 0.3 MG/DL (ref 0.2–1)
BUN SERPL-MCNC: 4 MG/DL (ref 6–20)
BUN/CREAT SERPL: 9 (ref 12–20)
CA-I BLD-MCNC: 8.6 MG/DL (ref 8.5–10.1)
CHLORIDE SERPL-SCNC: 95 MMOL/L (ref 97–108)
CO2 SERPL-SCNC: 29 MMOL/L (ref 21–32)
CREAT SERPL-MCNC: 0.43 MG/DL (ref 0.55–1.02)
DIFFERENTIAL METHOD BLD: ABNORMAL
EOSINOPHIL # BLD: 0.05 K/UL (ref 0–0.4)
EOSINOPHIL NFR BLD: 2 % (ref 0–7)
ERYTHROCYTE [DISTWIDTH] IN BLOOD BY AUTOMATED COUNT: 15.1 % (ref 11.5–14.5)
GLOBULIN SER CALC-MCNC: 2.6 G/DL (ref 2–4)
GLUCOSE SERPL-MCNC: 76 MG/DL (ref 65–100)
HCT VFR BLD AUTO: 29.8 % (ref 35–47)
HGB BLD-MCNC: 10.6 G/DL (ref 11.5–16)
IMM GRANULOCYTES # BLD AUTO: 0 K/UL
IMM GRANULOCYTES NFR BLD AUTO: 0 %
LACTATE BLD-SCNC: 0.74 MMOL/L (ref 0.4–2)
LIPASE SERPL-CCNC: 39 U/L (ref 13–75)
LYMPHOCYTES # BLD: 0.78 K/UL (ref 0.8–3.5)
LYMPHOCYTES NFR BLD: 31 % (ref 12–49)
MAGNESIUM SERPL-MCNC: 2 MG/DL (ref 1.6–2.4)
MCH RBC QN AUTO: 28 PG (ref 26–34)
MCHC RBC AUTO-ENTMCNC: 35.6 G/DL (ref 30–36.5)
MCV RBC AUTO: 78.8 FL (ref 80–99)
MONOCYTES # BLD: 0.33 K/UL (ref 0–1)
MONOCYTES NFR BLD: 13 % (ref 5–13)
NEUTS SEG # BLD: 1.31 K/UL (ref 1.8–8)
NEUTS SEG NFR BLD: 53 % (ref 32–75)
NRBC # BLD: 0 K/UL (ref 0–0.01)
NRBC BLD-RTO: 0 PER 100 WBC
PERFORMED BY:: NORMAL
PLATELET # BLD AUTO: 200 K/UL (ref 150–400)
PMV BLD AUTO: 10.6 FL (ref 8.9–12.9)
POTASSIUM SERPL-SCNC: 3.7 MMOL/L (ref 3.5–5.1)
PROT SERPL-MCNC: 6 G/DL (ref 6.4–8.2)
RBC # BLD AUTO: 3.78 M/UL (ref 3.8–5.2)
RBC MORPH BLD: ABNORMAL
SODIUM SERPL-SCNC: 130 MMOL/L (ref 136–145)
TROPONIN I SERPL HS-MCNC: <4 NG/L (ref 0–51)
WBC # BLD AUTO: 2.5 K/UL (ref 3.6–11)

## 2025-04-10 PROCEDURE — 85025 COMPLETE CBC W/AUTO DIFF WBC: CPT

## 2025-04-10 PROCEDURE — 74177 CT ABD & PELVIS W/CONTRAST: CPT

## 2025-04-10 PROCEDURE — 80053 COMPREHEN METABOLIC PANEL: CPT

## 2025-04-10 PROCEDURE — 2580000003 HC RX 258: Performed by: EMERGENCY MEDICINE

## 2025-04-10 PROCEDURE — 6360000002 HC RX W HCPCS: Performed by: EMERGENCY MEDICINE

## 2025-04-10 PROCEDURE — 93005 ELECTROCARDIOGRAM TRACING: CPT | Performed by: EMERGENCY MEDICINE

## 2025-04-10 PROCEDURE — 74022 RADEX COMPL AQT ABD SERIES: CPT

## 2025-04-10 PROCEDURE — 96374 THER/PROPH/DIAG INJ IV PUSH: CPT

## 2025-04-10 PROCEDURE — 84484 ASSAY OF TROPONIN QUANT: CPT

## 2025-04-10 PROCEDURE — 36415 COLL VENOUS BLD VENIPUNCTURE: CPT

## 2025-04-10 PROCEDURE — 83605 ASSAY OF LACTIC ACID: CPT

## 2025-04-10 PROCEDURE — 83690 ASSAY OF LIPASE: CPT

## 2025-04-10 PROCEDURE — 6370000000 HC RX 637 (ALT 250 FOR IP): Performed by: EMERGENCY MEDICINE

## 2025-04-10 PROCEDURE — 83735 ASSAY OF MAGNESIUM: CPT

## 2025-04-10 PROCEDURE — 99285 EMERGENCY DEPT VISIT HI MDM: CPT

## 2025-04-10 PROCEDURE — 96375 TX/PRO/DX INJ NEW DRUG ADDON: CPT

## 2025-04-10 PROCEDURE — 6360000004 HC RX CONTRAST MEDICATION: Performed by: EMERGENCY MEDICINE

## 2025-04-10 RX ORDER — 0.9 % SODIUM CHLORIDE 0.9 %
500 INTRAVENOUS SOLUTION INTRAVENOUS ONCE
Status: COMPLETED | OUTPATIENT
Start: 2025-04-10 | End: 2025-04-10

## 2025-04-10 RX ORDER — METOCLOPRAMIDE HYDROCHLORIDE 5 MG/ML
5 INJECTION INTRAMUSCULAR; INTRAVENOUS ONCE
Status: COMPLETED | OUTPATIENT
Start: 2025-04-10 | End: 2025-04-10

## 2025-04-10 RX ORDER — OXYCODONE HYDROCHLORIDE 5 MG/1
5 TABLET ORAL
Refills: 0 | Status: COMPLETED | OUTPATIENT
Start: 2025-04-10 | End: 2025-04-10

## 2025-04-10 RX ORDER — DIPHENHYDRAMINE HYDROCHLORIDE 50 MG/ML
25 INJECTION, SOLUTION INTRAMUSCULAR; INTRAVENOUS
Status: COMPLETED | OUTPATIENT
Start: 2025-04-10 | End: 2025-04-10

## 2025-04-10 RX ORDER — METOCLOPRAMIDE HYDROCHLORIDE 5 MG/ML
10 INJECTION INTRAMUSCULAR; INTRAVENOUS
Status: COMPLETED | OUTPATIENT
Start: 2025-04-10 | End: 2025-04-10

## 2025-04-10 RX ORDER — ONDANSETRON 2 MG/ML
4 INJECTION INTRAMUSCULAR; INTRAVENOUS ONCE
Status: COMPLETED | OUTPATIENT
Start: 2025-04-10 | End: 2025-04-10

## 2025-04-10 RX ORDER — 0.9 % SODIUM CHLORIDE 0.9 %
1000 INTRAVENOUS SOLUTION INTRAVENOUS ONCE
Status: COMPLETED | OUTPATIENT
Start: 2025-04-10 | End: 2025-04-10

## 2025-04-10 RX ORDER — IOPAMIDOL 755 MG/ML
100 INJECTION, SOLUTION INTRAVASCULAR
Status: COMPLETED | OUTPATIENT
Start: 2025-04-10 | End: 2025-04-10

## 2025-04-10 RX ORDER — MORPHINE SULFATE 4 MG/ML
4 INJECTION, SOLUTION INTRAMUSCULAR; INTRAVENOUS
Refills: 0 | Status: DISCONTINUED | OUTPATIENT
Start: 2025-04-10 | End: 2025-04-10

## 2025-04-10 RX ORDER — PROMETHAZINE HYDROCHLORIDE 25 MG/1
25 SUPPOSITORY RECTAL EVERY 6 HOURS PRN
Qty: 12 SUPPOSITORY | Refills: 0 | Status: SHIPPED | OUTPATIENT
Start: 2025-04-10 | End: 2025-04-14

## 2025-04-10 RX ADMIN — IOPAMIDOL 100 ML: 755 INJECTION, SOLUTION INTRAVENOUS at 19:28

## 2025-04-10 RX ADMIN — OXYCODONE HYDROCHLORIDE 5 MG: 5 TABLET ORAL at 23:11

## 2025-04-10 RX ADMIN — SODIUM CHLORIDE 500 ML: 0.9 INJECTION, SOLUTION INTRAVENOUS at 21:39

## 2025-04-10 RX ADMIN — METOCLOPRAMIDE 5 MG: 5 INJECTION, SOLUTION INTRAMUSCULAR; INTRAVENOUS at 21:39

## 2025-04-10 RX ADMIN — SODIUM CHLORIDE 1000 ML: 0.9 INJECTION, SOLUTION INTRAVENOUS at 17:14

## 2025-04-10 RX ADMIN — ONDANSETRON 4 MG: 2 INJECTION, SOLUTION INTRAMUSCULAR; INTRAVENOUS at 17:15

## 2025-04-10 RX ADMIN — METOCLOPRAMIDE 10 MG: 5 INJECTION, SOLUTION INTRAMUSCULAR; INTRAVENOUS at 17:14

## 2025-04-10 RX ADMIN — DIPHENHYDRAMINE HYDROCHLORIDE 25 MG: 50 INJECTION INTRAMUSCULAR; INTRAVENOUS at 21:39

## 2025-04-10 RX ADMIN — SODIUM CHLORIDE 500 ML: 0.9 INJECTION, SOLUTION INTRAVENOUS at 19:48

## 2025-04-10 RX ADMIN — DIPHENHYDRAMINE HYDROCHLORIDE 25 MG: 50 INJECTION INTRAMUSCULAR; INTRAVENOUS at 17:15

## 2025-04-10 ASSESSMENT — PAIN SCALES - GENERAL: PAINLEVEL_OUTOF10: 7

## 2025-04-10 NOTE — ED PROVIDER NOTES
[facility] EMERGENCY DEPT  EMERGENCY DEPARTMENT HISTORY AND PHYSICAL EXAM      Date of evaluation: 4/10/2025  Patient Name: Effie Rain  Birthdate 1970  MRN: [id number]  ED Provider: Kanu Leone DO   Note Started: 4:41 PM EDT 4/10/25    HISTORY OF PRESENT ILLNESS     Chief Complaint   Patient presents with    Abdominal Pain       History Provided By: Patient, only     HPI:   Patient is a 54-year-old female with history of extensive surgical history gastric bypass, bowel obstructions, chronic malnutrition, failure to thrive, type 2 diabetes and she presents with a chief complaint of vomiting.  She also incidentally notes her feeding tube is clogged.  She denies any chest pain or shortness of breath she does have some epigastric pain.  Epigastric pain is recurrent issue for her as well as the vomiting.  Patient denies any fevers or chills chest pain or shortness of breath.  The vomiting started over the last 24 hours.  She is typically followed via [facility]'s bariatric surgery team.  She called out her home health nurse who has been caring for her and they were unable to unclog the feeding tube as well.  She also notes her blood pressures have been running fairly low but does not unusual for her, and typically runs in the 80s systolic to 90s per her account.          PAST MEDICAL HISTORY   Past Medical History:  Past Medical History:   Diagnosis Date    Asthma     CAD (coronary artery disease)     Colon polyp     COPD (chronic obstructive pulmonary disease) (HCC)     Fibrocystic breast changes of both breasts 07/08/2015    GERD (gastroesophageal reflux disease)     Glaucoma 07/31/2020    Hypercholesterolemia 08/15/2013    Hypothyroidism 07/26/2019    Implantable loop recorder present 02/15/2023    Irritable bowel syndrome     Migraine headache 07/26/2019    Multinodular goiter 01/27/2023    Neuritis of right saphenous nerve 07/14/2021    Nontraumatic tear of left rotator cuff, unspecified tear extent 07/06/2023    NSVT    diphenhydrAMINE (BENADRYL) injection 25 mg (25 mg IntraVENous Given 4/10/25 1715)   metoclopramide (REGLAN) injection 10 mg (10 mg IntraVENous Given 4/10/25 1714)   ondansetron (ZOFRAN) injection 4 mg (4 mg IntraVENous Given 4/10/25 1715)   iopamidol (ISOVUE-370) 76 % injection 100 mL (100 mLs IntraVENous Given 4/10/25 1928)   sodium chloride 0.9 % bolus 500 mL (0 mLs IntraVENous Stopped 4/10/25 2037)   metoclopramide (REGLAN) injection 5 mg (5 mg IntraVENous Given 4/10/25 2139)   diphenhydrAMINE (BENADRYL) injection 25 mg (25 mg IntraVENous Given 4/10/25 2139)   sodium chloride 0.9 % bolus 500 mL (0 mLs IntraVENous Stopped 4/10/25 2248)   oxyCODONE (ROXICODONE) immediate release tablet 5 mg (5 mg Oral Given 4/10/25 2311)       CONSULTS: See ED Course/MDM for further details.  None   PROCEDURES   Unless otherwise noted above, none  Procedures    SEPSIS REASSESSMENT & CRITICAL CARE TIME   SEPSIS REASSESSMENT: Patient does NOT meet Sepsis criteria after ED workup    There was a high probability of life-threatening clinical deterioration in the patient's condition requiring my urgent intervention.  I personally saw the patient and independently provided 32 minutes of non-concurrent critical care out of the total shared critical care time provided, excluding separately reportable procedures.   CLINICAL IMPRESSIONS     1. Nausea and vomiting, unspecified vomiting type       SDOH/DISPOSITION/PLAN   Social Determinants affecting Treatment Plan: None    DISPOSITION Decision To Discharge 04/10/2025 10:55:59 PM   DISPOSITION CONDITION Stable         Discharge Note: The patient is stable for discharge home. The signs, symptoms, diagnosis, and discharge instructions have been discussed, understanding conveyed, and agreed upon. The patient is to follow up as recommended or return to ER should their symptoms worsen.      PATIENT REFERRED TO:  Juan Hartley MD  [id number] [facility] [facility]

## 2025-04-10 NOTE — ED TRIAGE NOTES
Nausea, vomiting abdominal pain. Woke up to Ng tube being clogged this AM. Home health came to unclogged gtube and couldn't get it unclogged. NG tube recently placed at [facility].

## 2025-04-11 LAB
EKG ATRIAL RATE: 66 BPM
EKG DIAGNOSIS: NORMAL
EKG P AXIS: 37 DEGREES
EKG P-R INTERVAL: 164 MS
EKG Q-T INTERVAL: 402 MS
EKG QRS DURATION: 86 MS
EKG QTC CALCULATION (BAZETT): 421 MS
EKG R AXIS: 42 DEGREES
EKG T AXIS: 39 DEGREES
EKG VENTRICULAR RATE: 66 BPM

## 2025-04-11 NOTE — DISCHARGE INSTRUCTIONS
Called and spoke to bariatrics at [facility].  They advised me they can set you up to have a YouTube put in but you need to try and take things by mouth until then to keep up your hydration.  You can continue your oral medications.  I sent in a prescription for Phenergan suppositories to take in the event that the oral Zofran is not working.    They should be calling you tomorrow however if you do not hear from them you should call to arrange this follow-up visit to have the tube replaced or looked at.  Since you are able to take by mouth is not an emergency to replace it but it should be done sooner rather than later.        Thank you for choosing our Emergency Department for your care.  It is our privilege to care for you in your time of need.  In the next several days, you may receive a survey via email or mailed to your home about your experience with our team.  We would greatly appreciate you taking a few minutes to complete the survey, as we use this information to learn what we have done well and what we could be doing better. Thank you for trusting us with your care!    Below you will find a list of your tests from today's visit.   Labs and Radiology Studies  Recent Results (from the past 12 hours)   POC Lactic Acid    Collection Time: 04/10/25  5:07 PM   Result Value Ref Range    POC Lactic Acid 0.74 0.40 - 2.00 mmol/L    Performed by: Sonia Cid    CBC with Auto Differential    Collection Time: 04/10/25  5:12 PM   Result Value Ref Range    WBC 2.5 (L) 3.6 - 11.0 K/uL    RBC 3.78 (L) 3.80 - 5.20 M/uL    Hemoglobin 10.6 (L) 11.5 - 16.0 g/dL    Hematocrit 29.8 (L) 35.0 - 47.0 %    MCV 78.8 (L) 80.0 - 99.0 FL    MCH 28.0 26.0 - 34.0 PG    MCHC 35.6 30.0 - 36.5 g/dL    RDW 15.1 (H) 11.5 - 14.5 %    Platelets 200 150 - 400 K/uL    MPV 10.6 8.9 - 12.9 FL    Nucleated RBCs 0.0 0.0  WBC    nRBC 0.00 0.00 - 0.01 K/uL    Neutrophils % 53.0 32 - 75 %    Lymphocytes % 31.0 12 - 49 %    Monocytes % 13.0 5 - 13 %     the Emergency Department were for an urgent problem. It is important that you follow-up with a doctor, nurse practitioner, or physician assistant to:  (1) confirm your diagnosis,  (2) re-evaluation of changes in your illness and treatment, and (3) for ongoing care. Please take your discharge instructions with you when you go to your follow-up appointment.     If you have any problem arranging a follow-up appointment, contact us!  If your symptoms become worse or you do not improve as expected, please return to us. We are available 24 hours a day.     If a prescription has been provided, please fill it as soon as possible to prevent a delay in treatment. If you have any questions or reservations about taking the medication due to side effects or interactions with other medications, please call your primary care provider or contact us directly.  Again, THANK YOU for choosing us to care for YOU!

## 2025-04-11 NOTE — ED NOTES
Patient able to drink approximately 100 cc of water without issue.    Spoke with patient's mother Diane Archer and updated her on the impending discharge. Per the mother, her or another family member will be coming to the emergency department to take the patient home.

## 2025-04-17 ENCOUNTER — HOSPITAL ENCOUNTER (EMERGENCY)
Facility: HOSPITAL | Age: 55
Discharge: HOME | End: 2025-04-17
Payer: MEDICARE

## 2025-04-17 ENCOUNTER — HOSPITAL ENCOUNTER (OUTPATIENT)
Facility: HOSPITAL | Age: 55
Discharge: HOME | End: 2025-04-20
Payer: MEDICARE

## 2025-04-17 VITALS
HEART RATE: 63 BPM | HEIGHT: 66 IN | SYSTOLIC BLOOD PRESSURE: 91 MMHG | RESPIRATION RATE: 18 BRPM | WEIGHT: 144 LBS | DIASTOLIC BLOOD PRESSURE: 59 MMHG | BODY MASS INDEX: 23.14 KG/M2 | TEMPERATURE: 97.6 F | OXYGEN SATURATION: 99 %

## 2025-04-17 VITALS
OXYGEN SATURATION: 100 % | TEMPERATURE: 98 F | BODY MASS INDEX: 23.99 KG/M2 | SYSTOLIC BLOOD PRESSURE: 115 MMHG | RESPIRATION RATE: 16 BRPM | DIASTOLIC BLOOD PRESSURE: 79 MMHG | WEIGHT: 144 LBS | HEART RATE: 75 BPM | HEIGHT: 65 IN

## 2025-04-17 DIAGNOSIS — T85.598A OBSTRUCTION OF FEEDING TUBE, INITIAL ENCOUNTER: Primary | ICD-10-CM

## 2025-04-17 LAB
ALBUMIN SERPL-MCNC: 3.2 G/DL (ref 3.5–5)
ALBUMIN/GLOB SERPL: 1.1 (ref 1.1–2.2)
ALP SERPL-CCNC: 99 U/L (ref 45–117)
ALT SERPL-CCNC: 48 U/L (ref 12–78)
ANION GAP SERPL CALC-SCNC: 3 MMOL/L (ref 2–12)
APTT PPP: 27.6 SEC (ref 21.2–34.1)
AST SERPL W P-5'-P-CCNC: 39 U/L (ref 15–37)
BILIRUB SERPL-MCNC: 0.4 MG/DL (ref 0.2–1)
BUN SERPL-MCNC: 7 MG/DL (ref 6–20)
BUN/CREAT SERPL: 16 (ref 12–20)
CA-I BLD-MCNC: 8.5 MG/DL (ref 8.5–10.1)
CHLORIDE SERPL-SCNC: 97 MMOL/L (ref 97–108)
CO2 SERPL-SCNC: 28 MMOL/L (ref 21–32)
CREAT SERPL-MCNC: 0.44 MG/DL (ref 0.55–1.02)
ERYTHROCYTE [DISTWIDTH] IN BLOOD BY AUTOMATED COUNT: 16 % (ref 11.5–14.5)
GLOBULIN SER CALC-MCNC: 2.8 G/DL (ref 2–4)
GLUCOSE SERPL-MCNC: 81 MG/DL (ref 65–100)
HCT VFR BLD AUTO: 33.2 % (ref 35–47)
HGB BLD-MCNC: 11.3 G/DL (ref 11.5–16)
INR PPP: 0.9 (ref 0.9–1.1)
MCH RBC QN AUTO: 27.4 PG (ref 26–34)
MCHC RBC AUTO-ENTMCNC: 34 G/DL (ref 30–36.5)
MCV RBC AUTO: 80.6 FL (ref 80–99)
NRBC # BLD: 0 K/UL (ref 0–0.01)
NRBC BLD-RTO: 0 PER 100 WBC
PLATELET # BLD AUTO: 293 K/UL (ref 150–400)
PMV BLD AUTO: 9.9 FL (ref 8.9–12.9)
POTASSIUM SERPL-SCNC: 4.4 MMOL/L (ref 3.5–5.1)
PROT SERPL-MCNC: 6 G/DL (ref 6.4–8.2)
PROTHROMBIN TIME: 12.7 SEC (ref 11.9–14.6)
RBC # BLD AUTO: 4.12 M/UL (ref 3.8–5.2)
SODIUM SERPL-SCNC: 128 MMOL/L (ref 136–145)
THERAPEUTIC RANGE: NORMAL SEC (ref 82–109)
WBC # BLD AUTO: 4.2 K/UL (ref 3.6–11)

## 2025-04-17 PROCEDURE — 80053 COMPREHEN METABOLIC PANEL: CPT

## 2025-04-17 PROCEDURE — 85610 PROTHROMBIN TIME: CPT

## 2025-04-17 PROCEDURE — 85730 THROMBOPLASTIN TIME PARTIAL: CPT

## 2025-04-17 PROCEDURE — 36415 COLL VENOUS BLD VENIPUNCTURE: CPT

## 2025-04-17 PROCEDURE — 99282 EMERGENCY DEPT VISIT SF MDM: CPT

## 2025-04-17 PROCEDURE — 85027 COMPLETE CBC AUTOMATED: CPT

## 2025-04-17 ASSESSMENT — PAIN - FUNCTIONAL ASSESSMENT: PAIN_FUNCTIONAL_ASSESSMENT: 0-10

## 2025-04-17 ASSESSMENT — PAIN SCALES - GENERAL: PAINLEVEL_OUTOF10: 0

## 2025-04-17 NOTE — ED TRIAGE NOTES
Arrives with reports of feeding tube being blocked, states has tried to flush water through and was unable to flush through. Has hx of stomach pain and lack of appetite and receives feeding. States tube was last working last night.

## 2025-04-17 NOTE — ED PROVIDER NOTES
[facility] EMERGENCY DEPT  EMERGENCY DEPARTMENT HISTORY AND PHYSICAL EXAM      Date of evaluation: 4/17/2025  Patient Name: Effie Rain  Birthdate 1970  MRN: [id number]  ED Provider: MIRACLE Camp CNP   Note Started: 1:34 PM EDT 4/17/25    HISTORY OF PRESENT ILLNESS     Chief Complaint   Patient presents with    Feeding Tube Problem       History Provided By: Patient, only     HPI: Effie Rain is a 54 y.o. female presents the emergency department chief complaint of clogged feeding tube.  The patient has a Dobbhoff tube which was placed at [facility] about 1 week ago.  The patient states that it had gotten clogged about 1 week before and required replaced.  This morning she was unable to flush it however it was functional last night when she hooked up for feeding.  Denies any other symptoms such as nausea or vomiting.  Denies headache, fever, chills, chest pain, abdominal pain.    PAST MEDICAL HISTORY   Past Medical History:  Past Medical History:   Diagnosis Date    Asthma     CAD (coronary artery disease)     Cerebral artery occlusion with cerebral infarction (HCC)     Colon polyp     COPD (chronic obstructive pulmonary disease) (HCC)     Elevated liver enzymes     Fibrocystic breast changes of both breasts 07/08/2015    GERD (gastroesophageal reflux disease)     Glaucoma 07/31/2020    Heart attack (HCC)     Hx of blood clots     Hypercholesterolemia 08/15/2013    Hypotension     Hypothyroidism 07/26/2019    Implantable loop recorder present 02/15/2023    Intestinal malrotation ([facility])     Irritable bowel syndrome     Migraine headache 07/26/2019    Multinodular goiter 01/27/2023    Nasogastric tube present     Patient stated does take soft feed by mouth    Neuritis of right saphenous nerve 07/14/2021    Nontraumatic tear of left rotator cuff, unspecified tear extent 07/06/2023    NSVT (nonsustained ventricular tachycardia) ([facility]) 04/10/2021    Pulmonary embolism 08/2022    Seizures ([facility])     lst one - March 2025  with loss of utilities: No     PHYSICAL EXAM   Physical Exam  Constitutional:       General: She is not in acute distress.     Appearance: Normal appearance. She is normal weight. She is not ill-appearing, toxic-appearing or diaphoretic.   HENT:      Head: Normocephalic and atraumatic.      Right Ear: Tympanic membrane, ear canal and external ear normal.      Left Ear: Tympanic membrane, ear canal and external ear normal.      Nose: Nose normal.      Comments: Bridled Dobbhoff tube to the left nare     Mouth/Throat:      Mouth: Mucous membranes are moist.      Pharynx: Oropharynx is clear.   Eyes:      Extraocular Movements: Extraocular movements intact.      Conjunctiva/sclera: Conjunctivae normal.      Pupils: Pupils are equal, round, and reactive to light.   Cardiovascular:      Rate and Rhythm: Normal rate and regular rhythm.      Pulses: Normal pulses.      Heart sounds: Normal heart sounds.   Pulmonary:      Effort: Pulmonary effort is normal.      Breath sounds: Normal breath sounds.   Abdominal:      General: Abdomen is flat. Bowel sounds are normal.      Palpations: Abdomen is soft.   Musculoskeletal:         General: Normal range of motion.      Cervical back: Normal range of motion and neck supple.   Skin:     General: Skin is warm and dry.      Capillary Refill: Capillary refill takes less than 2 seconds.   Neurological:      General: No focal deficit present.      Mental Status: She is alert and oriented to person, place, and time.   Psychiatric:         Mood and Affect: Mood normal.         Behavior: Behavior normal.         Thought Content: Thought content normal.         Judgment: Judgment normal.         SCREENINGS                No data recorded       LAB, EKG AND DIAGNOSTIC RESULTS   Labs:  Recent Results (from the past 12 hours)   CBC    Collection Time: 04/17/25 10:33 AM   Result Value Ref Range    WBC 4.2 3.6 - 11.0 K/uL    RBC 4.12 3.80 - 5.20 M/uL    Hemoglobin 11.3 (L) 11.5 - 16.0 g/dL

## 2025-04-17 NOTE — DISCHARGE INSTRUCTIONS
Thank you for choosing our Emergency Department for your care.  It is our privilege to care for you in your time of need.  In the next several days, you may receive a survey via email or mailed to your home about your experience with our team.  We would greatly appreciate you taking a few minutes to complete the survey, as we use this information to learn what we have done well and what we could be doing better. Thank you for trusting us with your care!    Below you will find a list of your tests from today's visit.   Labs and Radiology Studies  Recent Results (from the past 12 hours)   CBC    Collection Time: 04/17/25 10:33 AM   Result Value Ref Range    WBC 4.2 3.6 - 11.0 K/uL    RBC 4.12 3.80 - 5.20 M/uL    Hemoglobin 11.3 (L) 11.5 - 16.0 g/dL    Hematocrit 33.2 (L) 35.0 - 47.0 %    MCV 80.6 80.0 - 99.0 FL    MCH 27.4 26.0 - 34.0 PG    MCHC 34.0 30.0 - 36.5 g/dL    RDW 16.0 (H) 11.5 - 14.5 %    Platelets 293 150 - 400 K/uL    MPV 9.9 8.9 - 12.9 FL    Nucleated RBCs 0.0 0.0  WBC    nRBC 0.00 0.00 - 0.01 K/uL   Comprehensive Metabolic Panel    Collection Time: 04/17/25 10:33 AM   Result Value Ref Range    Sodium 128 (L) 136 - 145 mmol/L    Potassium 4.4 3.5 - 5.1 mmol/L    Chloride 97 97 - 108 mmol/L    CO2 28 21 - 32 mmol/L    Anion Gap 3 2 - 12 mmol/L    Glucose 81 65 - 100 mg/dL    BUN 7 6 - 20 mg/dL    Creatinine 0.44 (L) 0.55 - 1.02 mg/dL    BUN/Creatinine Ratio 16 12 - 20      Est, Glom Filt Rate >90 >60 ml/min/1.73m2    Calcium 8.5 8.5 - 10.1 mg/dL    Total Bilirubin 0.4 0.2 - 1.0 mg/dL    AST 39 (H) 15 - 37 U/L    ALT 48 12 - 78 U/L    Alk Phosphatase 99 45 - 117 U/L    Total Protein 6.0 (L) 6.4 - 8.2 g/dL    Albumin 3.2 (L) 3.5 - 5.0 g/dL    Globulin 2.8 2.0 - 4.0 g/dL    Albumin/Globulin Ratio 1.1 1.1 - 2.2     Protime-INR    Collection Time: 04/17/25 10:33 AM   Result Value Ref Range    Protime 12.7 11.9 - 14.6 sec    INR 0.9 0.9 - 1.1     APTT    Collection Time: 04/17/25 10:33 AM   Result

## 2025-04-17 NOTE — PERIOP NOTE
Patient stated during PAT that her NG tube seems to be clogged, stated that she has reached out to her PCP and was instructed to reach out to her specialist at [facility]. Offered to assist patient to ED department. Patient stated she is going to call [facility] after this appointment and will report to the ED if needed. Patient also stated she had a fall yesterday, reported no injury. Patient educated to go to the emergency department if she experiences any symptoms or is unable to get in touch with her provider at [facility]. Patient verbalized understanding.

## 2025-04-17 NOTE — PERIOP NOTE
1025- Dr. Nielson called made aware of patient's Shellfish allergy. Dr. Nielson stated to instruct patient to take one dose of Benadryl 50mg by mouth and one dose of Pepcid 40mg by mouth the night prior to the procedure scheduled for 4/24/25. Patient instructed, verbalized understanding. Patient aware both meds can be purchased OTC.

## 2025-04-17 NOTE — PERIOP NOTE
Dr. Nielson called made aware of patient's abnormal labs CBC-- Hemoglobin 11.3, Hematocrit 33.2, and CMP-- Sodium 128, Creatinine 0.44. Dr. Nielson stated to call patient and educate her to drink Gatoraide and/or lemon/lime water to help with low sodium. Dr. Nielson ordered to repeat CMP Dos. Dr. Nielson also made aware patient has cold sore is currently using cream prescribed by PCP. No new orders received.     Patient called given instructions as stated above, verbalized understanding.

## 2025-04-19 ENCOUNTER — HOSPITAL ENCOUNTER (EMERGENCY)
Facility: HOSPITAL | Age: 55
Discharge: HOME | End: 2025-04-19
Payer: MEDICARE

## 2025-04-19 VITALS
HEART RATE: 71 BPM | OXYGEN SATURATION: 100 % | DIASTOLIC BLOOD PRESSURE: 68 MMHG | RESPIRATION RATE: 17 BRPM | SYSTOLIC BLOOD PRESSURE: 91 MMHG | TEMPERATURE: 97.7 F

## 2025-04-19 DIAGNOSIS — T85.598D: Primary | ICD-10-CM

## 2025-04-19 LAB
ANION GAP SERPL CALC-SCNC: 4 MMOL/L (ref 2–12)
BASOPHILS # BLD: 0.03 K/UL (ref 0–0.1)
BASOPHILS NFR BLD: 0.8 % (ref 0–1)
BUN SERPL-MCNC: 5 MG/DL (ref 6–20)
BUN/CREAT SERPL: 9 (ref 12–20)
CA-I BLD-MCNC: 8.9 MG/DL (ref 8.5–10.1)
CHLORIDE SERPL-SCNC: 96 MMOL/L (ref 97–108)
CO2 SERPL-SCNC: 28 MMOL/L (ref 21–32)
CREAT SERPL-MCNC: 0.53 MG/DL (ref 0.55–1.02)
DIFFERENTIAL METHOD BLD: ABNORMAL
EOSINOPHIL # BLD: 0.27 K/UL (ref 0–0.4)
EOSINOPHIL NFR BLD: 7.5 % (ref 0–7)
ERYTHROCYTE [DISTWIDTH] IN BLOOD BY AUTOMATED COUNT: 15.4 % (ref 11.5–14.5)
GLUCOSE SERPL-MCNC: 81 MG/DL (ref 65–100)
HCT VFR BLD AUTO: 35.6 % (ref 35–47)
HGB BLD-MCNC: 12.5 G/DL (ref 11.5–16)
IMM GRANULOCYTES # BLD AUTO: 0.01 K/UL (ref 0–0.04)
IMM GRANULOCYTES NFR BLD AUTO: 0.3 % (ref 0–0.5)
LYMPHOCYTES # BLD: 1.13 K/UL (ref 0.8–3.5)
LYMPHOCYTES NFR BLD: 31.6 % (ref 12–49)
MCH RBC QN AUTO: 27.7 PG (ref 26–34)
MCHC RBC AUTO-ENTMCNC: 35.1 G/DL (ref 30–36.5)
MCV RBC AUTO: 78.8 FL (ref 80–99)
MONOCYTES # BLD: 0.25 K/UL (ref 0–1)
MONOCYTES NFR BLD: 7 % (ref 5–13)
NEUTS SEG # BLD: 1.89 K/UL (ref 1.8–8)
NEUTS SEG NFR BLD: 52.8 % (ref 32–75)
NRBC # BLD: 0 K/UL (ref 0–0.01)
NRBC BLD-RTO: 0 PER 100 WBC
PLATELET # BLD AUTO: 357 K/UL (ref 150–400)
PMV BLD AUTO: 9.7 FL (ref 8.9–12.9)
POTASSIUM SERPL-SCNC: 4.6 MMOL/L (ref 3.5–5.1)
RBC # BLD AUTO: 4.52 M/UL (ref 3.8–5.2)
SODIUM SERPL-SCNC: 128 MMOL/L (ref 136–145)
WBC # BLD AUTO: 3.6 K/UL (ref 3.6–11)

## 2025-04-19 PROCEDURE — 36415 COLL VENOUS BLD VENIPUNCTURE: CPT

## 2025-04-19 PROCEDURE — 6360000002 HC RX W HCPCS

## 2025-04-19 PROCEDURE — 99284 EMERGENCY DEPT VISIT MOD MDM: CPT

## 2025-04-19 PROCEDURE — 85025 COMPLETE CBC W/AUTO DIFF WBC: CPT

## 2025-04-19 PROCEDURE — 96374 THER/PROPH/DIAG INJ IV PUSH: CPT

## 2025-04-19 PROCEDURE — 80048 BASIC METABOLIC PNL TOTAL CA: CPT

## 2025-04-19 RX ORDER — ONDANSETRON 2 MG/ML
4 INJECTION INTRAMUSCULAR; INTRAVENOUS ONCE
Status: DISCONTINUED | OUTPATIENT
Start: 2025-04-19 | End: 2025-04-19

## 2025-04-19 RX ORDER — ONDANSETRON 2 MG/ML
4 INJECTION INTRAMUSCULAR; INTRAVENOUS ONCE
Status: COMPLETED | OUTPATIENT
Start: 2025-04-19 | End: 2025-04-19

## 2025-04-19 RX ADMIN — ONDANSETRON 4 MG: 2 INJECTION, SOLUTION INTRAMUSCULAR; INTRAVENOUS at 15:53

## 2025-04-19 ASSESSMENT — PAIN - FUNCTIONAL ASSESSMENT: PAIN_FUNCTIONAL_ASSESSMENT: NONE - DENIES PAIN

## 2025-04-19 NOTE — ED PROVIDER NOTES
[facility] EMERGENCY DEPARTMENT  EMERGENCY DEPARTMENT ENCOUNTER       Pt Name: Effie Rain  MRN: [id number]  Birthdate 1970  Date of evaluation: 4/19/2025  Provider: Kelli Cooper PA-C   PCP: Juan Hartley MD  Note Started: 11:37 AM EDT 4/19/25     CHIEF COMPLAINT       Chief Complaint   Patient presents with    Feeding Tube Problem        HISTORY OF PRESENT ILLNESS: 1 or more elements      History From: patient, History limited by: none     Effie Rain is a 54 y.o. female with past medical history as above returning for evaluation of obstruction of feeding tube.  States that it was functioning earlier this morning but became blocked.  Has trialed several flushes with no success.  Has appointment to follow-up with feeding tube on Monday.       Please See MDM for Additional Details of the HPI/PMH  Nursing Notes were all reviewed and agreed with or any disagreements were addressed in the HPI.     REVIEW OF SYSTEMS        Positives and Pertinent negatives as per HPI.    PAST HISTORY     Past Medical History:  Past Medical History:   Diagnosis Date    Asthma     CAD (coronary artery disease)     Cerebral artery occlusion with cerebral infarction (HCC)     Colon polyp     COPD (chronic obstructive pulmonary disease) (HCC)     Elevated liver enzymes     Fibrocystic breast changes of both breasts 07/08/2015    GERD (gastroesophageal reflux disease)     Glaucoma 07/31/2020    Heart attack (HCC)     Hx of blood clots     Hypercholesterolemia 08/15/2013    Hypotension     Hypothyroidism 07/26/2019    Implantable loop recorder present 02/15/2023    Intestinal malrotation (HCC)     Irritable bowel syndrome     Migraine headache 07/26/2019    Multinodular goiter 01/27/2023    Nasogastric tube present     Patient stated does take soft feed by mouth    Neuritis of right saphenous nerve 07/14/2021    Nontraumatic tear of left rotator cuff, unspecified tear extent 07/06/2023    NSVT (nonsustained

## 2025-04-19 NOTE — DISCHARGE INSTRUCTIONS
Follow-up with provider who placed feeding tube as scheduled on Monday.  Utilize soft foods and crushed medicines into these until you get to that point.  Follow-up with interventional radiology as needed if your other provider recommends it. Return to ER with acute worsening symptoms such as chest pain, shortness of breath, abdominal pain, or other acute complaints. Thank you for choosing [facility] to be a part of your care today.

## 2025-04-19 NOTE — ED TRIAGE NOTES
Reports feeding tube clogged again (Here on Thursday). Reports \"I have flushed it and flushed it and flushed it but it stlll gets clogged.

## 2025-04-24 ENCOUNTER — HOSPITAL ENCOUNTER (OUTPATIENT)
Facility: HOSPITAL | Age: 55
Discharge: HOME | End: 2025-04-24
Attending: INTERNAL MEDICINE | Admitting: INTERNAL MEDICINE
Payer: MEDICARE

## 2025-04-24 VITALS
RESPIRATION RATE: 20 BRPM | TEMPERATURE: 98.1 F | OXYGEN SATURATION: 96 % | DIASTOLIC BLOOD PRESSURE: 79 MMHG | SYSTOLIC BLOOD PRESSURE: 107 MMHG | HEART RATE: 85 BPM

## 2025-04-24 DIAGNOSIS — R07.9 CHEST PAIN: ICD-10-CM

## 2025-04-24 DIAGNOSIS — R94.39 ABNORMAL STRESS TEST: ICD-10-CM

## 2025-04-24 LAB
ALBUMIN SERPL-MCNC: 3.3 G/DL (ref 3.5–5)
ALBUMIN/GLOB SERPL: 1.4 (ref 1.1–2.2)
ALP SERPL-CCNC: 105 U/L (ref 45–117)
ALT SERPL-CCNC: 40 U/L (ref 12–78)
ANION GAP SERPL CALC-SCNC: 1 MMOL/L (ref 2–12)
AST SERPL W P-5'-P-CCNC: 33 U/L (ref 15–37)
BILIRUB SERPL-MCNC: 0.4 MG/DL (ref 0.2–1)
BUN SERPL-MCNC: 6 MG/DL (ref 6–20)
BUN/CREAT SERPL: 11 (ref 12–20)
CA-I BLD-MCNC: 8.5 MG/DL (ref 8.5–10.1)
CHLORIDE SERPL-SCNC: 100 MMOL/L (ref 97–108)
CO2 SERPL-SCNC: 28 MMOL/L (ref 21–32)
CREAT SERPL-MCNC: 0.55 MG/DL (ref 0.55–1.02)
GLOBULIN SER CALC-MCNC: 2.3 G/DL (ref 2–4)
GLUCOSE BLD STRIP.AUTO-MCNC: 105 MG/DL (ref 65–100)
GLUCOSE BLD STRIP.AUTO-MCNC: 122 MG/DL (ref 65–100)
GLUCOSE BLD STRIP.AUTO-MCNC: 62 MG/DL (ref 65–100)
GLUCOSE BLD STRIP.AUTO-MCNC: 77 MG/DL (ref 65–100)
GLUCOSE BLD STRIP.AUTO-MCNC: 87 MG/DL (ref 65–100)
GLUCOSE SERPL-MCNC: 81 MG/DL (ref 65–100)
PERFORMED BY:: ABNORMAL
PERFORMED BY:: NORMAL
PERFORMED BY:: NORMAL
POTASSIUM SERPL-SCNC: 4.1 MMOL/L (ref 3.5–5.1)
PROT SERPL-MCNC: 5.6 G/DL (ref 6.4–8.2)
SODIUM SERPL-SCNC: 129 MMOL/L (ref 136–145)

## 2025-04-24 PROCEDURE — 93458 L HRT ARTERY/VENTRICLE ANGIO: CPT | Performed by: INTERNAL MEDICINE

## 2025-04-24 PROCEDURE — 2709999900 HC NON-CHARGEABLE SUPPLY: Performed by: INTERNAL MEDICINE

## 2025-04-24 PROCEDURE — 7100000001 HC PACU RECOVERY - ADDTL 15 MIN: Performed by: INTERNAL MEDICINE

## 2025-04-24 PROCEDURE — C1894 INTRO/SHEATH, NON-LASER: HCPCS | Performed by: INTERNAL MEDICINE

## 2025-04-24 PROCEDURE — 76937 US GUIDE VASCULAR ACCESS: CPT | Performed by: INTERNAL MEDICINE

## 2025-04-24 PROCEDURE — 80053 COMPREHEN METABOLIC PANEL: CPT

## 2025-04-24 PROCEDURE — 2580000003 HC RX 258: Performed by: INTERNAL MEDICINE

## 2025-04-24 PROCEDURE — 7100000000 HC PACU RECOVERY - FIRST 15 MIN: Performed by: INTERNAL MEDICINE

## 2025-04-24 PROCEDURE — 99152 MOD SED SAME PHYS/QHP 5/>YRS: CPT | Performed by: INTERNAL MEDICINE

## 2025-04-24 PROCEDURE — 6360000002 HC RX W HCPCS: Performed by: INTERNAL MEDICINE

## 2025-04-24 PROCEDURE — 36415 COLL VENOUS BLD VENIPUNCTURE: CPT

## 2025-04-24 PROCEDURE — 6360000004 HC RX CONTRAST MEDICATION: Performed by: INTERNAL MEDICINE

## 2025-04-24 PROCEDURE — 7100000010 HC PHASE II RECOVERY - FIRST 15 MIN: Performed by: INTERNAL MEDICINE

## 2025-04-24 PROCEDURE — 99153 MOD SED SAME PHYS/QHP EA: CPT | Performed by: INTERNAL MEDICINE

## 2025-04-24 PROCEDURE — 7100000011 HC PHASE II RECOVERY - ADDTL 15 MIN: Performed by: INTERNAL MEDICINE

## 2025-04-24 PROCEDURE — 82962 GLUCOSE BLOOD TEST: CPT

## 2025-04-24 PROCEDURE — C1769 GUIDE WIRE: HCPCS | Performed by: INTERNAL MEDICINE

## 2025-04-24 RX ORDER — DIPHENHYDRAMINE HYDROCHLORIDE 50 MG/ML
INJECTION, SOLUTION INTRAMUSCULAR; INTRAVENOUS PRN
Status: DISCONTINUED | OUTPATIENT
Start: 2025-04-24 | End: 2025-04-24 | Stop reason: HOSPADM

## 2025-04-24 RX ORDER — IOPAMIDOL 755 MG/ML
INJECTION, SOLUTION INTRAVASCULAR PRN
Status: DISCONTINUED | OUTPATIENT
Start: 2025-04-24 | End: 2025-04-24 | Stop reason: HOSPADM

## 2025-04-24 RX ORDER — 0.9 % SODIUM CHLORIDE 0.9 %
INTRAVENOUS SOLUTION INTRAVENOUS CONTINUOUS PRN
Status: COMPLETED | OUTPATIENT
Start: 2025-04-24 | End: 2025-04-24

## 2025-04-24 RX ORDER — SODIUM CHLORIDE 9 MG/ML
INJECTION, SOLUTION INTRAVENOUS CONTINUOUS
Status: DISCONTINUED | OUTPATIENT
Start: 2025-04-24 | End: 2025-04-24 | Stop reason: HOSPADM

## 2025-04-24 RX ORDER — SODIUM CHLORIDE 0.9 % (FLUSH) 0.9 %
5-40 SYRINGE (ML) INJECTION PRN
Status: DISCONTINUED | OUTPATIENT
Start: 2025-04-24 | End: 2025-04-24 | Stop reason: HOSPADM

## 2025-04-24 RX ORDER — DEXTROSE MONOHYDRATE 50 MG/ML
INJECTION, SOLUTION INTRAVENOUS ONCE
Status: COMPLETED | OUTPATIENT
Start: 2025-04-24 | End: 2025-04-24

## 2025-04-24 RX ORDER — HEPARIN SODIUM 200 [USP'U]/100ML
INJECTION, SOLUTION INTRAVENOUS CONTINUOUS PRN
Status: COMPLETED | OUTPATIENT
Start: 2025-04-24 | End: 2025-04-24

## 2025-04-24 RX ORDER — HEPARIN SODIUM 1000 [USP'U]/ML
INJECTION, SOLUTION INTRAVENOUS; SUBCUTANEOUS PRN
Status: DISCONTINUED | OUTPATIENT
Start: 2025-04-24 | End: 2025-04-24 | Stop reason: HOSPADM

## 2025-04-24 RX ORDER — FENTANYL CITRATE 50 UG/ML
INJECTION, SOLUTION INTRAMUSCULAR; INTRAVENOUS PRN
Status: DISCONTINUED | OUTPATIENT
Start: 2025-04-24 | End: 2025-04-24 | Stop reason: HOSPADM

## 2025-04-24 RX ORDER — LIDOCAINE HYDROCHLORIDE 10 MG/ML
INJECTION, SOLUTION INFILTRATION; PERINEURAL PRN
Status: DISCONTINUED | OUTPATIENT
Start: 2025-04-24 | End: 2025-04-24 | Stop reason: HOSPADM

## 2025-04-24 RX ORDER — MIDAZOLAM HYDROCHLORIDE 1 MG/ML
INJECTION, SOLUTION INTRAMUSCULAR; INTRAVENOUS PRN
Status: DISCONTINUED | OUTPATIENT
Start: 2025-04-24 | End: 2025-04-24 | Stop reason: HOSPADM

## 2025-04-24 RX ORDER — SODIUM CHLORIDE 9 MG/ML
INJECTION, SOLUTION INTRAVENOUS PRN
Status: DISCONTINUED | OUTPATIENT
Start: 2025-04-24 | End: 2025-04-24 | Stop reason: HOSPADM

## 2025-04-24 RX ORDER — SODIUM CHLORIDE 0.9 % (FLUSH) 0.9 %
5-40 SYRINGE (ML) INJECTION EVERY 12 HOURS SCHEDULED
Status: DISCONTINUED | OUTPATIENT
Start: 2025-04-24 | End: 2025-04-24 | Stop reason: HOSPADM

## 2025-04-24 RX ORDER — DEXTROSE MONOHYDRATE 50 MG/ML
INJECTION, SOLUTION INTRAVENOUS CONTINUOUS
Status: DISCONTINUED | OUTPATIENT
Start: 2025-04-24 | End: 2025-04-24

## 2025-04-24 RX ADMIN — DEXTROSE MONOHYDRATE: 50 INJECTION, SOLUTION INTRAVENOUS at 11:18

## 2025-04-24 ASSESSMENT — PAIN - FUNCTIONAL ASSESSMENT
PAIN_FUNCTIONAL_ASSESSMENT: 0-10

## 2025-04-24 ASSESSMENT — PAIN DESCRIPTION - ORIENTATION: ORIENTATION: RIGHT

## 2025-04-24 ASSESSMENT — PAIN SCALES - GENERAL: PAINLEVEL_OUTOF10: 5

## 2025-04-24 ASSESSMENT — PAIN DESCRIPTION - LOCATION: LOCATION: WRIST

## 2025-04-24 NOTE — PROGRESS NOTES
Patient eating crackers and meat off of sandwich. 200 ml received of D5. LMTRC to Dr. Nielson.   1250: Okay to discharge patient.     1315: IV removed-- tip intact, no redness, swelling or bleeding noted. VSS. Patient in no acute distress. DC instructions reviewed-- verbalized understanding. Patient wheeled out to private vehicle-- no distress noted.

## 2025-05-04 ENCOUNTER — APPOINTMENT (OUTPATIENT)
Facility: HOSPITAL | Age: 55
End: 2025-05-04
Payer: MEDICARE

## 2025-05-04 ENCOUNTER — HOSPITAL ENCOUNTER (EMERGENCY)
Facility: HOSPITAL | Age: 55
Discharge: HOME | End: 2025-05-05
Attending: EMERGENCY MEDICINE
Payer: MEDICARE

## 2025-05-04 VITALS
DIASTOLIC BLOOD PRESSURE: 72 MMHG | HEART RATE: 74 BPM | RESPIRATION RATE: 18 BRPM | TEMPERATURE: 97.5 F | OXYGEN SATURATION: 97 % | SYSTOLIC BLOOD PRESSURE: 117 MMHG

## 2025-05-04 DIAGNOSIS — M54.9 ACUTE BACK PAIN, UNSPECIFIED BACK LOCATION, UNSPECIFIED BACK PAIN LATERALITY: Primary | ICD-10-CM

## 2025-05-04 PROCEDURE — 99284 EMERGENCY DEPT VISIT MOD MDM: CPT

## 2025-05-04 PROCEDURE — 72125 CT NECK SPINE W/O DYE: CPT

## 2025-05-04 PROCEDURE — 72131 CT LUMBAR SPINE W/O DYE: CPT

## 2025-05-04 PROCEDURE — 72128 CT CHEST SPINE W/O DYE: CPT

## 2025-05-04 RX ORDER — OXYCODONE HYDROCHLORIDE 10 MG/1
10 TABLET ORAL
Refills: 0 | Status: COMPLETED | OUTPATIENT
Start: 2025-05-05 | End: 2025-05-05

## 2025-05-04 ASSESSMENT — PAIN - FUNCTIONAL ASSESSMENT: PAIN_FUNCTIONAL_ASSESSMENT: 0-10

## 2025-05-04 ASSESSMENT — PAIN SCALES - GENERAL: PAINLEVEL_OUTOF10: 6

## 2025-05-05 PROCEDURE — 6370000000 HC RX 637 (ALT 250 FOR IP): Performed by: EMERGENCY MEDICINE

## 2025-05-05 RX ORDER — OXYCODONE HYDROCHLORIDE 5 MG/1
5 TABLET ORAL EVERY 6 HOURS PRN
Qty: 12 TABLET | Refills: 0 | Status: SHIPPED | OUTPATIENT
Start: 2025-05-05 | End: 2025-05-08

## 2025-05-05 RX ORDER — METHOCARBAMOL 750 MG/1
750 TABLET, FILM COATED ORAL 3 TIMES DAILY PRN
Qty: 20 TABLET | Refills: 0 | Status: SHIPPED | OUTPATIENT
Start: 2025-05-05 | End: 2025-05-12

## 2025-05-05 RX ORDER — METHOCARBAMOL 750 MG/1
750 TABLET, FILM COATED ORAL ONCE
Status: COMPLETED | OUTPATIENT
Start: 2025-05-05 | End: 2025-05-05

## 2025-05-05 RX ADMIN — METHOCARBAMOL 750 MG: 750 TABLET ORAL at 00:57

## 2025-05-05 RX ADMIN — OXYCODONE HYDROCHLORIDE 10 MG: 10 TABLET ORAL at 00:16

## 2025-05-05 ASSESSMENT — PAIN SCALES - GENERAL: PAINLEVEL_OUTOF10: 10

## 2025-05-05 ASSESSMENT — PAIN DESCRIPTION - LOCATION: LOCATION: BACK

## 2025-05-05 NOTE — DISCHARGE INSTRUCTIONS
Thank you for choosing our Emergency Department for your care.  It is our privilege to care for you in your time of need.  In the next several days, you may receive a survey via email or mailed to your home about your experience with our team.  We would greatly appreciate you taking a few minutes to complete the survey, as we use this information to learn what we have done well and what we could be doing better. Thank you for trusting us with your care!    Below you will find a list of your tests from today's visit.   Labs and Radiology Studies  No results found for this or any previous visit (from the past 12 hours).  CT THORACIC SPINE WO CONTRAST  Result Date: 5/4/2025  EXAM: CT THORACIC SPINE WO CONTRAST INDICATION: pain fall COMPARISON: None. TECHNIQUE:   Unenhanced multislice helical CT of the thoracic spine was performed in the axial plane.  Coronal and sagittal reconstructions were obtained.  CT dose reduction was achieved through use of a standardized protocol tailored for this examination and automatic exposure control for dose modulation. FINDINGS: Thoracic spine alignment is normal. No compression fracture. Mineralization is normal. No rib fracture. No paraspinal hematoma. Lungs are clear.     No fracture or traumatic malalignment. Electronically signed by GILBERTO CORDOBA    CT CERVICAL SPINE WO CONTRAST  Result Date: 5/4/2025  EXAM:  CT CERVICAL SPINE WITHOUT CONTRAST INDICATION: frequent falls, pain. Reason for exam:->frequent falls, pain COMPARISON: None. CONTRAST:  None. TECHNIQUE: Multislice helical CT of the cervical spine was performed without intravenous contrast administration.  Sagittal and coronal reformats were generated.  CT dose reduction was achieved through use of a standardized protocol tailored for this examination and automatic exposure control for dose modulation. FINDINGS: There is loss of the usual cervical spinal lordosis. There is no fracture or subluxation. The odontoid process

## 2025-05-05 NOTE — ED PROVIDER NOTES
(See Comments)     Advised not to take due to Liver Issues    Shellfish Allergy Anaphylaxis    Milk-Related Compounds Diarrhea    Nsaids Other (See Comments)     Hx of gastric bypass    Other Other (See Comments) and Rash     itching    Peanut Oil Rash and Other (See Comments)     Reaction Type: Intolerance; Severity: Severe    Sulfa Antibiotics Rash     itching       PCP: Juan Hartley MD    Current Meds:   Current Facility-Administered Medications   Medication Dose Route Frequency Provider Last Rate Last Admin    methocarbamol (ROBAXIN) tablet 750 mg  750 mg Oral Once Jarrod Higuera MD         Current Outpatient Medications   Medication Sig Dispense Refill    oxyCODONE (ROXICODONE) 5 MG immediate release tablet Take 1 tablet by mouth every 6 hours as needed for Pain for up to 3 days. Intended supply: 30 days Max Daily Amount: 20 mg 12 tablet 0    methocarbamol (ROBAXIN-750) 750 MG tablet Take 1 tablet by mouth 3 times daily as needed (pain) 20 tablet 0    furosemide (LASIX) 20 MG tablet Take 1 tablet by mouth daily      midodrine (PROAMATINE) 5 MG tablet Take 1 tablet by mouth in the morning and at bedtime Indications: Pt states she does not have this prescription (Patient not taking: Reported on 4/17/2025)      fluticasone-umeclidin-vilant (TRELEGY ELLIPTA) 200-62.5-25 MCG/ACT AEPB inhaler Inhale 1 puff into the lungs daily      vitamin D (VITAMIN D3) 50 MCG (2000 UT) CAPS capsule Take 1 capsule by mouth daily      cyanocobalamin 1000 MCG/ML injection Inject 1 mL into the muscle every 30 days      SUMAtriptan (IMITREX) 50 MG tablet Take 1 tablet by mouth once as needed for Migraine      acarbose (PRECOSE) 25 MG tablet Take 1 tablet by mouth 3 times daily (with meals)      ondansetron (ZOFRAN-ODT) 4 MG disintegrating tablet Take 1 tablet by mouth 3 times daily as needed for Nausea or Vomiting (Patient not taking: Reported on 4/17/2025) 21 tablet 0    dexlansoprazole (DEXILANT) 60 MG CPDR delayed release

## 2025-05-05 NOTE — ED TRIAGE NOTES
Pt presents with c/o back pain, has had multiple falls, last one this AM, states she lost her balance, uses rollator

## 2025-05-09 ENCOUNTER — APPOINTMENT (OUTPATIENT)
Facility: HOSPITAL | Age: 55
End: 2025-05-09
Attending: EMERGENCY MEDICINE
Payer: MEDICARE

## 2025-05-09 ENCOUNTER — HOSPITAL ENCOUNTER (EMERGENCY)
Facility: HOSPITAL | Age: 55
Discharge: HOME | End: 2025-05-09
Attending: EMERGENCY MEDICINE
Payer: MEDICARE

## 2025-05-09 VITALS
BODY MASS INDEX: 25.83 KG/M2 | TEMPERATURE: 97.6 F | SYSTOLIC BLOOD PRESSURE: 91 MMHG | HEART RATE: 97 BPM | WEIGHT: 155 LBS | HEIGHT: 65 IN | OXYGEN SATURATION: 96 % | RESPIRATION RATE: 16 BRPM | DIASTOLIC BLOOD PRESSURE: 76 MMHG

## 2025-05-09 DIAGNOSIS — S93.402A SPRAIN OF LEFT ANKLE, UNSPECIFIED LIGAMENT, INITIAL ENCOUNTER: Primary | ICD-10-CM

## 2025-05-09 PROCEDURE — 99283 EMERGENCY DEPT VISIT LOW MDM: CPT

## 2025-05-09 PROCEDURE — 73610 X-RAY EXAM OF ANKLE: CPT

## 2025-05-09 PROCEDURE — 6370000000 HC RX 637 (ALT 250 FOR IP): Performed by: EMERGENCY MEDICINE

## 2025-05-09 RX ORDER — LIDOCAINE 4 G/G
1 PATCH TOPICAL DAILY
Qty: 30 PATCH | Refills: 0 | Status: SHIPPED | OUTPATIENT
Start: 2025-05-09 | End: 2025-06-08

## 2025-05-09 RX ORDER — TRAMADOL HYDROCHLORIDE 50 MG/1
100 TABLET ORAL ONCE
Status: COMPLETED | OUTPATIENT
Start: 2025-05-09 | End: 2025-05-09

## 2025-05-09 RX ADMIN — TRAMADOL HYDROCHLORIDE 100 MG: 50 TABLET, FILM COATED ORAL at 17:15

## 2025-05-09 ASSESSMENT — PAIN - FUNCTIONAL ASSESSMENT
PAIN_FUNCTIONAL_ASSESSMENT: ACTIVITIES ARE NOT PREVENTED
PAIN_FUNCTIONAL_ASSESSMENT: 0-10

## 2025-05-09 ASSESSMENT — PAIN SCALES - GENERAL: PAINLEVEL_OUTOF10: 4

## 2025-05-09 ASSESSMENT — PAIN DESCRIPTION - DESCRIPTORS: DESCRIPTORS: ACHING

## 2025-05-09 ASSESSMENT — PAIN DESCRIPTION - PAIN TYPE: TYPE: ACUTE PAIN

## 2025-05-09 ASSESSMENT — PAIN DESCRIPTION - LOCATION: LOCATION: ANKLE

## 2025-05-09 ASSESSMENT — PAIN DESCRIPTION - ORIENTATION: ORIENTATION: LEFT

## 2025-05-09 NOTE — ED TRIAGE NOTES
Patient presents for complaint of L ankle, foot pain.  States she injured the ankle 2 days ago.  Pain 4/10 at rest, 7/10 with ambulation.  Ambulatory into the department with rollator walker.  Took oxycontin yesterday without relief of symptoms.  Took tylenol today without relief of symptoms.  States the ankle was previously injured October, 2024.

## 2025-05-09 NOTE — ED PROVIDER NOTES
tablet 3    potassium chloride (MICRO-K) 10 MEQ extended release capsule Take 1 capsule by mouth daily (Patient not taking: Reported on 4/17/2025)      perampanel (FYCOMPA) 4 MG TABS tablet Take 2 tablets by mouth nightly.      FLUoxetine (PROZAC) 40 MG capsule Take 1 capsule by mouth daily      levETIRAcetam (KEPPRA XR) 750 MG TB24 extended release tablet Take 2 tablets by mouth 2 times daily      OXTELLAR  MG TB24 Take 3 tablets by mouth every evening      traZODone (DESYREL) 100 MG tablet Take 1 tablet by mouth nightly      levothyroxine (SYNTHROID) 150 MCG tablet Take 1 tablet by mouth daily 90 tablet 1    Multiple Vitamins-Minerals (THERAPEUTIC MULTIVITAMIN-MINERALS) tablet Take 1 tablet by mouth daily      albuterol sulfate HFA (PROVENTIL;VENTOLIN;PROAIR) 108 (90 Base) MCG/ACT inhaler Inhale 2 puffs into the lungs every 4 hours as needed for Wheezing or Shortness of Breath      montelukast (SINGULAIR) 10 MG tablet Take 1 tablet by mouth daily         Social Determinants of Health:   Social Drivers of Health     Tobacco Use: Low Risk  (5/5/2025)    Received from [facility]    Patient History     Smoking Tobacco Use: Never     Smokeless Tobacco Use: Never     Passive Exposure: Not on file   Alcohol Use: Not At Risk (5/4/2025)    AUDIT-C     Frequency of Alcohol Consumption: Never     Average Number of Drinks: Patient does not drink     Frequency of Binge Drinking: Never   Financial Resource Strain: Low Risk  (12/7/2022)    Received from [facility], [facility]    Overall Financial Resource Strain (CARDIA)     Difficulty of Paying Living Expenses: Not very hard   Food Insecurity: No Food Insecurity (4/2/2025)    Received from [facility]    Hunger Vital Sign     Worried About Running Out of Food in the Last Year: Never true     Ran Out of Food in the Last Year: Never true   Transportation Needs: No Transportation Needs (4/2/2025)    Received from [facility]    PRAPARE - Transportation     Lack of

## 2025-06-02 NOTE — ED NOTES
Bedside shift change report given to Maria D (oncoming nurse) by Lorenzo (offgoing nurse). Report included the following information SBAR. No

## 2025-06-06 ENCOUNTER — APPOINTMENT (OUTPATIENT)
Facility: HOSPITAL | Age: 55
DRG: 089 | End: 2025-06-06
Payer: MEDICARE

## 2025-06-06 ENCOUNTER — HOSPITAL ENCOUNTER (INPATIENT)
Facility: HOSPITAL | Age: 55
LOS: 2 days | Discharge: HOME | DRG: 089 | End: 2025-06-08
Attending: STUDENT IN AN ORGANIZED HEALTH CARE EDUCATION/TRAINING PROGRAM | Admitting: FAMILY MEDICINE
Payer: MEDICARE

## 2025-06-06 DIAGNOSIS — E23.7 PITUITARY ABNORMALITY: ICD-10-CM

## 2025-06-06 DIAGNOSIS — E87.1 HYPONATREMIA: Primary | ICD-10-CM

## 2025-06-06 LAB
ALBUMIN SERPL-MCNC: 3.6 G/DL (ref 3.5–5)
ALBUMIN SERPL-MCNC: 3.8 G/DL (ref 3.5–5)
ALBUMIN/GLOB SERPL: 1.3 (ref 1.1–2.2)
ALBUMIN/GLOB SERPL: 1.5 (ref 1.1–2.2)
ALP SERPL-CCNC: 116 U/L (ref 45–117)
ALP SERPL-CCNC: 120 U/L (ref 45–117)
ALT SERPL-CCNC: 35 U/L (ref 12–78)
ALT SERPL-CCNC: 37 U/L (ref 12–78)
ANION GAP SERPL CALC-SCNC: 5 MMOL/L (ref 2–12)
ANION GAP SERPL CALC-SCNC: 6 MMOL/L (ref 2–12)
APPEARANCE UR: CLEAR
AST SERPL W P-5'-P-CCNC: 20 U/L (ref 15–37)
AST SERPL W P-5'-P-CCNC: 25 U/L (ref 15–37)
BACTERIA URNS QL MICRO: NEGATIVE /HPF
BASOPHILS # BLD: 0.02 K/UL (ref 0–0.1)
BASOPHILS NFR BLD: 0.4 % (ref 0–1)
BILIRUB SERPL-MCNC: 0.3 MG/DL (ref 0.2–1)
BILIRUB SERPL-MCNC: 0.4 MG/DL (ref 0.2–1)
BILIRUB UR QL: NEGATIVE
BUN SERPL-MCNC: 5 MG/DL (ref 6–20)
BUN SERPL-MCNC: 5 MG/DL (ref 6–20)
BUN/CREAT SERPL: 6 (ref 12–20)
BUN/CREAT SERPL: 8 (ref 12–20)
CA-I BLD-MCNC: 7.9 MG/DL (ref 8.5–10.1)
CA-I BLD-MCNC: 8.5 MG/DL (ref 8.5–10.1)
CHLORIDE SERPL-SCNC: 89 MMOL/L (ref 97–108)
CHLORIDE SERPL-SCNC: 95 MMOL/L (ref 97–108)
CHLORIDE UR-SCNC: 14 MMOL/L
CO2 SERPL-SCNC: 27 MMOL/L (ref 21–32)
CO2 SERPL-SCNC: 27 MMOL/L (ref 21–32)
COLOR UR: NORMAL
CREAT SERPL-MCNC: 0.61 MG/DL (ref 0.55–1.02)
CREAT SERPL-MCNC: 0.79 MG/DL (ref 0.55–1.02)
D DIMER PPP FEU-MCNC: 0.47 UG/ML(FEU)
DIFFERENTIAL METHOD BLD: ABNORMAL
EOSINOPHIL # BLD: 0.03 K/UL (ref 0–0.4)
EOSINOPHIL NFR BLD: 0.5 % (ref 0–7)
EPITH CASTS URNS QL MICRO: NORMAL /LPF
ERYTHROCYTE [DISTWIDTH] IN BLOOD BY AUTOMATED COUNT: 12.5 % (ref 11.5–14.5)
GLOBULIN SER CALC-MCNC: 2.6 G/DL (ref 2–4)
GLOBULIN SER CALC-MCNC: 2.8 G/DL (ref 2–4)
GLUCOSE SERPL-MCNC: 113 MG/DL (ref 65–100)
GLUCOSE SERPL-MCNC: 93 MG/DL (ref 65–100)
GLUCOSE UR STRIP.AUTO-MCNC: NEGATIVE MG/DL
HCT VFR BLD AUTO: 34.5 % (ref 35–47)
HGB BLD-MCNC: 12.4 G/DL (ref 11.5–16)
HGB UR QL STRIP: NEGATIVE
IMM GRANULOCYTES # BLD AUTO: 0.02 K/UL (ref 0–0.04)
IMM GRANULOCYTES NFR BLD AUTO: 0.4 % (ref 0–0.5)
KETONES UR QL STRIP.AUTO: NEGATIVE MG/DL
LEUKOCYTE ESTERASE UR QL STRIP.AUTO: NEGATIVE
LYMPHOCYTES # BLD: 0.65 K/UL (ref 0.8–3.5)
LYMPHOCYTES NFR BLD: 11.6 % (ref 12–49)
MCH RBC QN AUTO: 28.1 PG (ref 26–34)
MCHC RBC AUTO-ENTMCNC: 35.9 G/DL (ref 30–36.5)
MCV RBC AUTO: 78.2 FL (ref 80–99)
MONOCYTES # BLD: 0.17 K/UL (ref 0–1)
MONOCYTES NFR BLD: 3 % (ref 5–13)
MUCOUS THREADS URNS QL MICRO: NEGATIVE /LPF
NEUTS SEG # BLD: 4.72 K/UL (ref 1.8–8)
NEUTS SEG NFR BLD: 84.1 % (ref 32–75)
NITRITE UR QL STRIP.AUTO: NEGATIVE
NRBC # BLD: 0 K/UL (ref 0–0.01)
NRBC BLD-RTO: 0 PER 100 WBC
PH UR STRIP: 7 (ref 5–8)
PLATELET # BLD AUTO: 273 K/UL (ref 150–400)
PMV BLD AUTO: 8.7 FL (ref 8.9–12.9)
POTASSIUM SERPL-SCNC: 4.4 MMOL/L (ref 3.5–5.1)
POTASSIUM SERPL-SCNC: 4.4 MMOL/L (ref 3.5–5.1)
POTASSIUM UR-SCNC: 13 MMOL/L
PROT SERPL-MCNC: 6.4 G/DL (ref 6.4–8.2)
PROT SERPL-MCNC: 6.4 G/DL (ref 6.4–8.2)
PROT UR STRIP-MCNC: NEGATIVE MG/DL
RBC # BLD AUTO: 4.41 M/UL (ref 3.8–5.2)
RBC #/AREA URNS HPF: NORMAL /HPF (ref 0–5)
SODIUM SERPL-SCNC: 122 MMOL/L (ref 136–145)
SODIUM SERPL-SCNC: 127 MMOL/L (ref 136–145)
SODIUM UR-SCNC: 16 MMOL/L
SP GR UR REFRACTOMETRY: <1.005 (ref 1–1.03)
TROPONIN I SERPL HS-MCNC: 7 NG/L (ref 0–51)
URINE CULTURE IF INDICATED: NORMAL
UROBILINOGEN UR QL STRIP.AUTO: 0.1 EU/DL (ref 0.1–1)
WBC # BLD AUTO: 5.6 K/UL (ref 3.6–11)
WBC URNS QL MICRO: NORMAL /HPF (ref 0–4)

## 2025-06-06 PROCEDURE — 93005 ELECTROCARDIOGRAM TRACING: CPT | Performed by: STUDENT IN AN ORGANIZED HEALTH CARE EDUCATION/TRAINING PROGRAM

## 2025-06-06 PROCEDURE — 84133 ASSAY OF URINE POTASSIUM: CPT

## 2025-06-06 PROCEDURE — 81001 URINALYSIS AUTO W/SCOPE: CPT

## 2025-06-06 PROCEDURE — 6370000000 HC RX 637 (ALT 250 FOR IP): Performed by: STUDENT IN AN ORGANIZED HEALTH CARE EDUCATION/TRAINING PROGRAM

## 2025-06-06 PROCEDURE — 1100000000 HC RM PRIVATE

## 2025-06-06 PROCEDURE — 96361 HYDRATE IV INFUSION ADD-ON: CPT

## 2025-06-06 PROCEDURE — 84300 ASSAY OF URINE SODIUM: CPT

## 2025-06-06 PROCEDURE — 70450 CT HEAD/BRAIN W/O DYE: CPT

## 2025-06-06 PROCEDURE — 99285 EMERGENCY DEPT VISIT HI MDM: CPT

## 2025-06-06 PROCEDURE — 85379 FIBRIN DEGRADATION QUANT: CPT

## 2025-06-06 PROCEDURE — 96360 HYDRATION IV INFUSION INIT: CPT

## 2025-06-06 PROCEDURE — 85025 COMPLETE CBC W/AUTO DIFF WBC: CPT

## 2025-06-06 PROCEDURE — 36415 COLL VENOUS BLD VENIPUNCTURE: CPT

## 2025-06-06 PROCEDURE — 2580000003 HC RX 258: Performed by: FAMILY MEDICINE

## 2025-06-06 PROCEDURE — 80053 COMPREHEN METABOLIC PANEL: CPT

## 2025-06-06 PROCEDURE — 94640 AIRWAY INHALATION TREATMENT: CPT

## 2025-06-06 PROCEDURE — 6370000000 HC RX 637 (ALT 250 FOR IP): Performed by: FAMILY MEDICINE

## 2025-06-06 PROCEDURE — 84484 ASSAY OF TROPONIN QUANT: CPT

## 2025-06-06 PROCEDURE — 94761 N-INVAS EAR/PLS OXIMETRY MLT: CPT

## 2025-06-06 PROCEDURE — 72128 CT CHEST SPINE W/O DYE: CPT

## 2025-06-06 PROCEDURE — 82436 ASSAY OF URINE CHLORIDE: CPT

## 2025-06-06 PROCEDURE — 72125 CT NECK SPINE W/O DYE: CPT

## 2025-06-06 PROCEDURE — 2580000003 HC RX 258: Performed by: STUDENT IN AN ORGANIZED HEALTH CARE EDUCATION/TRAINING PROGRAM

## 2025-06-06 RX ORDER — MIDODRINE HYDROCHLORIDE 5 MG/1
5 TABLET ORAL 2 TIMES DAILY
Status: DISCONTINUED | OUTPATIENT
Start: 2025-06-06 | End: 2025-06-08 | Stop reason: HOSPADM

## 2025-06-06 RX ORDER — ENOXAPARIN SODIUM 100 MG/ML
40 INJECTION SUBCUTANEOUS DAILY
Status: DISCONTINUED | OUTPATIENT
Start: 2025-06-07 | End: 2025-06-08 | Stop reason: HOSPADM

## 2025-06-06 RX ORDER — ALBUTEROL SULFATE 90 UG/1
2 INHALANT RESPIRATORY (INHALATION) EVERY 4 HOURS PRN
Status: DISCONTINUED | OUTPATIENT
Start: 2025-06-06 | End: 2025-06-08 | Stop reason: HOSPADM

## 2025-06-06 RX ORDER — IPRATROPIUM BROMIDE AND ALBUTEROL SULFATE 2.5; .5 MG/3ML; MG/3ML
1 SOLUTION RESPIRATORY (INHALATION) EVERY 4 HOURS PRN
Status: DISCONTINUED | OUTPATIENT
Start: 2025-06-06 | End: 2025-06-08 | Stop reason: HOSPADM

## 2025-06-06 RX ORDER — SODIUM CHLORIDE 0.9 % (FLUSH) 0.9 %
5-40 SYRINGE (ML) INJECTION PRN
Status: DISCONTINUED | OUTPATIENT
Start: 2025-06-06 | End: 2025-06-08 | Stop reason: HOSPADM

## 2025-06-06 RX ORDER — POTASSIUM CHLORIDE 7.45 MG/ML
10 INJECTION INTRAVENOUS PRN
Status: DISCONTINUED | OUTPATIENT
Start: 2025-06-06 | End: 2025-06-08 | Stop reason: HOSPADM

## 2025-06-06 RX ORDER — OXYCODONE HYDROCHLORIDE 5 MG/1
5 TABLET ORAL
Refills: 0 | Status: COMPLETED | OUTPATIENT
Start: 2025-06-06 | End: 2025-06-06

## 2025-06-06 RX ORDER — PANTOPRAZOLE SODIUM 40 MG/1
40 TABLET, DELAYED RELEASE ORAL
Status: DISCONTINUED | OUTPATIENT
Start: 2025-06-07 | End: 2025-06-08 | Stop reason: HOSPADM

## 2025-06-06 RX ORDER — OXCARBAZEPINE 300 MG/1
300 TABLET, FILM COATED ORAL 2 TIMES DAILY
Status: DISCONTINUED | OUTPATIENT
Start: 2025-06-06 | End: 2025-06-07

## 2025-06-06 RX ORDER — MONTELUKAST SODIUM 10 MG/1
10 TABLET ORAL DAILY
Status: DISCONTINUED | OUTPATIENT
Start: 2025-06-07 | End: 2025-06-08 | Stop reason: HOSPADM

## 2025-06-06 RX ORDER — MAGNESIUM SULFATE IN WATER 40 MG/ML
2000 INJECTION, SOLUTION INTRAVENOUS PRN
Status: DISCONTINUED | OUTPATIENT
Start: 2025-06-06 | End: 2025-06-08 | Stop reason: HOSPADM

## 2025-06-06 RX ORDER — ACARBOSE 50 MG/1
100 TABLET ORAL
Status: DISCONTINUED | OUTPATIENT
Start: 2025-06-07 | End: 2025-06-08 | Stop reason: HOSPADM

## 2025-06-06 RX ORDER — ASPIRIN 81 MG/1
81 TABLET ORAL DAILY
Status: DISCONTINUED | OUTPATIENT
Start: 2025-06-07 | End: 2025-06-08 | Stop reason: HOSPADM

## 2025-06-06 RX ORDER — SODIUM CHLORIDE 9 MG/ML
INJECTION, SOLUTION INTRAVENOUS CONTINUOUS
Status: DISCONTINUED | OUTPATIENT
Start: 2025-06-06 | End: 2025-06-08 | Stop reason: HOSPADM

## 2025-06-06 RX ORDER — LEVOTHYROXINE SODIUM 75 UG/1
150 TABLET ORAL
Status: DISCONTINUED | OUTPATIENT
Start: 2025-06-07 | End: 2025-06-08 | Stop reason: HOSPADM

## 2025-06-06 RX ORDER — 0.9 % SODIUM CHLORIDE 0.9 %
500 INTRAVENOUS SOLUTION INTRAVENOUS ONCE
Status: DISCONTINUED | OUTPATIENT
Start: 2025-06-06 | End: 2025-06-06

## 2025-06-06 RX ORDER — SODIUM CHLORIDE 0.9 % (FLUSH) 0.9 %
5-40 SYRINGE (ML) INJECTION EVERY 12 HOURS SCHEDULED
Status: DISCONTINUED | OUTPATIENT
Start: 2025-06-06 | End: 2025-06-08 | Stop reason: HOSPADM

## 2025-06-06 RX ORDER — FUROSEMIDE 40 MG/1
20 TABLET ORAL DAILY
Status: DISCONTINUED | OUTPATIENT
Start: 2025-06-07 | End: 2025-06-06

## 2025-06-06 RX ORDER — ONDANSETRON 2 MG/ML
4 INJECTION INTRAMUSCULAR; INTRAVENOUS EVERY 6 HOURS PRN
Status: DISCONTINUED | OUTPATIENT
Start: 2025-06-06 | End: 2025-06-08 | Stop reason: HOSPADM

## 2025-06-06 RX ORDER — ONDANSETRON 4 MG/1
4 TABLET, ORALLY DISINTEGRATING ORAL EVERY 8 HOURS PRN
Status: DISCONTINUED | OUTPATIENT
Start: 2025-06-06 | End: 2025-06-08 | Stop reason: HOSPADM

## 2025-06-06 RX ORDER — CYANOCOBALAMIN 1000 UG/ML
1000 INJECTION, SOLUTION INTRAMUSCULAR; SUBCUTANEOUS
Status: DISCONTINUED | OUTPATIENT
Start: 2025-06-06 | End: 2025-06-07

## 2025-06-06 RX ORDER — LEVETIRACETAM 500 MG/1
1500 TABLET ORAL 2 TIMES DAILY
Status: DISCONTINUED | OUTPATIENT
Start: 2025-06-06 | End: 2025-06-08 | Stop reason: HOSPADM

## 2025-06-06 RX ORDER — POTASSIUM CHLORIDE 1500 MG/1
40 TABLET, EXTENDED RELEASE ORAL PRN
Status: DISCONTINUED | OUTPATIENT
Start: 2025-06-06 | End: 2025-06-08 | Stop reason: HOSPADM

## 2025-06-06 RX ORDER — ATORVASTATIN CALCIUM 40 MG/1
80 TABLET, FILM COATED ORAL NIGHTLY
Status: DISCONTINUED | OUTPATIENT
Start: 2025-06-06 | End: 2025-06-08 | Stop reason: HOSPADM

## 2025-06-06 RX ORDER — TRAZODONE HYDROCHLORIDE 50 MG/1
100 TABLET ORAL NIGHTLY
Status: DISCONTINUED | OUTPATIENT
Start: 2025-06-06 | End: 2025-06-08 | Stop reason: HOSPADM

## 2025-06-06 RX ORDER — BUDESONIDE AND FORMOTEROL FUMARATE DIHYDRATE 160; 4.5 UG/1; UG/1
2 AEROSOL RESPIRATORY (INHALATION)
Status: DISCONTINUED | OUTPATIENT
Start: 2025-06-06 | End: 2025-06-08 | Stop reason: HOSPADM

## 2025-06-06 RX ORDER — POLYETHYLENE GLYCOL 3350 17 G/17G
17 POWDER, FOR SOLUTION ORAL DAILY PRN
Status: DISCONTINUED | OUTPATIENT
Start: 2025-06-06 | End: 2025-06-08 | Stop reason: HOSPADM

## 2025-06-06 RX ORDER — SODIUM CHLORIDE 9 MG/ML
INJECTION, SOLUTION INTRAVENOUS PRN
Status: DISCONTINUED | OUTPATIENT
Start: 2025-06-06 | End: 2025-06-08 | Stop reason: HOSPADM

## 2025-06-06 RX ADMIN — LEVETIRACETAM 1500 MG: 500 TABLET, FILM COATED ORAL at 23:09

## 2025-06-06 RX ADMIN — Medication 2 PUFF: at 23:08

## 2025-06-06 RX ADMIN — ATORVASTATIN CALCIUM 80 MG: 40 TABLET, FILM COATED ORAL at 23:08

## 2025-06-06 RX ADMIN — SODIUM CHLORIDE: 0.9 INJECTION, SOLUTION INTRAVENOUS at 23:10

## 2025-06-06 RX ADMIN — OXYCODONE 5 MG: 5 TABLET ORAL at 17:39

## 2025-06-06 RX ADMIN — SODIUM CHLORIDE 500 ML: 9 INJECTION, SOLUTION INTRAVENOUS at 19:41

## 2025-06-06 NOTE — ED PROVIDER NOTES
EMERGENCY DEPARTMENT HISTORY AND PHYSICAL EXAM      Patient Name: Effie Rain  MRN: [id number]  YOB: 1970  Date of evaluation: 6/6/2025  Provider: Juancarlos Simons MD     History of Present Illness     Chief Complaint   Patient presents with    Fall    Head Injury       History Provided By: Patient    HPI: Effie Rain, 54 y.o. female with past medical history as listed and reviewed below presenting to the ED for evaluation of multiple falls.  Patient notes she has a year long history of multiple falls, she states she fell 2 times yesterday hit her head, she believes she may have lost consciousness.  She is unsure if she syncopized.  She feels as if she may have lost consciousness.  She reports a history of prior pulmonary embolism, is not currently on any blood thinners.  He does also have a history of vasovagal syncope as well as nonsustained ventricular tachycardia      Medical History     Past Medical History:  Past Medical History:   Diagnosis Date    Asthma     CAD (coronary artery disease)     Cerebral artery occlusion with cerebral infarction (HCC)     Colon polyp     COPD (chronic obstructive pulmonary disease) (HCC)     Elevated liver enzymes     Fibrocystic breast changes of both breasts 07/08/2015    GERD (gastroesophageal reflux disease)     Glaucoma 07/31/2020    Heart attack (HCC)     Hx of blood clots     Hypercholesterolemia 08/15/2013    Hypotension     Hypothyroidism 07/26/2019    Implantable loop recorder present 02/15/2023    Intestinal malrotation ([facility])     Irritable bowel syndrome     Migraine headache 07/26/2019    Multinodular goiter 01/27/2023    Nasogastric tube present     Patient stated does take soft feed by mouth    Neuritis of right saphenous nerve 07/14/2021    Nontraumatic tear of left rotator cuff, unspecified tear extent 07/06/2023    NSVT (nonsustained ventricular tachycardia) ([facility]) 04/10/2021    Pulmonary embolism ([facility]) 08/2022    Seizures ([facility])     lst one -

## 2025-06-06 NOTE — ED TRIAGE NOTES
Pt states yesterday she had 2 falls. Pt unsure if she passed out after first fall. Second fall pt denies LOC. Pt also complains of back pan pt states she takes ASA 81mg daily

## 2025-06-07 ENCOUNTER — APPOINTMENT (OUTPATIENT)
Facility: HOSPITAL | Age: 55
DRG: 089 | End: 2025-06-07
Payer: MEDICARE

## 2025-06-07 LAB
ANION GAP SERPL CALC-SCNC: 6 MMOL/L (ref 2–12)
BASOPHILS # BLD: 0.03 K/UL (ref 0–0.1)
BASOPHILS NFR BLD: 0.6 % (ref 0–1)
BUN SERPL-MCNC: 6 MG/DL (ref 6–20)
BUN/CREAT SERPL: 12 (ref 12–20)
CA-I BLD-MCNC: 7.9 MG/DL (ref 8.5–10.1)
CHLORIDE SERPL-SCNC: 105 MMOL/L (ref 97–108)
CO2 SERPL-SCNC: 18 MMOL/L (ref 21–32)
CREAT SERPL-MCNC: 0.52 MG/DL (ref 0.55–1.02)
DIFFERENTIAL METHOD BLD: ABNORMAL
EKG ATRIAL RATE: 76 BPM
EKG DIAGNOSIS: NORMAL
EKG P AXIS: 40 DEGREES
EKG P-R INTERVAL: 168 MS
EKG Q-T INTERVAL: 392 MS
EKG QRS DURATION: 94 MS
EKG QTC CALCULATION (BAZETT): 441 MS
EKG R AXIS: -2 DEGREES
EKG T AXIS: 25 DEGREES
EKG VENTRICULAR RATE: 76 BPM
EOSINOPHIL # BLD: 0.04 K/UL (ref 0–0.4)
EOSINOPHIL NFR BLD: 0.9 % (ref 0–7)
ERYTHROCYTE [DISTWIDTH] IN BLOOD BY AUTOMATED COUNT: 12.6 % (ref 11.5–14.5)
GLUCOSE SERPL-MCNC: 80 MG/DL (ref 65–100)
HCT VFR BLD AUTO: 32.5 % (ref 35–47)
HGB BLD-MCNC: 11.6 G/DL (ref 11.5–16)
IMM GRANULOCYTES # BLD AUTO: 0.02 K/UL (ref 0–0.04)
IMM GRANULOCYTES NFR BLD AUTO: 0.4 % (ref 0–0.5)
LYMPHOCYTES # BLD: 1.32 K/UL (ref 0.8–3.5)
LYMPHOCYTES NFR BLD: 28.4 % (ref 12–49)
MCH RBC QN AUTO: 28.4 PG (ref 26–34)
MCHC RBC AUTO-ENTMCNC: 35.7 G/DL (ref 30–36.5)
MCV RBC AUTO: 79.7 FL (ref 80–99)
MONOCYTES # BLD: 0.43 K/UL (ref 0–1)
MONOCYTES NFR BLD: 9.3 % (ref 5–13)
NEUTS SEG # BLD: 2.8 K/UL (ref 1.8–8)
NEUTS SEG NFR BLD: 60.4 % (ref 32–75)
NRBC # BLD: 0 K/UL (ref 0–0.01)
NRBC BLD-RTO: 0 PER 100 WBC
PLATELET # BLD AUTO: 283 K/UL (ref 150–400)
PMV BLD AUTO: 9.5 FL (ref 8.9–12.9)
POTASSIUM SERPL-SCNC: 4.3 MMOL/L (ref 3.5–5.1)
RBC # BLD AUTO: 4.08 M/UL (ref 3.8–5.2)
SODIUM SERPL-SCNC: 129 MMOL/L (ref 136–145)
WBC # BLD AUTO: 4.6 K/UL (ref 3.6–11)

## 2025-06-07 PROCEDURE — 2580000003 HC RX 258: Performed by: INTERNAL MEDICINE

## 2025-06-07 PROCEDURE — 73030 X-RAY EXAM OF SHOULDER: CPT

## 2025-06-07 PROCEDURE — 97530 THERAPEUTIC ACTIVITIES: CPT

## 2025-06-07 PROCEDURE — 94761 N-INVAS EAR/PLS OXIMETRY MLT: CPT

## 2025-06-07 PROCEDURE — 80048 BASIC METABOLIC PNL TOTAL CA: CPT

## 2025-06-07 PROCEDURE — 2580000003 HC RX 258: Performed by: FAMILY MEDICINE

## 2025-06-07 PROCEDURE — 1100000000 HC RM PRIVATE

## 2025-06-07 PROCEDURE — 6360000002 HC RX W HCPCS: Performed by: FAMILY MEDICINE

## 2025-06-07 PROCEDURE — 99223 1ST HOSP IP/OBS HIGH 75: CPT | Performed by: PSYCHIATRY & NEUROLOGY

## 2025-06-07 PROCEDURE — 94640 AIRWAY INHALATION TREATMENT: CPT

## 2025-06-07 PROCEDURE — 6370000000 HC RX 637 (ALT 250 FOR IP): Performed by: FAMILY MEDICINE

## 2025-06-07 PROCEDURE — 6370000000 HC RX 637 (ALT 250 FOR IP): Performed by: INTERNAL MEDICINE

## 2025-06-07 PROCEDURE — 36415 COLL VENOUS BLD VENIPUNCTURE: CPT

## 2025-06-07 PROCEDURE — 2500000003 HC RX 250 WO HCPCS: Performed by: FAMILY MEDICINE

## 2025-06-07 PROCEDURE — 93010 ELECTROCARDIOGRAM REPORT: CPT | Performed by: SPECIALIST

## 2025-06-07 PROCEDURE — 85025 COMPLETE CBC W/AUTO DIFF WBC: CPT

## 2025-06-07 PROCEDURE — 97161 PT EVAL LOW COMPLEX 20 MIN: CPT

## 2025-06-07 RX ORDER — OXCARBAZEPINE 300 MG/1
900 TABLET, FILM COATED ORAL 2 TIMES DAILY
Status: DISCONTINUED | OUTPATIENT
Start: 2025-06-07 | End: 2025-06-08 | Stop reason: HOSPADM

## 2025-06-07 RX ORDER — CYANOCOBALAMIN 1000 UG/ML
1000 INJECTION, SOLUTION INTRAMUSCULAR; SUBCUTANEOUS
Status: DISCONTINUED | OUTPATIENT
Start: 2026-07-01 | End: 2025-06-08 | Stop reason: HOSPADM

## 2025-06-07 RX ORDER — OXYCODONE HYDROCHLORIDE 10 MG/1
10 TABLET ORAL EVERY 6 HOURS PRN
Refills: 0 | Status: DISCONTINUED | OUTPATIENT
Start: 2025-06-07 | End: 2025-06-08 | Stop reason: HOSPADM

## 2025-06-07 RX ORDER — 0.9 % SODIUM CHLORIDE 0.9 %
500 INTRAVENOUS SOLUTION INTRAVENOUS ONCE
Status: COMPLETED | OUTPATIENT
Start: 2025-06-07 | End: 2025-06-07

## 2025-06-07 RX ORDER — OXYCODONE HYDROCHLORIDE 5 MG/1
5 TABLET ORAL EVERY 6 HOURS PRN
Refills: 0 | Status: DISCONTINUED | OUTPATIENT
Start: 2025-06-07 | End: 2025-06-07

## 2025-06-07 RX ORDER — PROMETHAZINE HYDROCHLORIDE 25 MG/1
25 SUPPOSITORY RECTAL EVERY 6 HOURS PRN
Status: DISCONTINUED | OUTPATIENT
Start: 2025-06-07 | End: 2025-06-08 | Stop reason: HOSPADM

## 2025-06-07 RX ADMIN — ASPIRIN 81 MG: 81 TABLET, COATED ORAL at 09:17

## 2025-06-07 RX ADMIN — SODIUM CHLORIDE, PRESERVATIVE FREE 10 ML: 5 INJECTION INTRAVENOUS at 09:17

## 2025-06-07 RX ADMIN — PANTOPRAZOLE SODIUM 40 MG: 40 TABLET, DELAYED RELEASE ORAL at 06:51

## 2025-06-07 RX ADMIN — LEVETIRACETAM 1500 MG: 500 TABLET, FILM COATED ORAL at 21:00

## 2025-06-07 RX ADMIN — TRAZODONE HYDROCHLORIDE 100 MG: 50 TABLET ORAL at 00:43

## 2025-06-07 RX ADMIN — ENOXAPARIN SODIUM 40 MG: 100 INJECTION SUBCUTANEOUS at 12:17

## 2025-06-07 RX ADMIN — MONTELUKAST 10 MG: 10 TABLET, FILM COATED ORAL at 09:17

## 2025-06-07 RX ADMIN — CHOLECALCIFEROL TAB 125 MCG (5000 UNIT) 5000 UNITS: 125 TAB at 12:16

## 2025-06-07 RX ADMIN — MIDODRINE HYDROCHLORIDE 5 MG: 5 TABLET ORAL at 09:17

## 2025-06-07 RX ADMIN — FLUOXETINE HYDROCHLORIDE 40 MG: 20 CAPSULE ORAL at 09:17

## 2025-06-07 RX ADMIN — Medication 2 PUFF: at 19:37

## 2025-06-07 RX ADMIN — SODIUM CHLORIDE, PRESERVATIVE FREE 10 ML: 5 INJECTION INTRAVENOUS at 21:01

## 2025-06-07 RX ADMIN — MIDODRINE HYDROCHLORIDE 5 MG: 5 TABLET ORAL at 21:01

## 2025-06-07 RX ADMIN — TRAZODONE HYDROCHLORIDE 100 MG: 50 TABLET ORAL at 21:01

## 2025-06-07 RX ADMIN — ACARBOSE 100 MG: 50 TABLET ORAL at 15:30

## 2025-06-07 RX ADMIN — SODIUM CHLORIDE, PRESERVATIVE FREE 10 ML: 5 INJECTION INTRAVENOUS at 00:40

## 2025-06-07 RX ADMIN — OXYCODONE HYDROCHLORIDE 10 MG: 10 TABLET ORAL at 21:01

## 2025-06-07 RX ADMIN — ATORVASTATIN CALCIUM 80 MG: 40 TABLET, FILM COATED ORAL at 21:01

## 2025-06-07 RX ADMIN — LEVETIRACETAM 1500 MG: 500 TABLET, FILM COATED ORAL at 09:17

## 2025-06-07 RX ADMIN — Medication 2 PUFF: at 08:31

## 2025-06-07 RX ADMIN — SODIUM CHLORIDE: 0.9 INJECTION, SOLUTION INTRAVENOUS at 20:56

## 2025-06-07 RX ADMIN — OXYCODONE 5 MG: 5 TABLET ORAL at 12:16

## 2025-06-07 RX ADMIN — MIDODRINE HYDROCHLORIDE 5 MG: 5 TABLET ORAL at 16:04

## 2025-06-07 RX ADMIN — OXCARBAZEPINE 900 MG: 300 TABLET, FILM COATED ORAL at 21:01

## 2025-06-07 RX ADMIN — SODIUM CHLORIDE 500 ML: 0.9 INJECTION, SOLUTION INTRAVENOUS at 16:10

## 2025-06-07 RX ADMIN — LEVOTHYROXINE SODIUM 150 MCG: 0.07 TABLET ORAL at 06:51

## 2025-06-07 ASSESSMENT — PAIN - FUNCTIONAL ASSESSMENT: PAIN_FUNCTIONAL_ASSESSMENT: ACTIVITIES ARE NOT PREVENTED

## 2025-06-07 ASSESSMENT — PAIN SCALES - GENERAL
PAINLEVEL_OUTOF10: 7
PAINLEVEL_OUTOF10: 7
PAINLEVEL_OUTOF10: 6
PAINLEVEL_OUTOF10: 4
PAINLEVEL_OUTOF10: 5

## 2025-06-07 ASSESSMENT — PAIN DESCRIPTION - LOCATION: LOCATION: GENERALIZED

## 2025-06-07 ASSESSMENT — PULMONARY FUNCTION TESTS: PEFR_L/MIN: 6

## 2025-06-07 ASSESSMENT — PAIN DESCRIPTION - DESCRIPTORS: DESCRIPTORS: ACHING

## 2025-06-07 ASSESSMENT — PAIN SCALES - WONG BAKER: WONGBAKER_NUMERICALRESPONSE: NO HURT

## 2025-06-07 ASSESSMENT — PAIN DESCRIPTION - ORIENTATION: ORIENTATION: LEFT

## 2025-06-07 NOTE — ED NOTES
details:   Process Protocols/Bundles: N/A    Recommendation  Incomplete STAT orders:   Overdue Medications:   Patient Belongings:    Additional Comments:   If any further questions, please call Sending RN at [phone]      Admitting Unit Notification  Name of person notified and time: Amina and Lizy        Electronically signed by: Electronically signed by Evan Cosme RN on 6/6/2025 at 10:56 PM

## 2025-06-07 NOTE — H&P
Hospitalist Admission Note    NAME: Effie Rain   :  1970   MRN:  [id number]     Date/Time:  2025 9:17 PM    Patient PCP: Juan Hartley MD    ______________________________________________________________________  Given the patient's current clinical presentation, I have a high level of concern for decompensation if discharged from the emergency department.  Complex decision making was performed, which includes reviewing the patient's available past medical records, laboratory results, and x-ray films.       My assessment of this patient's clinical condition and my plan of care is as follows.    Assessment / Plan:    Recurrent falls  Hyponatremia  History of seizure  History adenoma  Hypothyroidism  History of CHF  History of myocarditis  COPD  History of stroke      Admit to medical telemetry floor for hyponatremia started on IV fluids  Fall precautions  Teleneurology consult for seizures  PT OT consult    And continue other home medications      Current Facility-Administered Medications:     [START ON 2025] acarbose (PRECOSE) tablet 25 mg, 25 mg, Oral, TID WC, Sera Acevedo MD    albuterol sulfate HFA (PROVENTIL;VENTOLIN;PROAIR) 108 (90 Base) MCG/ACT inhaler 2 puff, 2 puff, Inhalation, Q4H PRN, Sera Acevedo MD    [START ON 2025] aspirin EC tablet 81 mg, 81 mg, Oral, Daily, Sera Acevedo MD    atorvastatin (LIPITOR) tablet 80 mg, 80 mg, Oral, Nightly, Sera Acevedo MD    cyanocobalamin injection 1,000 mcg, 1,000 mcg, IntraMUSCular, Q30 Days, Sera Acevedo MD    [START ON 2025] pantoprazole (PROTONIX) tablet 40 mg, 40 mg, Oral, QAM AC, Sera Acevedo MD    [START ON 2025] FLUoxetine (PROZAC) capsule 40 mg, 40 mg, Oral, Daily, Sera Acevedo MD    budesonide-formoterol (SYMBICORT) 160-4.5 MCG/ACT inhaler 2 puff, 2 puff, Inhalation, BID RT **AND** [START ON 2025] tiotropium (SPIRIVA RESPIMAT) 2.5 MCG/ACT inhaler 2 puff, 2 puff, Inhalation, Daily RT,

## 2025-06-07 NOTE — CARE COORDINATION
06/07/25 1534   Service Assessment   Patient Orientation Alert and Oriented   Cognition Alert   History Provided By Patient   Primary Caregiver Family   Support Systems Family Members   Patient's Healthcare Decision Maker is: Legal Next of Kin   PCP Verified by CM Yes   Last Visit to PCP Within last 3 months   Prior Functional Level Assistance with the following:;Bathing;Dressing;Toileting;Feeding;Cooking;Housework   Current Functional Level Bathing;Dressing;Assistance with the following:;Toileting;Feeding;Cooking;Housework;Shopping   Can patient return to prior living arrangement Yes   Ability to make needs known: Good   Family able to assist with home care needs: Yes   Would you like for me to discuss the discharge plan with any other family members/significant others, and if so, who? No   Financial Resources Medicaid;Medicare   Social/Functional History   Lives With Family   Type of Home House   Home Access Stairs to enter with rails   Entrance Stairs - Number of Steps 3   Bathroom Equipment 3-in-1 Commode   Home Equipment Rollator   Discharge Planning   History of falls? 1   Services At/After Discharge   Transition of Care Consult (CM Consult) Discharge Planning     CM assessment completed. Patient lives with family members at address on file. Max assist with ADLs. DME includes b/l leg brace, rollator and BSC.     DCP: home    Advance Care Planning     General Advance Care Planning (ACP) Conversation    Date of Conversation: 6/7/2025  Conducted with: Patient with Decision Making Capacity  Other persons present: None    Healthcare Decision Maker:   Primary Decision Maker: Diane Archer - [facility] - [phone]       Content/Action Overview:    Reviewed DNR/DNI and patient elects Full Code (Attempt Resuscitation)          Janeth Roblero

## 2025-06-07 NOTE — CONSULTS
[facility] NEUROLOGY CONSULTATION          Chief Complaint/Admission Diagnosis: Hyponatremia [E87.1]  Pituitary abnormality [E23.7]     I have been asked to see this 54 y.o. female in neurological consultation by Abhijit Billy MD  to render advice and opinion regarding seizures and recurrent falls.     ?     Impression/Recommendations:   54-year-old female with pituitary adenoma, hypothyroidism,  vasovagal syncope, heart failure, myocarditis, COPD, seizure disorder and stroke who presented to the ED with recurrent falls. CT head is negative for any acute finding shows a pituitary macroadenoma. MRI brain 4/2/25 report does not mention anything however. I don't have access to any brain imaging to confirm. Her last seizure was about 1 month ago. With a frequency of 2 seizures a month.  Exam shows decreased sensation to LT in the right side arm and leg and face.  Continue keppra 1500 mg bid  Continue oxcarbazepine 900 mg bid  Continue Fycompa 8 mg qhs    Patient has intractable epilepsy and is expected to have some breakthrough seizures by definition.   Would recommend following up with outpatient neurologist for treatment of intractable epilepsy and possibly considering VNS.  Would recommend correcting sodium slowly at the rate of only 12 mEq/L per day to avoid any osmotic demyelination syndrome. (She has chronic hyponatremia probably from trileptal.) With her complicated history, I think it is best that her trileptal is changed to another medication by her outpatient neurologist systematically by tapering it down.  Will get MRI brain to understand if patient truly have pituitary macroadenoma and the reason for right sided decreased sensation.            Nancy Emanuel MD  Teleneurologist    HPI:   History was obtained from a review of the electronic record and from the patient and family.??   54-year-old female with pituitary adenoma, hypothyroidism,  vasovagal syncope, heart failure, myocarditis,

## 2025-06-08 ENCOUNTER — APPOINTMENT (OUTPATIENT)
Facility: HOSPITAL | Age: 55
DRG: 089 | End: 2025-06-08
Payer: MEDICARE

## 2025-06-08 VITALS
TEMPERATURE: 97.3 F | RESPIRATION RATE: 18 BRPM | OXYGEN SATURATION: 97 % | BODY MASS INDEX: 24.24 KG/M2 | HEART RATE: 64 BPM | HEIGHT: 65 IN | SYSTOLIC BLOOD PRESSURE: 115 MMHG | WEIGHT: 145.5 LBS | DIASTOLIC BLOOD PRESSURE: 71 MMHG

## 2025-06-08 PROCEDURE — 6370000000 HC RX 637 (ALT 250 FOR IP): Performed by: INTERNAL MEDICINE

## 2025-06-08 PROCEDURE — 2580000003 HC RX 258: Performed by: FAMILY MEDICINE

## 2025-06-08 PROCEDURE — 6360000004 HC RX CONTRAST MEDICATION: Performed by: INTERNAL MEDICINE

## 2025-06-08 PROCEDURE — A9577 INJ MULTIHANCE: HCPCS | Performed by: INTERNAL MEDICINE

## 2025-06-08 PROCEDURE — 94640 AIRWAY INHALATION TREATMENT: CPT

## 2025-06-08 PROCEDURE — 70553 MRI BRAIN STEM W/O & W/DYE: CPT | Performed by: PSYCHIATRY & NEUROLOGY

## 2025-06-08 PROCEDURE — 2500000003 HC RX 250 WO HCPCS: Performed by: FAMILY MEDICINE

## 2025-06-08 PROCEDURE — 99233 SBSQ HOSP IP/OBS HIGH 50: CPT | Performed by: PSYCHIATRY & NEUROLOGY

## 2025-06-08 PROCEDURE — 6360000002 HC RX W HCPCS: Performed by: FAMILY MEDICINE

## 2025-06-08 PROCEDURE — 6370000000 HC RX 637 (ALT 250 FOR IP): Performed by: FAMILY MEDICINE

## 2025-06-08 PROCEDURE — 94761 N-INVAS EAR/PLS OXIMETRY MLT: CPT

## 2025-06-08 RX ORDER — IBUPROFEN 400 MG/1
400 TABLET, FILM COATED ORAL EVERY 4 HOURS PRN
Status: DISCONTINUED | OUTPATIENT
Start: 2025-06-08 | End: 2025-06-08 | Stop reason: HOSPADM

## 2025-06-08 RX ADMIN — MONTELUKAST 10 MG: 10 TABLET, FILM COATED ORAL at 10:28

## 2025-06-08 RX ADMIN — ACARBOSE 100 MG: 50 TABLET ORAL at 13:09

## 2025-06-08 RX ADMIN — SODIUM CHLORIDE, PRESERVATIVE FREE 10 ML: 5 INJECTION INTRAVENOUS at 10:30

## 2025-06-08 RX ADMIN — ASPIRIN 81 MG: 81 TABLET, COATED ORAL at 10:28

## 2025-06-08 RX ADMIN — Medication 2 PUFF: at 07:20

## 2025-06-08 RX ADMIN — GADOBENATE DIMEGLUMINE 13 ML: 529 INJECTION, SOLUTION INTRAVENOUS at 10:03

## 2025-06-08 RX ADMIN — MIDODRINE HYDROCHLORIDE 5 MG: 5 TABLET ORAL at 08:21

## 2025-06-08 RX ADMIN — ACARBOSE 100 MG: 50 TABLET ORAL at 10:32

## 2025-06-08 RX ADMIN — LEVOTHYROXINE SODIUM 150 MCG: 0.07 TABLET ORAL at 06:09

## 2025-06-08 RX ADMIN — CHOLECALCIFEROL TAB 125 MCG (5000 UNIT) 5000 UNITS: 125 TAB at 10:28

## 2025-06-08 RX ADMIN — OXYCODONE HYDROCHLORIDE 10 MG: 10 TABLET ORAL at 08:21

## 2025-06-08 RX ADMIN — OXCARBAZEPINE 900 MG: 300 TABLET, FILM COATED ORAL at 10:28

## 2025-06-08 RX ADMIN — LEVETIRACETAM 1500 MG: 500 TABLET, FILM COATED ORAL at 10:28

## 2025-06-08 RX ADMIN — FLUOXETINE HYDROCHLORIDE 40 MG: 20 CAPSULE ORAL at 10:28

## 2025-06-08 RX ADMIN — SODIUM CHLORIDE: 0.9 INJECTION, SOLUTION INTRAVENOUS at 10:34

## 2025-06-08 RX ADMIN — ENOXAPARIN SODIUM 40 MG: 100 INJECTION SUBCUTANEOUS at 10:27

## 2025-06-08 RX ADMIN — PANTOPRAZOLE SODIUM 40 MG: 40 TABLET, DELAYED RELEASE ORAL at 06:09

## 2025-06-08 RX ADMIN — OXYCODONE HYDROCHLORIDE 10 MG: 10 TABLET ORAL at 15:49

## 2025-06-08 RX ADMIN — IBUPROFEN 400 MG: 400 TABLET, FILM COATED ORAL at 13:09

## 2025-06-08 ASSESSMENT — PAIN SCALES - GENERAL
PAINLEVEL_OUTOF10: 5
PAINLEVEL_OUTOF10: 8
PAINLEVEL_OUTOF10: 7
PAINLEVEL_OUTOF10: 4
PAINLEVEL_OUTOF10: 8

## 2025-06-08 ASSESSMENT — PAIN SCALES - WONG BAKER: WONGBAKER_NUMERICALRESPONSE: HURTS LITTLE MORE

## 2025-06-08 ASSESSMENT — PAIN DESCRIPTION - LOCATION: LOCATION: GENERALIZED

## 2025-06-08 NOTE — DISCHARGE SUMMARY
Physician Discharge Summary     Patient ID:    Effie Rain  [id number]  54 y.o.  1970    Admit date: 6/6/2025    Discharge date : 6/8/2025      Final Diagnoses:   Acute concussion  Chronic hyponatremia  Probable vasovagal syncope versus breakthrough seizure  Pituitary adenoma  Chronic heart failure with preserved EF  Generalized pain due to recurrent falls  Seizure disorder  History of stroke  History of idiopathic myocarditis  Hypothyroidism  History of post bariatric hypoglycemia  GERD  Migraine headaches    Reason for Hospitalization:  54-year-old female with pituitary adenoma, hypothyroidism,  vasovagal syncope, heart failure, myocarditis, COPD, seizure disorder and stroke who presented to the ED with recurrent falls.  This has actually been going on for the past year.   Patient was staying at her daughter's house on Wednesday of this week when she got up in the middle of the night and got disoriented.  She went into the kitchen and apparently lost consciousness, struck her head on the kitchen island.  She was then walking down the stairs later that same night and thought she was on the bottom stair, took a step and fell down 5 steps.  She did not come to the ED then but came last night due to ongoing headache as well as generalized body aches from her falls.  No seizure activity was identified by her daughter although patient states this could have been a seizure.     In the ED, blood pressure was 101/72.  No orthostatics were done.  Head CT negative for trauma, showed pituitary adenoma.  CT of the cervical and thoracic spine unremarkable.  Labs showed a sodium of 122      Hospital Course:   Patient was admitted, started on normal saline.    Sodium improved to 129 which is about her baseline    She was seen by neurology who ordered MRI of the brain.  This confirmed pituitary adenoma which she is known to have and is followed by [facility].    Orthostatic vitals showed no

## 2025-06-08 NOTE — PLAN OF CARE
Problem: Pain  Goal: Verbalizes/displays adequate comfort level or baseline comfort level  Outcome: Progressing     Problem: Chronic Conditions and Co-morbidities  Goal: Patient's chronic conditions and co-morbidity symptoms are monitored and maintained or improved  Outcome: Progressing     Problem: Discharge Planning  Goal: Discharge to home or other facility with appropriate resources  Outcome: Progressing     Problem: Safety - Adult  Goal: Free from fall injury  Outcome: Progressing     Problem: Neurosensory - Adult  Goal: Achieves maximal functionality and self care  Outcome: Progressing     Problem: Respiratory - Adult  Goal: Achieves optimal ventilation and oxygenation  Outcome: Progressing     Problem: Cardiovascular - Adult  Goal: Maintains optimal cardiac output and hemodynamic stability  Outcome: Progressing  Goal: Absence of cardiac dysrhythmias or at baseline  Outcome: Progressing     Problem: Skin/Tissue Integrity - Adult  Goal: Skin integrity remains intact  Outcome: Progressing  Goal: Oral mucous membranes remain intact  Outcome: Progressing     Problem: Musculoskeletal - Adult  Goal: Return mobility to safest level of function  Outcome: Progressing  Goal: Return ADL status to a safe level of function  Outcome: Progressing     Problem: Gastrointestinal - Adult  Goal: Maintains or returns to baseline bowel function  Outcome: Progressing  Goal: Maintains adequate nutritional intake  Outcome: Progressing     Problem: Genitourinary - Adult  Goal: Absence of urinary retention  Outcome: Progressing     Problem: Infection - Adult  Goal: Absence of infection during hospitalization  Outcome: Progressing     Problem: Metabolic/Fluid and Electrolytes - Adult  Goal: Electrolytes maintained within normal limits  Outcome: Progressing     Problem: Hematologic - Adult  Goal: Maintains hematologic stability  Outcome: Progressing     
  Problem: Pain  Goal: Verbalizes/displays adequate comfort level or baseline comfort level  Outcome: Progressing     Problem: Chronic Conditions and Co-morbidities  Goal: Patient's chronic conditions and co-morbidity symptoms are monitored and maintained or improved  Outcome: Progressing  Flowsheets (Taken 6/7/2025 0915 by Janis Altman, RN)  Care Plan - Patient's Chronic Conditions and Co-Morbidity Symptoms are Monitored and Maintained or Improved: Monitor and assess patient's chronic conditions and comorbid symptoms for stability, deterioration, or improvement     Problem: Discharge Planning  Goal: Discharge to home or other facility with appropriate resources  Outcome: Progressing  Flowsheets (Taken 6/7/2025 0915 by Janis Altman, RN)  Discharge to home or other facility with appropriate resources: Identify barriers to discharge with patient and caregiver     Problem: Safety - Adult  Goal: Free from fall injury  Outcome: Progressing  Flowsheets (Taken 6/7/2025 1121 by Janis Altman, RN)  Free From Fall Injury: Instruct family/caregiver on patient safety     Problem: Neurosensory - Adult  Goal: Achieves maximal functionality and self care  Outcome: Progressing  Flowsheets (Taken 6/7/2025 0915 by Janis Altman, RN)  Achieves maximal functionality and self care: Monitor swallowing and airway patency with patient fatigue and changes in neurological status     Problem: Respiratory - Adult  Goal: Achieves optimal ventilation and oxygenation  Outcome: Progressing  Flowsheets (Taken 6/7/2025 0915 by Janis Altman, RN)  Achieves optimal ventilation and oxygenation: Assess for changes in respiratory status     Problem: Cardiovascular - Adult  Goal: Maintains optimal cardiac output and hemodynamic stability  Outcome: Progressing  Flowsheets (Taken 6/7/2025 0915 by Janis Altman, RN)  Maintains optimal cardiac output and hemodynamic stability: Monitor blood pressure and heart rate  Goal: Absence of cardiac 
PHYSICAL THERAPY EVALUATION  Patient: Effie Rain (54 y.o. female)  Date: 6/7/2025  Primary Diagnosis: Hyponatremia [E87.1]  Pituitary abnormality [E23.7]       Precautions: Fall Risk                      Recommendations for nursing mobility: Encourage HEP in prep for ADLs/mobility; see handout for details, Frequent repositioning to prevent skin breakdown, Use of bed/chair alarm for safety, and Amb to bathroom with AD and gait belt    In place during session: Peripheral IV, External Catheter, and EKG/telemetry     ASSESSMENT  Pt is a 54 y.o. female admitted on 6/6/2025 for weakness, frequent fall; pt currently being treated for hyponatremia . Pt semi supine upon PT arrival, agreeable to evaluation. Pt A&O x 4.  Patient lives with mother and son.Mother is her care giver at home.  CT   Impression     1. No acute intracranial abnormality.  2. No acute fracture or malalignment of the cervical spine.  3. Chronic prominence at the pituitary gland which may reflect underlying  pituitary macroadenoma.  4. Multilevel degenerative changes of the cervical spine, similar to prior.     Based on the objective data described below, the patient currently presents with impaired ability to perform high-level IADLs, impaired strength, decreased activity tolerance, impaired balance, and impaired posture. (See below for objective details and assist levels).     Overall pt tolerated session fair today with good. Pt required semi supine for bed mobility and transfers. Pt amb 3 feet with rollator, gt belt and cg/min assist; demonstrates gen weakness. Pt will benefit from continued skilled PT to address above deficits and return to PLOF. Current PT DC recommendation Intermittent physical therapy up to 2-3x/week in previous living setting with additional support needed for ADLs  once medically appropriate.      GOALS:    Problem: Physical Therapy - Adult  Goal: By Discharge: Performs mobility at highest level of function for planned 
Progressing  Flowsheets (Taken 6/8/2025 1026)  Absence of urinary retention: Assess patient’s ability to void and empty bladder     Problem: Infection - Adult  Goal: Absence of infection during hospitalization  Outcome: Progressing  Flowsheets (Taken 6/8/2025 1026)  Absence of infection during hospitalization: Assess and monitor for signs and symptoms of infection     Problem: Metabolic/Fluid and Electrolytes - Adult  Goal: Electrolytes maintained within normal limits  Outcome: Progressing  Flowsheets (Taken 6/8/2025 1026)  Electrolytes maintained within normal limits: Monitor labs and assess patient for signs and symptoms of electrolyte imbalances     Problem: Hematologic - Adult  Goal: Maintains hematologic stability  Outcome: Progressing  Flowsheets (Taken 6/8/2025 1026)  Maintains hematologic stability: Assess for signs and symptoms of bleeding or hemorrhage     Problem: Skin/Tissue Integrity  Goal: Skin integrity remains intact  Description: 1.  Monitor for areas of redness and/or skin breakdown2.  Assess vascular access sites hourly3.  Every 4-6 hours minimum:  Change oxygen saturation probe site4.  Every 4-6 hours:  If on nasal continuous positive airway pressure, respiratory therapy assess nares and determine need for appliance change or resting period  Outcome: Progressing  Flowsheets  Taken 6/8/2025 1050  Skin Integrity Remains Intact: Monitor for areas of redness and/or skin breakdown  Taken 6/8/2025 1026  Skin Integrity Remains Intact: Monitor for areas of redness and/or skin breakdown

## 2025-06-08 NOTE — PROGRESS NOTES
4 Eyes Skin Assessment     NAME:  Effie Rain  YOB: 1970  MEDICAL RECORD NUMBER:  [id number]    The patient is being assessed for  Admission    I agree that at least one RN has performed a thorough Head to Toe Skin Assessment on the patient. ALL assessment sites listed below have been assessed.      Areas assessed by both nurses:    Head, Face, Ears, Shoulders, Back, Chest, Arms, Elbows, Hands, Sacrum. Buttock, Coccyx, Ischium, Legs. Feet and Heels, and Under Medical Devices         Does the Patient have a Wound? Yes wound(s) were present on assessment. LDA wound assessment was Initiated and completed by RN Patient has abrasions on the left shoulder, shin, and ankle from fall at home.       Feliciano Prevention initiated by RN: Yes  Wound Care Orders initiated by RN: Yes    Pressure Injury (Stage 3,4, Unstageable, DTI, NWPT, and Complex wounds) if present, place Wound referral order by RN under : No    New Ostomies, if present place, Ostomy referral order under : No     Nurse 1 eSignature: Electronically signed by Bacilio Figureoa RN on 6/7/25 at 3:04 AM EDT    **SHARE this note so that the co-signing nurse can place an eSignature**    Nurse 2 eSignature: Electronically signed by Paris Higgins RN on 6/7/25 at 4:44 AM EDT    
Progress Note:      [facility] NEUROLOGY PROGRESS NOTE          Impression/Recommendations:   54-year-old female with pituitary adenoma, hypothyroidism,  vasovagal syncope, heart failure, myocarditis, COPD, seizure disorder and stroke who presented to the ED with recurrent falls. CT head is negative for any acute finding shows a pituitary macroadenoma. MRI brain 4/2/25 report does not mention anything however. I don't have access to any brain imaging to confirm. Her last seizure was about 1 month ago. With a frequency of 2 seizures a month.  Exam shows decreased sensation to LT in the right side arm and leg and face.  Continue keppra 1500 mg bid  Continue oxcarbazepine 900 mg bid  Continue Fycompa 8 mg qhs     Patient has intractable epilepsy and is expected to have some breakthrough seizures by definition.   Would recommend correcting sodium slowly at the rate of only 12 mEq/L per day to avoid any osmotic demyelination syndrome. (She has chronic hyponatremia probably from trileptal.) With her complicated history, I think it is best that her trileptal is changed to another medication by her outpatient neurologist systematically by tapering it down.  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 Does not have any new stroke.  Patient would need outpatient neurosurgery and endocrinology follow up.  Would recommend following up with outpatient neurologist for treatment of intractable epilepsy and possibly considering VNS.  No further neurological recommendations.  Thank you for the consult.       SUBJECTIVE   Effie Rain is a 54 y.o. female being evaluated for seizure..     ?     OBJECTIVE   ROS, PMH, FH, SH were all reviewed and are unchanged.   Neurological Examination:   /70   Pulse 64   Temp 97.3 °F (36.3 °C) (Axillary)   Resp 16   Ht 1.651 m (5' 5\")   Wt 66 kg (145 lb 8.1 oz)   SpO2 98%   BMI 24.21 kg/m²    Assisted by VEL Perrin   Mental status:  Patient was Alert.  Speech was 
RN and PCT wheeled patient down to main entrance accompanied by patients mother. No signs or symptoms of distress upon discharge.   Discharge plan of care/case management plan validated with provider discharge order.    
Received Order for Telemetry     Effie MILE Rain   1970   [id number]   Hyponatremia [E87.1]  Pituitary abnormality [E23.7]   Sera Acevedo MD     Tele Box # [id number] placed on patient at  2326 pm  ER Room # 11  Admitting to Room 219  Transferring Nurse Aislinn   Verified with Primary Nurse Bacilio at  2355 pm   
but had complications and she removed it    Hypothyroidism following thyroidectomy  Continue levothyroxine    History of post bariatric hypoglycemia  Has Dexcom in place    GERD    Seizure disorder  Migraine headaches  Followed by neurology Dr. Jenae Staples, Fycompa and Trileptal    Chronic heart failure with preserved EF  History of idiopathic myocarditis    Plan:  Continue normal saline  Recheck labs in a.m.  Telemetry neurology has been consulted  Oxycodone for pain      Code status: Full code    Social determinants of health: none      Estimated discharge date//time frame/disposition:    Barriers to discharge:           Abhijit Coombs MD

## 2025-06-14 ENCOUNTER — APPOINTMENT (OUTPATIENT)
Facility: HOSPITAL | Age: 55
End: 2025-06-14
Payer: MEDICARE

## 2025-06-14 ENCOUNTER — HOSPITAL ENCOUNTER (EMERGENCY)
Facility: HOSPITAL | Age: 55
Discharge: HOME | End: 2025-06-14
Attending: STUDENT IN AN ORGANIZED HEALTH CARE EDUCATION/TRAINING PROGRAM
Payer: MEDICARE

## 2025-06-14 VITALS
WEIGHT: 145 LBS | HEIGHT: 65 IN | HEART RATE: 65 BPM | OXYGEN SATURATION: 100 % | BODY MASS INDEX: 24.16 KG/M2 | SYSTOLIC BLOOD PRESSURE: 142 MMHG | TEMPERATURE: 98 F | RESPIRATION RATE: 18 BRPM | DIASTOLIC BLOOD PRESSURE: 78 MMHG

## 2025-06-14 DIAGNOSIS — R51.9 ACUTE NONINTRACTABLE HEADACHE, UNSPECIFIED HEADACHE TYPE: Primary | ICD-10-CM

## 2025-06-14 LAB
ALBUMIN SERPL-MCNC: 3.6 G/DL (ref 3.5–5)
ALBUMIN/GLOB SERPL: 1.4 (ref 1.1–2.2)
ALP SERPL-CCNC: 118 U/L (ref 45–117)
ALT SERPL-CCNC: 52 U/L (ref 12–78)
ANION GAP SERPL CALC-SCNC: 10 MMOL/L (ref 2–12)
AST SERPL W P-5'-P-CCNC: 53 U/L (ref 15–37)
BASOPHILS # BLD: 0.03 K/UL (ref 0–0.1)
BASOPHILS NFR BLD: 0.7 % (ref 0–1)
BILIRUB SERPL-MCNC: 0.4 MG/DL (ref 0.2–1)
BUN SERPL-MCNC: 8 MG/DL (ref 6–20)
BUN/CREAT SERPL: 13 (ref 12–20)
CA-I BLD-MCNC: 8.6 MG/DL (ref 8.5–10.1)
CHLORIDE SERPL-SCNC: 92 MMOL/L (ref 97–108)
CO2 SERPL-SCNC: 20 MMOL/L (ref 21–32)
CREAT SERPL-MCNC: 0.6 MG/DL (ref 0.55–1.02)
DIFFERENTIAL METHOD BLD: ABNORMAL
EOSINOPHIL # BLD: 0.19 K/UL (ref 0–0.4)
EOSINOPHIL NFR BLD: 4.5 % (ref 0–7)
ERYTHROCYTE [DISTWIDTH] IN BLOOD BY AUTOMATED COUNT: 12.8 % (ref 11.5–14.5)
GLOBULIN SER CALC-MCNC: 2.6 G/DL (ref 2–4)
GLUCOSE SERPL-MCNC: 84 MG/DL (ref 65–100)
HCT VFR BLD AUTO: 32 % (ref 35–47)
HGB BLD-MCNC: 11.4 G/DL (ref 11.5–16)
IMM GRANULOCYTES # BLD AUTO: 0.01 K/UL (ref 0–0.04)
IMM GRANULOCYTES NFR BLD AUTO: 0.2 % (ref 0–0.5)
LYMPHOCYTES # BLD: 1.63 K/UL (ref 0.8–3.5)
LYMPHOCYTES NFR BLD: 38.5 % (ref 12–49)
MCH RBC QN AUTO: 28.2 PG (ref 26–34)
MCHC RBC AUTO-ENTMCNC: 35.6 G/DL (ref 30–36.5)
MCV RBC AUTO: 79.2 FL (ref 80–99)
MONOCYTES # BLD: 0.44 K/UL (ref 0–1)
MONOCYTES NFR BLD: 10.4 % (ref 5–13)
NEUTS SEG # BLD: 1.93 K/UL (ref 1.8–8)
NEUTS SEG NFR BLD: 45.7 % (ref 32–75)
NRBC # BLD: 0 K/UL (ref 0–0.01)
NRBC BLD-RTO: 0 PER 100 WBC
PLATELET # BLD AUTO: 226 K/UL (ref 150–400)
PMV BLD AUTO: 9 FL (ref 8.9–12.9)
POTASSIUM SERPL-SCNC: 4.1 MMOL/L (ref 3.5–5.1)
PROT SERPL-MCNC: 6.2 G/DL (ref 6.4–8.2)
RBC # BLD AUTO: 4.04 M/UL (ref 3.8–5.2)
SODIUM SERPL-SCNC: 122 MMOL/L (ref 136–145)
WBC # BLD AUTO: 4.2 K/UL (ref 3.6–11)

## 2025-06-14 PROCEDURE — 36415 COLL VENOUS BLD VENIPUNCTURE: CPT

## 2025-06-14 PROCEDURE — 85025 COMPLETE CBC W/AUTO DIFF WBC: CPT

## 2025-06-14 PROCEDURE — 70450 CT HEAD/BRAIN W/O DYE: CPT

## 2025-06-14 PROCEDURE — 6360000002 HC RX W HCPCS: Performed by: STUDENT IN AN ORGANIZED HEALTH CARE EDUCATION/TRAINING PROGRAM

## 2025-06-14 PROCEDURE — 93005 ELECTROCARDIOGRAM TRACING: CPT | Performed by: STUDENT IN AN ORGANIZED HEALTH CARE EDUCATION/TRAINING PROGRAM

## 2025-06-14 PROCEDURE — 80053 COMPREHEN METABOLIC PANEL: CPT

## 2025-06-14 PROCEDURE — 96375 TX/PRO/DX INJ NEW DRUG ADDON: CPT

## 2025-06-14 PROCEDURE — 96374 THER/PROPH/DIAG INJ IV PUSH: CPT

## 2025-06-14 PROCEDURE — 99285 EMERGENCY DEPT VISIT HI MDM: CPT

## 2025-06-14 PROCEDURE — 6370000000 HC RX 637 (ALT 250 FOR IP): Performed by: STUDENT IN AN ORGANIZED HEALTH CARE EDUCATION/TRAINING PROGRAM

## 2025-06-14 PROCEDURE — 71045 X-RAY EXAM CHEST 1 VIEW: CPT

## 2025-06-14 RX ORDER — DIPHENHYDRAMINE HYDROCHLORIDE 50 MG/ML
25 INJECTION, SOLUTION INTRAMUSCULAR; INTRAVENOUS
Status: DISCONTINUED | OUTPATIENT
Start: 2025-06-14 | End: 2025-06-14

## 2025-06-14 RX ORDER — MORPHINE SULFATE 2 MG/ML
2 INJECTION, SOLUTION INTRAMUSCULAR; INTRAVENOUS
Refills: 0 | Status: COMPLETED | OUTPATIENT
Start: 2025-06-14 | End: 2025-06-14

## 2025-06-14 RX ORDER — PROCHLORPERAZINE EDISYLATE 5 MG/ML
10 INJECTION INTRAMUSCULAR; INTRAVENOUS ONCE
Status: COMPLETED | OUTPATIENT
Start: 2025-06-14 | End: 2025-06-14

## 2025-06-14 RX ORDER — DEXAMETHASONE SODIUM PHOSPHATE 10 MG/ML
10 INJECTION, SOLUTION INTRAMUSCULAR; INTRAVENOUS ONCE
Status: COMPLETED | OUTPATIENT
Start: 2025-06-14 | End: 2025-06-14

## 2025-06-14 RX ORDER — DIPHENHYDRAMINE HCL 25 MG
25 CAPSULE ORAL
Status: COMPLETED | OUTPATIENT
Start: 2025-06-14 | End: 2025-06-14

## 2025-06-14 RX ADMIN — DIPHENHYDRAMINE HYDROCHLORIDE 25 MG: 25 CAPSULE ORAL at 17:09

## 2025-06-14 RX ADMIN — DEXAMETHASONE SODIUM PHOSPHATE 10 MG: 10 INJECTION, SOLUTION INTRAMUSCULAR; INTRAVENOUS at 17:07

## 2025-06-14 RX ADMIN — PROCHLORPERAZINE EDISYLATE 10 MG: 5 INJECTION INTRAMUSCULAR; INTRAVENOUS at 17:08

## 2025-06-14 RX ADMIN — MORPHINE SULFATE 2 MG: 2 INJECTION, SOLUTION INTRAMUSCULAR; INTRAVENOUS at 17:10

## 2025-06-14 ASSESSMENT — PAIN - FUNCTIONAL ASSESSMENT
PAIN_FUNCTIONAL_ASSESSMENT: 0-10
PAIN_FUNCTIONAL_ASSESSMENT: NONE - DENIES PAIN
PAIN_FUNCTIONAL_ASSESSMENT: 0-10
PAIN_FUNCTIONAL_ASSESSMENT: 0-10

## 2025-06-14 ASSESSMENT — PAIN SCALES - GENERAL
PAINLEVEL_OUTOF10: 0
PAINLEVEL_OUTOF10: 7
PAINLEVEL_OUTOF10: 7
PAINLEVEL_OUTOF10: 2

## 2025-06-14 ASSESSMENT — LIFESTYLE VARIABLES
HOW MANY STANDARD DRINKS CONTAINING ALCOHOL DO YOU HAVE ON A TYPICAL DAY: PATIENT DECLINED
HOW OFTEN DO YOU HAVE A DRINK CONTAINING ALCOHOL: PATIENT DECLINED

## 2025-06-14 ASSESSMENT — PAIN DESCRIPTION - FREQUENCY: FREQUENCY: CONTINUOUS

## 2025-06-14 NOTE — ED PROVIDER NOTES
[facility] EMERGENCY DEPT  EMERGENCY DEPARTMENT HISTORY AND PHYSICAL EXAM      Date of evaluation: 6/14/2025  Patient Name: Effie aRin  Birthdate 1970  MRN: [id number]  ED Provider: Clinton Grajeda MD   Note Started: 5:03 PM EDT 6/14/25    HISTORY OF PRESENT ILLNESS     Chief Complaint   Patient presents with    Fall       History Provided By: Patient, parent     HPI: Effie Rain is a 55 y.o. female with past medical history as reviewed below presents for evaluation after syncopal event.  Patient states that she passed out at home, does not remember what happened.  Mother states that patient was alert, talking, slid down the wall.  No witnessed seizure-like activity, no postictal state.  Patient endorses generalized headache since this event.  No focal motor weakness or loss of sensation.    PAST MEDICAL HISTORY   Past Medical History:  Past Medical History:   Diagnosis Date    Asthma     CAD (coronary artery disease)     Cerebral artery occlusion with cerebral infarction (HCC)     Colon polyp     COPD (chronic obstructive pulmonary disease) (HCC)     Elevated liver enzymes     Fibrocystic breast changes of both breasts 07/08/2015    GERD (gastroesophageal reflux disease)     Glaucoma 07/31/2020    Heart attack (HCC)     Hx of blood clots     Hypercholesterolemia 08/15/2013    Hypotension     Hypothyroidism 07/26/2019    Implantable loop recorder present 02/15/2023    Intestinal malrotation ([facility])     Irritable bowel syndrome     Migraine headache 07/26/2019    Multinodular goiter 01/27/2023    Nasogastric tube present     Patient stated does take soft feed by mouth    Neuritis of right saphenous nerve 07/14/2021    Nontraumatic tear of left rotator cuff, unspecified tear extent 07/06/2023    NSVT (nonsustained ventricular tachycardia) ([facility]) 04/10/2021    Pulmonary embolism ([facility]) 08/2022    Seizures ([facility])     lst one - March 2025    Sleep apnea     patient stated does not currently use a CPAP    Type 2  BUN/Creatinine Ratio 13 12 - 20      Est, Glom Filt Rate >90 >60 ml/min/1.73m2    Calcium 8.6 8.5 - 10.1 mg/dL    Total Bilirubin 0.4 0.2 - 1.0 mg/dL    AST 53 (H) 15 - 37 U/L    ALT 52 12 - 78 U/L    Alk Phosphatase 118 (H) 45 - 117 U/L    Total Protein 6.2 (L) 6.4 - 8.2 g/dL    Albumin 3.6 3.5 - 5.0 g/dL    Globulin 2.6 2.0 - 4.0 g/dL    Albumin/Globulin Ratio 1.4 1.1 - 2.2         EKG: Initial EKG interpreted by me if performed. See ED course below    Radiologic Studies:  Non-plain film images such as CT, Ultrasound and MRI are read by the radiologist. Plain radiographic images are visualized and preliminarily interpreted by the ED Provider with findings available in ED course below.     Interpretation per the Radiologist below, if available at the time of this note:  CT Head W/O Contrast   Final Result   No acute intracranial process.   There is no intracranial mass or hemorrhage.      Electronically signed by GEORGE KAUFFMAN      XR CHEST PORTABLE   Final Result      No acute process on portable chest.         Electronically signed by MAVERICK LEPE           ED COURSE and DIFFERENTIAL DIAGNOSIS/MDM   6:28 PM Differential, Considerations of tests not ordered and ED Course:     ED Course as of 06/14/25 1828   Sat Jun 14, 2025   1611 EKG initially interpreted by me shows normal sinus rhythm with ventricular to 76, normal intervals, no ST elevation or depression [BQ]   1650 MDM: 55-year-old female presents for evaluation following a syncopal event yesterday.  Event witnessed by mother who states that patient remained responsive and communicative the entire time, but patient states she does not recall the event.  Neurologically intact on exam.  Will evaluate with CT head for evidence of acute intracranial bleed, otherwise differential including seizure, vasovagal event, orthostatic hypotension.  Will evaluate with CBC and metabolic panel for evidence of leukocytosis, anemia, electrolyte abnormality, dehydration,

## 2025-06-14 NOTE — DISCHARGE INSTRUCTIONS
Thank you for choosing our Emergency Department for your care.  It is our privilege to care for you in your time of need.  In the next several days, you may receive a survey via email or mailed to your home about your experience with our team.  We would greatly appreciate you taking a few minutes to complete the survey, as we use this information to learn what we have done well and what we could be doing better. Thank you for trusting us with your care!    Below you will find a list of your tests from today's visit.   Labs and Radiology Studies  Recent Results (from the past 12 hours)   CBC with Auto Differential    Collection Time: 06/14/25  3:59 PM   Result Value Ref Range    WBC 4.2 3.6 - 11.0 K/uL    RBC 4.04 3.80 - 5.20 M/uL    Hemoglobin 11.4 (L) 11.5 - 16.0 g/dL    Hematocrit 32.0 (L) 35.0 - 47.0 %    MCV 79.2 (L) 80.0 - 99.0 FL    MCH 28.2 26.0 - 34.0 PG    MCHC 35.6 30.0 - 36.5 g/dL    RDW 12.8 11.5 - 14.5 %    Platelets 226 150 - 400 K/uL    MPV 9.0 8.9 - 12.9 FL    Nucleated RBCs 0.0 0.0  WBC    nRBC 0.00 0.00 - 0.01 K/uL    Neutrophils % 45.7 32.0 - 75.0 %    Lymphocytes % 38.5 12.0 - 49.0 %    Monocytes % 10.4 5.0 - 13.0 %    Eosinophils % 4.5 0.0 - 7.0 %    Basophils % 0.7 0.0 - 1.0 %    Immature Granulocytes % 0.2 0 - 0.5 %    Neutrophils Absolute 1.93 1.80 - 8.00 K/UL    Lymphocytes Absolute 1.63 0.80 - 3.50 K/UL    Monocytes Absolute 0.44 0.00 - 1.00 K/UL    Eosinophils Absolute 0.19 0.00 - 0.40 K/UL    Basophils Absolute 0.03 0.00 - 0.10 K/UL    Immature Granulocytes Absolute 0.01 0.00 - 0.04 K/UL    Differential Type AUTOMATED     Comprehensive Metabolic Panel    Collection Time: 06/14/25  3:59 PM   Result Value Ref Range    Sodium 122 (L) 136 - 145 mmol/L    Potassium 4.1 3.5 - 5.1 mmol/L    Chloride 92 (L) 97 - 108 mmol/L    CO2 20 (L) 21 - 32 mmol/L    Anion Gap 10 2 - 12 mmol/L    Glucose 84 65 - 100 mg/dL    BUN 8 6 - 20 mg/dL    Creatinine 0.60 0.55 - 1.02 mg/dL    BUN/Creatinine

## 2025-06-14 NOTE — ED TRIAGE NOTES
Yesterday pt had a fall. Unknown if hit head. Pt doesn't remember fall. Unsure if hit head. Pt notes headache to back of head/colton side neck pain with no C-spine tenderness noted/back pain between shoulders. Pt notes several falls over last couple of months. Uses a walker at home.

## 2025-06-14 NOTE — ED NOTES
No s/s of distress. Denies pain at present. A/O 4. IV D/C'd without difficulty. Family at bedside. Verbalizes understanding of meds/instructions/RX>

## 2025-06-16 LAB
EKG ATRIAL RATE: 76 BPM
EKG DIAGNOSIS: NORMAL
EKG P AXIS: 50 DEGREES
EKG P-R INTERVAL: 170 MS
EKG Q-T INTERVAL: 374 MS
EKG QRS DURATION: 92 MS
EKG QTC CALCULATION (BAZETT): 420 MS
EKG R AXIS: 9 DEGREES
EKG T AXIS: 30 DEGREES
EKG VENTRICULAR RATE: 76 BPM

## 2025-07-04 ENCOUNTER — HOSPITAL ENCOUNTER (EMERGENCY)
Facility: HOSPITAL | Age: 55
Discharge: HOME | End: 2025-07-04
Attending: EMERGENCY MEDICINE
Payer: MEDICARE

## 2025-07-04 VITALS
SYSTOLIC BLOOD PRESSURE: 110 MMHG | RESPIRATION RATE: 18 BRPM | DIASTOLIC BLOOD PRESSURE: 69 MMHG | TEMPERATURE: 98.2 F | WEIGHT: 139 LBS | HEART RATE: 95 BPM | HEIGHT: 65 IN | OXYGEN SATURATION: 96 % | BODY MASS INDEX: 23.16 KG/M2

## 2025-07-04 DIAGNOSIS — L03.311 CELLULITIS OF ABDOMINAL WALL: ICD-10-CM

## 2025-07-04 DIAGNOSIS — L30.9 DERMATITIS: Primary | ICD-10-CM

## 2025-07-04 DIAGNOSIS — Z43.1 ATTENTION TO G-TUBE (HCC): ICD-10-CM

## 2025-07-04 PROCEDURE — 99283 EMERGENCY DEPT VISIT LOW MDM: CPT

## 2025-07-04 PROCEDURE — 6370000000 HC RX 637 (ALT 250 FOR IP): Performed by: EMERGENCY MEDICINE

## 2025-07-04 RX ORDER — CEPHALEXIN 500 MG/1
500 CAPSULE ORAL
Status: COMPLETED | OUTPATIENT
Start: 2025-07-04 | End: 2025-07-04

## 2025-07-04 RX ORDER — ZINC OXIDE 20 G/100G
OINTMENT TOPICAL
Status: DISCONTINUED | OUTPATIENT
Start: 2025-07-04 | End: 2025-07-04 | Stop reason: HOSPADM

## 2025-07-04 RX ORDER — CEPHALEXIN 500 MG/1
500 CAPSULE ORAL 3 TIMES DAILY
Qty: 30 CAPSULE | Refills: 0 | Status: SHIPPED | OUTPATIENT
Start: 2025-07-04 | End: 2025-07-14

## 2025-07-04 RX ADMIN — CEPHALEXIN 500 MG: 500 CAPSULE ORAL at 15:34

## 2025-07-04 ASSESSMENT — PAIN SCALES - GENERAL: PAINLEVEL_OUTOF10: 6

## 2025-07-04 ASSESSMENT — PAIN - FUNCTIONAL ASSESSMENT: PAIN_FUNCTIONAL_ASSESSMENT: 0-10

## 2025-07-04 NOTE — ED PROVIDER NOTES
[facility] EMERGENCY DEPT  EMERGENCY DEPARTMENT HISTORY AND PHYSICAL EXAM      Date of evaluation: 7/4/2025  Patient Name: Effie Rain  Birthdate 1970  MRN: [id number]  ED Provider: Marily Andersen MD   Note Started: 3:08 PM EDT 7/4/25    HISTORY OF PRESENT ILLNESS     Chief Complaint   Patient presents with    G Tube Complications       History Provided By: Patient, only     HPI: Effie Rain is a 55 y.o. female patient recently had a PEG tube placed at [facility] last week.  Now has noticed drainage and some slight redness around the site was worried about infection.  No fevers.  Does have some abdominal pain at the site.  Feeding tube is being used well    PAST MEDICAL HISTORY   Past Medical History:  Past Medical History:   Diagnosis Date    Asthma     CAD (coronary artery disease)     Cerebral artery occlusion with cerebral infarction (HCC)     Colon polyp     COPD (chronic obstructive pulmonary disease) (HCC)     Elevated liver enzymes     Fibrocystic breast changes of both breasts 07/08/2015    GERD (gastroesophageal reflux disease)     Glaucoma 07/31/2020    Heart attack (HCC)     Hx of blood clots     Hypercholesterolemia 08/15/2013    Hypotension     Hypothyroidism 07/26/2019    Implantable loop recorder present 02/15/2023    Intestinal malrotation (HCC)     Irritable bowel syndrome     Migraine headache 07/26/2019    Multinodular goiter 01/27/2023    Nasogastric tube present     Patient stated does take soft feed by mouth    Neuritis of right saphenous nerve 07/14/2021    Nontraumatic tear of left rotator cuff, unspecified tear extent 07/06/2023    NSVT (nonsustained ventricular tachycardia) ([facility]) 04/10/2021    Pulmonary embolism ([facility]) 08/2022    Seizures ([facility])     lst one - March 2025    Sleep apnea     patient stated does not currently use a CPAP    Type 2 diabetes mellitus with complication, without long-term current use of insulin (HCC) 11/26/2023    Vasovagal syncope 04/10/2021       Past Surgical

## 2025-07-04 NOTE — ED TRIAGE NOTES
Pt reports she had G-tube recently placed, is concerned that there is infection around it due to pain, redness and discharge.     Was placed by Dr. Moss with [facility]

## 2025-07-10 ENCOUNTER — HOSPITAL ENCOUNTER (EMERGENCY)
Facility: HOSPITAL | Age: 55
Discharge: HOME | End: 2025-07-10
Attending: EMERGENCY MEDICINE
Payer: MEDICARE

## 2025-07-10 VITALS
HEIGHT: 65 IN | HEART RATE: 71 BPM | OXYGEN SATURATION: 94 % | BODY MASS INDEX: 23.99 KG/M2 | SYSTOLIC BLOOD PRESSURE: 101 MMHG | WEIGHT: 144 LBS | RESPIRATION RATE: 18 BRPM | DIASTOLIC BLOOD PRESSURE: 70 MMHG | TEMPERATURE: 97.9 F

## 2025-07-10 DIAGNOSIS — E87.1 HYPONATREMIA: Primary | ICD-10-CM

## 2025-07-10 LAB
ANION GAP SERPL CALC-SCNC: 9 MMOL/L (ref 2–12)
BASOPHILS # BLD: 0.01 K/UL (ref 0–0.1)
BASOPHILS NFR BLD: 0.2 % (ref 0–1)
BUN SERPL-MCNC: 4 MG/DL (ref 6–20)
BUN/CREAT SERPL: 7 (ref 12–20)
CA-I BLD-MCNC: 1.16 MMOL/L (ref 1.12–1.32)
CA-I BLD-MCNC: 8.6 MG/DL (ref 8.5–10.1)
CHLORIDE BLD-SCNC: 91 MMOL/L (ref 98–107)
CHLORIDE SERPL-SCNC: 95 MMOL/L (ref 97–108)
CO2 SERPL-SCNC: 23 MMOL/L (ref 21–32)
CREAT SERPL-MCNC: 0.54 MG/DL (ref 0.55–1.02)
CREAT UR-MCNC: 0.61 MG/DL (ref 0.6–1.3)
DIFFERENTIAL METHOD BLD: ABNORMAL
EOSINOPHIL # BLD: 0.04 K/UL (ref 0–0.4)
EOSINOPHIL NFR BLD: 0.9 % (ref 0–7)
ERYTHROCYTE [DISTWIDTH] IN BLOOD BY AUTOMATED COUNT: 12.5 % (ref 11.5–14.5)
GLUCOSE BLD STRIP.AUTO-MCNC: 119 MG/DL (ref 65–100)
GLUCOSE SERPL-MCNC: 114 MG/DL (ref 65–100)
HCT VFR BLD AUTO: 30.1 % (ref 35–47)
HGB BLD-MCNC: 10.8 G/DL (ref 11.5–16)
IMM GRANULOCYTES # BLD AUTO: 0.01 K/UL (ref 0–0.04)
IMM GRANULOCYTES NFR BLD AUTO: 0.2 % (ref 0–0.5)
LYMPHOCYTES # BLD: 0.69 K/UL (ref 0.8–3.5)
LYMPHOCYTES NFR BLD: 15.8 % (ref 12–49)
MCH RBC QN AUTO: 27.3 PG (ref 26–34)
MCHC RBC AUTO-ENTMCNC: 35.9 G/DL (ref 30–36.5)
MCV RBC AUTO: 76.2 FL (ref 80–99)
MONOCYTES # BLD: 0.25 K/UL (ref 0–1)
MONOCYTES NFR BLD: 5.7 % (ref 5–13)
NEUTS SEG # BLD: 3.36 K/UL (ref 1.8–8)
NEUTS SEG NFR BLD: 77.2 % (ref 32–75)
NRBC # BLD: 0 K/UL (ref 0–0.01)
NRBC BLD-RTO: 0 PER 100 WBC
PLATELET # BLD AUTO: 308 K/UL (ref 150–400)
PMV BLD AUTO: 8.8 FL (ref 8.9–12.9)
POTASSIUM BLD-SCNC: 4.1 MMOL/L (ref 3.5–5.5)
POTASSIUM SERPL-SCNC: 4.4 MMOL/L (ref 3.5–5.1)
RBC # BLD AUTO: 3.95 M/UL (ref 3.8–5.2)
SODIUM BLD-SCNC: 124 MMOL/L (ref 136–145)
SODIUM SERPL-SCNC: 127 MMOL/L (ref 136–145)
TROPONIN I SERPL HS-MCNC: <4 NG/L (ref 0–51)
WBC # BLD AUTO: 4.4 K/UL (ref 3.6–11)

## 2025-07-10 PROCEDURE — 99284 EMERGENCY DEPT VISIT MOD MDM: CPT

## 2025-07-10 PROCEDURE — 84484 ASSAY OF TROPONIN QUANT: CPT

## 2025-07-10 PROCEDURE — 85025 COMPLETE CBC W/AUTO DIFF WBC: CPT

## 2025-07-10 PROCEDURE — 93005 ELECTROCARDIOGRAM TRACING: CPT | Performed by: EMERGENCY MEDICINE

## 2025-07-10 PROCEDURE — 80048 BASIC METABOLIC PNL TOTAL CA: CPT

## 2025-07-10 PROCEDURE — 96374 THER/PROPH/DIAG INJ IV PUSH: CPT

## 2025-07-10 PROCEDURE — 6360000002 HC RX W HCPCS: Performed by: EMERGENCY MEDICINE

## 2025-07-10 PROCEDURE — 36415 COLL VENOUS BLD VENIPUNCTURE: CPT

## 2025-07-10 PROCEDURE — 80047 BASIC METABLC PNL IONIZED CA: CPT

## 2025-07-10 RX ORDER — SODIUM CHLORIDE 1 G/1
1 TABLET ORAL 3 TIMES DAILY
Qty: 84 TABLET | Refills: 0 | Status: SHIPPED | OUTPATIENT
Start: 2025-07-10

## 2025-07-10 RX ORDER — METOCLOPRAMIDE HYDROCHLORIDE 5 MG/ML
10 INJECTION INTRAMUSCULAR; INTRAVENOUS ONCE
Status: COMPLETED | OUTPATIENT
Start: 2025-07-10 | End: 2025-07-10

## 2025-07-10 RX ORDER — SODIUM CHLORIDE 1 G/1
1 TABLET ORAL 3 TIMES DAILY
Qty: 84 TABLET | Refills: 0 | Status: SHIPPED | OUTPATIENT
Start: 2025-07-10 | End: 2025-07-10

## 2025-07-10 RX ADMIN — METOCLOPRAMIDE 10 MG: 5 INJECTION, SOLUTION INTRAMUSCULAR; INTRAVENOUS at 17:26

## 2025-07-10 ASSESSMENT — PAIN SCALES - GENERAL
PAINLEVEL_OUTOF10: 5
PAINLEVEL_OUTOF10: 4

## 2025-07-10 ASSESSMENT — PAIN - FUNCTIONAL ASSESSMENT: PAIN_FUNCTIONAL_ASSESSMENT: 0-10

## 2025-07-10 ASSESSMENT — PAIN DESCRIPTION - LOCATION: LOCATION: ABDOMEN

## 2025-07-10 NOTE — ED PROVIDER NOTES
[facility] EMERGENCY DEPT  EMERGENCY DEPARTMENT HISTORY AND PHYSICAL EXAM      Date of evaluation: 7/10/2025  Patient Name: Effie Rain  Birthdate 1970  MRN: [id number]  ED Provider: Joseph Ortiz MD   Note Started: 3:47 PM EDT 7/10/25    HISTORY OF PRESENT ILLNESS     Chief Complaint   Patient presents with    Abnormal Labs       History Provided By: Patient, only     HPI: Effie Rain is a 55 y.o. female is for evaluation of hyponatremia.  Patient does report some dizziness has been going on for a long time.  Denies fevers or chills, denies nausea or vomiting.  Patient states she was called by her nephrologist and told to come to be evaluated for a low sodium level.    PAST MEDICAL HISTORY   Past Medical History:  Past Medical History:   Diagnosis Date    Asthma     CAD (coronary artery disease)     Cerebral artery occlusion with cerebral infarction (HCC)     Colon polyp     COPD (chronic obstructive pulmonary disease) (HCC)     Elevated liver enzymes     Fibrocystic breast changes of both breasts 07/08/2015    GERD (gastroesophageal reflux disease)     Glaucoma 07/31/2020    Heart attack (HCC)     Hx of blood clots     Hypercholesterolemia 08/15/2013    Hypotension     Hypothyroidism 07/26/2019    Implantable loop recorder present 02/15/2023    Intestinal malrotation ([facility])     Irritable bowel syndrome     Migraine headache 07/26/2019    Multinodular goiter 01/27/2023    Nasogastric tube present     Patient stated does take soft feed by mouth    Neuritis of right saphenous nerve 07/14/2021    Nontraumatic tear of left rotator cuff, unspecified tear extent 07/06/2023    NSVT (nonsustained ventricular tachycardia) ([facility]) 04/10/2021    Pulmonary embolism ([facility]) 08/2022    Seizures ([facility])     lst one - March 2025    Sleep apnea     patient stated does not currently use a CPAP    Type 2 diabetes mellitus with complication, without long-term current use of insulin (HCC) 11/26/2023    Vasovagal syncope  Needs: No Transportation Needs (6/26/2025)    Received from [facility] [employer]    PRAPARE - Transportation     Lack of Transportation (Medical): No     Lack of Transportation (Non-Medical): No   Physical Activity: Insufficiently Active (12/7/2022)    Received from [facility]    Exercise Vital Sign     Days of Exercise per Week: 3 days     Minutes of Exercise per Session: 20 min   Stress: No Stress Concern Present (12/7/2022)    Received from [facility]    [employer] - Occupational Stress Questionnaire     Feeling of Stress : Only a little   Social Connections: Moderately Integrated (12/7/2022)    Received from [facility]    Social Connection and Isolation Panel [NHANES]     Frequency of Communication with Friends and Family: More than three times a week     Frequency of Social Gatherings with Friends and Family: Three times a week     Attends Rastafarian Services: More than 4 times per year     Active Member of Clubs or Organizations: Yes     Attends Club or Organization Meetings: More than 4 times per year     Marital Status:    Intimate Partner Violence: Not At Risk (12/7/2022)    Received from [facility]    Humiliation, Afraid, Rape, and Kick questionnaire     Fear of Current or Ex-Partner: No     Emotionally Abused: No     Physically Abused: No     Sexually Abused: No   Depression: Not at risk (7/10/2025)    Received from [facility]    PHQ-2     Patient Health Questionnaire-2 Score: 0   Housing Stability: Low Risk  (6/26/2025)    Received from [facility] [employer]    Housing Stability Vital Sign     Unable to Pay for Housing in the Last Year: No     Number of Times Moved in the Last Year: 0     Homeless in the Last Year: No   Interpersonal Safety: Not At Risk (7/10/2025)    Interpersonal Safety Domain Source: IP Abuse Screening     Physical abuse: Denies     Verbal abuse: Denies     Emotional abuse: Denies     Financial abuse: Denies     Sexual

## 2025-07-10 NOTE — DISCHARGE INSTRUCTIONS
Thank you for choosing our Emergency Department for your care.  It is our privilege to care for you in your time of need.  In the next several days, you may receive a survey via email or mailed to your home about your experience with our team.  We would greatly appreciate you taking a few minutes to complete the survey, as we use this information to learn what we have done well and what we could be doing better. Thank you for trusting us with your care!    Below you will find a list of your tests from today's visit.   Labs and Radiology Studies  Recent Results (from the past 12 hours)   BMP    Collection Time: 07/10/25  4:01 PM   Result Value Ref Range    Sodium 127 (L) 136 - 145 mmol/L    Potassium 4.4 3.5 - 5.1 mmol/L    Chloride 95 (L) 97 - 108 mmol/L    CO2 23 21 - 32 mmol/L    Anion Gap 9 2 - 12 mmol/L    Glucose 114 (H) 65 - 100 mg/dL    BUN 4 (L) 6 - 20 mg/dL    Creatinine 0.54 (L) 0.55 - 1.02 mg/dL    BUN/Creatinine Ratio 7 (L) 12 - 20      Est, Glom Filt Rate >90 >60 ml/min/1.73m2    Calcium 8.6 8.5 - 10.1 mg/dL   CBC with Auto Differential    Collection Time: 07/10/25  4:01 PM   Result Value Ref Range    WBC 4.4 3.6 - 11.0 K/uL    RBC 3.95 3.80 - 5.20 M/uL    Hemoglobin 10.8 (L) 11.5 - 16.0 g/dL    Hematocrit 30.1 (L) 35.0 - 47.0 %    MCV 76.2 (L) 80.0 - 99.0 FL    MCH 27.3 26.0 - 34.0 PG    MCHC 35.9 30.0 - 36.5 g/dL    RDW 12.5 11.5 - 14.5 %    Platelets 308 150 - 400 K/uL    MPV 8.8 (L) 8.9 - 12.9 FL    Nucleated RBCs 0.0 0.0  WBC    nRBC 0.00 0.00 - 0.01 K/uL    Neutrophils % 77.2 (H) 32.0 - 75.0 %    Lymphocytes % 15.8 12.0 - 49.0 %    Monocytes % 5.7 5.0 - 13.0 %    Eosinophils % 0.9 0.0 - 7.0 %    Basophils % 0.2 0.0 - 1.0 %    Immature Granulocytes % 0.2 0 - 0.5 %    Neutrophils Absolute 3.36 1.80 - 8.00 K/UL    Lymphocytes Absolute 0.69 (L) 0.80 - 3.50 K/UL    Monocytes Absolute 0.25 0.00 - 1.00 K/UL    Eosinophils Absolute 0.04 0.00 - 0.40 K/UL    Basophils Absolute 0.01 0.00 - 0.10

## 2025-07-11 LAB
EKG ATRIAL RATE: 54 BPM
EKG DIAGNOSIS: NORMAL
EKG P AXIS: 47 DEGREES
EKG P-R INTERVAL: 182 MS
EKG Q-T INTERVAL: 418 MS
EKG QRS DURATION: 92 MS
EKG QTC CALCULATION (BAZETT): 396 MS
EKG R AXIS: -1 DEGREES
EKG T AXIS: 11 DEGREES
EKG VENTRICULAR RATE: 54 BPM

## 2025-08-01 ENCOUNTER — APPOINTMENT (OUTPATIENT)
Facility: HOSPITAL | Age: 55
End: 2025-08-01
Payer: MEDICARE

## 2025-08-01 ENCOUNTER — HOSPITAL ENCOUNTER (EMERGENCY)
Facility: HOSPITAL | Age: 55
Discharge: HOME | End: 2025-08-01
Attending: EMERGENCY MEDICINE
Payer: MEDICARE

## 2025-08-01 VITALS
RESPIRATION RATE: 14 BRPM | TEMPERATURE: 98.8 F | BODY MASS INDEX: 25.49 KG/M2 | OXYGEN SATURATION: 100 % | DIASTOLIC BLOOD PRESSURE: 72 MMHG | SYSTOLIC BLOOD PRESSURE: 112 MMHG | HEART RATE: 64 BPM | HEIGHT: 65 IN | WEIGHT: 153 LBS

## 2025-08-01 DIAGNOSIS — E87.1 HYPONATREMIA: Primary | ICD-10-CM

## 2025-08-01 LAB
ALBUMIN SERPL-MCNC: 3.4 G/DL (ref 3.5–5)
ALBUMIN/GLOB SERPL: 1.3 (ref 1.1–2.2)
ALP SERPL-CCNC: 124 U/L (ref 45–117)
ALT SERPL-CCNC: 45 U/L (ref 12–78)
AMPHET UR QL SCN: NEGATIVE
ANION GAP SERPL CALC-SCNC: 10 MMOL/L (ref 2–12)
ANION GAP SERPL CALC-SCNC: 8 MMOL/L (ref 2–12)
APPEARANCE UR: CLEAR
AST SERPL W P-5'-P-CCNC: 25 U/L (ref 15–37)
BACTERIA URNS QL MICRO: NEGATIVE /HPF
BARBITURATES UR QL SCN: NEGATIVE
BASOPHILS # BLD: 0.02 K/UL (ref 0–0.1)
BASOPHILS NFR BLD: 0.6 % (ref 0–1)
BENZODIAZ UR QL: NEGATIVE
BILIRUB SERPL-MCNC: 0.3 MG/DL (ref 0.2–1)
BILIRUB UR QL: NEGATIVE
BNP SERPL-MCNC: 89 PG/ML
BUN SERPL-MCNC: 5 MG/DL (ref 6–20)
BUN SERPL-MCNC: 6 MG/DL (ref 6–20)
BUN/CREAT SERPL: 11 (ref 12–20)
BUN/CREAT SERPL: 16 (ref 12–20)
CA-I BLD-MCNC: 6.5 MG/DL (ref 8.5–10.1)
CA-I BLD-MCNC: 8.8 MG/DL (ref 8.5–10.1)
CANNABINOIDS UR QL SCN: POSITIVE
CHLORIDE SERPL-SCNC: 110 MMOL/L (ref 97–108)
CHLORIDE SERPL-SCNC: 93 MMOL/L (ref 97–108)
CO2 SERPL-SCNC: 18 MMOL/L (ref 21–32)
CO2 SERPL-SCNC: 24 MMOL/L (ref 21–32)
COCAINE UR QL SCN: NEGATIVE
COLOR UR: NORMAL
CREAT SERPL-MCNC: 0.31 MG/DL (ref 0.55–1.02)
CREAT SERPL-MCNC: 0.56 MG/DL (ref 0.55–1.02)
DIFFERENTIAL METHOD BLD: ABNORMAL
EKG ATRIAL RATE: 67 BPM
EKG DIAGNOSIS: NORMAL
EKG P AXIS: 57 DEGREES
EKG P-R INTERVAL: 166 MS
EKG Q-T INTERVAL: 390 MS
EKG QRS DURATION: 86 MS
EKG QTC CALCULATION (BAZETT): 412 MS
EKG R AXIS: 1 DEGREES
EKG T AXIS: 35 DEGREES
EKG VENTRICULAR RATE: 67 BPM
EOSINOPHIL # BLD: 0.11 K/UL (ref 0–0.4)
EOSINOPHIL NFR BLD: 3.3 % (ref 0–7)
EPITH CASTS URNS QL MICRO: NORMAL /LPF
ERYTHROCYTE [DISTWIDTH] IN BLOOD BY AUTOMATED COUNT: 14.4 % (ref 11.5–14.5)
ETHANOL SERPL-MCNC: <10 MG/DL (ref 0–0.08)
GLOBULIN SER CALC-MCNC: 2.7 G/DL (ref 2–4)
GLUCOSE SERPL-MCNC: 85 MG/DL (ref 65–100)
GLUCOSE SERPL-MCNC: 89 MG/DL (ref 65–100)
GLUCOSE UR STRIP.AUTO-MCNC: NEGATIVE MG/DL
HCT VFR BLD AUTO: 35.5 % (ref 35–47)
HGB BLD-MCNC: 12.7 G/DL (ref 11.5–16)
HGB UR QL STRIP: NEGATIVE
IMM GRANULOCYTES # BLD AUTO: 0.03 K/UL (ref 0–0.04)
IMM GRANULOCYTES NFR BLD AUTO: 0.9 % (ref 0–0.5)
KETONES UR QL STRIP.AUTO: NEGATIVE MG/DL
LEUKOCYTE ESTERASE UR QL STRIP.AUTO: NEGATIVE
LYMPHOCYTES # BLD: 1.12 K/UL (ref 0.8–3.5)
LYMPHOCYTES NFR BLD: 33.4 % (ref 12–49)
Lab: ABNORMAL
MAGNESIUM SERPL-MCNC: 2.3 MG/DL (ref 1.6–2.4)
MCH RBC QN AUTO: 27 PG (ref 26–34)
MCHC RBC AUTO-ENTMCNC: 35.8 G/DL (ref 30–36.5)
MCV RBC AUTO: 75.5 FL (ref 80–99)
METHADONE UR QL: NEGATIVE
MONOCYTES # BLD: 0.39 K/UL (ref 0–1)
MONOCYTES NFR BLD: 11.6 % (ref 5–13)
NEUTS SEG # BLD: 1.68 K/UL (ref 1.8–8)
NEUTS SEG NFR BLD: 50.2 % (ref 32–75)
NITRITE UR QL STRIP.AUTO: NEGATIVE
NRBC # BLD: 0 K/UL (ref 0–0.01)
NRBC BLD-RTO: 0 PER 100 WBC
OPIATES UR QL: NEGATIVE
PCP UR QL: NEGATIVE
PH UR STRIP: 6 (ref 5–8)
PHOSPHATE SERPL-MCNC: 3.7 MG/DL (ref 2.6–4.7)
PLATELET # BLD AUTO: 329 K/UL (ref 150–400)
PMV BLD AUTO: 10 FL (ref 8.9–12.9)
POTASSIUM SERPL-SCNC: 4 MMOL/L (ref 3.5–5.1)
POTASSIUM SERPL-SCNC: 4.5 MMOL/L (ref 3.5–5.1)
PROT SERPL-MCNC: 6.1 G/DL (ref 6.4–8.2)
PROT UR STRIP-MCNC: NEGATIVE MG/DL
RBC # BLD AUTO: 4.7 M/UL (ref 3.8–5.2)
RBC #/AREA URNS HPF: NORMAL /HPF (ref 0–5)
SODIUM SERPL-SCNC: 125 MMOL/L (ref 136–145)
SODIUM SERPL-SCNC: 138 MMOL/L (ref 136–145)
SP GR UR REFRACTOMETRY: <1.005 (ref 1–1.03)
TROPONIN I SERPL HS-MCNC: <4 NG/L (ref 0–51)
URINE CULTURE IF INDICATED: NORMAL
UROBILINOGEN UR QL STRIP.AUTO: 0.1 EU/DL (ref 0.1–1)
WBC # BLD AUTO: 3.4 K/UL (ref 3.6–11)
WBC URNS QL MICRO: NORMAL /HPF (ref 0–4)

## 2025-08-01 PROCEDURE — 84484 ASSAY OF TROPONIN QUANT: CPT

## 2025-08-01 PROCEDURE — 81001 URINALYSIS AUTO W/SCOPE: CPT

## 2025-08-01 PROCEDURE — 2580000003 HC RX 258: Performed by: EMERGENCY MEDICINE

## 2025-08-01 PROCEDURE — 83880 ASSAY OF NATRIURETIC PEPTIDE: CPT

## 2025-08-01 PROCEDURE — 80053 COMPREHEN METABOLIC PANEL: CPT

## 2025-08-01 PROCEDURE — 99285 EMERGENCY DEPT VISIT HI MDM: CPT

## 2025-08-01 PROCEDURE — 96360 HYDRATION IV INFUSION INIT: CPT

## 2025-08-01 PROCEDURE — 82077 ASSAY SPEC XCP UR&BREATH IA: CPT

## 2025-08-01 PROCEDURE — 71045 X-RAY EXAM CHEST 1 VIEW: CPT

## 2025-08-01 PROCEDURE — 93005 ELECTROCARDIOGRAM TRACING: CPT | Performed by: EMERGENCY MEDICINE

## 2025-08-01 PROCEDURE — 6360000002 HC RX W HCPCS: Performed by: EMERGENCY MEDICINE

## 2025-08-01 PROCEDURE — 96374 THER/PROPH/DIAG INJ IV PUSH: CPT

## 2025-08-01 PROCEDURE — 83735 ASSAY OF MAGNESIUM: CPT

## 2025-08-01 PROCEDURE — 84100 ASSAY OF PHOSPHORUS: CPT

## 2025-08-01 PROCEDURE — 80048 BASIC METABOLIC PNL TOTAL CA: CPT

## 2025-08-01 PROCEDURE — 70450 CT HEAD/BRAIN W/O DYE: CPT

## 2025-08-01 PROCEDURE — 85025 COMPLETE CBC W/AUTO DIFF WBC: CPT

## 2025-08-01 PROCEDURE — 80307 DRUG TEST PRSMV CHEM ANLYZR: CPT

## 2025-08-01 PROCEDURE — 96361 HYDRATE IV INFUSION ADD-ON: CPT

## 2025-08-01 RX ORDER — 0.9 % SODIUM CHLORIDE 0.9 %
1000 INTRAVENOUS SOLUTION INTRAVENOUS ONCE
Status: COMPLETED | OUTPATIENT
Start: 2025-08-01 | End: 2025-08-01

## 2025-08-01 RX ORDER — MORPHINE SULFATE 4 MG/ML
4 INJECTION, SOLUTION INTRAMUSCULAR; INTRAVENOUS
Refills: 0 | Status: COMPLETED | OUTPATIENT
Start: 2025-08-01 | End: 2025-08-01

## 2025-08-01 RX ORDER — SODIUM BICARBONATE 650 MG/1
650 TABLET ORAL 3 TIMES DAILY
Qty: 21 TABLET | Refills: 0 | Status: SHIPPED | OUTPATIENT
Start: 2025-08-01 | End: 2025-08-01

## 2025-08-01 RX ORDER — 0.9 % SODIUM CHLORIDE 0.9 %
1000 INTRAVENOUS SOLUTION INTRAVENOUS ONCE
Status: DISCONTINUED | OUTPATIENT
Start: 2025-08-01 | End: 2025-08-01

## 2025-08-01 RX ADMIN — MORPHINE SULFATE 4 MG: 4 INJECTION, SOLUTION INTRAMUSCULAR; INTRAVENOUS at 17:00

## 2025-08-01 RX ADMIN — SODIUM CHLORIDE 1000 ML: 9 INJECTION, SOLUTION INTRAVENOUS at 11:15

## 2025-08-01 RX ADMIN — SODIUM CHLORIDE 1000 ML: 0.9 INJECTION, SOLUTION INTRAVENOUS at 15:05

## 2025-08-01 ASSESSMENT — PAIN - FUNCTIONAL ASSESSMENT
PAIN_FUNCTIONAL_ASSESSMENT: 0-10
PAIN_FUNCTIONAL_ASSESSMENT: 0-10

## 2025-08-01 ASSESSMENT — PAIN SCALES - GENERAL
PAINLEVEL_OUTOF10: 7
PAINLEVEL_OUTOF10: 4
PAINLEVEL_OUTOF10: 1

## 2025-08-01 NOTE — ED PROVIDER NOTES
IV fluids.  BNP is within acceptable range.  Patient is well-appearing, hemoglobin stable no acute distress.  Appropriate for discharge with follow with PCP. [AA]      ED Course User Index  [AA] Yolie Bucio MD  [SW] Misseal Magana DO       Clinical Management Tools:  Not Applicable    Records Reviewed: Prior medical records and Nursing notes. See ED Course for summary.   Vitals:    Vitals:    08/01/25 1415 08/01/25 1425 08/01/25 1500 08/01/25 1830   BP: 119/74 118/72 108/76 112/72   Pulse: 57 55 61 64   Resp: 17 12 17 14   Temp:    98.8 °F (37.1 °C)   TempSrc:    Oral   SpO2: 100%   100%   Weight:       Height:           Patient was given the following medications:  Medications   sodium chloride 0.9 % bolus 1,000 mL (0 mLs IntraVENous Stopped 8/1/25 1226)   sodium chloride 0.9 % bolus 1,000 mL (0 mLs IntraVENous Stopped 8/1/25 1808)   morphine (PF) injection 4 mg (4 mg IntraVENous Given 8/1/25 1700)       CONSULTS: See ED Course/MDM for further details.  No consults.     Social Determinants affecting Dx or Tx: None    PROCEDURES   Procedures   N/a    SEPSIS REASSESSMENT   Sepsis Reassessment:   N/a    CRITICAL CARE TIME   Critical Care:  I personally spent a total of 40 minutes of critical care time in obtaining history, performing a physical exam, bedside monitoring of interventions, collecting, and interpreting tests but excluding time spent in any separately billed procedures and excluding time spent treating other patients and/or teaching time.   Discussion with consultant: n/a.  Clinical Concern: hyponatremia.   Intervention: iv fluids 2 L ns, close monitoring.  Missael Magana D.O.    DISPOSITION/PLAN   Social Determinants affecting Treatment Plan: None    DISPOSITION Decision To Discharge 08/01/2025 06:19:51 PM   DISPOSITION CONDITION Stable           Discharged.     PATIENT REFERRED TO:  [facility] Emergency Department  [address] [place] [address]  [phone]  Go to  (2000 UT) Caps capsule  Commonly known as: CHOLECALCIFEROL     zinc oxide 16 % Oint ointment  Apply topically 4 times daily as needed for Dry Skin              DISCONTINUED MEDICATIONS:  Discharge Medication List as of 8/1/2025  6:32 PM          ED FINAL IMPRESSION     1. Hyponatremia           I am the primary provider of record. Missael Magana DO (electronically signed)    (Please note that parts of this dictation were completed with voice recognition software. Quite often unanticipated grammatical, syntax, homophones, and other interpretive errors are inadvertently transcribed by the computer software. Please disregards these errors. Please excuse any errors that have escaped final proofreading.)       Missael Magana,   08/02/25 0050     Doxepin Counseling:  Patient advised that the medication is sedating and not to drive a car after taking this medication. Patient informed of potential adverse effects including but not limited to dry mouth, urinary retention, and blurry vision.  The patient verbalized understanding of the proper use and possible adverse effects of doxepin.  All of the patient's questions and concerns were addressed.

## 2025-08-01 NOTE — ED TRIAGE NOTES
Pt states she came in because the doctor sent her in for her low sodium lab. Pt states she has been falling all week. Pt states her right arm is hurting from fall.

## 2025-08-07 ENCOUNTER — HOSPITAL ENCOUNTER (INPATIENT)
Facility: HOSPITAL | Age: 55
LOS: 3 days | Discharge: HOME | DRG: 640 | End: 2025-08-10
Attending: STUDENT IN AN ORGANIZED HEALTH CARE EDUCATION/TRAINING PROGRAM
Payer: MEDICARE

## 2025-08-07 ENCOUNTER — APPOINTMENT (OUTPATIENT)
Facility: HOSPITAL | Age: 55
DRG: 640 | End: 2025-08-07
Payer: MEDICARE

## 2025-08-07 DIAGNOSIS — M79.601 RIGHT ARM PAIN: Primary | ICD-10-CM

## 2025-08-07 DIAGNOSIS — E87.1 HYPONATREMIA: ICD-10-CM

## 2025-08-07 LAB
ALBUMIN SERPL-MCNC: 3 G/DL (ref 3.5–5)
ALBUMIN SERPL-MCNC: 4 G/DL (ref 3.5–5)
ALBUMIN/GLOB SERPL: 1.1 (ref 1.1–2.2)
ALBUMIN/GLOB SERPL: 1.3 (ref 1.1–2.2)
ALP SERPL-CCNC: 109 U/L (ref 45–117)
ALP SERPL-CCNC: 141 U/L (ref 45–117)
ALT SERPL-CCNC: 55 U/L (ref 12–78)
ALT SERPL-CCNC: 71 U/L (ref 12–78)
ANION GAP SERPL CALC-SCNC: 7 MMOL/L (ref 2–12)
ANION GAP SERPL CALC-SCNC: 8 MMOL/L (ref 2–12)
AST SERPL W P-5'-P-CCNC: 63 U/L (ref 15–37)
AST SERPL W P-5'-P-CCNC: ABNORMAL U/L (ref 15–37)
BASOPHILS # BLD: 0.02 K/UL (ref 0–0.1)
BASOPHILS NFR BLD: 0.3 % (ref 0–1)
BILIRUB SERPL-MCNC: 0.4 MG/DL (ref 0.2–1)
BILIRUB SERPL-MCNC: 0.5 MG/DL (ref 0.2–1)
BUN SERPL-MCNC: 6 MG/DL (ref 6–20)
BUN SERPL-MCNC: 7 MG/DL (ref 6–20)
BUN/CREAT SERPL: 10 (ref 12–20)
BUN/CREAT SERPL: 8 (ref 12–20)
CA-I BLD-MCNC: 7.3 MG/DL (ref 8.5–10.1)
CA-I BLD-MCNC: 8.5 MG/DL (ref 8.5–10.1)
CHLORIDE SERPL-SCNC: 81 MMOL/L (ref 97–108)
CHLORIDE SERPL-SCNC: 90 MMOL/L (ref 97–108)
CO2 SERPL-SCNC: 26 MMOL/L (ref 21–32)
CO2 SERPL-SCNC: 29 MMOL/L (ref 21–32)
CREAT SERPL-MCNC: 0.72 MG/DL (ref 0.55–1.02)
CREAT SERPL-MCNC: 0.74 MG/DL (ref 0.55–1.02)
DIFFERENTIAL METHOD BLD: ABNORMAL
EKG ATRIAL RATE: 74 BPM
EKG DIAGNOSIS: NORMAL
EKG P AXIS: 50 DEGREES
EKG P-R INTERVAL: 160 MS
EKG Q-T INTERVAL: 416 MS
EKG QRS DURATION: 94 MS
EKG QTC CALCULATION (BAZETT): 461 MS
EKG R AXIS: -7 DEGREES
EKG T AXIS: 35 DEGREES
EKG VENTRICULAR RATE: 74 BPM
EOSINOPHIL # BLD: 0.07 K/UL (ref 0–0.4)
EOSINOPHIL NFR BLD: 1 % (ref 0–7)
ERYTHROCYTE [DISTWIDTH] IN BLOOD BY AUTOMATED COUNT: 15 % (ref 11.5–14.5)
GLOBULIN SER CALC-MCNC: 2.7 G/DL (ref 2–4)
GLOBULIN SER CALC-MCNC: 3.2 G/DL (ref 2–4)
GLUCOSE SERPL-MCNC: 80 MG/DL (ref 65–100)
GLUCOSE SERPL-MCNC: 95 MG/DL (ref 65–100)
HCT VFR BLD AUTO: 33.5 % (ref 35–47)
HGB BLD-MCNC: 12.2 G/DL (ref 11.5–16)
IMM GRANULOCYTES # BLD AUTO: 0.05 K/UL (ref 0–0.04)
IMM GRANULOCYTES NFR BLD AUTO: 0.7 % (ref 0–0.5)
LYMPHOCYTES # BLD: 2.18 K/UL (ref 0.8–3.5)
LYMPHOCYTES NFR BLD: 32 % (ref 12–49)
MCH RBC QN AUTO: 27.1 PG (ref 26–34)
MCHC RBC AUTO-ENTMCNC: 36.4 G/DL (ref 30–36.5)
MCV RBC AUTO: 74.3 FL (ref 80–99)
MONOCYTES # BLD: 0.57 K/UL (ref 0–1)
MONOCYTES NFR BLD: 8.4 % (ref 5–13)
NEUTS SEG # BLD: 3.92 K/UL (ref 1.8–8)
NEUTS SEG NFR BLD: 57.6 % (ref 32–75)
NRBC # BLD: 0 K/UL (ref 0–0.01)
NRBC BLD-RTO: 0 PER 100 WBC
PLATELET # BLD AUTO: 385 K/UL (ref 150–400)
PMV BLD AUTO: 9.7 FL (ref 8.9–12.9)
POTASSIUM SERPL-SCNC: 4.6 MMOL/L (ref 3.5–5.1)
POTASSIUM SERPL-SCNC: ABNORMAL MMOL/L (ref 3.5–5.1)
POTASSIUM UR-SCNC: 29 MMOL/L
PROT SERPL-MCNC: 5.7 G/DL (ref 6.4–8.2)
PROT SERPL-MCNC: 7.2 G/DL (ref 6.4–8.2)
RBC # BLD AUTO: 4.51 M/UL (ref 3.8–5.2)
SODIUM SERPL-SCNC: 117 MMOL/L (ref 136–145)
SODIUM SERPL-SCNC: 124 MMOL/L (ref 136–145)
SODIUM UR-SCNC: 21 MMOL/L
TROPONIN I SERPL HS-MCNC: <4 NG/L (ref 0–51)
TROPONIN I SERPL HS-MCNC: <4 NG/L (ref 0–51)
WBC # BLD AUTO: 6.8 K/UL (ref 3.6–11)

## 2025-08-07 PROCEDURE — 6360000002 HC RX W HCPCS

## 2025-08-07 PROCEDURE — 2580000003 HC RX 258: Performed by: HOSPITALIST

## 2025-08-07 PROCEDURE — 71046 X-RAY EXAM CHEST 2 VIEWS: CPT

## 2025-08-07 PROCEDURE — 96374 THER/PROPH/DIAG INJ IV PUSH: CPT

## 2025-08-07 PROCEDURE — 6360000002 HC RX W HCPCS: Performed by: HOSPITALIST

## 2025-08-07 PROCEDURE — 85025 COMPLETE CBC W/AUTO DIFF WBC: CPT

## 2025-08-07 PROCEDURE — 2580000003 HC RX 258

## 2025-08-07 PROCEDURE — 99285 EMERGENCY DEPT VISIT HI MDM: CPT

## 2025-08-07 PROCEDURE — 36415 COLL VENOUS BLD VENIPUNCTURE: CPT

## 2025-08-07 PROCEDURE — 96361 HYDRATE IV INFUSION ADD-ON: CPT

## 2025-08-07 PROCEDURE — 84484 ASSAY OF TROPONIN QUANT: CPT

## 2025-08-07 PROCEDURE — 83935 ASSAY OF URINE OSMOLALITY: CPT

## 2025-08-07 PROCEDURE — 1100000000 HC RM PRIVATE

## 2025-08-07 PROCEDURE — 2500000003 HC RX 250 WO HCPCS

## 2025-08-07 PROCEDURE — 73060 X-RAY EXAM OF HUMERUS: CPT

## 2025-08-07 PROCEDURE — 6370000000 HC RX 637 (ALT 250 FOR IP)

## 2025-08-07 PROCEDURE — 96376 TX/PRO/DX INJ SAME DRUG ADON: CPT

## 2025-08-07 PROCEDURE — 84300 ASSAY OF URINE SODIUM: CPT

## 2025-08-07 PROCEDURE — 96375 TX/PRO/DX INJ NEW DRUG ADDON: CPT

## 2025-08-07 PROCEDURE — 93005 ELECTROCARDIOGRAM TRACING: CPT

## 2025-08-07 PROCEDURE — 84133 ASSAY OF URINE POTASSIUM: CPT

## 2025-08-07 PROCEDURE — 80053 COMPREHEN METABOLIC PANEL: CPT

## 2025-08-07 RX ORDER — MAGNESIUM SULFATE IN WATER 40 MG/ML
2000 INJECTION, SOLUTION INTRAVENOUS PRN
Status: DISCONTINUED | OUTPATIENT
Start: 2025-08-07 | End: 2025-08-10 | Stop reason: HOSPADM

## 2025-08-07 RX ORDER — POTASSIUM CHLORIDE 1500 MG/1
40 TABLET, EXTENDED RELEASE ORAL PRN
Status: DISCONTINUED | OUTPATIENT
Start: 2025-08-07 | End: 2025-08-10 | Stop reason: HOSPADM

## 2025-08-07 RX ORDER — 0.9 % SODIUM CHLORIDE 0.9 %
500 INTRAVENOUS SOLUTION INTRAVENOUS ONCE
Status: DISCONTINUED | OUTPATIENT
Start: 2025-08-07 | End: 2025-08-07

## 2025-08-07 RX ORDER — LEVOTHYROXINE SODIUM 75 UG/1
150 TABLET ORAL DAILY
Status: DISCONTINUED | OUTPATIENT
Start: 2025-08-08 | End: 2025-08-10 | Stop reason: HOSPADM

## 2025-08-07 RX ORDER — ONDANSETRON 2 MG/ML
4 INJECTION INTRAMUSCULAR; INTRAVENOUS EVERY 6 HOURS PRN
Status: DISCONTINUED | OUTPATIENT
Start: 2025-08-07 | End: 2025-08-10 | Stop reason: HOSPADM

## 2025-08-07 RX ORDER — DESMOPRESSIN ACETATE 4 UG/ML
2 INJECTION, SOLUTION INTRAVENOUS; SUBCUTANEOUS ONCE
Status: COMPLETED | OUTPATIENT
Start: 2025-08-07 | End: 2025-08-07

## 2025-08-07 RX ORDER — ATORVASTATIN CALCIUM 40 MG/1
80 TABLET, FILM COATED ORAL NIGHTLY
Status: DISCONTINUED | OUTPATIENT
Start: 2025-08-08 | End: 2025-08-10 | Stop reason: HOSPADM

## 2025-08-07 RX ORDER — LEVETIRACETAM 500 MG/1
1500 TABLET ORAL 2 TIMES DAILY
Status: DISCONTINUED | OUTPATIENT
Start: 2025-08-07 | End: 2025-08-10 | Stop reason: HOSPADM

## 2025-08-07 RX ORDER — SODIUM CHLORIDE 0.9 % (FLUSH) 0.9 %
5-40 SYRINGE (ML) INJECTION PRN
Status: DISCONTINUED | OUTPATIENT
Start: 2025-08-07 | End: 2025-08-10 | Stop reason: HOSPADM

## 2025-08-07 RX ORDER — POLYETHYLENE GLYCOL 3350 17 G/17G
17 POWDER, FOR SOLUTION ORAL DAILY PRN
Status: DISCONTINUED | OUTPATIENT
Start: 2025-08-07 | End: 2025-08-10 | Stop reason: HOSPADM

## 2025-08-07 RX ORDER — SODIUM CHLORIDE 9 MG/ML
INJECTION, SOLUTION INTRAVENOUS PRN
Status: DISCONTINUED | OUTPATIENT
Start: 2025-08-07 | End: 2025-08-10 | Stop reason: HOSPADM

## 2025-08-07 RX ORDER — LIDOCAINE 50 MG/G
1 PATCH TOPICAL DAILY
Qty: 10 PATCH | Refills: 0 | Status: SHIPPED | OUTPATIENT
Start: 2025-08-07 | End: 2025-08-17

## 2025-08-07 RX ORDER — ONDANSETRON 4 MG/1
4 TABLET, ORALLY DISINTEGRATING ORAL EVERY 8 HOURS PRN
Status: DISCONTINUED | OUTPATIENT
Start: 2025-08-07 | End: 2025-08-10 | Stop reason: HOSPADM

## 2025-08-07 RX ORDER — MORPHINE SULFATE 4 MG/ML
4 INJECTION, SOLUTION INTRAMUSCULAR; INTRAVENOUS
Status: COMPLETED | OUTPATIENT
Start: 2025-08-07 | End: 2025-08-07

## 2025-08-07 RX ORDER — TRAZODONE HYDROCHLORIDE 50 MG/1
100 TABLET ORAL NIGHTLY
Status: DISCONTINUED | OUTPATIENT
Start: 2025-08-07 | End: 2025-08-10 | Stop reason: HOSPADM

## 2025-08-07 RX ORDER — ACETAMINOPHEN 650 MG/1
650 SUPPOSITORY RECTAL EVERY 6 HOURS PRN
Status: DISCONTINUED | OUTPATIENT
Start: 2025-08-07 | End: 2025-08-07

## 2025-08-07 RX ORDER — MIDODRINE HYDROCHLORIDE 5 MG/1
5 TABLET ORAL 2 TIMES DAILY
Status: DISCONTINUED | OUTPATIENT
Start: 2025-08-07 | End: 2025-08-10 | Stop reason: HOSPADM

## 2025-08-07 RX ORDER — ALBUTEROL SULFATE 90 UG/1
2 INHALANT RESPIRATORY (INHALATION) EVERY 4 HOURS PRN
Status: DISCONTINUED | OUTPATIENT
Start: 2025-08-07 | End: 2025-08-10 | Stop reason: HOSPADM

## 2025-08-07 RX ORDER — DEXTROSE MONOHYDRATE 50 MG/ML
INJECTION, SOLUTION INTRAVENOUS CONTINUOUS
Status: DISCONTINUED | OUTPATIENT
Start: 2025-08-07 | End: 2025-08-07

## 2025-08-07 RX ORDER — TRAMADOL HYDROCHLORIDE 50 MG/1
50 TABLET ORAL EVERY 6 HOURS PRN
Status: DISCONTINUED | OUTPATIENT
Start: 2025-08-07 | End: 2025-08-10 | Stop reason: HOSPADM

## 2025-08-07 RX ORDER — SODIUM CHLORIDE 0.9 % (FLUSH) 0.9 %
5-40 SYRINGE (ML) INJECTION EVERY 12 HOURS SCHEDULED
Status: DISCONTINUED | OUTPATIENT
Start: 2025-08-07 | End: 2025-08-10 | Stop reason: HOSPADM

## 2025-08-07 RX ORDER — ACETAMINOPHEN 325 MG/1
650 TABLET ORAL EVERY 6 HOURS PRN
Status: DISCONTINUED | OUTPATIENT
Start: 2025-08-07 | End: 2025-08-07

## 2025-08-07 RX ORDER — POTASSIUM CHLORIDE 7.45 MG/ML
10 INJECTION INTRAVENOUS PRN
Status: DISCONTINUED | OUTPATIENT
Start: 2025-08-07 | End: 2025-08-10 | Stop reason: HOSPADM

## 2025-08-07 RX ORDER — 0.9 % SODIUM CHLORIDE 0.9 %
500 INTRAVENOUS SOLUTION INTRAVENOUS ONCE
Status: COMPLETED | OUTPATIENT
Start: 2025-08-07 | End: 2025-08-07

## 2025-08-07 RX ORDER — MORPHINE SULFATE 2 MG/ML
2 INJECTION, SOLUTION INTRAMUSCULAR; INTRAVENOUS EVERY 4 HOURS PRN
Refills: 0 | Status: DISCONTINUED | OUTPATIENT
Start: 2025-08-07 | End: 2025-08-08

## 2025-08-07 RX ORDER — CYANOCOBALAMIN 1000 UG/ML
1000 INJECTION, SOLUTION INTRAMUSCULAR; SUBCUTANEOUS
Status: DISCONTINUED | OUTPATIENT
Start: 2025-08-07 | End: 2025-08-08

## 2025-08-07 RX ORDER — ENOXAPARIN SODIUM 100 MG/ML
30 INJECTION SUBCUTANEOUS DAILY
Status: DISCONTINUED | OUTPATIENT
Start: 2025-08-08 | End: 2025-08-10 | Stop reason: HOSPADM

## 2025-08-07 RX ADMIN — TRAZODONE HYDROCHLORIDE 100 MG: 50 TABLET ORAL at 21:00

## 2025-08-07 RX ADMIN — DESMOPRESSIN ACETATE 2 MCG: 4 INJECTION, SOLUTION INTRAVENOUS; SUBCUTANEOUS at 18:22

## 2025-08-07 RX ADMIN — MORPHINE SULFATE 4 MG: 4 INJECTION, SOLUTION INTRAMUSCULAR; INTRAVENOUS at 16:07

## 2025-08-07 RX ADMIN — SODIUM CHLORIDE 500 ML: 0.9 INJECTION, SOLUTION INTRAVENOUS at 17:20

## 2025-08-07 RX ADMIN — LEVETIRACETAM 1500 MG: 500 TABLET, FILM COATED ORAL at 21:00

## 2025-08-07 RX ADMIN — MORPHINE SULFATE 4 MG: 4 INJECTION, SOLUTION INTRAMUSCULAR; INTRAVENOUS at 16:44

## 2025-08-07 RX ADMIN — SODIUM CHLORIDE, PRESERVATIVE FREE 10 ML: 5 INJECTION INTRAVENOUS at 21:01

## 2025-08-07 RX ADMIN — DEXTROSE MONOHYDRATE 1000 ML: 50 INJECTION, SOLUTION INTRAVENOUS at 18:16

## 2025-08-07 RX ADMIN — SODIUM CHLORIDE 500 ML: 9 INJECTION, SOLUTION INTRAVENOUS at 14:49

## 2025-08-07 ASSESSMENT — PAIN - FUNCTIONAL ASSESSMENT
PAIN_FUNCTIONAL_ASSESSMENT: 0-10
PAIN_FUNCTIONAL_ASSESSMENT: 0-10

## 2025-08-07 ASSESSMENT — PAIN SCALES - GENERAL
PAINLEVEL_OUTOF10: 3
PAINLEVEL_OUTOF10: 4

## 2025-08-08 LAB
ANION GAP SERPL CALC-SCNC: 10 MMOL/L (ref 2–12)
ANION GAP SERPL CALC-SCNC: 5 MMOL/L (ref 2–12)
ANION GAP SERPL CALC-SCNC: 7 MMOL/L (ref 2–12)
ANION GAP SERPL CALC-SCNC: 7 MMOL/L (ref 2–12)
BUN SERPL-MCNC: 6 MG/DL (ref 6–20)
BUN SERPL-MCNC: 7 MG/DL (ref 6–20)
BUN SERPL-MCNC: 7 MG/DL (ref 6–20)
BUN SERPL-MCNC: 8 MG/DL (ref 6–20)
BUN/CREAT SERPL: 10 (ref 12–20)
BUN/CREAT SERPL: 14 (ref 12–20)
BUN/CREAT SERPL: 15 (ref 12–20)
BUN/CREAT SERPL: 16 (ref 12–20)
CA-I BLD-MCNC: 8 MG/DL (ref 8.5–10.1)
CA-I BLD-MCNC: 8 MG/DL (ref 8.5–10.1)
CA-I BLD-MCNC: 8.3 MG/DL (ref 8.5–10.1)
CA-I BLD-MCNC: 8.3 MG/DL (ref 8.5–10.1)
CHLORIDE SERPL-SCNC: 83 MMOL/L (ref 97–108)
CHLORIDE SERPL-SCNC: 84 MMOL/L (ref 97–108)
CHLORIDE SERPL-SCNC: 86 MMOL/L (ref 97–108)
CHLORIDE SERPL-SCNC: 95 MMOL/L (ref 97–108)
CK SERPL-CCNC: 70 U/L (ref 26–192)
CO2 SERPL-SCNC: 28 MMOL/L (ref 21–32)
CO2 SERPL-SCNC: 29 MMOL/L (ref 21–32)
CO2 SERPL-SCNC: 29 MMOL/L (ref 21–32)
CO2 SERPL-SCNC: 30 MMOL/L (ref 21–32)
CREAT SERPL-MCNC: 0.45 MG/DL (ref 0.55–1.02)
CREAT SERPL-MCNC: 0.51 MG/DL (ref 0.55–1.02)
CREAT SERPL-MCNC: 0.53 MG/DL (ref 0.55–1.02)
CREAT SERPL-MCNC: 0.6 MG/DL (ref 0.55–1.02)
GLUCOSE BLD STRIP.AUTO-MCNC: 72 MG/DL (ref 65–100)
GLUCOSE BLD STRIP.AUTO-MCNC: 75 MG/DL (ref 65–100)
GLUCOSE BLD STRIP.AUTO-MCNC: 82 MG/DL (ref 65–100)
GLUCOSE BLD STRIP.AUTO-MCNC: 91 MG/DL (ref 65–100)
GLUCOSE SERPL-MCNC: 76 MG/DL (ref 65–100)
GLUCOSE SERPL-MCNC: 77 MG/DL (ref 65–100)
GLUCOSE SERPL-MCNC: 82 MG/DL (ref 65–100)
GLUCOSE SERPL-MCNC: 90 MG/DL (ref 65–100)
PERFORMED BY:: NORMAL
POTASSIUM SERPL-SCNC: 3.1 MMOL/L (ref 3.5–5.1)
POTASSIUM SERPL-SCNC: 3.2 MMOL/L (ref 3.5–5.1)
POTASSIUM SERPL-SCNC: 3.6 MMOL/L (ref 3.5–5.1)
POTASSIUM SERPL-SCNC: 3.6 MMOL/L (ref 3.5–5.1)
SODIUM SERPL-SCNC: 119 MMOL/L (ref 136–145)
SODIUM SERPL-SCNC: 122 MMOL/L (ref 136–145)
SODIUM SERPL-SCNC: 123 MMOL/L (ref 136–145)
SODIUM SERPL-SCNC: 129 MMOL/L (ref 136–145)
T4 FREE SERPL-MCNC: 1.2 NG/DL (ref 0.9–1.6)
T4 FREE SERPL-MCNC: 1.4 NG/DL (ref 0.9–1.6)
TSH, 3RD GENERATION: 0.04 UIU/ML (ref 0.27–4.2)
TSH, 3RD GENERATION: 0.07 UIU/ML (ref 0.27–4.2)

## 2025-08-08 PROCEDURE — 82550 ASSAY OF CK (CPK): CPT

## 2025-08-08 PROCEDURE — 82962 GLUCOSE BLOOD TEST: CPT

## 2025-08-08 PROCEDURE — 6370000000 HC RX 637 (ALT 250 FOR IP): Performed by: PHYSICIAN ASSISTANT

## 2025-08-08 PROCEDURE — 6370000000 HC RX 637 (ALT 250 FOR IP): Performed by: HOSPITALIST

## 2025-08-08 PROCEDURE — 6370000000 HC RX 637 (ALT 250 FOR IP)

## 2025-08-08 PROCEDURE — 94761 N-INVAS EAR/PLS OXIMETRY MLT: CPT

## 2025-08-08 PROCEDURE — 1100000000 HC RM PRIVATE

## 2025-08-08 PROCEDURE — 6360000002 HC RX W HCPCS

## 2025-08-08 PROCEDURE — 6360000002 HC RX W HCPCS: Performed by: PHYSICIAN ASSISTANT

## 2025-08-08 PROCEDURE — 2580000003 HC RX 258: Performed by: HOSPITALIST

## 2025-08-08 PROCEDURE — 80048 BASIC METABOLIC PNL TOTAL CA: CPT

## 2025-08-08 PROCEDURE — 84443 ASSAY THYROID STIM HORMONE: CPT

## 2025-08-08 PROCEDURE — 2500000003 HC RX 250 WO HCPCS

## 2025-08-08 PROCEDURE — 36415 COLL VENOUS BLD VENIPUNCTURE: CPT

## 2025-08-08 PROCEDURE — 84439 ASSAY OF FREE THYROXINE: CPT

## 2025-08-08 RX ORDER — OXYCODONE AND ACETAMINOPHEN 5; 325 MG/1; MG/1
2 TABLET ORAL EVERY 6 HOURS PRN
Refills: 0 | Status: DISCONTINUED | OUTPATIENT
Start: 2025-08-08 | End: 2025-08-08

## 2025-08-08 RX ORDER — SODIUM CHLORIDE 1 G/1
2 TABLET ORAL 3 TIMES DAILY
Status: DISCONTINUED | OUTPATIENT
Start: 2025-08-08 | End: 2025-08-08

## 2025-08-08 RX ORDER — CYANOCOBALAMIN 1000 UG/ML
1000 INJECTION, SOLUTION INTRAMUSCULAR; SUBCUTANEOUS
Status: DISCONTINUED | OUTPATIENT
Start: 2025-09-02 | End: 2025-08-10 | Stop reason: HOSPADM

## 2025-08-08 RX ORDER — POTASSIUM CHLORIDE 1500 MG/1
40 TABLET, EXTENDED RELEASE ORAL ONCE
Status: DISCONTINUED | OUTPATIENT
Start: 2025-08-08 | End: 2025-08-08

## 2025-08-08 RX ORDER — DESMOPRESSIN ACETATE 4 UG/ML
2 INJECTION, SOLUTION INTRAVENOUS; SUBCUTANEOUS ONCE
Status: COMPLETED | OUTPATIENT
Start: 2025-08-08 | End: 2025-08-09

## 2025-08-08 RX ORDER — SODIUM CHLORIDE 1 G/1
1 TABLET ORAL 3 TIMES DAILY
Status: DISCONTINUED | OUTPATIENT
Start: 2025-08-08 | End: 2025-08-10 | Stop reason: HOSPADM

## 2025-08-08 RX ORDER — SODIUM CHLORIDE 9 MG/ML
INJECTION, SOLUTION INTRAVENOUS CONTINUOUS
Status: DISCONTINUED | OUTPATIENT
Start: 2025-08-08 | End: 2025-08-10

## 2025-08-08 RX ORDER — OXYCODONE HYDROCHLORIDE 5 MG/1
5 TABLET ORAL EVERY 4 HOURS PRN
Refills: 0 | Status: DISCONTINUED | OUTPATIENT
Start: 2025-08-08 | End: 2025-08-10 | Stop reason: HOSPADM

## 2025-08-08 RX ORDER — POTASSIUM CHLORIDE 1500 MG/1
40 TABLET, EXTENDED RELEASE ORAL ONCE
Status: COMPLETED | OUTPATIENT
Start: 2025-08-08 | End: 2025-08-08

## 2025-08-08 RX ADMIN — MIDODRINE HYDROCHLORIDE 5 MG: 5 TABLET ORAL at 09:32

## 2025-08-08 RX ADMIN — OXYCODONE 5 MG: 5 TABLET ORAL at 22:13

## 2025-08-08 RX ADMIN — SODIUM CHLORIDE, PRESERVATIVE FREE 10 ML: 5 INJECTION INTRAVENOUS at 09:33

## 2025-08-08 RX ADMIN — LEVOTHYROXINE SODIUM 150 MCG: 0.07 TABLET ORAL at 06:05

## 2025-08-08 RX ADMIN — TRAZODONE HYDROCHLORIDE 100 MG: 50 TABLET ORAL at 22:12

## 2025-08-08 RX ADMIN — LEVETIRACETAM 1500 MG: 500 TABLET, FILM COATED ORAL at 08:25

## 2025-08-08 RX ADMIN — LEVETIRACETAM 1500 MG: 500 TABLET, FILM COATED ORAL at 22:12

## 2025-08-08 RX ADMIN — POTASSIUM CHLORIDE 40 MEQ: 1500 TABLET, EXTENDED RELEASE ORAL at 09:32

## 2025-08-08 RX ADMIN — OXYCODONE 5 MG: 5 TABLET ORAL at 17:47

## 2025-08-08 RX ADMIN — ATORVASTATIN CALCIUM 80 MG: 40 TABLET, FILM COATED ORAL at 22:12

## 2025-08-08 RX ADMIN — Medication 2 G: at 11:41

## 2025-08-08 RX ADMIN — SODIUM CHLORIDE: 0.9 INJECTION, SOLUTION INTRAVENOUS at 11:37

## 2025-08-08 RX ADMIN — SODIUM CHLORIDE, PRESERVATIVE FREE 10 ML: 5 INJECTION INTRAVENOUS at 22:13

## 2025-08-08 RX ADMIN — Medication 1 G: at 22:12

## 2025-08-08 RX ADMIN — TRAMADOL HYDROCHLORIDE 50 MG: 50 TABLET, COATED ORAL at 08:25

## 2025-08-08 RX ADMIN — Medication 2 G: at 13:25

## 2025-08-08 RX ADMIN — SODIUM CHLORIDE: 0.9 INJECTION, SOLUTION INTRAVENOUS at 22:16

## 2025-08-08 RX ADMIN — MORPHINE SULFATE 2 MG: 2 INJECTION, SOLUTION INTRAMUSCULAR; INTRAVENOUS at 01:31

## 2025-08-08 RX ADMIN — OXYCODONE 5 MG: 5 TABLET ORAL at 13:24

## 2025-08-08 RX ADMIN — ENOXAPARIN SODIUM 30 MG: 100 INJECTION SUBCUTANEOUS at 08:26

## 2025-08-08 ASSESSMENT — PAIN SCALES - GENERAL
PAINLEVEL_OUTOF10: 4
PAINLEVEL_OUTOF10: 0
PAINLEVEL_OUTOF10: 7
PAINLEVEL_OUTOF10: 8
PAINLEVEL_OUTOF10: 2
PAINLEVEL_OUTOF10: 7
PAINLEVEL_OUTOF10: 8

## 2025-08-08 ASSESSMENT — PAIN DESCRIPTION - ORIENTATION
ORIENTATION: RIGHT
ORIENTATION: PROXIMAL
ORIENTATION: RIGHT
ORIENTATION: RIGHT

## 2025-08-08 ASSESSMENT — PAIN DESCRIPTION - DESCRIPTORS
DESCRIPTORS: ACHING

## 2025-08-08 ASSESSMENT — PAIN DESCRIPTION - LOCATION
LOCATION: ABDOMEN;ARM
LOCATION: ARM

## 2025-08-08 ASSESSMENT — PAIN SCALES - WONG BAKER
WONGBAKER_NUMERICALRESPONSE: HURTS A LITTLE BIT

## 2025-08-09 LAB
ANION GAP SERPL CALC-SCNC: 6 MMOL/L (ref 2–12)
ANION GAP SERPL CALC-SCNC: 8 MMOL/L (ref 2–12)
BASOPHILS # BLD: 0.02 K/UL (ref 0–0.1)
BASOPHILS NFR BLD: 0.4 % (ref 0–1)
BUN SERPL-MCNC: 5 MG/DL (ref 6–20)
BUN SERPL-MCNC: 7 MG/DL (ref 6–20)
BUN/CREAT SERPL: 12 (ref 12–20)
BUN/CREAT SERPL: 13 (ref 12–20)
CA-I BLD-MCNC: 8.4 MG/DL (ref 8.5–10.1)
CA-I BLD-MCNC: 8.5 MG/DL (ref 8.5–10.1)
CHLORIDE SERPL-SCNC: 97 MMOL/L (ref 97–108)
CHLORIDE SERPL-SCNC: 98 MMOL/L (ref 97–108)
CO2 SERPL-SCNC: 26 MMOL/L (ref 21–32)
CO2 SERPL-SCNC: 29 MMOL/L (ref 21–32)
CORTIS AM PEAK SERPL-MCNC: 18 UG/DL (ref 4.8–19.5)
CREAT SERPL-MCNC: 0.42 MG/DL (ref 0.55–1.02)
CREAT SERPL-MCNC: 0.56 MG/DL (ref 0.55–1.02)
DIFFERENTIAL METHOD BLD: ABNORMAL
EOSINOPHIL # BLD: 0.07 K/UL (ref 0–0.4)
EOSINOPHIL NFR BLD: 1.4 % (ref 0–7)
ERYTHROCYTE [DISTWIDTH] IN BLOOD BY AUTOMATED COUNT: 15.3 % (ref 11.5–14.5)
GLUCOSE BLD STRIP.AUTO-MCNC: 191 MG/DL (ref 65–100)
GLUCOSE BLD STRIP.AUTO-MCNC: 82 MG/DL (ref 65–100)
GLUCOSE BLD STRIP.AUTO-MCNC: 87 MG/DL (ref 65–100)
GLUCOSE BLD STRIP.AUTO-MCNC: 87 MG/DL (ref 65–100)
GLUCOSE BLD STRIP.AUTO-MCNC: 88 MG/DL (ref 65–100)
GLUCOSE SERPL-MCNC: 81 MG/DL (ref 65–100)
GLUCOSE SERPL-MCNC: 84 MG/DL (ref 65–100)
HCT VFR BLD AUTO: 31.8 % (ref 35–47)
HGB BLD-MCNC: 11 G/DL (ref 11.5–16)
IMM GRANULOCYTES # BLD AUTO: 0.03 K/UL (ref 0–0.04)
IMM GRANULOCYTES NFR BLD AUTO: 0.6 % (ref 0–0.5)
LYMPHOCYTES # BLD: 1.13 K/UL (ref 0.8–3.5)
LYMPHOCYTES NFR BLD: 22.1 % (ref 12–49)
MAGNESIUM SERPL-MCNC: 2.2 MG/DL (ref 1.6–2.4)
MCH RBC QN AUTO: 27.3 PG (ref 26–34)
MCHC RBC AUTO-ENTMCNC: 34.6 G/DL (ref 30–36.5)
MCV RBC AUTO: 78.9 FL (ref 80–99)
MONOCYTES # BLD: 0.52 K/UL (ref 0–1)
MONOCYTES NFR BLD: 10.2 % (ref 5–13)
NEUTS SEG # BLD: 3.34 K/UL (ref 1.8–8)
NEUTS SEG NFR BLD: 65.3 % (ref 32–75)
NRBC # BLD: 0 K/UL (ref 0–0.01)
NRBC BLD-RTO: 0 PER 100 WBC
PERFORMED BY:: ABNORMAL
PERFORMED BY:: NORMAL
PHOSPHATE SERPL-MCNC: 3.3 MG/DL (ref 2.6–4.7)
PLATELET # BLD AUTO: 296 K/UL (ref 150–400)
PMV BLD AUTO: 9.6 FL (ref 8.9–12.9)
POTASSIUM SERPL-SCNC: 3.2 MMOL/L (ref 3.5–5.1)
POTASSIUM SERPL-SCNC: 3.8 MMOL/L (ref 3.5–5.1)
RBC # BLD AUTO: 4.03 M/UL (ref 3.8–5.2)
SODIUM SERPL-SCNC: 131 MMOL/L (ref 136–145)
SODIUM SERPL-SCNC: 133 MMOL/L (ref 136–145)
WBC # BLD AUTO: 5.1 K/UL (ref 3.6–11)

## 2025-08-09 PROCEDURE — 80048 BASIC METABOLIC PNL TOTAL CA: CPT

## 2025-08-09 PROCEDURE — 36415 COLL VENOUS BLD VENIPUNCTURE: CPT

## 2025-08-09 PROCEDURE — 6360000002 HC RX W HCPCS

## 2025-08-09 PROCEDURE — 6370000000 HC RX 637 (ALT 250 FOR IP)

## 2025-08-09 PROCEDURE — 2500000003 HC RX 250 WO HCPCS

## 2025-08-09 PROCEDURE — 1100000000 HC RM PRIVATE

## 2025-08-09 PROCEDURE — 82533 TOTAL CORTISOL: CPT

## 2025-08-09 PROCEDURE — 6360000002 HC RX W HCPCS: Performed by: HOSPITALIST

## 2025-08-09 PROCEDURE — 2580000003 HC RX 258: Performed by: HOSPITALIST

## 2025-08-09 PROCEDURE — 84100 ASSAY OF PHOSPHORUS: CPT

## 2025-08-09 PROCEDURE — 85025 COMPLETE CBC W/AUTO DIFF WBC: CPT

## 2025-08-09 PROCEDURE — 6370000000 HC RX 637 (ALT 250 FOR IP): Performed by: HOSPITALIST

## 2025-08-09 PROCEDURE — 83735 ASSAY OF MAGNESIUM: CPT

## 2025-08-09 PROCEDURE — 82962 GLUCOSE BLOOD TEST: CPT

## 2025-08-09 RX ORDER — DEXTROSE MONOHYDRATE 50 MG/ML
INJECTION, SOLUTION INTRAVENOUS CONTINUOUS
Status: DISPENSED | OUTPATIENT
Start: 2025-08-09 | End: 2025-08-09

## 2025-08-09 RX ORDER — POTASSIUM CHLORIDE 1500 MG/1
40 TABLET, EXTENDED RELEASE ORAL ONCE
Status: COMPLETED | OUTPATIENT
Start: 2025-08-09 | End: 2025-08-09

## 2025-08-09 RX ORDER — SUMATRIPTAN SUCCINATE 25 MG/1
100 TABLET ORAL ONCE
Status: COMPLETED | OUTPATIENT
Start: 2025-08-09 | End: 2025-08-09

## 2025-08-09 RX ADMIN — LEVOTHYROXINE SODIUM 150 MCG: 0.07 TABLET ORAL at 07:39

## 2025-08-09 RX ADMIN — DEXTROSE 1000 ML: 5 SOLUTION INTRAVENOUS at 08:51

## 2025-08-09 RX ADMIN — SODIUM CHLORIDE, PRESERVATIVE FREE 10 ML: 5 INJECTION INTRAVENOUS at 08:50

## 2025-08-09 RX ADMIN — TRAZODONE HYDROCHLORIDE 100 MG: 50 TABLET ORAL at 21:05

## 2025-08-09 RX ADMIN — DESMOPRESSIN ACETATE 2 MCG: 4 INJECTION INTRAVENOUS; SUBCUTANEOUS at 00:44

## 2025-08-09 RX ADMIN — OXYCODONE 5 MG: 5 TABLET ORAL at 05:13

## 2025-08-09 RX ADMIN — Medication 1 G: at 08:52

## 2025-08-09 RX ADMIN — ENOXAPARIN SODIUM 30 MG: 100 INJECTION SUBCUTANEOUS at 08:51

## 2025-08-09 RX ADMIN — POTASSIUM CHLORIDE 40 MEQ: 1500 TABLET, EXTENDED RELEASE ORAL at 10:51

## 2025-08-09 RX ADMIN — DEXTROSE 1000 ML: 5 SOLUTION INTRAVENOUS at 00:50

## 2025-08-09 RX ADMIN — Medication 1 G: at 14:50

## 2025-08-09 RX ADMIN — SODIUM CHLORIDE, PRESERVATIVE FREE 10 ML: 5 INJECTION INTRAVENOUS at 21:57

## 2025-08-09 RX ADMIN — OXYCODONE 5 MG: 5 TABLET ORAL at 11:26

## 2025-08-09 RX ADMIN — DEXTROSE: 5 SOLUTION INTRAVENOUS at 11:24

## 2025-08-09 RX ADMIN — MIDODRINE HYDROCHLORIDE 5 MG: 5 TABLET ORAL at 08:52

## 2025-08-09 RX ADMIN — LEVETIRACETAM 1500 MG: 500 TABLET, FILM COATED ORAL at 08:52

## 2025-08-09 RX ADMIN — LEVETIRACETAM 1500 MG: 500 TABLET, FILM COATED ORAL at 21:05

## 2025-08-09 RX ADMIN — ATORVASTATIN CALCIUM 80 MG: 40 TABLET, FILM COATED ORAL at 21:05

## 2025-08-09 RX ADMIN — OXYCODONE 5 MG: 5 TABLET ORAL at 21:05

## 2025-08-09 RX ADMIN — Medication 1 G: at 21:05

## 2025-08-09 RX ADMIN — SUMATRIPTAN SUCCINATE 100 MG: 25 TABLET ORAL at 15:52

## 2025-08-09 ASSESSMENT — PAIN DESCRIPTION - LOCATION
LOCATION: HEAD
LOCATION: ARM

## 2025-08-09 ASSESSMENT — PAIN DESCRIPTION - ORIENTATION: ORIENTATION: RIGHT

## 2025-08-09 ASSESSMENT — PAIN SCALES - GENERAL
PAINLEVEL_OUTOF10: 8
PAINLEVEL_OUTOF10: 0
PAINLEVEL_OUTOF10: 0
PAINLEVEL_OUTOF10: 8
PAINLEVEL_OUTOF10: 7
PAINLEVEL_OUTOF10: 8
PAINLEVEL_OUTOF10: 8

## 2025-08-09 ASSESSMENT — PAIN - FUNCTIONAL ASSESSMENT
PAIN_FUNCTIONAL_ASSESSMENT: 0-10

## 2025-08-09 ASSESSMENT — PAIN DESCRIPTION - DESCRIPTORS
DESCRIPTORS: SHARP;ACHING
DESCRIPTORS: ACHING;SHARP

## 2025-08-10 VITALS
TEMPERATURE: 98.1 F | RESPIRATION RATE: 20 BRPM | BODY MASS INDEX: 18.49 KG/M2 | HEIGHT: 65 IN | SYSTOLIC BLOOD PRESSURE: 124 MMHG | HEART RATE: 68 BPM | DIASTOLIC BLOOD PRESSURE: 88 MMHG | WEIGHT: 111 LBS | OXYGEN SATURATION: 99 %

## 2025-08-10 LAB
ANION GAP SERPL CALC-SCNC: 5 MMOL/L (ref 2–12)
BASOPHILS # BLD: 0.02 K/UL (ref 0–0.1)
BASOPHILS NFR BLD: 0.5 % (ref 0–1)
BUN SERPL-MCNC: 3 MG/DL (ref 6–20)
BUN/CREAT SERPL: 6 (ref 12–20)
CA-I BLD-MCNC: 8.7 MG/DL (ref 8.5–10.1)
CHLORIDE SERPL-SCNC: 105 MMOL/L (ref 97–108)
CO2 SERPL-SCNC: 27 MMOL/L (ref 21–32)
CREAT SERPL-MCNC: 0.54 MG/DL (ref 0.55–1.02)
DIFFERENTIAL METHOD BLD: ABNORMAL
EOSINOPHIL # BLD: 0.08 K/UL (ref 0–0.4)
EOSINOPHIL NFR BLD: 1.8 % (ref 0–7)
ERYTHROCYTE [DISTWIDTH] IN BLOOD BY AUTOMATED COUNT: 15.6 % (ref 11.5–14.5)
GLUCOSE BLD STRIP.AUTO-MCNC: 159 MG/DL (ref 65–100)
GLUCOSE BLD STRIP.AUTO-MCNC: 88 MG/DL (ref 65–100)
GLUCOSE SERPL-MCNC: 85 MG/DL (ref 65–100)
HCT VFR BLD AUTO: 32.5 % (ref 35–47)
HGB BLD-MCNC: 11.2 G/DL (ref 11.5–16)
IMM GRANULOCYTES # BLD AUTO: 0.02 K/UL (ref 0–0.04)
IMM GRANULOCYTES NFR BLD AUTO: 0.5 % (ref 0–0.5)
LYMPHOCYTES # BLD: 1.32 K/UL (ref 0.8–3.5)
LYMPHOCYTES NFR BLD: 30.1 % (ref 12–49)
MCH RBC QN AUTO: 27.5 PG (ref 26–34)
MCHC RBC AUTO-ENTMCNC: 34.5 G/DL (ref 30–36.5)
MCV RBC AUTO: 79.7 FL (ref 80–99)
MONOCYTES # BLD: 0.42 K/UL (ref 0–1)
MONOCYTES NFR BLD: 9.6 % (ref 5–13)
NEUTS SEG # BLD: 2.53 K/UL (ref 1.8–8)
NEUTS SEG NFR BLD: 57.5 % (ref 32–75)
NRBC # BLD: 0 K/UL (ref 0–0.01)
NRBC BLD-RTO: 0 PER 100 WBC
PERFORMED BY:: ABNORMAL
PERFORMED BY:: NORMAL
PLATELET # BLD AUTO: 306 K/UL (ref 150–400)
PMV BLD AUTO: 9.4 FL (ref 8.9–12.9)
POTASSIUM SERPL-SCNC: 3.5 MMOL/L (ref 3.5–5.1)
RBC # BLD AUTO: 4.08 M/UL (ref 3.8–5.2)
SODIUM SERPL-SCNC: 137 MMOL/L (ref 136–145)
WBC # BLD AUTO: 4.4 K/UL (ref 3.6–11)

## 2025-08-10 PROCEDURE — 82962 GLUCOSE BLOOD TEST: CPT

## 2025-08-10 PROCEDURE — 6370000000 HC RX 637 (ALT 250 FOR IP)

## 2025-08-10 PROCEDURE — 2500000003 HC RX 250 WO HCPCS

## 2025-08-10 PROCEDURE — 6360000002 HC RX W HCPCS

## 2025-08-10 PROCEDURE — 2580000003 HC RX 258: Performed by: HOSPITALIST

## 2025-08-10 PROCEDURE — 36415 COLL VENOUS BLD VENIPUNCTURE: CPT

## 2025-08-10 PROCEDURE — 6370000000 HC RX 637 (ALT 250 FOR IP): Performed by: HOSPITALIST

## 2025-08-10 PROCEDURE — 80048 BASIC METABOLIC PNL TOTAL CA: CPT

## 2025-08-10 PROCEDURE — 85025 COMPLETE CBC W/AUTO DIFF WBC: CPT

## 2025-08-10 RX ADMIN — DEXTROSE 1000 ML: 5 SOLUTION INTRAVENOUS at 12:03

## 2025-08-10 RX ADMIN — MIDODRINE HYDROCHLORIDE 5 MG: 5 TABLET ORAL at 09:55

## 2025-08-10 RX ADMIN — Medication 1 G: at 09:55

## 2025-08-10 RX ADMIN — OXYCODONE 5 MG: 5 TABLET ORAL at 01:32

## 2025-08-10 RX ADMIN — SODIUM CHLORIDE, PRESERVATIVE FREE 10 ML: 5 INJECTION INTRAVENOUS at 09:48

## 2025-08-10 RX ADMIN — Medication 1 G: at 15:19

## 2025-08-10 RX ADMIN — LEVETIRACETAM 1500 MG: 500 TABLET, FILM COATED ORAL at 09:55

## 2025-08-10 RX ADMIN — LEVOTHYROXINE SODIUM 150 MCG: 0.07 TABLET ORAL at 06:26

## 2025-08-10 RX ADMIN — ENOXAPARIN SODIUM 30 MG: 100 INJECTION SUBCUTANEOUS at 09:55

## 2025-08-10 ASSESSMENT — PAIN - FUNCTIONAL ASSESSMENT: PAIN_FUNCTIONAL_ASSESSMENT: 0-10

## 2025-08-10 ASSESSMENT — PAIN SCALES - GENERAL
PAINLEVEL_OUTOF10: 0
PAINLEVEL_OUTOF10: 8

## 2025-08-31 ENCOUNTER — HOSPITAL ENCOUNTER (EMERGENCY)
Facility: HOSPITAL | Age: 55
Discharge: HOME | End: 2025-08-31
Attending: EMERGENCY MEDICINE
Payer: MEDICARE

## 2025-08-31 VITALS
HEIGHT: 60 IN | SYSTOLIC BLOOD PRESSURE: 119 MMHG | RESPIRATION RATE: 18 BRPM | DIASTOLIC BLOOD PRESSURE: 77 MMHG | BODY MASS INDEX: 27.88 KG/M2 | TEMPERATURE: 98.4 F | HEART RATE: 72 BPM | WEIGHT: 142 LBS | OXYGEN SATURATION: 100 %

## 2025-08-31 DIAGNOSIS — Z09 S/P GASTROSTOMY TUBE (G TUBE) PLACEMENT, FOLLOW-UP EXAM: Primary | ICD-10-CM

## 2025-08-31 PROCEDURE — 99283 EMERGENCY DEPT VISIT LOW MDM: CPT

## 2025-08-31 RX ORDER — NYSTATIN 100000 U/G
CREAM TOPICAL
Qty: 100 G | Refills: 0 | Status: SHIPPED | OUTPATIENT
Start: 2025-08-31

## 2025-08-31 ASSESSMENT — PAIN SCALES - GENERAL: PAINLEVEL_OUTOF10: 5

## 2025-08-31 ASSESSMENT — LIFESTYLE VARIABLES
HOW MANY STANDARD DRINKS CONTAINING ALCOHOL DO YOU HAVE ON A TYPICAL DAY: PATIENT DOES NOT DRINK
HOW OFTEN DO YOU HAVE A DRINK CONTAINING ALCOHOL: NEVER

## (undated) DEVICE — SOL IRR SOD CL 0.9% 3000ML --

## (undated) DEVICE — TUBING, SUCTION, 1/4" X 12', STRAIGHT: Brand: MEDLINE

## (undated) DEVICE — STERILE POLYISOPRENE POWDER-FREE SURGICAL GLOVES WITH EMOLLIENT COATING: Brand: PROTEXIS

## (undated) DEVICE — BLADE SHV L13CM DIA4MM BNE CUT AGG DEB COOLCUT

## (undated) DEVICE — TUBING PMP L8FT LNG W/ CONN FOR AR-6400 REDEUCE

## (undated) DEVICE — TEGADERM ABSORB CLEAR 1 12FT X 2 14IN BX5

## (undated) DEVICE — 3M™ STERI-DRAPE™ SMALL DRAPE WITH ADHESIVE APERTURE 1092 25/BX,4/CS&#X20;: Brand: STERI-DRAPE™

## (undated) DEVICE — WASTEBAG DRIP/ADAPTER: Brand: MEDLINE INDUSTRIES, INC.

## (undated) DEVICE — FLOOR PAD ICE BLUE BURNISHING UHS 27IN

## (undated) DEVICE — SYR 20ML LL STRL LF --

## (undated) DEVICE — STERILE POLYISOPRENE POWDER-FREE SURGICAL GLOVES: Brand: PROTEXIS

## (undated) DEVICE — GOWN,PREVENTION PLUS,XLN/XL,ST,24/CS: Brand: MEDLINE

## (undated) DEVICE — SPONGE GZ W4XL4IN COT 12 PLY TYP VII WVN C FLD DSGN STERILE

## (undated) DEVICE — MOUTHPIECE ENDOSCP 20X27MM --

## (undated) DEVICE — BLADE,CARBON-STEEL,15,STRL,DISPOSABLE,TB: Brand: MEDLINE

## (undated) DEVICE — SUT ETHLN 3-0 18IN FS1 BLK --

## (undated) DEVICE — NEEDLE SPNL 18GA L3.5IN W/ QNCKE SHARPER BVL DURA CLICK

## (undated) DEVICE — T-MAX DISPOSABLE FACE MASK 8 PER BOX

## (undated) DEVICE — FORCEPS BX L240CM JAW DIA2.8MM L CAP W/ NDL MIC MESH TOOTH

## (undated) DEVICE — BASIC SINGLE BASIN-LF: Brand: MEDLINE INDUSTRIES, INC.

## (undated) DEVICE — ARMBRD IV STRP 1.5X32IN FOAM -- DISP

## (undated) DEVICE — GLOVE SURG SZ 8 L12IN FNGR THK79MIL GRN LTX FREE

## (undated) DEVICE — GARMENT,MEDLINE,DVT,INT,CALF,MED, GEN2: Brand: MEDLINE

## (undated) DEVICE — POUCH FLD 36X29IN SHLDR HVY LO GLARE MATTE FINISH FLM 2 SUCT

## (undated) DEVICE — ENDO KIT 1: Brand: MEDLINE INDUSTRIES, INC.

## (undated) DEVICE — BAND COMPR L24CM REG CLR PLAS HEMSTAT EXT HK AND LOOP RETEN

## (undated) DEVICE — TUBE IRRIG L8IN LNG PT W/ CONN FOR PMP SYS REDEUCE

## (undated) DEVICE — TRAY BX SFT TISS W/ RUL ALC PREP PD FEN DRP TWL LUERLOCK

## (undated) DEVICE — TUBING PMP L6FT CONT WAVE EXTN

## (undated) DEVICE — ELECTRODE PT RET AD L9FT HI MOIST COND ADH HYDRGEL CORDED

## (undated) DEVICE — PADDING CST 6IN STERILE --

## (undated) DEVICE — RADIFOCUS OPTITORQUE ANGIOGRAPHIC CATHETER: Brand: OPTITORQUE

## (undated) DEVICE — 3M™ TEGADERM™ TRANSPARENT FILM DRESSING FRAME STYLE, 1629, 8 IN X 12 IN (20 CM X 30 CM), 10/CT 8CT/CASE: Brand: 3M™ TEGADERM™

## (undated) DEVICE — SLING ARM L ABV 13IN DE-ROTATION STRP HOOKS PROVIDE IMMOB

## (undated) DEVICE — COVER LT HNDL BLU PLAS

## (undated) DEVICE — INTENT TO BE USED WITH SUTURE MATERIAL FOR TISSUE CLOSURE: Brand: RICHARD-ALLAN® NEEDLE 1/2 CIRCLE TAPER

## (undated) DEVICE — NEEDLE SUT PASS FOR ROT CUF LABRAL REP MULTFI SCORPION

## (undated) DEVICE — SYRINGE MED 10ML RED POLYCARB BRL FIX M LUER CONN FLAT GRP

## (undated) DEVICE — BANDAGE COBAN 4 IN COMPR W4INXL5YD FOAM COHESIVE QUIK STK SELF ADH SFT

## (undated) DEVICE — GAUZE,SPONGE,4"X4",16PLY,STRL,LF,10/TRAY: Brand: MEDLINE

## (undated) DEVICE — DRAPE,U/ SHT,SPLIT,PLAS,STERIL: Brand: MEDLINE

## (undated) DEVICE — ZIMMER® STERILE DISPOSABLE TOURNIQUET CUFF WITH PROTECTIVE SLEEVE AND PLC, DUAL PORT, SINGLE BLADDER, 42 IN. (107 CM)

## (undated) DEVICE — SUTURE NONABSORBABLE MONOFILAMENT 4-0 FS-2 18 IN ETHILON 662H

## (undated) DEVICE — BLADE,CARBON-STEEL,11,STRL,DISPOSABLE,TB: Brand: MEDLINE

## (undated) DEVICE — TUBING ANGIO L30IN ID18MM 1200PSI POLYUR FLX BRAID AIRLESS

## (undated) DEVICE — Device: Brand: JELCO

## (undated) DEVICE — DRAPE SURG TOWEL SM 18X12 IN INVISISHIELD MP W/ ADH PLAS NS

## (undated) DEVICE — GLIDESHEATH SLENDER STAINLESS STEEL KIT: Brand: GLIDESHEATH SLENDER

## (undated) DEVICE — Z CONVERTED USE 2271386 BANDAGE COMPR W6INXL11YD WVN COTTON/ELASTIC CLP CLSR DBL

## (undated) DEVICE — BLADE SHV L13CM DIA4MM EXCALIBUR AGG COOLCUT

## (undated) DEVICE — CHS SHOULDER ARTHROSCOPY: Brand: MEDLINE INDUSTRIES, INC.

## (undated) DEVICE — SHOULDER STABILIZATION KIT,                                    DISPOSABLE 12 PER BOX

## (undated) DEVICE — ENDOSCOPIC KIT 1.1+ DE BOWL

## (undated) DEVICE — GLOVE SURG SZ 75 CRM LTX FREE POLYISOPRENE POLYMER BEAD ANTI

## (undated) DEVICE — FCPS RAD JAW 4LC 240CM W/NDL -- BX/20 RADIAL JAW 4

## (undated) DEVICE — MASTISOL ADHESIVE LIQ 2/3ML

## (undated) DEVICE — CANNULA NSL O2 AD 7 FT END-TIDAL CARBON DIOX VENTFLO

## (undated) DEVICE — BUR SHV L13CM DIA4MM 8 FLUT OVL FOR RAP AGG BNE RESECT

## (undated) DEVICE — SUTURE FIBERLOOP SZ 2-0 L20IN NONABSORBABLE BLU STR NDL AR7234

## (undated) DEVICE — SUTURE VCRL RAPIDE SZ 3-0 L18IN ABSRB UD PS-2 L19MM 3/8 CIR VR497

## (undated) DEVICE — GARMENT CMPR STD UNV CALF FLWT -- RN VENTILATE NS DISP 17- IN

## (undated) DEVICE — INTENDED FOR TISSUE SEPARATION, AND OTHER PROCEDURES THAT REQUIRE A SHARP SURGICAL BLADE TO PUNCTURE OR CUT.: Brand: BARD-PARKER ® CARBON RIB-BACK BLADES

## (undated) DEVICE — APPLICATOR MEDICATED 26 CC SOLUTION HI LT ORNG CHLORAPREP

## (undated) DEVICE — SOUTHSIDE TURNOVER: Brand: MEDLINE INDUSTRIES, INC.

## (undated) DEVICE — 3M™ IOBAN™ 2 ANTIMICROBIAL INCISE DRAPE 6650EZ: Brand: IOBAN™ 2

## (undated) DEVICE — 3M™ TEGADERM™ TRANSPARENT FILM DRESSING FRAME STYLE, 1626W, 4 IN X 4-3/4 IN (10 CM X 12 CM), 50/CT 4CT/CASE: Brand: 3M™ TEGADERM™

## (undated) DEVICE — 3M™ STERI-DRAPE™ U-DRAPE 1015: Brand: STERI-DRAPE™

## (undated) DEVICE — CATHETER COR DIAG PIGTAILS PIG 145 CRV 5FR 110CM 6 SIDE H

## (undated) DEVICE — YANKAUER,BULB TIP,W/O VENT,RIGID,STERILE: Brand: MEDLINE

## (undated) DEVICE — TRAY,IRRIGATN,BULB SYRGE,60ML,LIDDED,ALC: Brand: MEDLINE

## (undated) DEVICE — GUIDEWIRE VASC L260CM DIA0.035IN TIP L3MM STD EXCHG PTFE J

## (undated) DEVICE — PADDING CAST SPEC 6INX4YD COT --

## (undated) DEVICE — DRAPE,REIN 53X77,STERILE: Brand: MEDLINE

## (undated) DEVICE — MAX-CORE® DISPOSABLE CORE BIOPSY INSTRUMENT, 16G X 16CM: Brand: MAX-CORE

## (undated) DEVICE — DRESSING GZ W3XL16IN CELOS ACETT OIL EMUL N ADH

## (undated) DEVICE — SUTURE ETHLN SZ 3-0 L18IN NONABSORBABLE BLK FS-1 L24MM 3/8 663H

## (undated) DEVICE — TRAY SURG CUST CRD CATH 3 PRT SSR

## (undated) DEVICE — TOWEL SURG W17XL27IN STD BLU COT NONFENESTRATED PREWASHED

## (undated) DEVICE — TURNOVER KIT OR CUST CMB FLD GEN LF

## (undated) DEVICE — TURNOVER KIT A GEN SURG

## (undated) DEVICE — SUTURE FIBERWIRE SZ 2 W/ TAPERED NEEDLE BLUE L38IN NONABSORB BLU L26.5MM 1/2 CIRCLE AR7200

## (undated) DEVICE — TUBING SUCTION UNIV 3/16 INX20 FT W/ SCALLOPED CONN STRL

## (undated) DEVICE — PREP SKN CHLRAPRP 26ML TNT -- CONVERT TO ITEM 373320

## (undated) DEVICE — PENCIL ES CRD L10FT HND SWCHING ROCK SWCH W/ EDGE COAT BLDE

## (undated) DEVICE — PROBE ABLAT 90DEG ASPIR MULTIPORT BPLR RF 1 PC ELECTRD ERGO

## (undated) DEVICE — CANNULA ARTHSCP L3CM DIA10MM DBL DAM 1 PC MOLD LO PROF FLNG

## (undated) DEVICE — ARTHROSCOPY PROCEDURE: Brand: MEDLINE INDUSTRIES, INC.

## (undated) DEVICE — SUTURE PDS + SZ 0 L27IN ABSRB VLT L36MM CT 1 1 2 CIR PDP340H

## (undated) DEVICE — SUTURE MCRYL + SZ 3-0 L27IN ABSRB UD L26MM SH 1/2 CIR MCP416H

## (undated) DEVICE — 48" PROBE COVER W/GEL, ULTRASOUND, STERILE: Brand: SITE-RITE

## (undated) DEVICE — CANNULA NASAL ADULT 10FT ETCO2/CO2 VENTFLO

## (undated) DEVICE — LAMINECTOMY ARM CRADLE FOAM POSITIONER: Brand: CARDINAL HEALTH

## (undated) DEVICE — MASK ANES INF SZ 2 PREM TAIL VLV INFL PRT UNSCENTED SGL PT

## (undated) DEVICE — SYRINGE MED 10ML PUR GAM COMPATIBLE POLYCARB FIX M LUER CONN

## (undated) DEVICE — THE ENDO CARRY-ON PROCEDURE KIT CONTAINS ALL OF THE SUPPLIES AND INFECTION PREVENTION PRODUCTS NEEDED FOR ENDOSCOPIC PROCEDURES: Brand: ENDO CARRY-ON PROCEDURE KIT